# Patient Record
Sex: MALE | Race: WHITE | NOT HISPANIC OR LATINO | Employment: FULL TIME | URBAN - METROPOLITAN AREA
[De-identification: names, ages, dates, MRNs, and addresses within clinical notes are randomized per-mention and may not be internally consistent; named-entity substitution may affect disease eponyms.]

---

## 2020-11-12 RX ORDER — SODIUM, POTASSIUM,MAG SULFATES 17.5-3.13G
1 SOLUTION, RECONSTITUTED, ORAL ORAL DAILY
Qty: 1 KIT | Refills: 0 | Status: SHIPPED | OUTPATIENT
Start: 2020-11-12 | End: 2020-11-14

## 2020-12-28 ENCOUNTER — TELEPHONE (OUTPATIENT)
Dept: ENDOSCOPY | Facility: HOSPITAL | Age: 55
End: 2020-12-28

## 2020-12-28 ENCOUNTER — ANESTHESIA EVENT (OUTPATIENT)
Dept: ENDOSCOPY | Facility: HOSPITAL | Age: 55
End: 2020-12-28
Payer: COMMERCIAL

## 2020-12-28 NOTE — TELEPHONE ENCOUNTER
----- Message from Rossy Barba sent at 12/28/2020 11:34 AM CST -----  Regarding: Regarding procedure and Covid test  Contact: Narendra Ng:  Needs Medical Advice    Who Called: Pt  Would the patient rather a call back or a response via [facility]? Call back  Best Call Back Number: [phone]  Additional Information: Says he was told that he would get a rapid test the day of his procedure and need to speak with someone in office before he start the prep for his Endo visit on tomorrow morning

## 2020-12-29 ENCOUNTER — HOSPITAL ENCOUNTER (OUTPATIENT)
Facility: HOSPITAL | Age: 55
Discharge: HOME | End: 2020-12-29
Attending: STUDENT IN AN ORGANIZED HEALTH CARE EDUCATION/TRAINING PROGRAM | Admitting: STUDENT IN AN ORGANIZED HEALTH CARE EDUCATION/TRAINING PROGRAM
Payer: COMMERCIAL

## 2020-12-29 ENCOUNTER — ANESTHESIA (OUTPATIENT)
Dept: ENDOSCOPY | Facility: HOSPITAL | Age: 55
End: 2020-12-29
Payer: COMMERCIAL

## 2020-12-29 VITALS
OXYGEN SATURATION: 97 % | SYSTOLIC BLOOD PRESSURE: 138 MMHG | HEIGHT: 73 IN | WEIGHT: 295 LBS | TEMPERATURE: 98 F | RESPIRATION RATE: 20 BRPM | BODY MASS INDEX: 39.1 KG/M2 | DIASTOLIC BLOOD PRESSURE: 62 MMHG | HEART RATE: 77 BPM

## 2020-12-29 DIAGNOSIS — Z12.11 COLON CANCER SCREENING: ICD-10-CM

## 2020-12-29 LAB
POCT GLUCOSE: 123 MG/DL (ref 70–110)
SARS-COV-2 RDRP RESP QL NAA+PROBE: NEGATIVE

## 2020-12-29 PROCEDURE — 45385 COLONOSCOPY W/LESION REMOVAL: CPT | Mod: 33,,, | Performed by: STUDENT IN AN ORGANIZED HEALTH CARE EDUCATION/TRAINING PROGRAM

## 2020-12-29 PROCEDURE — 45385 COLONOSCOPY W/LESION REMOVAL: CPT | Performed by: STUDENT IN AN ORGANIZED HEALTH CARE EDUCATION/TRAINING PROGRAM

## 2020-12-29 PROCEDURE — 27201012 HC FORCEPS, HOT/COLD, DISP: Performed by: STUDENT IN AN ORGANIZED HEALTH CARE EDUCATION/TRAINING PROGRAM

## 2020-12-29 PROCEDURE — 27201089 HC SNARE, DISP (ANY): Performed by: STUDENT IN AN ORGANIZED HEALTH CARE EDUCATION/TRAINING PROGRAM

## 2020-12-29 PROCEDURE — 63600175 PHARM REV CODE 636 W HCPCS: Performed by: NURSE ANESTHETIST, CERTIFIED REGISTERED

## 2020-12-29 PROCEDURE — 88305 TISSUE EXAM BY PATHOLOGIST: ICD-10-PCS | Mod: 26,,, | Performed by: PATHOLOGY

## 2020-12-29 PROCEDURE — 27201028 HC NEEDLE, SCLERO: Performed by: STUDENT IN AN ORGANIZED HEALTH CARE EDUCATION/TRAINING PROGRAM

## 2020-12-29 PROCEDURE — 37000008 HC ANESTHESIA 1ST 15 MINUTES: Performed by: STUDENT IN AN ORGANIZED HEALTH CARE EDUCATION/TRAINING PROGRAM

## 2020-12-29 PROCEDURE — U0002 COVID-19 LAB TEST NON-CDC: HCPCS

## 2020-12-29 PROCEDURE — 45380 COLONOSCOPY AND BIOPSY: CPT | Mod: 59,,, | Performed by: STUDENT IN AN ORGANIZED HEALTH CARE EDUCATION/TRAINING PROGRAM

## 2020-12-29 PROCEDURE — D9220A PRA ANESTHESIA: ICD-10-PCS | Mod: 33,CRNA,, | Performed by: NURSE ANESTHETIST, CERTIFIED REGISTERED

## 2020-12-29 PROCEDURE — 45380 COLONOSCOPY AND BIOPSY: CPT | Performed by: STUDENT IN AN ORGANIZED HEALTH CARE EDUCATION/TRAINING PROGRAM

## 2020-12-29 PROCEDURE — 45380 PR COLONOSCOPY,BIOPSY: ICD-10-PCS | Mod: 59,,, | Performed by: STUDENT IN AN ORGANIZED HEALTH CARE EDUCATION/TRAINING PROGRAM

## 2020-12-29 PROCEDURE — 45381 PR COLONOSCPY,FLEX,W/DIR SUBMUC INJECT: ICD-10-PCS | Mod: 51,,, | Performed by: STUDENT IN AN ORGANIZED HEALTH CARE EDUCATION/TRAINING PROGRAM

## 2020-12-29 PROCEDURE — 37000009 HC ANESTHESIA EA ADD 15 MINS: Performed by: STUDENT IN AN ORGANIZED HEALTH CARE EDUCATION/TRAINING PROGRAM

## 2020-12-29 PROCEDURE — D9220A PRA ANESTHESIA: Mod: 33,ANES,, | Performed by: ANESTHESIOLOGY

## 2020-12-29 PROCEDURE — 45381 COLONOSCOPY SUBMUCOUS NJX: CPT | Performed by: STUDENT IN AN ORGANIZED HEALTH CARE EDUCATION/TRAINING PROGRAM

## 2020-12-29 PROCEDURE — 25000003 PHARM REV CODE 250: Performed by: ANESTHESIOLOGY

## 2020-12-29 PROCEDURE — 25000003 PHARM REV CODE 250: Performed by: NURSE ANESTHETIST, CERTIFIED REGISTERED

## 2020-12-29 PROCEDURE — 45381 COLONOSCOPY SUBMUCOUS NJX: CPT | Mod: 51,,, | Performed by: STUDENT IN AN ORGANIZED HEALTH CARE EDUCATION/TRAINING PROGRAM

## 2020-12-29 PROCEDURE — 88305 TISSUE EXAM BY PATHOLOGIST: CPT | Mod: 26,,, | Performed by: PATHOLOGY

## 2020-12-29 PROCEDURE — D9220A PRA ANESTHESIA: Mod: 33,CRNA,, | Performed by: NURSE ANESTHETIST, CERTIFIED REGISTERED

## 2020-12-29 PROCEDURE — 88305 TISSUE EXAM BY PATHOLOGIST: CPT | Mod: 59 | Performed by: PATHOLOGY

## 2020-12-29 PROCEDURE — D9220A PRA ANESTHESIA: ICD-10-PCS | Mod: 33,ANES,, | Performed by: ANESTHESIOLOGY

## 2020-12-29 PROCEDURE — 45385 PR COLONOSCOPY,REMV LESN,SNARE: ICD-10-PCS | Mod: 33,,, | Performed by: STUDENT IN AN ORGANIZED HEALTH CARE EDUCATION/TRAINING PROGRAM

## 2020-12-29 RX ORDER — LIDOCAINE HCL/PF 100 MG/5ML
SYRINGE (ML) INTRAVENOUS
Status: DISCONTINUED | OUTPATIENT
Start: 2020-12-29 | End: 2020-12-29

## 2020-12-29 RX ORDER — SODIUM CHLORIDE 9 MG/ML
INJECTION, SOLUTION INTRAVENOUS CONTINUOUS
Status: DISCONTINUED | OUTPATIENT
Start: 2020-12-30 | End: 2020-12-29 | Stop reason: HOSPADM

## 2020-12-29 RX ORDER — PROPOFOL 10 MG/ML
VIAL (ML) INTRAVENOUS
Status: DISCONTINUED | OUTPATIENT
Start: 2020-12-29 | End: 2020-12-29

## 2020-12-29 RX ORDER — LIDOCAINE HYDROCHLORIDE 10 MG/ML
1 INJECTION, SOLUTION EPIDURAL; INFILTRATION; INTRACAUDAL; PERINEURAL ONCE
Status: DISCONTINUED | OUTPATIENT
Start: 2020-12-30 | End: 2020-12-29 | Stop reason: HOSPADM

## 2020-12-29 RX ADMIN — PROPOFOL 120 MG: 10 INJECTION, EMULSION INTRAVENOUS at 10:12

## 2020-12-29 RX ADMIN — PROPOFOL 30 MG: 10 INJECTION, EMULSION INTRAVENOUS at 10:12

## 2020-12-29 RX ADMIN — PROPOFOL 40 MG: 10 INJECTION, EMULSION INTRAVENOUS at 10:12

## 2020-12-29 RX ADMIN — LIDOCAINE HYDROCHLORIDE 100 MG: 20 INJECTION, SOLUTION INTRAVENOUS at 10:12

## 2020-12-29 RX ADMIN — SODIUM CHLORIDE 10 ML/HR: 0.9 INJECTION, SOLUTION INTRAVENOUS at 09:12

## 2020-12-29 NOTE — TRANSFER OF CARE
"Anesthesia Transfer of Care Note    Patient: Narendra English    Procedure(s) Performed: Procedure(s) (LRB):  COLONOSCOPY (N/A)    Patient location: GI    Anesthesia Type: general    Transport from OR: Transported from OR on room air with adequate spontaneous ventilation    Post pain: adequate analgesia    Post assessment: no apparent anesthetic complications    Post vital signs: stable    Level of consciousness: sedated and responds to stimulation    Nausea/Vomiting: no nausea/vomiting    Complications: none    Transfer of care protocol was followed      Last vitals:   Visit Vitals  BP (!) 147/66 (BP Location: Right arm, Patient Position: Lying)   Pulse 75   Temp 36.9 °C (98.4 °F) (Oral)   Resp 20   Ht 6' 1" (1.854 m)   Wt 133.8 kg (295 lb)   SpO2 99%   BMI 38.92 kg/m²     "

## 2020-12-29 NOTE — ANESTHESIA PREPROCEDURE EVALUATION
12/29/2020  Narendra English is a 55 y.o., male.    Anesthesia Evaluation     I have reviewed the Nursing Notes.       Review of Systems  Anesthesia Hx:  No problems with previous Anesthesia   Social:  Non-Smoker    Cardiovascular:   Exercise tolerance: good Denies Pacemaker. Hypertension  Denies Valvular problems/Murmurs.  Denies MI.  Denies CAD.    Denies CABG/stent.  Denies Dysrhythmias.   Denies Angina.                Pulmonary:   Denies Pneumonia Denies COPD.  Denies Asthma.  Denies Shortness of breath.  Denies Recent URI. Sleep Apnea    Renal/:  Renal/ Normal     Hepatic/GI:   Bowel Prep. Denies PUD. Denies Hiatal Hernia.  Denies GERD. Denies Liver Disease.  Denies Hepatitis.    Neurological:  Neurology Normal    Endocrine:   Diabetes, type 2        Physical Exam  General:  Obesity    Airway/Jaw/Neck:  AIRWAY FINDINGS: Normal      Chest/Lungs:  Chest/Lungs Clear    Heart/Vascular:  Heart Findings: Normal       Mental Status:  Mental Status Findings: Normal        Anesthesia Plan  Type of Anesthesia, risks & benefits discussed:  Anesthesia Type:  general  Patient's Preference:   Intra-op Monitoring Plan: standard ASA monitors  Intra-op Monitoring Plan Comments:   Post Op Pain Control Plan:   Post Op Pain Control Plan Comments:   Induction:   IV  Beta Blocker:  Patient is not currently on a Beta-Blocker (No further documentation required).       Informed Consent: Patient understands risks and agrees with Anesthesia plan.  Questions answered. Anesthesia consent signed with patient.  ASA Score: 2     Day of Surgery Review of History & Physical:  There are no significant changes.  H&P update referred to the provider.         Ready For Surgery From Anesthesia Perspective.

## 2020-12-30 NOTE — ANESTHESIA POSTPROCEDURE EVALUATION
Anesthesia Post Evaluation    Patient: Narendra English    Procedure(s) Performed: Procedure(s) (LRB):  COLONOSCOPY (N/A)    Final Anesthesia Type: general      Patient location during evaluation: GI PACU  Patient participation: Yes- Able to Participate  Level of consciousness: awake and alert  Post-procedure vital signs: reviewed and stable  Pain management: adequate  Airway patency: patent    PONV status at discharge: No PONV  Anesthetic complications: no      Cardiovascular status: blood pressure returned to baseline and hemodynamically stable  Respiratory status: unassisted and spontaneous ventilation  Hydration status: euvolemic  Follow-up not needed.          Vitals Value Taken Time   /62 12/29/20 1109   Temp 36.6 °C (97.8 °F) 12/29/20 1039   Pulse 77 12/29/20 1109   Resp 20 12/29/20 1109   SpO2 97 % 12/29/20 1109         Event Time   Out of Recovery 11:31:41         Pain/Shayan Score: Shayan Score: 10 (12/29/2020 11:09 AM)

## 2021-01-04 ENCOUNTER — TELEPHONE (OUTPATIENT)
Dept: SURGERY | Facility: CLINIC | Age: 56
End: 2021-01-04

## 2021-01-04 DIAGNOSIS — C18.3 MALIGNANT NEOPLASM OF HEPATIC FLEXURE: Primary | ICD-10-CM

## 2021-01-04 LAB
FINAL PATHOLOGIC DIAGNOSIS: NORMAL
GROSS: NORMAL
Lab: NORMAL

## 2021-01-06 ENCOUNTER — HOSPITAL ENCOUNTER (OUTPATIENT)
Dept: RADIOLOGY | Facility: HOSPITAL | Age: 56
Discharge: HOME | End: 2021-01-06
Attending: COLON & RECTAL SURGERY
Payer: COMMERCIAL

## 2021-01-06 DIAGNOSIS — C18.3 MALIGNANT NEOPLASM OF HEPATIC FLEXURE: ICD-10-CM

## 2021-01-06 PROCEDURE — 71260 CT THORAX DX C+: CPT | Mod: TC

## 2021-01-06 PROCEDURE — 74177 CT ABD & PELVIS W/CONTRAST: CPT | Mod: TC

## 2021-01-06 PROCEDURE — 25500020 PHARM REV CODE 255: Performed by: COLON & RECTAL SURGERY

## 2021-01-06 RX ADMIN — IOHEXOL 100 ML: 350 INJECTION, SOLUTION INTRAVENOUS at 02:01

## 2021-01-08 ENCOUNTER — OFFICE VISIT (OUTPATIENT)
Dept: SURGERY | Facility: CLINIC | Age: 56
End: 2021-01-08
Attending: COLON & RECTAL SURGERY
Payer: COMMERCIAL

## 2021-01-08 ENCOUNTER — HOSPITAL ENCOUNTER (OUTPATIENT)
Dept: CARDIOLOGY | Facility: CLINIC | Age: 56
Discharge: HOME | End: 2021-01-08
Attending: COLON & RECTAL SURGERY
Payer: COMMERCIAL

## 2021-01-08 VITALS
BODY MASS INDEX: 38.54 KG/M2 | HEIGHT: 73 IN | SYSTOLIC BLOOD PRESSURE: 128 MMHG | DIASTOLIC BLOOD PRESSURE: 88 MMHG | WEIGHT: 290.81 LBS | HEART RATE: 82 BPM

## 2021-01-08 DIAGNOSIS — Z01.818 PRE-OP EVALUATION: ICD-10-CM

## 2021-01-08 DIAGNOSIS — Z01.818 PRE-OP EVALUATION: Primary | ICD-10-CM

## 2021-01-08 DIAGNOSIS — C18.3 MALIGNANT NEOPLASM OF HEPATIC FLEXURE: Primary | ICD-10-CM

## 2021-01-08 PROCEDURE — 1126F PR PAIN SEVERITY QUANTIFIED, NO PAIN PRESENT: ICD-10-PCS | Mod: S$GLB,,, | Performed by: COLON & RECTAL SURGERY

## 2021-01-08 PROCEDURE — 99999 PR PBB SHADOW E&M-EST. PATIENT-LVL IV: ICD-10-PCS | Mod: PBBFAC,,, | Performed by: COLON & RECTAL SURGERY

## 2021-01-08 PROCEDURE — 93005 ELECTROCARDIOGRAM TRACING: CPT | Mod: S$GLB,,, | Performed by: COLON & RECTAL SURGERY

## 2021-01-08 PROCEDURE — 3008F BODY MASS INDEX DOCD: CPT | Mod: CPTII,S$GLB,, | Performed by: COLON & RECTAL SURGERY

## 2021-01-08 PROCEDURE — 93005 EKG 12-LEAD: ICD-10-PCS | Mod: S$GLB,,, | Performed by: COLON & RECTAL SURGERY

## 2021-01-08 PROCEDURE — 99204 OFFICE O/P NEW MOD 45 MIN: CPT | Mod: S$GLB,,, | Performed by: COLON & RECTAL SURGERY

## 2021-01-08 PROCEDURE — 99204 PR OFFICE/OUTPT VISIT, NEW, LEVL IV, 45-59 MIN: ICD-10-PCS | Mod: S$GLB,,, | Performed by: COLON & RECTAL SURGERY

## 2021-01-08 PROCEDURE — 99999 PR PBB SHADOW E&M-EST. PATIENT-LVL IV: CPT | Mod: PBBFAC,,, | Performed by: COLON & RECTAL SURGERY

## 2021-01-08 PROCEDURE — 3008F PR BODY MASS INDEX (BMI) DOCUMENTED: ICD-10-PCS | Mod: CPTII,S$GLB,, | Performed by: COLON & RECTAL SURGERY

## 2021-01-08 PROCEDURE — 93010 ELECTROCARDIOGRAM REPORT: CPT | Mod: S$GLB,,, | Performed by: INTERNAL MEDICINE

## 2021-01-08 PROCEDURE — 1126F AMNT PAIN NOTED NONE PRSNT: CPT | Mod: S$GLB,,, | Performed by: COLON & RECTAL SURGERY

## 2021-01-08 PROCEDURE — 93010 EKG 12-LEAD: ICD-10-PCS | Mod: S$GLB,,, | Performed by: INTERNAL MEDICINE

## 2021-01-08 RX ORDER — BENAZEPRIL HYDROCHLORIDE 40 MG/1
40 TABLET ORAL DAILY
COMMUNITY
End: 2021-07-14

## 2021-01-08 RX ORDER — ZOLPIDEM TARTRATE 12.5 MG/1
12.5 TABLET, FILM COATED, EXTENDED RELEASE ORAL NIGHTLY PRN
COMMUNITY
End: 2021-07-15

## 2021-01-08 RX ORDER — METFORMIN HYDROCHLORIDE 1000 MG/1
1000 TABLET ORAL 2 TIMES DAILY WITH MEALS
COMMUNITY
End: 2021-07-15 | Stop reason: SDUPTHER

## 2021-01-08 RX ORDER — ATORVASTATIN CALCIUM 40 MG/1
40 TABLET, FILM COATED ORAL DAILY
COMMUNITY
End: 2021-07-15 | Stop reason: SDUPTHER

## 2021-01-08 RX ORDER — METRONIDAZOLE 500 MG/1
TABLET ORAL
Qty: 3 TABLET | Refills: 0 | Status: SHIPPED | OUTPATIENT
Start: 2021-01-08 | End: 2021-01-13 | Stop reason: SDUPTHER

## 2021-01-08 RX ORDER — NEOMYCIN SULFATE 500 MG/1
TABLET ORAL
Qty: 6 TABLET | Refills: 0 | Status: SHIPPED | OUTPATIENT
Start: 2021-01-08 | End: 2021-01-13 | Stop reason: SDUPTHER

## 2021-01-08 RX ORDER — POLYETHYLENE GLYCOL 3350 17 G/17G
POWDER, FOR SOLUTION ORAL
Qty: 238 G | Refills: 0 | Status: SHIPPED | OUTPATIENT
Start: 2021-01-08 | End: 2021-01-13 | Stop reason: SDUPTHER

## 2021-01-08 RX ORDER — COLCHICINE 0.6 MG/1
0.6 TABLET ORAL 3 TIMES DAILY PRN
COMMUNITY
End: 2021-02-25 | Stop reason: SDUPTHER

## 2021-01-08 RX ORDER — LEVOCETIRIZINE DIHYDROCHLORIDE 5 MG/1
5 TABLET, FILM COATED ORAL NIGHTLY
COMMUNITY
End: 2022-11-01

## 2021-01-08 RX ORDER — LEVOTHYROXINE SODIUM 112 UG/1
112 TABLET ORAL
COMMUNITY
End: 2021-07-15 | Stop reason: SDUPTHER

## 2021-01-08 RX ORDER — TESTOSTERONE GEL, 1% 10 MG/G
1.62 GEL TRANSDERMAL DAILY
COMMUNITY
End: 2022-04-20 | Stop reason: SDUPTHER

## 2021-01-13 ENCOUNTER — PATIENT MESSAGE (OUTPATIENT)
Dept: SURGERY | Facility: HOSPITAL | Age: 56
End: 2021-01-13

## 2021-01-13 RX ORDER — NEOMYCIN SULFATE 500 MG/1
TABLET ORAL
Qty: 6 TABLET | Refills: 0 | Status: SHIPPED | OUTPATIENT
Start: 2021-01-13 | End: 2021-02-25 | Stop reason: SDUPTHER

## 2021-01-13 RX ORDER — METRONIDAZOLE 500 MG/1
TABLET ORAL
Qty: 3 TABLET | Refills: 0 | Status: SHIPPED | OUTPATIENT
Start: 2021-01-13 | End: 2021-02-25 | Stop reason: SDUPTHER

## 2021-01-13 RX ORDER — POLYETHYLENE GLYCOL 3350 17 G/17G
POWDER, FOR SOLUTION ORAL
Qty: 238 G | Refills: 0 | Status: SHIPPED | OUTPATIENT
Start: 2021-01-13 | End: 2021-02-25 | Stop reason: SDUPTHER

## 2021-01-22 ENCOUNTER — LAB VISIT (OUTPATIENT)
Dept: LAB | Facility: HOSPITAL | Age: 56
End: 2021-01-22
Attending: INTERNAL MEDICINE
Payer: COMMERCIAL

## 2021-01-22 DIAGNOSIS — C18.3 MALIGNANT NEOPLASM OF HEPATIC FLEXURE: ICD-10-CM

## 2021-01-22 LAB
ANION GAP SERPL CALC-SCNC: 10 MMOL/L (ref 8–16)
BASOPHILS # BLD AUTO: 0.07 K/UL (ref 0–0.2)
BASOPHILS NFR BLD: 0.8 % (ref 0–1.9)
BUN SERPL-MCNC: 13 MG/DL (ref 6–20)
CALCIUM SERPL-MCNC: 8.9 MG/DL (ref 8.7–10.5)
CHLORIDE SERPL-SCNC: 109 MMOL/L (ref 95–110)
CO2 SERPL-SCNC: 23 MMOL/L (ref 23–29)
CREAT SERPL-MCNC: 1.3 MG/DL (ref 0.5–1.4)
DIFFERENTIAL METHOD: ABNORMAL
EOSINOPHIL # BLD AUTO: 0.2 K/UL (ref 0–0.5)
EOSINOPHIL NFR BLD: 1.7 % (ref 0–8)
ERYTHROCYTE [DISTWIDTH] IN BLOOD BY AUTOMATED COUNT: 13.5 % (ref 11.5–14.5)
EST. GFR  (AFRICAN AMERICAN): >60 ML/MIN/1.73 M^2
EST. GFR  (NON AFRICAN AMERICAN): >60 ML/MIN/1.73 M^2
GLUCOSE SERPL-MCNC: 153 MG/DL (ref 70–110)
HCT VFR BLD AUTO: 37.1 % (ref 40–54)
HGB BLD-MCNC: 11.6 G/DL (ref 14–18)
IMM GRANULOCYTES # BLD AUTO: 0.07 K/UL (ref 0–0.04)
IMM GRANULOCYTES NFR BLD AUTO: 0.8 % (ref 0–0.5)
LYMPHOCYTES # BLD AUTO: 2 K/UL (ref 1–4.8)
LYMPHOCYTES NFR BLD: 21.3 % (ref 18–48)
MCH RBC QN AUTO: 29.8 PG (ref 27–31)
MCHC RBC AUTO-ENTMCNC: 31.3 G/DL (ref 32–36)
MCV RBC AUTO: 95 FL (ref 82–98)
MONOCYTES # BLD AUTO: 0.9 K/UL (ref 0.3–1)
MONOCYTES NFR BLD: 9.9 % (ref 4–15)
NEUTROPHILS # BLD AUTO: 6.1 K/UL (ref 1.8–7.7)
NEUTROPHILS NFR BLD: 65.5 % (ref 38–73)
NRBC BLD-RTO: 0 /100 WBC
PLATELET # BLD AUTO: 378 K/UL (ref 150–350)
PMV BLD AUTO: 10.2 FL (ref 9.2–12.9)
POTASSIUM SERPL-SCNC: 4.4 MMOL/L (ref 3.5–5.1)
RBC # BLD AUTO: 3.89 M/UL (ref 4.6–6.2)
SODIUM SERPL-SCNC: 142 MMOL/L (ref 136–145)
WBC # BLD AUTO: 9.27 K/UL (ref 3.9–12.7)

## 2021-01-22 PROCEDURE — 85025 COMPLETE CBC W/AUTO DIFF WBC: CPT

## 2021-01-22 PROCEDURE — 80048 BASIC METABOLIC PNL TOTAL CA: CPT

## 2021-01-22 PROCEDURE — 36415 COLL VENOUS BLD VENIPUNCTURE: CPT

## 2021-02-01 ENCOUNTER — TELEPHONE (OUTPATIENT)
Dept: SURGERY | Facility: CLINIC | Age: 56
End: 2021-02-01

## 2021-02-01 ENCOUNTER — ANESTHESIA EVENT (OUTPATIENT)
Dept: SURGERY | Facility: HOSPITAL | Age: 56
DRG: 331 | End: 2021-02-01
Payer: COMMERCIAL

## 2021-02-01 ENCOUNTER — PATIENT MESSAGE (OUTPATIENT)
Dept: SURGERY | Facility: HOSPITAL | Age: 56
End: 2021-02-01

## 2021-02-01 ENCOUNTER — LAB VISIT (OUTPATIENT)
Dept: LAB | Facility: HOSPITAL | Age: 56
End: 2021-02-01
Attending: COLON & RECTAL SURGERY
Payer: COMMERCIAL

## 2021-02-01 DIAGNOSIS — C18.3 MALIGNANT NEOPLASM OF HEPATIC FLEXURE: Primary | ICD-10-CM

## 2021-02-01 LAB
ABO + RH BLD: NORMAL
BLD GP AB SCN CELLS X3 SERPL QL: NORMAL

## 2021-02-01 PROCEDURE — 86900 BLOOD TYPING SEROLOGIC ABO: CPT

## 2021-02-01 PROCEDURE — 36415 COLL VENOUS BLD VENIPUNCTURE: CPT

## 2021-02-02 ENCOUNTER — ANESTHESIA (OUTPATIENT)
Dept: SURGERY | Facility: HOSPITAL | Age: 56
DRG: 331 | End: 2021-02-02
Payer: COMMERCIAL

## 2021-02-02 ENCOUNTER — HOSPITAL ENCOUNTER (INPATIENT)
Facility: HOSPITAL | Age: 56
LOS: 2 days | Discharge: HOME | DRG: 331 | End: 2021-02-04
Attending: COLON & RECTAL SURGERY | Admitting: COLON & RECTAL SURGERY
Payer: COMMERCIAL

## 2021-02-02 DIAGNOSIS — C18.9 MALIGNANT NEOPLASM OF COLON, UNSPECIFIED PART OF COLON: Primary | ICD-10-CM

## 2021-02-02 DIAGNOSIS — C18.9 COLON CANCER: ICD-10-CM

## 2021-02-02 PROBLEM — E03.9 HYPOTHYROID: Status: ACTIVE | Noted: 2021-02-02

## 2021-02-02 PROBLEM — Z79.4 TYPE 2 DIABETES MELLITUS WITH CHRONIC KIDNEY DISEASE, WITH LONG-TERM CURRENT USE OF INSULIN: Status: ACTIVE | Noted: 2021-02-02

## 2021-02-02 PROBLEM — E11.22 TYPE 2 DIABETES MELLITUS WITH CHRONIC KIDNEY DISEASE, WITH LONG-TERM CURRENT USE OF INSULIN: Status: ACTIVE | Noted: 2021-02-02

## 2021-02-02 LAB
ABO + RH BLD: NORMAL
BLD GP AB SCN CELLS X3 SERPL QL: NORMAL
POCT GLUCOSE: 108 MG/DL (ref 70–110)
POCT GLUCOSE: 157 MG/DL (ref 70–110)
POCT GLUCOSE: 163 MG/DL (ref 70–110)
POCT GLUCOSE: 163 MG/DL (ref 70–110)
SARS-COV-2 RDRP RESP QL NAA+PROBE: NEGATIVE

## 2021-02-02 PROCEDURE — 94761 N-INVAS EAR/PLS OXIMETRY MLT: CPT

## 2021-02-02 PROCEDURE — 25000003 PHARM REV CODE 250: Performed by: NURSE PRACTITIONER

## 2021-02-02 PROCEDURE — 88341 PR IHC OR ICC EACH ADD'L SINGLE ANTIBODY  STAINPR: ICD-10-PCS | Mod: 26,,, | Performed by: PATHOLOGY

## 2021-02-02 PROCEDURE — 25000003 PHARM REV CODE 250: Performed by: NURSE ANESTHETIST, CERTIFIED REGISTERED

## 2021-02-02 PROCEDURE — 25000003 PHARM REV CODE 250: Performed by: GENERAL ACUTE CARE HOSPITAL

## 2021-02-02 PROCEDURE — 88309 TISSUE EXAM BY PATHOLOGIST: CPT | Performed by: PATHOLOGY

## 2021-02-02 PROCEDURE — 37000009 HC ANESTHESIA EA ADD 15 MINS: Performed by: COLON & RECTAL SURGERY

## 2021-02-02 PROCEDURE — 88309 TISSUE EXAM BY PATHOLOGIST: CPT | Mod: 26,,, | Performed by: PATHOLOGY

## 2021-02-02 PROCEDURE — D9220A PRA ANESTHESIA: Mod: ANES,,, | Performed by: STUDENT IN AN ORGANIZED HEALTH CARE EDUCATION/TRAINING PROGRAM

## 2021-02-02 PROCEDURE — 82962 GLUCOSE BLOOD TEST: CPT | Performed by: COLON & RECTAL SURGERY

## 2021-02-02 PROCEDURE — D9220A PRA ANESTHESIA: ICD-10-PCS | Mod: ANES,,, | Performed by: STUDENT IN AN ORGANIZED HEALTH CARE EDUCATION/TRAINING PROGRAM

## 2021-02-02 PROCEDURE — 88309 PR  SURG PATH,LEVEL VI: ICD-10-PCS | Mod: 26,,, | Performed by: PATHOLOGY

## 2021-02-02 PROCEDURE — 71000039 HC RECOVERY, EACH ADD'L HOUR: Performed by: COLON & RECTAL SURGERY

## 2021-02-02 PROCEDURE — 63600175 PHARM REV CODE 636 W HCPCS: Performed by: NURSE PRACTITIONER

## 2021-02-02 PROCEDURE — 25000003 PHARM REV CODE 250: Performed by: COLON & RECTAL SURGERY

## 2021-02-02 PROCEDURE — 94799 UNLISTED PULMONARY SVC/PX: CPT

## 2021-02-02 PROCEDURE — 88341 IMHCHEM/IMCYTCHM EA ADD ANTB: CPT | Mod: 26,,, | Performed by: PATHOLOGY

## 2021-02-02 PROCEDURE — 25000003 PHARM REV CODE 250: Performed by: STUDENT IN AN ORGANIZED HEALTH CARE EDUCATION/TRAINING PROGRAM

## 2021-02-02 PROCEDURE — 63600175 PHARM REV CODE 636 W HCPCS: Performed by: NURSE ANESTHETIST, CERTIFIED REGISTERED

## 2021-02-02 PROCEDURE — 99223 1ST HOSP IP/OBS HIGH 75: CPT | Mod: ,,, | Performed by: INTERNAL MEDICINE

## 2021-02-02 PROCEDURE — 20600001 HC STEP DOWN PRIVATE ROOM

## 2021-02-02 PROCEDURE — 71000015 HC POSTOP RECOV 1ST HR: Performed by: COLON & RECTAL SURGERY

## 2021-02-02 PROCEDURE — U0002 COVID-19 LAB TEST NON-CDC: HCPCS

## 2021-02-02 PROCEDURE — 71000016 HC POSTOP RECOV ADDL HR: Performed by: COLON & RECTAL SURGERY

## 2021-02-02 PROCEDURE — 27201423 OPTIME MED/SURG SUP & DEVICES STERILE SUPPLY: Performed by: COLON & RECTAL SURGERY

## 2021-02-02 PROCEDURE — D9220A PRA ANESTHESIA: Mod: CRNA,,, | Performed by: NURSE ANESTHETIST, CERTIFIED REGISTERED

## 2021-02-02 PROCEDURE — 88342 IMHCHEM/IMCYTCHM 1ST ANTB: CPT | Performed by: PATHOLOGY

## 2021-02-02 PROCEDURE — 71000033 HC RECOVERY, INTIAL HOUR: Performed by: COLON & RECTAL SURGERY

## 2021-02-02 PROCEDURE — 63600175 PHARM REV CODE 636 W HCPCS: Performed by: STUDENT IN AN ORGANIZED HEALTH CARE EDUCATION/TRAINING PROGRAM

## 2021-02-02 PROCEDURE — 99900035 HC TECH TIME PER 15 MIN (STAT)

## 2021-02-02 PROCEDURE — 37000008 HC ANESTHESIA 1ST 15 MINUTES: Performed by: COLON & RECTAL SURGERY

## 2021-02-02 PROCEDURE — 44205 PR LAP,SURG,COLECTOMY,W/REMVL TERM ILEUM: ICD-10-PCS | Mod: ,,, | Performed by: COLON & RECTAL SURGERY

## 2021-02-02 PROCEDURE — 88341 IMHCHEM/IMCYTCHM EA ADD ANTB: CPT | Performed by: PATHOLOGY

## 2021-02-02 PROCEDURE — 36000710: Performed by: COLON & RECTAL SURGERY

## 2021-02-02 PROCEDURE — D9220A PRA ANESTHESIA: ICD-10-PCS | Mod: CRNA,,, | Performed by: NURSE ANESTHETIST, CERTIFIED REGISTERED

## 2021-02-02 PROCEDURE — 99223 PR INITIAL HOSPITAL CARE,LEVL III: ICD-10-PCS | Mod: ,,, | Performed by: INTERNAL MEDICINE

## 2021-02-02 PROCEDURE — 86900 BLOOD TYPING SEROLOGIC ABO: CPT

## 2021-02-02 PROCEDURE — S0030 INJECTION, METRONIDAZOLE: HCPCS | Performed by: STUDENT IN AN ORGANIZED HEALTH CARE EDUCATION/TRAINING PROGRAM

## 2021-02-02 PROCEDURE — C9399 UNCLASSIFIED DRUGS OR BIOLOG: HCPCS | Performed by: GENERAL ACUTE CARE HOSPITAL

## 2021-02-02 PROCEDURE — 88342 IMHCHEM/IMCYTCHM 1ST ANTB: CPT | Mod: 26,,, | Performed by: PATHOLOGY

## 2021-02-02 PROCEDURE — 44205 LAP COLECTOMY PART W/ILEUM: CPT | Mod: ,,, | Performed by: COLON & RECTAL SURGERY

## 2021-02-02 PROCEDURE — S0030 INJECTION, METRONIDAZOLE: HCPCS | Performed by: NURSE PRACTITIONER

## 2021-02-02 PROCEDURE — 36000711: Performed by: COLON & RECTAL SURGERY

## 2021-02-02 PROCEDURE — 88342 CHG IMMUNOCYTOCHEMISTRY: ICD-10-PCS | Mod: 26,,, | Performed by: PATHOLOGY

## 2021-02-02 RX ORDER — FENTANYL CITRATE 50 UG/ML
INJECTION, SOLUTION INTRAMUSCULAR; INTRAVENOUS
Status: DISCONTINUED | OUTPATIENT
Start: 2021-02-02 | End: 2021-02-02

## 2021-02-02 RX ORDER — ACETAMINOPHEN 650 MG/20.3ML
975 LIQUID ORAL
Status: COMPLETED | OUTPATIENT
Start: 2021-02-02 | End: 2021-02-02

## 2021-02-02 RX ORDER — ACETAMINOPHEN 500 MG
1000 TABLET ORAL EVERY 8 HOURS
Status: DISCONTINUED | OUTPATIENT
Start: 2021-02-03 | End: 2021-02-04 | Stop reason: HOSPADM

## 2021-02-02 RX ORDER — METRONIDAZOLE 500 MG/100ML
500 INJECTION, SOLUTION INTRAVENOUS
Status: COMPLETED | OUTPATIENT
Start: 2021-02-02 | End: 2021-02-02

## 2021-02-02 RX ORDER — MIDAZOLAM HYDROCHLORIDE 1 MG/ML
INJECTION, SOLUTION INTRAMUSCULAR; INTRAVENOUS
Status: DISCONTINUED | OUTPATIENT
Start: 2021-02-02 | End: 2021-02-02

## 2021-02-02 RX ORDER — IBUPROFEN 200 MG
24 TABLET ORAL
Status: DISCONTINUED | OUTPATIENT
Start: 2021-02-02 | End: 2021-02-02

## 2021-02-02 RX ORDER — SODIUM CHLORIDE 0.9 % (FLUSH) 0.9 %
10 SYRINGE (ML) INJECTION
Status: DISCONTINUED | OUTPATIENT
Start: 2021-02-02 | End: 2021-02-02 | Stop reason: HOSPADM

## 2021-02-02 RX ORDER — METRONIDAZOLE 500 MG/100ML
500 INJECTION, SOLUTION INTRAVENOUS
Status: COMPLETED | OUTPATIENT
Start: 2021-02-02 | End: 2021-02-03

## 2021-02-02 RX ORDER — FENTANYL CITRATE 50 UG/ML
25 INJECTION, SOLUTION INTRAMUSCULAR; INTRAVENOUS EVERY 5 MIN PRN
Status: COMPLETED | OUTPATIENT
Start: 2021-02-02 | End: 2021-02-02

## 2021-02-02 RX ORDER — ALVIMOPAN 12 MG/1
12 CAPSULE ORAL 2 TIMES DAILY
Status: DISCONTINUED | OUTPATIENT
Start: 2021-02-03 | End: 2021-02-04 | Stop reason: HOSPADM

## 2021-02-02 RX ORDER — ENOXAPARIN SODIUM 100 MG/ML
40 INJECTION SUBCUTANEOUS EVERY 24 HOURS
Status: DISCONTINUED | OUTPATIENT
Start: 2021-02-02 | End: 2021-02-04 | Stop reason: HOSPADM

## 2021-02-02 RX ORDER — GLUCAGON 1 MG
1 KIT INJECTION
Status: DISCONTINUED | OUTPATIENT
Start: 2021-02-02 | End: 2021-02-02

## 2021-02-02 RX ORDER — MUPIROCIN 20 MG/G
1 OINTMENT TOPICAL
Status: COMPLETED | OUTPATIENT
Start: 2021-02-02 | End: 2021-02-02

## 2021-02-02 RX ORDER — SODIUM CHLORIDE 9 MG/ML
INJECTION, SOLUTION INTRAVENOUS
Status: COMPLETED | OUTPATIENT
Start: 2021-02-02 | End: 2021-02-02

## 2021-02-02 RX ORDER — IBUPROFEN 400 MG/1
800 TABLET ORAL EVERY 8 HOURS
Status: DISCONTINUED | OUTPATIENT
Start: 2021-02-03 | End: 2021-02-04 | Stop reason: HOSPADM

## 2021-02-02 RX ORDER — LEVOTHYROXINE SODIUM 112 UG/1
112 TABLET ORAL
Status: DISCONTINUED | OUTPATIENT
Start: 2021-02-03 | End: 2021-02-04 | Stop reason: HOSPADM

## 2021-02-02 RX ORDER — OXYCODONE HYDROCHLORIDE 5 MG/1
5 TABLET ORAL EVERY 6 HOURS PRN
Status: DISCONTINUED | OUTPATIENT
Start: 2021-02-02 | End: 2021-02-04 | Stop reason: HOSPADM

## 2021-02-02 RX ORDER — LIDOCAINE HYDROCHLORIDE ANHYDROUS AND DEXTROSE MONOHYDRATE .8; 5 G/100ML; G/100ML
INJECTION, SOLUTION INTRAVENOUS CONTINUOUS PRN
Status: DISCONTINUED | OUTPATIENT
Start: 2021-02-02 | End: 2021-02-02

## 2021-02-02 RX ORDER — PHENYLEPHRINE HYDROCHLORIDE 10 MG/ML
INJECTION INTRAVENOUS
Status: DISCONTINUED | OUTPATIENT
Start: 2021-02-02 | End: 2021-02-02

## 2021-02-02 RX ORDER — PROPOFOL 10 MG/ML
VIAL (ML) INTRAVENOUS
Status: DISCONTINUED | OUTPATIENT
Start: 2021-02-02 | End: 2021-02-02

## 2021-02-02 RX ORDER — ROCURONIUM BROMIDE 10 MG/ML
INJECTION, SOLUTION INTRAVENOUS
Status: DISCONTINUED | OUTPATIENT
Start: 2021-02-02 | End: 2021-02-02

## 2021-02-02 RX ORDER — SODIUM CHLORIDE 9 MG/ML
INJECTION, SOLUTION INTRAVENOUS CONTINUOUS
Status: DISCONTINUED | OUTPATIENT
Start: 2021-02-02 | End: 2021-02-03

## 2021-02-02 RX ORDER — DIPHENHYDRAMINE HCL 25 MG
25 CAPSULE ORAL EVERY 6 HOURS PRN
Status: DISCONTINUED | OUTPATIENT
Start: 2021-02-02 | End: 2021-02-04 | Stop reason: HOSPADM

## 2021-02-02 RX ORDER — OXYCODONE HYDROCHLORIDE 10 MG/1
10 TABLET ORAL EVERY 4 HOURS PRN
Status: DISCONTINUED | OUTPATIENT
Start: 2021-02-02 | End: 2021-02-04 | Stop reason: HOSPADM

## 2021-02-02 RX ORDER — HALOPERIDOL 5 MG/ML
0.5 INJECTION INTRAMUSCULAR EVERY 10 MIN PRN
Status: DISCONTINUED | OUTPATIENT
Start: 2021-02-02 | End: 2021-02-02 | Stop reason: HOSPADM

## 2021-02-02 RX ORDER — GLUCAGON 1 MG
1 KIT INJECTION
Status: DISCONTINUED | OUTPATIENT
Start: 2021-02-02 | End: 2021-02-04 | Stop reason: HOSPADM

## 2021-02-02 RX ORDER — SUCCINYLCHOLINE CHLORIDE 20 MG/ML
INJECTION INTRAMUSCULAR; INTRAVENOUS
Status: DISCONTINUED | OUTPATIENT
Start: 2021-02-02 | End: 2021-02-02

## 2021-02-02 RX ORDER — DEXAMETHASONE SODIUM PHOSPHATE 4 MG/ML
INJECTION, SOLUTION INTRA-ARTICULAR; INTRALESIONAL; INTRAMUSCULAR; INTRAVENOUS; SOFT TISSUE
Status: DISCONTINUED | OUTPATIENT
Start: 2021-02-02 | End: 2021-02-02

## 2021-02-02 RX ORDER — ESMOLOL HYDROCHLORIDE 10 MG/ML
INJECTION INTRAVENOUS
Status: DISCONTINUED | OUTPATIENT
Start: 2021-02-02 | End: 2021-02-02

## 2021-02-02 RX ORDER — ACETAMINOPHEN 10 MG/ML
1000 INJECTION, SOLUTION INTRAVENOUS EVERY 8 HOURS
Status: COMPLETED | OUTPATIENT
Start: 2021-02-02 | End: 2021-02-03

## 2021-02-02 RX ORDER — INSULIN ASPART 100 [IU]/ML
0-5 INJECTION, SOLUTION INTRAVENOUS; SUBCUTANEOUS EVERY 6 HOURS PRN
Status: DISCONTINUED | OUTPATIENT
Start: 2021-02-02 | End: 2021-02-03

## 2021-02-02 RX ORDER — IBUPROFEN 200 MG
16 TABLET ORAL
Status: DISCONTINUED | OUTPATIENT
Start: 2021-02-02 | End: 2021-02-02

## 2021-02-02 RX ORDER — LIDOCAINE HYDROCHLORIDE 10 MG/ML
1 INJECTION, SOLUTION EPIDURAL; INFILTRATION; INTRACAUDAL; PERINEURAL
Status: COMPLETED | OUTPATIENT
Start: 2021-02-02 | End: 2021-02-02

## 2021-02-02 RX ORDER — GABAPENTIN 300 MG/1
300 CAPSULE ORAL 3 TIMES DAILY
Status: DISCONTINUED | OUTPATIENT
Start: 2021-02-02 | End: 2021-02-02

## 2021-02-02 RX ORDER — SODIUM CHLORIDE 0.9 % (FLUSH) 0.9 %
10 SYRINGE (ML) INJECTION EVERY 6 HOURS PRN
Status: DISCONTINUED | OUTPATIENT
Start: 2021-02-02 | End: 2021-02-04 | Stop reason: HOSPADM

## 2021-02-02 RX ORDER — TRIPROLIDINE/PSEUDOEPHEDRINE 2.5MG-60MG
600 TABLET ORAL
Status: COMPLETED | OUTPATIENT
Start: 2021-02-02 | End: 2021-02-02

## 2021-02-02 RX ORDER — OXYCODONE HYDROCHLORIDE 5 MG/1
5 TABLET ORAL
Status: DISCONTINUED | OUTPATIENT
Start: 2021-02-02 | End: 2021-02-02 | Stop reason: HOSPADM

## 2021-02-02 RX ORDER — BUPIVACAINE HYDROCHLORIDE 2.5 MG/ML
INJECTION, SOLUTION EPIDURAL; INFILTRATION; INTRACAUDAL
Status: DISCONTINUED | OUTPATIENT
Start: 2021-02-02 | End: 2021-02-02

## 2021-02-02 RX ORDER — ONDANSETRON 8 MG/1
8 TABLET, ORALLY DISINTEGRATING ORAL EVERY 8 HOURS PRN
Status: DISCONTINUED | OUTPATIENT
Start: 2021-02-02 | End: 2021-02-04 | Stop reason: HOSPADM

## 2021-02-02 RX ORDER — KETAMINE HCL IN 0.9 % NACL 50 MG/5 ML
SYRINGE (ML) INTRAVENOUS
Status: DISCONTINUED | OUTPATIENT
Start: 2021-02-02 | End: 2021-02-02

## 2021-02-02 RX ORDER — OXYCODONE HYDROCHLORIDE 5 MG/1
5 TABLET ORAL EVERY 6 HOURS PRN
Status: DISCONTINUED | OUTPATIENT
Start: 2021-02-02 | End: 2021-02-02

## 2021-02-02 RX ORDER — GABAPENTIN 300 MG/1
300 CAPSULE ORAL 3 TIMES DAILY
Status: DISCONTINUED | OUTPATIENT
Start: 2021-02-02 | End: 2023-03-08

## 2021-02-02 RX ORDER — INSULIN ASPART 100 [IU]/ML
1-10 INJECTION, SOLUTION INTRAVENOUS; SUBCUTANEOUS EVERY 6 HOURS PRN
Status: DISCONTINUED | OUTPATIENT
Start: 2021-02-02 | End: 2021-02-02

## 2021-02-02 RX ORDER — HEPARIN SODIUM 5000 [USP'U]/ML
5000 INJECTION, SOLUTION INTRAVENOUS; SUBCUTANEOUS EVERY 8 HOURS
Status: COMPLETED | OUTPATIENT
Start: 2021-02-02 | End: 2021-02-02

## 2021-02-02 RX ORDER — ALVIMOPAN 12 MG/1
12 CAPSULE ORAL ONCE
Status: COMPLETED | OUTPATIENT
Start: 2021-02-02 | End: 2021-02-02

## 2021-02-02 RX ORDER — GABAPENTIN 300 MG/1
300 CAPSULE ORAL
Status: COMPLETED | OUTPATIENT
Start: 2021-02-02 | End: 2021-02-02

## 2021-02-02 RX ORDER — INSULIN ASPART 100 [IU]/ML
1-10 INJECTION, SOLUTION INTRAVENOUS; SUBCUTANEOUS
Status: DISCONTINUED | OUTPATIENT
Start: 2021-02-02 | End: 2021-02-02

## 2021-02-02 RX ORDER — HYDROMORPHONE HYDROCHLORIDE 2 MG/ML
INJECTION, SOLUTION INTRAMUSCULAR; INTRAVENOUS; SUBCUTANEOUS
Status: DISCONTINUED | OUTPATIENT
Start: 2021-02-02 | End: 2021-02-02

## 2021-02-02 RX ORDER — MUPIROCIN 20 MG/G
OINTMENT TOPICAL 2 TIMES DAILY
Status: DISCONTINUED | OUTPATIENT
Start: 2021-02-02 | End: 2021-02-04 | Stop reason: HOSPADM

## 2021-02-02 RX ORDER — TRAMADOL HYDROCHLORIDE 50 MG/1
50 TABLET ORAL EVERY 6 HOURS PRN
Status: DISCONTINUED | OUTPATIENT
Start: 2021-02-02 | End: 2021-02-04 | Stop reason: HOSPADM

## 2021-02-02 RX ORDER — CIPROFLOXACIN 2 MG/ML
400 INJECTION, SOLUTION INTRAVENOUS
Status: COMPLETED | OUTPATIENT
Start: 2021-02-02 | End: 2021-02-03

## 2021-02-02 RX ORDER — ONDANSETRON 2 MG/ML
INJECTION INTRAMUSCULAR; INTRAVENOUS
Status: DISCONTINUED | OUTPATIENT
Start: 2021-02-02 | End: 2021-02-02

## 2021-02-02 RX ADMIN — CIPROFLOXACIN 400 MG: 2 INJECTION, SOLUTION INTRAVENOUS at 12:02

## 2021-02-02 RX ADMIN — OXYCODONE HYDROCHLORIDE 10 MG: 10 TABLET ORAL at 12:02

## 2021-02-02 RX ADMIN — GLYCOPYRROLATE 0.2 MG: 0.2 INJECTION, SOLUTION INTRAMUSCULAR; INTRAVITREAL at 08:02

## 2021-02-02 RX ADMIN — METRONIDAZOLE 500 MG: 500 INJECTION, SOLUTION INTRAVENOUS at 11:02

## 2021-02-02 RX ADMIN — HYDROMORPHONE HYDROCHLORIDE 0.4 MG: 2 INJECTION, SOLUTION INTRAMUSCULAR; INTRAVENOUS; SUBCUTANEOUS at 09:02

## 2021-02-02 RX ADMIN — OXYCODONE HYDROCHLORIDE 10 MG: 10 TABLET ORAL at 04:02

## 2021-02-02 RX ADMIN — IBUPROFEN 800 MG: 800 INJECTION INTRAVENOUS at 09:02

## 2021-02-02 RX ADMIN — PHENYLEPHRINE HYDROCHLORIDE 100 MCG: 10 INJECTION INTRAVENOUS at 10:02

## 2021-02-02 RX ADMIN — HYDROMORPHONE HYDROCHLORIDE 0.4 MG: 2 INJECTION, SOLUTION INTRAMUSCULAR; INTRAVENOUS; SUBCUTANEOUS at 10:02

## 2021-02-02 RX ADMIN — ROCURONIUM BROMIDE 40 MG: 10 INJECTION, SOLUTION INTRAVENOUS at 07:02

## 2021-02-02 RX ADMIN — ACETAMINOPHEN 1000 MG: 10 INJECTION, SOLUTION INTRAVENOUS at 03:02

## 2021-02-02 RX ADMIN — OXYCODONE HYDROCHLORIDE 10 MG: 10 TABLET ORAL at 11:02

## 2021-02-02 RX ADMIN — FENTANYL CITRATE 25 MCG: 50 INJECTION, SOLUTION INTRAMUSCULAR; INTRAVENOUS at 12:02

## 2021-02-02 RX ADMIN — ROCURONIUM BROMIDE 10 MG: 10 INJECTION, SOLUTION INTRAVENOUS at 08:02

## 2021-02-02 RX ADMIN — MUPIROCIN 1 G: 20 OINTMENT TOPICAL at 06:02

## 2021-02-02 RX ADMIN — MIDAZOLAM HYDROCHLORIDE 2 MG: 1 INJECTION, SOLUTION INTRAMUSCULAR; INTRAVENOUS at 06:02

## 2021-02-02 RX ADMIN — Medication 25 MG: at 07:02

## 2021-02-02 RX ADMIN — MUPIROCIN: 20 OINTMENT TOPICAL at 09:02

## 2021-02-02 RX ADMIN — ROCURONIUM BROMIDE 10 MG: 10 INJECTION, SOLUTION INTRAVENOUS at 10:02

## 2021-02-02 RX ADMIN — PROPOFOL 200 MG: 10 INJECTION, EMULSION INTRAVENOUS at 07:02

## 2021-02-02 RX ADMIN — Medication 10 MG: at 09:02

## 2021-02-02 RX ADMIN — GABAPENTIN 300 MG: 300 CAPSULE ORAL at 09:02

## 2021-02-02 RX ADMIN — GABAPENTIN 300 MG: 300 CAPSULE ORAL at 03:02

## 2021-02-02 RX ADMIN — LIDOCAINE HYDROCHLORIDE 0.02 MG/KG/MIN: 8 INJECTION, SOLUTION INTRAVENOUS at 07:02

## 2021-02-02 RX ADMIN — METRONIDAZOLE 500 MG: 500 SOLUTION INTRAVENOUS at 07:02

## 2021-02-02 RX ADMIN — CEFTRIAXONE 2 G: 2 INJECTION, SOLUTION INTRAVENOUS at 07:02

## 2021-02-02 RX ADMIN — DEXAMETHASONE SODIUM PHOSPHATE 8 MG: 4 INJECTION, SOLUTION INTRAMUSCULAR; INTRAVENOUS at 07:02

## 2021-02-02 RX ADMIN — ALVIMOPAN 12 MG: 12 CAPSULE ORAL at 06:02

## 2021-02-02 RX ADMIN — HEPARIN SODIUM 5000 UNITS: 5000 INJECTION INTRAVENOUS; SUBCUTANEOUS at 06:02

## 2021-02-02 RX ADMIN — PHENYLEPHRINE HYDROCHLORIDE 100 MCG: 10 INJECTION INTRAVENOUS at 09:02

## 2021-02-02 RX ADMIN — Medication 15 MG: at 08:02

## 2021-02-02 RX ADMIN — CIPROFLOXACIN 400 MG: 2 INJECTION, SOLUTION INTRAVENOUS at 11:02

## 2021-02-02 RX ADMIN — SUCCINYLCHOLINE CHLORIDE 200 MG: 20 INJECTION, SOLUTION INTRAMUSCULAR; INTRAVENOUS at 07:02

## 2021-02-02 RX ADMIN — METRONIDAZOLE 500 MG: 500 INJECTION, SOLUTION INTRAVENOUS at 04:02

## 2021-02-02 RX ADMIN — ROCURONIUM BROMIDE 10 MG: 10 INJECTION, SOLUTION INTRAVENOUS at 07:02

## 2021-02-02 RX ADMIN — PHENYLEPHRINE HYDROCHLORIDE 50 MCG: 10 INJECTION INTRAVENOUS at 08:02

## 2021-02-02 RX ADMIN — SUGAMMADEX 400 MG: 100 INJECTION, SOLUTION INTRAVENOUS at 10:02

## 2021-02-02 RX ADMIN — PROPOFOL 100 MG: 10 INJECTION, EMULSION INTRAVENOUS at 07:02

## 2021-02-02 RX ADMIN — ESMOLOL HYDROCHLORIDE 20 MCG: 10 INJECTION INTRAVENOUS at 07:02

## 2021-02-02 RX ADMIN — SODIUM CHLORIDE: 0.9 INJECTION, SOLUTION INTRAVENOUS at 06:02

## 2021-02-02 RX ADMIN — ONDANSETRON 4 MG: 2 INJECTION, SOLUTION INTRAMUSCULAR; INTRAVENOUS at 10:02

## 2021-02-02 RX ADMIN — FENTANYL CITRATE 50 MCG: 50 INJECTION, SOLUTION INTRAMUSCULAR; INTRAVENOUS at 07:02

## 2021-02-02 RX ADMIN — FENTANYL CITRATE 50 MCG: 50 INJECTION, SOLUTION INTRAMUSCULAR; INTRAVENOUS at 08:02

## 2021-02-02 RX ADMIN — ACETAMINOPHEN 976.6 MG: 160 SOLUTION ORAL at 06:02

## 2021-02-02 RX ADMIN — INSULIN DETEMIR 12 UNITS: 100 INJECTION, SOLUTION SUBCUTANEOUS at 09:02

## 2021-02-02 RX ADMIN — ESMOLOL HYDROCHLORIDE 30 MCG: 10 INJECTION INTRAVENOUS at 07:02

## 2021-02-02 RX ADMIN — LIDOCAINE HYDROCHLORIDE 10 MG: 10 INJECTION, SOLUTION EPIDURAL; INFILTRATION; INTRACAUDAL at 06:02

## 2021-02-02 RX ADMIN — IBUPROFEN 600 MG: 100 SUSPENSION ORAL at 06:02

## 2021-02-02 RX ADMIN — ACETAMINOPHEN 1000 MG: 10 INJECTION, SOLUTION INTRAVENOUS at 09:02

## 2021-02-02 RX ADMIN — GABAPENTIN 300 MG: 300 CAPSULE ORAL at 06:02

## 2021-02-02 RX ADMIN — ROCURONIUM BROMIDE 10 MG: 10 INJECTION, SOLUTION INTRAVENOUS at 09:02

## 2021-02-02 RX ADMIN — ENOXAPARIN SODIUM 40 MG: 40 INJECTION SUBCUTANEOUS at 04:02

## 2021-02-02 RX ADMIN — SODIUM CHLORIDE: 0.9 INJECTION, SOLUTION INTRAVENOUS at 11:02

## 2021-02-02 RX ADMIN — PHENYLEPHRINE HYDROCHLORIDE 50 MCG: 10 INJECTION INTRAVENOUS at 09:02

## 2021-02-03 LAB
ANION GAP SERPL CALC-SCNC: 11 MMOL/L (ref 8–16)
BASOPHILS # BLD AUTO: 0.04 K/UL (ref 0–0.2)
BASOPHILS NFR BLD: 0.3 % (ref 0–1.9)
BUN SERPL-MCNC: 14 MG/DL (ref 6–20)
CALCIUM SERPL-MCNC: 8.1 MG/DL (ref 8.7–10.5)
CHLORIDE SERPL-SCNC: 107 MMOL/L (ref 95–110)
CO2 SERPL-SCNC: 20 MMOL/L (ref 23–29)
CREAT SERPL-MCNC: 1.2 MG/DL (ref 0.5–1.4)
DIFFERENTIAL METHOD: ABNORMAL
EOSINOPHIL # BLD AUTO: 0 K/UL (ref 0–0.5)
EOSINOPHIL NFR BLD: 0.1 % (ref 0–8)
ERYTHROCYTE [DISTWIDTH] IN BLOOD BY AUTOMATED COUNT: 13.5 % (ref 11.5–14.5)
EST. GFR  (AFRICAN AMERICAN): >60 ML/MIN/1.73 M^2
EST. GFR  (NON AFRICAN AMERICAN): >60 ML/MIN/1.73 M^2
GLUCOSE SERPL-MCNC: 154 MG/DL (ref 70–110)
HCT VFR BLD AUTO: 33.3 % (ref 40–54)
HGB BLD-MCNC: 10.6 G/DL (ref 14–18)
IMM GRANULOCYTES # BLD AUTO: 0.06 K/UL (ref 0–0.04)
IMM GRANULOCYTES NFR BLD AUTO: 0.5 % (ref 0–0.5)
LYMPHOCYTES # BLD AUTO: 1.8 K/UL (ref 1–4.8)
LYMPHOCYTES NFR BLD: 15.3 % (ref 18–48)
MAGNESIUM SERPL-MCNC: 1.7 MG/DL (ref 1.6–2.6)
MCH RBC QN AUTO: 29.4 PG (ref 27–31)
MCHC RBC AUTO-ENTMCNC: 31.8 G/DL (ref 32–36)
MCV RBC AUTO: 92 FL (ref 82–98)
MONOCYTES # BLD AUTO: 1.3 K/UL (ref 0.3–1)
MONOCYTES NFR BLD: 11 % (ref 4–15)
NEUTROPHILS # BLD AUTO: 8.7 K/UL (ref 1.8–7.7)
NEUTROPHILS NFR BLD: 72.8 % (ref 38–73)
NRBC BLD-RTO: 0 /100 WBC
PHOSPHATE SERPL-MCNC: 3.7 MG/DL (ref 2.7–4.5)
PLATELET # BLD AUTO: 371 K/UL (ref 150–350)
PMV BLD AUTO: 10.1 FL (ref 9.2–12.9)
POCT GLUCOSE: 117 MG/DL (ref 70–110)
POCT GLUCOSE: 118 MG/DL (ref 70–110)
POCT GLUCOSE: 120 MG/DL (ref 70–110)
POCT GLUCOSE: 145 MG/DL (ref 70–110)
POCT GLUCOSE: 159 MG/DL (ref 70–110)
POCT GLUCOSE: 173 MG/DL (ref 70–110)
POTASSIUM SERPL-SCNC: 3.8 MMOL/L (ref 3.5–5.1)
RBC # BLD AUTO: 3.61 M/UL (ref 4.6–6.2)
SODIUM SERPL-SCNC: 138 MMOL/L (ref 136–145)
WBC # BLD AUTO: 11.92 K/UL (ref 3.9–12.7)

## 2021-02-03 PROCEDURE — 25000003 PHARM REV CODE 250: Performed by: STUDENT IN AN ORGANIZED HEALTH CARE EDUCATION/TRAINING PROGRAM

## 2021-02-03 PROCEDURE — 84100 ASSAY OF PHOSPHORUS: CPT

## 2021-02-03 PROCEDURE — 97165 OT EVAL LOW COMPLEX 30 MIN: CPT

## 2021-02-03 PROCEDURE — 36415 COLL VENOUS BLD VENIPUNCTURE: CPT

## 2021-02-03 PROCEDURE — 97161 PT EVAL LOW COMPLEX 20 MIN: CPT

## 2021-02-03 PROCEDURE — 97535 SELF CARE MNGMENT TRAINING: CPT

## 2021-02-03 PROCEDURE — 83735 ASSAY OF MAGNESIUM: CPT

## 2021-02-03 PROCEDURE — 85025 COMPLETE CBC W/AUTO DIFF WBC: CPT

## 2021-02-03 PROCEDURE — 80048 BASIC METABOLIC PNL TOTAL CA: CPT

## 2021-02-03 PROCEDURE — 25000003 PHARM REV CODE 250: Performed by: NURSE PRACTITIONER

## 2021-02-03 PROCEDURE — 63600175 PHARM REV CODE 636 W HCPCS: Performed by: STUDENT IN AN ORGANIZED HEALTH CARE EDUCATION/TRAINING PROGRAM

## 2021-02-03 PROCEDURE — 99232 PR SUBSEQUENT HOSPITAL CARE,LEVL II: ICD-10-PCS | Mod: ,,, | Performed by: INTERNAL MEDICINE

## 2021-02-03 PROCEDURE — S0030 INJECTION, METRONIDAZOLE: HCPCS | Performed by: STUDENT IN AN ORGANIZED HEALTH CARE EDUCATION/TRAINING PROGRAM

## 2021-02-03 PROCEDURE — 25000003 PHARM REV CODE 250: Performed by: COLON & RECTAL SURGERY

## 2021-02-03 PROCEDURE — 99232 SBSQ HOSP IP/OBS MODERATE 35: CPT | Mod: ,,, | Performed by: INTERNAL MEDICINE

## 2021-02-03 PROCEDURE — 20600001 HC STEP DOWN PRIVATE ROOM

## 2021-02-03 PROCEDURE — 63600175 PHARM REV CODE 636 W HCPCS: Performed by: GENERAL ACUTE CARE HOSPITAL

## 2021-02-03 RX ORDER — ATORVASTATIN CALCIUM 20 MG/1
40 TABLET, FILM COATED ORAL DAILY
Status: DISCONTINUED | OUTPATIENT
Start: 2021-02-03 | End: 2021-02-04 | Stop reason: HOSPADM

## 2021-02-03 RX ORDER — INSULIN ASPART 100 [IU]/ML
0-5 INJECTION, SOLUTION INTRAVENOUS; SUBCUTANEOUS
Status: DISCONTINUED | OUTPATIENT
Start: 2021-02-03 | End: 2021-02-04 | Stop reason: HOSPADM

## 2021-02-03 RX ORDER — OXYMETAZOLINE HCL 0.05 %
1 SPRAY, NON-AEROSOL (ML) NASAL NIGHTLY
Status: DISCONTINUED | OUTPATIENT
Start: 2021-02-03 | End: 2021-02-04 | Stop reason: HOSPADM

## 2021-02-03 RX ORDER — INSULIN ASPART 100 [IU]/ML
4 INJECTION, SOLUTION INTRAVENOUS; SUBCUTANEOUS
Status: DISCONTINUED | OUTPATIENT
Start: 2021-02-03 | End: 2021-02-04 | Stop reason: HOSPADM

## 2021-02-03 RX ADMIN — Medication 1 SPRAY: at 05:02

## 2021-02-03 RX ADMIN — ENOXAPARIN SODIUM 40 MG: 40 INJECTION SUBCUTANEOUS at 05:02

## 2021-02-03 RX ADMIN — INSULIN ASPART 4 UNITS: 100 INJECTION, SOLUTION INTRAVENOUS; SUBCUTANEOUS at 05:02

## 2021-02-03 RX ADMIN — ATORVASTATIN CALCIUM 40 MG: 20 TABLET, FILM COATED ORAL at 08:02

## 2021-02-03 RX ADMIN — ACETAMINOPHEN 1000 MG: 500 TABLET ORAL at 02:02

## 2021-02-03 RX ADMIN — IBUPROFEN 800 MG: 800 INJECTION INTRAVENOUS at 05:02

## 2021-02-03 RX ADMIN — ACETAMINOPHEN 1000 MG: 500 TABLET ORAL at 09:02

## 2021-02-03 RX ADMIN — OXYCODONE HYDROCHLORIDE 10 MG: 10 TABLET ORAL at 09:02

## 2021-02-03 RX ADMIN — IBUPROFEN 800 MG: 400 TABLET, FILM COATED ORAL at 02:02

## 2021-02-03 RX ADMIN — MUPIROCIN: 20 OINTMENT TOPICAL at 08:02

## 2021-02-03 RX ADMIN — GABAPENTIN 300 MG: 300 CAPSULE ORAL at 02:02

## 2021-02-03 RX ADMIN — OXYCODONE HYDROCHLORIDE 10 MG: 10 TABLET ORAL at 05:02

## 2021-02-03 RX ADMIN — INSULIN DETEMIR 12 UNITS: 100 INJECTION, SOLUTION SUBCUTANEOUS at 09:02

## 2021-02-03 RX ADMIN — ALVIMOPAN 12 MG: 12 CAPSULE ORAL at 08:02

## 2021-02-03 RX ADMIN — ALVIMOPAN 12 MG: 12 CAPSULE ORAL at 09:02

## 2021-02-03 RX ADMIN — GABAPENTIN 300 MG: 300 CAPSULE ORAL at 08:02

## 2021-02-03 RX ADMIN — INSULIN ASPART 4 UNITS: 100 INJECTION, SOLUTION INTRAVENOUS; SUBCUTANEOUS at 08:02

## 2021-02-03 RX ADMIN — Medication 1 SPRAY: at 09:02

## 2021-02-03 RX ADMIN — ACETAMINOPHEN 1000 MG: 10 INJECTION, SOLUTION INTRAVENOUS at 06:02

## 2021-02-03 RX ADMIN — INSULIN ASPART 4 UNITS: 100 INJECTION, SOLUTION INTRAVENOUS; SUBCUTANEOUS at 11:02

## 2021-02-03 RX ADMIN — MUPIROCIN: 20 OINTMENT TOPICAL at 09:02

## 2021-02-03 RX ADMIN — IBUPROFEN 800 MG: 400 TABLET, FILM COATED ORAL at 09:02

## 2021-02-03 RX ADMIN — OXYCODONE HYDROCHLORIDE 5 MG: 5 TABLET ORAL at 12:02

## 2021-02-03 RX ADMIN — LEVOTHYROXINE SODIUM 112 MCG: 112 TABLET ORAL at 05:02

## 2021-02-03 RX ADMIN — GABAPENTIN 300 MG: 300 CAPSULE ORAL at 09:02

## 2021-02-03 RX ADMIN — METRONIDAZOLE 500 MG: 500 INJECTION, SOLUTION INTRAVENOUS at 08:02

## 2021-02-04 ENCOUNTER — TELEPHONE (OUTPATIENT)
Dept: SURGERY | Facility: CLINIC | Age: 56
End: 2021-02-04

## 2021-02-04 VITALS
HEIGHT: 73 IN | DIASTOLIC BLOOD PRESSURE: 78 MMHG | RESPIRATION RATE: 20 BRPM | WEIGHT: 290.13 LBS | BODY MASS INDEX: 38.45 KG/M2 | HEART RATE: 71 BPM | SYSTOLIC BLOOD PRESSURE: 147 MMHG | OXYGEN SATURATION: 95 % | TEMPERATURE: 97 F

## 2021-02-04 LAB — POCT GLUCOSE: 120 MG/DL (ref 70–110)

## 2021-02-04 PROCEDURE — 25000003 PHARM REV CODE 250: Performed by: STUDENT IN AN ORGANIZED HEALTH CARE EDUCATION/TRAINING PROGRAM

## 2021-02-04 PROCEDURE — 25000003 PHARM REV CODE 250: Performed by: NURSE PRACTITIONER

## 2021-02-04 RX ORDER — OXYCODONE AND ACETAMINOPHEN 5; 325 MG/1; MG/1
1 TABLET ORAL EVERY 4 HOURS PRN
Qty: 25 TABLET | Refills: 0 | Status: SHIPPED | OUTPATIENT
Start: 2021-02-04 | End: 2021-02-25 | Stop reason: SDUPTHER

## 2021-02-04 RX ADMIN — IBUPROFEN 800 MG: 400 TABLET, FILM COATED ORAL at 06:02

## 2021-02-04 RX ADMIN — ACETAMINOPHEN 1000 MG: 500 TABLET ORAL at 06:02

## 2021-02-04 RX ADMIN — MUPIROCIN: 20 OINTMENT TOPICAL at 08:02

## 2021-02-04 RX ADMIN — ATORVASTATIN CALCIUM 40 MG: 20 TABLET, FILM COATED ORAL at 08:02

## 2021-02-04 RX ADMIN — GABAPENTIN 300 MG: 300 CAPSULE ORAL at 08:02

## 2021-02-04 RX ADMIN — ALVIMOPAN 12 MG: 12 CAPSULE ORAL at 08:02

## 2021-02-04 RX ADMIN — INSULIN ASPART 4 UNITS: 100 INJECTION, SOLUTION INTRAVENOUS; SUBCUTANEOUS at 08:02

## 2021-02-04 RX ADMIN — LEVOTHYROXINE SODIUM 112 MCG: 112 TABLET ORAL at 05:02

## 2021-02-10 LAB
FINAL PATHOLOGIC DIAGNOSIS: NORMAL
GROSS: NORMAL
Lab: NORMAL

## 2021-02-13 ENCOUNTER — PATIENT MESSAGE (OUTPATIENT)
Dept: SURGERY | Facility: CLINIC | Age: 56
End: 2021-02-13

## 2021-02-15 ENCOUNTER — TELEPHONE (OUTPATIENT)
Dept: SURGERY | Facility: CLINIC | Age: 56
End: 2021-02-15

## 2021-02-15 ENCOUNTER — PATIENT MESSAGE (OUTPATIENT)
Dept: SURGERY | Facility: CLINIC | Age: 56
End: 2021-02-15

## 2021-02-17 ENCOUNTER — OFFICE VISIT (OUTPATIENT)
Dept: SURGERY | Facility: CLINIC | Age: 56
End: 2021-02-17
Attending: COLON & RECTAL SURGERY
Payer: COMMERCIAL

## 2021-02-17 VITALS
BODY MASS INDEX: 38.16 KG/M2 | SYSTOLIC BLOOD PRESSURE: 195 MMHG | WEIGHT: 287.94 LBS | HEART RATE: 88 BPM | HEIGHT: 73 IN | DIASTOLIC BLOOD PRESSURE: 93 MMHG

## 2021-02-17 DIAGNOSIS — L29.8 ITCHING DUE TO DRUG: Primary | ICD-10-CM

## 2021-02-17 DIAGNOSIS — R91.1 SOLITARY PULMONARY NODULE: ICD-10-CM

## 2021-02-17 DIAGNOSIS — T50.905A ITCHING DUE TO DRUG: Primary | ICD-10-CM

## 2021-02-17 DIAGNOSIS — Z98.890 POST-OPERATIVE STATE: ICD-10-CM

## 2021-02-17 PROCEDURE — 1125F PR PAIN SEVERITY QUANTIFIED, PAIN PRESENT: ICD-10-PCS | Mod: S$GLB,,, | Performed by: COLON & RECTAL SURGERY

## 2021-02-17 PROCEDURE — 3008F BODY MASS INDEX DOCD: CPT | Mod: CPTII,S$GLB,, | Performed by: COLON & RECTAL SURGERY

## 2021-02-17 PROCEDURE — 99024 POSTOP FOLLOW-UP VISIT: CPT | Mod: S$GLB,,, | Performed by: COLON & RECTAL SURGERY

## 2021-02-17 PROCEDURE — 99999 PR PBB SHADOW E&M-EST. PATIENT-LVL IV: ICD-10-PCS | Mod: PBBFAC,,, | Performed by: COLON & RECTAL SURGERY

## 2021-02-17 PROCEDURE — 3008F PR BODY MASS INDEX (BMI) DOCUMENTED: ICD-10-PCS | Mod: CPTII,S$GLB,, | Performed by: COLON & RECTAL SURGERY

## 2021-02-17 PROCEDURE — 1125F AMNT PAIN NOTED PAIN PRSNT: CPT | Mod: S$GLB,,, | Performed by: COLON & RECTAL SURGERY

## 2021-02-17 PROCEDURE — 99999 PR PBB SHADOW E&M-EST. PATIENT-LVL IV: CPT | Mod: PBBFAC,,, | Performed by: COLON & RECTAL SURGERY

## 2021-02-17 PROCEDURE — 99024 PR POST-OP FOLLOW-UP VISIT: ICD-10-PCS | Mod: S$GLB,,, | Performed by: COLON & RECTAL SURGERY

## 2021-02-17 RX ORDER — METHYLPREDNISOLONE 4 MG/1
TABLET ORAL
Qty: 1 PACKAGE | Refills: 0 | Status: SHIPPED | OUTPATIENT
Start: 2021-02-17 | End: 2021-02-25 | Stop reason: SDUPTHER

## 2021-02-23 ENCOUNTER — PATIENT MESSAGE (OUTPATIENT)
Dept: SURGERY | Facility: CLINIC | Age: 56
End: 2021-02-23

## 2021-02-25 ENCOUNTER — OFFICE VISIT (OUTPATIENT)
Dept: ALLERGY | Facility: CLINIC | Age: 56
End: 2021-02-25
Payer: COMMERCIAL

## 2021-02-25 VITALS
HEIGHT: 73 IN | HEART RATE: 86 BPM | WEIGHT: 293 LBS | TEMPERATURE: 98 F | SYSTOLIC BLOOD PRESSURE: 127 MMHG | DIASTOLIC BLOOD PRESSURE: 78 MMHG | BODY MASS INDEX: 38.83 KG/M2

## 2021-02-25 DIAGNOSIS — T50.905A ITCHING DUE TO DRUG: ICD-10-CM

## 2021-02-25 DIAGNOSIS — L29.8 ITCHING DUE TO DRUG: ICD-10-CM

## 2021-02-25 PROCEDURE — 99204 OFFICE O/P NEW MOD 45 MIN: CPT | Mod: S$GLB,,, | Performed by: ALLERGY & IMMUNOLOGY

## 2021-02-25 PROCEDURE — 99999 PR PBB SHADOW E&M-EST. PATIENT-LVL V: CPT | Mod: PBBFAC,,, | Performed by: ALLERGY & IMMUNOLOGY

## 2021-02-25 PROCEDURE — 3008F BODY MASS INDEX DOCD: CPT | Mod: CPTII,S$GLB,, | Performed by: ALLERGY & IMMUNOLOGY

## 2021-02-25 PROCEDURE — 3008F PR BODY MASS INDEX (BMI) DOCUMENTED: ICD-10-PCS | Mod: CPTII,S$GLB,, | Performed by: ALLERGY & IMMUNOLOGY

## 2021-02-25 PROCEDURE — 99204 PR OFFICE/OUTPT VISIT, NEW, LEVL IV, 45-59 MIN: ICD-10-PCS | Mod: S$GLB,,, | Performed by: ALLERGY & IMMUNOLOGY

## 2021-02-25 PROCEDURE — 99999 PR PBB SHADOW E&M-EST. PATIENT-LVL V: ICD-10-PCS | Mod: PBBFAC,,, | Performed by: ALLERGY & IMMUNOLOGY

## 2021-02-25 RX ORDER — FLASH GLUCOSE SENSOR
KIT MISCELLANEOUS
COMMUNITY
Start: 2021-02-19 | End: 2021-09-20 | Stop reason: SDUPTHER

## 2021-02-25 RX ORDER — ICOSAPENT ETHYL 1000 MG/1
2 CAPSULE ORAL 2 TIMES DAILY
COMMUNITY
Start: 2021-02-19 | End: 2021-07-15 | Stop reason: SDUPTHER

## 2021-02-25 RX ORDER — INSULIN GLARGINE 300 U/ML
INJECTION, SOLUTION SUBCUTANEOUS
COMMUNITY
Start: 2021-01-05 | End: 2021-07-15 | Stop reason: SDUPTHER

## 2021-02-25 RX ORDER — FLUTICASONE PROPIONATE 50 MCG
1 SPRAY, SUSPENSION (ML) NASAL
COMMUNITY
Start: 2020-02-27 | End: 2021-02-26

## 2021-02-25 RX ORDER — ALBUTEROL SULFATE 90 UG/1
AEROSOL, METERED RESPIRATORY (INHALATION)
COMMUNITY
Start: 2020-03-20 | End: 2021-11-15

## 2021-02-25 RX ORDER — SEMAGLUTIDE 1.34 MG/ML
INJECTION, SOLUTION SUBCUTANEOUS
COMMUNITY
Start: 2020-12-14 | End: 2021-07-15

## 2021-02-25 RX ORDER — FENOFIBRATE 120 MG/1
TABLET ORAL
COMMUNITY
End: 2021-07-14

## 2021-02-25 RX ORDER — INDOMETHACIN 25 MG/5ML
SUSPENSION ORAL
COMMUNITY
End: 2021-07-14

## 2021-03-11 ENCOUNTER — TELEPHONE (OUTPATIENT)
Dept: ORTHOPEDICS | Facility: CLINIC | Age: 56
End: 2021-03-11

## 2021-03-11 DIAGNOSIS — M25.579 ANKLE PAIN, UNSPECIFIED CHRONICITY, UNSPECIFIED LATERALITY: Primary | ICD-10-CM

## 2021-04-29 ENCOUNTER — PATIENT MESSAGE (OUTPATIENT)
Dept: RESEARCH | Facility: HOSPITAL | Age: 56
End: 2021-04-29

## 2021-05-21 ENCOUNTER — OFFICE VISIT (OUTPATIENT)
Dept: SURGERY | Facility: CLINIC | Age: 56
End: 2021-05-21
Attending: COLON & RECTAL SURGERY
Payer: COMMERCIAL

## 2021-05-21 ENCOUNTER — HOSPITAL ENCOUNTER (OUTPATIENT)
Dept: RADIOLOGY | Facility: HOSPITAL | Age: 56
Discharge: HOME | End: 2021-05-21
Attending: COLON & RECTAL SURGERY
Payer: COMMERCIAL

## 2021-05-21 VITALS
WEIGHT: 273.38 LBS | HEIGHT: 73 IN | SYSTOLIC BLOOD PRESSURE: 195 MMHG | BODY MASS INDEX: 36.23 KG/M2 | HEART RATE: 85 BPM | DIASTOLIC BLOOD PRESSURE: 98 MMHG

## 2021-05-21 DIAGNOSIS — C18.3 MALIGNANT NEOPLASM OF HEPATIC FLEXURE: Primary | ICD-10-CM

## 2021-05-21 DIAGNOSIS — R91.1 SOLITARY PULMONARY NODULE: ICD-10-CM

## 2021-05-21 PROCEDURE — 1126F AMNT PAIN NOTED NONE PRSNT: CPT | Mod: S$GLB,,, | Performed by: COLON & RECTAL SURGERY

## 2021-05-21 PROCEDURE — 71250 CT CHEST WITHOUT CONTRAST: ICD-10-PCS | Mod: 26,,, | Performed by: RADIOLOGY

## 2021-05-21 PROCEDURE — 71250 CT THORAX DX C-: CPT | Mod: TC

## 2021-05-21 PROCEDURE — 99999 PR PBB SHADOW E&M-EST. PATIENT-LVL IV: ICD-10-PCS | Mod: PBBFAC,,, | Performed by: COLON & RECTAL SURGERY

## 2021-05-21 PROCEDURE — 99213 OFFICE O/P EST LOW 20 MIN: CPT | Mod: S$GLB,,, | Performed by: COLON & RECTAL SURGERY

## 2021-05-21 PROCEDURE — 71250 CT THORAX DX C-: CPT | Mod: 26,,, | Performed by: RADIOLOGY

## 2021-05-21 PROCEDURE — 99999 PR PBB SHADOW E&M-EST. PATIENT-LVL IV: CPT | Mod: PBBFAC,,, | Performed by: COLON & RECTAL SURGERY

## 2021-05-21 PROCEDURE — 99213 PR OFFICE/OUTPT VISIT, EST, LEVL III, 20-29 MIN: ICD-10-PCS | Mod: S$GLB,,, | Performed by: COLON & RECTAL SURGERY

## 2021-05-21 PROCEDURE — 3008F BODY MASS INDEX DOCD: CPT | Mod: CPTII,S$GLB,, | Performed by: COLON & RECTAL SURGERY

## 2021-05-21 PROCEDURE — 1126F PR PAIN SEVERITY QUANTIFIED, NO PAIN PRESENT: ICD-10-PCS | Mod: S$GLB,,, | Performed by: COLON & RECTAL SURGERY

## 2021-05-21 PROCEDURE — 3008F PR BODY MASS INDEX (BMI) DOCUMENTED: ICD-10-PCS | Mod: CPTII,S$GLB,, | Performed by: COLON & RECTAL SURGERY

## 2021-05-21 RX ORDER — CLONAZEPAM 2 MG/1
2 TABLET, ORALLY DISINTEGRATING ORAL 2 TIMES DAILY PRN
COMMUNITY
End: 2021-07-14

## 2021-07-14 PROBLEM — E11.9 TYPE 2 DIABETES MELLITUS WITHOUT COMPLICATION, WITHOUT LONG-TERM CURRENT USE OF INSULIN: Status: ACTIVE | Noted: 2019-04-30

## 2021-07-14 PROBLEM — E03.9 HYPOTHYROIDISM: Status: ACTIVE | Noted: 2019-04-30

## 2021-07-14 PROBLEM — R79.89 LOW TESTOSTERONE: Status: ACTIVE | Noted: 2019-04-30

## 2021-07-14 PROBLEM — M10.9 GOUT: Status: ACTIVE | Noted: 2019-08-06

## 2021-07-14 PROBLEM — E78.5 HYPERLIPIDEMIA: Status: ACTIVE | Noted: 2019-08-06

## 2021-07-14 PROBLEM — I10 BENIGN ESSENTIAL HTN: Status: ACTIVE | Noted: 2019-04-30

## 2021-07-14 PROBLEM — C18.7 MALIGNANT NEOPLASM OF SIGMOID COLON: Status: ACTIVE | Noted: 2021-01-05

## 2021-07-14 PROBLEM — E78.1 HYPERTRIGLYCERIDEMIA: Status: ACTIVE | Noted: 2019-11-25

## 2021-07-14 PROBLEM — F39 MOOD DISORDER: Status: ACTIVE | Noted: 2019-04-30

## 2021-07-14 RX ORDER — CLONAZEPAM 1 MG/1
1 TABLET ORAL NIGHTLY PRN
COMMUNITY
Start: 2021-04-13 | End: 2021-07-15

## 2021-07-14 RX ORDER — COLCHICINE 0.6 MG/1
0.6 TABLET ORAL 3 TIMES DAILY
COMMUNITY
Start: 2021-03-13 | End: 2021-07-15 | Stop reason: SDUPTHER

## 2021-07-14 RX ORDER — INDOMETHACIN 75 MG/1
75 CAPSULE, EXTENDED RELEASE ORAL 2 TIMES DAILY
COMMUNITY
Start: 2021-05-04 | End: 2021-07-15 | Stop reason: SDUPTHER

## 2021-07-14 RX ORDER — METOPROLOL TARTRATE 50 MG/1
50 TABLET ORAL
COMMUNITY
End: 2021-07-15

## 2021-07-15 ENCOUNTER — OFFICE VISIT (OUTPATIENT)
Dept: INTERNAL MEDICINE | Facility: CLINIC | Age: 56
End: 2021-07-15
Payer: COMMERCIAL

## 2021-07-15 VITALS
BODY MASS INDEX: 33.28 KG/M2 | HEIGHT: 73 IN | OXYGEN SATURATION: 98 % | SYSTOLIC BLOOD PRESSURE: 139 MMHG | TEMPERATURE: 98 F | WEIGHT: 251.13 LBS | HEART RATE: 87 BPM | DIASTOLIC BLOOD PRESSURE: 87 MMHG

## 2021-07-15 DIAGNOSIS — I10 BENIGN ESSENTIAL HTN: ICD-10-CM

## 2021-07-15 DIAGNOSIS — M10.9 GOUT, UNSPECIFIED CAUSE, UNSPECIFIED CHRONICITY, UNSPECIFIED SITE: ICD-10-CM

## 2021-07-15 DIAGNOSIS — E78.5 HYPERLIPIDEMIA, UNSPECIFIED HYPERLIPIDEMIA TYPE: ICD-10-CM

## 2021-07-15 DIAGNOSIS — Z00.00 ROUTINE CHECK-UP: Primary | ICD-10-CM

## 2021-07-15 DIAGNOSIS — E03.9 HYPOTHYROIDISM, UNSPECIFIED TYPE: ICD-10-CM

## 2021-07-15 DIAGNOSIS — E11.9 TYPE 2 DIABETES MELLITUS WITHOUT COMPLICATION, WITHOUT LONG-TERM CURRENT USE OF INSULIN: ICD-10-CM

## 2021-07-15 PROCEDURE — 3008F BODY MASS INDEX DOCD: CPT | Mod: CPTII,S$GLB,, | Performed by: FAMILY MEDICINE

## 2021-07-15 PROCEDURE — 3008F PR BODY MASS INDEX (BMI) DOCUMENTED: ICD-10-PCS | Mod: CPTII,S$GLB,, | Performed by: FAMILY MEDICINE

## 2021-07-15 PROCEDURE — 99999 PR PBB SHADOW E&M-EST. PATIENT-LVL IV: ICD-10-PCS | Mod: PBBFAC,,, | Performed by: FAMILY MEDICINE

## 2021-07-15 PROCEDURE — 99204 PR OFFICE/OUTPT VISIT, NEW, LEVL IV, 45-59 MIN: ICD-10-PCS | Mod: S$GLB,,, | Performed by: FAMILY MEDICINE

## 2021-07-15 PROCEDURE — 99204 OFFICE O/P NEW MOD 45 MIN: CPT | Mod: S$GLB,,, | Performed by: FAMILY MEDICINE

## 2021-07-15 PROCEDURE — 3075F PR MOST RECENT SYSTOLIC BLOOD PRESS GE 130-139MM HG: ICD-10-PCS | Mod: CPTII,S$GLB,, | Performed by: FAMILY MEDICINE

## 2021-07-15 PROCEDURE — 3079F PR MOST RECENT DIASTOLIC BLOOD PRESSURE 80-89 MM HG: ICD-10-PCS | Mod: CPTII,S$GLB,, | Performed by: FAMILY MEDICINE

## 2021-07-15 PROCEDURE — 1125F PR PAIN SEVERITY QUANTIFIED, PAIN PRESENT: ICD-10-PCS | Mod: S$GLB,,, | Performed by: FAMILY MEDICINE

## 2021-07-15 PROCEDURE — 3079F DIAST BP 80-89 MM HG: CPT | Mod: CPTII,S$GLB,, | Performed by: FAMILY MEDICINE

## 2021-07-15 PROCEDURE — 99999 PR PBB SHADOW E&M-EST. PATIENT-LVL IV: CPT | Mod: PBBFAC,,, | Performed by: FAMILY MEDICINE

## 2021-07-15 PROCEDURE — 1125F AMNT PAIN NOTED PAIN PRSNT: CPT | Mod: S$GLB,,, | Performed by: FAMILY MEDICINE

## 2021-07-15 PROCEDURE — 3075F SYST BP GE 130 - 139MM HG: CPT | Mod: CPTII,S$GLB,, | Performed by: FAMILY MEDICINE

## 2021-07-15 RX ORDER — TAPENTADOL HYDROCHLORIDE 75 MG/1
TABLET, FILM COATED ORAL
COMMUNITY
Start: 2021-05-24 | End: 2021-11-15

## 2021-07-15 RX ORDER — METFORMIN HYDROCHLORIDE 1000 MG/1
1000 TABLET ORAL 2 TIMES DAILY WITH MEALS
Qty: 180 TABLET | Refills: 1 | Status: SHIPPED | OUTPATIENT
Start: 2021-07-15 | End: 2021-09-15 | Stop reason: SDUPTHER

## 2021-07-15 RX ORDER — INDOMETHACIN 75 MG/1
75 CAPSULE, EXTENDED RELEASE ORAL 2 TIMES DAILY WITH MEALS
Qty: 180 CAPSULE | Refills: 1 | Status: SHIPPED | OUTPATIENT
Start: 2021-07-15 | End: 2021-09-15 | Stop reason: SDUPTHER

## 2021-07-15 RX ORDER — INSULIN GLARGINE 300 U/ML
85 INJECTION, SOLUTION SUBCUTANEOUS DAILY
Qty: 4 PEN | Refills: 3 | Status: SHIPPED | OUTPATIENT
Start: 2021-07-15 | End: 2021-09-15 | Stop reason: SDUPTHER

## 2021-07-15 RX ORDER — CLONAZEPAM 2 MG/1
2 TABLET ORAL NIGHTLY PRN
COMMUNITY
Start: 2021-06-22 | End: 2021-07-15 | Stop reason: SDUPTHER

## 2021-07-15 RX ORDER — METOPROLOL SUCCINATE 50 MG/1
50 TABLET, EXTENDED RELEASE ORAL DAILY
COMMUNITY
Start: 2021-05-28 | End: 2021-07-15 | Stop reason: SDUPTHER

## 2021-07-15 RX ORDER — SEMAGLUTIDE 1.34 MG/ML
INJECTION, SOLUTION SUBCUTANEOUS
COMMUNITY
Start: 2021-06-27 | End: 2021-07-15 | Stop reason: SDUPTHER

## 2021-07-15 RX ORDER — LEVOTHYROXINE SODIUM 112 UG/1
112 TABLET ORAL
Qty: 90 TABLET | Refills: 1 | Status: SHIPPED | OUTPATIENT
Start: 2021-07-15 | End: 2021-09-15 | Stop reason: SDUPTHER

## 2021-07-15 RX ORDER — METOPROLOL SUCCINATE 50 MG/1
50 TABLET, EXTENDED RELEASE ORAL DAILY
Qty: 90 TABLET | Refills: 1 | Status: SHIPPED | OUTPATIENT
Start: 2021-07-15 | End: 2021-09-15 | Stop reason: SDUPTHER

## 2021-07-15 RX ORDER — BENAZEPRIL HYDROCHLORIDE 40 MG/1
40 TABLET ORAL DAILY
Qty: 90 TABLET | Refills: 3 | Status: SHIPPED | OUTPATIENT
Start: 2021-07-15 | End: 2021-09-15 | Stop reason: SDUPTHER

## 2021-07-15 RX ORDER — CLONAZEPAM 2 MG/1
2 TABLET ORAL NIGHTLY
Qty: 30 TABLET | Refills: 1 | Status: SHIPPED | OUTPATIENT
Start: 2021-07-15 | End: 2021-09-15 | Stop reason: SDUPTHER

## 2021-07-15 RX ORDER — ATORVASTATIN CALCIUM 40 MG/1
40 TABLET, FILM COATED ORAL DAILY
Qty: 90 TABLET | Refills: 1 | Status: SHIPPED | OUTPATIENT
Start: 2021-07-15 | End: 2021-09-15 | Stop reason: SDUPTHER

## 2021-07-15 RX ORDER — SEMAGLUTIDE 1.34 MG/ML
1 INJECTION, SOLUTION SUBCUTANEOUS
Qty: 12 PEN | Refills: 1 | Status: SHIPPED | OUTPATIENT
Start: 2021-07-15 | End: 2021-09-15 | Stop reason: SDUPTHER

## 2021-07-15 RX ORDER — ICOSAPENT ETHYL 1000 MG/1
2 CAPSULE ORAL 2 TIMES DAILY
Qty: 90 CAPSULE | Refills: 1 | Status: SHIPPED | OUTPATIENT
Start: 2021-07-15 | End: 2021-09-15 | Stop reason: SDUPTHER

## 2021-07-15 RX ORDER — BENAZEPRIL HYDROCHLORIDE 40 MG/1
1 TABLET ORAL DAILY
COMMUNITY
Start: 2021-01-05 | End: 2021-07-15 | Stop reason: SDUPTHER

## 2021-07-15 RX ORDER — COLCHICINE 0.6 MG/1
0.6 TABLET ORAL 2 TIMES DAILY
Qty: 180 TABLET | Refills: 1 | Status: SHIPPED | OUTPATIENT
Start: 2021-07-15 | End: 2021-09-15 | Stop reason: SDUPTHER

## 2021-08-03 ENCOUNTER — PATIENT MESSAGE (OUTPATIENT)
Dept: INTERNAL MEDICINE | Facility: CLINIC | Age: 56
End: 2021-08-03

## 2021-08-03 RX ORDER — AMLODIPINE BESYLATE 5 MG/1
5 TABLET ORAL DAILY
Qty: 30 TABLET | Refills: 3 | Status: SHIPPED | OUTPATIENT
Start: 2021-08-03 | End: 2021-08-12 | Stop reason: SDUPTHER

## 2021-08-04 ENCOUNTER — PATIENT MESSAGE (OUTPATIENT)
Dept: INTERNAL MEDICINE | Facility: CLINIC | Age: 56
End: 2021-08-04

## 2021-08-05 ENCOUNTER — PATIENT MESSAGE (OUTPATIENT)
Dept: INTERNAL MEDICINE | Facility: CLINIC | Age: 56
End: 2021-08-05

## 2021-08-06 ENCOUNTER — PATIENT MESSAGE (OUTPATIENT)
Dept: INTERNAL MEDICINE | Facility: CLINIC | Age: 56
End: 2021-08-06

## 2021-08-09 ENCOUNTER — PATIENT MESSAGE (OUTPATIENT)
Dept: INTERNAL MEDICINE | Facility: CLINIC | Age: 56
End: 2021-08-09

## 2021-08-10 ENCOUNTER — PATIENT MESSAGE (OUTPATIENT)
Dept: INTERNAL MEDICINE | Facility: CLINIC | Age: 56
End: 2021-08-10

## 2021-08-11 ENCOUNTER — OFFICE VISIT (OUTPATIENT)
Dept: OPHTHALMOLOGY | Facility: CLINIC | Age: 56
End: 2021-08-11
Payer: COMMERCIAL

## 2021-08-11 DIAGNOSIS — E11.9 DIABETES MELLITUS TYPE 2 WITHOUT RETINOPATHY: Primary | ICD-10-CM

## 2021-08-11 DIAGNOSIS — H52.7 REFRACTIVE ERRORS: ICD-10-CM

## 2021-08-11 DIAGNOSIS — E11.9 TYPE 2 DIABETES MELLITUS WITHOUT COMPLICATION, WITHOUT LONG-TERM CURRENT USE OF INSULIN: ICD-10-CM

## 2021-08-11 PROCEDURE — 99999 PR PBB SHADOW E&M-EST. PATIENT-LVL III: ICD-10-PCS | Mod: PBBFAC,,, | Performed by: OPTOMETRIST

## 2021-08-11 PROCEDURE — 1159F PR MEDICATION LIST DOCUMENTED IN MEDICAL RECORD: ICD-10-PCS | Mod: CPTII,S$GLB,, | Performed by: OPTOMETRIST

## 2021-08-11 PROCEDURE — 99999 PR PBB SHADOW E&M-EST. PATIENT-LVL III: CPT | Mod: PBBFAC,,, | Performed by: OPTOMETRIST

## 2021-08-11 PROCEDURE — 2023F DILAT RTA XM W/O RTNOPTHY: CPT | Mod: CPTII,S$GLB,, | Performed by: OPTOMETRIST

## 2021-08-11 PROCEDURE — 1159F MED LIST DOCD IN RCRD: CPT | Mod: CPTII,S$GLB,, | Performed by: OPTOMETRIST

## 2021-08-11 PROCEDURE — 92015 PR REFRACTION: ICD-10-PCS | Mod: S$GLB,,, | Performed by: OPTOMETRIST

## 2021-08-11 PROCEDURE — 92015 DETERMINE REFRACTIVE STATE: CPT | Mod: S$GLB,,, | Performed by: OPTOMETRIST

## 2021-08-11 PROCEDURE — 2023F PR DILATED RETINAL EXAM W/O EVID OF RETINOPATHY: ICD-10-PCS | Mod: CPTII,S$GLB,, | Performed by: OPTOMETRIST

## 2021-08-11 PROCEDURE — 92004 COMPRE OPH EXAM NEW PT 1/>: CPT | Mod: S$GLB,,, | Performed by: OPTOMETRIST

## 2021-08-11 PROCEDURE — 92004 PR EYE EXAM, NEW PATIENT,COMPREHESV: ICD-10-PCS | Mod: S$GLB,,, | Performed by: OPTOMETRIST

## 2021-08-12 ENCOUNTER — OFFICE VISIT (OUTPATIENT)
Dept: INTERNAL MEDICINE | Facility: CLINIC | Age: 56
End: 2021-08-12
Payer: COMMERCIAL

## 2021-08-12 VITALS
DIASTOLIC BLOOD PRESSURE: 72 MMHG | BODY MASS INDEX: 33.23 KG/M2 | HEIGHT: 73 IN | WEIGHT: 250.75 LBS | TEMPERATURE: 98 F | SYSTOLIC BLOOD PRESSURE: 122 MMHG | HEART RATE: 83 BPM | OXYGEN SATURATION: 98 %

## 2021-08-12 DIAGNOSIS — E11.9 TYPE 2 DIABETES MELLITUS WITHOUT COMPLICATION, WITHOUT LONG-TERM CURRENT USE OF INSULIN: ICD-10-CM

## 2021-08-12 DIAGNOSIS — I10 HYPERTENSION, UNSPECIFIED TYPE: Primary | ICD-10-CM

## 2021-08-12 DIAGNOSIS — E03.9 HYPOTHYROIDISM, UNSPECIFIED TYPE: ICD-10-CM

## 2021-08-12 DIAGNOSIS — R79.89 LOW TESTOSTERONE: ICD-10-CM

## 2021-08-12 PROCEDURE — 3074F SYST BP LT 130 MM HG: CPT | Mod: CPTII,S$GLB,, | Performed by: FAMILY MEDICINE

## 2021-08-12 PROCEDURE — 3008F BODY MASS INDEX DOCD: CPT | Mod: CPTII,S$GLB,, | Performed by: FAMILY MEDICINE

## 2021-08-12 PROCEDURE — 99999 PR PBB SHADOW E&M-EST. PATIENT-LVL IV: ICD-10-PCS | Mod: PBBFAC,,, | Performed by: FAMILY MEDICINE

## 2021-08-12 PROCEDURE — 99213 OFFICE O/P EST LOW 20 MIN: CPT | Mod: S$GLB,,, | Performed by: FAMILY MEDICINE

## 2021-08-12 PROCEDURE — 3074F PR MOST RECENT SYSTOLIC BLOOD PRESSURE < 130 MM HG: ICD-10-PCS | Mod: CPTII,S$GLB,, | Performed by: FAMILY MEDICINE

## 2021-08-12 PROCEDURE — 1159F MED LIST DOCD IN RCRD: CPT | Mod: CPTII,S$GLB,, | Performed by: FAMILY MEDICINE

## 2021-08-12 PROCEDURE — 3078F DIAST BP <80 MM HG: CPT | Mod: CPTII,S$GLB,, | Performed by: FAMILY MEDICINE

## 2021-08-12 PROCEDURE — 3008F PR BODY MASS INDEX (BMI) DOCUMENTED: ICD-10-PCS | Mod: CPTII,S$GLB,, | Performed by: FAMILY MEDICINE

## 2021-08-12 PROCEDURE — 99213 PR OFFICE/OUTPT VISIT, EST, LEVL III, 20-29 MIN: ICD-10-PCS | Mod: S$GLB,,, | Performed by: FAMILY MEDICINE

## 2021-08-12 PROCEDURE — 3078F PR MOST RECENT DIASTOLIC BLOOD PRESSURE < 80 MM HG: ICD-10-PCS | Mod: CPTII,S$GLB,, | Performed by: FAMILY MEDICINE

## 2021-08-12 PROCEDURE — 1159F PR MEDICATION LIST DOCUMENTED IN MEDICAL RECORD: ICD-10-PCS | Mod: CPTII,S$GLB,, | Performed by: FAMILY MEDICINE

## 2021-08-12 PROCEDURE — 99999 PR PBB SHADOW E&M-EST. PATIENT-LVL IV: CPT | Mod: PBBFAC,,, | Performed by: FAMILY MEDICINE

## 2021-08-12 RX ORDER — AMLODIPINE BESYLATE 10 MG/1
10 TABLET ORAL DAILY
Qty: 30 TABLET | Refills: 3 | Status: SHIPPED | OUTPATIENT
Start: 2021-08-12 | End: 2021-09-15 | Stop reason: SDUPTHER

## 2021-09-08 ENCOUNTER — PATIENT MESSAGE (OUTPATIENT)
Dept: INTERNAL MEDICINE | Facility: CLINIC | Age: 56
End: 2021-09-08

## 2021-09-09 ENCOUNTER — OFFICE VISIT (OUTPATIENT)
Dept: INTERNAL MEDICINE | Facility: CLINIC | Age: 56
End: 2021-09-09
Payer: COMMERCIAL

## 2021-09-09 VITALS — SYSTOLIC BLOOD PRESSURE: 137 MMHG | DIASTOLIC BLOOD PRESSURE: 78 MMHG

## 2021-09-09 DIAGNOSIS — E11.9 TYPE 2 DIABETES MELLITUS WITHOUT COMPLICATION, WITHOUT LONG-TERM CURRENT USE OF INSULIN: Primary | ICD-10-CM

## 2021-09-09 DIAGNOSIS — I10 ESSENTIAL HYPERTENSION: ICD-10-CM

## 2021-09-09 PROCEDURE — 99214 PR OFFICE/OUTPT VISIT, EST, LEVL IV, 30-39 MIN: ICD-10-PCS | Mod: 95,,, | Performed by: FAMILY MEDICINE

## 2021-09-09 PROCEDURE — 3075F PR MOST RECENT SYSTOLIC BLOOD PRESS GE 130-139MM HG: ICD-10-PCS | Mod: CPTII,,, | Performed by: FAMILY MEDICINE

## 2021-09-09 PROCEDURE — 4010F PR ACE/ARB THEARPY RXD/TAKEN: ICD-10-PCS | Mod: CPTII,,, | Performed by: FAMILY MEDICINE

## 2021-09-09 PROCEDURE — 1160F RVW MEDS BY RX/DR IN RCRD: CPT | Mod: CPTII,,, | Performed by: FAMILY MEDICINE

## 2021-09-09 PROCEDURE — 4010F ACE/ARB THERAPY RXD/TAKEN: CPT | Mod: CPTII,,, | Performed by: FAMILY MEDICINE

## 2021-09-09 PROCEDURE — 3075F SYST BP GE 130 - 139MM HG: CPT | Mod: CPTII,,, | Performed by: FAMILY MEDICINE

## 2021-09-09 PROCEDURE — 3078F PR MOST RECENT DIASTOLIC BLOOD PRESSURE < 80 MM HG: ICD-10-PCS | Mod: CPTII,,, | Performed by: FAMILY MEDICINE

## 2021-09-09 PROCEDURE — 1159F MED LIST DOCD IN RCRD: CPT | Mod: CPTII,,, | Performed by: FAMILY MEDICINE

## 2021-09-09 PROCEDURE — 99214 OFFICE O/P EST MOD 30 MIN: CPT | Mod: 95,,, | Performed by: FAMILY MEDICINE

## 2021-09-09 PROCEDURE — 1159F PR MEDICATION LIST DOCUMENTED IN MEDICAL RECORD: ICD-10-PCS | Mod: CPTII,,, | Performed by: FAMILY MEDICINE

## 2021-09-09 PROCEDURE — 3078F DIAST BP <80 MM HG: CPT | Mod: CPTII,,, | Performed by: FAMILY MEDICINE

## 2021-09-09 PROCEDURE — 1160F PR REVIEW ALL MEDS BY PRESCRIBER/CLIN PHARMACIST DOCUMENTED: ICD-10-PCS | Mod: CPTII,,, | Performed by: FAMILY MEDICINE

## 2021-09-10 ENCOUNTER — PATIENT MESSAGE (OUTPATIENT)
Dept: INTERNAL MEDICINE | Facility: CLINIC | Age: 56
End: 2021-09-10

## 2021-09-15 ENCOUNTER — PATIENT MESSAGE (OUTPATIENT)
Dept: INTERNAL MEDICINE | Facility: CLINIC | Age: 56
End: 2021-09-15

## 2021-09-16 RX ORDER — CLONAZEPAM 2 MG/1
2 TABLET ORAL NIGHTLY
Qty: 30 TABLET | Refills: 1 | Status: SHIPPED | OUTPATIENT
Start: 2021-09-16 | End: 2021-11-24

## 2021-09-16 RX ORDER — METOPROLOL SUCCINATE 50 MG/1
50 TABLET, EXTENDED RELEASE ORAL DAILY
Qty: 90 TABLET | Refills: 1 | Status: SHIPPED | OUTPATIENT
Start: 2021-09-16 | End: 2022-09-08 | Stop reason: SDUPTHER

## 2021-09-16 RX ORDER — SEMAGLUTIDE 1.34 MG/ML
1 INJECTION, SOLUTION SUBCUTANEOUS
Qty: 12 PEN | Refills: 1 | Status: SHIPPED | OUTPATIENT
Start: 2021-09-16 | End: 2022-02-09

## 2021-09-16 RX ORDER — AMLODIPINE BESYLATE 10 MG/1
10 TABLET ORAL DAILY
Qty: 30 TABLET | Refills: 3 | Status: SHIPPED | OUTPATIENT
Start: 2021-09-16 | End: 2021-11-15

## 2021-09-16 RX ORDER — METFORMIN HYDROCHLORIDE 1000 MG/1
1000 TABLET ORAL 2 TIMES DAILY WITH MEALS
Qty: 180 TABLET | Refills: 1 | Status: SHIPPED | OUTPATIENT
Start: 2021-09-16 | End: 2022-03-22

## 2021-09-16 RX ORDER — ICOSAPENT ETHYL 1000 MG/1
2 CAPSULE ORAL 2 TIMES DAILY
Qty: 90 CAPSULE | Refills: 1 | Status: SHIPPED | OUTPATIENT
Start: 2021-09-16 | End: 2022-07-05 | Stop reason: SDUPTHER

## 2021-09-16 RX ORDER — INSULIN GLARGINE 300 U/ML
85 INJECTION, SOLUTION SUBCUTANEOUS DAILY
Qty: 4 PEN | Refills: 3 | Status: SHIPPED | OUTPATIENT
Start: 2021-09-16 | End: 2022-03-14

## 2021-09-16 RX ORDER — INDOMETHACIN 75 MG/1
75 CAPSULE, EXTENDED RELEASE ORAL 2 TIMES DAILY WITH MEALS
Qty: 180 CAPSULE | Refills: 1 | Status: SHIPPED | OUTPATIENT
Start: 2021-09-16 | End: 2021-11-15

## 2021-09-16 RX ORDER — COLCHICINE 0.6 MG/1
0.6 TABLET ORAL 2 TIMES DAILY
Qty: 180 TABLET | Refills: 1 | Status: SHIPPED | OUTPATIENT
Start: 2021-09-16 | End: 2022-03-29

## 2021-09-16 RX ORDER — ATORVASTATIN CALCIUM 40 MG/1
40 TABLET, FILM COATED ORAL DAILY
Qty: 90 TABLET | Refills: 1 | Status: SHIPPED | OUTPATIENT
Start: 2021-09-16 | End: 2022-05-16

## 2021-09-16 RX ORDER — BENAZEPRIL HYDROCHLORIDE 40 MG/1
40 TABLET ORAL DAILY
Qty: 90 TABLET | Refills: 3 | Status: SHIPPED | OUTPATIENT
Start: 2021-09-16 | End: 2022-09-08 | Stop reason: SDUPTHER

## 2021-09-16 RX ORDER — LEVOTHYROXINE SODIUM 112 UG/1
112 TABLET ORAL
Qty: 90 TABLET | Refills: 1 | Status: SHIPPED | OUTPATIENT
Start: 2021-09-16 | End: 2022-03-22

## 2021-09-17 ENCOUNTER — PATIENT MESSAGE (OUTPATIENT)
Dept: INTERNAL MEDICINE | Facility: CLINIC | Age: 56
End: 2021-09-17

## 2021-09-20 ENCOUNTER — PATIENT MESSAGE (OUTPATIENT)
Dept: INTERNAL MEDICINE | Facility: CLINIC | Age: 56
End: 2021-09-20

## 2021-09-20 RX ORDER — FLASH GLUCOSE SENSOR
KIT MISCELLANEOUS
Qty: 1 KIT | Refills: 4 | Status: SHIPPED | OUTPATIENT
Start: 2021-09-20 | End: 2022-08-12

## 2021-10-15 ENCOUNTER — LAB VISIT (OUTPATIENT)
Dept: LAB | Facility: HOSPITAL | Age: 56
End: 2021-10-15
Attending: FAMILY MEDICINE
Payer: COMMERCIAL

## 2021-10-15 DIAGNOSIS — M10.9 GOUT, UNSPECIFIED CAUSE, UNSPECIFIED CHRONICITY, UNSPECIFIED SITE: ICD-10-CM

## 2021-10-15 DIAGNOSIS — E11.9 TYPE 2 DIABETES MELLITUS WITHOUT COMPLICATION, WITHOUT LONG-TERM CURRENT USE OF INSULIN: ICD-10-CM

## 2021-10-15 DIAGNOSIS — E78.5 HYPERLIPIDEMIA, UNSPECIFIED HYPERLIPIDEMIA TYPE: ICD-10-CM

## 2021-10-15 DIAGNOSIS — I10 BENIGN ESSENTIAL HTN: ICD-10-CM

## 2021-10-15 DIAGNOSIS — Z00.00 ROUTINE CHECK-UP: ICD-10-CM

## 2021-10-15 DIAGNOSIS — E03.9 HYPOTHYROIDISM, UNSPECIFIED TYPE: ICD-10-CM

## 2021-10-15 LAB
ALBUMIN SERPL BCP-MCNC: 4 G/DL (ref 3.5–5.2)
ALP SERPL-CCNC: 46 U/L (ref 55–135)
ALT SERPL W/O P-5'-P-CCNC: 104 U/L (ref 10–44)
ANION GAP SERPL CALC-SCNC: 11 MMOL/L (ref 8–16)
AST SERPL-CCNC: 49 U/L (ref 10–40)
BILIRUB SERPL-MCNC: 0.6 MG/DL (ref 0.1–1)
BUN SERPL-MCNC: 16 MG/DL (ref 6–20)
CALCIUM SERPL-MCNC: 9.6 MG/DL (ref 8.7–10.5)
CHLORIDE SERPL-SCNC: 109 MMOL/L (ref 95–110)
CO2 SERPL-SCNC: 23 MMOL/L (ref 23–29)
CREAT SERPL-MCNC: 1.2 MG/DL (ref 0.5–1.4)
EST. GFR  (AFRICAN AMERICAN): >60 ML/MIN/1.73 M^2
EST. GFR  (NON AFRICAN AMERICAN): >60 ML/MIN/1.73 M^2
ESTIMATED AVG GLUCOSE: 117 MG/DL (ref 68–131)
GLUCOSE SERPL-MCNC: 94 MG/DL (ref 70–110)
HBA1C MFR BLD: 5.7 % (ref 4–5.6)
HCV AB SERPL QL IA: NEGATIVE
POTASSIUM SERPL-SCNC: 4.1 MMOL/L (ref 3.5–5.1)
PROT SERPL-MCNC: 7 G/DL (ref 6–8.4)
SODIUM SERPL-SCNC: 143 MMOL/L (ref 136–145)
T4 FREE SERPL-MCNC: 0.97 NG/DL (ref 0.71–1.51)
TSH SERPL DL<=0.005 MIU/L-ACNC: 4.39 UIU/ML (ref 0.4–4)

## 2021-10-15 PROCEDURE — 83036 HEMOGLOBIN GLYCOSYLATED A1C: CPT | Performed by: FAMILY MEDICINE

## 2021-10-15 PROCEDURE — 86803 HEPATITIS C AB TEST: CPT | Performed by: FAMILY MEDICINE

## 2021-10-15 PROCEDURE — 36415 COLL VENOUS BLD VENIPUNCTURE: CPT | Mod: PO | Performed by: FAMILY MEDICINE

## 2021-10-15 PROCEDURE — 84443 ASSAY THYROID STIM HORMONE: CPT | Performed by: FAMILY MEDICINE

## 2021-10-15 PROCEDURE — 84439 ASSAY OF FREE THYROXINE: CPT | Performed by: FAMILY MEDICINE

## 2021-10-15 PROCEDURE — 80053 COMPREHEN METABOLIC PANEL: CPT | Performed by: FAMILY MEDICINE

## 2021-10-17 ENCOUNTER — PATIENT MESSAGE (OUTPATIENT)
Dept: INTERNAL MEDICINE | Facility: CLINIC | Age: 56
End: 2021-10-17

## 2021-10-17 DIAGNOSIS — E03.9 HYPOTHYROIDISM, UNSPECIFIED TYPE: Primary | ICD-10-CM

## 2021-10-17 DIAGNOSIS — R79.89 ELEVATED LIVER FUNCTION TESTS: ICD-10-CM

## 2021-10-18 ENCOUNTER — PATIENT MESSAGE (OUTPATIENT)
Dept: INTERNAL MEDICINE | Facility: CLINIC | Age: 56
End: 2021-10-18
Payer: COMMERCIAL

## 2021-10-19 ENCOUNTER — HOSPITAL ENCOUNTER (OUTPATIENT)
Dept: RADIOLOGY | Facility: HOSPITAL | Age: 56
Discharge: HOME | End: 2021-10-19
Attending: FAMILY MEDICINE
Payer: COMMERCIAL

## 2021-10-19 DIAGNOSIS — E03.9 HYPOTHYROIDISM, UNSPECIFIED TYPE: ICD-10-CM

## 2021-10-19 DIAGNOSIS — R79.89 ELEVATED LIVER FUNCTION TESTS: ICD-10-CM

## 2021-10-19 PROCEDURE — 76705 ECHO EXAM OF ABDOMEN: CPT | Mod: TC

## 2021-10-19 PROCEDURE — 76705 US ABDOMEN LIMITED_LIVER: ICD-10-PCS | Mod: 26,,, | Performed by: RADIOLOGY

## 2021-10-19 PROCEDURE — 76705 ECHO EXAM OF ABDOMEN: CPT | Mod: 26,,, | Performed by: RADIOLOGY

## 2021-10-20 ENCOUNTER — PATIENT MESSAGE (OUTPATIENT)
Dept: INTERNAL MEDICINE | Facility: CLINIC | Age: 56
End: 2021-10-20
Payer: COMMERCIAL

## 2021-10-27 ENCOUNTER — PATIENT MESSAGE (OUTPATIENT)
Dept: SURGERY | Facility: CLINIC | Age: 56
End: 2021-10-27
Payer: COMMERCIAL

## 2021-10-27 RX ORDER — AMLODIPINE BESYLATE 10 MG/1
10 TABLET ORAL DAILY
Qty: 90 TABLET | Refills: 3 | Status: SHIPPED | OUTPATIENT
Start: 2021-10-27 | End: 2022-04-12

## 2021-11-15 ENCOUNTER — OFFICE VISIT (OUTPATIENT)
Dept: INTERNAL MEDICINE | Facility: CLINIC | Age: 56
End: 2021-11-15
Payer: COMMERCIAL

## 2021-11-15 ENCOUNTER — HOSPITAL ENCOUNTER (EMERGENCY)
Facility: HOSPITAL | Age: 56
Discharge: HOME | End: 2021-11-15
Attending: EMERGENCY MEDICINE
Payer: COMMERCIAL

## 2021-11-15 ENCOUNTER — HOSPITAL ENCOUNTER (OUTPATIENT)
Dept: CARDIOLOGY | Facility: HOSPITAL | Age: 56
Discharge: HOME | End: 2021-11-15
Payer: COMMERCIAL

## 2021-11-15 VITALS
OXYGEN SATURATION: 98 % | RESPIRATION RATE: 17 BRPM | BODY MASS INDEX: 33.45 KG/M2 | HEART RATE: 64 BPM | SYSTOLIC BLOOD PRESSURE: 151 MMHG | WEIGHT: 253.5 LBS | DIASTOLIC BLOOD PRESSURE: 88 MMHG

## 2021-11-15 VITALS
HEART RATE: 71 BPM | DIASTOLIC BLOOD PRESSURE: 92 MMHG | TEMPERATURE: 97 F | OXYGEN SATURATION: 98 % | WEIGHT: 254.75 LBS | BODY MASS INDEX: 33.76 KG/M2 | HEIGHT: 73 IN | SYSTOLIC BLOOD PRESSURE: 156 MMHG

## 2021-11-15 DIAGNOSIS — E03.9 HYPOTHYROIDISM, UNSPECIFIED TYPE: ICD-10-CM

## 2021-11-15 DIAGNOSIS — C18.7 MALIGNANT NEOPLASM OF SIGMOID COLON: Primary | ICD-10-CM

## 2021-11-15 DIAGNOSIS — I10 ESSENTIAL HYPERTENSION: ICD-10-CM

## 2021-11-15 DIAGNOSIS — R07.9 CHEST PAIN, UNSPECIFIED TYPE: ICD-10-CM

## 2021-11-15 DIAGNOSIS — E11.9 TYPE 2 DIABETES MELLITUS WITHOUT COMPLICATION, WITHOUT LONG-TERM CURRENT USE OF INSULIN: ICD-10-CM

## 2021-11-15 DIAGNOSIS — I10 BENIGN ESSENTIAL HTN: ICD-10-CM

## 2021-11-15 DIAGNOSIS — R07.9 CHEST PAIN: Primary | ICD-10-CM

## 2021-11-15 LAB
ALBUMIN SERPL BCP-MCNC: 3.7 G/DL (ref 3.5–5.2)
ALP SERPL-CCNC: 45 U/L (ref 55–135)
ALT SERPL W/O P-5'-P-CCNC: 91 U/L (ref 10–44)
ANION GAP SERPL CALC-SCNC: 9 MMOL/L (ref 8–16)
AST SERPL-CCNC: 37 U/L (ref 10–40)
BASOPHILS # BLD AUTO: 0.05 K/UL (ref 0–0.2)
BASOPHILS NFR BLD: 0.9 % (ref 0–1.9)
BILIRUB SERPL-MCNC: 0.7 MG/DL (ref 0.1–1)
BILIRUB UR QL STRIP: NEGATIVE
BNP SERPL-MCNC: <10 PG/ML (ref 0–99)
BUN SERPL-MCNC: 16 MG/DL (ref 6–20)
CALCIUM SERPL-MCNC: 9.1 MG/DL (ref 8.7–10.5)
CHLORIDE SERPL-SCNC: 107 MMOL/L (ref 95–110)
CK SERPL-CCNC: 167 U/L (ref 20–200)
CLARITY UR: CLEAR
CO2 SERPL-SCNC: 24 MMOL/L (ref 23–29)
COLOR UR: YELLOW
CREAT SERPL-MCNC: 1.1 MG/DL (ref 0.5–1.4)
DIFFERENTIAL METHOD: ABNORMAL
EOSINOPHIL # BLD AUTO: 0.2 K/UL (ref 0–0.5)
EOSINOPHIL NFR BLD: 4.2 % (ref 0–8)
ERYTHROCYTE [DISTWIDTH] IN BLOOD BY AUTOMATED COUNT: 13.2 % (ref 11.5–14.5)
EST. GFR  (AFRICAN AMERICAN): >60 ML/MIN/1.73 M^2
EST. GFR  (NON AFRICAN AMERICAN): >60 ML/MIN/1.73 M^2
GLUCOSE SERPL-MCNC: 110 MG/DL (ref 70–110)
GLUCOSE UR QL STRIP: NEGATIVE
HCT VFR BLD AUTO: 38.8 % (ref 40–54)
HGB BLD-MCNC: 13 G/DL (ref 14–18)
HGB UR QL STRIP: NEGATIVE
IMM GRANULOCYTES # BLD AUTO: 0.02 K/UL (ref 0–0.04)
IMM GRANULOCYTES NFR BLD AUTO: 0.3 % (ref 0–0.5)
KETONES UR QL STRIP: NEGATIVE
LEUKOCYTE ESTERASE UR QL STRIP: NEGATIVE
LYMPHOCYTES # BLD AUTO: 2 K/UL (ref 1–4.8)
LYMPHOCYTES NFR BLD: 34.1 % (ref 18–48)
MCH RBC QN AUTO: 30 PG (ref 27–31)
MCHC RBC AUTO-ENTMCNC: 33.5 G/DL (ref 32–36)
MCV RBC AUTO: 89 FL (ref 82–98)
MONOCYTES # BLD AUTO: 0.7 K/UL (ref 0.3–1)
MONOCYTES NFR BLD: 11.8 % (ref 4–15)
NEUTROPHILS # BLD AUTO: 2.8 K/UL (ref 1.8–7.7)
NEUTROPHILS NFR BLD: 48.7 % (ref 38–73)
NITRITE UR QL STRIP: NEGATIVE
NRBC BLD-RTO: 0 /100 WBC
PH UR STRIP: 7 [PH] (ref 5–8)
PLATELET # BLD AUTO: 297 K/UL (ref 150–450)
PMV BLD AUTO: 10.6 FL (ref 9.2–12.9)
POTASSIUM SERPL-SCNC: 4.1 MMOL/L (ref 3.5–5.1)
PROT SERPL-MCNC: 6.6 G/DL (ref 6–8.4)
PROT UR QL STRIP: NEGATIVE
RBC # BLD AUTO: 4.34 M/UL (ref 4.6–6.2)
SODIUM SERPL-SCNC: 140 MMOL/L (ref 136–145)
SP GR UR STRIP: 1.01 (ref 1–1.03)
TROPONIN I SERPL DL<=0.01 NG/ML-MCNC: <0.006 NG/ML (ref 0–0.03)
URN SPEC COLLECT METH UR: NORMAL
UROBILINOGEN UR STRIP-ACNC: NEGATIVE EU/DL
WBC # BLD AUTO: 5.78 K/UL (ref 3.9–12.7)

## 2021-11-15 PROCEDURE — 3077F PR MOST RECENT SYSTOLIC BLOOD PRESSURE >= 140 MM HG: ICD-10-PCS | Mod: CPTII,S$GLB,, | Performed by: FAMILY MEDICINE

## 2021-11-15 PROCEDURE — 3044F PR MOST RECENT HEMOGLOBIN A1C LEVEL <7.0%: ICD-10-PCS | Mod: CPTII,S$GLB,, | Performed by: FAMILY MEDICINE

## 2021-11-15 PROCEDURE — 3066F PR DOCUMENTATION OF TREATMENT FOR NEPHROPATHY: ICD-10-PCS | Mod: CPTII,S$GLB,, | Performed by: FAMILY MEDICINE

## 2021-11-15 PROCEDURE — 99999 PR PBB SHADOW E&M-EST. PATIENT-LVL III: CPT | Mod: PBBFAC,,, | Performed by: FAMILY MEDICINE

## 2021-11-15 PROCEDURE — 3080F PR MOST RECENT DIASTOLIC BLOOD PRESSURE >= 90 MM HG: ICD-10-PCS | Mod: CPTII,S$GLB,, | Performed by: FAMILY MEDICINE

## 2021-11-15 PROCEDURE — 99214 OFFICE O/P EST MOD 30 MIN: CPT | Mod: S$GLB,,, | Performed by: FAMILY MEDICINE

## 2021-11-15 PROCEDURE — 3077F SYST BP >= 140 MM HG: CPT | Mod: CPTII,S$GLB,, | Performed by: FAMILY MEDICINE

## 2021-11-15 PROCEDURE — 3061F PR NEG MICROALBUMINURIA RESULT DOCUMENTED/REVIEW: ICD-10-PCS | Mod: CPTII,S$GLB,, | Performed by: FAMILY MEDICINE

## 2021-11-15 PROCEDURE — 93005 ELECTROCARDIOGRAM TRACING: CPT

## 2021-11-15 PROCEDURE — 81003 URINALYSIS AUTO W/O SCOPE: CPT | Performed by: EMERGENCY MEDICINE

## 2021-11-15 PROCEDURE — 93010 ELECTROCARDIOGRAM REPORT: CPT | Mod: ,,, | Performed by: INTERNAL MEDICINE

## 2021-11-15 PROCEDURE — 99285 EMERGENCY DEPT VISIT HI MDM: CPT | Mod: 25

## 2021-11-15 PROCEDURE — 85025 COMPLETE CBC W/AUTO DIFF WBC: CPT | Performed by: EMERGENCY MEDICINE

## 2021-11-15 PROCEDURE — 99999 PR PBB SHADOW E&M-EST. PATIENT-LVL III: ICD-10-PCS | Mod: PBBFAC,,, | Performed by: FAMILY MEDICINE

## 2021-11-15 PROCEDURE — 4010F ACE/ARB THERAPY RXD/TAKEN: CPT | Mod: CPTII,S$GLB,, | Performed by: FAMILY MEDICINE

## 2021-11-15 PROCEDURE — 3008F PR BODY MASS INDEX (BMI) DOCUMENTED: ICD-10-PCS | Mod: CPTII,S$GLB,, | Performed by: FAMILY MEDICINE

## 2021-11-15 PROCEDURE — 83880 ASSAY OF NATRIURETIC PEPTIDE: CPT | Performed by: EMERGENCY MEDICINE

## 2021-11-15 PROCEDURE — 3008F BODY MASS INDEX DOCD: CPT | Mod: CPTII,S$GLB,, | Performed by: FAMILY MEDICINE

## 2021-11-15 PROCEDURE — 80053 COMPREHEN METABOLIC PANEL: CPT | Performed by: EMERGENCY MEDICINE

## 2021-11-15 PROCEDURE — 4010F PR ACE/ARB THEARPY RXD/TAKEN: ICD-10-PCS | Mod: CPTII,S$GLB,, | Performed by: FAMILY MEDICINE

## 2021-11-15 PROCEDURE — 84484 ASSAY OF TROPONIN QUANT: CPT | Performed by: EMERGENCY MEDICINE

## 2021-11-15 PROCEDURE — 82550 ASSAY OF CK (CPK): CPT | Performed by: EMERGENCY MEDICINE

## 2021-11-15 PROCEDURE — 3066F NEPHROPATHY DOC TX: CPT | Mod: CPTII,S$GLB,, | Performed by: FAMILY MEDICINE

## 2021-11-15 PROCEDURE — 3061F NEG MICROALBUMINURIA REV: CPT | Mod: CPTII,S$GLB,, | Performed by: FAMILY MEDICINE

## 2021-11-15 PROCEDURE — 93010 EKG 12-LEAD: ICD-10-PCS | Mod: ,,, | Performed by: INTERNAL MEDICINE

## 2021-11-15 PROCEDURE — 3044F HG A1C LEVEL LT 7.0%: CPT | Mod: CPTII,S$GLB,, | Performed by: FAMILY MEDICINE

## 2021-11-15 PROCEDURE — 99214 PR OFFICE/OUTPT VISIT, EST, LEVL IV, 30-39 MIN: ICD-10-PCS | Mod: S$GLB,,, | Performed by: FAMILY MEDICINE

## 2021-11-15 PROCEDURE — 3080F DIAST BP >= 90 MM HG: CPT | Mod: CPTII,S$GLB,, | Performed by: FAMILY MEDICINE

## 2021-11-15 RX ORDER — INDOMETHACIN 75 MG/1
75 CAPSULE, EXTENDED RELEASE ORAL 2 TIMES DAILY PRN
COMMUNITY
End: 2023-10-12 | Stop reason: SDUPTHER

## 2021-11-15 RX ORDER — ESOMEPRAZOLE MAGNESIUM 40 MG/1
40 CAPSULE, DELAYED RELEASE ORAL
Qty: 30 CAPSULE | Refills: 11 | Status: SHIPPED | OUTPATIENT
Start: 2021-11-15 | End: 2022-03-15

## 2021-11-15 RX ORDER — OMEPRAZOLE 20 MG/1
20 CAPSULE, DELAYED RELEASE ORAL DAILY
Qty: 30 CAPSULE | Refills: 1 | Status: SHIPPED | OUTPATIENT
Start: 2021-11-15 | End: 2022-08-12

## 2021-11-16 ENCOUNTER — PATIENT MESSAGE (OUTPATIENT)
Dept: INTERNAL MEDICINE | Facility: CLINIC | Age: 56
End: 2021-11-16
Payer: COMMERCIAL

## 2021-11-19 ENCOUNTER — TELEPHONE (OUTPATIENT)
Dept: INTERNAL MEDICINE | Facility: CLINIC | Age: 56
End: 2021-11-19
Payer: COMMERCIAL

## 2021-11-19 ENCOUNTER — PATIENT OUTREACH (OUTPATIENT)
Dept: ADMINISTRATIVE | Facility: OTHER | Age: 56
End: 2021-11-19
Payer: COMMERCIAL

## 2021-11-22 ENCOUNTER — PATIENT MESSAGE (OUTPATIENT)
Dept: PHARMACY | Facility: CLINIC | Age: 56
End: 2021-11-22
Payer: COMMERCIAL

## 2021-11-22 ENCOUNTER — TELEPHONE (OUTPATIENT)
Dept: INTERNAL MEDICINE | Facility: CLINIC | Age: 56
End: 2021-11-22
Payer: COMMERCIAL

## 2021-11-22 ENCOUNTER — OFFICE VISIT (OUTPATIENT)
Dept: GASTROENTEROLOGY | Facility: CLINIC | Age: 56
End: 2021-11-22
Payer: COMMERCIAL

## 2021-11-22 VITALS
BODY MASS INDEX: 34.43 KG/M2 | DIASTOLIC BLOOD PRESSURE: 80 MMHG | SYSTOLIC BLOOD PRESSURE: 152 MMHG | WEIGHT: 259.81 LBS | HEART RATE: 76 BPM | HEIGHT: 73 IN | OXYGEN SATURATION: 97 %

## 2021-11-22 DIAGNOSIS — Z85.038 HX OF COLON CANCER, STAGE II: Primary | ICD-10-CM

## 2021-11-22 DIAGNOSIS — R63.4 WEIGHT LOSS: ICD-10-CM

## 2021-11-22 DIAGNOSIS — K52.9 CHRONIC DIARRHEA: ICD-10-CM

## 2021-11-22 DIAGNOSIS — R07.89 ATYPICAL CHEST PAIN: ICD-10-CM

## 2021-11-22 DIAGNOSIS — R13.19 ESOPHAGEAL DYSPHAGIA: ICD-10-CM

## 2021-11-22 DIAGNOSIS — R07.9 CHEST PAIN, UNSPECIFIED TYPE: ICD-10-CM

## 2021-11-22 PROBLEM — Z79.4 TYPE 2 DIABETES MELLITUS WITHOUT COMPLICATION, WITH LONG-TERM CURRENT USE OF INSULIN: Status: ACTIVE | Noted: 2019-04-30

## 2021-11-22 PROCEDURE — 4010F PR ACE/ARB THEARPY RXD/TAKEN: ICD-10-PCS | Mod: CPTII,S$GLB,, | Performed by: INTERNAL MEDICINE

## 2021-11-22 PROCEDURE — 3066F NEPHROPATHY DOC TX: CPT | Mod: CPTII,S$GLB,, | Performed by: INTERNAL MEDICINE

## 2021-11-22 PROCEDURE — 99999 PR PBB SHADOW E&M-EST. PATIENT-LVL V: CPT | Mod: PBBFAC,,, | Performed by: INTERNAL MEDICINE

## 2021-11-22 PROCEDURE — 99204 OFFICE O/P NEW MOD 45 MIN: CPT | Mod: S$GLB,,, | Performed by: INTERNAL MEDICINE

## 2021-11-22 PROCEDURE — 99204 PR OFFICE/OUTPT VISIT, NEW, LEVL IV, 45-59 MIN: ICD-10-PCS | Mod: S$GLB,,, | Performed by: INTERNAL MEDICINE

## 2021-11-22 PROCEDURE — 3061F NEG MICROALBUMINURIA REV: CPT | Mod: CPTII,S$GLB,, | Performed by: INTERNAL MEDICINE

## 2021-11-22 PROCEDURE — 99999 PR PBB SHADOW E&M-EST. PATIENT-LVL V: ICD-10-PCS | Mod: PBBFAC,,, | Performed by: INTERNAL MEDICINE

## 2021-11-22 PROCEDURE — 3061F PR NEG MICROALBUMINURIA RESULT DOCUMENTED/REVIEW: ICD-10-PCS | Mod: CPTII,S$GLB,, | Performed by: INTERNAL MEDICINE

## 2021-11-22 PROCEDURE — 4010F ACE/ARB THERAPY RXD/TAKEN: CPT | Mod: CPTII,S$GLB,, | Performed by: INTERNAL MEDICINE

## 2021-11-22 PROCEDURE — 3066F PR DOCUMENTATION OF TREATMENT FOR NEPHROPATHY: ICD-10-PCS | Mod: CPTII,S$GLB,, | Performed by: INTERNAL MEDICINE

## 2021-11-24 ENCOUNTER — PATIENT MESSAGE (OUTPATIENT)
Dept: INTERNAL MEDICINE | Facility: CLINIC | Age: 56
End: 2021-11-24
Payer: COMMERCIAL

## 2021-12-01 ENCOUNTER — TELEPHONE (OUTPATIENT)
Dept: INTERNAL MEDICINE | Facility: CLINIC | Age: 56
End: 2021-12-01
Payer: COMMERCIAL

## 2021-12-06 ENCOUNTER — LAB VISIT (OUTPATIENT)
Dept: LAB | Facility: HOSPITAL | Age: 56
End: 2021-12-06
Attending: INTERNAL MEDICINE
Payer: COMMERCIAL

## 2021-12-06 ENCOUNTER — TELEPHONE (OUTPATIENT)
Dept: GASTROENTEROLOGY | Facility: CLINIC | Age: 56
End: 2021-12-06
Payer: COMMERCIAL

## 2021-12-06 DIAGNOSIS — K52.9 CHRONIC DIARRHEA: ICD-10-CM

## 2021-12-06 DIAGNOSIS — R07.89 ATYPICAL CHEST PAIN: ICD-10-CM

## 2021-12-06 PROCEDURE — 82705 FATS/LIPIDS FECES QUAL: CPT | Performed by: INTERNAL MEDICINE

## 2021-12-06 PROCEDURE — 87427 SHIGA-LIKE TOXIN AG IA: CPT | Performed by: INTERNAL MEDICINE

## 2021-12-06 PROCEDURE — 87045 FECES CULTURE AEROBIC BACT: CPT | Performed by: INTERNAL MEDICINE

## 2021-12-06 PROCEDURE — 87338 HPYLORI STOOL AG IA: CPT | Performed by: INTERNAL MEDICINE

## 2021-12-06 PROCEDURE — 87324 CLOSTRIDIUM AG IA: CPT | Performed by: INTERNAL MEDICINE

## 2021-12-06 PROCEDURE — 87046 STOOL CULTR AEROBIC BACT EA: CPT | Performed by: INTERNAL MEDICINE

## 2021-12-06 PROCEDURE — 87449 NOS EACH ORGANISM AG IA: CPT | Performed by: INTERNAL MEDICINE

## 2021-12-06 PROCEDURE — 82272 OCCULT BLD FECES 1-3 TESTS: CPT | Performed by: INTERNAL MEDICINE

## 2021-12-06 PROCEDURE — 89055 LEUKOCYTE ASSESSMENT FECAL: CPT | Performed by: INTERNAL MEDICINE

## 2021-12-07 LAB
OB PNL STL: NEGATIVE
WBC #/AREA STL HPF: NORMAL /[HPF]

## 2021-12-08 ENCOUNTER — TELEPHONE (OUTPATIENT)
Dept: INTERNAL MEDICINE | Facility: CLINIC | Age: 56
End: 2021-12-08
Payer: COMMERCIAL

## 2021-12-08 LAB
E COLI SXT1 STL QL IA: NEGATIVE
E COLI SXT2 STL QL IA: NEGATIVE
FAT STL QL: NORMAL
NEUTRAL FAT STL QL: NORMAL

## 2021-12-09 ENCOUNTER — PATIENT MESSAGE (OUTPATIENT)
Dept: GASTROENTEROLOGY | Facility: CLINIC | Age: 56
End: 2021-12-09
Payer: COMMERCIAL

## 2021-12-09 DIAGNOSIS — R19.7 DIARRHEA IN ADULT PATIENT: Primary | ICD-10-CM

## 2021-12-10 LAB — BACTERIA STL CULT: NORMAL

## 2021-12-13 LAB
H PYLORI AG STL QL IA: NORMAL
SPECIMEN SOURCE: 0

## 2021-12-14 ENCOUNTER — HOSPITAL ENCOUNTER (OUTPATIENT)
Dept: RADIOLOGY | Facility: HOSPITAL | Age: 56
Discharge: HOME | End: 2021-12-14
Attending: INTERNAL MEDICINE
Payer: COMMERCIAL

## 2021-12-14 ENCOUNTER — OFFICE VISIT (OUTPATIENT)
Dept: CARDIOLOGY | Facility: CLINIC | Age: 56
End: 2021-12-14
Payer: COMMERCIAL

## 2021-12-14 VITALS
HEIGHT: 73 IN | OXYGEN SATURATION: 97 % | DIASTOLIC BLOOD PRESSURE: 76 MMHG | WEIGHT: 266.56 LBS | BODY MASS INDEX: 35.33 KG/M2 | HEART RATE: 74 BPM | SYSTOLIC BLOOD PRESSURE: 160 MMHG

## 2021-12-14 DIAGNOSIS — E11.9 TYPE 2 DIABETES MELLITUS WITHOUT COMPLICATION, WITHOUT LONG-TERM CURRENT USE OF INSULIN: ICD-10-CM

## 2021-12-14 DIAGNOSIS — R13.19 ESOPHAGEAL DYSPHAGIA: ICD-10-CM

## 2021-12-14 DIAGNOSIS — E78.5 HYPERLIPIDEMIA, UNSPECIFIED HYPERLIPIDEMIA TYPE: ICD-10-CM

## 2021-12-14 DIAGNOSIS — I10 HYPERTENSION, UNSPECIFIED TYPE: ICD-10-CM

## 2021-12-14 DIAGNOSIS — E78.1 HYPERTRIGLYCERIDEMIA: ICD-10-CM

## 2021-12-14 DIAGNOSIS — R94.31 NONSPECIFIC ABNORMAL ELECTROCARDIOGRAM (ECG) (EKG): ICD-10-CM

## 2021-12-14 DIAGNOSIS — R07.9 CHEST PAIN, UNSPECIFIED TYPE: Primary | ICD-10-CM

## 2021-12-14 DIAGNOSIS — R07.89 ATYPICAL CHEST PAIN: ICD-10-CM

## 2021-12-14 PROCEDURE — 99999 PR PBB SHADOW E&M-EST. PATIENT-LVL V: CPT | Mod: PBBFAC,,, | Performed by: INTERNAL MEDICINE

## 2021-12-14 PROCEDURE — 25500020 PHARM REV CODE 255: Performed by: INTERNAL MEDICINE

## 2021-12-14 PROCEDURE — 4010F PR ACE/ARB THEARPY RXD/TAKEN: ICD-10-PCS | Mod: CPTII,S$GLB,, | Performed by: INTERNAL MEDICINE

## 2021-12-14 PROCEDURE — 4010F ACE/ARB THERAPY RXD/TAKEN: CPT | Mod: CPTII,S$GLB,, | Performed by: INTERNAL MEDICINE

## 2021-12-14 PROCEDURE — 74220 X-RAY XM ESOPHAGUS 1CNTRST: CPT | Mod: TC

## 2021-12-14 PROCEDURE — A9698 NON-RAD CONTRAST MATERIALNOC: HCPCS | Performed by: INTERNAL MEDICINE

## 2021-12-14 PROCEDURE — 3061F NEG MICROALBUMINURIA REV: CPT | Mod: CPTII,S$GLB,, | Performed by: INTERNAL MEDICINE

## 2021-12-14 PROCEDURE — 3066F NEPHROPATHY DOC TX: CPT | Mod: CPTII,S$GLB,, | Performed by: INTERNAL MEDICINE

## 2021-12-14 PROCEDURE — 99204 OFFICE O/P NEW MOD 45 MIN: CPT | Mod: S$GLB,,, | Performed by: INTERNAL MEDICINE

## 2021-12-14 PROCEDURE — 99204 PR OFFICE/OUTPT VISIT, NEW, LEVL IV, 45-59 MIN: ICD-10-PCS | Mod: S$GLB,,, | Performed by: INTERNAL MEDICINE

## 2021-12-14 PROCEDURE — 3066F PR DOCUMENTATION OF TREATMENT FOR NEPHROPATHY: ICD-10-PCS | Mod: CPTII,S$GLB,, | Performed by: INTERNAL MEDICINE

## 2021-12-14 PROCEDURE — 99999 PR PBB SHADOW E&M-EST. PATIENT-LVL V: ICD-10-PCS | Mod: PBBFAC,,, | Performed by: INTERNAL MEDICINE

## 2021-12-14 PROCEDURE — 3061F PR NEG MICROALBUMINURIA RESULT DOCUMENTED/REVIEW: ICD-10-PCS | Mod: CPTII,S$GLB,, | Performed by: INTERNAL MEDICINE

## 2021-12-14 RX ADMIN — BARIUM SULFATE 40 G: 960 POWDER, FOR SUSPENSION ORAL at 12:12

## 2021-12-14 RX ADMIN — BARIUM SULFATE 135 ML: 980 POWDER, FOR SUSPENSION ORAL at 12:12

## 2021-12-15 ENCOUNTER — OFFICE VISIT (OUTPATIENT)
Dept: INTERNAL MEDICINE | Facility: CLINIC | Age: 56
End: 2021-12-15
Payer: COMMERCIAL

## 2021-12-15 VITALS
HEART RATE: 69 BPM | HEIGHT: 73 IN | BODY MASS INDEX: 34.3 KG/M2 | OXYGEN SATURATION: 97 % | SYSTOLIC BLOOD PRESSURE: 138 MMHG | WEIGHT: 258.81 LBS | DIASTOLIC BLOOD PRESSURE: 82 MMHG | TEMPERATURE: 99 F

## 2021-12-15 DIAGNOSIS — E11.9 TYPE 2 DIABETES MELLITUS WITHOUT COMPLICATION, WITH LONG-TERM CURRENT USE OF INSULIN: ICD-10-CM

## 2021-12-15 DIAGNOSIS — E11.9 ENCOUNTER FOR DIABETIC FOOT EXAM: ICD-10-CM

## 2021-12-15 DIAGNOSIS — J06.9 UPPER RESPIRATORY TRACT INFECTION, UNSPECIFIED TYPE: ICD-10-CM

## 2021-12-15 DIAGNOSIS — Z79.4 TYPE 2 DIABETES MELLITUS WITHOUT COMPLICATION, WITH LONG-TERM CURRENT USE OF INSULIN: ICD-10-CM

## 2021-12-15 DIAGNOSIS — I10 HYPERTENSION, UNSPECIFIED TYPE: Primary | ICD-10-CM

## 2021-12-15 DIAGNOSIS — R07.9 CHEST PAIN, UNSPECIFIED TYPE: ICD-10-CM

## 2021-12-15 PROCEDURE — 3066F NEPHROPATHY DOC TX: CPT | Mod: CPTII,S$GLB,,

## 2021-12-15 PROCEDURE — 3066F PR DOCUMENTATION OF TREATMENT FOR NEPHROPATHY: ICD-10-PCS | Mod: CPTII,S$GLB,,

## 2021-12-15 PROCEDURE — 99999 PR PBB SHADOW E&M-EST. PATIENT-LVL IV: CPT | Mod: PBBFAC,,,

## 2021-12-15 PROCEDURE — 4010F PR ACE/ARB THEARPY RXD/TAKEN: ICD-10-PCS | Mod: CPTII,S$GLB,,

## 2021-12-15 PROCEDURE — 99214 OFFICE O/P EST MOD 30 MIN: CPT | Mod: S$GLB,,,

## 2021-12-15 PROCEDURE — 99999 PR PBB SHADOW E&M-EST. PATIENT-LVL IV: ICD-10-PCS | Mod: PBBFAC,,,

## 2021-12-15 PROCEDURE — 3061F NEG MICROALBUMINURIA REV: CPT | Mod: CPTII,S$GLB,,

## 2021-12-15 PROCEDURE — 99214 PR OFFICE/OUTPT VISIT, EST, LEVL IV, 30-39 MIN: ICD-10-PCS | Mod: S$GLB,,,

## 2021-12-15 PROCEDURE — 3061F PR NEG MICROALBUMINURIA RESULT DOCUMENTED/REVIEW: ICD-10-PCS | Mod: CPTII,S$GLB,,

## 2021-12-15 PROCEDURE — 4010F ACE/ARB THERAPY RXD/TAKEN: CPT | Mod: CPTII,S$GLB,,

## 2021-12-15 RX ORDER — PROMETHAZINE HYDROCHLORIDE AND PHENYLEPHRINE HYDROCHLORIDE 6.25; 5 MG/5ML; MG/5ML
5 SYRUP ORAL EVERY 6 HOURS PRN
Qty: 118 ML | Refills: 0 | Status: SHIPPED | OUTPATIENT
Start: 2021-12-15 | End: 2021-12-25

## 2022-01-03 ENCOUNTER — PATIENT MESSAGE (OUTPATIENT)
Dept: INTERNAL MEDICINE | Facility: CLINIC | Age: 57
End: 2022-01-03
Payer: COMMERCIAL

## 2022-01-18 ENCOUNTER — PATIENT MESSAGE (OUTPATIENT)
Dept: INTERNAL MEDICINE | Facility: CLINIC | Age: 57
End: 2022-01-18
Payer: COMMERCIAL

## 2022-01-18 ENCOUNTER — LAB VISIT (OUTPATIENT)
Dept: LAB | Facility: HOSPITAL | Age: 57
End: 2022-01-18
Attending: FAMILY MEDICINE
Payer: COMMERCIAL

## 2022-01-18 DIAGNOSIS — E03.9 HYPOTHYROIDISM, UNSPECIFIED TYPE: ICD-10-CM

## 2022-01-18 LAB — TSH SERPL DL<=0.005 MIU/L-ACNC: 3.75 UIU/ML (ref 0.4–4)

## 2022-01-18 PROCEDURE — 84443 ASSAY THYROID STIM HORMONE: CPT | Performed by: FAMILY MEDICINE

## 2022-01-18 PROCEDURE — 36415 COLL VENOUS BLD VENIPUNCTURE: CPT | Mod: PO | Performed by: FAMILY MEDICINE

## 2022-01-19 ENCOUNTER — PATIENT MESSAGE (OUTPATIENT)
Dept: INTERNAL MEDICINE | Facility: CLINIC | Age: 57
End: 2022-01-19
Payer: COMMERCIAL

## 2022-01-19 DIAGNOSIS — Z85.038 HX OF COLON CANCER, STAGE II: Primary | ICD-10-CM

## 2022-01-19 RX ORDER — SODIUM, POTASSIUM,MAG SULFATES 17.5-3.13G
1 SOLUTION, RECONSTITUTED, ORAL ORAL DAILY
Qty: 1 KIT | Refills: 0 | Status: SHIPPED | OUTPATIENT
Start: 2022-01-19 | End: 2022-01-21

## 2022-01-21 ENCOUNTER — PATIENT MESSAGE (OUTPATIENT)
Dept: ENDOSCOPY | Facility: HOSPITAL | Age: 57
End: 2022-01-21
Payer: COMMERCIAL

## 2022-01-24 ENCOUNTER — PATIENT MESSAGE (OUTPATIENT)
Dept: INTERNAL MEDICINE | Facility: CLINIC | Age: 57
End: 2022-01-24
Payer: COMMERCIAL

## 2022-01-25 ENCOUNTER — PATIENT MESSAGE (OUTPATIENT)
Dept: INTERNAL MEDICINE | Facility: CLINIC | Age: 57
End: 2022-01-25
Payer: COMMERCIAL

## 2022-01-25 ENCOUNTER — PATIENT MESSAGE (OUTPATIENT)
Dept: CARDIOLOGY | Facility: CLINIC | Age: 57
End: 2022-01-25
Payer: COMMERCIAL

## 2022-02-01 ENCOUNTER — HOSPITAL ENCOUNTER (OUTPATIENT)
Dept: CARDIOLOGY | Facility: HOSPITAL | Age: 57
Discharge: HOME | End: 2022-02-01
Attending: INTERNAL MEDICINE
Payer: COMMERCIAL

## 2022-02-01 VITALS
DIASTOLIC BLOOD PRESSURE: 85 MMHG | HEIGHT: 73 IN | SYSTOLIC BLOOD PRESSURE: 149 MMHG | WEIGHT: 258 LBS | BODY MASS INDEX: 34.19 KG/M2

## 2022-02-01 DIAGNOSIS — E11.9 TYPE 2 DIABETES MELLITUS WITHOUT COMPLICATION, WITHOUT LONG-TERM CURRENT USE OF INSULIN: ICD-10-CM

## 2022-02-01 DIAGNOSIS — E78.1 HYPERTRIGLYCERIDEMIA: ICD-10-CM

## 2022-02-01 DIAGNOSIS — R94.31 NONSPECIFIC ABNORMAL ELECTROCARDIOGRAM (ECG) (EKG): ICD-10-CM

## 2022-02-01 DIAGNOSIS — R07.9 CHEST PAIN, UNSPECIFIED TYPE: ICD-10-CM

## 2022-02-01 DIAGNOSIS — I10 HYPERTENSION, UNSPECIFIED TYPE: ICD-10-CM

## 2022-02-01 DIAGNOSIS — E78.5 HYPERLIPIDEMIA, UNSPECIFIED HYPERLIPIDEMIA TYPE: ICD-10-CM

## 2022-02-01 PROCEDURE — 93351 STRESS TTE COMPLETE: CPT | Mod: 26,,, | Performed by: INTERNAL MEDICINE

## 2022-02-01 PROCEDURE — 93351 STRESS TTE COMPLETE: CPT | Mod: PO

## 2022-02-01 PROCEDURE — 93351 STRESS ECHO (CUPID ONLY): ICD-10-PCS | Mod: 26,,, | Performed by: INTERNAL MEDICINE

## 2022-02-02 LAB
AORTIC ROOT ANNULUS: 3.43 CM
AV INDEX (PROSTH): 0.84
AV MEAN GRADIENT: 4 MMHG
AV PEAK GRADIENT: 6 MMHG
AV VALVE AREA: 2.57 CM2
AV VELOCITY RATIO: 0.86
BSA FOR ECHO PROCEDURE: 2.45 M2
CV ECHO LV RWT: 0.5 CM
CV STRESS BASE HR: 72 BPM
DIASTOLIC BLOOD PRESSURE: 85 MMHG
DOP CALC AO PEAK VEL: 1.26 M/S
DOP CALC AO VTI: 28.5 CM
DOP CALC LVOT AREA: 3.1 CM2
DOP CALC LVOT DIAMETER: 1.98 CM
DOP CALC LVOT PEAK VEL: 1.08 M/S
DOP CALC LVOT STROKE VOLUME: 73.24 CM3
DOP CALC RVOT PEAK VEL: 0.77 M/S
DOP CALC RVOT VTI: 16.7 CM
DOP CALCLVOT PEAK VEL VTI: 23.8 CM
E WAVE DECELERATION TIME: 396.8 MSEC
E/A RATIO: 0.89
E/E' RATIO: 7.9 M/S
ECHO EF ESTIMATED: 69 %
ECHO LV POSTERIOR WALL: 1.28 CM (ref 0.6–1.1)
EJECTION FRACTION: 60 %
FRACTIONAL SHORTENING: 38 % (ref 28–44)
INTERVENTRICULAR SEPTUM: 1.05 CM (ref 0.6–1.1)
IVRT: 97.05 MSEC
LA MAJOR: 5.06 CM
LA MINOR: 4.65 CM
LA WIDTH: 3.31 CM
LEFT ATRIUM SIZE: 3.45 CM
LEFT ATRIUM VOLUME INDEX MOD: 22.2 ML/M2
LEFT ATRIUM VOLUME INDEX: 19.6 ML/M2
LEFT ATRIUM VOLUME MOD: 53.32 CM3
LEFT ATRIUM VOLUME: 47.04 CM3
LEFT INTERNAL DIMENSION IN SYSTOLE: 3.18 CM (ref 2.1–4)
LEFT VENTRICLE DIASTOLIC VOLUME INDEX: 53.23 ML/M2
LEFT VENTRICLE DIASTOLIC VOLUME: 127.74 ML
LEFT VENTRICLE MASS INDEX: 99 G/M2
LEFT VENTRICLE SYSTOLIC VOLUME INDEX: 16.8 ML/M2
LEFT VENTRICLE SYSTOLIC VOLUME: 40.24 ML
LEFT VENTRICULAR INTERNAL DIMENSION IN DIASTOLE: 5.17 CM (ref 3.5–6)
LEFT VENTRICULAR MASS: 236.63 G
LV LATERAL E/E' RATIO: 7.18 M/S
LV SEPTAL E/E' RATIO: 8.78 M/S
LVOT MG: 2.58 MMHG
LVOT MV: 0.77 CM/S
MV PEAK A VEL: 0.89 M/S
MV PEAK E VEL: 0.79 M/S
OHS CV CPX 1 MINUTE RECOVERY HEART RATE: 122 BPM
OHS CV CPX 85 PERCENT MAX PREDICTED HEART RATE MALE: 139
OHS CV CPX ESTIMATED METS: 7
OHS CV CPX MAX PREDICTED HEART RATE: 164
OHS CV CPX PATIENT IS FEMALE: 0
OHS CV CPX PATIENT IS MALE: 1
OHS CV CPX PEAK DIASTOLIC BLOOD PRESSURE: 63 MMHG
OHS CV CPX PEAK HEAR RATE: 142 BPM
OHS CV CPX PEAK RATE PRESSURE PRODUCT: ABNORMAL
OHS CV CPX PEAK SYSTOLIC BLOOD PRESSURE: 225 MMHG
OHS CV CPX PERCENT MAX PREDICTED HEART RATE ACHIEVED: 87
OHS CV CPX RATE PRESSURE PRODUCT PRESENTING: ABNORMAL
PULM VEIN S/D RATIO: 1.35
PV MEAN GRADIENT: 1.35 MMHG
PV MV: 0.82 M/S
PV PEAK D VEL: 0.5 M/S
PV PEAK S VEL: 0.67 M/S
PV PEAK VELOCITY: 1.1 CM/S
RA MAJOR: 4.17 CM
RA WIDTH: 3.06 CM
RIGHT VENTRICULAR END-DIASTOLIC DIMENSION: 2.51 CM
SINUS: 3.24 CM
STJ: 3.02 CM
STRESS ECHO POST EXERCISE DUR MIN: 5 MINUTES
STRESS ECHO POST EXERCISE DUR SEC: 37 SECONDS
SYSTOLIC BLOOD PRESSURE: 149 MMHG
TDI LATERAL: 0.11 M/S
TDI SEPTAL: 0.09 M/S
TDI: 0.1 M/S

## 2022-02-03 ENCOUNTER — TELEPHONE (OUTPATIENT)
Dept: TRANSPLANT | Facility: CLINIC | Age: 57
End: 2022-02-03
Payer: COMMERCIAL

## 2022-02-03 NOTE — TELEPHONE ENCOUNTER
Spoke to pt to let him know that stress test came back negative and dr gonzalez will follow up next visit. Pt verbalized understanding  ----- Message from Narendra Gonzalez MD sent at 2/3/2022  8:08 AM CST -----  The negative stress test for cardiac ischemia normal heart function will discuss further at next visit

## 2022-02-07 ENCOUNTER — PATIENT OUTREACH (OUTPATIENT)
Dept: ADMINISTRATIVE | Facility: OTHER | Age: 57
End: 2022-02-07
Payer: COMMERCIAL

## 2022-02-08 ENCOUNTER — OFFICE VISIT (OUTPATIENT)
Dept: CARDIOLOGY | Facility: CLINIC | Age: 57
End: 2022-02-08
Payer: COMMERCIAL

## 2022-02-08 VITALS
OXYGEN SATURATION: 96 % | DIASTOLIC BLOOD PRESSURE: 92 MMHG | SYSTOLIC BLOOD PRESSURE: 146 MMHG | WEIGHT: 260.38 LBS | BODY MASS INDEX: 34.51 KG/M2 | HEIGHT: 73 IN | HEART RATE: 64 BPM

## 2022-02-08 DIAGNOSIS — Z01.818 PRE-OP EVALUATION: ICD-10-CM

## 2022-02-08 DIAGNOSIS — E78.5 HYPERLIPIDEMIA, UNSPECIFIED HYPERLIPIDEMIA TYPE: ICD-10-CM

## 2022-02-08 DIAGNOSIS — R07.9 CHEST PAIN, UNSPECIFIED TYPE: ICD-10-CM

## 2022-02-08 DIAGNOSIS — I10 HYPERTENSION, UNSPECIFIED TYPE: Primary | ICD-10-CM

## 2022-02-08 DIAGNOSIS — E11.9 TYPE 2 DIABETES MELLITUS WITHOUT COMPLICATION, WITHOUT LONG-TERM CURRENT USE OF INSULIN: ICD-10-CM

## 2022-02-08 DIAGNOSIS — R94.31 NONSPECIFIC ABNORMAL ELECTROCARDIOGRAM (ECG) (EKG): ICD-10-CM

## 2022-02-08 PROCEDURE — 99999 PR PBB SHADOW E&M-EST. PATIENT-LVL V: ICD-10-PCS | Mod: PBBFAC,,, | Performed by: INTERNAL MEDICINE

## 2022-02-08 PROCEDURE — 3080F DIAST BP >= 90 MM HG: CPT | Mod: CPTII,S$GLB,, | Performed by: INTERNAL MEDICINE

## 2022-02-08 PROCEDURE — 1159F PR MEDICATION LIST DOCUMENTED IN MEDICAL RECORD: ICD-10-PCS | Mod: CPTII,S$GLB,, | Performed by: INTERNAL MEDICINE

## 2022-02-08 PROCEDURE — 3080F PR MOST RECENT DIASTOLIC BLOOD PRESSURE >= 90 MM HG: ICD-10-PCS | Mod: CPTII,S$GLB,, | Performed by: INTERNAL MEDICINE

## 2022-02-08 PROCEDURE — 3077F PR MOST RECENT SYSTOLIC BLOOD PRESSURE >= 140 MM HG: ICD-10-PCS | Mod: CPTII,S$GLB,, | Performed by: INTERNAL MEDICINE

## 2022-02-08 PROCEDURE — 1159F MED LIST DOCD IN RCRD: CPT | Mod: CPTII,S$GLB,, | Performed by: INTERNAL MEDICINE

## 2022-02-08 PROCEDURE — 99214 OFFICE O/P EST MOD 30 MIN: CPT | Mod: S$GLB,,, | Performed by: INTERNAL MEDICINE

## 2022-02-08 PROCEDURE — 3008F BODY MASS INDEX DOCD: CPT | Mod: CPTII,S$GLB,, | Performed by: INTERNAL MEDICINE

## 2022-02-08 PROCEDURE — 3072F LOW RISK FOR RETINOPATHY: CPT | Mod: CPTII,S$GLB,, | Performed by: INTERNAL MEDICINE

## 2022-02-08 PROCEDURE — 99999 PR PBB SHADOW E&M-EST. PATIENT-LVL V: CPT | Mod: PBBFAC,,, | Performed by: INTERNAL MEDICINE

## 2022-02-08 PROCEDURE — 3072F PR LOW RISK FOR RETINOPATHY: ICD-10-PCS | Mod: CPTII,S$GLB,, | Performed by: INTERNAL MEDICINE

## 2022-02-08 PROCEDURE — 3077F SYST BP >= 140 MM HG: CPT | Mod: CPTII,S$GLB,, | Performed by: INTERNAL MEDICINE

## 2022-02-08 PROCEDURE — 3008F PR BODY MASS INDEX (BMI) DOCUMENTED: ICD-10-PCS | Mod: CPTII,S$GLB,, | Performed by: INTERNAL MEDICINE

## 2022-02-08 PROCEDURE — 99214 PR OFFICE/OUTPT VISIT, EST, LEVL IV, 30-39 MIN: ICD-10-PCS | Mod: S$GLB,,, | Performed by: INTERNAL MEDICINE

## 2022-02-08 RX ORDER — HYDRALAZINE HYDROCHLORIDE 10 MG/1
10 TABLET, FILM COATED ORAL EVERY 12 HOURS
Qty: 60 TABLET | Refills: 11 | Status: SHIPPED | OUTPATIENT
Start: 2022-02-08 | End: 2022-08-15

## 2022-02-08 RX ORDER — IBUPROFEN 800 MG/1
800 TABLET ORAL 3 TIMES DAILY
COMMUNITY
Start: 2022-02-04 | End: 2022-06-27

## 2022-02-08 RX ORDER — PENICILLIN V POTASSIUM 500 MG/1
500 TABLET, FILM COATED ORAL EVERY 6 HOURS
COMMUNITY
Start: 2022-02-04 | End: 2022-03-14

## 2022-02-08 NOTE — PROGRESS NOTES
Subjective:   Patient ID:  Narendra English Sr. is a 56 y.o. male who presents for follow-up of No chief complaint on file.    This pleasant patient 56 years of age who has history hypertension hyperlipidemia and evidence of type 2 diabetes without complications.  The patient has had exertional chest pain present in the emergency room with nonspecific EKG changes negative troponin levels.  Follow-up in the office today.  EKG shows evidence of right bundle branch block no acute changes.  Otherwise patient is stable.  He has had no exertional symptoms.  There is no significant family history of coronary disease.  Patient nonsmoker no history of drug or alcohol abuse otherwise stable.  States that the pain is very intermittent and is not typical.  This visit finds patient feeling well.  No exertional symptoms chest pain shortness of breath.  Stress test showed showed no evidence cardiac ischemia normal LV function blood pressure still elevated I will add hydralazine 10 mg b.i.d. to his current regimen of metoprolol 50 mg daily, amlodipine 10 mg daily and benazepril 40 mg daily.  Of note hydrochlorothiazide in the past has given the patient gout and is not to be used.  Overall stable and he is to have endoscopy this week for follow-up.  He should do well with procedure and has no issues with cardiac events for this and should be at low cardiac risk for procedure.      Review of Systems   Constitutional: Negative for chills, diaphoresis, night sweats, weight gain and weight loss.   HENT: Negative for congestion, hoarse voice, sore throat and stridor.    Eyes: Negative for double vision and pain.   Cardiovascular: Negative for chest pain, claudication, cyanosis, dyspnea on exertion, irregular heartbeat, leg swelling, near-syncope, orthopnea, palpitations, paroxysmal nocturnal dyspnea and syncope.   Respiratory: Negative for cough, hemoptysis, shortness of breath, sleep disturbances due to breathing, snoring, sputum  production and wheezing.    Endocrine: Negative for cold intolerance, heat intolerance and polydipsia.   Hematologic/Lymphatic: Negative for bleeding problem. Does not bruise/bleed easily.   Skin: Negative for color change, dry skin and rash.   Musculoskeletal: Negative for joint swelling and muscle cramps.   Gastrointestinal: Negative for bloating, abdominal pain, constipation, diarrhea, dysphagia, melena, nausea and vomiting.   Genitourinary: Negative for flank pain and urgency.   Neurological: Negative for dizziness, focal weakness, headaches, light-headedness, loss of balance, seizures and weakness.   Psychiatric/Behavioral: Negative for altered mental status and memory loss. The patient is not nervous/anxious.      Family History   Problem Relation Age of Onset    Hypertension Mother     Diabetes Mother     Breast cancer Mother     Diabetes Maternal Grandmother     Hypertension Brother     Stroke Father      Past Medical History:   Diagnosis Date    Diabetes mellitus 8 years    BS 84 in the room 08/11/2021    Hypertension     Sleep apnea     Thyroid disease      Social History     Socioeconomic History    Marital status:    Tobacco Use    Smoking status: Never Smoker    Smokeless tobacco: Never Used   Substance and Sexual Activity    Alcohol use: Not Currently    Drug use: Never    Sexual activity: Yes     Partners: Female     Current Outpatient Medications on File Prior to Visit   Medication Sig Dispense Refill    amLODIPine (NORVASC) 10 MG tablet Take 1 tablet (10 mg total) by mouth once daily. 90 tablet 3    atorvastatin (LIPITOR) 40 MG tablet Take 1 tablet (40 mg total) by mouth once daily. 90 tablet 1    benazepriL (LOTENSIN) 40 MG tablet Take 1 tablet (40 mg total) by mouth once daily. 90 tablet 3    clonazePAM (KLONOPIN) 2 MG Tab TAKE 1 TABLET BY MOUTH EVERY EVENING 30 tablet 0    colchicine (COLCRYS) 0.6 mg tablet Take 1 tablet (0.6 mg total) by mouth 2 (two) times daily. 180  "tablet 1    esomeprazole (NEXIUM) 40 MG capsule Take 1 capsule (40 mg total) by mouth before breakfast. 30 capsule 11    flash glucose scanning reader Misc 1 application.      flash glucose sensor Kit 1 application.      FREESTYLE WILMAN 14 DAY SENSOR Kit SMARTSI Patch(s) Topical Every 2 Weeks 1 kit 4    indomethacin (INDOCIN SR) 75 mg CpSR CR capsule Take 75 mg by mouth 2 (two) times daily with meals.      levocetirizine (XYZAL) 5 MG tablet Take 5 mg by mouth every evening.      levothyroxine (SYNTHROID) 112 MCG tablet Take 1 tablet (112 mcg total) by mouth before breakfast. 90 tablet 1    metFORMIN (GLUCOPHAGE) 1000 MG tablet Take 1 tablet (1,000 mg total) by mouth 2 (two) times daily with meals. 180 tablet 1    metoprolol succinate (TOPROL-XL) 50 MG 24 hr tablet Take 1 tablet (50 mg total) by mouth once daily. 90 tablet 1    omeprazole (PRILOSEC) 20 MG capsule Take 1 capsule (20 mg total) by mouth once daily. 30 capsule 1    OZEMPIC 1 mg/dose (4 mg/3 mL) Inject 1 mg into the skin every 7 days. 12 pen 1    testosterone (ANDROGEL) 1 % (50 mg/5 gram) GlPk Apply 1.62 g topically once daily.      TOUJEO SOLOSTAR U-300 INSULIN 300 unit/mL (1.5 mL) InPn pen Inject 85 Units into the skin once daily. 4 pen 3    VASCEPA 1 gram Cap Take 2 capsules (2,000 mg total) by mouth 2 (two) times daily. 90 capsule 1     Current Facility-Administered Medications on File Prior to Visit   Medication Dose Route Frequency Provider Last Rate Last Admin    gabapentin capsule 300 mg  300 mg Oral TID Dali Lim NP   300 mg at 21 0810     Review of patient's allergies indicates:   Allergen Reactions    Demerol (pf) [meperidine (pf)] Other (See Comments)     Pt reports,"They lost my blood pressure."    Percocet [oxycodone-acetaminophen] Itching     itching       Objective:     Physical Exam  Eyes:      Pupils: Pupils are equal, round, and reactive to light.   Neck:      Trachea: No tracheal deviation. "   Cardiovascular:      Rate and Rhythm: Normal rate and regular rhythm.      Pulses: Intact distal pulses.           Carotid pulses are 2+ on the right side and 2+ on the left side.       Radial pulses are 2+ on the right side and 2+ on the left side.        Femoral pulses are 2+ on the right side and 2+ on the left side.       Popliteal pulses are 2+ on the right side and 2+ on the left side.        Dorsalis pedis pulses are 2+ on the right side and 2+ on the left side.        Posterior tibial pulses are 2+ on the right side and 2+ on the left side.      Heart sounds: Normal heart sounds. No murmur heard.  No friction rub. No gallop.    Pulmonary:      Effort: Pulmonary effort is normal. No respiratory distress.      Breath sounds: Normal breath sounds. No stridor. No wheezing or rales.   Chest:      Chest wall: No tenderness.   Abdominal:      General: There is no distension.      Tenderness: There is no abdominal tenderness. There is no rebound.   Musculoskeletal:         General: No tenderness or edema.      Cervical back: Normal range of motion.   Skin:     General: Skin is warm and dry.   Neurological:      Mental Status: He is alert and oriented to person, place, and time.     Stress test 02/01/2022  · Concentric remodeling and normal systolic function.  · There were no arrhythmias during stress.  · The estimated ejection fraction is 60%.  · Normal left ventricular diastolic function.  · With normal right ventricular systolic function.  · The stress echo portion of this study is negative for myocardial ischemia.  · The ECG portion of this study is negative for myocardial ischemia.       Assessment:     1. Chest pain, unspecified type    2. Nonspecific abnormal electrocardiogram (ECG) (EKG)    3. Hypertension, unspecified type    4. Pre-op evaluation    5. Hyperlipidemia, unspecified hyperlipidemia type    6. Type 2 diabetes mellitus without complication, without long-term current use of insulin        Plan:      Chest pain, unspecified type    Nonspecific abnormal electrocardiogram (ECG) (EKG)    Hypertension, unspecified type    Pre-op evaluation    Hyperlipidemia, unspecified hyperlipidemia type    Type 2 diabetes mellitus without complication, without long-term current use of insulin      Impression 1 hypertension still elevated blood pressure improved I will add hydralazine 10 mg b.i.d. current regimen.  Repeat blood pressure checks this week and next week and follow-up in 1 month.  2. Hyperlipidemia stable continue atorvastatin  3. Diabetes improving  4. Endoscopy planned this week preop the patient is cleared for procedure and should be at low cardiac risk.  Hopefully blood pressure is slightly better for procedure.  Patient has no symptoms.

## 2022-02-10 ENCOUNTER — HOSPITAL ENCOUNTER (OUTPATIENT)
Facility: HOSPITAL | Age: 57
Discharge: HOME | End: 2022-02-10
Attending: INTERNAL MEDICINE | Admitting: INTERNAL MEDICINE
Payer: COMMERCIAL

## 2022-02-10 ENCOUNTER — ANESTHESIA (OUTPATIENT)
Dept: ENDOSCOPY | Facility: HOSPITAL | Age: 57
End: 2022-02-10
Payer: COMMERCIAL

## 2022-02-10 ENCOUNTER — ANESTHESIA EVENT (OUTPATIENT)
Dept: ENDOSCOPY | Facility: HOSPITAL | Age: 57
End: 2022-02-10
Payer: COMMERCIAL

## 2022-02-10 ENCOUNTER — PATIENT MESSAGE (OUTPATIENT)
Dept: SURGERY | Facility: CLINIC | Age: 57
End: 2022-02-10
Payer: COMMERCIAL

## 2022-02-10 ENCOUNTER — PATIENT MESSAGE (OUTPATIENT)
Dept: INTERNAL MEDICINE | Facility: CLINIC | Age: 57
End: 2022-02-10
Payer: COMMERCIAL

## 2022-02-10 DIAGNOSIS — C18.7 MALIGNANT NEOPLASM OF SIGMOID COLON: Primary | ICD-10-CM

## 2022-02-10 DIAGNOSIS — Z85.038 HISTORY OF COLON CANCER: ICD-10-CM

## 2022-02-10 LAB
CTP QC/QA: NORMAL
POCT GLUCOSE: 77 MG/DL (ref 70–110)
SARS-COV-2 RDRP RESP QL NAA+PROBE: NEGATIVE

## 2022-02-10 PROCEDURE — 88305 TISSUE EXAM BY PATHOLOGIST: ICD-10-PCS | Mod: 26,,, | Performed by: PATHOLOGY

## 2022-02-10 PROCEDURE — 37000009 HC ANESTHESIA EA ADD 15 MINS: Performed by: INTERNAL MEDICINE

## 2022-02-10 PROCEDURE — 45380 COLONOSCOPY AND BIOPSY: CPT | Mod: 33,,, | Performed by: INTERNAL MEDICINE

## 2022-02-10 PROCEDURE — 88305 TISSUE EXAM BY PATHOLOGIST: CPT | Mod: 59 | Performed by: PATHOLOGY

## 2022-02-10 PROCEDURE — 25000003 PHARM REV CODE 250: Performed by: NURSE ANESTHETIST, CERTIFIED REGISTERED

## 2022-02-10 PROCEDURE — 45380 COLONOSCOPY AND BIOPSY: CPT | Performed by: INTERNAL MEDICINE

## 2022-02-10 PROCEDURE — 37000008 HC ANESTHESIA 1ST 15 MINUTES: Performed by: INTERNAL MEDICINE

## 2022-02-10 PROCEDURE — 88305 TISSUE EXAM BY PATHOLOGIST: CPT | Mod: 26,,, | Performed by: PATHOLOGY

## 2022-02-10 PROCEDURE — 45380 PR COLONOSCOPY,BIOPSY: ICD-10-PCS | Mod: 33,,, | Performed by: INTERNAL MEDICINE

## 2022-02-10 PROCEDURE — 27201012 HC FORCEPS, HOT/COLD, DISP: Performed by: INTERNAL MEDICINE

## 2022-02-10 PROCEDURE — U0002 COVID-19 LAB TEST NON-CDC: HCPCS | Performed by: INTERNAL MEDICINE

## 2022-02-10 PROCEDURE — 63600175 PHARM REV CODE 636 W HCPCS: Performed by: NURSE ANESTHETIST, CERTIFIED REGISTERED

## 2022-02-10 RX ORDER — LIDOCAINE HYDROCHLORIDE 10 MG/ML
INJECTION, SOLUTION EPIDURAL; INFILTRATION; INTRACAUDAL; PERINEURAL
Status: DISCONTINUED | OUTPATIENT
Start: 2022-02-10 | End: 2022-02-10

## 2022-02-10 RX ORDER — PROPOFOL 10 MG/ML
VIAL (ML) INTRAVENOUS
Status: DISCONTINUED | OUTPATIENT
Start: 2022-02-10 | End: 2022-02-10

## 2022-02-10 RX ORDER — SODIUM CHLORIDE, SODIUM LACTATE, POTASSIUM CHLORIDE, CALCIUM CHLORIDE 600; 310; 30; 20 MG/100ML; MG/100ML; MG/100ML; MG/100ML
INJECTION, SOLUTION INTRAVENOUS CONTINUOUS PRN
Status: DISCONTINUED | OUTPATIENT
Start: 2022-02-10 | End: 2022-02-10

## 2022-02-10 RX ADMIN — LIDOCAINE HYDROCHLORIDE 50 MG: 10 INJECTION, SOLUTION EPIDURAL; INFILTRATION; INTRACAUDAL; PERINEURAL at 01:02

## 2022-02-10 RX ADMIN — PROPOFOL 20 MG: 10 INJECTION, EMULSION INTRAVENOUS at 01:02

## 2022-02-10 RX ADMIN — SODIUM CHLORIDE, SODIUM LACTATE, POTASSIUM CHLORIDE, AND CALCIUM CHLORIDE: .6; .31; .03; .02 INJECTION, SOLUTION INTRAVENOUS at 01:02

## 2022-02-10 RX ADMIN — PROPOFOL 100 MG: 10 INJECTION, EMULSION INTRAVENOUS at 01:02

## 2022-02-10 NOTE — ANESTHESIA POSTPROCEDURE EVALUATION
Anesthesia Post Evaluation    Patient: Narendra Hopkins Amador Oliva    Procedure(s) Performed: Procedure(s) (LRB):  COLONOSCOPY (N/A)    Final Anesthesia Type: MAC      Patient location during evaluation: PACU  Patient participation: No - Unable to Participate, Sedation  Level of consciousness: sedated  Post-procedure vital signs: reviewed and stable  Pain management: adequate  Airway patency: patent    PONV status at discharge: No PONV  Anesthetic complications: no      Cardiovascular status: blood pressure returned to baseline and hemodynamically stable  Respiratory status: unassisted and spontaneous ventilation  Hydration status: euvolemic  Follow-up not needed.              No case tracking events are documented in the log.      Pain/Shayan Score: No data recorded

## 2022-02-10 NOTE — ANESTHESIA PREPROCEDURE EVALUATION
02/10/2022  Narendra English . is a 56 y.o., male.    Anesthesia Evaluation    I have reviewed the Patient Summary Reports.    I have reviewed the Nursing Notes. I have reviewed the NPO Status.   I have reviewed the Medications.     Review of Systems  Anesthesia Hx:  No problems with previous Anesthesia  Denies Family Hx of Anesthesia complications.   Denies Personal Hx of Anesthesia complications.   Social:  Non-Smoker, No Alcohol Use    Hematology/Oncology:         -- Anemia: Oncology Comments: Malignant neoplasm of sigmoid colon    Cardiovascular:   Hypertension Denies MI.   Denies CABG/stent.      hyperlipidemia ECG has been reviewed. ekg 11/2021:  Sinus rhythm with 1st degree A-V block 67  Right bundle branch block   Abnormal ECG   When compared with ECG of 08-JAN-2021 11:40,   KS interval has increased    Echo 2/2022:  · Concentric remodeling and normal systolic function.  · There were no arrhythmias during stress.  · The estimated ejection fraction is 60%.  · Normal left ventricular diastolic function.  · With normal right ventricular systolic function.  · The stress echo portion of this study is negative for myocardial   ischemia.  · The ECG portion of this study is negative for myocardial ischemia   Pulmonary:   Denies COPD.  Denies Asthma. Sleep Apnea    Renal/:  Renal/ Normal     Hepatic/GI:   Bowel Prep. Denies GERD. Denies Liver Disease.  Denies Hepatitis.    Musculoskeletal:   gout   Neurological:   Denies CVA. Denies Seizures.    Endocrine:   Diabetes Hypothyroidism        Physical Exam  General:  Obesity    Airway/Jaw/Neck:  Airway Findings: Mouth Opening: Normal Tongue: Normal  General Airway Assessment: Adult  Mallampati: II      Dental:  Dental Findings: In tact   Chest/Lungs:  Chest/Lungs Findings: Normal Respiratory Rate, Clear to auscultation     Heart/Vascular:  Heart Findings:  Rate: Normal  Rhythm: Regular Rhythm             Anesthesia Plan  Type of Anesthesia, risks & benefits discussed:  Anesthesia Type:  MAC    Patient's Preference:   Plan Factors:          Intra-op Monitoring Plan: standard ASA monitors  Intra-op Monitoring Plan Comments:   Post Op Pain Control Plan:   Post Op Pain Control Plan Comments:     Induction:   IV  Beta Blocker:  Patient is on a Beta-Blocker and has received one dose within the past 24 hours (No further documentation required).       Informed Consent: Patient understands risks and agrees with Anesthesia plan.  Questions answered. Anesthesia consent signed with patient.  ASA Score: 2     Day of Surgery Review of History & Physical: I have interviewed and examined the patient. I have reviewed the patient's H&P dated:  There are no significant changes.  H&P update referred to the surgeon.         Ready For Surgery From Anesthesia Perspective.

## 2022-02-10 NOTE — DISCHARGE INSTRUCTIONS
"Patient Education       Colon Polyps   The Basics   Written by the doctors and editors at [facility]   What are colon polyps? -- Colon polyps are tiny growths that form on the inside of the large intestine (also known as the colon) (figure 1). Polyps are very common. About one-third to one-half of all adults have them by the time they are 50 years old. They do not usually cause symptoms. But some polyps can be or become cancer, so doctors sometimes remove them.  What are the symptoms of colon polyps? -- Colon polyps do not usually cause symptoms.  How do doctors find colon polyps? -- Doctors usually find colon polyps when they are doing screening tests to check for colon or rectal cancer. Cancer screening tests are tests that are done to try and find cancer early, before a person has symptoms. The screening tests for colon and rectal cancer include:  · Colonoscopy - Before having a colonoscopy, you will get medicine to help you relax. Then a doctor will put a thin tube into your anus and advance it into your colon (figure 2). The tube has a camera attached to it, so the doctor can look inside your colon. The tube also has tools on the end, so the doctor can remove pieces of tissue, including polyps. After polyps are removed, they usually go to a lab to be tested for cancer and other problems.  · Sigmoidoscopy - A sigmoidoscopy is very similar to a colonoscopy. The only difference is that this test looks only at the first part of the colon, and a colonoscopy looks at the whole colon.  · CT colonography (also known as virtual colonoscopy) - For a virtual colonoscopy, you have a special kind of X-ray taken, called a "CT scan." This test creates pictures of the colon.  · Stool test - "Stool" is another word for "bowel movements." Stool tests check for blood or abnormal genes in samples of stool. If a stool test indicates that something might be wrong with the colon, doctors usually follow up with a colonoscopy. Then " "doctors find polyps, if they are there.  · Capsule colonoscopy - Rarely, your doctor might do something called a "capsule" colonoscopy. For this test, you swallow a special capsule that contains tiny wireless video cameras.   How are colon polyps treated? -- Doctors remove polyps using the same tools they use for a colonoscopy. They can remove polyps either by snipping them off with a special cutting tool, or by catching the polyps in a noose (figure 3). Most polyps can be removed during a colonoscopy. But sometimes, large polyps need to be removed at a later time, either with another colonoscopy or with surgery.  What happens after I have polyps removed? -- You might need to have a colonoscopy every few years to check for more polyps. In some people polyps come back. And if you had the kind of polyps that could become cancer, your doctor will want to remove them as they appear. Also, if the polyps you had removed were the kind that could become cancer, people in your family might need to be checked for polyps and colon cancer earlier than if you did not have polyps.  Depending on your situation, your doctor might suggest genetic testing. This can show if your polyps are related to a specific gene that runs in families. If this turns out to be the case, they might recommend other tests that can be done to prevent cancer or find it early.  Can colon polyps be prevented? -- To reduce your chances of getting polyps or colon cancer:  · Eat a diet that is low in fat and high in fruits, vegetables, and fiber  · Lose weight, if you are overweight  · Do not smoke  · Limit the amount of alcohol you drink  All topics are updated as new evidence becomes available and our peer review process is complete.  This topic retrieved from [employer] on: Sep 21, 2021.  Topic 18891 Version 8.0  Release: 29.4.2 - C29.263  © 2021 UpToDate, Inc. and/or its affiliates. All rights reserved.  figure 1: Digestive system     This drawing shows the " "organs in the body that process food. Together these organs are called "the digestive system," or "digestive tract." As food travels through this system, the body absorbs nutrients and water.  Graphic 70729 Version 4.0    figure 2: Colonoscopy     During a colonoscopy, you lie on your side and the doctor puts a thin tube with a camera into your anus (from behind). Then the doctor advances the tube into the rectum and colon. The camera sends pictures from inside your colon to a television screen.  Graphic 43700 Version 6.0    figure 3: Removing a colon polyp     One way doctors remove colon polyps is to use a noose as a tool. They loop a wire around the polyp and squeeze the loop tight. When the polyp comes off, the doctor sucks it up into the endoscope, so that it can go to the lab for tests.  Graphic 25379 Version 5.0    Consumer Information Use and Disclaimer   This information is not specific medical advice and does not replace information you receive from your health care provider. This is only a brief summary of general information. It does NOT include all information about conditions, illnesses, injuries, tests, procedures, treatments, therapies, discharge instructions or life-style choices that may apply to you. You must talk with your health care provider for complete information about your health and treatment options. This information should not be used to decide whether or not to accept your health care provider's advice, instructions or recommendations. Only your health care provider has the knowledge and training to provide advice that is right for you. The use of this information is governed by the [employer] End User License Agreement, available at [email or web address]/eric.The use of [employer] content is governed by the [employer] Terms of Use. ©2021 UpToDate, Inc. All rights reserved.  Copyright   © 2021 UpToDate, Inc. and/or its affiliates. All rights reserved.    "

## 2022-02-10 NOTE — PROVATION PATIENT INSTRUCTIONS
Discharge Summary/Instructions after an Endoscopic Procedure  Patient Name: Narendra English  Patient MRN: [id number]  Patient YOB: 1965  Thursday, February 10, 2022 Wili Espinosa MD  Dear patient,  As a result of recent federal legislation (The Federal Cures Act), you may   receive lab or pathology results from your procedure in your [facility]   account before your physician is able to contact you. Your physician or   their representative will relay the results to you with their   recommendations at their soonest availability.  Thank you,  RESTRICTIONS:  During your procedure today, you received medications for sedation.  These   medications may affect your judgment, balance and coordination.  Therefore,   for 24 hours, you have the following restrictions:   - DO NOT drive a car, operate machinery, make legal/financial decisions,   sign important papers or drink alcohol.    ACTIVITY:  Today: no heavy lifting, straining or running due to procedural   sedation/anesthesia.  The following day: return to full activity including work.  DIET:  Eat and drink normally unless instructed otherwise.     TREATMENT FOR COMMON SIDE EFFECTS:  - Mild abdominal pain, nausea, belching, bloating or excessive gas:  rest,   eat lightly and use a heating pad.  - Sore Throat: treat with throat lozenges and/or gargle with warm salt   water.  - Because air was used during the procedure, expelling large amounts of air   from your rectum or belching is normal.  - If a bowel prep was taken, you may not have a bowel movement for 1-3 days.    This is normal.  SYMPTOMS TO WATCH FOR AND REPORT TO YOUR PHYSICIAN:  1. Abdominal pain or bloating, other than gas cramps.  2. Chest pain.  3. Back pain.  4. Signs of infection such as: chills or fever occurring within 24 hours   after the procedure.  5. Rectal bleeding, which would show as bright red, maroon, or black stools.   (A tablespoon of blood from the rectum is not serious,  especially if   hemorrhoids are present.)  6. Vomiting.  7. Weakness or dizziness.  GO DIRECTLY TO THE NEAREST EMERGENCY ROOM IF YOU HAVE ANY OF THE FOLLOWING:      Difficulty breathing              Chills and/or fever over 101 F   Persistent vomiting and/or vomiting blood   Severe abdominal pain   Severe chest pain   Black, tarry stools   Bleeding- more than one tablespoon   Any other symptom or condition that you feel may need urgent attention  Your doctor recommends these additional instructions:  If any biopsies were taken, your doctors clinic will contact you in 1 to 2   weeks with any results.  - Discharge patient to home.   - Resume previous diet.   - Continue present medications.   - Await pathology results.   - Repeat colonoscopy in 3 years for surveillance.   - Return to referring physician.  For questions, problems or results please call your physician Wili Espinosa MD at Work:  [phone]  If you have any questions about the above instructions, call the GI   department at [phone] or call the endoscopy unit at [phone]   from 7am until 3 pm.  [facility] [place], EMERGENCY ROOM PHONE NUMBER:   [phone]  IF A COMPLICATION OR EMERGENCY SITUATION ARISES AND YOU ARE UNABLE TO REACH   YOUR PHYSICIAN - GO DIRECTLY TO THE EMERGENCY ROOM.  I have read or have had read to me these discharge instructions for my   procedure and have received a written copy.  I understand these   instructions and will follow-up with my physician if I have any questions.     __________________________________       _____________________________________  Nurse Signature                                          Patient/Designated   Responsible Party Signature  Wili Espinosa MD  2/10/2022 1:55:40 PM  This report has been verified and signed electronically.  Dear patient,  As a result of recent federal legislation (The Federal Cures Act), you may   receive lab or pathology results from  your procedure in your [facility]   account before your physician is able to contact you. Your physician or   their representative will relay the results to you with their   recommendations at their soonest availability.  Thank you,  PROVATION

## 2022-02-10 NOTE — TRANSFER OF CARE
"Anesthesia Transfer of Care Note    Patient: Narendra English .    Procedure(s) Performed: Procedure(s) (LRB):  COLONOSCOPY (N/A)    Patient location: PACU    Anesthesia Type: MAC    Transport from OR: Transported from OR on room air with adequate spontaneous ventilation    Post pain: adequate analgesia    Post assessment: no apparent anesthetic complications and tolerated procedure well    Post vital signs: stable    Level of consciousness: sedated    Nausea/Vomiting: no nausea/vomiting    Complications: none    Transfer of care protocol was followed      Last vitals:   Visit Vitals  BP (!) 147/80 (BP Location: Left arm, Patient Position: Lying)   Pulse 67   Temp 36.6 °C (97.9 °F) (Temporal)   Resp 18   Ht 6' 1" (1.854 m)   Wt 115.7 kg (255 lb)   SpO2 98%   BMI 33.64 kg/m²     "

## 2022-02-10 NOTE — H&P
PRE PROCEDURE H&P    Patient Name: Narendra English Sr.  MRN: [id number]  : 1965  Date of Procedure:  2/10/2022  Referring Physician: Cricket Silva III, *  Primary Physician: Skyla Ardon MD  Procedure Physician: Wili Espinosa MD       Planned Procedure: Colonoscopy  Diagnosis: previous colon cancer  Chief Complaint: Same as above    HPI: Patient is an 56 y.o. male is here for the above.     Last colonoscopy:   Family history: None  Anticoagulation: None    Past Medical History:   Past Medical History:   Diagnosis Date    Diabetes mellitus 8 years    BS 84 in the room 2021    Hypertension     Sleep apnea     Thyroid disease         Past Surgical History:  Past Surgical History:   Procedure Laterality Date    BACK SURGERY      COLONOSCOPY N/A 2020    Procedure: COLONOSCOPY;  Surgeon: William Cotter MD;  Location: [facility];  Service: Endoscopy;  Laterality: N/A;  Will need Rapid doesn't live here    LAPAROSCOPIC RIGHT COLON RESECTION N/A 2021    Procedure: COLECTOMY, RIGHT, LAPAROSCOPIC, ERAS low;  Surgeon: Melonie Santoyo MD;  Location: [facility] OR [address] [place] [address];  Service: Colon and Rectal;  Laterality: N/A;  needs rapid covid test        Home Medications:  Prior to Admission medications    Medication Sig Start Date End Date Taking? Authorizing Provider   amLODIPine (NORVASC) 10 MG tablet Take 1 tablet (10 mg total) by mouth once daily. 10/27/21  Yes Skyla Ardon MD   atorvastatin (LIPITOR) 40 MG tablet Take 1 tablet (40 mg total) by mouth once daily. 21  Yes Skyla Ardon MD   benazepriL (LOTENSIN) 40 MG tablet Take 1 tablet (40 mg total) by mouth once daily. 21 Yes Skyla Ardon MD   clonazePAM (KLONOPIN) 2 MG Tab TAKE 1 TABLET BY MOUTH EVERY EVENING 22  Yes Skyla Ardon MD   colchicine (COLCRYS) 0.6 mg tablet Take 1 tablet (0.6 mg total) by mouth 2 (two) times daily. 21  Yes Skyla Ardon MD   esomeprazole (NEXIUM) 40  MG capsule Take 1 capsule (40 mg total) by mouth before breakfast. 11/15/21 11/15/22 Yes Skyla Ardon MD   flash glucose scanning reader Misc 1 application. 10/27/20  Yes Historical Provider   flash glucose sensor Kit 1 application. 10/27/20  Yes Historical Provider   FREESTYLE WILMAN 14 DAY SENSOR Kit SMARTSI Patch(s) Topical Every 2 Weeks 21  Yes Skyla Ardon MD   hydrALAZINE (APRESOLINE) 10 MG tablet Take 1 tablet (10 mg total) by mouth every 12 (twelve) hours. 22 Yes Narendra Gonzalez MD   ibuprofen (ADVIL,MOTRIN) 800 MG tablet Take 800 mg by mouth 3 (three) times daily. 22  Yes Historical Provider   indomethacin (INDOCIN SR) 75 mg CpSR CR capsule Take 75 mg by mouth 2 (two) times daily with meals.   Yes Historical Provider   levocetirizine (XYZAL) 5 MG tablet Take 5 mg by mouth every evening.   Yes Historical Provider   levothyroxine (SYNTHROID) 112 MCG tablet Take 1 tablet (112 mcg total) by mouth before breakfast. 21  Yes Skyla Ardon MD   metFORMIN (GLUCOPHAGE) 1000 MG tablet Take 1 tablet (1,000 mg total) by mouth 2 (two) times daily with meals. 21  Yes Skyla Ardon MD   metoprolol succinate (TOPROL-XL) 50 MG 24 hr tablet Take 1 tablet (50 mg total) by mouth once daily. 21  Yes Skyla Ardon MD   omeprazole (PRILOSEC) 20 MG capsule Take 1 capsule (20 mg total) by mouth once daily. 11/15/21 11/15/22 Yes Nakul Preston MD   OZEMPIC 1 mg/dose (4 mg/3 mL) INJECT 1 MG INTO THE SKIN EVERY 7 DAYS 22  Yes Skyla Ardon MD   penicillin v potassium (VEETID) 500 MG tablet Take 500 mg by mouth every 6 (six) hours. 22  Yes Historical Provider   testosterone (ANDROGEL) 1 % (50 mg/5 gram) GlPk Apply 1.62 g topically once daily.   Yes Historical Provider   TOUJEO SOLOSTAR U-300 INSULIN 300 unit/mL (1.5 mL) InPn pen Inject 85 Units into the skin once daily. 21  Yes Skyla Ardon MD   VASCEPA 1 gram Cap Take 2 capsules (2,000 mg total) by  "mouth 2 (two) times daily. 9/16/21  Yes Skyla Ardon MD        Allergies:  Review of patient's allergies indicates:   Allergen Reactions    Demerol (pf) [meperidine (pf)] Other (See Comments)     Pt reports,"They lost my blood pressure."    Percocet [oxycodone-acetaminophen] Itching     itching        Social History:   Social History     Socioeconomic History    Marital status:    Tobacco Use    Smoking status: Never Smoker    Smokeless tobacco: Never Used   Substance and Sexual Activity    Alcohol use: Not Currently    Drug use: Never    Sexual activity: Yes     Partners: Female       Family History:  Family History   Problem Relation Age of Onset    Hypertension Mother     Diabetes Mother     Breast cancer Mother     Diabetes Maternal Grandmother     Hypertension Brother     Stroke Father        ROS: No acute cardiac events, no acute respiratory complaints.     Physical Exam (all patients):    BP (!) 147/80 (BP Location: Left arm, Patient Position: Lying)   Pulse 67   Temp 97.9 °F (36.6 °C) (Temporal)   Resp 18   Ht 6' 1" (1.854 m)   Wt 115.7 kg (255 lb)   SpO2 98%   BMI 33.64 kg/m²   Lungs: Clear to auscultation bilaterally, respirations unlabored  Heart: Regular rate and rhythm, S1 and S2 normal, no obvious murmurs  Abdomen:         Soft, non-tender, bowel sounds normal, no masses, no organomegaly    Lab Results   Component Value Date    WBC 5.78 11/15/2021    MCV 89 11/15/2021    RDW 13.2 11/15/2021     11/15/2021     11/15/2021    HGBA1C 5.7 (H) 10/15/2021    BUN 16 11/15/2021     11/15/2021    K 4.1 11/15/2021     11/15/2021        SEDATION PLAN: per anesthesia      History reviewed, vital signs satisfactory, cardiopulmonary status satisfactory, sedation options, risks and plans have been discussed with the patient  All their questions were answered and the patient agrees to the sedation procedures as planned and the patient is deemed an appropriate " candidate for the sedation as planned.    Procedure explained to patient, informed consent obtained and placed in chart.    Wili Espinosa  2/10/2022  1:18 PM

## 2022-02-11 ENCOUNTER — TELEPHONE (OUTPATIENT)
Dept: SURGERY | Facility: CLINIC | Age: 57
End: 2022-02-11
Payer: COMMERCIAL

## 2022-02-14 VITALS
TEMPERATURE: 98 F | WEIGHT: 255 LBS | HEIGHT: 73 IN | RESPIRATION RATE: 20 BRPM | BODY MASS INDEX: 33.8 KG/M2 | OXYGEN SATURATION: 96 % | HEART RATE: 70 BPM | DIASTOLIC BLOOD PRESSURE: 85 MMHG | SYSTOLIC BLOOD PRESSURE: 142 MMHG

## 2022-02-17 ENCOUNTER — PATIENT MESSAGE (OUTPATIENT)
Dept: GASTROENTEROLOGY | Facility: CLINIC | Age: 57
End: 2022-02-17
Payer: COMMERCIAL

## 2022-02-17 LAB
FINAL PATHOLOGIC DIAGNOSIS: NORMAL
GROSS: NORMAL
Lab: NORMAL

## 2022-02-28 ENCOUNTER — PATIENT MESSAGE (OUTPATIENT)
Dept: GASTROENTEROLOGY | Facility: CLINIC | Age: 57
End: 2022-02-28
Payer: COMMERCIAL

## 2022-03-02 RX ORDER — CLONAZEPAM 2 MG/1
TABLET ORAL
Qty: 30 TABLET | Refills: 0 | Status: SHIPPED | OUTPATIENT
Start: 2022-03-02 | End: 2022-04-11

## 2022-03-02 NOTE — TELEPHONE ENCOUNTER
No new care gaps identified.  Powered by [employer]. Reference number: [id number].   3/02/2022 3:24:15 PM CST

## 2022-03-14 ENCOUNTER — LAB VISIT (OUTPATIENT)
Dept: LAB | Facility: HOSPITAL | Age: 57
End: 2022-03-14
Attending: FAMILY MEDICINE
Payer: COMMERCIAL

## 2022-03-14 ENCOUNTER — OFFICE VISIT (OUTPATIENT)
Dept: OTOLARYNGOLOGY | Facility: CLINIC | Age: 57
End: 2022-03-14
Payer: COMMERCIAL

## 2022-03-14 ENCOUNTER — OFFICE VISIT (OUTPATIENT)
Dept: INTERNAL MEDICINE | Facility: CLINIC | Age: 57
End: 2022-03-14
Payer: COMMERCIAL

## 2022-03-14 ENCOUNTER — PATIENT OUTREACH (OUTPATIENT)
Dept: ADMINISTRATIVE | Facility: OTHER | Age: 57
End: 2022-03-14
Payer: COMMERCIAL

## 2022-03-14 VITALS
TEMPERATURE: 98 F | WEIGHT: 263.88 LBS | SYSTOLIC BLOOD PRESSURE: 144 MMHG | DIASTOLIC BLOOD PRESSURE: 96 MMHG | HEART RATE: 77 BPM | BODY MASS INDEX: 34.82 KG/M2

## 2022-03-14 VITALS
DIASTOLIC BLOOD PRESSURE: 82 MMHG | HEART RATE: 77 BPM | TEMPERATURE: 96 F | OXYGEN SATURATION: 97 % | SYSTOLIC BLOOD PRESSURE: 130 MMHG | BODY MASS INDEX: 34.42 KG/M2 | HEIGHT: 73 IN | WEIGHT: 259.69 LBS

## 2022-03-14 DIAGNOSIS — I10 BENIGN ESSENTIAL HTN: ICD-10-CM

## 2022-03-14 DIAGNOSIS — Z00.00 ANNUAL PHYSICAL EXAM: Primary | ICD-10-CM

## 2022-03-14 DIAGNOSIS — R09.81 SINUS CONGESTION: ICD-10-CM

## 2022-03-14 DIAGNOSIS — J32.9 CHRONIC SINUSITIS, UNSPECIFIED LOCATION: Primary | ICD-10-CM

## 2022-03-14 DIAGNOSIS — I10 HYPERTENSION, UNSPECIFIED TYPE: ICD-10-CM

## 2022-03-14 DIAGNOSIS — Z79.4 TYPE 2 DIABETES MELLITUS WITHOUT COMPLICATION, WITH LONG-TERM CURRENT USE OF INSULIN: ICD-10-CM

## 2022-03-14 DIAGNOSIS — R79.89 LOW TESTOSTERONE: ICD-10-CM

## 2022-03-14 DIAGNOSIS — E03.9 HYPOTHYROIDISM, UNSPECIFIED TYPE: ICD-10-CM

## 2022-03-14 DIAGNOSIS — E11.9 TYPE 2 DIABETES MELLITUS WITHOUT COMPLICATION, WITH LONG-TERM CURRENT USE OF INSULIN: ICD-10-CM

## 2022-03-14 LAB
ALBUMIN SERPL BCP-MCNC: 3.9 G/DL (ref 3.5–5.2)
ALP SERPL-CCNC: 54 U/L (ref 55–135)
ALT SERPL W/O P-5'-P-CCNC: 98 U/L (ref 10–44)
ANION GAP SERPL CALC-SCNC: 13 MMOL/L (ref 8–16)
AST SERPL-CCNC: 36 U/L (ref 10–40)
BILIRUB SERPL-MCNC: 0.6 MG/DL (ref 0.1–1)
BUN SERPL-MCNC: 12 MG/DL (ref 6–20)
CALCIUM SERPL-MCNC: 9.9 MG/DL (ref 8.7–10.5)
CHLORIDE SERPL-SCNC: 108 MMOL/L (ref 95–110)
CHOLEST SERPL-MCNC: 89 MG/DL (ref 120–199)
CHOLEST/HDLC SERPL: 2.8 {RATIO} (ref 2–5)
CO2 SERPL-SCNC: 23 MMOL/L (ref 23–29)
CREAT SERPL-MCNC: 1.3 MG/DL (ref 0.5–1.4)
EST. GFR  (AFRICAN AMERICAN): >60 ML/MIN/1.73 M^2
EST. GFR  (NON AFRICAN AMERICAN): >60 ML/MIN/1.73 M^2
GLUCOSE SERPL-MCNC: 84 MG/DL (ref 70–110)
HDLC SERPL-MCNC: 32 MG/DL (ref 40–75)
HDLC SERPL: 36 % (ref 20–50)
LDLC SERPL CALC-MCNC: 31 MG/DL (ref 63–159)
NONHDLC SERPL-MCNC: 57 MG/DL
POTASSIUM SERPL-SCNC: 4.8 MMOL/L (ref 3.5–5.1)
PROT SERPL-MCNC: 6.8 G/DL (ref 6–8.4)
SODIUM SERPL-SCNC: 144 MMOL/L (ref 136–145)
TRIGL SERPL-MCNC: 130 MG/DL (ref 30–150)
TSH SERPL DL<=0.005 MIU/L-ACNC: 1.82 UIU/ML (ref 0.4–4)

## 2022-03-14 PROCEDURE — 99214 PR OFFICE/OUTPT VISIT, EST, LEVL IV, 30-39 MIN: ICD-10-PCS | Mod: S$GLB,,, | Performed by: FAMILY MEDICINE

## 2022-03-14 PROCEDURE — 99999 PR PBB SHADOW E&M-EST. PATIENT-LVL V: CPT | Mod: PBBFAC,,, | Performed by: FAMILY MEDICINE

## 2022-03-14 PROCEDURE — 3080F DIAST BP >= 90 MM HG: CPT | Mod: CPTII,S$GLB,, | Performed by: STUDENT IN AN ORGANIZED HEALTH CARE EDUCATION/TRAINING PROGRAM

## 2022-03-14 PROCEDURE — 3008F BODY MASS INDEX DOCD: CPT | Mod: CPTII,S$GLB,, | Performed by: STUDENT IN AN ORGANIZED HEALTH CARE EDUCATION/TRAINING PROGRAM

## 2022-03-14 PROCEDURE — 3072F PR LOW RISK FOR RETINOPATHY: ICD-10-PCS | Mod: CPTII,S$GLB,, | Performed by: STUDENT IN AN ORGANIZED HEALTH CARE EDUCATION/TRAINING PROGRAM

## 2022-03-14 PROCEDURE — 1159F MED LIST DOCD IN RCRD: CPT | Mod: CPTII,S$GLB,, | Performed by: FAMILY MEDICINE

## 2022-03-14 PROCEDURE — 99203 OFFICE O/P NEW LOW 30 MIN: CPT | Mod: S$GLB,,, | Performed by: STUDENT IN AN ORGANIZED HEALTH CARE EDUCATION/TRAINING PROGRAM

## 2022-03-14 PROCEDURE — 80053 COMPREHEN METABOLIC PANEL: CPT | Performed by: FAMILY MEDICINE

## 2022-03-14 PROCEDURE — 3079F PR MOST RECENT DIASTOLIC BLOOD PRESSURE 80-89 MM HG: ICD-10-PCS | Mod: CPTII,S$GLB,, | Performed by: FAMILY MEDICINE

## 2022-03-14 PROCEDURE — 4010F PR ACE/ARB THEARPY RXD/TAKEN: ICD-10-PCS | Mod: CPTII,S$GLB,, | Performed by: FAMILY MEDICINE

## 2022-03-14 PROCEDURE — 83036 HEMOGLOBIN GLYCOSYLATED A1C: CPT | Performed by: FAMILY MEDICINE

## 2022-03-14 PROCEDURE — 99999 PR PBB SHADOW E&M-EST. PATIENT-LVL V: ICD-10-PCS | Mod: PBBFAC,,, | Performed by: STUDENT IN AN ORGANIZED HEALTH CARE EDUCATION/TRAINING PROGRAM

## 2022-03-14 PROCEDURE — 99999 PR PBB SHADOW E&M-EST. PATIENT-LVL V: CPT | Mod: PBBFAC,,, | Performed by: STUDENT IN AN ORGANIZED HEALTH CARE EDUCATION/TRAINING PROGRAM

## 2022-03-14 PROCEDURE — 3077F SYST BP >= 140 MM HG: CPT | Mod: CPTII,S$GLB,, | Performed by: STUDENT IN AN ORGANIZED HEALTH CARE EDUCATION/TRAINING PROGRAM

## 2022-03-14 PROCEDURE — 3072F PR LOW RISK FOR RETINOPATHY: ICD-10-PCS | Mod: CPTII,S$GLB,, | Performed by: FAMILY MEDICINE

## 2022-03-14 PROCEDURE — 36415 COLL VENOUS BLD VENIPUNCTURE: CPT | Performed by: FAMILY MEDICINE

## 2022-03-14 PROCEDURE — 3075F PR MOST RECENT SYSTOLIC BLOOD PRESS GE 130-139MM HG: ICD-10-PCS | Mod: CPTII,S$GLB,, | Performed by: FAMILY MEDICINE

## 2022-03-14 PROCEDURE — 80061 LIPID PANEL: CPT | Performed by: FAMILY MEDICINE

## 2022-03-14 PROCEDURE — 3079F DIAST BP 80-89 MM HG: CPT | Mod: CPTII,S$GLB,, | Performed by: FAMILY MEDICINE

## 2022-03-14 PROCEDURE — 99214 OFFICE O/P EST MOD 30 MIN: CPT | Mod: S$GLB,,, | Performed by: FAMILY MEDICINE

## 2022-03-14 PROCEDURE — 3008F BODY MASS INDEX DOCD: CPT | Mod: CPTII,S$GLB,, | Performed by: FAMILY MEDICINE

## 2022-03-14 PROCEDURE — 3077F PR MOST RECENT SYSTOLIC BLOOD PRESSURE >= 140 MM HG: ICD-10-PCS | Mod: CPTII,S$GLB,, | Performed by: STUDENT IN AN ORGANIZED HEALTH CARE EDUCATION/TRAINING PROGRAM

## 2022-03-14 PROCEDURE — 3072F LOW RISK FOR RETINOPATHY: CPT | Mod: CPTII,S$GLB,, | Performed by: STUDENT IN AN ORGANIZED HEALTH CARE EDUCATION/TRAINING PROGRAM

## 2022-03-14 PROCEDURE — 3080F PR MOST RECENT DIASTOLIC BLOOD PRESSURE >= 90 MM HG: ICD-10-PCS | Mod: CPTII,S$GLB,, | Performed by: STUDENT IN AN ORGANIZED HEALTH CARE EDUCATION/TRAINING PROGRAM

## 2022-03-14 PROCEDURE — 4010F ACE/ARB THERAPY RXD/TAKEN: CPT | Mod: CPTII,S$GLB,, | Performed by: STUDENT IN AN ORGANIZED HEALTH CARE EDUCATION/TRAINING PROGRAM

## 2022-03-14 PROCEDURE — 3008F PR BODY MASS INDEX (BMI) DOCUMENTED: ICD-10-PCS | Mod: CPTII,S$GLB,, | Performed by: FAMILY MEDICINE

## 2022-03-14 PROCEDURE — 1159F PR MEDICATION LIST DOCUMENTED IN MEDICAL RECORD: ICD-10-PCS | Mod: CPTII,S$GLB,, | Performed by: STUDENT IN AN ORGANIZED HEALTH CARE EDUCATION/TRAINING PROGRAM

## 2022-03-14 PROCEDURE — 4010F PR ACE/ARB THEARPY RXD/TAKEN: ICD-10-PCS | Mod: CPTII,S$GLB,, | Performed by: STUDENT IN AN ORGANIZED HEALTH CARE EDUCATION/TRAINING PROGRAM

## 2022-03-14 PROCEDURE — 84443 ASSAY THYROID STIM HORMONE: CPT | Performed by: FAMILY MEDICINE

## 2022-03-14 PROCEDURE — 4010F ACE/ARB THERAPY RXD/TAKEN: CPT | Mod: CPTII,S$GLB,, | Performed by: FAMILY MEDICINE

## 2022-03-14 PROCEDURE — 3072F LOW RISK FOR RETINOPATHY: CPT | Mod: CPTII,S$GLB,, | Performed by: FAMILY MEDICINE

## 2022-03-14 PROCEDURE — 1159F PR MEDICATION LIST DOCUMENTED IN MEDICAL RECORD: ICD-10-PCS | Mod: CPTII,S$GLB,, | Performed by: FAMILY MEDICINE

## 2022-03-14 PROCEDURE — 1159F MED LIST DOCD IN RCRD: CPT | Mod: CPTII,S$GLB,, | Performed by: STUDENT IN AN ORGANIZED HEALTH CARE EDUCATION/TRAINING PROGRAM

## 2022-03-14 PROCEDURE — 3075F SYST BP GE 130 - 139MM HG: CPT | Mod: CPTII,S$GLB,, | Performed by: FAMILY MEDICINE

## 2022-03-14 PROCEDURE — 3008F PR BODY MASS INDEX (BMI) DOCUMENTED: ICD-10-PCS | Mod: CPTII,S$GLB,, | Performed by: STUDENT IN AN ORGANIZED HEALTH CARE EDUCATION/TRAINING PROGRAM

## 2022-03-14 PROCEDURE — 85025 COMPLETE CBC W/AUTO DIFF WBC: CPT | Performed by: FAMILY MEDICINE

## 2022-03-14 PROCEDURE — 99203 PR OFFICE/OUTPT VISIT, NEW, LEVL III, 30-44 MIN: ICD-10-PCS | Mod: S$GLB,,, | Performed by: STUDENT IN AN ORGANIZED HEALTH CARE EDUCATION/TRAINING PROGRAM

## 2022-03-14 PROCEDURE — 99999 PR PBB SHADOW E&M-EST. PATIENT-LVL V: ICD-10-PCS | Mod: PBBFAC,,, | Performed by: FAMILY MEDICINE

## 2022-03-14 RX ORDER — LEVOFLOXACIN 500 MG/1
500 TABLET, FILM COATED ORAL DAILY
Qty: 10 TABLET | Refills: 0 | Status: SHIPPED | OUTPATIENT
Start: 2022-03-14 | End: 2022-03-24

## 2022-03-14 RX ORDER — PREDNISONE 10 MG/1
TABLET ORAL
Qty: 24 TABLET | Refills: 0 | Status: SHIPPED | OUTPATIENT
Start: 2022-03-14 | End: 2022-08-12

## 2022-03-14 RX ORDER — FLUTICASONE PROPIONATE 50 MCG
1 SPRAY, SUSPENSION (ML) NASAL DAILY
Qty: 16 G | Refills: 0 | Status: SHIPPED | OUTPATIENT
Start: 2022-03-14 | End: 2022-08-12

## 2022-03-14 NOTE — PROGRESS NOTES
Narendra English .  03/14/2022  [id number]    Skyla Ardon MD  Patient Care Team:  Skyla Ardon MD as PCP - General (Family Medicine)  Has the patient seen any provider outside of the [facility] network since the last visit? (no). If yes, HIPPA forms completed and records requested.        Visit Type:a scheduled routine follow-up visit    Chief Complaint:  Chief Complaint   Patient presents with    Annual Exam       History of Present Illness:  56 year old  Annual exam    Type 2 Dm  Lab Results   Component Value Date    HGBA1C 5.7 (H) 10/15/2021   Glucophage, Ozempic an Toujeo  Has Katy  History of low T  On Angrogel    HTn, stable on Toprol, Hydralazine, Norvasc, Benazapril.    Hypothyroid- on replacement  Lab Results   Component Value Date    TSH 3.750 01/18/2022     Hitstory of mood disorder    History of colon cancer, sigmoid. Had surgery.   Had repeat colon done in Feb. Benign polyp  Following with GI    Had covid in [place]  Jess to have Sinus issues  Had tried OTC meds.  PND down his throat.   Gag.  He has use Zytrec or Xyzal, mucinex.              History:  Past Medical History:   Diagnosis Date    Diabetes mellitus 8 years    BS 84 in the room 08/11/2021    Hypertension     Sleep apnea     Thyroid disease      Past Surgical History:   Procedure Laterality Date    BACK SURGERY      COLONOSCOPY N/A 12/29/2020    Procedure: COLONOSCOPY;  Surgeon: William Cotter MD;  Location: [facility];  Service: Endoscopy;  Laterality: N/A;  Will need Rapid doesn't live here    COLONOSCOPY N/A 2/10/2022    Procedure: COLONOSCOPY;  Surgeon: Wili Espinosa MD;  Location: [facility];  Service: Endoscopy;  Laterality: N/A;    LAPAROSCOPIC RIGHT COLON RESECTION N/A 2/2/2021    Procedure: COLECTOMY, RIGHT, LAPAROSCOPIC, ERAS low;  Surgeon: Melonie Santoyo MD;  Location: [address];  Service: Colon and Rectal;  Laterality: N/A;  needs rapid covid test     Family History   Problem Relation Age of  "Onset    Hypertension Mother     Diabetes Mother     Breast cancer Mother     Diabetes Maternal Grandmother     Hypertension Brother     Stroke Father      Social History     Socioeconomic History    Marital status:    Tobacco Use    Smoking status: Never Smoker    Smokeless tobacco: Never Used   Substance and Sexual Activity    Alcohol use: Not Currently    Drug use: Never    Sexual activity: Yes     Partners: Female     Patient Active Problem List   Diagnosis    Malignant neoplasm of sigmoid colon    Type 2 diabetes mellitus without complication, with long-term current use of insulin    Hypothyroidism    Adult BMI 38.0-38.9 kg/sq m    Benign essential HTN    Gout    Hyperlipidemia    Hypertriglyceridemia    Low testosterone    Mood disorder    Hypertension     Review of patient's allergies indicates:   Allergen Reactions    Demerol (pf) [meperidine (pf)] Other (See Comments)     Pt reports,"They lost my blood pressure."    Percocet [oxycodone-acetaminophen] Itching     itching       The following were reviewed at this visit: active problem list, medication list, allergies, family history, social history, and health maintenance.    Medications:  Current Outpatient Medications on File Prior to Visit   Medication Sig Dispense Refill    amLODIPine (NORVASC) 10 MG tablet Take 1 tablet (10 mg total) by mouth once daily. 90 tablet 3    atorvastatin (LIPITOR) 40 MG tablet Take 1 tablet (40 mg total) by mouth once daily. 90 tablet 1    benazepriL (LOTENSIN) 40 MG tablet Take 1 tablet (40 mg total) by mouth once daily. 90 tablet 3    clonazePAM (KLONOPIN) 2 MG Tab TAKE 1 TABLET BY MOUTH EVERY EVENING 30 tablet 0    colchicine (COLCRYS) 0.6 mg tablet Take 1 tablet (0.6 mg total) by mouth 2 (two) times daily. 180 tablet 1    hydrALAZINE (APRESOLINE) 10 MG tablet Take 1 tablet (10 mg total) by mouth every 12 (twelve) hours. 60 tablet 11    ibuprofen (ADVIL,MOTRIN) 800 MG tablet Take 800 " mg by mouth 3 (three) times daily.      indomethacin (INDOCIN SR) 75 mg CpSR CR capsule Take 75 mg by mouth 2 (two) times daily with meals.      levocetirizine (XYZAL) 5 MG tablet Take 5 mg by mouth every evening.      levothyroxine (SYNTHROID) 112 MCG tablet Take 1 tablet (112 mcg total) by mouth before breakfast. 90 tablet 1    metFORMIN (GLUCOPHAGE) 1000 MG tablet Take 1 tablet (1,000 mg total) by mouth 2 (two) times daily with meals. 180 tablet 1    metoprolol succinate (TOPROL-XL) 50 MG 24 hr tablet Take 1 tablet (50 mg total) by mouth once daily. 90 tablet 1    OZEMPIC 1 mg/dose (4 mg/3 mL) INJECT 1 MG INTO THE SKIN EVERY 7 DAYS 1 pen 1    testosterone (ANDROGEL) 1 % (50 mg/5 gram) GlPk Apply 1.62 g topically once daily.      TOUJEO SOLOSTAR U-300 INSULIN 300 unit/mL (1.5 mL) InPn pen Inject 85 Units into the skin once daily. 4 pen 3    VASCEPA 1 gram Cap Take 2 capsules (2,000 mg total) by mouth 2 (two) times daily. 90 capsule 1    esomeprazole (NEXIUM) 40 MG capsule Take 1 capsule (40 mg total) by mouth before breakfast. (Patient not taking: Reported on 3/14/2022) 30 capsule 11    flash glucose scanning reader Misc 1 application.      flash glucose sensor Kit 1 application.      FREESTYLE WILMAN 14 DAY SENSOR Kit SMARTSI Patch(s) Topical Every 2 Weeks (Patient not taking: Reported on 3/14/2022) 1 kit 4    omeprazole (PRILOSEC) 20 MG capsule Take 1 capsule (20 mg total) by mouth once daily. (Patient not taking: Reported on 3/14/2022) 30 capsule 1    [DISCONTINUED] penicillin v potassium (VEETID) 500 MG tablet Take 500 mg by mouth every 6 (six) hours.       Current Facility-Administered Medications on File Prior to Visit   Medication Dose Route Frequency Provider Last Rate Last Admin    gabapentin capsule 300 mg  300 mg Oral TID Dali Lim NP   300 mg at 21 0810       Medications have been reviewed and reconciled with patient at this visit.  Barriers to medications reviewed with  patient.    Adverse reactions to current medications reviewed with patient..    Over the counter medications reviewed and reconciled with patient.    Exam:  Wt Readings from Last 3 Encounters:   03/14/22 117.8 kg (259 lb 11.2 oz)   02/10/22 115.7 kg (255 lb)   02/08/22 118.1 kg (260 lb 5.8 oz)     Temp Readings from Last 3 Encounters:   03/14/22 96 °F (35.6 °C) (Tympanic)   02/10/22 98.1 °F (36.7 °C) (Temporal)   12/15/21 98.7 °F (37.1 °C) (Tympanic)     BP Readings from Last 3 Encounters:   03/14/22 130/82   02/10/22 (!) 142/85   02/08/22 (!) 146/92     Pulse Readings from Last 3 Encounters:   03/14/22 77   02/10/22 70   02/08/22 64     Body mass index is 34.26 kg/m².      Review of Systems   Constitutional: Negative.  Negative for chills and fever.   HENT: Positive for sinus pain. Negative for congestion and sore throat.    Eyes: Negative for blurred vision and double vision.   Respiratory: Negative for cough, sputum production, shortness of breath and wheezing.    Cardiovascular: Negative for chest pain, palpitations and leg swelling.   Gastrointestinal: Negative for abdominal pain, constipation, diarrhea, heartburn, nausea and vomiting.   Genitourinary: Negative.    Musculoskeletal: Negative.    Skin: Negative.  Negative for rash.   Neurological: Negative.    Endo/Heme/Allergies: Negative.  Negative for polydipsia. Does not bruise/bleed easily.   Psychiatric/Behavioral: Negative for depression and substance abuse.     Physical Exam  Nursing note reviewed.   Cardiovascular:      Rate and Rhythm: Normal rate and regular rhythm.   Pulmonary:      Effort: Pulmonary effort is normal. No respiratory distress.   Neurological:      Mental Status: He is alert and oriented to person, place, and time.   Psychiatric:         Mood and Affect: Mood normal.         Behavior: Behavior normal.         Thought Content: Thought content normal.         Judgment: Judgment normal.         Laboratory Reviewed ({Yes)  Lab Results    Component Value Date    WBC 5.78 11/15/2021    HGB 13.0 (L) 11/15/2021    HCT 38.8 (L) 11/15/2021     11/15/2021    ALT 91 (H) 11/15/2021    AST 37 11/15/2021     11/15/2021    K 4.1 11/15/2021     11/15/2021    CREATININE 1.1 11/15/2021    BUN 16 11/15/2021    CO2 24 11/15/2021    TSH 3.750 01/18/2022    HGBA1C 5.7 (H) 10/15/2021       Narendra was seen today for annual exam.    Diagnoses and all orders for this visit:    Annual physical exam    Hypertension, unspecified type  -     Comprehensive Metabolic Panel; Future  -     Lipid Panel; Future  -     CBC Auto Differential; Future    Type 2 diabetes mellitus without complication, with long-term current use of insulin  -     Lipid Panel; Future  -     Hemoglobin A1C; Future    Hypothyroidism, unspecified type  -     TSH; Future    Benign essential HTN  -     Comprehensive Metabolic Panel; Future    Low testosterone  -     Comprehensive Metabolic Panel; Future  -     CBC Auto Differential; Future    Sinus congestion  -     Ambulatory referral/consult to ENT; Future          Check Labs today   reviewed  Up to date    Takes [facility]    ENT for post COVID Sinus        Care Plan/Goals: Reviewed    Goals    None         Follow up: No follow-ups on file.    After visit summary was printed and given to patient upon discharge today.  Patient goals and care plan are included in After Visit Summary.

## 2022-03-14 NOTE — PROGRESS NOTES
Subjective:   Patient ID:  Narendra English Sr. is a 56 y.o. male who presents for follow-up of No chief complaint on file.    This pleasant patient 56 years of age who has history hypertension hyperlipidemia and evidence of type 2 diabetes without complications.  The patient has had exertional chest pain present in the emergency room with nonspecific EKG changes negative troponin levels.  Follow-up in the office today.  EKG shows evidence of right bundle branch block no acute changes.  Otherwise patient is stable.  He has had no exertional symptoms.  There is no significant family history of coronary disease.  Patient nonsmoker no history of drug or alcohol abuse otherwise stable.  States that the pain is very intermittent and is not typical.  This visit finds patient feeling well.  No exertional symptoms chest pain shortness of breath.  Stress test showed showed no evidence cardiac ischemia normal LV function blood pressure still elevated I will add hydralazine 10 mg b.i.d. to his current regimen of metoprolol 50 mg daily, amlodipine 10 mg daily and benazepril 40 mg daily.  Of note hydrochlorothiazide in the past has given the patient gout and is not to be used.  Overall stable and he is to have endoscopy this week for follow-up.  He should do well with procedure and has no issues with cardiac events for this and should be at low cardiac risk for procedure.  Is is advised patient doing well.  Stable heart and blood pressure on medication renewed cluster profiles are excellent he continues on statin medications.      Review of Systems   Constitutional: Negative for chills, diaphoresis, night sweats, weight gain and weight loss.   HENT: Negative for congestion, hoarse voice, sore throat and stridor.    Eyes: Negative for double vision and pain.   Cardiovascular: Negative for chest pain, claudication, cyanosis, dyspnea on exertion, irregular heartbeat, leg swelling, near-syncope, orthopnea, palpitations, paroxysmal  nocturnal dyspnea and syncope.   Respiratory: Negative for cough, hemoptysis, shortness of breath, sleep disturbances due to breathing, snoring, sputum production and wheezing.    Endocrine: Negative for cold intolerance, heat intolerance and polydipsia.   Hematologic/Lymphatic: Negative for bleeding problem. Does not bruise/bleed easily.   Skin: Negative for color change, dry skin and rash.   Musculoskeletal: Negative for joint swelling and muscle cramps.   Gastrointestinal: Negative for bloating, abdominal pain, constipation, diarrhea, dysphagia, melena, nausea and vomiting.   Genitourinary: Negative for flank pain and urgency.   Neurological: Negative for dizziness, focal weakness, headaches, light-headedness, loss of balance, seizures and weakness.   Psychiatric/Behavioral: Negative for altered mental status and memory loss. The patient is not nervous/anxious.      Family History   Problem Relation Age of Onset    Hypertension Mother     Diabetes Mother     Breast cancer Mother     Diabetes Maternal Grandmother     Hypertension Brother     Stroke Father      Past Medical History:   Diagnosis Date    Diabetes mellitus 8 years    BS 84 in the room 08/11/2021    Hypertension     Sleep apnea     Thyroid disease      Social History     Socioeconomic History    Marital status:    Tobacco Use    Smoking status: Never Smoker    Smokeless tobacco: Never Used   Substance and Sexual Activity    Alcohol use: Not Currently    Drug use: Never    Sexual activity: Yes     Partners: Female     Current Outpatient Medications on File Prior to Visit   Medication Sig Dispense Refill    amLODIPine (NORVASC) 10 MG tablet Take 1 tablet (10 mg total) by mouth once daily. 90 tablet 3    atorvastatin (LIPITOR) 40 MG tablet Take 1 tablet (40 mg total) by mouth once daily. 90 tablet 1    benazepriL (LOTENSIN) 40 MG tablet Take 1 tablet (40 mg total) by mouth once daily. 90 tablet 3    clonazePAM (KLONOPIN) 2 MG  Tab TAKE 1 TABLET BY MOUTH EVERY EVENING 30 tablet 0    colchicine (COLCRYS) 0.6 mg tablet Take 1 tablet (0.6 mg total) by mouth 2 (two) times daily. 180 tablet 1    esomeprazole (NEXIUM) 40 MG capsule Take 1 capsule (40 mg total) by mouth before breakfast. (Patient not taking: No sig reported) 30 capsule 11    flash glucose scanning reader Misc 1 application.      flash glucose sensor Kit 1 application.      fluticasone propionate (FLONASE) 50 mcg/actuation nasal spray 1 spray (50 mcg total) by Each Nostril route once daily. 16 g 0    FREESTYLE WILMAN 14 DAY SENSOR Kit SMARTSI Patch(s) Topical Every 2 Weeks 1 kit 4    hydrALAZINE (APRESOLINE) 10 MG tablet Take 1 tablet (10 mg total) by mouth every 12 (twelve) hours. 60 tablet 11    ibuprofen (ADVIL,MOTRIN) 800 MG tablet Take 800 mg by mouth 3 (three) times daily.      indomethacin (INDOCIN SR) 75 mg CpSR CR capsule Take 75 mg by mouth 2 (two) times daily with meals.      levocetirizine (XYZAL) 5 MG tablet Take 5 mg by mouth every evening.      levoFLOXacin (LEVAQUIN) 500 MG tablet Take 1 tablet (500 mg total) by mouth once daily. for 10 days 10 tablet 0    levothyroxine (SYNTHROID) 112 MCG tablet Take 1 tablet (112 mcg total) by mouth before breakfast. 90 tablet 1    metFORMIN (GLUCOPHAGE) 1000 MG tablet Take 1 tablet (1,000 mg total) by mouth 2 (two) times daily with meals. 180 tablet 1    metoprolol succinate (TOPROL-XL) 50 MG 24 hr tablet Take 1 tablet (50 mg total) by mouth once daily. 90 tablet 1    omeprazole (PRILOSEC) 20 MG capsule Take 1 capsule (20 mg total) by mouth once daily. 30 capsule 1    OZEMPIC 1 mg/dose (4 mg/3 mL) INJECT 1 MG INTO THE SKIN EVERY 7 DAYS 1 pen 1    predniSONE (DELTASONE) 10 MG tablet Take 3 tablets a day for 4 days (1 before breakfast, 1 after lunch, 1 before bed). Then take 2 tablets a day for 4 days (1 before breakfast, 1 before bed). Then take 1 tablet a day for 4 days (1 before breakfast). 24 tablet 0     "testosterone (ANDROGEL) 1 % (50 mg/5 gram) GlPk Apply 1.62 g topically once daily.      TOUJEO SOLOSTAR U-300 INSULIN 300 unit/mL (1.5 mL) InPn pen Inject 85 Units into the skin once daily. 4 pen 3    VASCEPA 1 gram Cap Take 2 capsules (2,000 mg total) by mouth 2 (two) times daily. 90 capsule 1    [DISCONTINUED] penicillin v potassium (VEETID) 500 MG tablet Take 500 mg by mouth every 6 (six) hours.       Current Facility-Administered Medications on File Prior to Visit   Medication Dose Route Frequency Provider Last Rate Last Admin    gabapentin capsule 300 mg  300 mg Oral TID Dali Lim, NP   300 mg at 02/04/21 0810     Review of patient's allergies indicates:   Allergen Reactions    Demerol (pf) [meperidine (pf)] Other (See Comments)     Pt reports,"They lost my blood pressure."    Percocet [oxycodone-acetaminophen] Itching     itching    Demerol [meperidine]        Objective:     Physical Exam  Eyes:      Pupils: Pupils are equal, round, and reactive to light.   Neck:      Trachea: No tracheal deviation.   Cardiovascular:      Rate and Rhythm: Normal rate and regular rhythm.      Pulses: Intact distal pulses.           Carotid pulses are 2+ on the right side and 2+ on the left side.       Radial pulses are 2+ on the right side and 2+ on the left side.        Femoral pulses are 2+ on the right side and 2+ on the left side.       Popliteal pulses are 2+ on the right side and 2+ on the left side.        Dorsalis pedis pulses are 2+ on the right side and 2+ on the left side.        Posterior tibial pulses are 2+ on the right side and 2+ on the left side.      Heart sounds: Normal heart sounds. No murmur heard.    No friction rub. No gallop.   Pulmonary:      Effort: Pulmonary effort is normal. No respiratory distress.      Breath sounds: Normal breath sounds. No stridor. No wheezing or rales.   Chest:      Chest wall: No tenderness.   Abdominal:      General: There is no distension.      Tenderness: There " is no abdominal tenderness. There is no rebound.   Musculoskeletal:         General: No tenderness.      Cervical back: Normal range of motion.   Skin:     General: Skin is warm and dry.   Neurological:      Mental Status: He is alert and oriented to person, place, and time.     Stress echo from 02/01/2022:  · Concentric remodeling and normal systolic function.  · There were no arrhythmias during stress.  · The estimated ejection fraction is 60%.  · Normal left ventricular diastolic function.  · With normal right ventricular systolic function.  · The stress echo portion of this study is negative for myocardial ischemia.  · The ECG portion of this study is negative for myocardial ischemia.     & Units 1 d ago     Cholesterol 120 - 199 mg/dL 89 Low     Comment: The National Cholesterol Education Program (NCEP) has set the   following guidelines (reference ranges) for Cholesterol:   Optimal.....................<200 mg/dL   Borderline High.............200-239 mg/dL   High........................> or = 240 mg/dL    Triglycerides 30 - 150 mg/dL 130    Comment: The National Cholesterol Education Program (NCEP) has set the   following guidelines (reference values) for triglycerides:   Normal......................<150 mg/dL   Borderline High.............150-199 mg/dL   High........................200-499 mg/dL    HDL 40 - 75 mg/dL 32 Low     Comment: The National Cholesterol Education Program (NCEP) has set the   following guidelines (reference values) for HDL Cholesterol:   Low...............<40 mg/dL   Optimal...........>60 mg/dL    LDL Cholesterol 63.0 - 159.0 mg/dL 31.0 Low     Comment: The National Cholesterol Education Program (       Assessment:     1. Chest pain, unspecified type    2. Hyperlipidemia, unspecified hyperlipidemia type    3. Nonspecific abnormal electrocardiogram (ECG) (EKG)    4. Type 2 diabetes mellitus without complication, without long-term current use of insulin    5. Hypertension, unspecified type     6. Hypertriglyceridemia    7. Pre-op evaluation        Plan:     Chest pain, unspecified type    Hyperlipidemia, unspecified hyperlipidemia type    Nonspecific abnormal electrocardiogram (ECG) (EKG)    Type 2 diabetes mellitus without complication, without long-term current use of insulin    Hypertension, unspecified type    Hypertriglyceridemia    Pre-op evaluation      Impression 1. Hypertension good control current medication   2 hyperlipidemia stable on current dose of statin and atorvastatin 40 mg daily  3. Chest discomfort stable and resolved stress test showed no evidence of cardiac ischemia  All questions answered patient doing well on current medications of ablation 3 months.

## 2022-03-14 NOTE — TELEPHONE ENCOUNTER
No new care gaps identified.  Powered by [employer]. Reference number: [id number].   3/14/2022 4:23:05 PM CDT

## 2022-03-14 NOTE — PROGRESS NOTES
Chief complaint:    Chief Complaint   Patient presents with    drainage in throat     Pt states he had Covid in Jan. C/o headaches and drainage in back of throat getting worse. C/o congestion as well.           Referring Provider:  Skyla Ardon Md  [address] [place] [id number]      History of present illness:     Mr. English is a 56 y.o. presenting for evaluation of sinus congestion.     He  has been referred by Dr. Ardon.      The patient reports the following allergy/sinus symptoms:     Headaches - 3-4 times/day, forehead    Nasal congestion, purulent anterior drainage, and PND - present all the time  Coughing - at night    No fever, no hyposmia.     Started after COVID in January. Symptoms have been present for almost continuously since then.    Treatment has included: Zytrec or Xyzal, mucinex, abx given 1 month ago for tooth issue. Had no effect on nasal issues.     Prior to the last few months patient has had about 0 sinus infections in the past 12 months.   Prior sinus surgery: no.    Current medications: ibuprofen 800, mucinex, and xyzal. No help with any of it.    Allergy history: yes, when he was young, not bad as an adult       History      Past Medical History:   Past Medical History:   Diagnosis Date    Diabetes mellitus 8 years    BS 84 in the room 08/11/2021    Hypertension     Sleep apnea     Thyroid disease          Past Surgical History:  Past Surgical History:   Procedure Laterality Date    BACK SURGERY      COLONOSCOPY N/A 12/29/2020    Procedure: COLONOSCOPY;  Surgeon: William Cotter MD;  [facility];  Service: Endoscopy;  Laterality: N/A;  Will need Rapid doesn't live here    COLONOSCOPY N/A 2/10/2022    Procedure: COLONOSCOPY;  Surgeon: Wili Espinosa MD;  Location: [facility];  Service: Endoscopy;  Laterality: N/A;    LAPAROSCOPIC RIGHT COLON RESECTION N/A 2/2/2021    Procedure: COLECTOMY, RIGHT, LAPAROSCOPIC, ERAS low;  Surgeon: Melonie  "LARA Santoyo MD;  Location: [facility] OR [address] [place] [address];  Service: Colon and Rectal;  Laterality: N/A;  needs rapid covid test         Medications: Medication list reviewed. He  has a current medication list which includes the following prescription(s): amlodipine, atorvastatin, benazepril, clonazepam, colchicine, flash glucose scanning reader, flash glucose sensor, freestyle lakhwinder 14 day sensor, hydralazine, indomethacin, levothyroxine, metformin, metoprolol succinate, omeprazole, ozempic, testosterone, toujeo solostar u-300 insulin, vascepa, esomeprazole, ibuprofen, and levocetirizine, and the following Facility-Administered Medications: gabapentin.     Allergies:   Review of patient's allergies indicates:   Allergen Reactions    Demerol (pf) [meperidine (pf)] Other (See Comments)     Pt reports,"They lost my blood pressure."    Percocet [oxycodone-acetaminophen] Itching     itching    Demerol [meperidine]          Family history: family history includes Breast cancer in his mother; Diabetes in his maternal grandmother and mother; Hypertension in his brother and mother; Stroke in his father.         Social History          Alcohol use:  reports previous alcohol use.            Tobacco:  reports that he has never smoked. He has never used smokeless tobacco.         Physical Examination      Vitals: Blood pressure (!) 144/96, pulse 77, temperature 98.1 °F (36.7 °C), temperature source Temporal, weight 119.7 kg (263 lb 14.3 oz).      General: Well developed, well nourished, well hydrated.     Voice: no dysphonia, no dysarthria      Head/Face: Normocephalic, atraumatic. No scars or lesions. Facial musculature equal.     Eyes: No scleral icterus or conjunctival hemorrhage. EOMI. PERRLA.     Ears:     · Right ear: No gross deformity. EAC is clear of debris and erythema. TM are intact with a pneumatized middle ear. No signs of retraction, fluid or infection.      · Left ear: No gross deformity. EAC is clear of debris and " erythema. TM are intact with a pneumatized middle ear. No signs of retraction, fluid or infection.      Nose: No gross deformity or lesions. No purulent discharge. Anterior rhinoscopy reveals: No significant NSD. Inferior turbinate hypertrophy. L MT edema visualized. Some thick purulent drainage     Mouth/Oropharynx: Lips without any lesions. No mucosal lesions within the oropharynx. No tonsillar exudate or lesions. Pharyngeal walls symmetrical. Uvula midline. Tongue midline without lesions.     Neurologic: Moving all extremities without gross abnormality.CN II-XII grossly intact. House-Brackmann 1/6. No signs of nystagmus.        Data reviewed      Review of records:      I reviewed records from the referring provider's office visits describing the history, workup, and/or treatment of this problem thus far.      Assessment/Plan:    Chronic Rhinosinusitis following URI    Narendra  presents today for initial evaluation.  The patient presents with significant evidence of chronic sinusitis.My recommendation is treatment of chronic rhinosinusitis with a course of oral steroids and prolonged course of antibiotics. The patient will return in 3-4 weeks after completion of treatment. If the patient has persistent symptoms of sinusitis with nasal endoscopy findings consistent with sinusitis we will proceed with a CT scan. We discussed the possible need for sinus surgery in the event of persistent sinusitis resistant to medical management. We discussed at length the risk of sinus surgery including, but not limited to, recurrence of disease, bleeding, infection, septal perforation, tooth or cheek numbness, vision changes, orbital injury, CSF leak and changes in smell.  The patient had opportunity to ask questions and I answered all of them to their satisfaction.           Viktor Ferrell MD  [facility]   [facility]  [address] [place] [address]  P: [phone]  F: [phone]

## 2022-03-15 ENCOUNTER — OFFICE VISIT (OUTPATIENT)
Dept: CARDIOLOGY | Facility: CLINIC | Age: 57
End: 2022-03-15
Payer: COMMERCIAL

## 2022-03-15 ENCOUNTER — PATIENT MESSAGE (OUTPATIENT)
Dept: SURGERY | Facility: CLINIC | Age: 57
End: 2022-03-15
Payer: COMMERCIAL

## 2022-03-15 ENCOUNTER — PATIENT MESSAGE (OUTPATIENT)
Dept: INTERNAL MEDICINE | Facility: CLINIC | Age: 57
End: 2022-03-15
Payer: COMMERCIAL

## 2022-03-15 VITALS
DIASTOLIC BLOOD PRESSURE: 84 MMHG | BODY MASS INDEX: 34.76 KG/M2 | SYSTOLIC BLOOD PRESSURE: 134 MMHG | WEIGHT: 263.44 LBS | HEART RATE: 68 BPM | OXYGEN SATURATION: 98 %

## 2022-03-15 DIAGNOSIS — E78.1 HYPERTRIGLYCERIDEMIA: ICD-10-CM

## 2022-03-15 DIAGNOSIS — Z01.818 PRE-OP EVALUATION: ICD-10-CM

## 2022-03-15 DIAGNOSIS — E78.5 HYPERLIPIDEMIA, UNSPECIFIED HYPERLIPIDEMIA TYPE: ICD-10-CM

## 2022-03-15 DIAGNOSIS — E11.9 TYPE 2 DIABETES MELLITUS WITHOUT COMPLICATION, WITHOUT LONG-TERM CURRENT USE OF INSULIN: ICD-10-CM

## 2022-03-15 DIAGNOSIS — I10 HYPERTENSION, UNSPECIFIED TYPE: ICD-10-CM

## 2022-03-15 DIAGNOSIS — R94.31 NONSPECIFIC ABNORMAL ELECTROCARDIOGRAM (ECG) (EKG): ICD-10-CM

## 2022-03-15 DIAGNOSIS — R07.9 CHEST PAIN, UNSPECIFIED TYPE: Primary | ICD-10-CM

## 2022-03-15 LAB
BASOPHILS # BLD AUTO: 0.07 K/UL (ref 0–0.2)
BASOPHILS NFR BLD: 1 % (ref 0–1.9)
DIFFERENTIAL METHOD: ABNORMAL
EOSINOPHIL # BLD AUTO: 0.3 K/UL (ref 0–0.5)
EOSINOPHIL NFR BLD: 4.1 % (ref 0–8)
ERYTHROCYTE [DISTWIDTH] IN BLOOD BY AUTOMATED COUNT: 15 % (ref 11.5–14.5)
ESTIMATED AVG GLUCOSE: 131 MG/DL (ref 68–131)
HBA1C MFR BLD: 6.2 % (ref 4–5.6)
HCT VFR BLD AUTO: 45.6 % (ref 40–54)
HGB BLD-MCNC: 14.3 G/DL (ref 14–18)
IMM GRANULOCYTES # BLD AUTO: 0.02 K/UL (ref 0–0.04)
IMM GRANULOCYTES NFR BLD AUTO: 0.3 % (ref 0–0.5)
LYMPHOCYTES # BLD AUTO: 2.5 K/UL (ref 1–4.8)
LYMPHOCYTES NFR BLD: 36.5 % (ref 18–48)
MCH RBC QN AUTO: 30.6 PG (ref 27–31)
MCHC RBC AUTO-ENTMCNC: 31.4 G/DL (ref 32–36)
MCV RBC AUTO: 97 FL (ref 82–98)
MONOCYTES # BLD AUTO: 0.8 K/UL (ref 0.3–1)
MONOCYTES NFR BLD: 11.3 % (ref 4–15)
NEUTROPHILS # BLD AUTO: 3.2 K/UL (ref 1.8–7.7)
NEUTROPHILS NFR BLD: 46.8 % (ref 38–73)
NRBC BLD-RTO: 0 /100 WBC
PLATELET # BLD AUTO: 285 K/UL (ref 150–450)
PMV BLD AUTO: 12.1 FL (ref 9.2–12.9)
RBC # BLD AUTO: 4.68 M/UL (ref 4.6–6.2)
WBC # BLD AUTO: 6.79 K/UL (ref 3.9–12.7)

## 2022-03-15 PROCEDURE — 3079F PR MOST RECENT DIASTOLIC BLOOD PRESSURE 80-89 MM HG: ICD-10-PCS | Mod: CPTII,S$GLB,, | Performed by: INTERNAL MEDICINE

## 2022-03-15 PROCEDURE — 99214 OFFICE O/P EST MOD 30 MIN: CPT | Mod: S$GLB,,, | Performed by: INTERNAL MEDICINE

## 2022-03-15 PROCEDURE — 3079F DIAST BP 80-89 MM HG: CPT | Mod: CPTII,S$GLB,, | Performed by: INTERNAL MEDICINE

## 2022-03-15 PROCEDURE — 4010F ACE/ARB THERAPY RXD/TAKEN: CPT | Mod: CPTII,S$GLB,, | Performed by: INTERNAL MEDICINE

## 2022-03-15 PROCEDURE — 1160F PR REVIEW ALL MEDS BY PRESCRIBER/CLIN PHARMACIST DOCUMENTED: ICD-10-PCS | Mod: CPTII,S$GLB,, | Performed by: INTERNAL MEDICINE

## 2022-03-15 PROCEDURE — 99999 PR PBB SHADOW E&M-EST. PATIENT-LVL V: CPT | Mod: PBBFAC,,, | Performed by: INTERNAL MEDICINE

## 2022-03-15 PROCEDURE — 3075F PR MOST RECENT SYSTOLIC BLOOD PRESS GE 130-139MM HG: ICD-10-PCS | Mod: CPTII,S$GLB,, | Performed by: INTERNAL MEDICINE

## 2022-03-15 PROCEDURE — 3075F SYST BP GE 130 - 139MM HG: CPT | Mod: CPTII,S$GLB,, | Performed by: INTERNAL MEDICINE

## 2022-03-15 PROCEDURE — 1159F MED LIST DOCD IN RCRD: CPT | Mod: CPTII,S$GLB,, | Performed by: INTERNAL MEDICINE

## 2022-03-15 PROCEDURE — 4010F PR ACE/ARB THEARPY RXD/TAKEN: ICD-10-PCS | Mod: CPTII,S$GLB,, | Performed by: INTERNAL MEDICINE

## 2022-03-15 PROCEDURE — 3044F PR MOST RECENT HEMOGLOBIN A1C LEVEL <7.0%: ICD-10-PCS | Mod: CPTII,S$GLB,, | Performed by: INTERNAL MEDICINE

## 2022-03-15 PROCEDURE — 1159F PR MEDICATION LIST DOCUMENTED IN MEDICAL RECORD: ICD-10-PCS | Mod: CPTII,S$GLB,, | Performed by: INTERNAL MEDICINE

## 2022-03-15 PROCEDURE — 3008F PR BODY MASS INDEX (BMI) DOCUMENTED: ICD-10-PCS | Mod: CPTII,S$GLB,, | Performed by: INTERNAL MEDICINE

## 2022-03-15 PROCEDURE — 99214 PR OFFICE/OUTPT VISIT, EST, LEVL IV, 30-39 MIN: ICD-10-PCS | Mod: S$GLB,,, | Performed by: INTERNAL MEDICINE

## 2022-03-15 PROCEDURE — 3072F LOW RISK FOR RETINOPATHY: CPT | Mod: CPTII,S$GLB,, | Performed by: INTERNAL MEDICINE

## 2022-03-15 PROCEDURE — 3008F BODY MASS INDEX DOCD: CPT | Mod: CPTII,S$GLB,, | Performed by: INTERNAL MEDICINE

## 2022-03-15 PROCEDURE — 1160F RVW MEDS BY RX/DR IN RCRD: CPT | Mod: CPTII,S$GLB,, | Performed by: INTERNAL MEDICINE

## 2022-03-15 PROCEDURE — 99999 PR PBB SHADOW E&M-EST. PATIENT-LVL V: ICD-10-PCS | Mod: PBBFAC,,, | Performed by: INTERNAL MEDICINE

## 2022-03-15 PROCEDURE — 3044F HG A1C LEVEL LT 7.0%: CPT | Mod: CPTII,S$GLB,, | Performed by: INTERNAL MEDICINE

## 2022-03-15 PROCEDURE — 3072F PR LOW RISK FOR RETINOPATHY: ICD-10-PCS | Mod: CPTII,S$GLB,, | Performed by: INTERNAL MEDICINE

## 2022-03-22 RX ORDER — METFORMIN HYDROCHLORIDE 1000 MG/1
TABLET ORAL
Qty: 180 TABLET | Refills: 1 | Status: SHIPPED | OUTPATIENT
Start: 2022-03-22 | End: 2022-09-08 | Stop reason: SDUPTHER

## 2022-03-22 RX ORDER — LEVOTHYROXINE SODIUM 112 UG/1
TABLET ORAL
Qty: 90 TABLET | Refills: 3 | Status: SHIPPED | OUTPATIENT
Start: 2022-03-22 | End: 2023-03-16 | Stop reason: SDUPTHER

## 2022-03-22 NOTE — TELEPHONE ENCOUNTER
Refill Authorization Note   Narendra English  is requesting a refill authorization.  Brief Assessment and Rationale for Refill:  Approve     Medication Therapy Plan:       Medication Reconciliation Completed: No   Comments:   --->Care Gap information included below if applicable.   Orders Placed This Encounter    levothyroxine (SYNTHROID) 112 MCG tablet    metFORMIN (GLUCOPHAGE) 1000 MG tablet      Requested Prescriptions   Signed Prescriptions Disp Refills    levothyroxine (SYNTHROID) 112 MCG tablet 90 tablet 3     Sig: TAKE 1 TABLET(112 MCG) BY MOUTH BEFORE BREAKFAST       Endocrinology:  Hypothyroid Agents Passed - 3/14/2022  4:22 PM        Passed - Patient is at least 18 years old        Passed - Valid encounter within last 15 months     Recent Visits  Date Type Provider Dept   03/14/22 Office Visit Skyla Ardon MD [facility]   11/15/21 Office Visit Skyla Ardon MD [facility]   09/09/21 Office Visit Skyla Ardon MD [facility]   08/12/21 Office Visit Skyla Ardon MD [facility]   07/15/21 Office Visit Skyla Ardon MD [facility]   Showing recent visits within past 720 days and meeting all other requirements  Future Appointments  No visits were found meeting these conditions.  Showing future appointments within next 150 days and meeting all other requirements      Future Appointments              In 1 week [facility] CT1 LIMIT 500 LBS [place] - Ct Scan 1st Fl, [place]    In 2 weeks MD [facility] - Ear Nose Throat,  Medical C    In 2 months MD [facility] - Heart Failure & Transplant ([facility]                Passed - Manual Review: FT4 is not required if last TSH is WNL.        Passed - TSH in normal range and within 360 days     TSH   Date Value Ref Range Status   03/14/2022 1.817 0.400 - 4.000 uIU/mL Final   01/18/2022 3.750 0.400 - 4.000 uIU/mL Final   10/15/2021 4.390 (H) 0.400 -  4.000 uIU/mL Final              Passed - T4 free within 1080 days     Free T4   Date Value Ref Range Status   10/15/2021 0.97 0.71 - 1.51 ng/dL Final                metFORMIN (GLUCOPHAGE) 1000 MG tablet 180 tablet 1     Sig: TAKE 1 TABLET(1000 MG) BY MOUTH TWICE DAILY WITH MEALS       Endocrinology:  Diabetes - Biguanides Passed - 3/14/2022  4:22 PM        Passed - Patient is at least 18 years old        Passed - Valid encounter within last 15 months     Recent Visits  Date Type Provider Dept   03/14/22 Office Visit Skyla Ardon MD [facility] Internal Medicine   11/15/21 Office Visit Skyla Ardon MD [facility]   09/09/21 Office Visit Skyla Ardon MD [facility] Medicine   08/12/21 Office Visit Skyla Ardon MD [facility]   07/15/21 Office Visit Skyla Ardon MD [facility] Medicine   Showing recent visits within past 720 days and meeting all other requirements  Future Appointments  No visits were found meeting these conditions.  Showing future appointments within next 150 days and meeting all other requirements      Future Appointments              In 1 week [facility] CT1 LIMIT 500 LBS [place] - Ct Scan 1st Fl, [place]    In 2 weeks MD [facility] - Ear Nose Throat,  Medical C    In 2 months MD [facility] - Heart Failure & Transplant ([facility]), [facility]                Passed - Cr is 1.39 or below and within 360 days     Lab Results   Component Value Date    CREATININE 1.3 03/14/2022    CREATININE 1.1 11/15/2021    CREATININE 1.2 10/15/2021              Passed - HBA1C within 180 days     Lab Results   Component Value Date    HGBA1C 6.2 (H) 03/14/2022    HGBA1C 5.7 (H) 10/15/2021    HGBA1C 6.3 (H) 03/26/2021              Passed - eGFR is 45 or above and within 360 days     Lab Results   Component Value Date    EGFRNONAA >60.0 03/14/2022    EGFRNONAA >60 11/15/2021    EGFRNONAA >60.0 10/15/2021                    Appointments  past 12m or  future 3m with PCP    Date Provider   Last Visit   3/14/2022 Skyla Ardon MD   Next Visit   Visit date not found Skyla Ardon MD   ED visits in past 90 days: 0     Note composed:4:01 PM 03/22/2022

## 2022-03-27 NOTE — TELEPHONE ENCOUNTER
No new care gaps identified.  Powered by [employer] by [employer]. Reference number: [id number].   3/27/2022 6:33:36 AM CDT

## 2022-03-28 NOTE — TELEPHONE ENCOUNTER
This Rx Request does not qualify for assessment with the OR   Please review protocol details and the Care Due Message for extra clinical information    Reasons Rx Request may be deferred:  Labs/Vitals overdue    Note composed:5:57 PM 03/28/2022

## 2022-03-29 RX ORDER — COLCHICINE 0.6 MG/1
TABLET ORAL
Qty: 180 TABLET | Refills: 1 | Status: SHIPPED | OUTPATIENT
Start: 2022-03-29 | End: 2022-09-08 | Stop reason: SDUPTHER

## 2022-03-30 ENCOUNTER — HOSPITAL ENCOUNTER (OUTPATIENT)
Dept: RADIOLOGY | Facility: HOSPITAL | Age: 57
Discharge: HOME | End: 2022-03-30
Attending: COLON & RECTAL SURGERY
Payer: COMMERCIAL

## 2022-03-30 DIAGNOSIS — R91.1 SOLITARY PULMONARY NODULE: ICD-10-CM

## 2022-03-30 PROCEDURE — 71250 CT CHEST WITHOUT CONTRAST: ICD-10-PCS | Mod: 26,,, | Performed by: RADIOLOGY

## 2022-03-30 PROCEDURE — 71250 CT THORAX DX C-: CPT | Mod: TC

## 2022-03-30 PROCEDURE — 71250 CT THORAX DX C-: CPT | Mod: 26,,, | Performed by: RADIOLOGY

## 2022-03-31 DIAGNOSIS — R91.1 SOLITARY PULMONARY NODULE: Primary | ICD-10-CM

## 2022-04-01 ENCOUNTER — PATIENT MESSAGE (OUTPATIENT)
Dept: SURGERY | Facility: CLINIC | Age: 57
End: 2022-04-01
Payer: COMMERCIAL

## 2022-04-11 ENCOUNTER — OFFICE VISIT (OUTPATIENT)
Dept: OTOLARYNGOLOGY | Facility: CLINIC | Age: 57
End: 2022-04-11
Payer: COMMERCIAL

## 2022-04-11 VITALS
SYSTOLIC BLOOD PRESSURE: 154 MMHG | TEMPERATURE: 98 F | HEART RATE: 73 BPM | BODY MASS INDEX: 34.9 KG/M2 | WEIGHT: 264.56 LBS | DIASTOLIC BLOOD PRESSURE: 85 MMHG

## 2022-04-11 DIAGNOSIS — J30.9 ALLERGIC RHINITIS, UNSPECIFIED SEASONALITY, UNSPECIFIED TRIGGER: Primary | ICD-10-CM

## 2022-04-11 DIAGNOSIS — J30.0 VASOMOTOR RHINITIS: ICD-10-CM

## 2022-04-11 PROCEDURE — 3072F LOW RISK FOR RETINOPATHY: CPT | Mod: CPTII,S$GLB,, | Performed by: STUDENT IN AN ORGANIZED HEALTH CARE EDUCATION/TRAINING PROGRAM

## 2022-04-11 PROCEDURE — 3077F PR MOST RECENT SYSTOLIC BLOOD PRESSURE >= 140 MM HG: ICD-10-PCS | Mod: CPTII,S$GLB,, | Performed by: STUDENT IN AN ORGANIZED HEALTH CARE EDUCATION/TRAINING PROGRAM

## 2022-04-11 PROCEDURE — 3044F PR MOST RECENT HEMOGLOBIN A1C LEVEL <7.0%: ICD-10-PCS | Mod: CPTII,S$GLB,, | Performed by: STUDENT IN AN ORGANIZED HEALTH CARE EDUCATION/TRAINING PROGRAM

## 2022-04-11 PROCEDURE — 99999 PR PBB SHADOW E&M-EST. PATIENT-LVL IV: CPT | Mod: PBBFAC,,, | Performed by: STUDENT IN AN ORGANIZED HEALTH CARE EDUCATION/TRAINING PROGRAM

## 2022-04-11 PROCEDURE — 1159F PR MEDICATION LIST DOCUMENTED IN MEDICAL RECORD: ICD-10-PCS | Mod: CPTII,S$GLB,, | Performed by: STUDENT IN AN ORGANIZED HEALTH CARE EDUCATION/TRAINING PROGRAM

## 2022-04-11 PROCEDURE — 3072F PR LOW RISK FOR RETINOPATHY: ICD-10-PCS | Mod: CPTII,S$GLB,, | Performed by: STUDENT IN AN ORGANIZED HEALTH CARE EDUCATION/TRAINING PROGRAM

## 2022-04-11 PROCEDURE — 3079F DIAST BP 80-89 MM HG: CPT | Mod: CPTII,S$GLB,, | Performed by: STUDENT IN AN ORGANIZED HEALTH CARE EDUCATION/TRAINING PROGRAM

## 2022-04-11 PROCEDURE — 4010F PR ACE/ARB THEARPY RXD/TAKEN: ICD-10-PCS | Mod: CPTII,S$GLB,, | Performed by: STUDENT IN AN ORGANIZED HEALTH CARE EDUCATION/TRAINING PROGRAM

## 2022-04-11 PROCEDURE — 99213 OFFICE O/P EST LOW 20 MIN: CPT | Mod: 25,S$GLB,, | Performed by: STUDENT IN AN ORGANIZED HEALTH CARE EDUCATION/TRAINING PROGRAM

## 2022-04-11 PROCEDURE — 31231 PR NASAL ENDOSCOPY, DX: ICD-10-PCS | Mod: S$GLB,,, | Performed by: STUDENT IN AN ORGANIZED HEALTH CARE EDUCATION/TRAINING PROGRAM

## 2022-04-11 PROCEDURE — 99999 PR PBB SHADOW E&M-EST. PATIENT-LVL IV: ICD-10-PCS | Mod: PBBFAC,,, | Performed by: STUDENT IN AN ORGANIZED HEALTH CARE EDUCATION/TRAINING PROGRAM

## 2022-04-11 PROCEDURE — 3077F SYST BP >= 140 MM HG: CPT | Mod: CPTII,S$GLB,, | Performed by: STUDENT IN AN ORGANIZED HEALTH CARE EDUCATION/TRAINING PROGRAM

## 2022-04-11 PROCEDURE — 3044F HG A1C LEVEL LT 7.0%: CPT | Mod: CPTII,S$GLB,, | Performed by: STUDENT IN AN ORGANIZED HEALTH CARE EDUCATION/TRAINING PROGRAM

## 2022-04-11 PROCEDURE — 3008F BODY MASS INDEX DOCD: CPT | Mod: CPTII,S$GLB,, | Performed by: STUDENT IN AN ORGANIZED HEALTH CARE EDUCATION/TRAINING PROGRAM

## 2022-04-11 PROCEDURE — 3079F PR MOST RECENT DIASTOLIC BLOOD PRESSURE 80-89 MM HG: ICD-10-PCS | Mod: CPTII,S$GLB,, | Performed by: STUDENT IN AN ORGANIZED HEALTH CARE EDUCATION/TRAINING PROGRAM

## 2022-04-11 PROCEDURE — 1159F MED LIST DOCD IN RCRD: CPT | Mod: CPTII,S$GLB,, | Performed by: STUDENT IN AN ORGANIZED HEALTH CARE EDUCATION/TRAINING PROGRAM

## 2022-04-11 PROCEDURE — 99213 PR OFFICE/OUTPT VISIT, EST, LEVL III, 20-29 MIN: ICD-10-PCS | Mod: 25,S$GLB,, | Performed by: STUDENT IN AN ORGANIZED HEALTH CARE EDUCATION/TRAINING PROGRAM

## 2022-04-11 PROCEDURE — 31231 NASAL ENDOSCOPY DX: CPT | Mod: S$GLB,,, | Performed by: STUDENT IN AN ORGANIZED HEALTH CARE EDUCATION/TRAINING PROGRAM

## 2022-04-11 PROCEDURE — 3008F PR BODY MASS INDEX (BMI) DOCUMENTED: ICD-10-PCS | Mod: CPTII,S$GLB,, | Performed by: STUDENT IN AN ORGANIZED HEALTH CARE EDUCATION/TRAINING PROGRAM

## 2022-04-11 PROCEDURE — 4010F ACE/ARB THERAPY RXD/TAKEN: CPT | Mod: CPTII,S$GLB,, | Performed by: STUDENT IN AN ORGANIZED HEALTH CARE EDUCATION/TRAINING PROGRAM

## 2022-04-11 RX ORDER — FLUTICASONE PROPIONATE 50 MCG
1 SPRAY, SUSPENSION (ML) NASAL DAILY
Qty: 16 G | Refills: 0 | Status: SHIPPED | OUTPATIENT
Start: 2022-04-11

## 2022-04-11 RX ORDER — IPRATROPIUM BROMIDE 21 UG/1
2 SPRAY, METERED NASAL 2 TIMES DAILY
Qty: 30 ML | Refills: 0 | Status: SHIPPED | OUTPATIENT
Start: 2022-04-11 | End: 2022-05-23

## 2022-04-11 RX ORDER — SEMAGLUTIDE 1.34 MG/ML
INJECTION, SOLUTION SUBCUTANEOUS
Qty: 3 PEN | Refills: 1 | Status: SHIPPED | OUTPATIENT
Start: 2022-04-11 | End: 2022-06-07

## 2022-04-11 NOTE — PROGRESS NOTES
Chief complaint:    Chief Complaint   Patient presents with    Follow-up    Sinus Problem     States no improvement in symptoms           Referring Provider:  No referring provider defined for this encounter.      History of present illness:     Mr. English is a 56 y.o. presenting for evaluation of sinus congestion.     The patient reports the following allergy/sinus symptoms:     Headaches - 3-4 times/day, forehead    Nasal congestion, purulent anterior drainage, and PND - present all the time  Coughing - at night    No fever, no hyposmia.     Started after COVID in January. Symptoms have been present for almost continuously since then.    Treatment has included: Zytrec or Xyzal, mucinex, abx given 1 month ago for tooth issue. Had no effect on nasal issues.     Prior to the last few months patient has had about 0 sinus infections in the past 12 months.   Prior sinus surgery: no.    Current medications: ibuprofen 800, mucinex, and xyzal. No help with any of it.    Allergy history: yes, when he was young, not bad as an adult       Return clinic visit, 4/11/22  Completed abx and steroids. Since then, symptoms have minimally improved. Major complaints are nasal drip (clear) and nasal congestion (both sides), and some forehead pressure.     History      Past Medical History:   Past Medical History:   Diagnosis Date    Diabetes mellitus 8 years    BS 84 in the room 08/11/2021    Hypertension     Sleep apnea     Thyroid disease          Past Surgical History:  Past Surgical History:   Procedure Laterality Date    BACK SURGERY      COLONOSCOPY N/A 12/29/2020    Procedure: COLONOSCOPY;  Surgeon: William Cotter MD;  Location: [facility];  Service: Endoscopy;  Laterality: N/A;  Will need Rapid doesn't live here    COLONOSCOPY N/A 2/10/2022    Procedure: COLONOSCOPY;  Surgeon: Wili Espinosa MD;  Location: [facility];  Service: Endoscopy;  Laterality: N/A;    LAPAROSCOPIC RIGHT COLON RESECTION N/A 2/2/2021  "   Procedure: COLECTOMY, RIGHT, LAPAROSCOPIC, ERAS low;  Surgeon: Melonie Santoyo MD;  Location: [facility] OR [address] [place] [address];  Service: Colon and Rectal;  Laterality: N/A;  needs rapid covid test         Medications: Medication list reviewed. He  has a current medication list which includes the following prescription(s): amlodipine, atorvastatin, benazepril, clonazepam, colchicine, flash glucose scanning reader, flash glucose sensor, fluticasone propionate, freestyle lakhwinder 14 day sensor, hydralazine, ibuprofen, indomethacin, levocetirizine, levothyroxine, metformin, metoprolol succinate, omeprazole, ozempic, prednisone, testosterone, toujeo solostar u-300 insulin, vascepa, fluticasone propionate, and ipratropium, and the following Facility-Administered Medications: gabapentin.     Allergies:   Review of patient's allergies indicates:   Allergen Reactions    Demerol (pf) [meperidine (pf)] Other (See Comments)     Pt reports,"They lost my blood pressure."    Percocet [oxycodone-acetaminophen] Itching     itching    Demerol [meperidine]          Family history: family history includes Breast cancer in his mother; Diabetes in his maternal grandmother and mother; Hypertension in his brother and mother; Stroke in his father.         Social History          Alcohol use:  reports previous alcohol use.            Tobacco:  reports that he has never smoked. He has never used smokeless tobacco.         Physical Examination      Vitals: Blood pressure (!) 154/85, pulse 73, temperature 97.9 °F (36.6 °C), temperature source Temporal, weight 120 kg (264 lb 8.8 oz).      General: Well developed, well nourished, well hydrated.     Voice: no dysphonia, no dysarthria      Head/Face: Normocephalic, atraumatic. No scars or lesions. Facial musculature equal.     Eyes: No scleral icterus or conjunctival hemorrhage. EOMI. PERRLA.     Ears:     · Right ear: No gross deformity. EAC is clear of debris and erythema. TM are intact with a " pneumatized middle ear. No signs of retraction, fluid or infection.      · Left ear: No gross deformity. EAC is clear of debris and erythema. TM are intact with a pneumatized middle ear. No signs of retraction, fluid or infection.      Nose: No gross deformity or lesions. No purulent discharge.     Mouth/Oropharynx: Lips without any lesions. No mucosal lesions within the oropharynx. No tonsillar exudate or lesions. Pharyngeal walls symmetrical. Uvula midline. Tongue midline without lesions.     Neurologic: Moving all extremities without gross abnormality.CN II-XII grossly intact. House-Brackmann 1/6. No signs of nystagmus.        Data reviewed      Review of records:      I reviewed records from the referring provider's office visits describing the history, workup, and/or treatment of this problem thus far.    Procedure Note - Rigid Nasal Endoscopy     Surgeon: Viktor Ferrell MD  Anesthesia: topical oxymetazoline and 4% lidocaine.    Technique: The nose was sprayed with oxymetazoline and 4% lidocaine. With the patient in the upright position, a 2.7mm 30-degree endscope was inserted into the patient's right and left nare.  Where visible, nasal secretions and mucosal crusting were removed with a suction. The overall appearance of the nasal cavity and paranasal sinuses were noted and the findings are described below.     Findings: The nasal septum was intact and was deviated to the left with a mid-septal spur.  The inferior turbinates were moderately enlarged. There was not any evidence of nasal polyps, masses, or lesions within the nasal cavity.  Mucopurulent drainage was not noted at the middle meatus, frontal recess, or sphenoethmoidal recess.  There was significant clear mucus drainage in the bilateral nasal cavities, significant middle turbinate and inferior turbinate bogginess        Assessment/Plan:    Chronic Rhinosinusitis following URI    Narendra  presents today for initial evaluation.  The patient presents  with significant evidence of chronic sinusitis.My recommendation is treatment of chronic rhinosinusitis with a course of oral steroids and prolonged course of antibiotics. The patient will return in 3-4 weeks after completion of treatment. If the patient has persistent symptoms of sinusitis with nasal endoscopy findings consistent with sinusitis we will proceed with a CT scan. We discussed the possible need for sinus surgery in the event of persistent sinusitis resistant to medical management. We discussed at length the risk of sinus surgery including, but not limited to, recurrence of disease, bleeding, infection, septal perforation, tooth or cheek numbness, vision changes, orbital injury, CSF leak and changes in smell.  The patient had opportunity to ask questions and I answered all of them to their satisfaction.     Update, 4/11/22  Minimal response to abx/steroids. Endoscopy consistent with rhinitis, no sinusitis. Recommend Atrovent BID and Flonase daily. Continue OTC oral antihistamine. Will reassess symptoms in 1-2 months.        Viktor Ferrell MD  [facility]   [facility] [address]  [place] [address]  P: [phone]  F: [phone]

## 2022-04-11 NOTE — TELEPHONE ENCOUNTER
Refill Authorization Note   Narendra English  is requesting a refill authorization.  Brief Assessment and Rationale for Refill:  Approve    -Medication-Related Problems Identified: Requires labs  Medication Therapy Plan:       Medication Reconciliation Completed: No   Comments:     Provider Staff:     Action is required for this patient.   Please see care gap opportunities below in Care Due Message.     Thanks!  [facility]     Appointments      Date Provider   Last Visit   3/14/2022 Skyla Ardon MD   Next Visit   Visit date not found Skyla Ardon MD     Note composed:4:12 PM 04/11/2022           Note composed:4:12 PM 04/11/2022

## 2022-04-14 ENCOUNTER — PATIENT MESSAGE (OUTPATIENT)
Dept: INTERNAL MEDICINE | Facility: CLINIC | Age: 57
End: 2022-04-14
Payer: COMMERCIAL

## 2022-04-20 ENCOUNTER — PATIENT MESSAGE (OUTPATIENT)
Dept: INTERNAL MEDICINE | Facility: CLINIC | Age: 57
End: 2022-04-20
Payer: COMMERCIAL

## 2022-04-20 RX ORDER — TESTOSTERONE GEL, 1% 10 MG/G
1.62 GEL TRANSDERMAL DAILY
Qty: 5 G | Refills: 0 | Status: SHIPPED | OUTPATIENT
Start: 2022-04-20 | End: 2022-04-21 | Stop reason: SDUPTHER

## 2022-04-21 ENCOUNTER — PATIENT MESSAGE (OUTPATIENT)
Dept: INTERNAL MEDICINE | Facility: CLINIC | Age: 57
End: 2022-04-21
Payer: COMMERCIAL

## 2022-04-21 NOTE — TELEPHONE ENCOUNTER
No new care gaps identified.  Powered by [employer]. Reference number: [id number].   4/21/2022 4:27:15 PM CDT

## 2022-04-22 ENCOUNTER — PATIENT MESSAGE (OUTPATIENT)
Dept: INTERNAL MEDICINE | Facility: CLINIC | Age: 57
End: 2022-04-22
Payer: COMMERCIAL

## 2022-04-22 RX ORDER — TESTOSTERONE GEL, 1% 10 MG/G
GEL TRANSDERMAL
Qty: 5 G | Refills: 2 | Status: SHIPPED | OUTPATIENT
Start: 2022-04-22 | End: 2022-04-25

## 2022-04-22 NOTE — TELEPHONE ENCOUNTER
Call and asked him how much he was using a day, was it one pump or two?  Since I was not last to Rx I need to know before I send

## 2022-04-24 ENCOUNTER — PATIENT MESSAGE (OUTPATIENT)
Dept: INTERNAL MEDICINE | Facility: CLINIC | Age: 57
End: 2022-04-24
Payer: COMMERCIAL

## 2022-04-25 RX ORDER — TESTOSTERONE 20.25 MG/1.25G
40.5 GEL TOPICAL DAILY
Qty: 75 G | Refills: 1 | Status: SHIPPED | OUTPATIENT
Start: 2022-04-25 | End: 2022-07-20

## 2022-05-14 NOTE — TELEPHONE ENCOUNTER
No new care gaps identified.  [facility] Embedded Care Gaps. Reference number: [id number]. 5/14/2022   9:09:29 AM TINOT

## 2022-05-16 RX ORDER — ATORVASTATIN CALCIUM 40 MG/1
TABLET, FILM COATED ORAL
Qty: 90 TABLET | Refills: 3 | Status: SHIPPED | OUTPATIENT
Start: 2022-05-16 | End: 2023-02-22 | Stop reason: SDUPTHER

## 2022-05-16 NOTE — TELEPHONE ENCOUNTER
Refill Authorization Note   Narendra English  is requesting a refill authorization.  Brief Assessment and Rationale for Refill:  Approve     Medication Therapy Plan:       Medication Reconciliation Completed: No   Comments:     No Care Gaps recommended.     Note composed:8:23 AM 05/16/2022

## 2022-05-31 ENCOUNTER — OFFICE VISIT (OUTPATIENT)
Dept: OTOLARYNGOLOGY | Facility: CLINIC | Age: 57
End: 2022-05-31
Payer: COMMERCIAL

## 2022-05-31 VITALS
BODY MASS INDEX: 35.49 KG/M2 | SYSTOLIC BLOOD PRESSURE: 155 MMHG | HEART RATE: 82 BPM | WEIGHT: 268.94 LBS | DIASTOLIC BLOOD PRESSURE: 89 MMHG | TEMPERATURE: 98 F

## 2022-05-31 DIAGNOSIS — J34.89 NASAL OBSTRUCTION: ICD-10-CM

## 2022-05-31 DIAGNOSIS — J32.9 CHRONIC SINUSITIS, UNSPECIFIED LOCATION: ICD-10-CM

## 2022-05-31 DIAGNOSIS — J34.3 HYPERTROPHY OF BOTH INFERIOR NASAL TURBINATES: ICD-10-CM

## 2022-05-31 DIAGNOSIS — J30.9 ALLERGIC RHINITIS, UNSPECIFIED SEASONALITY, UNSPECIFIED TRIGGER: Primary | ICD-10-CM

## 2022-05-31 DIAGNOSIS — J34.2 NASAL SEPTAL DEVIATION: ICD-10-CM

## 2022-05-31 DIAGNOSIS — J30.0 VASOMOTOR RHINITIS: ICD-10-CM

## 2022-05-31 PROCEDURE — 31231 NASAL ENDOSCOPY DX: CPT | Mod: S$GLB,,, | Performed by: STUDENT IN AN ORGANIZED HEALTH CARE EDUCATION/TRAINING PROGRAM

## 2022-05-31 PROCEDURE — 3077F SYST BP >= 140 MM HG: CPT | Mod: CPTII,S$GLB,, | Performed by: STUDENT IN AN ORGANIZED HEALTH CARE EDUCATION/TRAINING PROGRAM

## 2022-05-31 PROCEDURE — 3079F PR MOST RECENT DIASTOLIC BLOOD PRESSURE 80-89 MM HG: ICD-10-PCS | Mod: CPTII,S$GLB,, | Performed by: STUDENT IN AN ORGANIZED HEALTH CARE EDUCATION/TRAINING PROGRAM

## 2022-05-31 PROCEDURE — 3072F LOW RISK FOR RETINOPATHY: CPT | Mod: CPTII,S$GLB,, | Performed by: STUDENT IN AN ORGANIZED HEALTH CARE EDUCATION/TRAINING PROGRAM

## 2022-05-31 PROCEDURE — 3079F DIAST BP 80-89 MM HG: CPT | Mod: CPTII,S$GLB,, | Performed by: STUDENT IN AN ORGANIZED HEALTH CARE EDUCATION/TRAINING PROGRAM

## 2022-05-31 PROCEDURE — 3044F PR MOST RECENT HEMOGLOBIN A1C LEVEL <7.0%: ICD-10-PCS | Mod: CPTII,S$GLB,, | Performed by: STUDENT IN AN ORGANIZED HEALTH CARE EDUCATION/TRAINING PROGRAM

## 2022-05-31 PROCEDURE — 99999 PR PBB SHADOW E&M-EST. PATIENT-LVL III: ICD-10-PCS | Mod: PBBFAC,,, | Performed by: STUDENT IN AN ORGANIZED HEALTH CARE EDUCATION/TRAINING PROGRAM

## 2022-05-31 PROCEDURE — 3072F PR LOW RISK FOR RETINOPATHY: ICD-10-PCS | Mod: CPTII,S$GLB,, | Performed by: STUDENT IN AN ORGANIZED HEALTH CARE EDUCATION/TRAINING PROGRAM

## 2022-05-31 PROCEDURE — 99214 OFFICE O/P EST MOD 30 MIN: CPT | Mod: 25,S$GLB,, | Performed by: STUDENT IN AN ORGANIZED HEALTH CARE EDUCATION/TRAINING PROGRAM

## 2022-05-31 PROCEDURE — 99999 PR PBB SHADOW E&M-EST. PATIENT-LVL III: CPT | Mod: PBBFAC,,, | Performed by: STUDENT IN AN ORGANIZED HEALTH CARE EDUCATION/TRAINING PROGRAM

## 2022-05-31 PROCEDURE — 3077F PR MOST RECENT SYSTOLIC BLOOD PRESSURE >= 140 MM HG: ICD-10-PCS | Mod: CPTII,S$GLB,, | Performed by: STUDENT IN AN ORGANIZED HEALTH CARE EDUCATION/TRAINING PROGRAM

## 2022-05-31 PROCEDURE — 31231 PR NASAL ENDOSCOPY, DX: ICD-10-PCS | Mod: S$GLB,,, | Performed by: STUDENT IN AN ORGANIZED HEALTH CARE EDUCATION/TRAINING PROGRAM

## 2022-05-31 PROCEDURE — 3008F PR BODY MASS INDEX (BMI) DOCUMENTED: ICD-10-PCS | Mod: CPTII,S$GLB,, | Performed by: STUDENT IN AN ORGANIZED HEALTH CARE EDUCATION/TRAINING PROGRAM

## 2022-05-31 PROCEDURE — 99214 PR OFFICE/OUTPT VISIT, EST, LEVL IV, 30-39 MIN: ICD-10-PCS | Mod: 25,S$GLB,, | Performed by: STUDENT IN AN ORGANIZED HEALTH CARE EDUCATION/TRAINING PROGRAM

## 2022-05-31 PROCEDURE — 4010F ACE/ARB THERAPY RXD/TAKEN: CPT | Mod: CPTII,S$GLB,, | Performed by: STUDENT IN AN ORGANIZED HEALTH CARE EDUCATION/TRAINING PROGRAM

## 2022-05-31 PROCEDURE — 3044F HG A1C LEVEL LT 7.0%: CPT | Mod: CPTII,S$GLB,, | Performed by: STUDENT IN AN ORGANIZED HEALTH CARE EDUCATION/TRAINING PROGRAM

## 2022-05-31 PROCEDURE — 3008F BODY MASS INDEX DOCD: CPT | Mod: CPTII,S$GLB,, | Performed by: STUDENT IN AN ORGANIZED HEALTH CARE EDUCATION/TRAINING PROGRAM

## 2022-05-31 PROCEDURE — 4010F PR ACE/ARB THEARPY RXD/TAKEN: ICD-10-PCS | Mod: CPTII,S$GLB,, | Performed by: STUDENT IN AN ORGANIZED HEALTH CARE EDUCATION/TRAINING PROGRAM

## 2022-05-31 RX ORDER — AZELASTINE 1 MG/ML
1 SPRAY, METERED NASAL 2 TIMES DAILY
Qty: 30 ML | Refills: 3 | Status: SHIPPED | OUTPATIENT
Start: 2022-05-31 | End: 2022-09-29 | Stop reason: SDUPTHER

## 2022-05-31 NOTE — PROGRESS NOTES
Chief complaint:    No chief complaint on file.        Referring Provider:  No referring provider defined for this encounter.      History of present illness:     Mr. English is a 56 y.o. presenting for evaluation of sinus congestion.     The patient reports the following allergy/sinus symptoms:     Headaches - 3-4 times/day, forehead    Nasal congestion, purulent anterior drainage, and PND - present all the time  Coughing - at night    No fever, no hyposmia.     Started after COVID in January. Symptoms have been present for almost continuously since then.    Treatment has included: Zytrec or Xyzal, mucinex, abx given 1 month ago for tooth issue. Had no effect on nasal issues.     Prior to the last few months patient has had about 0 sinus infections in the past 12 months.   Prior sinus surgery: no.    Current medications: ibuprofen 800, mucinex, and xyzal. No help with any of it.    Allergy history: yes, when he was young, not bad as an adult       Return clinic visit, 4/11/22  Completed abx and steroids. Since then, symptoms have minimally improved. Major complaints are nasal drip (clear) and nasal congestion (both sides), and some forehead pressure.     Return clinic visit, 5/31/22  Using saline, flonase, and Atrovent.   Post nasal drip has improved, but nasal congestion has not. Bilateral, sometimes worse on either side. Worse at night, but present all the time.   Intermittent facial pressure/pain, anterior rhinorrhea.     History      Past Medical History:   Past Medical History:   Diagnosis Date    Diabetes mellitus 8 years    BS 84 in the room 08/11/2021    Hypertension     Sleep apnea     Thyroid disease          Past Surgical History:  Past Surgical History:   Procedure Laterality Date    BACK SURGERY      COLONOSCOPY N/A 12/29/2020    Procedure: COLONOSCOPY;  Surgeon: William Cotter MD;  Location: [facility];  Service: Endoscopy;  Laterality: N/A;  Will need Rapid doesn't live here     "COLONOSCOPY N/A 2/10/2022    Procedure: COLONOSCOPY;  Surgeon: Wili Espinosa MD;  Location: [facility];  Service: Endoscopy;  Laterality: N/A;    LAPAROSCOPIC RIGHT COLON RESECTION N/A 2/2/2021    Procedure: COLECTOMY, RIGHT, LAPAROSCOPIC, ERAS low;  Surgeon: Melonie Santoyo MD;  Location: [facility] OR [address] [place] [address];  Service: Colon and Rectal;  Laterality: N/A;  needs rapid covid test         Medications: Medication list reviewed. He  has a current medication list which includes the following prescription(s): amlodipine, atorvastatin, benazepril, clonazepam, colchicine, flash glucose scanning reader, flash glucose sensor, fluticasone propionate, fluticasone propionate, freestyle lakhwinder 14 day sensor, hydralazine, ibuprofen, indomethacin, ipratropium, levocetirizine, levothyroxine, metformin, metoprolol succinate, omeprazole, ozempic, prednisone, testosterone, toujeo solostar u-300 insulin, and vascepa, and the following Facility-Administered Medications: gabapentin.     Allergies:   Review of patient's allergies indicates:   Allergen Reactions    Demerol (pf) [meperidine (pf)] Other (See Comments)     Pt reports,"They lost my blood pressure."    Percocet [oxycodone-acetaminophen] Itching     itching    Demerol [meperidine]          Family history: family history includes Breast cancer in his mother; Diabetes in his maternal grandmother and mother; Hypertension in his brother and mother; Stroke in his father.         Social History          Alcohol use:  reports previous alcohol use.            Tobacco:  reports that he has never smoked. He has never used smokeless tobacco.         Physical Examination      Vitals: There were no vitals taken for this visit.      General: Well developed, well nourished, well hydrated.     Voice: no dysphonia, no dysarthria      Head/Face: Normocephalic, atraumatic. No scars or lesions. Facial musculature equal.     Eyes: No scleral icterus or conjunctival hemorrhage. EOMI. " PERRLA.     Ears:     · Right ear: No gross deformity. EAC is clear of debris and erythema. TM are intact with a pneumatized middle ear. No signs of retraction, fluid or infection.      · Left ear: No gross deformity. EAC is clear of debris and erythema. TM are intact with a pneumatized middle ear. No signs of retraction, fluid or infection.      Nose: No gross deformity or lesions. No purulent discharge.     Mouth/Oropharynx: Lips without any lesions. No mucosal lesions within the oropharynx. No tonsillar exudate or lesions. Pharyngeal walls symmetrical. Uvula midline. Tongue midline without lesions.     Neurologic: Moving all extremities without gross abnormality.CN II-XII grossly intact. House-Brackmann 1/6. No signs of nystagmus.        Data reviewed      Review of records:      I reviewed records from the referring provider's office visits describing the history, workup, and/or treatment of this problem thus far.    Procedure Note - Rigid Nasal Endoscopy     Surgeon: Viktor Ferrell MD  Anesthesia: topical oxymetazoline and 4% lidocaine.    Technique: The nose was sprayed with oxymetazoline and 4% lidocaine. With the patient in the upright position, a 2.7mm 30-degree endscope was inserted into the patient's right and left nare.  Where visible, nasal secretions and mucosal crusting were removed with a suction. The overall appearance of the nasal cavity and paranasal sinuses were noted and the findings are described below.     Findings: The nasal septum was intact and was deviated to the left with a mid-septal spur.  The inferior turbinates were moderately enlarged. There was not any evidence of nasal polyps, masses, or lesions within the nasal cavity.  Mucopurulent drainage was not noted at the middle meatus, frontal recess, or sphenoethmoidal recess.  There was some thick mucuoid drainage at bilateral middle meatus, significant middle turbinate and inferior turbinate bogginess      Assessment/Plan:    Chronic  Rhinosinusitis following URI  Vasomotor rhinitis   Septal deviation  Inferior turbinate hypertrophy    Narendra  presents today for initial evaluation.  The patient presents with significant evidence of chronic sinusitis.My recommendation is treatment of chronic rhinosinusitis with a course of oral steroids and prolonged course of antibiotics. The patient will return in 3-4 weeks after completion of treatment. If the patient has persistent symptoms of sinusitis with nasal endoscopy findings consistent with sinusitis we will proceed with a CT scan. We discussed the possible need for sinus surgery in the event of persistent sinusitis resistant to medical management. We discussed at length the risk of sinus surgery including, but not limited to, recurrence of disease, bleeding, infection, septal perforation, tooth or cheek numbness, vision changes, orbital injury, CSF leak and changes in smell.  The patient had opportunity to ask questions and I answered all of them to their satisfaction.     Update, 4/11/22  Minimal response to abx/steroids. Endoscopy consistent with rhinitis, no sinusitis. Recommend Atrovent BID and Flonase daily. Continue OTC oral antihistamine. Will reassess symptoms in 1-2 months.    Update, 5/31/22  Continue atrovent  Addition of astelin  CT sinus for persistent sinonasal symptoms minimal responsive to prior treatment  Discussed septoplasty and inferior turbinate reduction if no evidence of sinus disease and insignificant improvement with addition of astelin      Viktor Ferrell MD  [facility]   [facility]  [id number] [place] [address].  [place] [address]  P: [phone]  F: [phone]

## 2022-06-06 NOTE — TELEPHONE ENCOUNTER
No new care gaps identified.  [facility] Embedded Care Gaps. Reference number: [id number]. 6/06/2022   4:09:32 PM CDT

## 2022-06-07 RX ORDER — SEMAGLUTIDE 1.34 MG/ML
INJECTION, SOLUTION SUBCUTANEOUS
Qty: 3 PEN | Refills: 1 | Status: SHIPPED | OUTPATIENT
Start: 2022-06-07 | End: 2022-07-12

## 2022-06-07 NOTE — TELEPHONE ENCOUNTER
Refill Authorization Note   Narendra English  is requesting a refill authorization.  Brief Assessment and Rationale for Refill:  Approve     Medication Therapy Plan:       Medication Reconciliation Completed: No   Comments:     No Care Gaps recommended.     Note composed:12:52 PM 06/07/2022

## 2022-06-16 ENCOUNTER — LAB VISIT (OUTPATIENT)
Dept: LAB | Facility: HOSPITAL | Age: 57
End: 2022-06-16
Attending: UROLOGY
Payer: COMMERCIAL

## 2022-06-16 ENCOUNTER — OFFICE VISIT (OUTPATIENT)
Dept: UROLOGY | Facility: CLINIC | Age: 57
End: 2022-06-16
Payer: COMMERCIAL

## 2022-06-16 VITALS
SYSTOLIC BLOOD PRESSURE: 158 MMHG | DIASTOLIC BLOOD PRESSURE: 82 MMHG | WEIGHT: 268.94 LBS | HEIGHT: 73 IN | BODY MASS INDEX: 35.64 KG/M2

## 2022-06-16 DIAGNOSIS — Z12.5 PROSTATE CANCER SCREENING: Primary | ICD-10-CM

## 2022-06-16 DIAGNOSIS — Z12.5 PROSTATE CANCER SCREENING: ICD-10-CM

## 2022-06-16 DIAGNOSIS — N52.9 ERECTILE DYSFUNCTION, UNSPECIFIED ERECTILE DYSFUNCTION TYPE: ICD-10-CM

## 2022-06-16 LAB — COMPLEXED PSA SERPL-MCNC: 0.77 NG/ML (ref 0–4)

## 2022-06-16 PROCEDURE — 3079F PR MOST RECENT DIASTOLIC BLOOD PRESSURE 80-89 MM HG: ICD-10-PCS | Mod: CPTII,S$GLB,, | Performed by: UROLOGY

## 2022-06-16 PROCEDURE — 3044F PR MOST RECENT HEMOGLOBIN A1C LEVEL <7.0%: ICD-10-PCS | Mod: CPTII,S$GLB,, | Performed by: UROLOGY

## 2022-06-16 PROCEDURE — 99204 PR OFFICE/OUTPT VISIT, NEW, LEVL IV, 45-59 MIN: ICD-10-PCS | Mod: S$GLB,,, | Performed by: UROLOGY

## 2022-06-16 PROCEDURE — 3072F PR LOW RISK FOR RETINOPATHY: ICD-10-PCS | Mod: CPTII,S$GLB,, | Performed by: UROLOGY

## 2022-06-16 PROCEDURE — 3079F DIAST BP 80-89 MM HG: CPT | Mod: CPTII,S$GLB,, | Performed by: UROLOGY

## 2022-06-16 PROCEDURE — 36415 COLL VENOUS BLD VENIPUNCTURE: CPT | Performed by: UROLOGY

## 2022-06-16 PROCEDURE — 1160F PR REVIEW ALL MEDS BY PRESCRIBER/CLIN PHARMACIST DOCUMENTED: ICD-10-PCS | Mod: CPTII,S$GLB,, | Performed by: UROLOGY

## 2022-06-16 PROCEDURE — 1159F PR MEDICATION LIST DOCUMENTED IN MEDICAL RECORD: ICD-10-PCS | Mod: CPTII,S$GLB,, | Performed by: UROLOGY

## 2022-06-16 PROCEDURE — 3072F LOW RISK FOR RETINOPATHY: CPT | Mod: CPTII,S$GLB,, | Performed by: UROLOGY

## 2022-06-16 PROCEDURE — 3044F HG A1C LEVEL LT 7.0%: CPT | Mod: CPTII,S$GLB,, | Performed by: UROLOGY

## 2022-06-16 PROCEDURE — 3077F SYST BP >= 140 MM HG: CPT | Mod: CPTII,S$GLB,, | Performed by: UROLOGY

## 2022-06-16 PROCEDURE — 1160F RVW MEDS BY RX/DR IN RCRD: CPT | Mod: CPTII,S$GLB,, | Performed by: UROLOGY

## 2022-06-16 PROCEDURE — 84153 ASSAY OF PSA TOTAL: CPT | Performed by: UROLOGY

## 2022-06-16 PROCEDURE — 4010F PR ACE/ARB THEARPY RXD/TAKEN: ICD-10-PCS | Mod: CPTII,S$GLB,, | Performed by: UROLOGY

## 2022-06-16 PROCEDURE — 99999 PR PBB SHADOW E&M-EST. PATIENT-LVL V: ICD-10-PCS | Mod: PBBFAC,,, | Performed by: UROLOGY

## 2022-06-16 PROCEDURE — 99204 OFFICE O/P NEW MOD 45 MIN: CPT | Mod: S$GLB,,, | Performed by: UROLOGY

## 2022-06-16 PROCEDURE — 3077F PR MOST RECENT SYSTOLIC BLOOD PRESSURE >= 140 MM HG: ICD-10-PCS | Mod: CPTII,S$GLB,, | Performed by: UROLOGY

## 2022-06-16 PROCEDURE — 99999 PR PBB SHADOW E&M-EST. PATIENT-LVL V: CPT | Mod: PBBFAC,,, | Performed by: UROLOGY

## 2022-06-16 PROCEDURE — 3008F BODY MASS INDEX DOCD: CPT | Mod: CPTII,S$GLB,, | Performed by: UROLOGY

## 2022-06-16 PROCEDURE — 1159F MED LIST DOCD IN RCRD: CPT | Mod: CPTII,S$GLB,, | Performed by: UROLOGY

## 2022-06-16 PROCEDURE — 4010F ACE/ARB THERAPY RXD/TAKEN: CPT | Mod: CPTII,S$GLB,, | Performed by: UROLOGY

## 2022-06-16 PROCEDURE — 3008F PR BODY MASS INDEX (BMI) DOCUMENTED: ICD-10-PCS | Mod: CPTII,S$GLB,, | Performed by: UROLOGY

## 2022-06-16 NOTE — PROGRESS NOTES
Chief Complaint:  ED    HPI:   Narendra English Sr. is a 56 y.o. male that presents today for evaluation of ED.  Patient notes issues for the last 3-4 years but notes that over the last six months it has gotten worse.  He notes difficulty both achieving and maintaining an erection.  He has previously tried both Cialis and Viagra and both of these cause in to have a very bad headache that make it on tolerable to take these medications, though they do work.  Otherwise he voids with a good stream and feels like he empties well subjectively.  He denies any gross hematuria or family history of  cancers.  Denies prior stones or urologic procedures.      PMH:  Past Medical History:   Diagnosis Date    Diabetes mellitus 8 years    BS 84 in the room 08/11/2021    Hypertension     Sleep apnea     Thyroid disease        PSH:  Past Surgical History:   Procedure Laterality Date    BACK SURGERY      COLONOSCOPY N/A 12/29/2020    Procedure: COLONOSCOPY;  Surgeon: William Cotter MD;  Location: [facility];  Service: Endoscopy;  Laterality: N/A;  Will need Rapid doesn't live here    COLONOSCOPY N/A 2/10/2022    Procedure: COLONOSCOPY;  Surgeon: Wili Espinosa MD;  Location: [facility];  Service: Endoscopy;  Laterality: N/A;    LAPAROSCOPIC RIGHT COLON RESECTION N/A 2/2/2021    Procedure: COLECTOMY, RIGHT, LAPAROSCOPIC, ERAS low;  Surgeon: Melonie Santoyo MD;  Location: [address];  Service: Colon and Rectal;  Laterality: N/A;  needs rapid covid test       Family History:  Family History   Problem Relation Age of Onset    Hypertension Mother     Diabetes Mother     Breast cancer Mother     Diabetes Maternal Grandmother     Hypertension Brother     Stroke Father        Social History:  Social History     Tobacco Use    Smoking status: Never Smoker    Smokeless tobacco: Never Used   Substance Use Topics    Alcohol use: Not Currently    Drug use: Never        Review of Systems:  General: No fever,  chills  Skin: No rashes  Chest:  Denies cough and sputum production  Heart: Denies chest pain  Resp: Denies dyspnea  Abdomen: Denies diarrhea, abdominal pain, hematemesis, or blood in stool.  Musculoskeletal: No joint stiffness or swelling. Denies back pain.  : see HPI  Neuro: no dizziness or weakness    Allergies:  Demerol (pf) [meperidine (pf)], Percocet [oxycodone-acetaminophen], and Demerol [meperidine]    Medications:    Current Outpatient Medications:     amLODIPine (NORVASC) 10 MG tablet, TAKE 1 TABLET(10 MG) BY MOUTH EVERY DAY, Disp: 90 tablet, Rfl: 1    atorvastatin (LIPITOR) 40 MG tablet, TAKE 1 TABLET(40 MG) BY MOUTH EVERY DAY, Disp: 90 tablet, Rfl: 3    azelastine (ASTELIN) 137 mcg (0.1 %) nasal spray, 1 spray (137 mcg total) by Nasal route 2 (two) times daily., Disp: 30 mL, Rfl: 3    benazepriL (LOTENSIN) 40 MG tablet, Take 1 tablet (40 mg total) by mouth once daily., Disp: 90 tablet, Rfl: 3    clonazePAM (KLONOPIN) 2 MG Tab, TAKE 1 TABLET BY MOUTH EVERY EVENING, Disp: 30 tablet, Rfl: 0    colchicine (COLCRYS) 0.6 mg tablet, TAKE 1 TABLET(0.6 MG) BY MOUTH TWICE DAILY, Disp: 180 tablet, Rfl: 1    flash glucose scanning reader Misc, 1 application., Disp: , Rfl:     flash glucose sensor Kit, 1 application., Disp: , Rfl:     fluticasone propionate (FLONASE) 50 mcg/actuation nasal spray, 1 spray (50 mcg total) by Each Nostril route once daily., Disp: 16 g, Rfl: 0    fluticasone propionate (FLONASE) 50 mcg/actuation nasal spray, 1 spray (50 mcg total) by Each Nostril route once daily., Disp: 16 g, Rfl: 0    FREESTYLE WILMAN 14 DAY SENSOR Kit, SMARTSI Patch(s) Topical Every 2 Weeks, Disp: 1 kit, Rfl: 4    hydrALAZINE (APRESOLINE) 10 MG tablet, Take 1 tablet (10 mg total) by mouth every 12 (twelve) hours., Disp: 60 tablet, Rfl: 11    ibuprofen (ADVIL,MOTRIN) 800 MG tablet, Take 800 mg by mouth 3 (three) times daily., Disp: , Rfl:     indomethacin (INDOCIN SR) 75 mg CpSR CR capsule, Take 75 mg  by mouth 2 (two) times daily with meals., Disp: , Rfl:     ipratropium (ATROVENT) 21 mcg (0.03 %) nasal spray, USE 2 SPRAYS IN EACH NOSTRIL TWICE DAILY, Disp: 30 mL, Rfl: 0    levocetirizine (XYZAL) 5 MG tablet, Take 5 mg by mouth every evening., Disp: , Rfl:     levothyroxine (SYNTHROID) 112 MCG tablet, TAKE 1 TABLET(112 MCG) BY MOUTH BEFORE BREAKFAST, Disp: 90 tablet, Rfl: 3    metFORMIN (GLUCOPHAGE) 1000 MG tablet, TAKE 1 TABLET(1000 MG) BY MOUTH TWICE DAILY WITH MEALS, Disp: 180 tablet, Rfl: 1    metoprolol succinate (TOPROL-XL) 50 MG 24 hr tablet, Take 1 tablet (50 mg total) by mouth once daily., Disp: 90 tablet, Rfl: 1    omeprazole (PRILOSEC) 20 MG capsule, Take 1 capsule (20 mg total) by mouth once daily., Disp: 30 capsule, Rfl: 1    OZEMPIC 1 mg/dose (4 mg/3 mL), INJECT 1MG INTO THE SKIN ONCE EVERY 7 DAYS, Disp: 3 pen, Rfl: 1    predniSONE (DELTASONE) 10 MG tablet, Take 3 tablets a day for 4 days (1 before breakfast, 1 after lunch, 1 before bed). Then take 2 tablets a day for 4 days (1 before breakfast, 1 before bed). Then take 1 tablet a day for 4 days (1 before breakfast)., Disp: 24 tablet, Rfl: 0    testosterone (ANDROGEL) 20.25 mg/1.25 gram (1.62 %) GlPm, Place 40.5 mg onto the skin once daily at 6am., Disp: 75 g, Rfl: 1    TOUJEO SOLOSTAR U-300 INSULIN 300 unit/mL (1.5 mL) InPn pen, ADMINISTER 85 UNITS UNDER THE SKIN EVERY DAY, Disp: 3 pen, Rfl: 3    VASCEPA 1 gram Cap, Take 2 capsules (2,000 mg total) by mouth 2 (two) times daily., Disp: 90 capsule, Rfl: 1    pep injection, Inject 0.15 ml as directed   For compounding pharmacy use:  Add PAPAVERINE 30 mg Add PHENTOLAMINE 1 mg Add ALPROSTADIL 20 mcg, Disp: 1 vial, Rfl: 5  No current facility-administered medications for this visit.    Facility-Administered Medications Ordered in Other Visits:     gabapentin capsule 300 mg, 300 mg, Oral, TID, Dali Lim NP, 300 mg at 02/04/21 0810    Physical Exam:  Vitals:    06/16/22 1419   BP: (!)  158/82     Body mass index is 35.49 kg/m².  General: awake, alert, cooperative  Head: NC/AT  Ears: external ears normal  Eyes: sclera normal  Lungs: normal inspiration, NAD  Heart: well-perfused  Abdomen: Soft, NT, ND  : Normal circ'd phallus, meatus normal in size and position, BL testicles palpable, no masses, nontender, no abnormalities of epididymi  JAIMIE: Normal rectal tone, no hemorrhoids. Prostate smooth and normal, no nodules 30 gm SV not palpable. Perineum and anus normal.  Lymphatic: groin nodes negative  Skin: The skin is warm and dry  Ext: No c/c/e.  Neuro: grossly intact, AOx3    RADIOLOGY:  No recent relevant imaging available for review.    LABS:  I personally reviewed the following lab values:  Lab Results   Component Value Date    WBC 6.79 03/14/2022    HGB 14.3 03/14/2022    HCT 45.6 03/14/2022     03/14/2022     03/14/2022    K 4.8 03/14/2022     03/14/2022    CREATININE 1.3 03/14/2022    BUN 12 03/14/2022    CO2 23 03/14/2022    TSH 1.817 03/14/2022    HGBA1C 6.2 (H) 03/14/2022    CHOL 89 (L) 03/14/2022    TRIG 130 03/14/2022    HDL 32 (L) 03/14/2022    ALT 98 (H) 03/14/2022    AST 36 03/14/2022       Assessment/Plan:   Narendra English  is a 56 y.o. male with:    ED - I reviewed the etiology and management of ED.  Management options include oral medications, vacuum erectile devices, MUSE urethral suppositories, intracavernosal injections, and surgical placement of a penile prosthesis.  I reviewed the risks and benefits of each.  For medications, I noted that they are safe and effective, but noted that side effects include flushing of the face, headaches, color vision change, blurry vision, and congestion/runny nose. They should not be used by anyone currently taking nitrates for chest pain, and I reviewed the patient's current medications in the chart and verbally with the patient and he is not. For vacuum erectile devices, I noted that they also are safe but that they  require the use of a restrictive ring at the base of the penis in order to prevent a detumescence, which can be uncomfortable for some patients. They also have the added benefits of being able to be combined with medical therapy for added effect.  For MUSE, I noted that these are reasonably effective, but that patients usually experience urethral burning, which can also be experienced by the partner, often requiring the use of condoms.  For intracavernosal injections I explained that this is usually the most efficacious medication to achieve a natural erection, but requires injecting the penis, which is not a suitable option for everybody.  For surgical options I reviewed a that there is the surgical risks which include pain, bleeding, and infection.  I noted that an infection of the device would require it to be removed, which can make replacement at a later date very difficult.  I explained that once a prosthesis is implanted the patient will never achieve a natural erection ever again.  Rx for trimix provided, and he was scheduled for teaching next week.    Prostate Cancer Screening - JAIMIE normal, needs PSA prior to next visit    Thank you for allowing me the opportunity to participate in this patient's care.     William Clarke MD  Urology

## 2022-06-24 ENCOUNTER — OFFICE VISIT (OUTPATIENT)
Dept: UROLOGY | Facility: CLINIC | Age: 57
End: 2022-06-24
Payer: COMMERCIAL

## 2022-06-24 DIAGNOSIS — N52.9 ERECTILE DYSFUNCTION, UNSPECIFIED ERECTILE DYSFUNCTION TYPE: Primary | ICD-10-CM

## 2022-06-24 PROCEDURE — 3072F PR LOW RISK FOR RETINOPATHY: ICD-10-PCS | Mod: CPTII,S$GLB,, | Performed by: UROLOGY

## 2022-06-24 PROCEDURE — 1159F PR MEDICATION LIST DOCUMENTED IN MEDICAL RECORD: ICD-10-PCS | Mod: CPTII,S$GLB,, | Performed by: UROLOGY

## 2022-06-24 PROCEDURE — 99999 PR PBB SHADOW E&M-EST. PATIENT-LVL II: CPT | Mod: PBBFAC,,, | Performed by: UROLOGY

## 2022-06-24 PROCEDURE — 4010F ACE/ARB THERAPY RXD/TAKEN: CPT | Mod: CPTII,S$GLB,, | Performed by: UROLOGY

## 2022-06-24 PROCEDURE — 99213 OFFICE O/P EST LOW 20 MIN: CPT | Mod: S$GLB,,, | Performed by: UROLOGY

## 2022-06-24 PROCEDURE — 3072F LOW RISK FOR RETINOPATHY: CPT | Mod: CPTII,S$GLB,, | Performed by: UROLOGY

## 2022-06-24 PROCEDURE — 3044F PR MOST RECENT HEMOGLOBIN A1C LEVEL <7.0%: ICD-10-PCS | Mod: CPTII,S$GLB,, | Performed by: UROLOGY

## 2022-06-24 PROCEDURE — 4010F PR ACE/ARB THEARPY RXD/TAKEN: ICD-10-PCS | Mod: CPTII,S$GLB,, | Performed by: UROLOGY

## 2022-06-24 PROCEDURE — 1160F RVW MEDS BY RX/DR IN RCRD: CPT | Mod: CPTII,S$GLB,, | Performed by: UROLOGY

## 2022-06-24 PROCEDURE — 99999 PR PBB SHADOW E&M-EST. PATIENT-LVL II: ICD-10-PCS | Mod: PBBFAC,,, | Performed by: UROLOGY

## 2022-06-24 PROCEDURE — 99213 PR OFFICE/OUTPT VISIT, EST, LEVL III, 20-29 MIN: ICD-10-PCS | Mod: S$GLB,,, | Performed by: UROLOGY

## 2022-06-24 PROCEDURE — 3044F HG A1C LEVEL LT 7.0%: CPT | Mod: CPTII,S$GLB,, | Performed by: UROLOGY

## 2022-06-24 PROCEDURE — 1159F MED LIST DOCD IN RCRD: CPT | Mod: CPTII,S$GLB,, | Performed by: UROLOGY

## 2022-06-24 PROCEDURE — 1160F PR REVIEW ALL MEDS BY PRESCRIBER/CLIN PHARMACIST DOCUMENTED: ICD-10-PCS | Mod: CPTII,S$GLB,, | Performed by: UROLOGY

## 2022-06-24 NOTE — PROGRESS NOTES
Chief Complaint:  ED    HPI:   06/24/2022 - presents for trimix teaching, wife is a nurse and he notes that she will be able to help    06/16/2022 - 57 yo male that presents today for evaluation of ED.  Patient notes issues for the last 3-4 years but notes that over the last six months it has gotten worse.  He notes difficulty both achieving and maintaining an erection.  He has previously tried both Cialis and Viagra and both of these cause in to have a very bad headache that make it on tolerable to take these medications, though they do work.  Otherwise he voids with a good stream and feels like he empties well subjectively.  He denies any gross hematuria or family history of  cancers.  Denies prior stones or urologic procedures.      PMH:  Past Medical History:   Diagnosis Date    Diabetes mellitus 8 years    BS 84 in the room 08/11/2021    Hypertension     Sleep apnea     Thyroid disease        PSH:  Past Surgical History:   Procedure Laterality Date    BACK SURGERY      COLONOSCOPY N/A 12/29/2020    Procedure: COLONOSCOPY;  Surgeon: William Cotter MD;  Location: [facility];  Service: Endoscopy;  Laterality: N/A;  Will need Rapid doesn't live here    COLONOSCOPY N/A 2/10/2022    Procedure: COLONOSCOPY;  Surgeon: Wili Espinosa MD;  Location: [facility];  Service: Endoscopy;  Laterality: N/A;    LAPAROSCOPIC RIGHT COLON RESECTION N/A 2/2/2021    Procedure: COLECTOMY, RIGHT, LAPAROSCOPIC, ERAS low;  Surgeon: Melonie Santoyo MD;  Location: [address];  Service: Colon and Rectal;  Laterality: N/A;  needs rapid covid test       Family History:  Family History   Problem Relation Age of Onset    Hypertension Mother     Diabetes Mother     Breast cancer Mother     Diabetes Maternal Grandmother     Hypertension Brother     Stroke Father        Social History:  Social History     Tobacco Use    Smoking status: Never Smoker    Smokeless tobacco: Never Used   Substance Use Topics     Alcohol use: Not Currently    Drug use: Never        Review of Systems:  General: No fever, chills  Skin: No rashes  Chest:  Denies cough and sputum production  Heart: Denies chest pain  Resp: Denies dyspnea  Abdomen: Denies diarrhea, abdominal pain, hematemesis, or blood in stool.  Musculoskeletal: No joint stiffness or swelling. Denies back pain.  : see HPI  Neuro: no dizziness or weakness    Allergies:  Demerol (pf) [meperidine (pf)], Percocet [oxycodone-acetaminophen], and Demerol [meperidine]    Medications:    Current Outpatient Medications:     amLODIPine (NORVASC) 10 MG tablet, TAKE 1 TABLET(10 MG) BY MOUTH EVERY DAY, Disp: 90 tablet, Rfl: 1    atorvastatin (LIPITOR) 40 MG tablet, TAKE 1 TABLET(40 MG) BY MOUTH EVERY DAY, Disp: 90 tablet, Rfl: 3    azelastine (ASTELIN) 137 mcg (0.1 %) nasal spray, 1 spray (137 mcg total) by Nasal route 2 (two) times daily., Disp: 30 mL, Rfl: 3    benazepriL (LOTENSIN) 40 MG tablet, Take 1 tablet (40 mg total) by mouth once daily., Disp: 90 tablet, Rfl: 3    clonazePAM (KLONOPIN) 2 MG Tab, TAKE 1 TABLET BY MOUTH EVERY EVENING, Disp: 30 tablet, Rfl: 0    colchicine (COLCRYS) 0.6 mg tablet, TAKE 1 TABLET(0.6 MG) BY MOUTH TWICE DAILY, Disp: 180 tablet, Rfl: 1    flash glucose scanning reader Misc, 1 application., Disp: , Rfl:     flash glucose sensor Kit, 1 application., Disp: , Rfl:     fluticasone propionate (FLONASE) 50 mcg/actuation nasal spray, 1 spray (50 mcg total) by Each Nostril route once daily., Disp: 16 g, Rfl: 0    fluticasone propionate (FLONASE) 50 mcg/actuation nasal spray, 1 spray (50 mcg total) by Each Nostril route once daily., Disp: 16 g, Rfl: 0    FREESTYLE WILMAN 14 DAY SENSOR Kit, SMARTSI Patch(s) Topical Every 2 Weeks, Disp: 1 kit, Rfl: 4    hydrALAZINE (APRESOLINE) 10 MG tablet, Take 1 tablet (10 mg total) by mouth every 12 (twelve) hours., Disp: 60 tablet, Rfl: 11    ibuprofen (ADVIL,MOTRIN) 800 MG tablet, Take 800 mg by mouth 3 (three)  times daily., Disp: , Rfl:     indomethacin (INDOCIN SR) 75 mg CpSR CR capsule, Take 75 mg by mouth 2 (two) times daily with meals., Disp: , Rfl:     ipratropium (ATROVENT) 21 mcg (0.03 %) nasal spray, USE 2 SPRAYS IN EACH NOSTRIL TWICE DAILY, Disp: 30 mL, Rfl: 0    levocetirizine (XYZAL) 5 MG tablet, Take 5 mg by mouth every evening., Disp: , Rfl:     levothyroxine (SYNTHROID) 112 MCG tablet, TAKE 1 TABLET(112 MCG) BY MOUTH BEFORE BREAKFAST, Disp: 90 tablet, Rfl: 3    metFORMIN (GLUCOPHAGE) 1000 MG tablet, TAKE 1 TABLET(1000 MG) BY MOUTH TWICE DAILY WITH MEALS, Disp: 180 tablet, Rfl: 1    metoprolol succinate (TOPROL-XL) 50 MG 24 hr tablet, Take 1 tablet (50 mg total) by mouth once daily., Disp: 90 tablet, Rfl: 1    omeprazole (PRILOSEC) 20 MG capsule, Take 1 capsule (20 mg total) by mouth once daily., Disp: 30 capsule, Rfl: 1    OZEMPIC 1 mg/dose (4 mg/3 mL), INJECT 1MG INTO THE SKIN ONCE EVERY 7 DAYS, Disp: 3 pen, Rfl: 1    pep injection, Inject 0.15 ml as directed   For compounding pharmacy use:  Add PAPAVERINE 30 mg Add PHENTOLAMINE 1 mg Add ALPROSTADIL 20 mcg, Disp: 1 vial, Rfl: 5    predniSONE (DELTASONE) 10 MG tablet, Take 3 tablets a day for 4 days (1 before breakfast, 1 after lunch, 1 before bed). Then take 2 tablets a day for 4 days (1 before breakfast, 1 before bed). Then take 1 tablet a day for 4 days (1 before breakfast)., Disp: 24 tablet, Rfl: 0    testosterone (ANDROGEL) 20.25 mg/1.25 gram (1.62 %) GlPm, Place 40.5 mg onto the skin once daily at 6am., Disp: 75 g, Rfl: 1    TOUJEO SOLOSTAR U-300 INSULIN 300 unit/mL (1.5 mL) InPn pen, ADMINISTER 85 UNITS UNDER THE SKIN EVERY DAY, Disp: 3 pen, Rfl: 3    VASCEPA 1 gram Cap, Take 2 capsules (2,000 mg total) by mouth 2 (two) times daily., Disp: 90 capsule, Rfl: 1  No current facility-administered medications for this visit.    Facility-Administered Medications Ordered in Other Visits:     gabapentin capsule 300 mg, 300 mg, Oral, TID, Dali YEAGER  Carina, NP, 300 mg at 02/04/21 0810    Physical Exam:  General: awake, alert, cooperative  Head: NC/AT  Ears: external ears normal  Eyes: sclera normal  Lungs: normal inspiration, NAD  Heart: well-perfused  Abdomen: Soft, NT, ND  : Normal circ'd phallus, meatus normal in size and position, BL testicles palpable, no masses, nontender, no abnormalities of epididymi  JAIMIE 6/22: Normal rectal tone, no hemorrhoids. Prostate smooth and normal, no nodules 30 gm SV not palpable. Perineum and anus normal.  Lymphatic: groin nodes negative  Skin: The skin is warm and dry  Ext: No c/c/e.  Neuro: grossly intact, AOx3    RADIOLOGY:  No recent relevant imaging available for review.    LABS:  I personally reviewed the following lab values:  Lab Results   Component Value Date    WBC 6.79 03/14/2022    HGB 14.3 03/14/2022    HCT 45.6 03/14/2022     03/14/2022     03/14/2022    K 4.8 03/14/2022     03/14/2022    CREATININE 1.3 03/14/2022    BUN 12 03/14/2022    CO2 23 03/14/2022    TSH 1.817 03/14/2022    PSA 0.77 06/16/2022    HGBA1C 6.2 (H) 03/14/2022    CHOL 89 (L) 03/14/2022    TRIG 130 03/14/2022    HDL 32 (L) 03/14/2022    ALT 98 (H) 03/14/2022    AST 36 03/14/2022       Assessment/Plan:   Narendra Amador Oliva is a 56 y.o. male with:    ED - 0.15ml injected, patient with some response, instructed to go home and stimulate, will call in 3 hours to ensure erection has resolved    Prostate Cancer Screening - PSA and JAIMIE normal, continue annual screening    - f/u 3 months    Thank you for allowing me the opportunity to participate in this patient's care.     William Clarke MD  Urology

## 2022-06-27 ENCOUNTER — HOSPITAL ENCOUNTER (EMERGENCY)
Facility: HOSPITAL | Age: 57
Discharge: HOME | End: 2022-06-27
Attending: INTERNAL MEDICINE
Payer: COMMERCIAL

## 2022-06-27 VITALS
DIASTOLIC BLOOD PRESSURE: 83 MMHG | HEIGHT: 73 IN | OXYGEN SATURATION: 97 % | TEMPERATURE: 98 F | RESPIRATION RATE: 16 BRPM | BODY MASS INDEX: 35.52 KG/M2 | HEART RATE: 67 BPM | WEIGHT: 268 LBS | SYSTOLIC BLOOD PRESSURE: 141 MMHG

## 2022-06-27 DIAGNOSIS — N48.30 PRIAPISM: Primary | ICD-10-CM

## 2022-06-27 PROCEDURE — 63600175 PHARM REV CODE 636 W HCPCS: Mod: ER | Performed by: INTERNAL MEDICINE

## 2022-06-27 PROCEDURE — 99284 EMERGENCY DEPT VISIT MOD MDM: CPT | Mod: 25,ER

## 2022-06-27 PROCEDURE — 54220 IRRG CRPRA CAVRNOSA PRIAPISM: CPT | Mod: ER

## 2022-06-27 PROCEDURE — 96372 THER/PROPH/DIAG INJ SC/IM: CPT | Mod: 59 | Performed by: INTERNAL MEDICINE

## 2022-06-27 RX ORDER — PHENYLEPHRINE HYDROCHLORIDE 10 MG/ML
200 INJECTION INTRAVENOUS
Status: DISCONTINUED | OUTPATIENT
Start: 2022-06-27 | End: 2022-06-27

## 2022-06-27 RX ORDER — KETOROLAC TROMETHAMINE 30 MG/ML
60 INJECTION, SOLUTION INTRAMUSCULAR; INTRAVENOUS
Status: COMPLETED | OUTPATIENT
Start: 2022-06-27 | End: 2022-06-27

## 2022-06-27 RX ORDER — PHENYLEPHRINE HYDROCHLORIDE 10 MG/ML
500 INJECTION INTRAVENOUS
Status: COMPLETED | OUTPATIENT
Start: 2022-06-27 | End: 2022-06-27

## 2022-06-27 RX ORDER — PROMETHAZINE HYDROCHLORIDE 25 MG/ML
25 INJECTION, SOLUTION INTRAMUSCULAR; INTRAVENOUS
Status: COMPLETED | OUTPATIENT
Start: 2022-06-27 | End: 2022-06-27

## 2022-06-27 RX ORDER — ONDANSETRON 2 MG/ML
4 INJECTION INTRAMUSCULAR; INTRAVENOUS
Status: DISCONTINUED | OUTPATIENT
Start: 2022-06-27 | End: 2022-06-27

## 2022-06-27 RX ORDER — IBUPROFEN 600 MG/1
600 TABLET ORAL 3 TIMES DAILY
Qty: 30 TABLET | Refills: 0 | Status: SHIPPED | OUTPATIENT
Start: 2022-06-27 | End: 2022-08-12

## 2022-06-27 RX ADMIN — PHENYLEPHRINE HYDROCHLORIDE 500 MCG: 10 INJECTION INTRAVENOUS at 03:06

## 2022-06-27 RX ADMIN — PROMETHAZINE HYDROCHLORIDE 25 MG: 25 INJECTION INTRAMUSCULAR; INTRAVENOUS at 02:06

## 2022-06-27 RX ADMIN — KETOROLAC TROMETHAMINE 60 MG: 30 INJECTION, SOLUTION INTRAMUSCULAR at 02:06

## 2022-06-27 NOTE — ED PROVIDER NOTES
"Encounter Date: 6/27/2022       History     Chief Complaint   Patient presents with    priapism     Pt has had sustained erection x 4.5hrs     56-year-old male presents to the emergency department complaining of an erection lasting more than 4 hours at home.  Patient states he recently received a TriMix injection from his urologist for erectile dysfunction.  He denies fever/chills/penile trauma.    The history is provided by the patient. No  was used.     Review of patient's allergies indicates:   Allergen Reactions    Demerol (pf) [meperidine (pf)] Other (See Comments)     Pt reports,"They lost my blood pressure."    Percocet [oxycodone-acetaminophen] Itching     itching    Demerol [meperidine]      Past Medical History:   Diagnosis Date    Diabetes mellitus 8 years    BS 84 in the room 08/11/2021    Hypertension     Sleep apnea     Thyroid disease      Past Surgical History:   Procedure Laterality Date    BACK SURGERY      COLONOSCOPY N/A 12/29/2020    Procedure: COLONOSCOPY;  Surgeon: William Cotter MD;  Location: [facility];  Service: Endoscopy;  Laterality: N/A;  Will need Rapid doesn't live here    COLONOSCOPY N/A 2/10/2022    Procedure: COLONOSCOPY;  Surgeon: Wili Espinosa MD;  Location: [facility];  Service: Endoscopy;  Laterality: N/A;    LAPAROSCOPIC RIGHT COLON RESECTION N/A 2/2/2021    Procedure: COLECTOMY, RIGHT, LAPAROSCOPIC, ERAS low;  Surgeon: Melonie Santoyo MD;  Location: [facility] OR [address] [place] [address];  Service: Colon and Rectal;  Laterality: N/A;  needs rapid covid test     Family History   Problem Relation Age of Onset    Hypertension Mother     Diabetes Mother     Breast cancer Mother     Diabetes Maternal Grandmother     Hypertension Brother     Stroke Father      Social History     Tobacco Use    Smoking status: Never Smoker    Smokeless tobacco: Never Used   Substance Use Topics    Alcohol use: Not Currently    Drug use: Never     Review of Systems "   Constitutional: Negative for fever.   Respiratory: Negative for shortness of breath.    Cardiovascular: Negative for chest pain.   Genitourinary: Positive for penile pain. Negative for dysuria, frequency, hematuria, penile discharge and testicular pain.   All other systems reviewed and are negative.      Physical Exam     Initial Vitals [06/27/22 0128]   BP Pulse Resp Temp SpO2   (!) 161/98 72 18 97.9 °F (36.6 °C) 97 %      MAP       --         Physical Exam    Nursing note and vitals reviewed.  Constitutional: He is not diaphoretic. No distress.   HENT:   Head: Normocephalic and atraumatic.   Eyes: Conjunctivae are normal.   Neck:   Normal range of motion.  Cardiovascular: Normal rate and regular rhythm.   Pulmonary/Chest: Breath sounds normal. No respiratory distress.   Abdominal: Abdomen is soft. Bowel sounds are normal. There is no abdominal tenderness.   Genitourinary:    Penis normal.   No discharge found.    Genitourinary Comments: Penis is fully erect on initial physical exam.  There is no erythema/induration/ecchymosis.     Musculoskeletal:         General: Normal range of motion.      Cervical back: Normal range of motion.     Neurological: He is alert. He has normal strength.   Skin: Skin is warm and dry.         ED Course   Procedures  Labs Reviewed - No data to display       Imaging Results    None          Medications   ketorolac injection 60 mg (60 mg Intramuscular Given 6/27/22 0258)   promethazine injection 25 mg (25 mg Intramuscular Given 6/27/22 0258)   phenylephrine injection 500 mcg (500 mcg Intracavernosal Given 6/27/22 0347)     Medical Decision Making:   ED Management:  Procedure:  corpus cavernosum drainage/phenylephrine injection  1. Area was prepped with chlorhexidine  2. 18 gauge needle was used to aspirate approximately 6 mL blood from right corpus cavernosum  3. 25 gauge needle was used to inject 250 mcg phenylephrine.  A 2nd 250 mcg injection was performed  Patient reports decreased  pain level and flaccidity of penis was achieved prior to discharge.  Patient was advised to follow-up with urology today for re-evaluation/return to the emergency department if condition worsens.  Instructions for priapism were given prior to discharge.                      Clinical Impression:   Final diagnoses:  [N48.30] Priapism (Primary)          ED Disposition Condition    Discharge Stable        ED Prescriptions     Medication Sig Dispense Start Date End Date Auth. Provider    ibuprofen (ADVIL,MOTRIN) 600 MG tablet Take 1 tablet (600 mg total) by mouth 3 (three) times daily. 30 tablet 6/27/2022  Jonathon Diaz MD        Follow-up Information     Follow up With Specialties Details Why Contact Info    Wliliam Clarke MD Urology Schedule an appointment as soon as possible for a visit today For reevaluation [id number] [facility]  [place] [id number]  [phone]             Jonathon Diaz MD  06/27/22 0514

## 2022-07-15 DIAGNOSIS — R79.89 LOW TESTOSTERONE: Primary | ICD-10-CM

## 2022-07-15 NOTE — TELEPHONE ENCOUNTER
No new care gaps identified.  [facility] Embedded Care Gaps. Reference number: [id number]. 7/15/2022   2:06:37 PM CDT

## 2022-07-18 ENCOUNTER — HOSPITAL ENCOUNTER (OUTPATIENT)
Dept: RADIOLOGY | Facility: HOSPITAL | Age: 57
Discharge: HOME | End: 2022-07-18
Attending: STUDENT IN AN ORGANIZED HEALTH CARE EDUCATION/TRAINING PROGRAM
Payer: COMMERCIAL

## 2022-07-18 ENCOUNTER — OFFICE VISIT (OUTPATIENT)
Dept: OTOLARYNGOLOGY | Facility: CLINIC | Age: 57
End: 2022-07-18
Payer: COMMERCIAL

## 2022-07-18 VITALS — BODY MASS INDEX: 34.85 KG/M2 | TEMPERATURE: 98 F | WEIGHT: 264.13 LBS

## 2022-07-18 DIAGNOSIS — J34.3 HYPERTROPHY OF BOTH INFERIOR NASAL TURBINATES: ICD-10-CM

## 2022-07-18 DIAGNOSIS — J32.9 CHRONIC SINUSITIS, UNSPECIFIED LOCATION: ICD-10-CM

## 2022-07-18 DIAGNOSIS — J34.89 NASAL OBSTRUCTION: ICD-10-CM

## 2022-07-18 DIAGNOSIS — J30.9 ALLERGIC RHINITIS, UNSPECIFIED SEASONALITY, UNSPECIFIED TRIGGER: Primary | ICD-10-CM

## 2022-07-18 DIAGNOSIS — J34.2 NASAL SEPTAL DEVIATION: ICD-10-CM

## 2022-07-18 DIAGNOSIS — J34.89 CONCHA BULLOSA: ICD-10-CM

## 2022-07-18 PROCEDURE — 4010F ACE/ARB THERAPY RXD/TAKEN: CPT | Mod: CPTII,S$GLB,, | Performed by: STUDENT IN AN ORGANIZED HEALTH CARE EDUCATION/TRAINING PROGRAM

## 2022-07-18 PROCEDURE — 99999 PR PBB SHADOW E&M-EST. PATIENT-LVL V: CPT | Mod: PBBFAC,,, | Performed by: STUDENT IN AN ORGANIZED HEALTH CARE EDUCATION/TRAINING PROGRAM

## 2022-07-18 PROCEDURE — 99214 OFFICE O/P EST MOD 30 MIN: CPT | Mod: S$GLB,,, | Performed by: STUDENT IN AN ORGANIZED HEALTH CARE EDUCATION/TRAINING PROGRAM

## 2022-07-18 PROCEDURE — 3008F BODY MASS INDEX DOCD: CPT | Mod: CPTII,S$GLB,, | Performed by: STUDENT IN AN ORGANIZED HEALTH CARE EDUCATION/TRAINING PROGRAM

## 2022-07-18 PROCEDURE — 99999 PR PBB SHADOW E&M-EST. PATIENT-LVL V: ICD-10-PCS | Mod: PBBFAC,,, | Performed by: STUDENT IN AN ORGANIZED HEALTH CARE EDUCATION/TRAINING PROGRAM

## 2022-07-18 PROCEDURE — 3072F PR LOW RISK FOR RETINOPATHY: ICD-10-PCS | Mod: CPTII,S$GLB,, | Performed by: STUDENT IN AN ORGANIZED HEALTH CARE EDUCATION/TRAINING PROGRAM

## 2022-07-18 PROCEDURE — 4010F PR ACE/ARB THEARPY RXD/TAKEN: ICD-10-PCS | Mod: CPTII,S$GLB,, | Performed by: STUDENT IN AN ORGANIZED HEALTH CARE EDUCATION/TRAINING PROGRAM

## 2022-07-18 PROCEDURE — 3008F PR BODY MASS INDEX (BMI) DOCUMENTED: ICD-10-PCS | Mod: CPTII,S$GLB,, | Performed by: STUDENT IN AN ORGANIZED HEALTH CARE EDUCATION/TRAINING PROGRAM

## 2022-07-18 PROCEDURE — 3044F HG A1C LEVEL LT 7.0%: CPT | Mod: CPTII,S$GLB,, | Performed by: STUDENT IN AN ORGANIZED HEALTH CARE EDUCATION/TRAINING PROGRAM

## 2022-07-18 PROCEDURE — 99214 PR OFFICE/OUTPT VISIT, EST, LEVL IV, 30-39 MIN: ICD-10-PCS | Mod: S$GLB,,, | Performed by: STUDENT IN AN ORGANIZED HEALTH CARE EDUCATION/TRAINING PROGRAM

## 2022-07-18 PROCEDURE — 70486 CT MAXILLOFACIAL W/O DYE: CPT | Mod: TC

## 2022-07-18 PROCEDURE — 1159F PR MEDICATION LIST DOCUMENTED IN MEDICAL RECORD: ICD-10-PCS | Mod: CPTII,S$GLB,, | Performed by: STUDENT IN AN ORGANIZED HEALTH CARE EDUCATION/TRAINING PROGRAM

## 2022-07-18 PROCEDURE — 1159F MED LIST DOCD IN RCRD: CPT | Mod: CPTII,S$GLB,, | Performed by: STUDENT IN AN ORGANIZED HEALTH CARE EDUCATION/TRAINING PROGRAM

## 2022-07-18 PROCEDURE — 3044F PR MOST RECENT HEMOGLOBIN A1C LEVEL <7.0%: ICD-10-PCS | Mod: CPTII,S$GLB,, | Performed by: STUDENT IN AN ORGANIZED HEALTH CARE EDUCATION/TRAINING PROGRAM

## 2022-07-18 PROCEDURE — 3072F LOW RISK FOR RETINOPATHY: CPT | Mod: CPTII,S$GLB,, | Performed by: STUDENT IN AN ORGANIZED HEALTH CARE EDUCATION/TRAINING PROGRAM

## 2022-07-18 NOTE — PROGRESS NOTES
Chief complaint:    Chief Complaint   Patient presents with    Follow-up     CT resuts         Referring Provider:  No referring provider defined for this encounter.      History of present illness:     Mr. English is a 57 y.o. presenting for evaluation of sinus congestion.     The patient reports the following allergy/sinus symptoms:     Headaches - 3-4 times/day, forehead    Nasal congestion, purulent anterior drainage, and PND - present all the time  Coughing - at night    No fever, no hyposmia.     Started after COVID in January. Symptoms have been present for almost continuously since then.    Treatment has included: Zytrec or Xyzal, mucinex, abx given 1 month ago for tooth issue. Had no effect on nasal issues.     Prior to the last few months patient has had about 0 sinus infections in the past 12 months.   Prior sinus surgery: no.    Current medications: ibuprofen 800, mucinex, and xyzal. No help with any of it.    Allergy history: yes, when he was young, not bad as an adult       Return clinic visit, 4/11/22  Completed abx and steroids. Since then, symptoms have minimally improved. Major complaints are nasal drip (clear) and nasal congestion (both sides), and some forehead pressure.     Return clinic visit, 5/31/22  Using saline, flonase, and Atrovent.   Post nasal drip has improved, but nasal congestion has not. Bilateral, sometimes worse on either side. Worse at night, but present all the time.   Intermittent facial pressure/pain, anterior rhinorrhea.     Return clinic visit, 7/18/22  No major change since last visit.   Continued L>R nasal obstruction, all the time, worse at night. Also with facial pressure/pain and purulent rhinorrhea.   Using astelin, flonase, atrovent without much change. Also completed a course of steroids and antibiotics. Gets improvement with abx/steroids then returns.    History      Past Medical History:   Past Medical History:   Diagnosis Date    Diabetes mellitus 8 years    BS  "84 in the room 08/11/2021    Hypertension     Sleep apnea     Thyroid disease          Past Surgical History:  Past Surgical History:   Procedure Laterality Date    BACK SURGERY      COLONOSCOPY N/A 12/29/2020    Procedure: COLONOSCOPY;  Surgeon: William Cotter MD;  Location: [facility] ENDO;  Service: Endoscopy;  Laterality: N/A;  Will need Rapid doesn't live here    COLONOSCOPY N/A 2/10/2022    Procedure: COLONOSCOPY;  Surgeon: Wili Espinosa MD;  Location: [facility] ENDO;  Service: Endoscopy;  Laterality: N/A;    LAPAROSCOPIC RIGHT COLON RESECTION N/A 2/2/2021    Procedure: COLECTOMY, RIGHT, LAPAROSCOPIC, ERAS low;  Surgeon: Melonie Santoyo MD;  Location: [facility] OR [address] [place] [address];  Service: Colon and Rectal;  Laterality: N/A;  needs rapid covid test         Medications: Medication list reviewed. He  has a current medication list which includes the following prescription(s): amlodipine, atorvastatin, azelastine, benazepril, clonazepam, colchicine, flash glucose scanning reader, flash glucose sensor, fluticasone propionate, fluticasone propionate, freestyle lakhwinder 14 day sensor, hydralazine, ibuprofen, indomethacin, ipratropium, levocetirizine, levothyroxine, metformin, metoprolol succinate, omeprazole, ozempic, alprostadil, prednisone, testosterone, toujeo solostar u-300 insulin, and vascepa, and the following Facility-Administered Medications: gabapentin.     Allergies:   Review of patient's allergies indicates:   Allergen Reactions    Demerol (pf) [meperidine (pf)] Other (See Comments)     Pt reports,"They lost my blood pressure."    Percocet [oxycodone-acetaminophen] Itching     itching    Demerol [meperidine]          Family history: family history includes Breast cancer in his mother; Diabetes in his maternal grandmother and mother; Hypertension in his brother and mother; Stroke in his father.         Social History          Alcohol use:  reports previous alcohol use.            Tobacco:  reports that " he has never smoked. He has never used smokeless tobacco.         Physical Examination      Vitals: Temperature 97.7 °F (36.5 °C), temperature source Temporal, weight 119.8 kg (264 lb 1.8 oz).      General: Well developed, well nourished, well hydrated.     Voice: no dysphonia, no dysarthria      Head/Face: Normocephalic, atraumatic. No scars or lesions. Facial musculature equal.     Eyes: No scleral icterus or conjunctival hemorrhage. EOMI. PERRLA.     Ears:     · Right ear: No gross deformity. EAC is clear of debris and erythema. TM are intact with a pneumatized middle ear. No signs of retraction, fluid or infection.      · Left ear: No gross deformity. EAC is clear of debris and erythema. TM are intact with a pneumatized middle ear. No signs of retraction, fluid or infection.      Nose: No gross deformity or lesions. No purulent discharge.     Mouth/Oropharynx: Lips without any lesions. No mucosal lesions within the oropharynx. No tonsillar exudate or lesions. Pharyngeal walls symmetrical. Uvula midline. Tongue midline without lesions.     Neurologic: Moving all extremities without gross abnormality.CN II-XII grossly intact. House-Brackmann 1/6. No signs of nystagmus.        Data reviewed      Review of records:      I reviewed records from the referring provider's office visits describing the history, workup, and/or treatment of this problem thus far.    Imaging  I have independently reviewed the following imaging with the findings noted below:      CT sinus, 07/18/2022  Bilateral toya bullosa  S-shaped septal deviation, left mid-septal spur  Inferior turbinate reduction   Left frontal thickening and obstruction of outflow  Bilateral anterior ethmoid disease       Procedure Note - Rigid Nasal Endoscopy (previous)    Surgeon: Viktor Ferrell MD  Anesthesia: topical oxymetazoline and 4% lidocaine.    Technique: The nose was sprayed with oxymetazoline and 4% lidocaine. With the patient in the upright position, a  2.7mm 30-degree endscope was inserted into the patient's right and left nare.  Where visible, nasal secretions and mucosal crusting were removed with a suction. The overall appearance of the nasal cavity and paranasal sinuses were noted and the findings are described below.     Findings: The nasal septum was intact and was deviated to the left with a mid-septal spur.  The inferior turbinates were moderately enlarged. There was not any evidence of nasal polyps, masses, or lesions within the nasal cavity.  Mucopurulent drainage was not noted at the middle meatus, frontal recess, or sphenoethmoidal recess.  There was some thick mucuoid drainage at bilateral middle meatus, significant middle turbinate and inferior turbinate bogginess      Assessment/Plan:    Chronic Rhinosinusitis following URI  Vasomotor rhinitis   Septal deviation  Inferior turbinate hypertrophy    Narendra  presents today for initial evaluation.  The patient presents with significant evidence of chronic sinusitis.My recommendation is treatment of chronic rhinosinusitis with a course of oral steroids and prolonged course of antibiotics. The patient will return in 3-4 weeks after completion of treatment. If the patient has persistent symptoms of sinusitis with nasal endoscopy findings consistent with sinusitis we will proceed with a CT scan. We discussed the possible need for sinus surgery in the event of persistent sinusitis resistant to medical management. We discussed at length the risk of sinus surgery including, but not limited to, recurrence of disease, bleeding, infection, septal perforation, tooth or cheek numbness, vision changes, orbital injury, CSF leak and changes in smell.  The patient had opportunity to ask questions and I answered all of them to their satisfaction.     Update, 4/11/22  Minimal response to abx/steroids. Endoscopy consistent with rhinitis, no sinusitis. Recommend Atrovent BID and Flonase daily. Continue OTC oral  antihistamine. Will reassess symptoms in 1-2 months.    Update, 5/31/22  Continue atrovent  Addition of astelin  CT sinus for persistent sinonasal symptoms minimal responsive to prior treatment  Discussed septoplasty and inferior turbinate reduction if no evidence of sinus disease and insignificant improvement with addition of astelin    Update, 718/22    Incomplete response to medical therapy and continued evidence of Chronic Rhinosinusitis and nasal obstruciton. My recommendation is for endoscopic sinus surgery including:     Left frontal sinusotomy   Bilateral anterior ethmoidectomy  Septoplasty  Inferior turbinate reduction   Lizzy bullosa excision    We discussed the need for postoperative debridements as well as chronic allergy management postoperatively.    Risks of sinus surgery were discussed including, but not limited to bleeding, infection, scar, lack of improvement or recurrence of symptoms, cerebrospinal fluid leak, injury to eyes. Additional risks of septoplasty include septal perforation, upper teeth and gum numbness, and nasal collapse were discussed. Additional risks of septoplasty include septal perforation, upper teeth and gum numbness, and nasal collapse were discussed.              Viktor Ferrell MD  [facility]   [facility] - [address]  [place] [address]  P: [phone]  F: [phone]

## 2022-07-18 NOTE — H&P (VIEW-ONLY)
Chief complaint:    Chief Complaint   Patient presents with    Follow-up     CT resuts         Referring Provider:  No referring provider defined for this encounter.      History of present illness:     Mr. English is a 57 y.o. presenting for evaluation of sinus congestion.     The patient reports the following allergy/sinus symptoms:     Headaches - 3-4 times/day, forehead    Nasal congestion, purulent anterior drainage, and PND - present all the time  Coughing - at night    No fever, no hyposmia.     Started after COVID in January. Symptoms have been present for almost continuously since then.    Treatment has included: Zytrec or Xyzal, mucinex, abx given 1 month ago for tooth issue. Had no effect on nasal issues.     Prior to the last few months patient has had about 0 sinus infections in the past 12 months.   Prior sinus surgery: no.    Current medications: ibuprofen 800, mucinex, and xyzal. No help with any of it.    Allergy history: yes, when he was young, not bad as an adult       Return clinic visit, 4/11/22  Completed abx and steroids. Since then, symptoms have minimally improved. Major complaints are nasal drip (clear) and nasal congestion (both sides), and some forehead pressure.     Return clinic visit, 5/31/22  Using saline, flonase, and Atrovent.   Post nasal drip has improved, but nasal congestion has not. Bilateral, sometimes worse on either side. Worse at night, but present all the time.   Intermittent facial pressure/pain, anterior rhinorrhea.     Return clinic visit, 7/18/22  No major change since last visit.   Continued L>R nasal obstruction, all the time, worse at night. Also with facial pressure/pain and purulent rhinorrhea.   Using astelin, flonase, atrovent without much change. Also completed a course of steroids and antibiotics. Gets improvement with abx/steroids then returns.    History      Past Medical History:   Past Medical History:   Diagnosis Date    Diabetes mellitus 8 years    BS  "84 in the room 08/11/2021    Hypertension     Sleep apnea     Thyroid disease          Past Surgical History:  Past Surgical History:   Procedure Laterality Date    BACK SURGERY      COLONOSCOPY N/A 12/29/2020    Procedure: COLONOSCOPY;  Surgeon: William Cotter MD;  Location: [facility] ENDO;  Service: Endoscopy;  Laterality: N/A;  Will need Rapid doesn't live here    COLONOSCOPY N/A 2/10/2022    Procedure: COLONOSCOPY;  Surgeon: Wili Espinosa MD;  [facility] ENDO;  Service: Endoscopy;  Laterality: N/A;    LAPAROSCOPIC RIGHT COLON RESECTION N/A 2/2/2021    Procedure: COLECTOMY, RIGHT, LAPAROSCOPIC, ERAS low;  Surgeon: Melonie Santoyo MD;  Location: [facility] OR [address] [place] [address];  Service: Colon and Rectal;  Laterality: N/A;  needs rapid covid test         Medications: Medication list reviewed. He  has a current medication list which includes the following prescription(s): amlodipine, atorvastatin, azelastine, benazepril, clonazepam, colchicine, flash glucose scanning reader, flash glucose sensor, fluticasone propionate, fluticasone propionate, freestyle lakhwinder 14 day sensor, hydralazine, ibuprofen, indomethacin, ipratropium, levocetirizine, levothyroxine, metformin, metoprolol succinate, omeprazole, ozempic, alprostadil, prednisone, testosterone, toujeo solostar u-300 insulin, and vascepa, and the following Facility-Administered Medications: gabapentin.     Allergies:   Review of patient's allergies indicates:   Allergen Reactions    Demerol (pf) [meperidine (pf)] Other (See Comments)     Pt reports,"They lost my blood pressure."    Percocet [oxycodone-acetaminophen] Itching     itching    Demerol [meperidine]          Family history: family history includes Breast cancer in his mother; Diabetes in his maternal grandmother and mother; Hypertension in his brother and mother; Stroke in his father.         Social History          Alcohol use:  reports previous alcohol use.            Tobacco:  reports that " he has never smoked. He has never used smokeless tobacco.         Physical Examination      Vitals: Temperature 97.7 °F (36.5 °C), temperature source Temporal, weight 119.8 kg (264 lb 1.8 oz).      General: Well developed, well nourished, well hydrated.     Voice: no dysphonia, no dysarthria      Head/Face: Normocephalic, atraumatic. No scars or lesions. Facial musculature equal.     Eyes: No scleral icterus or conjunctival hemorrhage. EOMI. PERRLA.     Ears:     · Right ear: No gross deformity. EAC is clear of debris and erythema. TM are intact with a pneumatized middle ear. No signs of retraction, fluid or infection.      · Left ear: No gross deformity. EAC is clear of debris and erythema. TM are intact with a pneumatized middle ear. No signs of retraction, fluid or infection.      Nose: No gross deformity or lesions. No purulent discharge.     Mouth/Oropharynx: Lips without any lesions. No mucosal lesions within the oropharynx. No tonsillar exudate or lesions. Pharyngeal walls symmetrical. Uvula midline. Tongue midline without lesions.     Neurologic: Moving all extremities without gross abnormality.CN II-XII grossly intact. House-Brackmann 1/6. No signs of nystagmus.        Data reviewed      Review of records:      I reviewed records from the referring provider's office visits describing the history, workup, and/or treatment of this problem thus far.    Imaging  I have independently reviewed the following imaging with the findings noted below:      CT sinus, 07/18/2022  Bilateral toya bullosa  S-shaped septal deviation, left mid-septal spur  Inferior turbinate reduction   Left frontal thickening and obstruction of outflow  Bilateral anterior ethmoid disease       Procedure Note - Rigid Nasal Endoscopy (previous)    Surgeon: Viktor Ferrell MD  Anesthesia: topical oxymetazoline and 4% lidocaine.    Technique: The nose was sprayed with oxymetazoline and 4% lidocaine. With the patient in the upright position, a  2.7mm 30-degree endscope was inserted into the patient's right and left nare.  Where visible, nasal secretions and mucosal crusting were removed with a suction. The overall appearance of the nasal cavity and paranasal sinuses were noted and the findings are described below.     Findings: The nasal septum was intact and was deviated to the left with a mid-septal spur.  The inferior turbinates were moderately enlarged. There was not any evidence of nasal polyps, masses, or lesions within the nasal cavity.  Mucopurulent drainage was not noted at the middle meatus, frontal recess, or sphenoethmoidal recess.  There was some thick mucuoid drainage at bilateral middle meatus, significant middle turbinate and inferior turbinate bogginess      Assessment/Plan:    Chronic Rhinosinusitis following URI  Vasomotor rhinitis   Septal deviation  Inferior turbinate hypertrophy    Narendra  presents today for initial evaluation.  The patient presents with significant evidence of chronic sinusitis.My recommendation is treatment of chronic rhinosinusitis with a course of oral steroids and prolonged course of antibiotics. The patient will return in 3-4 weeks after completion of treatment. If the patient has persistent symptoms of sinusitis with nasal endoscopy findings consistent with sinusitis we will proceed with a CT scan. We discussed the possible need for sinus surgery in the event of persistent sinusitis resistant to medical management. We discussed at length the risk of sinus surgery including, but not limited to, recurrence of disease, bleeding, infection, septal perforation, tooth or cheek numbness, vision changes, orbital injury, CSF leak and changes in smell.  The patient had opportunity to ask questions and I answered all of them to their satisfaction.     Update, 4/11/22  Minimal response to abx/steroids. Endoscopy consistent with rhinitis, no sinusitis. Recommend Atrovent BID and Flonase daily. Continue OTC oral  antihistamine. Will reassess symptoms in 1-2 months.    Update, 5/31/22  Continue atrovent  Addition of astelin  CT sinus for persistent sinonasal symptoms minimal responsive to prior treatment  Discussed septoplasty and inferior turbinate reduction if no evidence of sinus disease and insignificant improvement with addition of astelin    Update, 718/22    Incomplete response to medical therapy and continued evidence of Chronic Rhinosinusitis and nasal obstruciton. My recommendation is for endoscopic sinus surgery including:     Left frontal sinusotomy   Bilateral anterior ethmoidectomy  Septoplasty  Inferior turbinate reduction   Lizzy bullosa excision    We discussed the need for postoperative debridements as well as chronic allergy management postoperatively.    Risks of sinus surgery were discussed including, but not limited to bleeding, infection, scar, lack of improvement or recurrence of symptoms, cerebrospinal fluid leak, injury to eyes. Additional risks of septoplasty include septal perforation, upper teeth and gum numbness, and nasal collapse were discussed. Additional risks of septoplasty include septal perforation, upper teeth and gum numbness, and nasal collapse were discussed.              Viktor Ferrell MD  [facility]   [facility] - [address]  [place] [address]  P: [phone]  F: [phone]

## 2022-07-20 RX ORDER — TESTOSTERONE 20.25 MG/1.25G
GEL TOPICAL
Qty: 75 G | Refills: 0 | Status: SHIPPED | OUTPATIENT
Start: 2022-07-20 | End: 2022-09-08 | Stop reason: SDUPTHER

## 2022-08-03 ENCOUNTER — PATIENT MESSAGE (OUTPATIENT)
Dept: CARDIOLOGY | Facility: CLINIC | Age: 57
End: 2022-08-03
Payer: COMMERCIAL

## 2022-08-03 ENCOUNTER — HOSPITAL ENCOUNTER (OUTPATIENT)
Dept: CARDIOLOGY | Facility: HOSPITAL | Age: 57
Discharge: HOME | End: 2022-08-03
Attending: STUDENT IN AN ORGANIZED HEALTH CARE EDUCATION/TRAINING PROGRAM
Payer: COMMERCIAL

## 2022-08-03 ENCOUNTER — PATIENT MESSAGE (OUTPATIENT)
Dept: SURGERY | Facility: HOSPITAL | Age: 57
End: 2022-08-03
Payer: COMMERCIAL

## 2022-08-03 DIAGNOSIS — J34.2 NASAL SEPTAL DEVIATION: ICD-10-CM

## 2022-08-03 DIAGNOSIS — J34.3 HYPERTROPHY OF BOTH INFERIOR NASAL TURBINATES: ICD-10-CM

## 2022-08-03 PROCEDURE — 93005 ELECTROCARDIOGRAM TRACING: CPT

## 2022-08-03 PROCEDURE — 93010 EKG 12-LEAD: ICD-10-PCS | Mod: ,,, | Performed by: INTERNAL MEDICINE

## 2022-08-03 PROCEDURE — 93010 ELECTROCARDIOGRAM REPORT: CPT | Mod: ,,, | Performed by: INTERNAL MEDICINE

## 2022-08-03 NOTE — TELEPHONE ENCOUNTER
Please let the patient know that the pre-op anesthesia team will be reviewing the results of the EKG.

## 2022-08-12 ENCOUNTER — HOSPITAL ENCOUNTER (OUTPATIENT)
Dept: PREADMISSION TESTING | Facility: HOSPITAL | Age: 57
Discharge: HOME | End: 2022-08-12
Attending: STUDENT IN AN ORGANIZED HEALTH CARE EDUCATION/TRAINING PROGRAM
Payer: COMMERCIAL

## 2022-08-12 ENCOUNTER — PATIENT MESSAGE (OUTPATIENT)
Dept: CARDIOLOGY | Facility: CLINIC | Age: 57
End: 2022-08-12
Payer: COMMERCIAL

## 2022-08-12 VITALS
SYSTOLIC BLOOD PRESSURE: 175 MMHG | RESPIRATION RATE: 18 BRPM | OXYGEN SATURATION: 97 % | TEMPERATURE: 98 F | HEART RATE: 77 BPM | DIASTOLIC BLOOD PRESSURE: 85 MMHG

## 2022-08-12 DIAGNOSIS — E03.9 HYPOTHYROIDISM, UNSPECIFIED TYPE: ICD-10-CM

## 2022-08-12 DIAGNOSIS — J32.9 CHRONIC RHINOSINUSITIS: ICD-10-CM

## 2022-08-12 DIAGNOSIS — C18.7 MALIGNANT NEOPLASM OF SIGMOID COLON: ICD-10-CM

## 2022-08-12 DIAGNOSIS — E78.5 HYPERLIPIDEMIA, UNSPECIFIED HYPERLIPIDEMIA TYPE: ICD-10-CM

## 2022-08-12 DIAGNOSIS — E11.9 TYPE 2 DIABETES MELLITUS WITHOUT COMPLICATION, WITH LONG-TERM CURRENT USE OF INSULIN: ICD-10-CM

## 2022-08-12 DIAGNOSIS — Z79.4 TYPE 2 DIABETES MELLITUS WITHOUT COMPLICATION, WITH LONG-TERM CURRENT USE OF INSULIN: ICD-10-CM

## 2022-08-12 DIAGNOSIS — I10 HYPERTENSION, UNSPECIFIED TYPE: ICD-10-CM

## 2022-08-12 DIAGNOSIS — M10.9 GOUT, UNSPECIFIED CAUSE, UNSPECIFIED CHRONICITY, UNSPECIFIED SITE: ICD-10-CM

## 2022-08-12 DIAGNOSIS — Z01.818 PRE-OP TESTING: Primary | ICD-10-CM

## 2022-08-12 DIAGNOSIS — N48.30 PRIAPISM: ICD-10-CM

## 2022-08-12 DIAGNOSIS — G47.33 OSA (OBSTRUCTIVE SLEEP APNEA): ICD-10-CM

## 2022-08-12 NOTE — ASSESSMENT & PLAN NOTE
- pt reports BP of 140s/ 80s at home recheck in /83  - continue hydralazine, metoprolol, benazepril, amlodipine; take amlodipine and hydralazine AM of surgery   - take metoprolol evening prior to surgery as normally would  - Continue to follow up with PCP  And cards  - follows with Lisa last visit 3/2022 no prior MI or stents

## 2022-08-12 NOTE — ASSESSMENT & PLAN NOTE
- pt presets at the request of Dr. Ferrell who plans on performing a FESS on 8/17/22  - no recent abx use; sinus sx at baseline   - Known risk factors for perioperative complications: Diabetes mellitus    Difficulty with intubation is not anticipated. Glidescope used by student with colon resection x 1 attempt     Cardiac Risk Estimation:  Per Revised Cardiac Risk Index patient is a Class I risk with a 3.9% risk of a major cardiac event.      1.) Preoperative workup as follows: ECG, hemoglobin, hematocrit, electrolytes, creatinine, glucose, liver function studies.  2.) Change in medication regimen before surgery: hold NSAIDs, avoid indomethacin, hold metformin 24 hours prior to surgery, take half dose of evening insulin evening prior to surgery.  3.) Prophylaxis for cardiac events with perioperative beta-blockers: take metoprolol evening prior to surgery.  4.) Invasive hemodynamic monitoring perioperatively: not indicated.  5.) Deep vein thrombosis prophylaxis postoperatively: intermittent pneumatic compression boots and regimen to be chosen by surgical team.  6.) Surveillance for postoperative MI with ECG immediately postoperatively and on postoperati ve days 1 and 2 AND troponin levels 24 hours postoperatively and on day 4 or hospital discharge (whichever comes first): not indicated.  7.) Current medications which may produce withdrawal symptoms if withheld perioperatively: none  8.) Other measures: none

## 2022-08-12 NOTE — DISCHARGE INSTRUCTIONS
To confirm, your doctor has instructed you that surgery is scheduled for 8/17/2022.     Pre admit office will call the afternoon prior to surgery between 1PM and 3PM with final arrival time.     IMPORTANT INSTRUCTIONS!   Do not eat, drink, or smoke after 12 midnight, including water.  OK to brush teeth, but no gum, candy, or mints!     Take only these medicines with a small swallow of water-morning of surgery.   Amlodipine, Lipitor, Hydralazine, Synthroid, Xyzal, and Vascepa        Pre-operative instructions:     Please review the Pre-Operative Instruction booklet that you were given.     Bathing Instructions--See page 6 in the Pre-operative booklet.     Prevention of surgical site infections:     -Keep incisions clean and dry.    -Do not soak/submerge incisions in water until completely healed.    -Do not apply lotions, powders, creams, or deodorants to site.    -Always make sure hands are cleaned with antibacterial soap/ alcohol-based  prior to touching the surgical site. (This includes doctors, nurses, staff, and yourself.)      Signs and symptoms:    -Redness and pain around the area where you had surgery    -Drainage of cloudy fluid from your surgical wound    -Fever over 100.4     Additional comments or instructions:   Received a copy of Pre-operative instructions booklet, FAQ surgical site infection sheet, and packets of hibiclens (if indicated).

## 2022-08-12 NOTE — H&P
Preoperative History and Physical  [facility]                                                                   Chief Complaint: Preoperative evaluation     History of Present Illness:      Narendra English Sr. is a 57 y.o. male with PMH of DM2, HTN, HLD, hypothyroidism, gout, FARA with CPAP use, h/o Colon Cancer  who presents to the office today for a preoperative consultation at the request of Dr. Ferrell who plans on performing FESS  on August 17.     Functional Status:      The patient is able to climb a flight of stairs ( climbs about 5 flights of stairs daily at work). The patient is able to ambulate  without difficulty. The patient's functional status is affected by the surgical problem. The patient's functional status is not affected by shortness of breath, chest pain, dyspnea on exertion and fatigue.    MET score greater than 4    Past Medical History:      Past Medical History:   Diagnosis Date    Cancer     Diabetes mellitus 8 years    BS 84 in the room 08/11/2021    Hypertension     Sleep apnea     Thyroid disease         Past Surgical History:      Past Surgical History:   Procedure Laterality Date    BACK SURGERY      COLONOSCOPY N/A 12/29/2020    Procedure: COLONOSCOPY;  Surgeon: William Cotter MD;  Location: [facility];  Service: Endoscopy;  Laterality: N/A;  Will need Rapid doesn't live here    COLONOSCOPY N/A 2/10/2022    Procedure: COLONOSCOPY;  Surgeon: Wili Espinosa MD;  Location: [facility];  Service: Endoscopy;  Laterality: N/A;    LAPAROSCOPIC RIGHT COLON RESECTION N/A 2/2/2021    Procedure: COLECTOMY, RIGHT, LAPAROSCOPIC, ERAS low;  Surgeon: Melonie Santoyo MD;  Location: [address];  Service: Colon and Rectal;  Laterality: N/A;  needs rapid covid test    TONSILLECTOMY          Social History:      Social History     Socioeconomic History    Marital status:    Tobacco Use    Smoking status: Never  "Smoker    Smokeless tobacco: Never Used   Substance and Sexual Activity    Alcohol use: Not Currently    Drug use: Never    Sexual activity: Yes     Partners: Female        Family History:      Family History   Problem Relation Age of Onset    Hypertension Mother     Diabetes Mother     Breast cancer Mother     Diabetes Maternal Grandmother     Hypertension Brother     Stroke Father        Allergies:      Review of patient's allergies indicates:   Allergen Reactions    Demerol (pf) [meperidine (pf)] Other (See Comments)     Pt reports,"They lost my blood pressure."    Percocet [oxycodone-acetaminophen] Itching     itching    Demerol [meperidine]        Medications:      Current Outpatient Medications   Medication Sig    amLODIPine (NORVASC) 10 MG tablet TAKE 1 TABLET(10 MG) BY MOUTH EVERY DAY    atorvastatin (LIPITOR) 40 MG tablet TAKE 1 TABLET(40 MG) BY MOUTH EVERY DAY    azelastine (ASTELIN) 137 mcg (0.1 %) nasal spray 1 spray (137 mcg total) by Nasal route 2 (two) times daily.    benazepriL (LOTENSIN) 40 MG tablet Take 1 tablet (40 mg total) by mouth once daily.    clonazePAM (KLONOPIN) 2 MG Tab TAKE 1 TABLET BY MOUTH EVERY EVENING    colchicine (COLCRYS) 0.6 mg tablet TAKE 1 TABLET(0.6 MG) BY MOUTH TWICE DAILY    flash glucose scanning reader Misc 1 application.    flash glucose sensor Kit 1 application.    fluticasone propionate (FLONASE) 50 mcg/actuation nasal spray 1 spray (50 mcg total) by Each Nostril route once daily.    hydrALAZINE (APRESOLINE) 10 MG tablet Take 1 tablet (10 mg total) by mouth every 12 (twelve) hours.    indomethacin (INDOCIN SR) 75 mg CpSR CR capsule Take 75 mg by mouth 2 (two) times daily with meals.    ipratropium (ATROVENT) 21 mcg (0.03 %) nasal spray USE 2 SPRAYS IN EACH NOSTRIL TWICE DAILY    levocetirizine (XYZAL) 5 MG tablet Take 5 mg by mouth every evening.    levothyroxine (SYNTHROID) 112 MCG tablet TAKE 1 TABLET(112 MCG) BY MOUTH BEFORE BREAKFAST    " metFORMIN (GLUCOPHAGE) 1000 MG tablet TAKE 1 TABLET(1000 MG) BY MOUTH TWICE DAILY WITH MEALS    metoprolol succinate (TOPROL-XL) 50 MG 24 hr tablet Take 1 tablet (50 mg total) by mouth once daily.    OZEMPIC 1 mg/dose (4 mg/3 mL) INJECT 1 MG INTO THE SKIN ONCE WEEKLY    pep injection Inject 0.15 ml as directed     For compounding pharmacy use:   Add PAPAVERINE 30 mg  Add PHENTOLAMINE 1 mg  Add ALPROSTADIL 20 mcg    testosterone (ANDROGEL) 20.25 mg/1.25 gram (1.62 %) GlPm PLACE 2 PUMPS(40.5 MG) ONTO THE SKIN ONCE DAILY AT 6 AM    TOUJEO SOLOSTAR U-300 INSULIN 300 unit/mL (1.5 mL) InPn pen INJECT 85 UNITS INTO THE SKIN EVERY DAY    VASCEPA 1 gram Cap Take 2 capsules (2,000 mg total) by mouth 2 (two) times daily.     No current facility-administered medications for this encounter.     Facility-Administered Medications Ordered in Other Encounters   Medication    gabapentin capsule 300 mg       Vitals:      Vitals:    08/12/22 0945   BP: (!) 175/85   Pulse: 77   Resp: 18   Temp: 97.9 °F (36.6 °C)       Review of Systems:        Constitutional: Negative for fever, chills, weight loss, malaise/fatigue and diaphoresis.   HENT: Negative for hearing loss, ear pain, nosebleeds, , sore throat, neck pain, tinnitus and ear discharge.  Nasal congestion   Eyes: Negative for blurred vision, double vision, photophobia, pain, discharge and redness.   Respiratory: Negative for cough, hemoptysis, sputum production, shortness of breath, wheezing and stridor.    Cardiovascular: Negative for chest pain, palpitations, orthopnea, claudication, leg swelling and PND. No prior MI, no stents, no prior cath   Gastrointestinal: Negative for heartburn, nausea, vomiting, abdominal pain, diarrhea, constipation, blood in stool and melena.   Genitourinary: Negative for dysuria, urgency, frequency, hematuria and flank pain.   Musculoskeletal: Negative for myalgias, back pain, joint pain and falls. Neck pain   Skin: Negative for itching and rash.    Neurological: Negative for dizziness, tingling, tremors, sensory change, speech change, focal weakness, seizures, loss of consciousness, weakness and headaches.   Endo/Heme/Allergies: Negative for environmental allergies and polydipsia. Does not bruise/bleed easily.   Psychiatric/Behavioral: Negative for depression, suicidal ideas, hallucinations, memory loss and substance abuse. The patient is not nervous/anxious and does not have insomnia.    All 14 systems reviewed and negative except as noted above.    Physical Exam:      Constitutional: Appears well-developed, well-nourished and in no acute distress.  Patient is oriented to person, place, and time.   Head: Normocephalic and atraumatic. Mucous membranes moist.  Neck: Neck supple no mass.   Cardiovascular: Normal rate and regular rhythm.  S1 S2 appreciated by ascultation.  Pulmonary/Chest: Effort normal and clear to auscultation bilaterally. No respiratory distress.   Abdomen: Soft. Non-tender and non-distended. Bowel sounds are normal.   Neurological: Patient is alert and oriented to person, place and time. Moves all extremities.  Skin: Warm and dry. No lesions.  Extremities: No clubbing, cyanosis or edema.    Laboratory data:      Reviewed and noted in plan where applicable. Please see chart for full laboratory data.    No results for input(s): CPK, CPKMB, TROPONINI, MB in the last 24 hours. No results for input(s): POCTGLUCOSE in the last 24 hours.     No results found for: INR, PROTIME    Lab Results   Component Value Date    WBC 6.79 03/14/2022    HGB 14.3 03/14/2022    HCT 45.6 03/14/2022    MCV 97 03/14/2022     03/14/2022       No results for input(s): GLU, NA, K, CL, CO2, BUN, CREATININE, CALCIUM, MG in the last 24 hours.    Predictors of intubation difficulty:       Morbid obesity? BMI34   Anatomically abnormal facies? no   Prominent incisors? no   Receding mandible? no   Short, thick neck? Thick   Neck range of motion: normal   Dentition:  Chipped teeth present (left upper central insicor)  Based on the Modified Mallampati, patient is a mallampati score: IV (only hard palate visible)with improvement to grade III with phonation ; glidescope successfully used x 1 attempt     Cardiographics:      EC/3/22 SR with 1st degree AVb, LAD, RBBB, T wave abn consider inferior ischemia; when compared to 2021 EKG tracings appear similar; cards also reviewed EKG  Echocardiogram: 2021 stress echo  · Concentric remodeling and normal systolic function.  · There were no arrhythmias during stress.  · The estimated ejection fraction is 60%.  · Normal left ventricular diastolic function.  · With normal right ventricular systolic function.  · The stress echo portion of this study is negative for myocardial ischemia.  · The ECG portion of this study is negative for myocardial ischemia.      Imaging:      Chest x-ray: none     Assessment and Plan:      Chronic rhinosinusitis  - pt presets at the request of Dr. Ferrell who plans on performing a FESS on 22  - no recent abx use; sinus sx at baseline   - Known risk factors for perioperative complications: Diabetes mellitus    Difficulty with intubation is not anticipated. Glidescope used by student with colon resection x 1 attempt     Cardiac Risk Estimation:  Per Revised Cardiac Risk Index patient is a Class I risk with a 3.9% risk of a major cardiac event.      1.) Preoperative workup as follows: ECG, hemoglobin, hematocrit, electrolytes, creatinine, glucose, liver function studies.  2.) Change in medication regimen before surgery: hold NSAIDs, avoid indomethacin, hold metformin 24 hours prior to surgery, take half dose of evening insulin evening prior to surgery.  3.) Prophylaxis for cardiac events with perioperative beta-blockers: take metoprolol evening prior to surgery.  4.) Invasive hemodynamic monitoring perioperatively: not indicated.  5.) Deep vein thrombosis prophylaxis postoperatively: intermittent  pneumatic compression boots and regimen to be chosen by surgical team.  6.) Surveillance for postoperative MI with ECG immediately postoperatively and on postoperati ve days 1 and 2 AND troponin levels 24 hours postoperatively and on day 4 or hospital discharge (whichever comes first): not indicated.  7.) Current medications which may produce withdrawal symptoms if withheld perioperatively: none  8.) Other measures: none          Hypertension  - pt reports BP of 140s/ 80s at home recheck in /83  - continue hydralazine, metoprolol, benazepril, amlodipine; take amlodipine and hydralazine AM of surgery   - take metoprolol evening prior to surgery as normally would  - Continue to follow up with PCP  And joseph  - follows with Lisa last visit 3/2022 no prior MI or stents      Hyperlipidemia  - continue home Vascepa and statin   - Continue to follow up with PCP       Type 2 diabetes mellitus without complication, with long-term current use of insulin  - A1c pending; last 3/14/22 6.2; pt reprots AM glucose low 100s   - continue home ozempic,  metformin , and insulin   ]- hold metformin 24 hours prior to surgery  - take half of dose of insulin evening prior to surgery   - Continue to follow up with PCP       Hypothyroidism  - TSH 3/2022 WNL  - continue home levothyroxine   - Continue to follow up with PCP       FARA (obstructive sleep apnea)  - pt reports compliance with CPAP     Gout  - on colchicine , continue  - indomethacin PRN flares  - last flare 1 week ago ; no longer on indomethacin     Malignant neoplasm of sigmoid colon  S/p R colectomy 2/2021 no chemo no radiation  - continue follow up with oncology     Priapism  - no reoccurrence since ED visit  - no ED meds 48 hours prior to surgery

## 2022-08-12 NOTE — ASSESSMENT & PLAN NOTE
- on colchicine , continue  - indomethacin PRN flares  - last flare 1 week ago ; no longer on indomethacin

## 2022-08-12 NOTE — ASSESSMENT & PLAN NOTE
- A1c pending; last 3/14/22 6.2; pt reprots AM glucose low 100s   - continue home ozempic,  metformin , and insulin   ]- hold metformin 24 hours prior to surgery  - take half of dose of insulin evening prior to surgery   - Continue to follow up with PCP

## 2022-08-15 ENCOUNTER — ANESTHESIA EVENT (OUTPATIENT)
Dept: SURGERY | Facility: HOSPITAL | Age: 57
End: 2022-08-15
Payer: COMMERCIAL

## 2022-08-15 DIAGNOSIS — R07.9 CHEST PAIN, UNSPECIFIED TYPE: ICD-10-CM

## 2022-08-15 DIAGNOSIS — E78.5 HYPERLIPIDEMIA, UNSPECIFIED HYPERLIPIDEMIA TYPE: ICD-10-CM

## 2022-08-15 DIAGNOSIS — I10 HYPERTENSION, UNSPECIFIED TYPE: Primary | ICD-10-CM

## 2022-08-15 DIAGNOSIS — Z01.818 PRE-OP TESTING: Primary | ICD-10-CM

## 2022-08-15 DIAGNOSIS — R94.31 NONSPECIFIC ABNORMAL ELECTROCARDIOGRAM (ECG) (EKG): ICD-10-CM

## 2022-08-15 DIAGNOSIS — E11.9 TYPE 2 DIABETES MELLITUS WITHOUT COMPLICATION, WITHOUT LONG-TERM CURRENT USE OF INSULIN: ICD-10-CM

## 2022-08-15 DIAGNOSIS — Z01.818 PRE-OP EVALUATION: ICD-10-CM

## 2022-08-15 RX ORDER — HYDRALAZINE HYDROCHLORIDE 25 MG/1
25 TABLET, FILM COATED ORAL EVERY 12 HOURS
Qty: 60 TABLET | Refills: 11 | Status: SHIPPED | OUTPATIENT
Start: 2022-08-15 | End: 2022-08-29 | Stop reason: SDUPTHER

## 2022-08-15 NOTE — ANESTHESIA PREPROCEDURE EVALUATION
08/15/2022  Narendra English . is a 57 y.o., male.      Pre-op Assessment    I have reviewed the Patient Summary Reports.     I have reviewed the Nursing Notes. I have reviewed the NPO Status.   I have reviewed the Medications.     Review of Systems  Anesthesia Hx:  No problems with previous Anesthesia Grade 1 view with Dan 2,   Grade 1 view with videolaryngoscope. History of prior surgery of interest to airway management or planning: Previous anesthesia: General Airway issues documented on chart review include mask, easy, videolaryngoscope used  Denies Family Hx of Anesthesia complications.   Denies Personal Hx of Anesthesia complications.   Social:  Non-Smoker    Hematology/Oncology:  Hematology Normal       -- Cancer in past history:  Oncology Comments: Colon, s/p R colectomy.     Cardiovascular:   Hypertension Pt had cards w/u recently for CP, NEG stress echo, NL EF.  NO recurrence.    Pulmonary:   Sleep Apnea    Education provided regarding risk of obstructive sleep apnea     Renal/:  Renal/ Normal     Hepatic/GI:  Hepatic/GI Normal    Neurological:  Neurology Normal    Endocrine:   Diabetes, type 2 Hypothyroidism    Psych:  Psychiatric Normal           Physical Exam    Airway:  Mallampati: IV / III  Mouth Opening: Small, but > 3cm  TM Distance: Normal  Tongue: Large  Neck ROM: Normal ROM    Dental:  Intact, Periodontal disease    Chest/Lungs:  Clear to auscultation, Normal Respiratory Rate    Heart:  Rate: Normal  Rhythm: Regular Rhythm        Anesthesia Plan  Type of Anesthesia, risks & benefits discussed:    Anesthesia Type: Gen ETT  Intra-op Monitoring Plan: Standard ASA Monitors  Post Op Pain Control Plan: multimodal analgesia and IV/PO Opioids PRN  Induction:  IV  Informed Consent: Informed consent signed with the Patient and all parties understand the risks and agree with anesthesia  plan.  All questions answered.   ASA Score: 3  Day of Surgery Review of History & Physical: H&P Update referred to the surgeon/provider.    Ready For Surgery From Anesthesia Perspective.     .

## 2022-08-17 ENCOUNTER — HOSPITAL ENCOUNTER (OUTPATIENT)
Facility: HOSPITAL | Age: 57
Discharge: HOME | End: 2022-08-17
Attending: STUDENT IN AN ORGANIZED HEALTH CARE EDUCATION/TRAINING PROGRAM | Admitting: STUDENT IN AN ORGANIZED HEALTH CARE EDUCATION/TRAINING PROGRAM
Payer: COMMERCIAL

## 2022-08-17 ENCOUNTER — ANESTHESIA (OUTPATIENT)
Dept: SURGERY | Facility: HOSPITAL | Age: 57
End: 2022-08-17
Payer: COMMERCIAL

## 2022-08-17 VITALS
RESPIRATION RATE: 16 BRPM | WEIGHT: 264.75 LBS | TEMPERATURE: 98 F | OXYGEN SATURATION: 97 % | BODY MASS INDEX: 35.09 KG/M2 | DIASTOLIC BLOOD PRESSURE: 92 MMHG | HEART RATE: 68 BPM | SYSTOLIC BLOOD PRESSURE: 148 MMHG | HEIGHT: 73 IN

## 2022-08-17 DIAGNOSIS — J34.89 NASAL OBSTRUCTION: ICD-10-CM

## 2022-08-17 DIAGNOSIS — J34.89 CONCHA BULLOSA: ICD-10-CM

## 2022-08-17 DIAGNOSIS — J32.4 CHRONIC PANSINUSITIS: ICD-10-CM

## 2022-08-17 DIAGNOSIS — J34.2 NASAL SEPTAL DEVIATION: ICD-10-CM

## 2022-08-17 DIAGNOSIS — J34.3 HYPERTROPHY OF INFERIOR NASAL TURBINATE: Primary | ICD-10-CM

## 2022-08-17 LAB
POCT GLUCOSE: 103 MG/DL (ref 70–110)
POCT GLUCOSE: 119 MG/DL (ref 70–110)

## 2022-08-17 PROCEDURE — 31256 PR NASAL/SINUS ENDOSCOPY,OPEN MAXILL SINUS: ICD-10-PCS | Mod: 50,51,, | Performed by: STUDENT IN AN ORGANIZED HEALTH CARE EDUCATION/TRAINING PROGRAM

## 2022-08-17 PROCEDURE — 31256 EXPLORATION MAXILLARY SINUS: CPT | Mod: 50,51,, | Performed by: STUDENT IN AN ORGANIZED HEALTH CARE EDUCATION/TRAINING PROGRAM

## 2022-08-17 PROCEDURE — 25000003 PHARM REV CODE 250: Performed by: NURSE ANESTHETIST, CERTIFIED REGISTERED

## 2022-08-17 PROCEDURE — D9220A PRA ANESTHESIA: Mod: ANES,,, | Performed by: ANESTHESIOLOGY

## 2022-08-17 PROCEDURE — 30520 REPAIR OF NASAL SEPTUM: CPT | Mod: ,,, | Performed by: STUDENT IN AN ORGANIZED HEALTH CARE EDUCATION/TRAINING PROGRAM

## 2022-08-17 PROCEDURE — 31276 NSL/SINS NDSC FRNT TISS RMVL: CPT | Mod: 51,LT,, | Performed by: STUDENT IN AN ORGANIZED HEALTH CARE EDUCATION/TRAINING PROGRAM

## 2022-08-17 PROCEDURE — 88305 TISSUE EXAM BY PATHOLOGIST: CPT | Mod: 26,,, | Performed by: STUDENT IN AN ORGANIZED HEALTH CARE EDUCATION/TRAINING PROGRAM

## 2022-08-17 PROCEDURE — D9220A PRA ANESTHESIA: ICD-10-PCS | Mod: CRNA,,, | Performed by: NURSE ANESTHETIST, CERTIFIED REGISTERED

## 2022-08-17 PROCEDURE — 61782 PR STEREOTACTIC COMP ASSIST PROC,CRANIAL,EXTRADURAL: ICD-10-PCS | Mod: ,,, | Performed by: STUDENT IN AN ORGANIZED HEALTH CARE EDUCATION/TRAINING PROGRAM

## 2022-08-17 PROCEDURE — 31254 NSL/SINS NDSC W/PRTL ETHMDCT: CPT | Mod: 50,51,, | Performed by: STUDENT IN AN ORGANIZED HEALTH CARE EDUCATION/TRAINING PROGRAM

## 2022-08-17 PROCEDURE — 82962 GLUCOSE BLOOD TEST: CPT | Performed by: STUDENT IN AN ORGANIZED HEALTH CARE EDUCATION/TRAINING PROGRAM

## 2022-08-17 PROCEDURE — 63600175 PHARM REV CODE 636 W HCPCS: Performed by: NURSE ANESTHETIST, CERTIFIED REGISTERED

## 2022-08-17 PROCEDURE — 31254 PR NASAL/SINUS ENDOSCOPY,REMV PART ETHMOID: ICD-10-PCS | Mod: 50,51,, | Performed by: STUDENT IN AN ORGANIZED HEALTH CARE EDUCATION/TRAINING PROGRAM

## 2022-08-17 PROCEDURE — 31276 PR NASAL/SINUS ENDOSCOPY,EXPLOR FRONTAL SINUS: ICD-10-PCS | Mod: 51,LT,, | Performed by: STUDENT IN AN ORGANIZED HEALTH CARE EDUCATION/TRAINING PROGRAM

## 2022-08-17 PROCEDURE — 30520 PR REPAIR, NASAL SEPTUM: ICD-10-PCS | Mod: ,,, | Performed by: STUDENT IN AN ORGANIZED HEALTH CARE EDUCATION/TRAINING PROGRAM

## 2022-08-17 PROCEDURE — 36000711: Performed by: STUDENT IN AN ORGANIZED HEALTH CARE EDUCATION/TRAINING PROGRAM

## 2022-08-17 PROCEDURE — 31240 NSL/SNS NDSC CNCH BULL RESCJ: CPT | Mod: 50,51,, | Performed by: STUDENT IN AN ORGANIZED HEALTH CARE EDUCATION/TRAINING PROGRAM

## 2022-08-17 PROCEDURE — 30802 PR RF ABLATION INF TURBINATE SUBMUCOSAL: ICD-10-PCS | Mod: 51,,, | Performed by: STUDENT IN AN ORGANIZED HEALTH CARE EDUCATION/TRAINING PROGRAM

## 2022-08-17 PROCEDURE — 63600175 PHARM REV CODE 636 W HCPCS: Performed by: STUDENT IN AN ORGANIZED HEALTH CARE EDUCATION/TRAINING PROGRAM

## 2022-08-17 PROCEDURE — D9220A PRA ANESTHESIA: ICD-10-PCS | Mod: ANES,,, | Performed by: ANESTHESIOLOGY

## 2022-08-17 PROCEDURE — 71000033 HC RECOVERY, INTIAL HOUR: Performed by: STUDENT IN AN ORGANIZED HEALTH CARE EDUCATION/TRAINING PROGRAM

## 2022-08-17 PROCEDURE — 27201423 OPTIME MED/SURG SUP & DEVICES STERILE SUPPLY: Performed by: STUDENT IN AN ORGANIZED HEALTH CARE EDUCATION/TRAINING PROGRAM

## 2022-08-17 PROCEDURE — 63600175 PHARM REV CODE 636 W HCPCS: Performed by: ANESTHESIOLOGY

## 2022-08-17 PROCEDURE — 37000008 HC ANESTHESIA 1ST 15 MINUTES: Performed by: STUDENT IN AN ORGANIZED HEALTH CARE EDUCATION/TRAINING PROGRAM

## 2022-08-17 PROCEDURE — 36000710: Performed by: STUDENT IN AN ORGANIZED HEALTH CARE EDUCATION/TRAINING PROGRAM

## 2022-08-17 PROCEDURE — 25000003 PHARM REV CODE 250: Performed by: STUDENT IN AN ORGANIZED HEALTH CARE EDUCATION/TRAINING PROGRAM

## 2022-08-17 PROCEDURE — 88305 TISSUE EXAM BY PATHOLOGIST: CPT | Performed by: STUDENT IN AN ORGANIZED HEALTH CARE EDUCATION/TRAINING PROGRAM

## 2022-08-17 PROCEDURE — 31240 PR NASAL/SINUS ENDOSCOPY,RMV CONCHA BULL: ICD-10-PCS | Mod: 50,51,, | Performed by: STUDENT IN AN ORGANIZED HEALTH CARE EDUCATION/TRAINING PROGRAM

## 2022-08-17 PROCEDURE — 37000009 HC ANESTHESIA EA ADD 15 MINS: Performed by: STUDENT IN AN ORGANIZED HEALTH CARE EDUCATION/TRAINING PROGRAM

## 2022-08-17 PROCEDURE — D9220A PRA ANESTHESIA: Mod: CRNA,,, | Performed by: NURSE ANESTHETIST, CERTIFIED REGISTERED

## 2022-08-17 PROCEDURE — 88305 TISSUE EXAM BY PATHOLOGIST: ICD-10-PCS | Mod: 26,,, | Performed by: STUDENT IN AN ORGANIZED HEALTH CARE EDUCATION/TRAINING PROGRAM

## 2022-08-17 PROCEDURE — 61782 SCAN PROC CRANIAL EXTRA: CPT | Mod: ,,, | Performed by: STUDENT IN AN ORGANIZED HEALTH CARE EDUCATION/TRAINING PROGRAM

## 2022-08-17 PROCEDURE — 30802 ABLATE INF TURBINATE SUBMUC: CPT | Mod: 51,,, | Performed by: STUDENT IN AN ORGANIZED HEALTH CARE EDUCATION/TRAINING PROGRAM

## 2022-08-17 RX ORDER — ACETAMINOPHEN 10 MG/ML
INJECTION, SOLUTION INTRAVENOUS
Status: DISCONTINUED | OUTPATIENT
Start: 2022-08-17 | End: 2022-08-17

## 2022-08-17 RX ORDER — ALBUTEROL SULFATE 0.83 MG/ML
2.5 SOLUTION RESPIRATORY (INHALATION) EVERY 4 HOURS PRN
Status: DISCONTINUED | OUTPATIENT
Start: 2022-08-17 | End: 2022-08-17 | Stop reason: HOSPADM

## 2022-08-17 RX ORDER — OXYMETAZOLINE HCL 0.05 %
SPRAY, NON-AEROSOL (ML) NASAL
Status: DISCONTINUED | OUTPATIENT
Start: 2022-08-17 | End: 2022-08-17 | Stop reason: HOSPADM

## 2022-08-17 RX ORDER — EPINEPHRINE 1 MG/ML
INJECTION INTRAMUSCULAR; INTRAVENOUS; SUBCUTANEOUS
Status: DISCONTINUED
Start: 2022-08-17 | End: 2022-08-17 | Stop reason: HOSPADM

## 2022-08-17 RX ORDER — LIDOCAINE HYDROCHLORIDE 20 MG/ML
INJECTION, SOLUTION EPIDURAL; INFILTRATION; INTRACAUDAL; PERINEURAL
Status: DISCONTINUED | OUTPATIENT
Start: 2022-08-17 | End: 2022-08-17

## 2022-08-17 RX ORDER — BACITRACIN ZINC 500 UNIT/G
OINTMENT (GRAM) TOPICAL
Status: DISCONTINUED
Start: 2022-08-17 | End: 2022-08-17 | Stop reason: HOSPADM

## 2022-08-17 RX ORDER — PROPOFOL 10 MG/ML
VIAL (ML) INTRAVENOUS
Status: DISCONTINUED | OUTPATIENT
Start: 2022-08-17 | End: 2022-08-17

## 2022-08-17 RX ORDER — PROPOFOL 10 MG/ML
VIAL (ML) INTRAVENOUS CONTINUOUS PRN
Status: DISCONTINUED | OUTPATIENT
Start: 2022-08-17 | End: 2022-08-17

## 2022-08-17 RX ORDER — OXYMETAZOLINE HCL 0.05 %
SPRAY, NON-AEROSOL (ML) NASAL
Status: DISCONTINUED
Start: 2022-08-17 | End: 2022-08-17 | Stop reason: HOSPADM

## 2022-08-17 RX ORDER — FENTANYL CITRATE 50 UG/ML
INJECTION, SOLUTION INTRAMUSCULAR; INTRAVENOUS
Status: DISCONTINUED | OUTPATIENT
Start: 2022-08-17 | End: 2022-08-17

## 2022-08-17 RX ORDER — ONDANSETRON 2 MG/ML
INJECTION INTRAMUSCULAR; INTRAVENOUS
Status: DISCONTINUED | OUTPATIENT
Start: 2022-08-17 | End: 2022-08-17

## 2022-08-17 RX ORDER — EPINEPHRINE 0.1 MG/ML
INJECTION INTRAVENOUS
Status: DISCONTINUED | OUTPATIENT
Start: 2022-08-17 | End: 2022-08-17 | Stop reason: HOSPADM

## 2022-08-17 RX ORDER — ONDANSETRON 2 MG/ML
4 INJECTION INTRAMUSCULAR; INTRAVENOUS ONCE AS NEEDED
Status: DISCONTINUED | OUTPATIENT
Start: 2022-08-17 | End: 2022-08-17 | Stop reason: HOSPADM

## 2022-08-17 RX ORDER — MIDAZOLAM HYDROCHLORIDE 1 MG/ML
INJECTION INTRAMUSCULAR; INTRAVENOUS
Status: DISCONTINUED | OUTPATIENT
Start: 2022-08-17 | End: 2022-08-17

## 2022-08-17 RX ORDER — HYDROCODONE BITARTRATE AND ACETAMINOPHEN 5; 325 MG/1; MG/1
1 TABLET ORAL EVERY 6 HOURS PRN
Qty: 20 TABLET | Refills: 0 | Status: SHIPPED | OUTPATIENT
Start: 2022-08-17 | End: 2022-11-01

## 2022-08-17 RX ORDER — DEXAMETHASONE SODIUM PHOSPHATE 4 MG/ML
INJECTION, SOLUTION INTRA-ARTICULAR; INTRALESIONAL; INTRAMUSCULAR; INTRAVENOUS; SOFT TISSUE
Status: DISCONTINUED | OUTPATIENT
Start: 2022-08-17 | End: 2022-08-17

## 2022-08-17 RX ORDER — DIPHENHYDRAMINE HYDROCHLORIDE 50 MG/ML
25 INJECTION INTRAMUSCULAR; INTRAVENOUS EVERY 6 HOURS PRN
Status: DISCONTINUED | OUTPATIENT
Start: 2022-08-17 | End: 2022-08-17 | Stop reason: HOSPADM

## 2022-08-17 RX ORDER — SUCCINYLCHOLINE CHLORIDE 20 MG/ML
INJECTION INTRAMUSCULAR; INTRAVENOUS
Status: DISCONTINUED | OUTPATIENT
Start: 2022-08-17 | End: 2022-08-17

## 2022-08-17 RX ORDER — SODIUM CHLORIDE, SODIUM LACTATE, POTASSIUM CHLORIDE, CALCIUM CHLORIDE 600; 310; 30; 20 MG/100ML; MG/100ML; MG/100ML; MG/100ML
INJECTION, SOLUTION INTRAVENOUS CONTINUOUS
Status: DISCONTINUED | OUTPATIENT
Start: 2022-08-17 | End: 2023-03-08

## 2022-08-17 RX ORDER — ROCURONIUM BROMIDE 10 MG/ML
INJECTION, SOLUTION INTRAVENOUS
Status: DISCONTINUED | OUTPATIENT
Start: 2022-08-17 | End: 2022-08-17

## 2022-08-17 RX ADMIN — SUGAMMADEX 200 MG: 100 INJECTION, SOLUTION INTRAVENOUS at 05:08

## 2022-08-17 RX ADMIN — SUCCINYLCHOLINE CHLORIDE 140 MG: 20 INJECTION, SOLUTION INTRAMUSCULAR; INTRAVENOUS at 03:08

## 2022-08-17 RX ADMIN — ROCURONIUM BROMIDE 5 MG: 10 INJECTION, SOLUTION INTRAVENOUS at 03:08

## 2022-08-17 RX ADMIN — ROCURONIUM BROMIDE 45 MG: 10 INJECTION, SOLUTION INTRAVENOUS at 03:08

## 2022-08-17 RX ADMIN — LIDOCAINE HYDROCHLORIDE 100 MG: 20 INJECTION, SOLUTION EPIDURAL; INFILTRATION; INTRACAUDAL; PERINEURAL at 03:08

## 2022-08-17 RX ADMIN — PROPOFOL 200 MG: 10 INJECTION, EMULSION INTRAVENOUS at 03:08

## 2022-08-17 RX ADMIN — REMIFENTANIL HYDROCHLORIDE 0.05 MCG/KG/MIN: 1 INJECTION, POWDER, LYOPHILIZED, FOR SOLUTION INTRAVENOUS at 03:08

## 2022-08-17 RX ADMIN — ONDANSETRON 4 MG: 2 INJECTION, SOLUTION INTRAMUSCULAR; INTRAVENOUS at 05:08

## 2022-08-17 RX ADMIN — FENTANYL CITRATE 100 MCG: 50 INJECTION, SOLUTION INTRAMUSCULAR; INTRAVENOUS at 03:08

## 2022-08-17 RX ADMIN — DEXTROSE 3 G: 50 INJECTION, SOLUTION INTRAVENOUS at 03:08

## 2022-08-17 RX ADMIN — ACETAMINOPHEN 1000 MG: 10 INJECTION, SOLUTION INTRAVENOUS at 03:08

## 2022-08-17 RX ADMIN — PROPOFOL 150 MCG/KG/MIN: 10 INJECTION, EMULSION INTRAVENOUS at 03:08

## 2022-08-17 RX ADMIN — SODIUM CHLORIDE, SODIUM LACTATE, POTASSIUM CHLORIDE, AND CALCIUM CHLORIDE: 600; 310; 30; 20 INJECTION, SOLUTION INTRAVENOUS at 02:08

## 2022-08-17 RX ADMIN — MIDAZOLAM HYDROCHLORIDE 2 MG: 1 INJECTION INTRAMUSCULAR; INTRAVENOUS at 02:08

## 2022-08-17 RX ADMIN — DEXAMETHASONE SODIUM PHOSPHATE 12 MG: 4 INJECTION, SOLUTION INTRA-ARTICULAR; INTRALESIONAL; INTRAMUSCULAR; INTRAVENOUS; SOFT TISSUE at 03:08

## 2022-08-17 RX ADMIN — ONDANSETRON 4 MG: 2 INJECTION, SOLUTION INTRAMUSCULAR; INTRAVENOUS at 03:08

## 2022-08-17 NOTE — BRIEF OP NOTE
[facility]  Brief Operative Note     SUMMARY     Surgery Date: 8/17/2022     Surgeon(s) and Role:     * Viktor Ferrell MD - Primary    Assisting Surgeon: None    Pre-op Diagnosis:  Chronic sinusitis, unspecified location [J32.9]  Hypertrophy of both inferior nasal turbinates [J34.3]  Nasal septal deviation [J34.2]  Nasal obstruction [J34.89]  Lizzy bullosa [J34.89]    Post-op Diagnosis:  Post-Op Diagnosis Codes:     * Chronic sinusitis, unspecified location [J32.9]     * Hypertrophy of both inferior nasal turbinates [J34.3]     * Nasal septal deviation [J34.2]     * Nasal obstruction [J34.89]     * Lizzy bullosa [J34.89]    Procedure(s) (LRB):  FESS, WITH NASAL SEPTOPLASTY, WITH IMAGING GUIDANCE (Bilateral)  REDUCTION, NASAL TURBINATE (Bilateral)    Anesthesia: General    Findings/Key Components:  See op note    Estimated Blood Loss: 25 mL         Specimens:   Specimen (24h ago, onward)             Start     Ordered    08/17/22 1711  Specimen to Pathology, Surgery ENT  Once        Comments: Pre-op Diagnosis: Chronic sinusitis, unspecified location [J32.9]Hypertrophy of both inferior nasal turbinates [J34.3]Nasal septal deviation [J34.2]Nasal obstruction [J34.89]Lizzy bullosa [J34.89]Procedure(s):FESS, WITH NASAL SEPTOPLASTY, WITH IMAGING GUIDANCEREDUCTION, NASAL TURBINATE Number of specimens: 2Name of specimens: 1.  Left Sinus Contents2.  Septal Cartilage - GROSS ONLY     References:    Click here for ordering Quick Tip   Question Answer Comment   Procedure Type: ENT    Specimen Class: Routine/Screening    Which provider would you like to cc? VIKTOR FERRELL    Release to patient Immediate        08/17/22 1711                Discharge Note    SUMMARY     Admit Date: 8/17/2022    Discharge Date and Time: No discharge date for patient encounter.    Attending Physician: Viktor Ferrell MD     Discharge Provider: Viktor Ferrell    Final Diagnosis: Post-Op Diagnosis Codes:     * Chronic sinusitis,  unspecified location [J32.9]     * Hypertrophy of both inferior nasal turbinates [J34.3]     * Nasal septal deviation [J34.2]     * Nasal obstruction [J34.89]     * Lizzy bullosa [J34.89]    Disposition: Home or Self Care, discharged in good condition    Follow Up/Patient Instructions:       Medications:  Reconciled Home Medications:   Current Discharge Medication List      CONTINUE these medications which have NOT CHANGED    Details   amLODIPine (NORVASC) 10 MG tablet TAKE 1 TABLET(10 MG) BY MOUTH EVERY DAY  Qty: 90 tablet, Refills: 1    Comments: ZERO refills remain on this prescription. Your patient is requesting advance approval of refills for this medication to PREVENT ANY MISSED DOSES      atorvastatin (LIPITOR) 40 MG tablet TAKE 1 TABLET(40 MG) BY MOUTH EVERY DAY  Qty: 90 tablet, Refills: 3      azelastine (ASTELIN) 137 mcg (0.1 %) nasal spray 1 spray (137 mcg total) by Nasal route 2 (two) times daily.  Qty: 30 mL, Refills: 3      benazepriL (LOTENSIN) 40 MG tablet Take 1 tablet (40 mg total) by mouth once daily.  Qty: 90 tablet, Refills: 3    Comments: .      clonazePAM (KLONOPIN) 2 MG Tab TAKE 1 TABLET BY MOUTH EVERY EVENING  Qty: 30 tablet, Refills: 1      colchicine (COLCRYS) 0.6 mg tablet TAKE 1 TABLET(0.6 MG) BY MOUTH TWICE DAILY  Qty: 180 tablet, Refills: 1      fluticasone propionate (FLONASE) 50 mcg/actuation nasal spray 1 spray (50 mcg total) by Each Nostril route once daily.  Qty: 16 g, Refills: 0      hydrALAZINE (APRESOLINE) 25 MG tablet Take 1 tablet (25 mg total) by mouth every 12 (twelve) hours.  Qty: 60 tablet, Refills: 11    Comments: .  Associated Diagnoses: Chest pain, unspecified type; Nonspecific abnormal electrocardiogram (ECG) (EKG); Hypertension, unspecified type; Pre-op evaluation; Hyperlipidemia, unspecified hyperlipidemia type; Type 2 diabetes mellitus without complication, without long-term current use of insulin      indomethacin (INDOCIN SR) 75 mg CpSR CR capsule Take 75 mg by  mouth 2 (two) times daily with meals.      ipratropium (ATROVENT) 21 mcg (0.03 %) nasal spray USE 2 SPRAYS IN EACH NOSTRIL TWICE DAILY  Qty: 30 mL, Refills: 0      levocetirizine (XYZAL) 5 MG tablet Take 5 mg by mouth every evening.      levothyroxine (SYNTHROID) 112 MCG tablet TAKE 1 TABLET(112 MCG) BY MOUTH BEFORE BREAKFAST  Qty: 90 tablet, Refills: 3      metFORMIN (GLUCOPHAGE) 1000 MG tablet TAKE 1 TABLET(1000 MG) BY MOUTH TWICE DAILY WITH MEALS  Qty: 180 tablet, Refills: 1      metoprolol succinate (TOPROL-XL) 50 MG 24 hr tablet Take 1 tablet (50 mg total) by mouth once daily.  Qty: 90 tablet, Refills: 1    Comments: .      testosterone (ANDROGEL) 20.25 mg/1.25 gram (1.62 %) GlPm PLACE 2 PUMPS(40.5 MG) ONTO THE SKIN ONCE DAILY AT 6 AM  Qty: 75 g, Refills: 0      TOUJEO SOLOSTAR U-300 INSULIN 300 unit/mL (1.5 mL) InPn pen INJECT 85 UNITS INTO THE SKIN EVERY DAY  Qty: 4 pen, Refills: 1      VASCEPA 1 gram Cap Take 2 capsules (2,000 mg total) by mouth 2 (two) times daily.  Qty: 90 capsule, Refills: 1      flash glucose scanning reader Misc 1 application.      flash glucose sensor Kit 1 application.      OZEMPIC 1 mg/dose (4 mg/3 mL) INJECT 1 MG INTO THE SKIN ONCE WEEKLY  Qty: 4 pen, Refills: 11    Comments: ZERO refills remain on this prescription. Your patient is requesting advance approval of refills for this medication to PREVENT ANY MISSED DOSES      pep injection Inject 0.15 ml as directed     For compounding pharmacy use:   Add PAPAVERINE 30 mg  Add PHENTOLAMINE 1 mg  Add ALPROSTADIL 20 mcg  Qty: 1 vial, Refills: 5    Associated Diagnoses: Erectile dysfunction, unspecified erectile dysfunction type           No discharge procedures on file.

## 2022-08-17 NOTE — ANESTHESIA PROCEDURE NOTES
Intubation    Date/Time: 8/17/2022 3:09 PM  Performed by: Yola Gordon CRNA  Authorized by: Katie Shukla MD     Intubation:     Induction:  Intravenous    Intubated:  Postinduction    Mask Ventilation:  Easy with oral airway    Attempts:  1    Attempted By:  CRNA    Method of Intubation:  Video laryngoscopy    Blade:  Glidescope 3    Laryngeal View Grade: Grade I - full view of cords      Difficult Airway Encountered?: No      Complications:  None    Airway Device:  Oral endotracheal tube    Airway Device Size:  7.5    Style/Cuff Inflation:  Cuffed (inflated to minimal occlusive pressure)    Inflation Amount (mL):  8    Tube secured:  22    Secured at:  The lips    Placement Verified By:  Capnometry    Complicating Factors:  Large/floppy epiglottis, obesity, short neck and poor neck/head extension    Findings Post-Intubation:  BS equal bilateral and atraumatic/condition of teeth unchanged

## 2022-08-17 NOTE — TRANSFER OF CARE
"Anesthesia Transfer of Care Note    Patient: Narendra English .    Procedure(s) Performed: Procedure(s) (LRB):  FESS, WITH NASAL SEPTOPLASTY, WITH IMAGING GUIDANCE (Bilateral)  REDUCTION, NASAL TURBINATE (Bilateral)    Patient location: PACU    Anesthesia Type: general    Transport from OR: Transported from OR on room air with adequate spontaneous ventilation    Post pain: adequate analgesia    Post assessment: no apparent anesthetic complications    Post vital signs: stable    Level of consciousness: awake    Nausea/Vomiting: no nausea/vomiting    Complications: none    Transfer of care protocol was followed      Last vitals:   Visit Vitals  /63 (BP Location: Left arm, Patient Position: Sitting)   Pulse 69   Temp 36.6 °C (97.9 °F) (Temporal)   Resp 12   Ht 6' 1" (1.854 m)   Wt 120.1 kg (264 lb 12.4 oz)   SpO2 97%   BMI 34.93 kg/m²     "

## 2022-08-17 NOTE — CARE UPDATE
Assumed care of pt. from ALBERTO Ken. Pt. drowsy at this time. Will respond to verbal stimuli. Family at bedside. No acute distress noted. Safety measures intact and ongoing. Will continue to monitor per POC.

## 2022-08-17 NOTE — PATIENT INSTRUCTIONS
Sinus Surgery  Discharge Instructions:     - If you were prescribed an antibiotic and steroid before surgery, be sure to complete the prescription after surgery. You may also be given a new prescription for an antibiotic and/or steroid. Refer to your After Visit Summary, and take as directed.  - Start saline irrigations as soon as you are not having any further bloody oozing. This may take a day or two. Please wait to resume other nasal sprays.    - Spray saline gently into the splints in each side of your nose to help keep them clear of crusting. This will help you breathe thrugh your nose until the splints are removed in clinic.     - You may have bloody mucous from your nose for a few days. This is expected. Please call if there is significant bleeding.   - No nose blowing, picking, strenuous activity, straining, or use of a straw. Sleep with the head of the bed elevated.    - if you need to sneeze, do so with your mouth open.  - There is packing placed high in the sinuses.  At your follow up the nose will suctioned clear of crusting and packing. If you have some thick material that looks like tissue come out of the nose or go down your throat it is likely dislodged packing. If this were to happen, let our office know.  - If you have splints in the nose, they will be removed at the time of your follow up as well.    Activity/Restrictions:    - Avoid heavy lifting, straining or strenuous activities until instructed otherwise     Diet:   - Resume your regular diet, as tolerated     Pain Instructions:   - Try using acetaminophen/Tylenol and ibuprofen/Motrin to control your pain. If this does not bring you relief or the pain remains severe, a stronger pain medication has been prescribed to you.   - DO NOT MIX NARCOTIC PAIN MEDICATIONS OR TAKE NARCOTIC PRESCRIPTIONS AT THE SAME TIME (PERCODET, LORTAB, ROXICODONE, ETC.)   - DO NOT DRIVE OR OPERATE HEAVY MACHINERY WHILE ON NARCOTICS    - DO NOT TAKE MORE THAN 4 GRAMS  (4000mg) OF TYLENOL (ACETAMINOPHEN) IN 24 HOURS     Follow Up:    - A follow up appointment has been scheduled for you as outlined below:   Future Appointments   Date Time Provider Department Center   8/22/2022  8:45 AM Viktor Ferrell MD [facility] ENT  Medical C   9/7/2022 12:20 PM Alma Layne MD [facility] PHYS [facility]   9/21/2022  8:20 AM Elisabet Llamas NP [facility] IM  Medical C   10/6/2022  8:30 AM [facility] CT1 LIMIT 500 LBS [facility] CT SCAN [facility]       Call your doctor or go to the emergency room if you have:    - Fever of 101.5 degrees or higher   - Severe pain that has increased greatly since your surgery or is uncontrolled by your current pain medications   - Heavy nose bleeding with bright red blood or large blood clots  - Nausea and vomiting that does not go away   - Chest pain/shortness of breath   - Any other acute events, problems, or concerns     For any questions, please call our clinic our leave us a My Chart message. [facility]: [phone], then ask for ENT Clinic.   For after hours questions and/or urgent concerns, call the same number above ([phone]) and ask for the on-call ENT physician.

## 2022-08-17 NOTE — OP NOTE
DATE OF PROCEDURE: 8/17/2022     PRE-OPERATIVE DIAGNOSIS:   Chronic rhinosinusitis with nasal polyposis   Chronic rhinosinusitis without nasal polyposis   Recurrent acute rhinosinusitis   Allergic fungal sinusitis   Nasal obstruction   Septal deviation   Inferior turbinate hypertrophy      POST-OPERATIVE DIAGNOSIS:   Same      PROCEDURE:   Endoscopic septoplasty   Bilateral inferior turbinate submucosal resection  Nasal endoscopy with resection of bilateral toya bullosa   Bilateral nasal endoscopy with maxillary antrostomy   Bilateral nasal endoscopy with anterior ethmoidectomy    Left nasal endoscopy with frontal sinusotomy and frontal sinus exploration   Functional endoscopic sinus surgery with CT image guided electromagnetic system    SURGEON:   Viktor Ferrell MD     ANESTHESIA:   General endotracheal anesthesia     ESTIMATED BLOOD LOSS:   25 mL      SPECIMENS:   Septal bone and cartilage - gross only   Left sinus contents    COMPLICATIONS:   None apparent        OPERATIVE INDICATIONS:   Narendra English Sr. is a 57 y.o. male with a history of chronic rhinosinusitis and nasal obstruction secondary to nasal septal deviation and inferior turbinate hypertrophy. He presents today for endoscopic sinus surgery, septoplasty and inferior turbinate reduction.       OPERATIVE FINDINGS:   Polypoid mucosal thickening at the left frontal outflow tract  Bilateral polypoid thickening in anterior ethmoids  Left septal deviation with large left septal spur      OPERATIVE DETAILS:   Anesthesia and Prep -- After being properly identified in the preoperative holding area, the patient was brought into the operating suite. The patient was placed supine on the operating table. A pre-procedural time-out was performed. Pre-operative antibiotics and steroids were administered. After induction of general anesthesia, the patient was successfully intubated with confirmation of tube placement via CO2 return. The bed was then turned 180  Pt ambulatory to triage c/o migraine since Wednesday.  Pt reports hx same.    degrees. Eyes were taped closed with steristrips. Pledgets soaked in 1:1000 fluorescein dyed epinephrine were placed in each nostril. The face was draped sterilely.     Image Guidance Registration and Prep -- The registration sticker for the [employer] image guidance system was placed on the forehead. The face was registered with good correlation. Landmarks were checked with the probe which showed satisfactory accuracy.      Endoscopic septoplasty -- The septum was injected bilaterally with 1% lidocaine with 1:100,000 epinephrine. A 15 blade was utilized to make a michael-transfixion incision in the left nasal cavity. A Sandeep elevator was then used to elevate a sub-mucoperichondrial flap. Once adequate room was available, the 0 degree endoscopic was placed into the flap and the septal cartilage was visualized. A suction freer was utilized to raise the flap back to the bony-cartilaginous junction. A caudal transcartilaginous cut was made using the Sandeep elevator taking care to leave at least a 1cm caudal strut. The opposing mucoperichondrial flap was then elevated off of the cartilage and bone of the right side. Then, a Digna forceps was used to cut away the cartilage working anterior to posterior. As the cartilage was removed superiorly, care was taken to leave at least a 1cm dorsal strut. The bony septum and spur were removed with Digna and Ronguers. The nose was then inspected and the septum was seen to be midline. The working room in the nose was much improved. The hemitransfixion incision was closed with interrupted 5-0 fast absorbing plain gut suture.     Left nasal endoscopy with resection of bilatearl toya bullosa -- The left middle turbinate was medialized and image guidance was used to identify an appropriate location to enter on the lateral surface of the turbinate. The microdebrider was used to enter the toya bullosa and mucosa and bone were removed opening the entire cavity. The edges were  smoothed and the turbinate was noted to be completely open.    Bilateral maxillary antrostomy was then necessitated for access to the frontal and ethmoid sinuses.    Left nasal endoscopy with maxillary antrostomy -- 1% Lidocaine with 1:100,000 epinephrine was used to infiltrate the axilla of the middle turbinate, head of the middle turbinate and the sphenopalatine artery. The middle turbinate was medialized using a freer. The maxillary seeker was inserted and slipped behind the uncinate process. This was pulled anteriorly exposing the edge. A pediatric back-biter was inserted and the uncinate was cut anteriorly toward but not into the nasolacrimal duct. The microdebrider was used to remove the uncinate process above and below the cut made by the back-biter. A curved suction was inserted into the maxillary sinus through the antrostomy and pushed posteriorly and inferiorly to enlarge the opening. The microdebrider was used to clean up the edges of bone and mucosa. A 30 degree endoscope was then attached and used to view the sinus. The natural os was identified and the maxillary antrostomy was complete.      Left nasal endoscopy with anterior ethmoidectomy -- The 0 degree endoscope was reattached and the ethmoid bulla was identified.  A J-curette was slipped behind the bulla and gentle pressure was applied anteriorly to fracture the bulla. The microdebrider was taken and used to remove bone and mucosa opening the bulla. Removal extended laterally near the lamina papyracea and superiorly toward skull base. The microdebrider was used to remove thin bone and clean up mucosal edges. This significantly improved the maxillary sinus outflow tract.     Left nasal endoscopy with frontal sinusotomy and frontal sinus exploration -- 30 and 45 degree scopes were used to identify landmarks of the frontal recess. The CT scan was consulted to identify the drainage pattern. The agger nasi was removed, exposing the natural ostium. The  sinus was entered through the natural ostium with the frontal sinus navigation probe. Surrounding bone and mucosa was removed with  punch. The frontal recess was opened in an anterior-posterior dimension from the frontal beak to the skull base and in a medial-lateral dimension from the middle turbinate to the lamina papyracea.      Right nasal endoscopy with resection of right toya bullosa -- The right middle turbinate was medialized and image guidance was used to identify an appropriate location to enter on the lateral surface of the turbinate. The microdebrider was used to enter the toya bullosa and mucosa and bone were removed opening the entire cavity. The edges were smoothed and the turbinate was noted to be completely open.     Right nasal endoscopy with maxillary antrostomy -- 1% Lidocaine with 1:100,000 epinephrine was used to infiltrate the axilla of the middle turbinate, head of the middle turbinate and the sphenopalatine artery. The middle turbinate was medialized using a freer. The maxillary seeker was inserted and slipped behind the uncinate process. This was pulled anteriorly exposing the edge. A pediatric back-biter was inserted and the uncinate was cut anteriorly toward but not into the nasolacrimal duct. The microdebrider was used to remove the uncinate process above and below the cut made by the back-biter. A curved suction was inserted into the maxillary sinus through the antrostomy and pushed posteriorly and inferiorly to enlarge the opening. The microdebrider was used to clean up the edges of bone and mucosa. A 30 degree endoscope was then attached and used to view the sinus. The natural os was identified and the maxillary antrostomy was complete.      Right nasal endoscopy with anterior ethmoidectomy -- The 0 degree endoscope was reattached and the ethmoid bulla was identified.  A J-curette was slipped behind the bulla and gentle pressure was applied anteriorly to fracture the bulla.  The microdebrider was taken and used to remove bone and mucosa opening the bulla. Removal extended laterally near the lamina papyracea and superiorly toward skull base. The microdebrider was used to remove thin bone and clean up mucosal edges. This significantly improved the maxillary sinus outflow tract.     Attention was turned to the inferior turbinate reduction. 1% lidocaine with epinephrine 1:100,000 was injected into the inferior turbinates and an 11cm x 2mm Inferior Tubinate blade was inserted just anterior to the head of the inferior turbinate. The inferior turbinate was reduced by advancing the blade submucosally and removing and disrupting tissue between the mucosa and the bone of the turbinate. Moving the blade back and forth while rotating it through four quadrants was performed until the mucosa could be seen to collapse onto the bone. The same procedure was performed on the opposite side. The turbinates were out-fractured using a [place] elevator.      Conclusion -- Both sides were inspected and no orbital fat or evidence of cerebral spinal fluid leak were observed. Both sides were irrigated and clot removed.      Small PosiSepX packs (0.5 x 1.5cm) were inserted lateral to the middle turbinates bilaterally.      A flexible suction catheter was used to remove fluid and blood from the oropharynx and hypopharynx.     Steri strips were removed from the eyelids and the eyes were checked for tension or proptosis, none was observed. The image guidance sticker was removed. The patient's skin was cleaned. The patient was returned to the care of the anesthesia team. They were then weaned from the anesthetic and transported to the PACU in stable condition.

## 2022-08-18 ENCOUNTER — PATIENT MESSAGE (OUTPATIENT)
Dept: INTERNAL MEDICINE | Facility: CLINIC | Age: 57
End: 2022-08-18
Payer: COMMERCIAL

## 2022-08-18 ENCOUNTER — TELEPHONE (OUTPATIENT)
Dept: OTOLARYNGOLOGY | Facility: CLINIC | Age: 57
End: 2022-08-18
Payer: COMMERCIAL

## 2022-08-18 ENCOUNTER — PATIENT MESSAGE (OUTPATIENT)
Dept: OTOLARYNGOLOGY | Facility: CLINIC | Age: 57
End: 2022-08-18
Payer: COMMERCIAL

## 2022-08-18 NOTE — TELEPHONE ENCOUNTER
Pt stated pharmacy will not fill narcotic due to concurrent benzo RX. Request staff to call pharmacy. Attempt was made pharmacy not open at this time. Pt also advised he is having intermittent bleeding from nose, some dark blood and some bright red. Per Dr Winkler, advised this is expected and of little concern. Advised to call office if blood begins steadily flowing. Verbalized understanding.

## 2022-08-18 NOTE — ANESTHESIA POSTPROCEDURE EVALUATION
Anesthesia Post Evaluation    Patient: Narendra Hopkins Amador Oliva    Procedure(s) Performed: Procedure(s) (LRB):  FESS, WITH NASAL SEPTOPLASTY, WITH IMAGING GUIDANCE (Bilateral)  REDUCTION, NASAL TURBINATE (Bilateral)    Final Anesthesia Type: general      Patient location during evaluation: PACU  Patient participation: Yes- Able to Participate  Level of consciousness: awake and alert and oriented  Post-procedure vital signs: reviewed and stable  Pain management: adequate  Airway patency: patent    PONV status at discharge: No PONV  Anesthetic complications: no      Cardiovascular status: blood pressure returned to baseline, stable and hemodynamically stable  Respiratory status: unassisted  Hydration status: euvolemic  Follow-up not needed.          Vitals Value Taken Time   /92 08/17/22 1827   Temp 36.6 °C (97.9 °F) 08/17/22 1827   Pulse 68 08/17/22 1827   Resp 16 08/17/22 1827   SpO2 97 % 08/17/22 1827         No case tracking events are documented in the log.      Pain/Shayan Score: Shayan Score: 7 (8/17/2022  6:30 PM)

## 2022-08-18 NOTE — CARE UPDATE
Physical Therapy  2/7/18     Patient chart reviewed. Discussed with RN. Patient currently intubated, sedated, no command following. Not appropriate for mobility with PT at this time.      Recommend with nursing patient to complete as able in order to promote cardiopulmonary systems, maintain strength, endurance and independence:   -bed in chair position with foot board on 3x/day 30-60 mins max each  -passive ROM B UEs and LEs during bathing to prevent contractures  -positioning to prevent edema and contractures.      Petrona Moreno, PT, DPT    Pt. ready for discharge. Discharge instructions given to pt. and family. Verbalized understanding. IV dc'd with catheter tip intact. Safety measures remained intact throughout stay. Dc'd via w/c per staff to personal vehicle. Condition stable upon discharge.

## 2022-08-19 ENCOUNTER — PATIENT MESSAGE (OUTPATIENT)
Dept: OTOLARYNGOLOGY | Facility: CLINIC | Age: 57
End: 2022-08-19
Payer: COMMERCIAL

## 2022-08-19 PROCEDURE — 99283 EMERGENCY DEPT VISIT LOW MDM: CPT

## 2022-08-20 ENCOUNTER — HOSPITAL ENCOUNTER (EMERGENCY)
Facility: HOSPITAL | Age: 57
Discharge: HOME | End: 2022-08-20
Attending: EMERGENCY MEDICINE
Payer: COMMERCIAL

## 2022-08-20 VITALS
SYSTOLIC BLOOD PRESSURE: 168 MMHG | OXYGEN SATURATION: 95 % | DIASTOLIC BLOOD PRESSURE: 86 MMHG | RESPIRATION RATE: 16 BRPM | HEART RATE: 74 BPM | HEIGHT: 73 IN | WEIGHT: 264.56 LBS | BODY MASS INDEX: 35.06 KG/M2 | TEMPERATURE: 98 F

## 2022-08-20 DIAGNOSIS — I10 ESSENTIAL HYPERTENSION: ICD-10-CM

## 2022-08-20 DIAGNOSIS — R04.0 EPISTAXIS: Primary | ICD-10-CM

## 2022-08-20 PROCEDURE — 30905 CONTROL OF NOSEBLEED: CPT | Mod: LT,,, | Performed by: STUDENT IN AN ORGANIZED HEALTH CARE EDUCATION/TRAINING PROGRAM

## 2022-08-20 PROCEDURE — 99024 POSTOP FOLLOW-UP VISIT: CPT | Mod: ,,, | Performed by: STUDENT IN AN ORGANIZED HEALTH CARE EDUCATION/TRAINING PROGRAM

## 2022-08-20 PROCEDURE — 99024 PR POST-OP FOLLOW-UP VISIT: ICD-10-PCS | Mod: ,,, | Performed by: STUDENT IN AN ORGANIZED HEALTH CARE EDUCATION/TRAINING PROGRAM

## 2022-08-20 PROCEDURE — 25000003 PHARM REV CODE 250: Performed by: EMERGENCY MEDICINE

## 2022-08-20 PROCEDURE — 30905 PR CTRL 2SEBLEED,POST,W/PACKS &/OR CAUT: ICD-10-PCS | Mod: LT,,, | Performed by: STUDENT IN AN ORGANIZED HEALTH CARE EDUCATION/TRAINING PROGRAM

## 2022-08-20 RX ORDER — ONDANSETRON 4 MG/1
4 TABLET, ORALLY DISINTEGRATING ORAL
Status: COMPLETED | OUTPATIENT
Start: 2022-08-20 | End: 2022-08-20

## 2022-08-20 RX ORDER — HYDROCODONE BITARTRATE AND ACETAMINOPHEN 10; 325 MG/1; MG/1
1 TABLET ORAL
Status: COMPLETED | OUTPATIENT
Start: 2022-08-20 | End: 2022-08-20

## 2022-08-20 RX ORDER — HYDRALAZINE HYDROCHLORIDE 25 MG/1
25 TABLET, FILM COATED ORAL ONCE
Status: COMPLETED | OUTPATIENT
Start: 2022-08-20 | End: 2022-08-20

## 2022-08-20 RX ORDER — CLONIDINE HYDROCHLORIDE 0.1 MG/1
0.1 TABLET ORAL
Status: COMPLETED | OUTPATIENT
Start: 2022-08-20 | End: 2022-08-20

## 2022-08-20 RX ORDER — AMLODIPINE BESYLATE 5 MG/1
10 TABLET ORAL
Status: COMPLETED | OUTPATIENT
Start: 2022-08-20 | End: 2022-08-20

## 2022-08-20 RX ORDER — OXYMETAZOLINE HCL 0.05 %
1 SPRAY, NON-AEROSOL (ML) NASAL
Status: COMPLETED | OUTPATIENT
Start: 2022-08-20 | End: 2022-08-20

## 2022-08-20 RX ADMIN — ONDANSETRON 4 MG: 4 TABLET, ORALLY DISINTEGRATING ORAL at 04:08

## 2022-08-20 RX ADMIN — OXYMETAZOLINE HYDROCHLORIDE 1 SPRAY: 5 SPRAY NASAL at 05:08

## 2022-08-20 RX ADMIN — HYDROCODONE BITARTRATE AND ACETAMINOPHEN 1 TABLET: 10; 325 TABLET ORAL at 04:08

## 2022-08-20 RX ADMIN — CLONIDINE HYDROCHLORIDE 0.1 MG: 0.1 TABLET ORAL at 05:08

## 2022-08-20 RX ADMIN — AMLODIPINE BESYLATE 10 MG: 5 TABLET ORAL at 05:08

## 2022-08-20 RX ADMIN — HYDRALAZINE HYDROCHLORIDE 25 MG: 25 TABLET, FILM COATED ORAL at 05:08

## 2022-08-20 NOTE — FIRST PROVIDER EVALUATION
Medical screening exam completed.  I have conducted a focused provider triage encounter, findings are as follows:    Brief history of present illness:  Pt presents with c/o nosebleed x2 days after nasal septoplasty.  States it started bleeding worse tonight.    There were no vitals filed for this visit.    Pertinent physical exam:      Brief workup plan:      Preliminary workup initiated; this workup will be continued and followed by the physician or advanced practice provider that is assigned to the patient when roomed.

## 2022-08-20 NOTE — ED PROVIDER NOTES
"SCRIBE #1 NOTE: I, Arnav Domo, am scribing for, and in the presence of, Vazquez Rivero MD. I have scribed the entire note.      History      Chief Complaint   Patient presents with    Epistaxis     Nasal septal surgery 2 days ago at the [facility], pt states continuous  bleeding since. Spoke with MD and they stated bleeding post op was normal but tonight per wife it was actively flowing. Bleeding scant amount at triage.       Review of patient's allergies indicates:   Allergen Reactions    Demerol (pf) [meperidine (pf)] Other (See Comments)     Pt reports,"They lost my blood pressure."    Percocet [oxycodone-acetaminophen] Itching     itching    Demerol [meperidine]         HPI   HPI    8/20/2022, 3:58 AM   History obtained from the patient      History of Present Illness: Narendra English Sr. is a 57 y.o. male patient who presents to the Emergency Department for epistaxis, onset 2 days PTA. Pt states that his sxs started following a nasal septoplasty on 8/17/22 with Dr. Ferrell (ENT). Symptoms are constant and moderate in severity. No mitigating or exacerbating factors reported. Associated sxs include n/v. Patient denies any fever, chills, SOB, CP, weakness, numbness, dizziness, headache, and all other sxs at this time. No prior Tx reported. No further complaints or concerns at this time.     Arrival mode: Personal vehicle    PCP: Skyla Ardon MD       Past Medical History:  Past Medical History:   Diagnosis Date    Cancer     Colon    Diabetes mellitus 8 years    BS 84 in the room 08/11/2021    Hypertension     Sleep apnea     Thyroid disease        Past Surgical History:  Past Surgical History:   Procedure Laterality Date    BACK SURGERY      COLONOSCOPY N/A 12/29/2020    Procedure: COLONOSCOPY;  Surgeon: William Cotter MD;  Location: [facility];  Service: Endoscopy;  Laterality: N/A;  Will need Rapid doesn't live here    COLONOSCOPY N/A 2/10/2022    Procedure: COLONOSCOPY;  Surgeon: Wili ELLIS" MD Jesús;  Location: [facility] ENDO;  Service: Endoscopy;  Laterality: N/A;    FESS, WITH NASAL SEPTOPLASTY, WITH IMAGING GUIDANCE Bilateral 8/17/2022    Procedure: FESS, WITH NASAL SEPTOPLASTY, WITH IMAGING GUIDANCE;  Surgeon: Viktor Ferrell MD;  Location: [facility] OR;  Service: ENT;  Laterality: Bilateral;    LAPAROSCOPIC RIGHT COLON RESECTION N/A 2/2/2021    Procedure: COLECTOMY, RIGHT, LAPAROSCOPIC, ERAS low;  Surgeon: Melonie Santoyo MD;  Location: [address];  Service: Colon and Rectal;  Laterality: N/A;  needs rapid covid test    NASAL TURBINATE REDUCTION Bilateral 8/17/2022    Procedure: REDUCTION, NASAL TURBINATE;  Surgeon: Viktor Ferrell MD;  Location: [facility] OR;  Service: ENT;  Laterality: Bilateral;    TONSILLECTOMY           Family History:  Family History   Problem Relation Age of Onset    Hypertension Mother     Diabetes Mother     Breast cancer Mother     Diabetes Maternal Grandmother     Hypertension Brother     Stroke Father        Social History:  Social History     Tobacco Use    Smoking status: Never Smoker    Smokeless tobacco: Never Used   Substance and Sexual Activity    Alcohol use: Not Currently    Drug use: Never    Sexual activity: Yes     Partners: Female       ROS   Review of Systems   Constitutional: Negative for chills and fever.   HENT: Positive for nosebleeds. Negative for sore throat.    Respiratory: Negative for shortness of breath.    Cardiovascular: Negative for chest pain.   Gastrointestinal: Positive for nausea and vomiting. Negative for diarrhea.   Genitourinary: Negative for dysuria.   Musculoskeletal: Negative for back pain.   Skin: Negative for rash.   Neurological: Negative for dizziness, weakness, light-headedness, numbness and headaches.   Hematological: Does not bruise/bleed easily.   All other systems reviewed and are negative.    Physical Exam      Initial Vitals [08/19/22 2326]   BP Pulse Resp Temp SpO2   (!) 180/105 97 18 98.4 °F (36.9 °C) 100 %  "     MAP       --          Physical Exam  Nursing Notes and Vital Signs Reviewed.  Constitutional: Patient is in no acute distress. Well-developed and well-nourished.  Head: Atraumatic. Normocephalic.  Eyes: PERRL. EOM intact. Conjunctivae are not pale. No scleral icterus.  ENT: Mucous membranes are moist. Oropharynx is clear and symmetric. Active epistaxis from the L nare. Surgical stint visualized in the R nare  Neck: Supple. Full ROM.   Cardiovascular: Regular rate. Regular rhythm. No murmurs, rubs, or gallops. Distal pulses are 2+ and symmetric.  Pulmonary/Chest: No respiratory distress. Clear to auscultation bilaterally. No wheezing or rales.  Abdominal: Soft and non-distended.  There is no tenderness.  No rebound, guarding, or rigidity.  Musculoskeletal: Moves all extremities. No obvious deformities. No edema.  Skin: Warm and dry.  Neurological:  Alert, awake, and appropriate.  Normal speech.  No acute focal neurological deficits are appreciated.  Psychiatric: Normal affect. Good eye contact. Appropriate in content.    ED Course    Procedures  ED Vital Signs:  Vitals:    08/19/22 2326 08/20/22 0232 08/20/22 0302 08/20/22 0422   BP: (!) 180/105 (!) 184/101 (!) 188/96    Pulse: 97 91 86    Resp: 18   20   Temp: 98.4 °F (36.9 °C)      TempSrc: Oral      SpO2: 100% 100% 100%    Weight: 120 kg (264 lb 8.8 oz)      Height: 6' 1" (1.854 m)       08/20/22 0432 08/20/22 0520   BP: (!) 195/103 (!) 164/88   Pulse: 83 74   Resp:     Temp:     TempSrc:     SpO2: 98% 95%   Weight:     Height:         Abnormal Lab Results:  Labs Reviewed - No data to display     Imaging Results:  Imaging Results    None                 The Emergency Provider reviewed the vital signs and test results, which are outlined above.    ED Discussion     4:11 AM: Discussed pt's case with pt's surgeon, Dr. Ferrell (ENT), who will come evaluate pt at bedside.    5:00 AM: Dr. Ferrell (ENT) is at bedside.    5:17 AM: Dr. Ferrell (ENT) has placed packing " in the pt's L nare, epistaxis is controlled at this time.    5:29 AM: Reassessed pt at this time. Discussed with pt all pertinent ED information. Discussed pt dx and plan of tx. Gave pt all f/u and return to the ED instructions. All questions and concerns were addressed at this time. Pt expresses understanding of information and instructions, and is comfortable with plan to discharge. Pt is stable for discharge.    I discussed with patient and/or family/caretaker that evaluation in the ED does not suggest any emergent or life threatening medical conditions requiring immediate intervention beyond what was provided in the ED, and I believe patient is safe for discharge.  Regardless, an unremarkable evaluation in the ED does not preclude the development or presence of a serious of life threatening condition. As such, patient was instructed to return immediately for any worsening or change in current symptoms.         ED Medication(s):  Medications   HYDROcodone-acetaminophen  mg per tablet 1 tablet (1 tablet Oral Given 8/20/22 0422)   ondansetron disintegrating tablet 4 mg (4 mg Oral Given 8/20/22 0422)   oxymetazoline 0.05 % nasal spray 1 spray (1 spray Each Nostril Given 8/20/22 0500)   cloNIDine tablet 0.1 mg (0.1 mg Oral Given 8/20/22 0527)        Follow-up Information     Viktor Ferrell MD.    Specialty: Otolaryngology  Contact information:  [address]  [place] [address]  [phone]             [facility] - Emergency Dept..    Specialty: Emergency Medicine  Why: As needed, If symptoms worsen  Contact information:  [address] [facility] [address] [phone]                      New Prescriptions    No medications on file         Medical Decision Making              Scribe Attestation:   Scribe #1: I performed the above scribed service and the documentation accurately describes the services I performed. I attest to the accuracy of the note.    Attending:   Physician  Attestation Statement for Scribe #1: I, Vazquez Rivero MD, personally performed the services described in this documentation, as scribed by Arnav Oliveros, in my presence, and it is both accurate and complete.          Clinical Impression       ICD-10-CM ICD-9-CM   1. Epistaxis  R04.0 784.7       Disposition:   Disposition: Discharged  Condition: Stable         Vazquez Rivero MD  08/20/22 5418

## 2022-08-20 NOTE — CONSULTS
Otolaryngology Consultation Note       Referring Physician: Dr. Vazquez Rivero MD       Chief Complaint/Reason for Consult: epistaxis          History of Presenting Illness:      Narendra English Sr. is a  57 y.o. male s/p FESS, septoplasty on 8/17 presenting with epistaxis. Since surgery has had persistent bleeding from left nare. Soaking drip pad multiple times per hour. Not able to sleep at night. Some going down back of his throat when he tries to sleep. Exacerbated by lying down. No treatment tried. Minimal pain.    Since arrival, he has been afebrile, hypertensive (180-189 SBP), with normal rate. Does have HTN, poorly controlled recently, and unable to take his BP med yesterday due to nausea/bleeding.             Past Medical History:   Diagnosis Date    Cancer     Colon    Diabetes mellitus 8 years    BS 84 in the room 08/11/2021    Hypertension     Sleep apnea     Thyroid disease              Past Surgical History:   Procedure Laterality Date    BACK SURGERY      COLONOSCOPY N/A 12/29/2020    Procedure: COLONOSCOPY;  Surgeon: William Cotter MD;  Location: [facility];  Service: Endoscopy;  Laterality: N/A;  Will need Rapid doesn't live here    COLONOSCOPY N/A 2/10/2022    Procedure: COLONOSCOPY;  Surgeon: Wili Espinosa MD;  Location: [facility];  Service: Endoscopy;  Laterality: N/A;    FESS, WITH NASAL SEPTOPLASTY, WITH IMAGING GUIDANCE Bilateral 8/17/2022    Procedure: FESS, WITH NASAL SEPTOPLASTY, WITH IMAGING GUIDANCE;  Surgeon: Viktor Ferrell MD;  Location: [facility];  Service: ENT;  Laterality: Bilateral;    LAPAROSCOPIC RIGHT COLON RESECTION N/A 2/2/2021    Procedure: COLECTOMY, RIGHT, LAPAROSCOPIC, ERAS low;  Surgeon: Melonie Santoyo MD;  Location: [address];  Service: Colon and Rectal;  Laterality: N/A;  needs rapid covid test    NASAL TURBINATE REDUCTION Bilateral 8/17/2022    Procedure: REDUCTION, NASAL TURBINATE;  Surgeon: Viktor Ferrell MD;  Location: [facility];   Service: ENT;  Laterality: Bilateral;    TONSILLECTOMY            Family History   Problem Relation Age of Onset    Hypertension Mother     Diabetes Mother     Breast cancer Mother     Diabetes Maternal Grandmother     Hypertension Brother     Stroke Father              Social History     Socioeconomic History    Marital status:    Tobacco Use    Smoking status: Never Smoker    Smokeless tobacco: Never Used   Substance and Sexual Activity    Alcohol use: Not Currently    Drug use: Never    Sexual activity: Yes     Partners: Female             Current Facility-Administered Medications on File Prior to Encounter   Medication Dose Route Frequency Provider Last Rate Last Admin    gabapentin capsule 300 mg  300 mg Oral TID Dali Lim NP   300 mg at 02/04/21 0810    lactated ringers infusion   Intravenous Continuous Katie Shukla MD   Stopped at 08/17/22 1741     Current Outpatient Medications on File Prior to Encounter   Medication Sig Dispense Refill    atorvastatin (LIPITOR) 40 MG tablet TAKE 1 TABLET(40 MG) BY MOUTH EVERY DAY 90 tablet 3    azelastine (ASTELIN) 137 mcg (0.1 %) nasal spray 1 spray (137 mcg total) by Nasal route 2 (two) times daily. 30 mL 3    benazepriL (LOTENSIN) 40 MG tablet Take 1 tablet (40 mg total) by mouth once daily. 90 tablet 3    clonazePAM (KLONOPIN) 2 MG Tab TAKE 1 TABLET BY MOUTH EVERY EVENING 30 tablet 1    colchicine (COLCRYS) 0.6 mg tablet TAKE 1 TABLET(0.6 MG) BY MOUTH TWICE DAILY 180 tablet 1    flash glucose scanning reader Misc 1 application.      flash glucose sensor Kit 1 application.      fluticasone propionate (FLONASE) 50 mcg/actuation nasal spray 1 spray (50 mcg total) by Each Nostril route once daily. 16 g 0    hydrALAZINE (APRESOLINE) 25 MG tablet Take 1 tablet (25 mg total) by mouth every 12 (twelve) hours. 60 tablet 11    HYDROcodone-acetaminophen (NORCO) 5-325 mg per tablet Take 1 tablet by mouth every 6 (six) hours as needed for  "Pain. 20 tablet 0    indomethacin (INDOCIN SR) 75 mg CpSR CR capsule Take 75 mg by mouth 2 (two) times daily with meals.      ipratropium (ATROVENT) 21 mcg (0.03 %) nasal spray USE 2 SPRAYS IN EACH NOSTRIL TWICE DAILY 30 mL 0    levothyroxine (SYNTHROID) 112 MCG tablet TAKE 1 TABLET(112 MCG) BY MOUTH BEFORE BREAKFAST 90 tablet 3    metFORMIN (GLUCOPHAGE) 1000 MG tablet TAKE 1 TABLET(1000 MG) BY MOUTH TWICE DAILY WITH MEALS 180 tablet 1    metoprolol succinate (TOPROL-XL) 50 MG 24 hr tablet Take 1 tablet (50 mg total) by mouth once daily. 90 tablet 1    OZEMPIC 1 mg/dose (4 mg/3 mL) INJECT 1 MG INTO THE SKIN ONCE WEEKLY 4 pen 11    testosterone (ANDROGEL) 20.25 mg/1.25 gram (1.62 %) GlPm PLACE 2 PUMPS(40.5 MG) ONTO THE SKIN ONCE DAILY AT 6 AM 75 g 0    TOUJEO SOLOSTAR U-300 INSULIN 300 unit/mL (1.5 mL) InPn pen INJECT 85 UNITS INTO THE SKIN EVERY DAY 4 pen 1    VASCEPA 1 gram Cap Take 2 capsules (2,000 mg total) by mouth 2 (two) times daily. 90 capsule 1    amLODIPine (NORVASC) 10 MG tablet TAKE 1 TABLET(10 MG) BY MOUTH EVERY DAY 90 tablet 1    levocetirizine (XYZAL) 5 MG tablet Take 5 mg by mouth every evening.      pep injection Inject 0.15 ml as directed     For compounding pharmacy use:   Add PAPAVERINE 30 mg  Add PHENTOLAMINE 1 mg  Add ALPROSTADIL 20 mcg 1 vial 5             Review of patient's allergies indicates:   Allergen Reactions    Demerol (pf) [meperidine (pf)] Other (See Comments)     Pt reports,"They lost my blood pressure."    Percocet [oxycodone-acetaminophen] Itching     itching    Demerol [meperidine]               Review of Systems:   Constitutional: Negative for chills and fever.   HENT: Positive for nosebleeds. Negative for sore throat.    Respiratory: Negative for shortness of breath.    Cardiovascular: Negative for chest pain.   Gastrointestinal: Positive for nausea and vomiting. Negative for diarrhea.   Genitourinary: Negative for dysuria.   Musculoskeletal: Negative for back " pain.   Skin: Negative for rash.   Neurological: Negative for dizziness, weakness, light-headedness, numbness and headaches.   Hematological: Does not bruise/bleed easily.   All other systems reviewed and are negative.      OBJECTIVE:     Vital Signs:     Vitals:    08/20/22 0520   BP: (!) 164/88   Pulse: 74   Resp:    Temp:              I&O     No intake or output data in the 24 hours ending 08/20/22 0522          Physical Exam     General:  No acute distress. Voice strong     Head/Face: Normocephalic, atraumatic. No scars or lesions. Facial sensation intact bilaterally in distribution of V1-V3. Facial nerve intact and equal bilaterally.  No facial lacerations.      Eyes: PERRL, EOMI     Ears: No gross deformity. Normal external canal.      Hearing: Normal speech reception.     Nose: No gross deformity or lesions. No purulent discharge.  Obvious bleeding from left nare, splints in place    Mouth/Oropharynx: Lips without any lesions. Dentition fair. No mucosal lesions within the oropharynx.  No significant bleeding down posterior oropharynx, no clot     Neck: Trachea midline. No masses. No thyromegaly or nodules palpated. No crepitus.     Lymphatic: No lymphadenopathy in the neck.     Respiratory: No stridor or distress.     Cardiovascular: Regular rate and rhythm.     Extremities: No edema or cyanosis. Warm and well-perfused.     Skin: No scars or lesions on face or neck.     Neurologic: Moving all extremities without gross abnormality.         Review of Ancillary Data / Diagnostic Tests:     Op note reviewed, 8/17/22  Bilateral max, ethmoids, left frontal, b/l toya, septoplasty, inferior turbinate reduction     Labs:     Lab Results   Component Value Date    WBC 6.79 03/14/2022    HGB 14.3 03/14/2022    HCT 45.6 03/14/2022     03/14/2022    CHOL 89 (L) 03/14/2022    TRIG 130 03/14/2022    HDL 32 (L) 03/14/2022    ALT 72 (H) 08/12/2022    AST 38 08/12/2022     08/12/2022    K 4.1 08/12/2022      08/12/2022    CREATININE 1.0 08/12/2022    BUN 15 08/12/2022    CO2 23 08/12/2022    TSH 1.817 03/14/2022    PSA 0.77 06/16/2022    HGBA1C 6.2 (H) 03/14/2022          Procedure - control of epistaxis, anterior and posterior, complex with packing     Description: Risks, benefits, and alternatives of the procedure were discussed with the patient, and the patient consented to the control of epistaxis.  The nasal cavity was sprayed with a topical decongestant and topical anesthetic. After adequate anesthesia was obtained, the septal splits were removed. Afrin was applied. Clot was suctioned clear from the left nasal cavity. Active bleeding was noted. With a nasal speculum an anterior source was not identified. Therefore, ctrol of epistaxis was performed for the left side with placement of an 8 cm merocel pack. This was placed atraumatically, then hydrated with Afrin. At the end of the procedure there was no further bleeding from the nose and sinuses.  The patient tolerated the procedure well with no complications.    Nasal Findings  -     The area(s) causing the epistaxis were found to be originating from left posterior nasal cavity.      ASSESSMENT:     57 y.o. male s/p FESS, septoplasty on 8/17 with epistaxis (left) in setting of uncontrolled HTN. Splints removed and packing placed.        RECOMMENDATIONS:   - tight HTN control, resume regular home meds, PRN while in house  - observation x 1 hour, if no further significant bleeding can be d/c and keep f/u for Monday         Electronically signed by: Viktor Ferrell

## 2022-08-22 ENCOUNTER — OFFICE VISIT (OUTPATIENT)
Dept: OTOLARYNGOLOGY | Facility: CLINIC | Age: 57
End: 2022-08-22
Payer: COMMERCIAL

## 2022-08-22 VITALS
DIASTOLIC BLOOD PRESSURE: 98 MMHG | BODY MASS INDEX: 34.23 KG/M2 | SYSTOLIC BLOOD PRESSURE: 180 MMHG | TEMPERATURE: 97 F | WEIGHT: 259.5 LBS

## 2022-08-22 DIAGNOSIS — J34.2 NASAL SEPTAL DEVIATION: ICD-10-CM

## 2022-08-22 DIAGNOSIS — I16.0 HYPERTENSIVE URGENCY: ICD-10-CM

## 2022-08-22 DIAGNOSIS — J34.89 CONCHA BULLOSA: ICD-10-CM

## 2022-08-22 DIAGNOSIS — J32.9 CHRONIC SINUSITIS, UNSPECIFIED LOCATION: Primary | ICD-10-CM

## 2022-08-22 DIAGNOSIS — J34.3 HYPERTROPHY OF BOTH INFERIOR NASAL TURBINATES: ICD-10-CM

## 2022-08-22 PROCEDURE — 1159F PR MEDICATION LIST DOCUMENTED IN MEDICAL RECORD: ICD-10-PCS | Mod: CPTII,S$GLB,, | Performed by: STUDENT IN AN ORGANIZED HEALTH CARE EDUCATION/TRAINING PROGRAM

## 2022-08-22 PROCEDURE — 3080F PR MOST RECENT DIASTOLIC BLOOD PRESSURE >= 90 MM HG: ICD-10-PCS | Mod: CPTII,S$GLB,, | Performed by: STUDENT IN AN ORGANIZED HEALTH CARE EDUCATION/TRAINING PROGRAM

## 2022-08-22 PROCEDURE — 3080F DIAST BP >= 90 MM HG: CPT | Mod: CPTII,S$GLB,, | Performed by: STUDENT IN AN ORGANIZED HEALTH CARE EDUCATION/TRAINING PROGRAM

## 2022-08-22 PROCEDURE — 3072F PR LOW RISK FOR RETINOPATHY: ICD-10-PCS | Mod: CPTII,S$GLB,, | Performed by: STUDENT IN AN ORGANIZED HEALTH CARE EDUCATION/TRAINING PROGRAM

## 2022-08-22 PROCEDURE — 3077F SYST BP >= 140 MM HG: CPT | Mod: CPTII,S$GLB,, | Performed by: STUDENT IN AN ORGANIZED HEALTH CARE EDUCATION/TRAINING PROGRAM

## 2022-08-22 PROCEDURE — 3077F PR MOST RECENT SYSTOLIC BLOOD PRESSURE >= 140 MM HG: ICD-10-PCS | Mod: CPTII,S$GLB,, | Performed by: STUDENT IN AN ORGANIZED HEALTH CARE EDUCATION/TRAINING PROGRAM

## 2022-08-22 PROCEDURE — 99024 PR POST-OP FOLLOW-UP VISIT: ICD-10-PCS | Mod: S$GLB,,, | Performed by: STUDENT IN AN ORGANIZED HEALTH CARE EDUCATION/TRAINING PROGRAM

## 2022-08-22 PROCEDURE — 99999 PR PBB SHADOW E&M-EST. PATIENT-LVL IV: CPT | Mod: PBBFAC,,, | Performed by: STUDENT IN AN ORGANIZED HEALTH CARE EDUCATION/TRAINING PROGRAM

## 2022-08-22 PROCEDURE — 4010F ACE/ARB THERAPY RXD/TAKEN: CPT | Mod: CPTII,S$GLB,, | Performed by: STUDENT IN AN ORGANIZED HEALTH CARE EDUCATION/TRAINING PROGRAM

## 2022-08-22 PROCEDURE — 99024 POSTOP FOLLOW-UP VISIT: CPT | Mod: S$GLB,,, | Performed by: STUDENT IN AN ORGANIZED HEALTH CARE EDUCATION/TRAINING PROGRAM

## 2022-08-22 PROCEDURE — 1159F MED LIST DOCD IN RCRD: CPT | Mod: CPTII,S$GLB,, | Performed by: STUDENT IN AN ORGANIZED HEALTH CARE EDUCATION/TRAINING PROGRAM

## 2022-08-22 PROCEDURE — 3072F LOW RISK FOR RETINOPATHY: CPT | Mod: CPTII,S$GLB,, | Performed by: STUDENT IN AN ORGANIZED HEALTH CARE EDUCATION/TRAINING PROGRAM

## 2022-08-22 PROCEDURE — 3008F BODY MASS INDEX DOCD: CPT | Mod: CPTII,S$GLB,, | Performed by: STUDENT IN AN ORGANIZED HEALTH CARE EDUCATION/TRAINING PROGRAM

## 2022-08-22 PROCEDURE — 3044F PR MOST RECENT HEMOGLOBIN A1C LEVEL <7.0%: ICD-10-PCS | Mod: CPTII,S$GLB,, | Performed by: STUDENT IN AN ORGANIZED HEALTH CARE EDUCATION/TRAINING PROGRAM

## 2022-08-22 PROCEDURE — 99999 PR PBB SHADOW E&M-EST. PATIENT-LVL IV: ICD-10-PCS | Mod: PBBFAC,,, | Performed by: STUDENT IN AN ORGANIZED HEALTH CARE EDUCATION/TRAINING PROGRAM

## 2022-08-22 PROCEDURE — 4010F PR ACE/ARB THEARPY RXD/TAKEN: ICD-10-PCS | Mod: CPTII,S$GLB,, | Performed by: STUDENT IN AN ORGANIZED HEALTH CARE EDUCATION/TRAINING PROGRAM

## 2022-08-22 PROCEDURE — 3008F PR BODY MASS INDEX (BMI) DOCUMENTED: ICD-10-PCS | Mod: CPTII,S$GLB,, | Performed by: STUDENT IN AN ORGANIZED HEALTH CARE EDUCATION/TRAINING PROGRAM

## 2022-08-22 PROCEDURE — 3044F HG A1C LEVEL LT 7.0%: CPT | Mod: CPTII,S$GLB,, | Performed by: STUDENT IN AN ORGANIZED HEALTH CARE EDUCATION/TRAINING PROGRAM

## 2022-08-22 NOTE — PROGRESS NOTES
Chief complaint:    Chief Complaint   Patient presents with    Follow-up     Post op FESS, packing in place - reports still bleeding         Referring Provider:  No referring provider defined for this encounter.      History of present illness:     Mr. English is a 57 y.o. presenting for evaluation of sinus congestion.     The patient reports the following allergy/sinus symptoms:     Headaches - 3-4 times/day, forehead    Nasal congestion, purulent anterior drainage, and PND - present all the time  Coughing - at night    No fever, no hyposmia.     Started after COVID in January. Symptoms have been present for almost continuously since then.    Treatment has included: Zytrec or Xyzal, mucinex, abx given 1 month ago for tooth issue. Had no effect on nasal issues.     Prior to the last few months patient has had about 0 sinus infections in the past 12 months.   Prior sinus surgery: no.    Current medications: ibuprofen 800, mucinex, and xyzal. No help with any of it.    Allergy history: yes, when he was young, not bad as an adult       Return clinic visit, 4/11/22  Completed abx and steroids. Since then, symptoms have minimally improved. Major complaints are nasal drip (clear) and nasal congestion (both sides), and some forehead pressure.     Return clinic visit, 5/31/22  Using saline, flonase, and Atrovent.   Post nasal drip has improved, but nasal congestion has not. Bilateral, sometimes worse on either side. Worse at night, but present all the time.   Intermittent facial pressure/pain, anterior rhinorrhea.     Return clinic visit, 7/18/22  No major change since last visit.   Continued L>R nasal obstruction, all the time, worse at night. Also with facial pressure/pain and purulent rhinorrhea.   Using astelin, flonase, atrovent without much change. Also completed a course of steroids and antibiotics. Gets improvement with abx/steroids then returns.    Return clinic visit, 8/22/22  S/p FESS and septoplasty. Epistaxis  requiring packing on POD2. Since then, bleeding has greatly slowed. Still spitting up some dark blood from time to time. No bright red blood.           History      Past Medical History:   Past Medical History:   Diagnosis Date    Cancer     Colon    Diabetes mellitus 8 years    BS 84 in the room 08/11/2021    Hypertension     Sleep apnea     Thyroid disease          Past Surgical History:  Past Surgical History:   Procedure Laterality Date    BACK SURGERY      COLONOSCOPY N/A 12/29/2020    Procedure: COLONOSCOPY;  Surgeon: William Cotter MD;  Location: [facility] ENDO;  Service: Endoscopy;  Laterality: N/A;  Will need Rapid doesn't live here    COLONOSCOPY N/A 2/10/2022    Procedure: COLONOSCOPY;  Surgeon: Wili Espinosa MD;  Location: [facility] ENDO;  Service: Endoscopy;  Laterality: N/A;    FESS, WITH NASAL SEPTOPLASTY, WITH IMAGING GUIDANCE Bilateral 8/17/2022    Procedure: FESS, WITH NASAL SEPTOPLASTY, WITH IMAGING GUIDANCE;  Surgeon: Viktor Ferrell MD;  Location: [facility] OR;  Service: ENT;  Laterality: Bilateral;    LAPAROSCOPIC RIGHT COLON RESECTION N/A 2/2/2021    Procedure: COLECTOMY, RIGHT, LAPAROSCOPIC, ERAS low;  Surgeon: Melonie Santoyo MD;  Location: [address];  Service: Colon and Rectal;  Laterality: N/A;  needs rapid covid test    NASAL TURBINATE REDUCTION Bilateral 8/17/2022    Procedure: REDUCTION, NASAL TURBINATE;  Surgeon: Viktor Ferrell MD;  Location: [facility] OR;  Service: ENT;  Laterality: Bilateral;    TONSILLECTOMY           Medications: Medication list reviewed. He  has a current medication list which includes the following prescription(s): amlodipine, atorvastatin, azelastine, benazepril, clonazepam, colchicine, flash glucose scanning reader, flash glucose sensor, fluticasone propionate, hydralazine, hydrocodone-acetaminophen, indomethacin, ipratropium, levocetirizine, levothyroxine, metformin, metoprolol succinate, ozempic, alprostadil, testosterone, toujeo solostar  "u-300 insulin, and vascepa, and the following Facility-Administered Medications: gabapentin and lactated ringers.     Allergies:   Review of patient's allergies indicates:   Allergen Reactions    Demerol (pf) [meperidine (pf)] Other (See Comments)     Pt reports,"They lost my blood pressure."    Percocet [oxycodone-acetaminophen] Itching     itching    Demerol [meperidine]          Family history: family history includes Breast cancer in his mother; Diabetes in his maternal grandmother and mother; Hypertension in his brother and mother; Stroke in his father.         Social History          Alcohol use:  reports previous alcohol use.            Tobacco:  reports that he has never smoked. He has never used smokeless tobacco.         Physical Examination      Vitals: Blood pressure (!) 180/98, temperature 97.2 °F (36.2 °C), temperature source Temporal, weight 117.7 kg (259 lb 7.7 oz).      General: Well developed, well nourished, well hydrated.     Voice: no dysphonia, no dysarthria      Head/Face: Normocephalic, atraumatic. No scars or lesions. Facial musculature equal.     Eyes: No scleral icterus or conjunctival hemorrhage. EOMI. PERRLA.     Ears:     · Right ear: No gross deformity. EAC is clear of debris and erythema. TM are intact with a pneumatized middle ear. No signs of retraction, fluid or infection.      · Left ear: No gross deformity. EAC is clear of debris and erythema. TM are intact with a pneumatized middle ear. No signs of retraction, fluid or infection.      Nose: No gross deformity or lesions. Left merocel in place. No active bleeding. Small streak of dark blood down posterior oropharynx. Right nare dry    Mouth/Oropharynx: Lips without any lesions. No mucosal lesions within the oropharynx. No tonsillar exudate or lesions. Pharyngeal walls symmetrical. Uvula midline. Tongue midline without lesions.     Neurologic: Moving all extremities without gross abnormality.CN II-XII grossly intact. " House-Brackmann 1/6. No signs of nystagmus.        Data reviewed      Review of records:      I reviewed records from the referring provider's office visits describing the history, workup, and/or treatment of this problem thus far.    Imaging  I have independently reviewed the following imaging with the findings noted below:      CT sinus, 08/22/2022  Bilateral toya bullosa  S-shaped septal deviation, left mid-septal spur  Inferior turbinate reduction   Left frontal thickening and obstruction of outflow  Bilateral anterior ethmoid disease     Op note, 8/20/22     PROCEDURE:   Endoscopic septoplasty   Bilateral inferior turbinate submucosal resection  Nasal endoscopy with resection of bilateral tyoa bullosa   Bilateral nasal endoscopy with maxillary antrostomy   Bilateral nasal endoscopy with anterior ethmoidectomy    Left nasal endoscopy with frontal sinusotomy and frontal sinus exploration   Functional endoscopic sinus surgery with CT image guided electromagnetic system     OPERATIVE FINDINGS:   Polypoid mucosal thickening at the left frontal outflow tract  Bilateral polypoid thickening in anterior ethmoids  Left septal deviation with large left septal spur      Assessment/Plan:    Chronic Rhinosinusitis following URI  Vasomotor rhinitis   Septal deviation  Inferior turbinate hypertrophy    Narendra  presents today for initial evaluation.  The patient presents with significant evidence of chronic sinusitis.My recommendation is treatment of chronic rhinosinusitis with a course of oral steroids and prolonged course of antibiotics. The patient will return in 3-4 weeks after completion of treatment. If the patient has persistent symptoms of sinusitis with nasal endoscopy findings consistent with sinusitis we will proceed with a CT scan. We discussed the possible need for sinus surgery in the event of persistent sinusitis resistant to medical management. We discussed at length the risk of sinus surgery including, but  not limited to, recurrence of disease, bleeding, infection, septal perforation, tooth or cheek numbness, vision changes, orbital injury, CSF leak and changes in smell.  The patient had opportunity to ask questions and I answered all of them to their satisfaction.     Update 8/22/22  S/p FESS and septoplasty with post op epistaxis complicated by hypertensive urgency (-190 in ED and again here today). Pack still in place. Slow dark oozing at times. Will leave until Wednesday. BP today 180/90 again. Will message his PCP about altering regimen to get BP under better control.             Viktor Ferrell MD  [facility]   [facility]  [id number] [place] [address].  [place] [address]  P: [phone]  F: [phone]

## 2022-08-23 ENCOUNTER — TELEPHONE (OUTPATIENT)
Dept: OTOLARYNGOLOGY | Facility: CLINIC | Age: 57
End: 2022-08-23
Payer: COMMERCIAL

## 2022-08-23 NOTE — TELEPHONE ENCOUNTER
Pt's wife called stating pt had sx last  Wed, and has been bleeding ever since. States he went to ER on Fri, stents were taken out and packing placed. States they have used Afrin to help stop the bleeding. . Wife states she is an RN and is not stupid. She knows that an artery was knpicked causing an arterial bleed because nothing tammy would bleed like this. States he was seen on Mon and was told to come back on Wed at 7:30. States pt is extremely upset because now Dr. Ferrell has a sx at that time and cant see pt until later in the day. Wife states she is his only ride and can only bring him in at 7:30. Wife states pt was yelling at her on phone and was on phone with an . Wife states he is gonna have to be seen today or go to ER today due to the bleeding. Informed I will contact Dr. Ferrell to receive direction on how to proceed. Dr. Ferrell is currently on the phone with the wife now and has already spoken to pt.

## 2022-08-23 NOTE — TELEPHONE ENCOUNTER
Spoke with Mr. English on the phone in regards to changing appointment time for tomorrow.    Since pack placement the bleeding has significantly slowed. On my exam Monday there was no active bleeding and only minimal dark old blood along the posterior oropharynx. We elected to keep the pack in at that time as it had been in place only 2 days and his blood pressures were still extremely elevated in clinic. The plan was to keep the pack for 2-3 more days and work on tighter blood pressure control. I elected to add an appointment on for Wednesday for him during my OR day, instead of waiting until my next clinic day, Thursday. We discussed that if bleeding does not resolve by Wednesday's appointment for pack removal, we would plan to return to the OR for control of epistaxis.    His appointment for Wednesday was originally scheduled for 7:30, but had to be moved to 10:30 due to a change in the OR schedule. Our staff called to inform the patient of this, but he began shouting before hanging up. I then called the patient to speak him regarding the need to move the appointment back, and reiterated that, other than the time, the above plan had not changed. He was very agitated and shouting that he would not have a ride because of the later time. He said his wife was leaving town and he had no other options for a ride. I mentioned other options such as social work aiding in finding a ride, but the patient hung up.     I then called his wife and reiterated the above plan. She was understanding and stated she would be able to bring the patient to the appointment, but he would likely need a ride after - likely from his daughter or with an arranged ride. All questions were answered and she agreed and felt comfortable with the above plan.

## 2022-08-23 NOTE — TELEPHONE ENCOUNTER
Spoke with wife and instructed to increase hydralazine to 50mg twice a day.  They will be at [facility] tomorrow for 10:30 appt with Dr Ferrell.

## 2022-08-23 NOTE — TELEPHONE ENCOUNTER
----- Message from Kassidy Ardon sent at 8/23/2022 11:27 AM CDT -----  Contact: Payton/Wife  Payton is calling in regards to schedule an appointment . Payton states that patients has been bleeding since surgery. Please call her back at [phone].          Thanks  CF

## 2022-08-23 NOTE — PROGRESS NOTES
Chief complaint:    Chief Complaint   Patient presents with    Other     Post op FESS evaluation          Referring Provider:  No referring provider defined for this encounter.      History of present illness:     Mr. English is a 57 y.o. presenting for evaluation of sinus congestion.     The patient reports the following allergy/sinus symptoms:     Headaches - 3-4 times/day, forehead    Nasal congestion, purulent anterior drainage, and PND - present all the time  Coughing - at night    No fever, no hyposmia.     Started after COVID in January. Symptoms have been present for almost continuously since then.    Treatment has included: Zytrec or Xyzal, mucinex, abx given 1 month ago for tooth issue. Had no effect on nasal issues.     Prior to the last few months patient has had about 0 sinus infections in the past 12 months.   Prior sinus surgery: no.    Current medications: ibuprofen 800, mucinex, and xyzal. No help with any of it.    Allergy history: yes, when he was young, not bad as an adult       Return clinic visit, 4/11/22  Completed abx and steroids. Since then, symptoms have minimally improved. Major complaints are nasal drip (clear) and nasal congestion (both sides), and some forehead pressure.     Return clinic visit, 5/31/22  Using saline, flonase, and Atrovent.   Post nasal drip has improved, but nasal congestion has not. Bilateral, sometimes worse on either side. Worse at night, but present all the time.   Intermittent facial pressure/pain, anterior rhinorrhea.     Return clinic visit, 7/18/22  No major change since last visit.   Continued L>R nasal obstruction, all the time, worse at night. Also with facial pressure/pain and purulent rhinorrhea.   Using astelin, flonase, atrovent without much change. Also completed a course of steroids and antibiotics. Gets improvement with abx/steroids then returns.    Return clinic visit, 8/22/22  S/p FESS and septoplasty. Epistaxis requiring packing on POD2. Since  then, bleeding has greatly slowed. Still spitting up some dark blood from time to time. No bright red blood.     Return clinic visit, 8/24/22  Some slow oozing after pack placement, much improved. HTN to 180-190, but now 150s since doubling hydralazine.         History      Past Medical History:   Past Medical History:   Diagnosis Date    Cancer     Colon    Diabetes mellitus 8 years    BS 84 in the room 08/11/2021    Hypertension     Sleep apnea     Thyroid disease          Past Surgical History:  Past Surgical History:   Procedure Laterality Date    BACK SURGERY      COLONOSCOPY N/A 12/29/2020    Procedure: COLONOSCOPY;  Surgeon: William Cotter MD;  Location: [facility] ENDO;  Service: Endoscopy;  Laterality: N/A;  Will need Rapid doesn't live here    COLONOSCOPY N/A 2/10/2022    Procedure: COLONOSCOPY;  Surgeon: Wili Espinosa MD;  Location: [facility];  Service: Endoscopy;  Laterality: N/A;    FESS, WITH NASAL SEPTOPLASTY, WITH IMAGING GUIDANCE Bilateral 8/17/2022    Procedure: FESS, WITH NASAL SEPTOPLASTY, WITH IMAGING GUIDANCE;  Surgeon: Viktor Ferrell MD;  Location: [facility];  Service: ENT;  Laterality: Bilateral;    LAPAROSCOPIC RIGHT COLON RESECTION N/A 2/2/2021    Procedure: COLECTOMY, RIGHT, LAPAROSCOPIC, ERAS low;  Surgeon: Melonie Santoyo MD;  Location: [address];  Service: Colon and Rectal;  Laterality: N/A;  needs rapid covid test    NASAL TURBINATE REDUCTION Bilateral 8/17/2022    Procedure: REDUCTION, NASAL TURBINATE;  Surgeon: Viktor Ferrell MD;  Location: [facility] OR;  Service: ENT;  Laterality: Bilateral;    TONSILLECTOMY           Medications: Medication list reviewed. He  has a current medication list which includes the following prescription(s): amlodipine, atorvastatin, azelastine, benazepril, clonazepam, colchicine, flash glucose scanning reader, flash glucose sensor, fluticasone propionate, hydralazine, hydrocodone-acetaminophen, indomethacin, ipratropium,  "levocetirizine, levothyroxine, metformin, metoprolol succinate, ozempic, testosterone, toujeo solostar u-300 insulin, vascepa, and alprostadil, and the following Facility-Administered Medications: gabapentin and lactated ringers.     Allergies:   Review of patient's allergies indicates:   Allergen Reactions    Demerol (pf) [meperidine (pf)] Other (See Comments)     Pt reports,"They lost my blood pressure."    Percocet [oxycodone-acetaminophen] Itching     itching    Demerol [meperidine]          Family history: family history includes Breast cancer in his mother; Diabetes in his maternal grandmother and mother; Hypertension in his brother and mother; Stroke in his father.         Social History          Alcohol use:  reports previous alcohol use.            Tobacco:  reports that he has never smoked. He has never used smokeless tobacco.         Physical Examination      Vitals: Blood pressure (!) 156/86, pulse 69, temperature 98.4 °F (36.9 °C), temperature source Temporal.      General: Well developed, well nourished, well hydrated.     Voice: no dysphonia, no dysarthria      Head/Face: Normocephalic, atraumatic. No scars or lesions. Facial musculature equal.     Eyes: No scleral icterus or conjunctival hemorrhage. EOMI. PERRLA.     Ears:     · Right ear: No gross deformity. EAC is clear of debris and erythema. TM are intact with a pneumatized middle ear. No signs of retraction, fluid or infection.      · Left ear: No gross deformity. EAC is clear of debris and erythema. TM are intact with a pneumatized middle ear. No signs of retraction, fluid or infection.      Nose: No gross deformity or lesions. Pack in place, dry. Removed.    Mouth/Oropharynx: Lips without any lesions. No mucosal lesions within the oropharynx. No tonsillar exudate or lesions. Pharyngeal walls symmetrical. Uvula midline. Tongue midline without lesions. Dry.     Neurologic: Moving all extremities without gross abnormality.CN II-XII grossly " "intact. House-Brackmann 1/6. No signs of nystagmus.        Data reviewed      Review of records:      I reviewed records from the referring provider's office visits describing the history, workup, and/or treatment of this problem thus far.    Imaging  I have independently reviewed the following imaging with the findings noted below:      CT sinus, 08/24/2022  Bilateral toya bullosa  S-shaped septal deviation, left mid-septal spur  Inferior turbinate reduction   Left frontal thickening and obstruction of outflow  Bilateral anterior ethmoid disease     Op note, 8/20/22     PROCEDURE:   Endoscopic septoplasty   Bilateral inferior turbinate submucosal resection  Nasal endoscopy with resection of bilateral toya bullosa   Bilateral nasal endoscopy with maxillary antrostomy   Bilateral nasal endoscopy with anterior ethmoidectomy    Left nasal endoscopy with frontal sinusotomy and frontal sinus exploration   Functional endoscopic sinus surgery with CT image guided electromagnetic system     OPERATIVE FINDINGS:   Polypoid mucosal thickening at the left frontal outflow tract  Bilateral polypoid thickening in anterior ethmoids  Left septal deviation with large left septal spur      Pathology reviewed  1. "left sinus contents", excision:       -  Sinonasal mucosa with chronic inflammation       - No eosinophils seen   2. "septal cartilage", excision:       - Portion of cartilage     Procedure Note - Rigid Nasal Endoscopy with debridement    Surgeon: Viktor Ferrell MD  Anesthesia: topical oxymetazoline and 4% lidocaine.    Indication: Narendra English Sr. is a 57 y.o. male underwent recent sinus surgery. Debridement was indicated to remove crusting and clot and visualize at the surgical site.    Technique: The nose was sprayed with oxymetazoline and 4% lidocaine. With the patient in the upright position, a 2.7mm 30-degree endscope was inserted into the patient's right and left nare.  Where visible, nasal secretions and " mucosal crusting were removed with a suction. The overall appearance of the nasal cavity and paranasal sinuses were noted and the findings are described below.     Findings: residual packing and crusting removed. Patent sinus cavities. Small area of dried blood along the left inferior septum - small piece of surgicel placed. No active bleeding.       Assessment/Plan:    Chronic Rhinosinusitis following URI  Vasomotor rhinitis   Septal deviation  Inferior turbinate hypertrophy      S/p FESS and septoplasty with post op epistaxis complicated by hypertensive urgency (resolved, pack removed, HTN better controlled); debrided today  - continue saline x 1 week  - hold irrigations and flonase for 1 more week  - Return to clinic 3 weeks sooner if concerns arise      Epistaxis - resolved, pack removed, HTN better controlled, keep PCP f/u 2 weeks              Viktor Ferrell MD  [facility]   [facility]  [address]  [place] [address]  P: [phone]  F: [phone]

## 2022-08-23 NOTE — TELEPHONE ENCOUNTER
----- Message from Viktor Ferrell MD sent at 8/23/2022 12:28 PM CDT -----  Regarding: FW: post op bleeding complicated by hypertensive urgency  Thank you!  ----- Message -----  From: Skyla Ardon MD  Sent: 8/23/2022   8:15 AM CDT  To: Viktor Ferrell MD  Subject: FW: post op bleeding complicated by hyperten#    We can increase his Hydralazine to 50 Twice a day for now.  He can double up to get the pressure down.    ----- Message -----  From: Narendra Gonzalez MD  Sent: 8/22/2022   7:30 PM CDT  To: Skyla Ardon MD  Subject: RE: post op bleeding complicated by hyperten#    I would increase hydralazine if needed., happy to see him again. thanks  ----- Message -----  From: Skyla Ardon MD  Sent: 8/22/2022   4:39 PM CDT  To: Narendra Gonzalez MD  Subject: FW: post op bleeding complicated by hyperten#    I saw that you had been titrating his BP meds.  Not sure if spiked after sx due to pain.     I want going to increase Hydralazine again, thoughts?    ----- Message -----  From: Viktor Ferrell MD  Sent: 8/22/2022   9:21 AM CDT  To: Skyla Ardon MD  Subject: post op bleeding complicated by hypertensive#    Hi Dr. Ardon,     I did a septoplasty and sinus surgery on Mr. English on Wednesday. He presented with persistent bleeding over the weekend  and I had to pack him. The pack is still in place and bleeding greatly reduced, but still a very slow venous trickle. His SBP were 180-190 in the ED and again here today was 180/98. It will be hard to get the bleeding totally under control, even if we went to the OR, with SBP that high. Is there anything that can be adjusted in his regimen to help better control this?    Thank you so much for your help    Viktor Ferrell

## 2022-08-23 NOTE — TELEPHONE ENCOUNTER
"Call placed to patient per Dr Ferrell.  Pt was scheduled to be seen between Dr Ferrell's surgical cases on 8/24/22 @7:30a at [facility].  Per Dr Ferrell pt needs to come at 11am because he will be in a surgical case at [facility] at 7am.  Pt stated he would not come at that time and would just go to ER.  Informed pt that the ER will not remove any surgical packing and will call Dr Ferrell to remove it or another one of our ENT providers.  Then stated he did not need to see an [facility].  Pt became irate and began yelling at this nurse that "Dr Ferrell needs to handle this patient before other surgeries".  Asked pt to please stop yelling at me and that per the discussion he had with Dr Ferrell at his office visit he wanted to see him on 8/24/22 to evaluate him and determine if he needed to bring him back down to the OR.  Pt continued to yell after being asked numerous times to stop screaming.  Pt hung up the phone as I was attempting to hand the phone to Dr Ferrell so he could attempt to explain the situation and finish the conversation.  "

## 2022-08-24 ENCOUNTER — OFFICE VISIT (OUTPATIENT)
Dept: OTOLARYNGOLOGY | Facility: CLINIC | Age: 57
End: 2022-08-24
Payer: COMMERCIAL

## 2022-08-24 VITALS — SYSTOLIC BLOOD PRESSURE: 156 MMHG | DIASTOLIC BLOOD PRESSURE: 86 MMHG | HEART RATE: 69 BPM | TEMPERATURE: 98 F

## 2022-08-24 DIAGNOSIS — J32.9 CHRONIC SINUSITIS, UNSPECIFIED LOCATION: Primary | ICD-10-CM

## 2022-08-24 DIAGNOSIS — J34.3 HYPERTROPHY OF BOTH INFERIOR NASAL TURBINATES: ICD-10-CM

## 2022-08-24 DIAGNOSIS — J34.2 NASAL SEPTAL DEVIATION: ICD-10-CM

## 2022-08-24 DIAGNOSIS — R04.0 EPISTAXIS: ICD-10-CM

## 2022-08-24 DIAGNOSIS — J34.89 CONCHA BULLOSA: ICD-10-CM

## 2022-08-24 LAB
FINAL PATHOLOGIC DIAGNOSIS: NORMAL
GROSS: NORMAL
Lab: NORMAL

## 2022-08-24 PROCEDURE — 3077F SYST BP >= 140 MM HG: CPT | Mod: CPTII,S$GLB,, | Performed by: STUDENT IN AN ORGANIZED HEALTH CARE EDUCATION/TRAINING PROGRAM

## 2022-08-24 PROCEDURE — 99999 PR PBB SHADOW E&M-EST. PATIENT-LVL III: ICD-10-PCS | Mod: PBBFAC,,, | Performed by: STUDENT IN AN ORGANIZED HEALTH CARE EDUCATION/TRAINING PROGRAM

## 2022-08-24 PROCEDURE — 3044F PR MOST RECENT HEMOGLOBIN A1C LEVEL <7.0%: ICD-10-PCS | Mod: CPTII,S$GLB,, | Performed by: STUDENT IN AN ORGANIZED HEALTH CARE EDUCATION/TRAINING PROGRAM

## 2022-08-24 PROCEDURE — 1159F PR MEDICATION LIST DOCUMENTED IN MEDICAL RECORD: ICD-10-PCS | Mod: CPTII,S$GLB,, | Performed by: STUDENT IN AN ORGANIZED HEALTH CARE EDUCATION/TRAINING PROGRAM

## 2022-08-24 PROCEDURE — 4010F ACE/ARB THERAPY RXD/TAKEN: CPT | Mod: CPTII,S$GLB,, | Performed by: STUDENT IN AN ORGANIZED HEALTH CARE EDUCATION/TRAINING PROGRAM

## 2022-08-24 PROCEDURE — 99213 OFFICE O/P EST LOW 20 MIN: CPT | Mod: 25,24,S$GLB, | Performed by: STUDENT IN AN ORGANIZED HEALTH CARE EDUCATION/TRAINING PROGRAM

## 2022-08-24 PROCEDURE — 3072F PR LOW RISK FOR RETINOPATHY: ICD-10-PCS | Mod: CPTII,S$GLB,, | Performed by: STUDENT IN AN ORGANIZED HEALTH CARE EDUCATION/TRAINING PROGRAM

## 2022-08-24 PROCEDURE — 3077F PR MOST RECENT SYSTOLIC BLOOD PRESSURE >= 140 MM HG: ICD-10-PCS | Mod: CPTII,S$GLB,, | Performed by: STUDENT IN AN ORGANIZED HEALTH CARE EDUCATION/TRAINING PROGRAM

## 2022-08-24 PROCEDURE — 1159F MED LIST DOCD IN RCRD: CPT | Mod: CPTII,S$GLB,, | Performed by: STUDENT IN AN ORGANIZED HEALTH CARE EDUCATION/TRAINING PROGRAM

## 2022-08-24 PROCEDURE — 3044F HG A1C LEVEL LT 7.0%: CPT | Mod: CPTII,S$GLB,, | Performed by: STUDENT IN AN ORGANIZED HEALTH CARE EDUCATION/TRAINING PROGRAM

## 2022-08-24 PROCEDURE — 31237 PR NASAL/SINUS ENDOSCOPY,BX/RMV POLYP/DEBRID: ICD-10-PCS | Mod: 50,79,S$GLB, | Performed by: STUDENT IN AN ORGANIZED HEALTH CARE EDUCATION/TRAINING PROGRAM

## 2022-08-24 PROCEDURE — 3079F DIAST BP 80-89 MM HG: CPT | Mod: CPTII,S$GLB,, | Performed by: STUDENT IN AN ORGANIZED HEALTH CARE EDUCATION/TRAINING PROGRAM

## 2022-08-24 PROCEDURE — 99213 PR OFFICE/OUTPT VISIT, EST, LEVL III, 20-29 MIN: ICD-10-PCS | Mod: 25,24,S$GLB, | Performed by: STUDENT IN AN ORGANIZED HEALTH CARE EDUCATION/TRAINING PROGRAM

## 2022-08-24 PROCEDURE — 3079F PR MOST RECENT DIASTOLIC BLOOD PRESSURE 80-89 MM HG: ICD-10-PCS | Mod: CPTII,S$GLB,, | Performed by: STUDENT IN AN ORGANIZED HEALTH CARE EDUCATION/TRAINING PROGRAM

## 2022-08-24 PROCEDURE — 31237 NSL/SINS NDSC SURG BX POLYPC: CPT | Mod: 50,79,S$GLB, | Performed by: STUDENT IN AN ORGANIZED HEALTH CARE EDUCATION/TRAINING PROGRAM

## 2022-08-24 PROCEDURE — 4010F PR ACE/ARB THEARPY RXD/TAKEN: ICD-10-PCS | Mod: CPTII,S$GLB,, | Performed by: STUDENT IN AN ORGANIZED HEALTH CARE EDUCATION/TRAINING PROGRAM

## 2022-08-24 PROCEDURE — 99999 PR PBB SHADOW E&M-EST. PATIENT-LVL III: CPT | Mod: PBBFAC,,, | Performed by: STUDENT IN AN ORGANIZED HEALTH CARE EDUCATION/TRAINING PROGRAM

## 2022-08-24 PROCEDURE — 3072F LOW RISK FOR RETINOPATHY: CPT | Mod: CPTII,S$GLB,, | Performed by: STUDENT IN AN ORGANIZED HEALTH CARE EDUCATION/TRAINING PROGRAM

## 2022-09-01 ENCOUNTER — PATIENT MESSAGE (OUTPATIENT)
Dept: CARDIOLOGY | Facility: CLINIC | Age: 57
End: 2022-09-01
Payer: COMMERCIAL

## 2022-09-07 ENCOUNTER — PATIENT MESSAGE (OUTPATIENT)
Dept: OTOLARYNGOLOGY | Facility: CLINIC | Age: 57
End: 2022-09-07
Payer: COMMERCIAL

## 2022-09-07 DIAGNOSIS — I10 HYPERTENSION, UNSPECIFIED TYPE: ICD-10-CM

## 2022-09-07 DIAGNOSIS — E11.9 TYPE 2 DIABETES MELLITUS WITHOUT COMPLICATION, WITHOUT LONG-TERM CURRENT USE OF INSULIN: ICD-10-CM

## 2022-09-07 DIAGNOSIS — Z01.818 PRE-OP EVALUATION: ICD-10-CM

## 2022-09-07 DIAGNOSIS — R94.31 NONSPECIFIC ABNORMAL ELECTROCARDIOGRAM (ECG) (EKG): ICD-10-CM

## 2022-09-07 DIAGNOSIS — R07.9 CHEST PAIN, UNSPECIFIED TYPE: ICD-10-CM

## 2022-09-07 DIAGNOSIS — E78.5 HYPERLIPIDEMIA, UNSPECIFIED HYPERLIPIDEMIA TYPE: ICD-10-CM

## 2022-09-07 RX ORDER — HYDRALAZINE HYDROCHLORIDE 25 MG/1
50 TABLET, FILM COATED ORAL EVERY 12 HOURS
Qty: 120 TABLET | Refills: 11 | Status: SHIPPED | OUTPATIENT
Start: 2022-09-07 | End: 2022-09-29 | Stop reason: SDUPTHER

## 2022-09-07 NOTE — TELEPHONE ENCOUNTER
Offer him a visit today with another provider. Otherwise, he has an appointment with tomorrow. Thanks

## 2022-09-08 ENCOUNTER — PATIENT MESSAGE (OUTPATIENT)
Dept: OTOLARYNGOLOGY | Facility: CLINIC | Age: 57
End: 2022-09-08

## 2022-09-08 ENCOUNTER — OFFICE VISIT (OUTPATIENT)
Dept: INTERNAL MEDICINE | Facility: CLINIC | Age: 57
End: 2022-09-08
Payer: COMMERCIAL

## 2022-09-08 ENCOUNTER — OFFICE VISIT (OUTPATIENT)
Dept: OTOLARYNGOLOGY | Facility: CLINIC | Age: 57
End: 2022-09-08
Payer: COMMERCIAL

## 2022-09-08 VITALS
SYSTOLIC BLOOD PRESSURE: 152 MMHG | BODY MASS INDEX: 35.11 KG/M2 | DIASTOLIC BLOOD PRESSURE: 89 MMHG | TEMPERATURE: 98 F | HEART RATE: 74 BPM | WEIGHT: 266.13 LBS

## 2022-09-08 VITALS
DIASTOLIC BLOOD PRESSURE: 82 MMHG | WEIGHT: 261.13 LBS | BODY MASS INDEX: 34.45 KG/M2 | SYSTOLIC BLOOD PRESSURE: 146 MMHG | HEART RATE: 82 BPM | TEMPERATURE: 99 F | RESPIRATION RATE: 16 BRPM | OXYGEN SATURATION: 96 %

## 2022-09-08 DIAGNOSIS — Z79.4 TYPE 2 DIABETES MELLITUS WITHOUT COMPLICATION, WITH LONG-TERM CURRENT USE OF INSULIN: ICD-10-CM

## 2022-09-08 DIAGNOSIS — E11.59 HYPERTENSION ASSOCIATED WITH DIABETES: Primary | ICD-10-CM

## 2022-09-08 DIAGNOSIS — E11.9 TYPE 2 DIABETES MELLITUS WITHOUT COMPLICATION, WITH LONG-TERM CURRENT USE OF INSULIN: ICD-10-CM

## 2022-09-08 DIAGNOSIS — I15.2 HYPERTENSION ASSOCIATED WITH DIABETES: Primary | ICD-10-CM

## 2022-09-08 DIAGNOSIS — E03.9 HYPOTHYROIDISM (ACQUIRED): ICD-10-CM

## 2022-09-08 DIAGNOSIS — E11.69 HYPERLIPIDEMIA ASSOCIATED WITH TYPE 2 DIABETES MELLITUS: ICD-10-CM

## 2022-09-08 DIAGNOSIS — E78.5 HYPERLIPIDEMIA ASSOCIATED WITH TYPE 2 DIABETES MELLITUS: ICD-10-CM

## 2022-09-08 DIAGNOSIS — J34.2 NASAL SEPTAL DEVIATION: ICD-10-CM

## 2022-09-08 DIAGNOSIS — J34.3 HYPERTROPHY OF BOTH INFERIOR NASAL TURBINATES: ICD-10-CM

## 2022-09-08 DIAGNOSIS — M10.9 GOUT, UNSPECIFIED CAUSE, UNSPECIFIED CHRONICITY, UNSPECIFIED SITE: ICD-10-CM

## 2022-09-08 DIAGNOSIS — J32.9 CHRONIC SINUSITIS, UNSPECIFIED LOCATION: Primary | ICD-10-CM

## 2022-09-08 DIAGNOSIS — R79.89 LOW TESTOSTERONE: ICD-10-CM

## 2022-09-08 PROCEDURE — 31237 PR NASAL/SINUS ENDOSCOPY,BX/RMV POLYP/DEBRID: ICD-10-PCS | Mod: 79,50,S$GLB, | Performed by: STUDENT IN AN ORGANIZED HEALTH CARE EDUCATION/TRAINING PROGRAM

## 2022-09-08 PROCEDURE — 99214 OFFICE O/P EST MOD 30 MIN: CPT | Mod: S$GLB,,, | Performed by: FAMILY MEDICINE

## 2022-09-08 PROCEDURE — 4010F PR ACE/ARB THEARPY RXD/TAKEN: ICD-10-PCS | Mod: CPTII,S$GLB,, | Performed by: FAMILY MEDICINE

## 2022-09-08 PROCEDURE — 3044F PR MOST RECENT HEMOGLOBIN A1C LEVEL <7.0%: ICD-10-PCS | Mod: CPTII,S$GLB,, | Performed by: FAMILY MEDICINE

## 2022-09-08 PROCEDURE — 1159F PR MEDICATION LIST DOCUMENTED IN MEDICAL RECORD: ICD-10-PCS | Mod: CPTII,S$GLB,, | Performed by: FAMILY MEDICINE

## 2022-09-08 PROCEDURE — 3044F HG A1C LEVEL LT 7.0%: CPT | Mod: CPTII,S$GLB,, | Performed by: STUDENT IN AN ORGANIZED HEALTH CARE EDUCATION/TRAINING PROGRAM

## 2022-09-08 PROCEDURE — 1159F MED LIST DOCD IN RCRD: CPT | Mod: CPTII,S$GLB,, | Performed by: STUDENT IN AN ORGANIZED HEALTH CARE EDUCATION/TRAINING PROGRAM

## 2022-09-08 PROCEDURE — 99214 PR OFFICE/OUTPT VISIT, EST, LEVL IV, 30-39 MIN: ICD-10-PCS | Mod: S$GLB,,, | Performed by: FAMILY MEDICINE

## 2022-09-08 PROCEDURE — 3008F PR BODY MASS INDEX (BMI) DOCUMENTED: ICD-10-PCS | Mod: CPTII,S$GLB,, | Performed by: FAMILY MEDICINE

## 2022-09-08 PROCEDURE — 3079F DIAST BP 80-89 MM HG: CPT | Mod: CPTII,S$GLB,, | Performed by: STUDENT IN AN ORGANIZED HEALTH CARE EDUCATION/TRAINING PROGRAM

## 2022-09-08 PROCEDURE — 3008F BODY MASS INDEX DOCD: CPT | Mod: CPTII,S$GLB,, | Performed by: FAMILY MEDICINE

## 2022-09-08 PROCEDURE — 3072F PR LOW RISK FOR RETINOPATHY: ICD-10-PCS | Mod: CPTII,S$GLB,, | Performed by: FAMILY MEDICINE

## 2022-09-08 PROCEDURE — 3077F PR MOST RECENT SYSTOLIC BLOOD PRESSURE >= 140 MM HG: ICD-10-PCS | Mod: CPTII,S$GLB,, | Performed by: STUDENT IN AN ORGANIZED HEALTH CARE EDUCATION/TRAINING PROGRAM

## 2022-09-08 PROCEDURE — 4010F ACE/ARB THERAPY RXD/TAKEN: CPT | Mod: CPTII,S$GLB,, | Performed by: STUDENT IN AN ORGANIZED HEALTH CARE EDUCATION/TRAINING PROGRAM

## 2022-09-08 PROCEDURE — 1160F RVW MEDS BY RX/DR IN RCRD: CPT | Mod: CPTII,S$GLB,, | Performed by: FAMILY MEDICINE

## 2022-09-08 PROCEDURE — 99024 PR POST-OP FOLLOW-UP VISIT: ICD-10-PCS | Mod: S$GLB,,, | Performed by: STUDENT IN AN ORGANIZED HEALTH CARE EDUCATION/TRAINING PROGRAM

## 2022-09-08 PROCEDURE — 99999 PR PBB SHADOW E&M-EST. PATIENT-LVL V: CPT | Mod: PBBFAC,,, | Performed by: STUDENT IN AN ORGANIZED HEALTH CARE EDUCATION/TRAINING PROGRAM

## 2022-09-08 PROCEDURE — 3077F PR MOST RECENT SYSTOLIC BLOOD PRESSURE >= 140 MM HG: ICD-10-PCS | Mod: CPTII,S$GLB,, | Performed by: FAMILY MEDICINE

## 2022-09-08 PROCEDURE — 99999 PR PBB SHADOW E&M-EST. PATIENT-LVL V: ICD-10-PCS | Mod: PBBFAC,,, | Performed by: STUDENT IN AN ORGANIZED HEALTH CARE EDUCATION/TRAINING PROGRAM

## 2022-09-08 PROCEDURE — 3044F HG A1C LEVEL LT 7.0%: CPT | Mod: CPTII,S$GLB,, | Performed by: FAMILY MEDICINE

## 2022-09-08 PROCEDURE — 1159F PR MEDICATION LIST DOCUMENTED IN MEDICAL RECORD: ICD-10-PCS | Mod: CPTII,S$GLB,, | Performed by: STUDENT IN AN ORGANIZED HEALTH CARE EDUCATION/TRAINING PROGRAM

## 2022-09-08 PROCEDURE — 3072F LOW RISK FOR RETINOPATHY: CPT | Mod: CPTII,S$GLB,, | Performed by: STUDENT IN AN ORGANIZED HEALTH CARE EDUCATION/TRAINING PROGRAM

## 2022-09-08 PROCEDURE — 3077F SYST BP >= 140 MM HG: CPT | Mod: CPTII,S$GLB,, | Performed by: STUDENT IN AN ORGANIZED HEALTH CARE EDUCATION/TRAINING PROGRAM

## 2022-09-08 PROCEDURE — 1160F PR REVIEW ALL MEDS BY PRESCRIBER/CLIN PHARMACIST DOCUMENTED: ICD-10-PCS | Mod: CPTII,S$GLB,, | Performed by: FAMILY MEDICINE

## 2022-09-08 PROCEDURE — 99999 PR PBB SHADOW E&M-EST. PATIENT-LVL V: CPT | Mod: PBBFAC,,, | Performed by: FAMILY MEDICINE

## 2022-09-08 PROCEDURE — 31237 NSL/SINS NDSC SURG BX POLYPC: CPT | Mod: 79,50,S$GLB, | Performed by: STUDENT IN AN ORGANIZED HEALTH CARE EDUCATION/TRAINING PROGRAM

## 2022-09-08 PROCEDURE — 4010F PR ACE/ARB THEARPY RXD/TAKEN: ICD-10-PCS | Mod: CPTII,S$GLB,, | Performed by: STUDENT IN AN ORGANIZED HEALTH CARE EDUCATION/TRAINING PROGRAM

## 2022-09-08 PROCEDURE — 3072F PR LOW RISK FOR RETINOPATHY: ICD-10-PCS | Mod: CPTII,S$GLB,, | Performed by: STUDENT IN AN ORGANIZED HEALTH CARE EDUCATION/TRAINING PROGRAM

## 2022-09-08 PROCEDURE — 99999 PR PBB SHADOW E&M-EST. PATIENT-LVL V: ICD-10-PCS | Mod: PBBFAC,,, | Performed by: FAMILY MEDICINE

## 2022-09-08 PROCEDURE — 99024 POSTOP FOLLOW-UP VISIT: CPT | Mod: S$GLB,,, | Performed by: STUDENT IN AN ORGANIZED HEALTH CARE EDUCATION/TRAINING PROGRAM

## 2022-09-08 PROCEDURE — 4010F ACE/ARB THERAPY RXD/TAKEN: CPT | Mod: CPTII,S$GLB,, | Performed by: FAMILY MEDICINE

## 2022-09-08 PROCEDURE — 3044F PR MOST RECENT HEMOGLOBIN A1C LEVEL <7.0%: ICD-10-PCS | Mod: CPTII,S$GLB,, | Performed by: STUDENT IN AN ORGANIZED HEALTH CARE EDUCATION/TRAINING PROGRAM

## 2022-09-08 PROCEDURE — 3079F PR MOST RECENT DIASTOLIC BLOOD PRESSURE 80-89 MM HG: ICD-10-PCS | Mod: CPTII,S$GLB,, | Performed by: FAMILY MEDICINE

## 2022-09-08 PROCEDURE — 3079F DIAST BP 80-89 MM HG: CPT | Mod: CPTII,S$GLB,, | Performed by: FAMILY MEDICINE

## 2022-09-08 PROCEDURE — 3077F SYST BP >= 140 MM HG: CPT | Mod: CPTII,S$GLB,, | Performed by: FAMILY MEDICINE

## 2022-09-08 PROCEDURE — 3008F BODY MASS INDEX DOCD: CPT | Mod: CPTII,S$GLB,, | Performed by: STUDENT IN AN ORGANIZED HEALTH CARE EDUCATION/TRAINING PROGRAM

## 2022-09-08 PROCEDURE — 3008F PR BODY MASS INDEX (BMI) DOCUMENTED: ICD-10-PCS | Mod: CPTII,S$GLB,, | Performed by: STUDENT IN AN ORGANIZED HEALTH CARE EDUCATION/TRAINING PROGRAM

## 2022-09-08 PROCEDURE — 1159F MED LIST DOCD IN RCRD: CPT | Mod: CPTII,S$GLB,, | Performed by: FAMILY MEDICINE

## 2022-09-08 PROCEDURE — 3072F LOW RISK FOR RETINOPATHY: CPT | Mod: CPTII,S$GLB,, | Performed by: FAMILY MEDICINE

## 2022-09-08 PROCEDURE — 3079F PR MOST RECENT DIASTOLIC BLOOD PRESSURE 80-89 MM HG: ICD-10-PCS | Mod: CPTII,S$GLB,, | Performed by: STUDENT IN AN ORGANIZED HEALTH CARE EDUCATION/TRAINING PROGRAM

## 2022-09-08 RX ORDER — AMOXICILLIN AND CLAVULANATE POTASSIUM 875; 125 MG/1; MG/1
1 TABLET, FILM COATED ORAL EVERY 12 HOURS
Qty: 20 TABLET | Refills: 0 | Status: SHIPPED | OUTPATIENT
Start: 2022-09-08 | End: 2022-09-18

## 2022-09-08 RX ORDER — COLCHICINE 0.6 MG/1
0.6 TABLET ORAL 2 TIMES DAILY
Qty: 180 TABLET | Refills: 3 | Status: SHIPPED | OUTPATIENT
Start: 2022-09-08 | End: 2023-09-19

## 2022-09-08 RX ORDER — METOPROLOL SUCCINATE 50 MG/1
50 TABLET, EXTENDED RELEASE ORAL DAILY
Qty: 90 TABLET | Refills: 3 | Status: SHIPPED | OUTPATIENT
Start: 2022-09-08 | End: 2023-02-22 | Stop reason: SDUPTHER

## 2022-09-08 RX ORDER — METFORMIN HYDROCHLORIDE 1000 MG/1
1000 TABLET ORAL 2 TIMES DAILY WITH MEALS
Qty: 180 TABLET | Refills: 3 | Status: SHIPPED | OUTPATIENT
Start: 2022-09-08 | End: 2023-12-05

## 2022-09-08 RX ORDER — BENAZEPRIL HYDROCHLORIDE 40 MG/1
40 TABLET ORAL DAILY
Qty: 90 TABLET | Refills: 3 | Status: SHIPPED | OUTPATIENT
Start: 2022-09-08 | End: 2023-02-22 | Stop reason: SDUPTHER

## 2022-09-08 RX ORDER — PREDNISONE 10 MG/1
TABLET ORAL
Qty: 24 TABLET | Refills: 0 | Status: SHIPPED | OUTPATIENT
Start: 2022-09-08 | End: 2022-12-02

## 2022-09-08 RX ORDER — TESTOSTERONE 20.25 MG/1.25G
GEL TOPICAL
Qty: 75 G | Refills: 0 | Status: SHIPPED | OUTPATIENT
Start: 2022-09-08 | End: 2022-09-12 | Stop reason: SDUPTHER

## 2022-09-08 NOTE — ADDENDUM NOTE
Addended by: KEMAL MIDDLETON on: 9/8/2022 03:51 PM     Modules accepted: Orders     Additional Notes: Patient consent was obtained to proceed with the visit and recommended plan of care after discussion of all risks and benefits, including the risks of COVID-19 exposure.\\n Render Risk Assessment In Note?: no Detail Level: Simple

## 2022-09-08 NOTE — PROGRESS NOTES
Chief complaint:    Chief Complaint   Patient presents with    Follow-up     C/o continued bleeding.         Referring Provider:  No referring provider defined for this encounter.      History of present illness:     Mr. English is a 57 y.o. presenting for evaluation of sinus congestion.     The patient reports the following allergy/sinus symptoms:     Headaches - 3-4 times/day, forehead    Nasal congestion, purulent anterior drainage, and PND - present all the time  Coughing - at night    No fever, no hyposmia.     Started after COVID in January. Symptoms have been present for almost continuously since then.    Treatment has included: Zytrec or Xyzal, mucinex, abx given 1 month ago for tooth issue. Had no effect on nasal issues.     Prior to the last few months patient has had about 0 sinus infections in the past 12 months.   Prior sinus surgery: no.    Current medications: ibuprofen 800, mucinex, and xyzal. No help with any of it.    Allergy history: yes, when he was young, not bad as an adult       Return clinic visit, 4/11/22  Completed abx and steroids. Since then, symptoms have minimally improved. Major complaints are nasal drip (clear) and nasal congestion (both sides), and some forehead pressure.     Return clinic visit, 5/31/22  Using saline, flonase, and Atrovent.   Post nasal drip has improved, but nasal congestion has not. Bilateral, sometimes worse on either side. Worse at night, but present all the time.   Intermittent facial pressure/pain, anterior rhinorrhea.     Return clinic visit, 7/18/22  No major change since last visit.   Continued L>R nasal obstruction, all the time, worse at night. Also with facial pressure/pain and purulent rhinorrhea.   Using astelin, flonase, atrovent without much change. Also completed a course of steroids and antibiotics. Gets improvement with abx/steroids then returns.    Return clinic visit, 8/22/22  S/p FESS and septoplasty. Epistaxis requiring packing on POD2. Since  then, bleeding has greatly slowed. Still spitting up some dark blood from time to time. No bright red blood.     Return clinic visit, 8/24/22  Some slow oozing after pack placement, much improved. HTN to 180-190, but now 150s since doubling hydralazine.     Return clinic visit, 9/8/22  Overall doing well. However, still having times when he coughs up some phlegm which can be blood-tinged. Not bright red. Also with congestion on the left side and some pressure as well.     History      Past Medical History:   Past Medical History:   Diagnosis Date    Cancer     Colon    Diabetes mellitus 8 years    BS 84 in the room 08/11/2021    Hypertension     Sleep apnea     Thyroid disease          Past Surgical History:  Past Surgical History:   Procedure Laterality Date    BACK SURGERY      COLONOSCOPY N/A 12/29/2020    Procedure: COLONOSCOPY;  Surgeon: William Cotter MD;  Location: [facility];  Service: Endoscopy;  Laterality: N/A;  Will need Rapid doesn't live here    COLONOSCOPY N/A 2/10/2022    Procedure: COLONOSCOPY;  Surgeon: Wili Espinosa MD;  Location: [facility];  Service: Endoscopy;  Laterality: N/A;    FESS, WITH NASAL SEPTOPLASTY, WITH IMAGING GUIDANCE Bilateral 8/17/2022    Procedure: FESS, WITH NASAL SEPTOPLASTY, WITH IMAGING GUIDANCE;  Surgeon: Viktor Ferrell MD;  Location: [facility] OR;  Service: ENT;  Laterality: Bilateral;    LAPAROSCOPIC RIGHT COLON RESECTION N/A 2/2/2021    Procedure: COLECTOMY, RIGHT, LAPAROSCOPIC, ERAS low;  Surgeon: Melonie Santoyo MD;  Location: [address];  Service: Colon and Rectal;  Laterality: N/A;  needs rapid covid test    NASAL TURBINATE REDUCTION Bilateral 8/17/2022    Procedure: REDUCTION, NASAL TURBINATE;  Surgeon: Viktor Ferrell MD;  Location: [facility] OR;  Service: ENT;  Laterality: Bilateral;    TONSILLECTOMY           Medications: Medication list reviewed. He  has a current medication list which includes the following prescription(s): amlodipine, atorvastatin,  "azelastine, benazepril, clonazepam, colchicine, flash glucose scanning reader, flash glucose sensor, fluticasone propionate, hydralazine, hydrocodone-acetaminophen, indomethacin, ipratropium, levocetirizine, levothyroxine, metformin, metoprolol succinate, ozempic, alprostadil, testosterone, toujeo solostar u-300 insulin, and vascepa, and the following Facility-Administered Medications: gabapentin and lactated ringers.     Allergies:   Review of patient's allergies indicates:   Allergen Reactions    Demerol (pf) [meperidine (pf)] Other (See Comments)     Pt reports,"They lost my blood pressure."    Percocet [oxycodone-acetaminophen] Itching     itching    Demerol [meperidine]          Family history: family history includes Breast cancer in his mother; Diabetes in his maternal grandmother and mother; Hypertension in his brother and mother; Stroke in his father.         Social History          Alcohol use:  reports that he does not currently use alcohol.            Tobacco:  reports that he has never smoked. He has never used smokeless tobacco.         Physical Examination      Vitals: Blood pressure (!) 152/89, pulse 74, temperature 97.9 °F (36.6 °C), temperature source Temporal, weight 120.7 kg (266 lb 1.5 oz).      General: Well developed, well nourished, well hydrated.     Voice: no dysphonia, no dysarthria      Head/Face: Normocephalic, atraumatic. No scars or lesions. Facial musculature equal.     Eyes: No scleral icterus or conjunctival hemorrhage. EOMI. PERRLA.     Ears:     Right ear: No gross deformity. EAC is clear of debris and erythema. TM are intact with a pneumatized middle ear. No signs of retraction, fluid or infection.      Left ear: No gross deformity. EAC is clear of debris and erythema. TM are intact with a pneumatized middle ear. No signs of retraction, fluid or infection.      Nose: No gross deformity or lesions. Septum midline    Mouth/Oropharynx: Lips without any lesions. No mucosal lesions " "within the oropharynx. No tonsillar exudate or lesions. Pharyngeal walls symmetrical. Uvula midline. Tongue midline without lesions. Dry.     Neurologic: Moving all extremities without gross abnormality.CN II-XII grossly intact. House-Brackmann 1/6. No signs of nystagmus.        Data reviewed      Review of records:      I reviewed records from the referring provider's office visits describing the history, workup, and/or treatment of this problem thus far.    Imaging  I have independently reviewed the following imaging with the findings noted below:      CT sinus, 09/08/2022  Bilateral toya bullosa  S-shaped septal deviation, left mid-septal spur  Inferior turbinate reduction   Left frontal thickening and obstruction of outflow  Bilateral anterior ethmoid disease     Op note, 8/20/22     PROCEDURE:   Endoscopic septoplasty   Bilateral inferior turbinate submucosal resection  Nasal endoscopy with resection of bilateral toya bullosa   Bilateral nasal endoscopy with maxillary antrostomy   Bilateral nasal endoscopy with anterior ethmoidectomy    Left nasal endoscopy with frontal sinusotomy and frontal sinus exploration   Functional endoscopic sinus surgery with CT image guided electromagnetic system     OPERATIVE FINDINGS:   Polypoid mucosal thickening at the left frontal outflow tract  Bilateral polypoid thickening in anterior ethmoids  Left septal deviation with large left septal spur      Pathology reviewed  1. "left sinus contents", excision:       -  Sinonasal mucosa with chronic inflammation       - No eosinophils seen   2. "septal cartilage", excision:       - Portion of cartilage     Procedure Note - Rigid Nasal Endoscopy with debridement    Surgeon: Viktor Ferrell MD  Anesthesia: topical oxymetazoline and 4% lidocaine.    Indication: Narendra English . is a 57 y.o. male underwent recent sinus surgery. Debridement was indicated to remove crusting and clot and visualize at the surgical site.    Technique: " The nose was sprayed with oxymetazoline and 4% lidocaine. With the patient in the upright position, a 2.7mm 30-degree endscope was inserted into the patient's right and left nare.  Where visible, nasal secretions and mucosal crusting were removed with a suction. The overall appearance of the nasal cavity and paranasal sinuses were noted and the findings are described below.     Findings: residual rusting removed. Septum midline, mild granulation along inferior left septum, intact. Patent sinus cavities. Mucopurulence from left middle meatus. No bleeding.       Assessment/Plan:    Chronic Rhinosinusitis following URI  Vasomotor rhinitis   Septal deviation  Inferior turbinate hypertrophy      S/p FESS and septoplasty with post op epistaxis complicated by hypertensive urgency (resolved, pack removed, HTN better controlled); debrided today;     - will treat with round of abx/steroids, if occasional bloody sputum not improved in 2 weeks, would plan for further workup   -ok to start rgular sprays  - continue saline  - Return to clinic 3-4 weeks    We discussed risks of steroids including:  Short-term risks include irritability, weight gain (fluid retention), feelings of jitteriness, increase heart rate (tachycardia), flushing, increased blood glucose, insomnia, stomach ulcer, glaucoma (vision changes/eye pain), and a rare risk of damage to joints necessitating joint replacement    Long-term risks include weight gain, glaucoma, cataracts, osteoporosis, hypertension, and osteonecrosis of joints. For complete list of risks, see medication insert. Regular eye exams, bone density studies, calcium supplementation and possible bisphosphonate therapy (please discuss further with your primary care physician).                 Viktor Ferrell MD  [facility]   [facility]  [address] [place] [address]  P: [phone]  F: [phone]

## 2022-09-09 PROBLEM — E11.69 HYPERLIPIDEMIA ASSOCIATED WITH TYPE 2 DIABETES MELLITUS: Status: ACTIVE | Noted: 2022-09-09

## 2022-09-09 PROBLEM — E11.59 HYPERTENSION ASSOCIATED WITH DIABETES: Status: ACTIVE | Noted: 2021-06-29

## 2022-09-09 PROBLEM — E78.5 HYPERLIPIDEMIA ASSOCIATED WITH TYPE 2 DIABETES MELLITUS: Status: ACTIVE | Noted: 2022-09-09

## 2022-09-09 PROBLEM — I15.2 HYPERTENSION ASSOCIATED WITH DIABETES: Status: ACTIVE | Noted: 2021-06-29

## 2022-09-09 NOTE — PROGRESS NOTES
Subjective:       Patient ID: Narendra English Sr. is a 57 y.o. male.    Chief Complaint: Hypertension    Patient presents to clinic today to establish care. Previous PCP Dr. Ardon. He is concerned about BPs running 152-158/73-82 at home. He is on four medications. Recent medication adjustment per Cardiology about 2-3 weeks ago. Patient is otherwise without concerns today.     Review of Systems   Constitutional:  Negative for chills, fatigue, fever and unexpected weight change.   Eyes:  Negative for visual disturbance.   Respiratory:  Negative for shortness of breath.    Cardiovascular:  Negative for chest pain.   Musculoskeletal:  Negative for myalgias.   Neurological:  Negative for headaches.       Objective:      Physical Exam  Vitals reviewed.   Constitutional:       General: He is not in acute distress.     Appearance: He is well-developed.   HENT:      Head: Normocephalic and atraumatic.   Eyes:      General: Lids are normal. No scleral icterus.     Extraocular Movements: Extraocular movements intact.      Conjunctiva/sclera: Conjunctivae normal.      Pupils: Pupils are equal, round, and reactive to light.   Pulmonary:      Effort: Pulmonary effort is normal.   Neurological:      Mental Status: He is alert and oriented to person, place, and time.      Cranial Nerves: No cranial nerve deficit.      Gait: Gait normal.   Psychiatric:         Mood and Affect: Mood and affect normal.       Assessment:       1. Hypertension associated with diabetes    2. Hyperlipidemia associated with type 2 diabetes mellitus    3. Type 2 diabetes mellitus without complication, with long-term current use of insulin    4. Hypothyroidism (acquired)    5. Low testosterone    6. Gout, unspecified cause, unspecified chronicity, unspecified site          Plan:   1. Hypertension associated with diabetes  Assessment & Plan:  Mild elevation today; recent medication adjustment per Cardiology; will arrange follow up with Dr. Gonzalez;  advised to continue current medications; patient expressed understanding and agreement with plan.    Orders:  -     Ambulatory referral/consult to Cardiology  -     metoprolol succinate (TOPROL-XL) 50 MG 24 hr tablet  -     benazepriL (LOTENSIN) 40 MG tablet    2. Hyperlipidemia associated with type 2 diabetes mellitus  Assessment & Plan:  Status pending lab, continue atorvastatin     Orders:  -     Lipid Panel    3. Type 2 diabetes mellitus without complication, with long-term current use of insulin  Assessment & Plan:  Status pending lab, continue current medications    Orders:  -     Ambulatory referral/consult to Optometry  -     Microalbumin/Creatinine Ratio, Urine  -     Hemoglobin A1C  -     metFORMIN (GLUCOPHAGE) 1000 MG tablet    4. Hypothyroidism (acquired)  Assessment & Plan:  Status pending lab, continue levothyroxine     Orders:  -     TSH  -     T4, Free    5. Low testosterone  Assessment & Plan:  Single refill of testosterone given, referral to establish care with Urology; advised I do not treat testosterone deficiency; patient expressed understanding and agreement with plan.    Orders:  -     Ambulatory referral/consult to Urology  -     testosterone (ANDROGEL) 20.25 mg/1.25 gram (1.62 %) GlPm    6. Gout, unspecified cause, unspecified chronicity, unspecified site  Assessment & Plan:  Stable on colchicine    Orders:  -     colchicine (COLCRYS) 0.6 mg tablet      Health Maintenance reviewed/updated.

## 2022-09-09 NOTE — ASSESSMENT & PLAN NOTE
Single refill of testosterone given, referral to establish care with Urology; advised I do not treat testosterone deficiency; patient expressed understanding and agreement with plan.

## 2022-09-09 NOTE — ASSESSMENT & PLAN NOTE
Mild elevation today; recent medication adjustment per Cardiology; will arrange follow up with Dr. Gonzalez; advised to continue current medications; patient expressed understanding and agreement with plan.

## 2022-09-10 ENCOUNTER — PATIENT MESSAGE (OUTPATIENT)
Dept: INTERNAL MEDICINE | Facility: CLINIC | Age: 57
End: 2022-09-10
Payer: COMMERCIAL

## 2022-09-10 DIAGNOSIS — R79.89 LOW TESTOSTERONE: ICD-10-CM

## 2022-09-12 ENCOUNTER — TELEPHONE (OUTPATIENT)
Dept: PAIN MEDICINE | Facility: CLINIC | Age: 57
End: 2022-09-12
Payer: COMMERCIAL

## 2022-09-12 RX ORDER — TESTOSTERONE 20.25 MG/1.25G
GEL TOPICAL
Qty: 75 G | Refills: 0 | Status: SHIPPED | OUTPATIENT
Start: 2022-09-12 | End: 2022-11-02 | Stop reason: SDUPTHER

## 2022-09-13 ENCOUNTER — OFFICE VISIT (OUTPATIENT)
Dept: PAIN MEDICINE | Facility: CLINIC | Age: 57
End: 2022-09-13
Payer: COMMERCIAL

## 2022-09-13 VITALS
HEIGHT: 73 IN | DIASTOLIC BLOOD PRESSURE: 74 MMHG | HEART RATE: 68 BPM | SYSTOLIC BLOOD PRESSURE: 152 MMHG | BODY MASS INDEX: 35.44 KG/M2 | WEIGHT: 267.44 LBS

## 2022-09-13 DIAGNOSIS — M54.12 CERVICAL RADICULOPATHY: Primary | ICD-10-CM

## 2022-09-13 DIAGNOSIS — M47.812 CERVICAL SPONDYLOSIS: ICD-10-CM

## 2022-09-13 DIAGNOSIS — M50.30 DDD (DEGENERATIVE DISC DISEASE), CERVICAL: ICD-10-CM

## 2022-09-13 PROCEDURE — 1159F MED LIST DOCD IN RCRD: CPT | Mod: CPTII,S$GLB,, | Performed by: ANESTHESIOLOGY

## 2022-09-13 PROCEDURE — 1159F PR MEDICATION LIST DOCUMENTED IN MEDICAL RECORD: ICD-10-PCS | Mod: CPTII,S$GLB,, | Performed by: ANESTHESIOLOGY

## 2022-09-13 PROCEDURE — 99203 OFFICE O/P NEW LOW 30 MIN: CPT | Mod: S$GLB,,, | Performed by: ANESTHESIOLOGY

## 2022-09-13 PROCEDURE — 3077F PR MOST RECENT SYSTOLIC BLOOD PRESSURE >= 140 MM HG: ICD-10-PCS | Mod: CPTII,S$GLB,, | Performed by: ANESTHESIOLOGY

## 2022-09-13 PROCEDURE — 3077F SYST BP >= 140 MM HG: CPT | Mod: CPTII,S$GLB,, | Performed by: ANESTHESIOLOGY

## 2022-09-13 PROCEDURE — 3072F PR LOW RISK FOR RETINOPATHY: ICD-10-PCS | Mod: CPTII,S$GLB,, | Performed by: ANESTHESIOLOGY

## 2022-09-13 PROCEDURE — 3044F PR MOST RECENT HEMOGLOBIN A1C LEVEL <7.0%: ICD-10-PCS | Mod: CPTII,S$GLB,, | Performed by: ANESTHESIOLOGY

## 2022-09-13 PROCEDURE — 3078F DIAST BP <80 MM HG: CPT | Mod: CPTII,S$GLB,, | Performed by: ANESTHESIOLOGY

## 2022-09-13 PROCEDURE — 1160F RVW MEDS BY RX/DR IN RCRD: CPT | Mod: CPTII,S$GLB,, | Performed by: ANESTHESIOLOGY

## 2022-09-13 PROCEDURE — 3078F PR MOST RECENT DIASTOLIC BLOOD PRESSURE < 80 MM HG: ICD-10-PCS | Mod: CPTII,S$GLB,, | Performed by: ANESTHESIOLOGY

## 2022-09-13 PROCEDURE — 99999 PR PBB SHADOW E&M-EST. PATIENT-LVL III: ICD-10-PCS | Mod: PBBFAC,,, | Performed by: ANESTHESIOLOGY

## 2022-09-13 PROCEDURE — 3044F HG A1C LEVEL LT 7.0%: CPT | Mod: CPTII,S$GLB,, | Performed by: ANESTHESIOLOGY

## 2022-09-13 PROCEDURE — 3072F LOW RISK FOR RETINOPATHY: CPT | Mod: CPTII,S$GLB,, | Performed by: ANESTHESIOLOGY

## 2022-09-13 PROCEDURE — 3008F PR BODY MASS INDEX (BMI) DOCUMENTED: ICD-10-PCS | Mod: CPTII,S$GLB,, | Performed by: ANESTHESIOLOGY

## 2022-09-13 PROCEDURE — 3008F BODY MASS INDEX DOCD: CPT | Mod: CPTII,S$GLB,, | Performed by: ANESTHESIOLOGY

## 2022-09-13 PROCEDURE — 1160F PR REVIEW ALL MEDS BY PRESCRIBER/CLIN PHARMACIST DOCUMENTED: ICD-10-PCS | Mod: CPTII,S$GLB,, | Performed by: ANESTHESIOLOGY

## 2022-09-13 PROCEDURE — 99999 PR PBB SHADOW E&M-EST. PATIENT-LVL III: CPT | Mod: PBBFAC,,, | Performed by: ANESTHESIOLOGY

## 2022-09-13 PROCEDURE — 99203 PR OFFICE/OUTPT VISIT, NEW, LEVL III, 30-44 MIN: ICD-10-PCS | Mod: S$GLB,,, | Performed by: ANESTHESIOLOGY

## 2022-09-13 PROCEDURE — 4010F ACE/ARB THERAPY RXD/TAKEN: CPT | Mod: CPTII,S$GLB,, | Performed by: ANESTHESIOLOGY

## 2022-09-13 PROCEDURE — 4010F PR ACE/ARB THEARPY RXD/TAKEN: ICD-10-PCS | Mod: CPTII,S$GLB,, | Performed by: ANESTHESIOLOGY

## 2022-09-13 RX ORDER — PREGABALIN 75 MG/1
75 CAPSULE ORAL 2 TIMES DAILY
Qty: 60 CAPSULE | Refills: 2 | Status: SHIPPED | OUTPATIENT
Start: 2022-09-13 | End: 2022-12-11

## 2022-09-13 RX ORDER — DICLOFENAC SODIUM 10 MG/G
2 GEL TOPICAL 4 TIMES DAILY PRN
Qty: 100 G | Refills: 1 | Status: SHIPPED | OUTPATIENT
Start: 2022-09-13 | End: 2023-09-11

## 2022-09-13 NOTE — PROGRESS NOTES
New Patient Interventional Pain Note (Initial Visit)    Referring Physician: Tabitha Melgoza MD    PCP: Tabitha Melgoza MD    Chief Complaint:   Chief Complaint   Patient presents with    Neck Pain        SUBJECTIVE:    Narendra English Sr. is a 57 y.o. male who presents to the clinic for the evaluation of neck pain. The pain started 12 years ago and symptoms have been worsening.  Patient reports that 10 years ago he received a series of injections that gave him relief.   The pain is described as a aching throbbing pain that starts near the top of his neck and then radiates down his left upper extremity and numbness and tingling pain to the left forearm.  Pain is worse with flexion and lifting, better with heat and rest.  Pain is rated a 7/10.  The pain is rated with a score of  2/10 on the BEST day and a score of 9/10 on the WORST day.  Symptoms interfere with daily activity and work. The pain is exacerbated by Flexing and Lifting.  The pain is mitigated by heat and rest.     Patient denies night fever/night sweats, urinary incontinence, bowel incontinence, significant weight loss, significant motor weakness, and loss of sensations.      Non-Pharmacologic Treatments:  Physical Therapy/Home Exercise: yes  Ice/Heat:yes  TENS: no  Acupuncture: no  Massage: no  Chiropractic: no        Previous Pain Medications:  NSAIDs, Tylenol, muscle relaxers, neuropathic, opioids, topicals     report:  Reviewed and consistent with medication use as prescribed.    Pain Procedures:   Previous history of epidural steroid injections 10 years ago with significant relief      Imaging:   CT CERVICAL SPINE WO CONTRAST  5/18/22    COMPARISON:     No prior study.     FINDINGS:     Automated exposure control was used for dose reduction.     No acute fracture, subluxation, dislocation or osseous destructive lesion.     Straightening and moderate reversal of lordosis. Mild dextrocurvature cervicothoracic junction.     Moderate  degenerative change anteriorly at C1-2. Minimal anterior, lateral and posterior spondylosis C2-3 and C3-4. Mild/moderate anterior to lateral spondylosis with minimal mild posterior spondylosis C4-5. Moderate to moderate severe anterior to lateral spondylosis and mild posterior spondylosis C5-6 and C6-7. Moderate anterior to lateral spondylosis with minimal posterior spondylosis C7-T1. Ossification posterior longitudinal ligament at C6, C6-7, C7 and C7-T1. Mild and moderate facet arthropathy, greatest on the right at C5-6 and C6-7 and on the left at C3-4.     C1-2:  No significant narrowing of canal.     C2-3:  Minimal disc bulge and posterior spondylosis. Minimal ligament flavum thickening. Minimal narrowing of canal. Moderate to severe right and moderate left foraminal narrowing.     C3-4:  Mild central disc bulge and minimal posterior spondylosis. Mild effacement of left lateral recess with minimal overall narrowing of canal. Severe bilateral foraminal narrowing, greater on the left.     C4-5:  Moderate lobular disc bulge or protrusion, greatest at midline. Minimal mild posterior spondylosis. Moderate severe narrowing left lateral recess with moderate overall narrowing of canal. Severe bilateral foraminal narrowing, greater on the right.     C5-6:  Moderate disc spur complex, greatest at midline and left of midline. Severe effacement of left lateral recess with moderate severe overall narrowing of canal. Severe bilateral foraminal narrowing.     C6-7:  Moderate to severe disc spur complex as well as ossification posterior longitudinal ligament. Mild moderately ligament flavum thickening. Severe narrowing of canal, greater on the left. Severe bilateral foraminal narrowing.     C7-T1:  Mild central disc spur complex. Mild ossification posterior longitudinal ligament. Moderate ligament flavum thickening. Moderate severe overall narrowing of canal. Moderate severe bilateral foraminal narrowing.     The visualized  "surrounding cervical soft tissues show no acute findings.    Past Medical History:   Diagnosis Date    Cancer     Colon    Diabetes mellitus 8 years    BS 84 in the room 08/11/2021    Hypertension     Sleep apnea     Thyroid disease      Past Surgical History:   Procedure Laterality Date    BACK SURGERY      COLONOSCOPY N/A 12/29/2020    Procedure: COLONOSCOPY;  Surgeon: William Cotter MD;  Location: [facility] ENDO;  Service: Endoscopy;  Laterality: N/A;  Will need Rapid doesn't live here    COLONOSCOPY N/A 2/10/2022    Procedure: COLONOSCOPY;  Surgeon: Wili Espinosa MD;  Location: [facility] ENDO;  Service: Endoscopy;  Laterality: N/A;    FESS, WITH NASAL SEPTOPLASTY, WITH IMAGING GUIDANCE Bilateral 8/17/2022    Procedure: FESS, WITH NASAL SEPTOPLASTY, WITH IMAGING GUIDANCE;  Surgeon: Viktor Ferrell MD;  Location: [facility] OR;  Service: ENT;  Laterality: Bilateral;    LAPAROSCOPIC RIGHT COLON RESECTION N/A 2/2/2021    Procedure: COLECTOMY, RIGHT, LAPAROSCOPIC, ERAS low;  Surgeon: Melonie Santoyo MD;  Location: [address];  Service: Colon and Rectal;  Laterality: N/A;  needs rapid covid test    NASAL TURBINATE REDUCTION Bilateral 8/17/2022    Procedure: REDUCTION, NASAL TURBINATE;  Surgeon: Viktor Ferrell MD;  Location: [facility] OR;  Service: ENT;  Laterality: Bilateral;    TONSILLECTOMY       Social History     Socioeconomic History    Marital status:    Tobacco Use    Smoking status: Never    Smokeless tobacco: Never   Substance and Sexual Activity    Alcohol use: Not Currently    Drug use: Never    Sexual activity: Yes     Partners: Female     Family History   Problem Relation Age of Onset    Hypertension Mother     Diabetes Mother     Breast cancer Mother     Diabetes Maternal Grandmother     Hypertension Brother     Stroke Father        Review of patient's allergies indicates:   Allergen Reactions    Demerol (pf) [meperidine (pf)] Other (See Comments)     Pt reports,"They lost my blood pressure."    " Percocet [oxycodone-acetaminophen] Itching     itching    Demerol [meperidine]        Current Outpatient Medications   Medication Sig    amLODIPine (NORVASC) 10 MG tablet TAKE 1 TABLET(10 MG) BY MOUTH EVERY DAY    amoxicillin-clavulanate 875-125mg (AUGMENTIN) 875-125 mg per tablet Take 1 tablet by mouth every 12 (twelve) hours. for 10 days    atorvastatin (LIPITOR) 40 MG tablet TAKE 1 TABLET(40 MG) BY MOUTH EVERY DAY    azelastine (ASTELIN) 137 mcg (0.1 %) nasal spray 1 spray (137 mcg total) by Nasal route 2 (two) times daily.    benazepriL (LOTENSIN) 40 MG tablet Take 1 tablet (40 mg total) by mouth once daily.    clonazePAM (KLONOPIN) 2 MG Tab TAKE 1 TABLET BY MOUTH EVERY EVENING    colchicine (COLCRYS) 0.6 mg tablet Take 1 tablet (0.6 mg total) by mouth 2 (two) times daily.    flash glucose scanning reader Misc 1 application.    flash glucose sensor Kit 1 application.    fluticasone propionate (FLONASE) 50 mcg/actuation nasal spray 1 spray (50 mcg total) by Each Nostril route once daily.    hydrALAZINE (APRESOLINE) 25 MG tablet Take 2 tablets (50 mg total) by mouth every 12 (twelve) hours.    HYDROcodone-acetaminophen (NORCO) 5-325 mg per tablet Take 1 tablet by mouth every 6 (six) hours as needed for Pain.    indomethacin (INDOCIN SR) 75 mg CpSR CR capsule Take 75 mg by mouth 2 (two) times daily with meals.    ipratropium (ATROVENT) 21 mcg (0.03 %) nasal spray USE 2 SPRAYS IN EACH NOSTRIL TWICE DAILY    levocetirizine (XYZAL) 5 MG tablet Take 5 mg by mouth every evening.    levothyroxine (SYNTHROID) 112 MCG tablet TAKE 1 TABLET(112 MCG) BY MOUTH BEFORE BREAKFAST    metFORMIN (GLUCOPHAGE) 1000 MG tablet Take 1 tablet (1,000 mg total) by mouth 2 (two) times daily with meals.    metoprolol succinate (TOPROL-XL) 50 MG 24 hr tablet Take 1 tablet (50 mg total) by mouth once daily.    OZEMPIC 1 mg/dose (4 mg/3 mL) INJECT 1 MG INTO THE SKIN ONCE WEEKLY    pep injection Inject 0.15 ml as directed     For compounding  pharmacy use:   Add PAPAVERINE 30 mg  Add PHENTOLAMINE 1 mg  Add ALPROSTADIL 20 mcg    predniSONE (DELTASONE) 10 MG tablet Take 3 tablets a day for 4 days (1 before breakfast, 1 after lunch, 1 before bed). Then take 2 tablets a day for 4 days (1 before breakfast, 1 before bed). Then take 1 tablet a day for 4 days (1 before breakfast).    testosterone (ANDROGEL) 20.25 mg/1.25 gram (1.62 %) GlPm PLACE 2 PUMPS(40.5 MG) ONTO THE SKIN ONCE DAILY AT 6 AM  Strength: 20.25 mg/1.25 gram (1.62 %)    TOUJEO SOLOSTAR U-300 INSULIN 300 unit/mL (1.5 mL) InPn pen INJECT 85 UNITS INTO THE SKIN EVERY DAY    VASCEPA 1 gram Cap Take 2 capsules (2,000 mg total) by mouth 2 (two) times daily.    diclofenac sodium (VOLTAREN) 1 % Gel Apply 2 g topically 4 (four) times daily as needed (Pain).    pregabalin (LYRICA) 75 MG capsule Take 1 capsule (75 mg total) by mouth 2 (two) times daily.     No current facility-administered medications for this visit.     Facility-Administered Medications Ordered in Other Visits   Medication    gabapentin capsule 300 mg    lactated ringers infusion         ROS  Review of Systems   Constitutional:  Negative for activity change, appetite change and fever.   HENT:  Negative for facial swelling, rhinorrhea and sore throat.    Eyes:  Negative for pain and redness.   Respiratory:  Negative for cough, chest tightness, shortness of breath, wheezing and stridor.    Cardiovascular:  Negative for chest pain, palpitations and leg swelling.   Gastrointestinal:  Negative for abdominal pain, blood in stool, constipation, diarrhea, nausea and vomiting.   Endocrine: Negative for polydipsia, polyphagia and polyuria.   Genitourinary:  Negative for dysuria, hematuria and urgency.   Musculoskeletal:  Positive for arthralgias, myalgias, neck pain and neck stiffness. Negative for back pain, gait problem and joint swelling.   Skin:  Negative for rash.   Allergic/Immunologic: Negative for food allergies.   Neurological:  Positive  "for numbness. Negative for dizziness, tremors, seizures, syncope, facial asymmetry, speech difficulty, weakness, light-headedness and headaches.   Psychiatric/Behavioral:  Negative for agitation, hallucinations, self-injury and suicidal ideas. The patient is not nervous/anxious and is not hyperactive.           OBJECTIVE:  BP (!) 152/74   Pulse 68   Ht 6' 1" (1.854 m)   Wt 121.3 kg (267 lb 6.7 oz)   BMI 35.28 kg/m²         Physical Exam  Constitutional:       General: He is not in acute distress.     Appearance: Normal appearance. He is not ill-appearing.   HENT:      Head: Normocephalic and atraumatic.      Nose: No congestion or rhinorrhea.      Mouth/Throat:      Mouth: Mucous membranes are moist.   Eyes:      Extraocular Movements: Extraocular movements intact.      Pupils: Pupils are equal, round, and reactive to light.   Cardiovascular:      Pulses: Normal pulses.   Pulmonary:      Effort: Pulmonary effort is normal.   Abdominal:      General: Abdomen is flat.      Palpations: Abdomen is soft.   Musculoskeletal:      Cervical back: Normal range of motion and neck supple.   Skin:     General: Skin is warm and dry.      Capillary Refill: Capillary refill takes less than 2 seconds.   Neurological:      General: No focal deficit present.      Mental Status: He is alert and oriented to person, place, and time.      Cranial Nerves: No cranial nerve deficit.      Sensory: No sensory deficit.      Motor: No weakness or abnormal muscle tone.      Gait: Gait normal.      Deep Tendon Reflexes: Babinski sign absent on the right side. Babinski sign absent on the left side.      Reflex Scores:       Tricep reflexes are 2+ on the right side and 2+ on the left side.       Bicep reflexes are 2+ on the right side and 2+ on the left side.       Brachioradialis reflexes are 2+ on the right side and 2+ on the left side.       Patellar reflexes are 2+ on the right side and 2+ on the left side.       Achilles reflexes are 2+ on " the right side and 2+ on the left side.  Psychiatric:         Mood and Affect: Mood normal.         Behavior: Behavior normal.         Thought Content: Thought content normal.         Musculoskeletal:    Cervical Exam  Incision: no  Pain with Flexion: yes  Pain with Extension: no  Paraspinous TTP:  Left-greater-than-right  Facet TTP:  C3-C4  Spurling:  Positive on the left  ROM:  Decreased secondary to pain      LABS:  Lab Results   Component Value Date    WBC 6.79 03/14/2022    HGB 14.3 03/14/2022    HCT 45.6 03/14/2022    MCV 97 03/14/2022     03/14/2022       CMP  Sodium   Date Value Ref Range Status   08/12/2022 140 136 - 145 mmol/L Final     Potassium   Date Value Ref Range Status   08/12/2022 4.1 3.5 - 5.1 mmol/L Final     Chloride   Date Value Ref Range Status   08/12/2022 106 95 - 110 mmol/L Final     CO2   Date Value Ref Range Status   08/12/2022 23 23 - 29 mmol/L Final     Glucose   Date Value Ref Range Status   08/12/2022 101 70 - 110 mg/dL Final     BUN   Date Value Ref Range Status   08/12/2022 15 6 - 20 mg/dL Final     Creatinine   Date Value Ref Range Status   08/12/2022 1.0 0.5 - 1.4 mg/dL Final     Calcium   Date Value Ref Range Status   08/12/2022 9.3 8.7 - 10.5 mg/dL Final     Total Protein   Date Value Ref Range Status   08/12/2022 6.6 6.0 - 8.4 g/dL Final     Albumin   Date Value Ref Range Status   08/12/2022 4.1 3.6 - 5.1 g/dL Final   08/12/2022 3.8 3.5 - 5.2 g/dL Final     Total Bilirubin   Date Value Ref Range Status   08/12/2022 0.5 0.1 - 1.0 mg/dL Final     Comment:     For infants and newborns, interpretation of results should be based  on gestational age, weight and in agreement with clinical  observations.    Premature Infant recommended reference ranges:  Up to 24 hours.............<8.0 mg/dL  Up to 48 hours............<12.0 mg/dL  3-5 days..................<15.0 mg/dL  6-29 days.................<15.0 mg/dL       Alkaline Phosphatase   Date Value Ref Range Status   08/12/2022 50  (L) 55 - 135 U/L Final     AST   Date Value Ref Range Status   08/12/2022 38 10 - 40 U/L Final     ALT   Date Value Ref Range Status   08/12/2022 72 (H) 10 - 44 U/L Final     Anion Gap   Date Value Ref Range Status   08/12/2022 11 8 - 16 mmol/L Final     eGFR if    Date Value Ref Range Status   03/14/2022 >60.0 >60 mL/min/1.73 m^2 Final     eGFR if non    Date Value Ref Range Status   03/14/2022 >60.0 >60 mL/min/1.73 m^2 Final     Comment:     Calculation used to obtain the estimated glomerular filtration  rate (eGFR) is the CKD-EPI equation.          Lab Results   Component Value Date    HGBA1C 6.2 (H) 03/14/2022             ASSESSMENT:       57 y.o. year old male with neck pain, consistent with     1. Cervical radiculopathy  Ambulatory referral/consult to Physical/Occupational Therapy    IR SALBADOR Cervical/THoracic w/ Img    Case Request-RAD/Other Procedure Area: C7/T1 IL SALBADOR    diclofenac sodium (VOLTAREN) 1 % Gel    pregabalin (LYRICA) 75 MG capsule      2. DDD (degenerative disc disease), cervical        3. Cervical spondylosis          Cervical radiculopathy  -     Ambulatory referral/consult to Physical/Occupational Therapy; Future; Expected date: 09/20/2022  -     IR SALBADOR Cervical/THoracic w/ Img; Future; Expected date: 09/13/2022  -     Case Request-RAD/Other Procedure Area: C7/T1 IL SALBADOR  -     diclofenac sodium (VOLTAREN) 1 % Gel; Apply 2 g topically 4 (four) times daily as needed (Pain).  Dispense: 100 g; Refill: 1  -     pregabalin (LYRICA) 75 MG capsule; Take 1 capsule (75 mg total) by mouth 2 (two) times daily.  Dispense: 60 capsule; Refill: 2    DDD (degenerative disc disease), cervical    Cervical spondylosis             PLAN:   - Interventions:   Schedule patient for a C7-T1 interlaminar epidural steroid injection for cervical radiculopathy  - Anticoagulation use:   no no anticoagulation    - Medications:   Start Voltaren gel 4 times a day as needed for neck pain   Start  Lyrica 75 mg twice a day   Continue indomethacin for gout    - Therapy:    Refer to physical therapy for neck pain with left cervical radicular pain    - Imaging:   CT and cervical spine reviewed and findings discussed with patient.  Significant for diffuse foraminal stenosis and uncinate hypertrophy was noted at lateral recess stenosis, left greater than right, at C6-C7 and C7-T1    - Consults:   None at this time    - Counseled patient regarding the importance of physical therapy    - Patient Questions: Answered all of the patient's questions regarding diagnosis, therapy, and treatment    - Follow up visit: return to clinic 2-4 weeks after procedure        The above plan and management options were discussed at length with patient. Patient is in agreement with the above and verbalized understanding.    I discussed the goals of interventional chronic pain management with the patient on today's visit.  I explained the utility of injections for diagnostic and therapeutic purposes.  We discussed a multimodal approach to pain including treating the patient's given worst pain at any given time.  We will use a systematic approach to addressing pain.  We will also adopt a multimodal approach that includes injections, adjuvant medications, physical therapy, at times psychiatry.  There may be a limited role for opioid use intermittently in the treatment of pain, more particularly for acute pain although no one approach can be used as a sole treatment modality.    I emphasized the importance of regular exercise, core strengthening and stretching, diet and weight loss as a cornerstone of long-term pain management.      Leonard Mart MD  Interventional Pain Management  [facility]    Disclaimer:  This note was prepared using voice recognition system and is likely to have sound alike errors that may have been overlooked even after proof reading.  Please call me with any questions

## 2022-09-14 ENCOUNTER — LAB VISIT (OUTPATIENT)
Dept: LAB | Facility: HOSPITAL | Age: 57
End: 2022-09-14
Attending: FAMILY MEDICINE
Payer: COMMERCIAL

## 2022-09-14 DIAGNOSIS — E78.5 HYPERLIPIDEMIA ASSOCIATED WITH TYPE 2 DIABETES MELLITUS: ICD-10-CM

## 2022-09-14 DIAGNOSIS — E11.69 HYPERLIPIDEMIA ASSOCIATED WITH TYPE 2 DIABETES MELLITUS: ICD-10-CM

## 2022-09-14 DIAGNOSIS — E03.9 HYPOTHYROIDISM (ACQUIRED): ICD-10-CM

## 2022-09-14 DIAGNOSIS — E11.9 TYPE 2 DIABETES MELLITUS WITHOUT COMPLICATION, WITH LONG-TERM CURRENT USE OF INSULIN: ICD-10-CM

## 2022-09-14 DIAGNOSIS — Z79.4 TYPE 2 DIABETES MELLITUS WITHOUT COMPLICATION, WITH LONG-TERM CURRENT USE OF INSULIN: ICD-10-CM

## 2022-09-14 LAB
CHOLEST SERPL-MCNC: 83 MG/DL (ref 120–199)
CHOLEST/HDLC SERPL: 2.9 {RATIO} (ref 2–5)
ESTIMATED AVG GLUCOSE: 143 MG/DL (ref 68–131)
HBA1C MFR BLD: 6.6 % (ref 4–5.6)
HDLC SERPL-MCNC: 29 MG/DL (ref 40–75)
HDLC SERPL: 34.9 % (ref 20–50)
LDLC SERPL CALC-MCNC: 40.6 MG/DL (ref 63–159)
NONHDLC SERPL-MCNC: 54 MG/DL
T4 FREE SERPL-MCNC: 1.01 NG/DL (ref 0.71–1.51)
TRIGL SERPL-MCNC: 67 MG/DL (ref 30–150)
TSH SERPL DL<=0.005 MIU/L-ACNC: 1.15 UIU/ML (ref 0.4–4)

## 2022-09-14 PROCEDURE — 36415 COLL VENOUS BLD VENIPUNCTURE: CPT | Mod: PO | Performed by: FAMILY MEDICINE

## 2022-09-14 PROCEDURE — 80061 LIPID PANEL: CPT | Performed by: FAMILY MEDICINE

## 2022-09-14 PROCEDURE — 84443 ASSAY THYROID STIM HORMONE: CPT | Performed by: FAMILY MEDICINE

## 2022-09-14 PROCEDURE — 84439 ASSAY OF FREE THYROXINE: CPT | Performed by: FAMILY MEDICINE

## 2022-09-14 PROCEDURE — 83036 HEMOGLOBIN GLYCOSYLATED A1C: CPT | Performed by: FAMILY MEDICINE

## 2022-09-22 ENCOUNTER — PATIENT MESSAGE (OUTPATIENT)
Dept: INTERNAL MEDICINE | Facility: CLINIC | Age: 57
End: 2022-09-22
Payer: COMMERCIAL

## 2022-09-23 ENCOUNTER — PATIENT MESSAGE (OUTPATIENT)
Dept: INTERNAL MEDICINE | Facility: CLINIC | Age: 57
End: 2022-09-23
Payer: COMMERCIAL

## 2022-09-29 ENCOUNTER — OFFICE VISIT (OUTPATIENT)
Dept: OTOLARYNGOLOGY | Facility: CLINIC | Age: 57
End: 2022-09-29
Payer: COMMERCIAL

## 2022-09-29 ENCOUNTER — OFFICE VISIT (OUTPATIENT)
Dept: CARDIOLOGY | Facility: CLINIC | Age: 57
End: 2022-09-29
Payer: COMMERCIAL

## 2022-09-29 ENCOUNTER — OFFICE VISIT (OUTPATIENT)
Dept: OPHTHALMOLOGY | Facility: CLINIC | Age: 57
End: 2022-09-29
Payer: COMMERCIAL

## 2022-09-29 ENCOUNTER — PATIENT MESSAGE (OUTPATIENT)
Dept: UROLOGY | Facility: CLINIC | Age: 57
End: 2022-09-29
Payer: COMMERCIAL

## 2022-09-29 VITALS
DIASTOLIC BLOOD PRESSURE: 81 MMHG | BODY MASS INDEX: 36.46 KG/M2 | HEART RATE: 71 BPM | SYSTOLIC BLOOD PRESSURE: 141 MMHG | HEIGHT: 73 IN | WEIGHT: 275.13 LBS | OXYGEN SATURATION: 96 %

## 2022-09-29 VITALS — BODY MASS INDEX: 36.46 KG/M2 | HEIGHT: 73 IN | WEIGHT: 275.13 LBS

## 2022-09-29 DIAGNOSIS — I10 HYPERTENSION, UNSPECIFIED TYPE: ICD-10-CM

## 2022-09-29 DIAGNOSIS — R79.89 LOW TESTOSTERONE: ICD-10-CM

## 2022-09-29 DIAGNOSIS — E78.5 HYPERLIPIDEMIA, UNSPECIFIED HYPERLIPIDEMIA TYPE: ICD-10-CM

## 2022-09-29 DIAGNOSIS — E78.1 HYPERTRIGLYCERIDEMIA: ICD-10-CM

## 2022-09-29 DIAGNOSIS — I10 BENIGN ESSENTIAL HTN: Primary | ICD-10-CM

## 2022-09-29 DIAGNOSIS — J31.0 RHINITIS MEDICAMENTOSA: ICD-10-CM

## 2022-09-29 DIAGNOSIS — E11.9 TYPE 2 DIABETES MELLITUS WITHOUT COMPLICATION, WITHOUT LONG-TERM CURRENT USE OF INSULIN: ICD-10-CM

## 2022-09-29 DIAGNOSIS — I15.2 HYPERTENSION ASSOCIATED WITH DIABETES: ICD-10-CM

## 2022-09-29 DIAGNOSIS — G47.33 OSA (OBSTRUCTIVE SLEEP APNEA): ICD-10-CM

## 2022-09-29 DIAGNOSIS — Z79.4 TYPE 2 DIABETES MELLITUS WITHOUT COMPLICATION, WITH LONG-TERM CURRENT USE OF INSULIN: ICD-10-CM

## 2022-09-29 DIAGNOSIS — Z01.818 PRE-OP EVALUATION: ICD-10-CM

## 2022-09-29 DIAGNOSIS — T48.5X5A RHINITIS MEDICAMENTOSA: ICD-10-CM

## 2022-09-29 DIAGNOSIS — E11.9 TYPE 2 DIABETES MELLITUS WITHOUT COMPLICATION, WITH LONG-TERM CURRENT USE OF INSULIN: ICD-10-CM

## 2022-09-29 DIAGNOSIS — R07.9 CHEST PAIN, UNSPECIFIED TYPE: ICD-10-CM

## 2022-09-29 DIAGNOSIS — E03.9 HYPOTHYROIDISM (ACQUIRED): ICD-10-CM

## 2022-09-29 DIAGNOSIS — E11.9 DIABETES MELLITUS TYPE 2 WITHOUT RETINOPATHY: Primary | ICD-10-CM

## 2022-09-29 DIAGNOSIS — H52.7 REFRACTIVE ERRORS: ICD-10-CM

## 2022-09-29 DIAGNOSIS — E11.59 HYPERTENSION ASSOCIATED WITH DIABETES: ICD-10-CM

## 2022-09-29 DIAGNOSIS — R94.31 NONSPECIFIC ABNORMAL ELECTROCARDIOGRAM (ECG) (EKG): ICD-10-CM

## 2022-09-29 DIAGNOSIS — J32.9 CHRONIC SINUSITIS, UNSPECIFIED LOCATION: Primary | ICD-10-CM

## 2022-09-29 DIAGNOSIS — J34.2 NASAL SEPTAL DEVIATION: ICD-10-CM

## 2022-09-29 PROCEDURE — 92014 COMPRE OPH EXAM EST PT 1/>: CPT | Mod: S$GLB,,, | Performed by: OPTOMETRIST

## 2022-09-29 PROCEDURE — 3060F POS MICROALBUMINURIA REV: CPT | Mod: CPTII,S$GLB,, | Performed by: INTERNAL MEDICINE

## 2022-09-29 PROCEDURE — 3008F BODY MASS INDEX DOCD: CPT | Mod: CPTII,S$GLB,, | Performed by: INTERNAL MEDICINE

## 2022-09-29 PROCEDURE — 99214 OFFICE O/P EST MOD 30 MIN: CPT | Mod: S$GLB,,, | Performed by: INTERNAL MEDICINE

## 2022-09-29 PROCEDURE — 99999 PR PBB SHADOW E&M-EST. PATIENT-LVL V: CPT | Mod: PBBFAC,,, | Performed by: INTERNAL MEDICINE

## 2022-09-29 PROCEDURE — 3060F POS MICROALBUMINURIA REV: CPT | Mod: CPTII,S$GLB,, | Performed by: OPTOMETRIST

## 2022-09-29 PROCEDURE — 3060F POS MICROALBUMINURIA REV: CPT | Mod: CPTII,S$GLB,, | Performed by: STUDENT IN AN ORGANIZED HEALTH CARE EDUCATION/TRAINING PROGRAM

## 2022-09-29 PROCEDURE — 3072F LOW RISK FOR RETINOPATHY: CPT | Mod: CPTII,S$GLB,, | Performed by: STUDENT IN AN ORGANIZED HEALTH CARE EDUCATION/TRAINING PROGRAM

## 2022-09-29 PROCEDURE — 4010F ACE/ARB THERAPY RXD/TAKEN: CPT | Mod: CPTII,S$GLB,, | Performed by: INTERNAL MEDICINE

## 2022-09-29 PROCEDURE — 3060F PR POS MICROALBUMINURIA RESULT DOCUMENTED/REVIEW: ICD-10-PCS | Mod: CPTII,S$GLB,, | Performed by: INTERNAL MEDICINE

## 2022-09-29 PROCEDURE — 3066F PR DOCUMENTATION OF TREATMENT FOR NEPHROPATHY: ICD-10-PCS | Mod: CPTII,S$GLB,, | Performed by: STUDENT IN AN ORGANIZED HEALTH CARE EDUCATION/TRAINING PROGRAM

## 2022-09-29 PROCEDURE — 99999 PR PBB SHADOW E&M-EST. PATIENT-LVL II: ICD-10-PCS | Mod: PBBFAC,,, | Performed by: OPTOMETRIST

## 2022-09-29 PROCEDURE — 92015 PR REFRACTION: ICD-10-PCS | Mod: S$GLB,,, | Performed by: OPTOMETRIST

## 2022-09-29 PROCEDURE — 4010F ACE/ARB THERAPY RXD/TAKEN: CPT | Mod: CPTII,S$GLB,, | Performed by: OPTOMETRIST

## 2022-09-29 PROCEDURE — 3060F PR POS MICROALBUMINURIA RESULT DOCUMENTED/REVIEW: ICD-10-PCS | Mod: CPTII,S$GLB,, | Performed by: OPTOMETRIST

## 2022-09-29 PROCEDURE — 3044F HG A1C LEVEL LT 7.0%: CPT | Mod: CPTII,S$GLB,, | Performed by: INTERNAL MEDICINE

## 2022-09-29 PROCEDURE — 1160F RVW MEDS BY RX/DR IN RCRD: CPT | Mod: CPTII,S$GLB,, | Performed by: INTERNAL MEDICINE

## 2022-09-29 PROCEDURE — 3066F NEPHROPATHY DOC TX: CPT | Mod: CPTII,S$GLB,, | Performed by: OPTOMETRIST

## 2022-09-29 PROCEDURE — 1159F PR MEDICATION LIST DOCUMENTED IN MEDICAL RECORD: ICD-10-PCS | Mod: CPTII,S$GLB,, | Performed by: INTERNAL MEDICINE

## 2022-09-29 PROCEDURE — 3077F PR MOST RECENT SYSTOLIC BLOOD PRESSURE >= 140 MM HG: ICD-10-PCS | Mod: CPTII,S$GLB,, | Performed by: INTERNAL MEDICINE

## 2022-09-29 PROCEDURE — 3077F SYST BP >= 140 MM HG: CPT | Mod: CPTII,S$GLB,, | Performed by: INTERNAL MEDICINE

## 2022-09-29 PROCEDURE — 4010F PR ACE/ARB THEARPY RXD/TAKEN: ICD-10-PCS | Mod: CPTII,S$GLB,, | Performed by: INTERNAL MEDICINE

## 2022-09-29 PROCEDURE — 3079F PR MOST RECENT DIASTOLIC BLOOD PRESSURE 80-89 MM HG: ICD-10-PCS | Mod: CPTII,S$GLB,, | Performed by: INTERNAL MEDICINE

## 2022-09-29 PROCEDURE — 31237 NSL/SINS NDSC SURG BX POLYPC: CPT | Mod: 59,LT,S$GLB, | Performed by: STUDENT IN AN ORGANIZED HEALTH CARE EDUCATION/TRAINING PROGRAM

## 2022-09-29 PROCEDURE — 99999 PR PBB SHADOW E&M-EST. PATIENT-LVL III: CPT | Mod: PBBFAC,,, | Performed by: STUDENT IN AN ORGANIZED HEALTH CARE EDUCATION/TRAINING PROGRAM

## 2022-09-29 PROCEDURE — 1159F PR MEDICATION LIST DOCUMENTED IN MEDICAL RECORD: ICD-10-PCS | Mod: CPTII,S$GLB,, | Performed by: STUDENT IN AN ORGANIZED HEALTH CARE EDUCATION/TRAINING PROGRAM

## 2022-09-29 PROCEDURE — 1159F MED LIST DOCD IN RCRD: CPT | Mod: CPTII,S$GLB,, | Performed by: OPTOMETRIST

## 2022-09-29 PROCEDURE — 1159F MED LIST DOCD IN RCRD: CPT | Mod: CPTII,S$GLB,, | Performed by: STUDENT IN AN ORGANIZED HEALTH CARE EDUCATION/TRAINING PROGRAM

## 2022-09-29 PROCEDURE — 3044F PR MOST RECENT HEMOGLOBIN A1C LEVEL <7.0%: ICD-10-PCS | Mod: CPTII,S$GLB,, | Performed by: STUDENT IN AN ORGANIZED HEALTH CARE EDUCATION/TRAINING PROGRAM

## 2022-09-29 PROCEDURE — 92014 PR EYE EXAM, EST PATIENT,COMPREHESV: ICD-10-PCS | Mod: S$GLB,,, | Performed by: OPTOMETRIST

## 2022-09-29 PROCEDURE — 3008F PR BODY MASS INDEX (BMI) DOCUMENTED: ICD-10-PCS | Mod: CPTII,S$GLB,, | Performed by: STUDENT IN AN ORGANIZED HEALTH CARE EDUCATION/TRAINING PROGRAM

## 2022-09-29 PROCEDURE — 3044F HG A1C LEVEL LT 7.0%: CPT | Mod: CPTII,S$GLB,, | Performed by: STUDENT IN AN ORGANIZED HEALTH CARE EDUCATION/TRAINING PROGRAM

## 2022-09-29 PROCEDURE — 4010F PR ACE/ARB THEARPY RXD/TAKEN: ICD-10-PCS | Mod: CPTII,S$GLB,, | Performed by: OPTOMETRIST

## 2022-09-29 PROCEDURE — 92015 DETERMINE REFRACTIVE STATE: CPT | Mod: S$GLB,,, | Performed by: OPTOMETRIST

## 2022-09-29 PROCEDURE — 3066F NEPHROPATHY DOC TX: CPT | Mod: CPTII,S$GLB,, | Performed by: STUDENT IN AN ORGANIZED HEALTH CARE EDUCATION/TRAINING PROGRAM

## 2022-09-29 PROCEDURE — 3060F PR POS MICROALBUMINURIA RESULT DOCUMENTED/REVIEW: ICD-10-PCS | Mod: CPTII,S$GLB,, | Performed by: STUDENT IN AN ORGANIZED HEALTH CARE EDUCATION/TRAINING PROGRAM

## 2022-09-29 PROCEDURE — 3072F LOW RISK FOR RETINOPATHY: CPT | Mod: CPTII,S$GLB,, | Performed by: INTERNAL MEDICINE

## 2022-09-29 PROCEDURE — 3072F PR LOW RISK FOR RETINOPATHY: ICD-10-PCS | Mod: CPTII,S$GLB,, | Performed by: STUDENT IN AN ORGANIZED HEALTH CARE EDUCATION/TRAINING PROGRAM

## 2022-09-29 PROCEDURE — 99999 PR PBB SHADOW E&M-EST. PATIENT-LVL III: ICD-10-PCS | Mod: PBBFAC,,, | Performed by: STUDENT IN AN ORGANIZED HEALTH CARE EDUCATION/TRAINING PROGRAM

## 2022-09-29 PROCEDURE — 3008F BODY MASS INDEX DOCD: CPT | Mod: CPTII,S$GLB,, | Performed by: STUDENT IN AN ORGANIZED HEALTH CARE EDUCATION/TRAINING PROGRAM

## 2022-09-29 PROCEDURE — 3008F PR BODY MASS INDEX (BMI) DOCUMENTED: ICD-10-PCS | Mod: CPTII,S$GLB,, | Performed by: INTERNAL MEDICINE

## 2022-09-29 PROCEDURE — 3044F PR MOST RECENT HEMOGLOBIN A1C LEVEL <7.0%: ICD-10-PCS | Mod: CPTII,S$GLB,, | Performed by: INTERNAL MEDICINE

## 2022-09-29 PROCEDURE — 4010F ACE/ARB THERAPY RXD/TAKEN: CPT | Mod: CPTII,S$GLB,, | Performed by: STUDENT IN AN ORGANIZED HEALTH CARE EDUCATION/TRAINING PROGRAM

## 2022-09-29 PROCEDURE — 3072F PR LOW RISK FOR RETINOPATHY: ICD-10-PCS | Mod: CPTII,S$GLB,, | Performed by: INTERNAL MEDICINE

## 2022-09-29 PROCEDURE — 1160F PR REVIEW ALL MEDS BY PRESCRIBER/CLIN PHARMACIST DOCUMENTED: ICD-10-PCS | Mod: CPTII,S$GLB,, | Performed by: INTERNAL MEDICINE

## 2022-09-29 PROCEDURE — 3044F PR MOST RECENT HEMOGLOBIN A1C LEVEL <7.0%: ICD-10-PCS | Mod: CPTII,S$GLB,, | Performed by: OPTOMETRIST

## 2022-09-29 PROCEDURE — 3044F HG A1C LEVEL LT 7.0%: CPT | Mod: CPTII,S$GLB,, | Performed by: OPTOMETRIST

## 2022-09-29 PROCEDURE — 99999 PR PBB SHADOW E&M-EST. PATIENT-LVL V: ICD-10-PCS | Mod: PBBFAC,,, | Performed by: INTERNAL MEDICINE

## 2022-09-29 PROCEDURE — 3079F DIAST BP 80-89 MM HG: CPT | Mod: CPTII,S$GLB,, | Performed by: INTERNAL MEDICINE

## 2022-09-29 PROCEDURE — 99214 PR OFFICE/OUTPT VISIT, EST, LEVL IV, 30-39 MIN: ICD-10-PCS | Mod: S$GLB,,, | Performed by: INTERNAL MEDICINE

## 2022-09-29 PROCEDURE — 99213 OFFICE O/P EST LOW 20 MIN: CPT | Mod: 25,24,S$GLB, | Performed by: STUDENT IN AN ORGANIZED HEALTH CARE EDUCATION/TRAINING PROGRAM

## 2022-09-29 PROCEDURE — 31237 PR NASAL/SINUS ENDOSCOPY,BX/RMV POLYP/DEBRID: ICD-10-PCS | Mod: 59,LT,S$GLB, | Performed by: STUDENT IN AN ORGANIZED HEALTH CARE EDUCATION/TRAINING PROGRAM

## 2022-09-29 PROCEDURE — 1159F PR MEDICATION LIST DOCUMENTED IN MEDICAL RECORD: ICD-10-PCS | Mod: CPTII,S$GLB,, | Performed by: OPTOMETRIST

## 2022-09-29 PROCEDURE — 99999 PR PBB SHADOW E&M-EST. PATIENT-LVL II: CPT | Mod: PBBFAC,,, | Performed by: OPTOMETRIST

## 2022-09-29 PROCEDURE — 3066F NEPHROPATHY DOC TX: CPT | Mod: CPTII,S$GLB,, | Performed by: INTERNAL MEDICINE

## 2022-09-29 PROCEDURE — 99213 PR OFFICE/OUTPT VISIT, EST, LEVL III, 20-29 MIN: ICD-10-PCS | Mod: 25,24,S$GLB, | Performed by: STUDENT IN AN ORGANIZED HEALTH CARE EDUCATION/TRAINING PROGRAM

## 2022-09-29 PROCEDURE — 4010F PR ACE/ARB THEARPY RXD/TAKEN: ICD-10-PCS | Mod: CPTII,S$GLB,, | Performed by: STUDENT IN AN ORGANIZED HEALTH CARE EDUCATION/TRAINING PROGRAM

## 2022-09-29 PROCEDURE — 3066F PR DOCUMENTATION OF TREATMENT FOR NEPHROPATHY: ICD-10-PCS | Mod: CPTII,S$GLB,, | Performed by: INTERNAL MEDICINE

## 2022-09-29 PROCEDURE — 3066F PR DOCUMENTATION OF TREATMENT FOR NEPHROPATHY: ICD-10-PCS | Mod: CPTII,S$GLB,, | Performed by: OPTOMETRIST

## 2022-09-29 PROCEDURE — 1159F MED LIST DOCD IN RCRD: CPT | Mod: CPTII,S$GLB,, | Performed by: INTERNAL MEDICINE

## 2022-09-29 RX ORDER — HYDRALAZINE HYDROCHLORIDE 100 MG/1
100 TABLET, FILM COATED ORAL EVERY 8 HOURS
Qty: 270 TABLET | Refills: 1 | Status: SHIPPED | OUTPATIENT
Start: 2022-09-29 | End: 2023-02-22 | Stop reason: SDUPTHER

## 2022-09-29 RX ORDER — AZELASTINE 1 MG/ML
1 SPRAY, METERED NASAL 2 TIMES DAILY
Qty: 30 ML | Refills: 3 | Status: SHIPPED | OUTPATIENT
Start: 2022-09-29 | End: 2022-10-11 | Stop reason: SDUPTHER

## 2022-09-29 RX ORDER — PREDNISONE 10 MG/1
TABLET ORAL
Qty: 24 TABLET | Refills: 0 | Status: SHIPPED | OUTPATIENT
Start: 2022-09-29 | End: 2022-12-02

## 2022-09-29 RX ORDER — SEMAGLUTIDE 2.68 MG/ML
2 INJECTION, SOLUTION SUBCUTANEOUS
Qty: 10 ML | Refills: 1 | Status: SHIPPED | OUTPATIENT
Start: 2022-09-29 | End: 2022-10-14 | Stop reason: ALTCHOICE

## 2022-09-29 NOTE — PROGRESS NOTES
HPI    NIDDM exam.  Decrease near visual acuity.  Patient wears +2.00 OTC Readers.  Left readers today  Last eye exam 08/11/2021 TRF.  Update glasses RX.  Lab Results       Component                Value               Date                       HGBA1C                   6.6 (H)             09/14/2022              Last edited by Armida Viera MA on 9/29/2022  1:10 PM.            Assessment /Plan     For exam results, see Encounter Report.    Diabetes mellitus type 2 without retinopathy    Type 2 diabetes mellitus without complication, with long-term current use of insulin  -     Ambulatory referral/consult to Optometry    Refractive errors      No Background Diabetic Retinopathy    Dispense Final Rx for glasses or may use OTC glasses.  RTC 1 year  Discussed above and answered questions.

## 2022-09-29 NOTE — PROGRESS NOTES
Chief complaint:    Chief Complaint   Patient presents with    Follow-up         Referring Provider:  No referring provider defined for this encounter.      History of present illness:     Mr. English is a 57 y.o. presenting for evaluation of sinus congestion.     The patient reports the following allergy/sinus symptoms:     Headaches - 3-4 times/day, forehead    Nasal congestion, purulent anterior drainage, and PND - present all the time  Coughing - at night    No fever, no hyposmia.     Started after COVID in January. Symptoms have been present for almost continuously since then.    Treatment has included: Zytrec or Xyzal, mucinex, abx given 1 month ago for tooth issue. Had no effect on nasal issues.     Prior to the last few months patient has had about 0 sinus infections in the past 12 months.   Prior sinus surgery: no.    Current medications: ibuprofen 800, mucinex, and xyzal. No help with any of it.    Allergy history: yes, when he was young, not bad as an adult       Return clinic visit, 4/11/22  Completed abx and steroids. Since then, symptoms have minimally improved. Major complaints are nasal drip (clear) and nasal congestion (both sides), and some forehead pressure.     Return clinic visit, 5/31/22  Using saline, flonase, and Atrovent.   Post nasal drip has improved, but nasal congestion has not. Bilateral, sometimes worse on either side. Worse at night, but present all the time.   Intermittent facial pressure/pain, anterior rhinorrhea.     Return clinic visit, 7/18/22  No major change since last visit.   Continued L>R nasal obstruction, all the time, worse at night. Also with facial pressure/pain and purulent rhinorrhea.   Using astelin, flonase, atrovent without much change. Also completed a course of steroids and antibiotics. Gets improvement with abx/steroids then returns.    Return clinic visit, 8/22/22  S/p FESS and septoplasty. Epistaxis requiring packing on POD2. Since then, bleeding has greatly  slowed. Still spitting up some dark blood from time to time. No bright red blood.     Return clinic visit, 8/24/22  Some slow oozing after pack placement, much improved. HTN to 180-190, but now 150s since doubling hydralazine.     Return clinic visit, 9/8/22  Overall doing well. However, still having times when he coughs up some phlegm which can be blood-tinged. Not bright red. Also with congestion on the left side and some pressure as well.     Return clinic visit, 9/29/22  No further bleeding.   Since last visit, continued and worsened left sided nasal obstruction. Using afrin several times a day.    Wakes him up at night with CPAP.     History      Past Medical History:   Past Medical History:   Diagnosis Date    Cancer     Colon    Diabetes mellitus 8 years    BS 84 in the room 08/11/2021    Hypertension     Sleep apnea     Thyroid disease          Past Surgical History:  Past Surgical History:   Procedure Laterality Date    BACK SURGERY      COLONOSCOPY N/A 12/29/2020    Procedure: COLONOSCOPY;  Surgeon: William Cotter MD;  Location: [facility];  Service: Endoscopy;  Laterality: N/A;  Will need Rapid doesn't live here    COLONOSCOPY N/A 2/10/2022    Procedure: COLONOSCOPY;  Surgeon: Wili Espinosa MD;  Location: [facility];  Service: Endoscopy;  Laterality: N/A;    FESS, WITH NASAL SEPTOPLASTY, WITH IMAGING GUIDANCE Bilateral 8/17/2022    Procedure: FESS, WITH NASAL SEPTOPLASTY, WITH IMAGING GUIDANCE;  Surgeon: Viktor Ferrell MD;  Location: [facility] OR;  Service: ENT;  Laterality: Bilateral;    LAPAROSCOPIC RIGHT COLON RESECTION N/A 2/2/2021    Procedure: COLECTOMY, RIGHT, LAPAROSCOPIC, ERAS low;  Surgeon: Melonie Santoyo MD;  Location: [address];  Service: Colon and Rectal;  Laterality: N/A;  needs rapid covid test    NASAL TURBINATE REDUCTION Bilateral 8/17/2022    Procedure: REDUCTION, NASAL TURBINATE;  Surgeon: Viktor Ferrell MD;  Location: [facility] OR;  Service: ENT;  Laterality: Bilateral;  "   TONSILLECTOMY           Medications: Medication list reviewed. He  has a current medication list which includes the following prescription(s): amlodipine, atorvastatin, azelastine, benazepril, clonazepam, colchicine, diclofenac sodium, flash glucose scanning reader, flash glucose sensor, fluticasone propionate, hydralazine, hydrocodone-acetaminophen, indomethacin, toujeo solostar u-300 insulin, ipratropium, levocetirizine, levothyroxine, metformin, metoprolol succinate, ozempic, alprostadil, prednisone, pregabalin, testosterone, vascepa, and prednisone, and the following Facility-Administered Medications: gabapentin and lactated ringers.     Allergies:   Review of patient's allergies indicates:   Allergen Reactions    Demerol (pf) [meperidine (pf)] Other (See Comments)     Pt reports,"They lost my blood pressure."    Percocet [oxycodone-acetaminophen] Itching     itching    Demerol [meperidine]          Family history: family history includes Breast cancer in his mother; Diabetes in his maternal grandmother and mother; Hypertension in his brother and mother; Stroke in his father.         Social History          Alcohol use:  reports that he does not currently use alcohol.            Tobacco:  reports that he has never smoked. He has never used smokeless tobacco.         Physical Examination      Vitals: Height 6' 1" (1.854 m), weight 124.8 kg (275 lb 2.2 oz).      General: Well developed, well nourished, well hydrated.     Voice: no dysphonia, no dysarthria      Head/Face: Normocephalic, atraumatic. No scars or lesions. Facial musculature equal.     Eyes: No scleral icterus or conjunctival hemorrhage. EOMI. PERRLA.     Ears:     Right ear: No gross deformity. EAC is clear of debris and erythema. TM are intact with a pneumatized middle ear. No signs of retraction, fluid or infection.      Left ear: No gross deformity. EAC is clear of debris and erythema. TM are intact with a pneumatized middle ear. No signs of " "retraction, fluid or infection.      Nose: No gross deformity or lesions. Septum midline    Mouth/Oropharynx: Lips without any lesions. No mucosal lesions within the oropharynx. No tonsillar exudate or lesions. Pharyngeal walls symmetrical. Uvula midline. Tongue midline without lesions. Dry.     Neurologic: Moving all extremities without gross abnormality.CN II-XII grossly intact. House-Brackmann 1/6. No signs of nystagmus.        Data reviewed      Review of records:      I reviewed records from the referring provider's office visits describing the history, workup, and/or treatment of this problem thus far.    Imaging  I have independently reviewed the following imaging with the findings noted below:      CT sinus, 09/29/2022  Bilateral toya bullosa  S-shaped septal deviation, left mid-septal spur  Inferior turbinate reduction   Left frontal thickening and obstruction of outflow  Bilateral anterior ethmoid disease     Op note, 8/20/22     PROCEDURE:   Endoscopic septoplasty   Bilateral inferior turbinate submucosal resection  Nasal endoscopy with resection of bilateral toya bullosa   Bilateral nasal endoscopy with maxillary antrostomy   Bilateral nasal endoscopy with anterior ethmoidectomy    Left nasal endoscopy with frontal sinusotomy and frontal sinus exploration   Functional endoscopic sinus surgery with CT image guided electromagnetic system     OPERATIVE FINDINGS:   Polypoid mucosal thickening at the left frontal outflow tract  Bilateral polypoid thickening in anterior ethmoids  Left septal deviation with large left septal spur      Pathology reviewed  1. "left sinus contents", excision:       -  Sinonasal mucosa with chronic inflammation       - No eosinophils seen   2. "septal cartilage", excision:       - Portion of cartilage     Procedure Note - Rigid Nasal Endoscopy with debridement    Surgeon: Viktor Ferrell MD  Anesthesia: topical oxymetazoline and 4% lidocaine.    Indication: Narendra English Sr. " is a 57 y.o. male underwent recent sinus surgery. Debridement was indicated to remove crusting and clot and visualize at the surgical site.    Technique: The nose was sprayed with oxymetazoline and 4% lidocaine. With the patient in the upright position, a 2.7mm 30-degree endscope was inserted into the patient's right and left nare.  Where visible, nasal secretions and mucosal crusting were removed with a suction. The overall appearance of the nasal cavity and paranasal sinuses were noted and the findings are described below.     Findings: residual crusting removed from the left middle meatus. Septum midline, mild granulation along inferior left septum now healed. Patent sinus and nasal cavities. No further mucopurulence and no polyps      Assessment/Plan:    1. Chronic sinusitis, unspecified location    2. Nasal septal deviation    3. Rhinitis medicamentosa        S/p FESS and septoplasty with post op epistaxis complicated by hypertensive urgency (resolved, pack removed, HTN better controlled); developed post op sinus infection (resolved)    Now with consistent congestion secondary to RM (new problem today). Will wean and treat with rounds of steroids during weaning.     Will add astelin.   Continue flonase.                   Viktor Ferrell MD  [facility]   [facility]  [id number] [place] [address].  [place] [id number]  P: [phone]  F: [phone]

## 2022-09-29 NOTE — PROGRESS NOTES
Subjective:   Patient ID:  Narendra English Sr. is a 57 y.o. male who presents for cardiac consult of No chief complaint on file.    Referring Physician:    Tabitha Melgoza MD [[id number]] [address] [facility] [place] [address]      Reason for consult: HTN    HPI  The patient came in today for cardiac consult of No chief complaint on file.    Narendra English Sr. is a 57 y.o. male pt with HTN, HLD, elevated TGs, DM2, FARA, obesity, gout, hypothyroidism, low T presents for CV eval of HTN     3/15/22 - Dr. Gonzalez Visit  This pleasant patient 56 years of age who has history hypertension hyperlipidemia and evidence of type 2 diabetes without complications.  The patient has had exertional chest pain present in the emergency room with nonspecific EKG changes negative troponin levels.  Follow-up in the office today.  EKG shows evidence of right bundle branch block no acute changes.  Otherwise patient is stable.  He has had no exertional symptoms.  There is no significant family history of coronary disease.  Patient nonsmoker no history of drug or alcohol abuse otherwise stable.  States that the pain is very intermittent and is not typical.  This visit finds patient feeling well.  No exertional symptoms chest pain shortness of breath.  Stress test showed showed no evidence cardiac ischemia normal LV function blood pressure still elevated I will add hydralazine 10 mg b.i.d. to his current regimen of metoprolol 50 mg daily, amlodipine 10 mg daily and benazepril 40 mg daily.  Of note hydrochlorothiazide in the past has given the patient gout and is not to be used.  Overall stable and he is to have endoscopy this week for follow-up.  He should do well with procedure and has no issues with cardiac events for this and should be at low cardiac risk for procedure.  Is is advised patient doing well.  Stable heart and blood pressure on medication renewed cluster profiles are excellent he continues on statin  medications.    9/29/22  Pt of Dr. Gonzalez seen in March 2022 here for eval of HTN. Stress ECHO in 2/22 neg with normal bi V function.   Recent BP elevated 150s/80s. Pt is on Norvasc 10, benazepril 40 , hydral 50 q 12, toprol 50    BP today 140/80, HR 70. BMI 36 - 275 lbs. BP at . He used to weight > 300 lbs, had colon ca; is on ozempic 1mg.     Patient feels no chest pain, no sob, no leg swelling, no PND, no palpitation, no dizziness, no syncope, no CNS symptoms.    Patient has fairly good exercise tolerance.    Patient is compliant with medications.      Stress ECHO 2/2022  Summary    Concentric remodeling and normal systolic function.  There were no arrhythmias during stress.  The estimated ejection fraction is 60%.  Normal left ventricular diastolic function.  With normal right ventricular systolic function.  The stress echo portion of this study is negative for myocardial ischemia.  The ECG portion of this study is negative for myocardial ischemia.    Past Medical History:   Diagnosis Date    Cancer     Colon    Diabetes mellitus 8 years    BS 84 in the room 08/11/2021    Hypertension     Sleep apnea     Thyroid disease        Past Surgical History:   Procedure Laterality Date    BACK SURGERY      COLONOSCOPY N/A 12/29/2020    Procedure: COLONOSCOPY;  Surgeon: William Cotter MD;  Location: [facility];  Service: Endoscopy;  Laterality: N/A;  Will need Rapid doesn't live here    COLONOSCOPY N/A 2/10/2022    Procedure: COLONOSCOPY;  Surgeon: Wili Espinosa MD;  Location: [facility];  Service: Endoscopy;  Laterality: N/A;    FESS, WITH NASAL SEPTOPLASTY, WITH IMAGING GUIDANCE Bilateral 8/17/2022    Procedure: FESS, WITH NASAL SEPTOPLASTY, WITH IMAGING GUIDANCE;  Surgeon: Viktor Ferrell MD;  Location: [facility] OR;  Service: ENT;  Laterality: Bilateral;    LAPAROSCOPIC RIGHT COLON RESECTION N/A 2/2/2021    Procedure: COLECTOMY, RIGHT, LAPAROSCOPIC, ERAS low;  Surgeon: Melonie Santoyo MD;  Location:  [facility] OR 2ND FLR;  Service: Colon and Rectal;  Laterality: N/A;  needs rapid covid test    NASAL TURBINATE REDUCTION Bilateral 8/17/2022    Procedure: REDUCTION, NASAL TURBINATE;  Surgeon: Viktor Ferrell MD;  Location: [facility] OR;  Service: ENT;  Laterality: Bilateral;    TONSILLECTOMY         Social History     Tobacco Use    Smoking status: Never    Smokeless tobacco: Never   Substance Use Topics    Alcohol use: Not Currently    Drug use: Never       Family History   Problem Relation Age of Onset    Hypertension Mother     Diabetes Mother     Breast cancer Mother     Diabetes Maternal Grandmother     Hypertension Brother     Stroke Father        Patient's Medications   New Prescriptions    SEMAGLUTIDE (OZEMPIC) 2 MG/DOSE (8 MG/3 ML) PNIJ    Inject 2 mg into the skin every 7 days.   Previous Medications    AMLODIPINE (NORVASC) 10 MG TABLET    TAKE 1 TABLET(10 MG) BY MOUTH EVERY DAY    ATORVASTATIN (LIPITOR) 40 MG TABLET    TAKE 1 TABLET(40 MG) BY MOUTH EVERY DAY    AZELASTINE (ASTELIN) 137 MCG (0.1 %) NASAL SPRAY    1 spray (137 mcg total) by Nasal route 2 (two) times daily.    BENAZEPRIL (LOTENSIN) 40 MG TABLET    Take 1 tablet (40 mg total) by mouth once daily.    CLONAZEPAM (KLONOPIN) 2 MG TAB    TAKE 1 TABLET BY MOUTH EVERY EVENING    COLCHICINE (COLCRYS) 0.6 MG TABLET    Take 1 tablet (0.6 mg total) by mouth 2 (two) times daily.    DICLOFENAC SODIUM (VOLTAREN) 1 % GEL    Apply 2 g topically 4 (four) times daily as needed (Pain).    FLASH GLUCOSE SCANNING READER MISC    1 application.    FLASH GLUCOSE SENSOR KIT    1 application.    FLUTICASONE PROPIONATE (FLONASE) 50 MCG/ACTUATION NASAL SPRAY    1 spray (50 mcg total) by Each Nostril route once daily.    HYDROCODONE-ACETAMINOPHEN (NORCO) 5-325 MG PER TABLET    Take 1 tablet by mouth every 6 (six) hours as needed for Pain.    INDOMETHACIN (INDOCIN SR) 75 MG CPSR CR CAPSULE    Take 75 mg by mouth 2 (two) times daily with meals.    INSULIN GLARGINE, TOUJEO, (TOUJEO  SOLOSTAR U-300 INSULIN) 300 UNIT/ML (1.5 ML) INPN PEN    Inject 85 Units into the skin once daily.    IPRATROPIUM (ATROVENT) 21 MCG (0.03 %) NASAL SPRAY    USE 2 SPRAYS IN EACH NOSTRIL TWICE DAILY    LEVOCETIRIZINE (XYZAL) 5 MG TABLET    Take 5 mg by mouth every evening.    LEVOTHYROXINE (SYNTHROID) 112 MCG TABLET    TAKE 1 TABLET(112 MCG) BY MOUTH BEFORE BREAKFAST    METFORMIN (GLUCOPHAGE) 1000 MG TABLET    Take 1 tablet (1,000 mg total) by mouth 2 (two) times daily with meals.    METOPROLOL SUCCINATE (TOPROL-XL) 50 MG 24 HR TABLET    Take 1 tablet (50 mg total) by mouth once daily.    PEP INJECTION    Inject 0.15 ml as directed     For compounding pharmacy use:   Add PAPAVERINE 30 mg  Add PHENTOLAMINE 1 mg  Add ALPROSTADIL 20 mcg    PREDNISONE (DELTASONE) 10 MG TABLET    Take 3 tablets a day for 4 days (1 before breakfast, 1 after lunch, 1 before bed). Then take 2 tablets a day for 4 days (1 before breakfast, 1 before bed). Then take 1 tablet a day for 4 days (1 before breakfast).    PREDNISONE (DELTASONE) 10 MG TABLET    Take 3 tablets a day for 4 days (1 before breakfast, 1 after lunch, 1 before bed). Then take 2 tablets a day for 4 days (1 before breakfast, 1 before bed). Then take 1 tablet a day for 4 days (1 before breakfast).    PREGABALIN (LYRICA) 75 MG CAPSULE    Take 1 capsule (75 mg total) by mouth 2 (two) times daily.    TESTOSTERONE (ANDROGEL) 20.25 MG/1.25 GRAM (1.62 %) GLPM    PLACE 2 PUMPS(40.5 MG) ONTO THE SKIN ONCE DAILY AT 6 AM  Strength: 20.25 mg/1.25 gram (1.62 %)    VASCEPA 1 GRAM CAP    Take 2 capsules (2,000 mg total) by mouth 2 (two) times daily.   Modified Medications    Modified Medication Previous Medication    HYDRALAZINE (APRESOLINE) 100 MG TABLET hydrALAZINE (APRESOLINE) 25 MG tablet       Take 1 tablet (100 mg total) by mouth every 8 (eight) hours.    Take 2 tablets (50 mg total) by mouth every 12 (twelve) hours.   Discontinued Medications    OZEMPIC 1 MG/DOSE (4 MG/3 ML)    INJECT 1  "MG INTO THE SKIN ONCE WEEKLY       Review of Systems   Constitutional: Negative.    HENT: Negative.     Eyes: Negative.    Respiratory: Negative.     Cardiovascular: Negative.    Gastrointestinal: Negative.    Genitourinary: Negative.    Musculoskeletal: Negative.    Skin: Negative.    Neurological: Negative.    Endo/Heme/Allergies: Negative.    Psychiatric/Behavioral: Negative.     All 12 systems otherwise negative.    Wt Readings from Last 3 Encounters:   09/29/22 124.8 kg (275 lb 2.2 oz)   09/29/22 124.8 kg (275 lb 2.2 oz)   09/13/22 121.3 kg (267 lb 6.7 oz)     Temp Readings from Last 3 Encounters:   09/08/22 98.8 °F (37.1 °C) (Temporal)   09/08/22 97.9 °F (36.6 °C) (Temporal)   08/24/22 98.4 °F (36.9 °C) (Temporal)     BP Readings from Last 3 Encounters:   09/29/22 (!) 141/81   09/13/22 (!) 152/74   09/08/22 (!) 146/82     Pulse Readings from Last 3 Encounters:   09/29/22 71   09/13/22 68   09/08/22 82       BP (!) 141/81   Pulse 71   Ht 6' 1" (1.854 m)   Wt 124.8 kg (275 lb 2.2 oz)   SpO2 96%   BMI 36.30 kg/m²     Objective:   Physical Exam  Vitals and nursing note reviewed.   Constitutional:       General: He is not in acute distress.     Appearance: He is well-developed. He is obese. He is not diaphoretic.   HENT:      Head: Normocephalic and atraumatic.      Nose: Nose normal.   Eyes:      General: No scleral icterus.     Conjunctiva/sclera: Conjunctivae normal.   Neck:      Thyroid: No thyromegaly.      Vascular: No JVD.   Cardiovascular:      Rate and Rhythm: Normal rate and regular rhythm.      Heart sounds: S1 normal and S2 normal. No murmur heard.    No friction rub. No gallop. No S3 or S4 sounds.   Pulmonary:      Effort: Pulmonary effort is normal. No respiratory distress.      Breath sounds: Normal breath sounds. No stridor. No wheezing or rales.   Chest:      Chest wall: No tenderness.   Abdominal:      General: Bowel sounds are normal. There is no distension.      Palpations: Abdomen is soft. " There is no mass.      Tenderness: There is no abdominal tenderness. There is no rebound.   Genitourinary:     Comments: Deferred  Musculoskeletal:         General: No tenderness or deformity. Normal range of motion.      Cervical back: Normal range of motion and neck supple.   Lymphadenopathy:      Cervical: No cervical adenopathy.   Skin:     General: Skin is warm and dry.      Coloration: Skin is not pale.      Findings: No erythema or rash.   Neurological:      Mental Status: He is alert and oriented to person, place, and time.      Motor: No abnormal muscle tone.      Coordination: Coordination normal.   Psychiatric:         Behavior: Behavior normal.         Thought Content: Thought content normal.         Judgment: Judgment normal.       Lab Results   Component Value Date     08/12/2022    K 4.1 08/12/2022     08/12/2022    CO2 23 08/12/2022    BUN 15 08/12/2022    CREATININE 1.0 08/12/2022     08/12/2022    HGBA1C 6.6 (H) 09/14/2022    MG 1.7 02/03/2021    AST 38 08/12/2022    ALT 72 (H) 08/12/2022    ALBUMIN 4.1 08/12/2022    ALBUMIN 3.8 08/12/2022    PROT 6.6 08/12/2022    BILITOT 0.5 08/12/2022    WBC 6.79 03/14/2022    HGB 14.3 03/14/2022    HCT 45.6 03/14/2022    MCV 97 03/14/2022     03/14/2022    TSH 1.149 09/14/2022    CHOL 83 (L) 09/14/2022    HDL 29 (L) 09/14/2022    LDLCALC 40.6 (L) 09/14/2022    TRIG 67 09/14/2022    BNP <10 11/15/2021     Assessment:      1. Benign essential HTN    2. Hypertension associated with diabetes    3. Hyperlipidemia, unspecified hyperlipidemia type    4. Hypertriglyceridemia    5. FARA (obstructive sleep apnea)    6. Hypothyroidism (acquired)    7. Type 2 diabetes mellitus without complication, with long-term current use of insulin    8. BMI 36.0-36.9,adult    9. Low testosterone    10. Chest pain, unspecified type    11. Nonspecific abnormal electrocardiogram (ECG) (EKG)    12. Hypertension, unspecified type    13. Pre-op evaluation    14.  Type 2 diabetes mellitus without complication, without long-term current use of insulin        Plan:     HTN - elevated  - titrate meds   - increase hydral to 100mg TID    2. HLD, elevated TGs  - cont meds and titrate  - well controlled    3. DM2 A1c 6.6  - cont tx - on Ozempic - increase to 2mg    4. Hypothyroidsm TSH1.1  - cont Synthroid    5. FARA  - cont CPAP    6. Obesity, BMI 36  - cont weight loss with diet/exercise     7. Low T  - cont T supplements as needed  - monitor BP     F/u with Dr. Gonzalez or myself depending on sched/pt pref - can see in [facility] office    Thank you for allowing me to participate in this patient's care. Please do not hesitate to contact me with any questions or concerns. Consult note has been forwarded to the referral physician.

## 2022-09-29 NOTE — PATIENT INSTRUCTIONS
Rhinitis Medicamentosa     Weaning off Afrin  Empty half a bottle of Afrin and mix with saline. Continue to use this a few times a day. Then once that bottle is half empty, refill it with saline again. Repeat these steps 3-4 times. At that point the bottle will be extremely diluted of Afrin and you should be able to discontinue its use at that time.     To help the expected worsening of your nasal congestion during the weaning process we will treat with a steroid taper (prednisone for 12 days) to help with your symptoms. Take as instructed.    From then on, you can continue to use nasal saline and Flonase nasal spray daily.       Also continue the Astelin nasal spray twice daily.

## 2022-10-03 ENCOUNTER — PATIENT MESSAGE (OUTPATIENT)
Dept: INTERNAL MEDICINE | Facility: CLINIC | Age: 57
End: 2022-10-03
Payer: COMMERCIAL

## 2022-10-05 ENCOUNTER — PATIENT MESSAGE (OUTPATIENT)
Dept: UROLOGY | Facility: CLINIC | Age: 57
End: 2022-10-05
Payer: COMMERCIAL

## 2022-10-06 ENCOUNTER — HOSPITAL ENCOUNTER (OUTPATIENT)
Dept: RADIOLOGY | Facility: HOSPITAL | Age: 57
Discharge: HOME | End: 2022-10-06
Attending: COLON & RECTAL SURGERY
Payer: COMMERCIAL

## 2022-10-06 DIAGNOSIS — R91.1 SOLITARY PULMONARY NODULE: Primary | ICD-10-CM

## 2022-10-06 DIAGNOSIS — R91.1 SOLITARY PULMONARY NODULE: ICD-10-CM

## 2022-10-06 PROCEDURE — 71250 CT THORAX DX C-: CPT | Mod: 26,,, | Performed by: RADIOLOGY

## 2022-10-06 PROCEDURE — 71250 CT THORAX DX C-: CPT | Mod: TC

## 2022-10-06 PROCEDURE — 71250 CT CHEST WITHOUT CONTRAST: ICD-10-PCS | Mod: 26,,, | Performed by: RADIOLOGY

## 2022-10-07 NOTE — TELEPHONE ENCOUNTER
Check with diabetes department on how to order that. Insurance may not cover, find out what the reason patient wants this too please.

## 2022-10-07 NOTE — TELEPHONE ENCOUNTER
Please be advised per pt portal message.  Pt PCP asking diabetes to assist with how pt can order and address needs

## 2022-10-08 ENCOUNTER — PATIENT MESSAGE (OUTPATIENT)
Dept: CARDIOLOGY | Facility: CLINIC | Age: 57
End: 2022-10-08
Payer: COMMERCIAL

## 2022-10-10 ENCOUNTER — PATIENT MESSAGE (OUTPATIENT)
Dept: INTERNAL MEDICINE | Facility: CLINIC | Age: 57
End: 2022-10-10
Payer: COMMERCIAL

## 2022-10-10 ENCOUNTER — TELEPHONE (OUTPATIENT)
Dept: PAIN MEDICINE | Facility: CLINIC | Age: 57
End: 2022-10-10
Payer: COMMERCIAL

## 2022-10-10 DIAGNOSIS — E11.9 TYPE 2 DIABETES MELLITUS WITHOUT COMPLICATION, WITH LONG-TERM CURRENT USE OF INSULIN: Primary | ICD-10-CM

## 2022-10-10 DIAGNOSIS — Z79.4 TYPE 2 DIABETES MELLITUS WITHOUT COMPLICATION, WITH LONG-TERM CURRENT USE OF INSULIN: Primary | ICD-10-CM

## 2022-10-10 NOTE — TELEPHONE ENCOUNTER
Spoke with the pt and he stated that he had to cancel his procedure due to personal reasons, and he stated that he will call back when he is ready to reschedule.

## 2022-10-11 ENCOUNTER — TELEPHONE (OUTPATIENT)
Dept: SURGERY | Facility: CLINIC | Age: 57
End: 2022-10-11
Payer: COMMERCIAL

## 2022-10-11 NOTE — TELEPHONE ENCOUNTER
Called patient to relate Dr. Santoyo's message that CT is stable and that she recommends another CT in 6 months. Patient verbalized understanding. CT scheduled for 04/2023.

## 2022-10-12 NOTE — TELEPHONE ENCOUNTER
Pt states he needs refill of Ozempic, Clonzepam, and Colchicine.  However pt sent previous message asking about alternative for Ozempic due to it being cost prohibitive. Please advise and I will pend appropriate Rx

## 2022-10-13 NOTE — TELEPHONE ENCOUNTER
Patient is new to me and only seen me once. I suggest he schedule an appointment to discuss diabetes treatment and klonopin refill. I sent refill of colchicine at his initial visit.

## 2022-10-13 NOTE — TELEPHONE ENCOUNTER
I sent one refill of klonopin since Dr. Ardon was prescribing. What is the issue with ozempic? Is it cost or availability?

## 2022-10-14 RX ORDER — TIRZEPATIDE 2.5 MG/.5ML
2.5 INJECTION, SOLUTION SUBCUTANEOUS
Qty: 4 PEN | Refills: 0 | Status: SHIPPED | OUTPATIENT
Start: 2022-10-14 | End: 2022-11-11

## 2022-10-24 ENCOUNTER — PATIENT MESSAGE (OUTPATIENT)
Dept: UROLOGY | Facility: CLINIC | Age: 57
End: 2022-10-24
Payer: COMMERCIAL

## 2022-10-26 RX ORDER — SILDENAFIL 100 MG/1
100 TABLET, FILM COATED ORAL DAILY PRN
Qty: 10 TABLET | Refills: 5 | Status: SHIPPED | OUTPATIENT
Start: 2022-10-26 | End: 2022-12-09 | Stop reason: SDUPTHER

## 2022-10-31 ENCOUNTER — TELEPHONE (OUTPATIENT)
Dept: PAIN MEDICINE | Facility: CLINIC | Age: 57
End: 2022-10-31
Payer: COMMERCIAL

## 2022-11-01 ENCOUNTER — OFFICE VISIT (OUTPATIENT)
Dept: INTERNAL MEDICINE | Facility: CLINIC | Age: 57
End: 2022-11-01
Payer: COMMERCIAL

## 2022-11-01 ENCOUNTER — TELEPHONE (OUTPATIENT)
Dept: PAIN MEDICINE | Facility: CLINIC | Age: 57
End: 2022-11-01
Payer: COMMERCIAL

## 2022-11-01 VITALS
DIASTOLIC BLOOD PRESSURE: 66 MMHG | SYSTOLIC BLOOD PRESSURE: 124 MMHG | WEIGHT: 256.94 LBS | HEIGHT: 73 IN | OXYGEN SATURATION: 98 % | HEART RATE: 71 BPM | BODY MASS INDEX: 34.05 KG/M2 | TEMPERATURE: 98 F

## 2022-11-01 DIAGNOSIS — B35.4 TINEA CORPORIS: ICD-10-CM

## 2022-11-01 DIAGNOSIS — G47.26 SHIFT WORK SLEEP DISORDER: ICD-10-CM

## 2022-11-01 DIAGNOSIS — E11.9 TYPE 2 DIABETES MELLITUS WITHOUT COMPLICATION, WITH LONG-TERM CURRENT USE OF INSULIN: ICD-10-CM

## 2022-11-01 DIAGNOSIS — Z79.4 TYPE 2 DIABETES MELLITUS WITHOUT COMPLICATION, WITH LONG-TERM CURRENT USE OF INSULIN: ICD-10-CM

## 2022-11-01 DIAGNOSIS — J06.9 VIRAL URI WITH COUGH: Primary | ICD-10-CM

## 2022-11-01 LAB
CTP QC/QA: YES
CTP QC/QA: YES
POC MOLECULAR INFLUENZA A AGN: NEGATIVE
POC MOLECULAR INFLUENZA B AGN: NEGATIVE
SARS-COV-2 RDRP RESP QL NAA+PROBE: NEGATIVE

## 2022-11-01 PROCEDURE — 3074F SYST BP LT 130 MM HG: CPT | Mod: CPTII,S$GLB,, | Performed by: FAMILY MEDICINE

## 2022-11-01 PROCEDURE — 3060F PR POS MICROALBUMINURIA RESULT DOCUMENTED/REVIEW: ICD-10-PCS | Mod: CPTII,S$GLB,, | Performed by: FAMILY MEDICINE

## 2022-11-01 PROCEDURE — 99214 OFFICE O/P EST MOD 30 MIN: CPT | Mod: S$GLB,,, | Performed by: FAMILY MEDICINE

## 2022-11-01 PROCEDURE — 3072F LOW RISK FOR RETINOPATHY: CPT | Mod: CPTII,S$GLB,, | Performed by: FAMILY MEDICINE

## 2022-11-01 PROCEDURE — 87502 INFLUENZA DNA AMP PROBE: CPT | Mod: QW,S$GLB,, | Performed by: FAMILY MEDICINE

## 2022-11-01 PROCEDURE — 3060F POS MICROALBUMINURIA REV: CPT | Mod: CPTII,S$GLB,, | Performed by: FAMILY MEDICINE

## 2022-11-01 PROCEDURE — 99214 PR OFFICE/OUTPT VISIT, EST, LEVL IV, 30-39 MIN: ICD-10-PCS | Mod: S$GLB,,, | Performed by: FAMILY MEDICINE

## 2022-11-01 PROCEDURE — 3008F BODY MASS INDEX DOCD: CPT | Mod: CPTII,S$GLB,, | Performed by: FAMILY MEDICINE

## 2022-11-01 PROCEDURE — 3066F PR DOCUMENTATION OF TREATMENT FOR NEPHROPATHY: ICD-10-PCS | Mod: CPTII,S$GLB,, | Performed by: FAMILY MEDICINE

## 2022-11-01 PROCEDURE — 3066F NEPHROPATHY DOC TX: CPT | Mod: CPTII,S$GLB,, | Performed by: FAMILY MEDICINE

## 2022-11-01 PROCEDURE — 1159F MED LIST DOCD IN RCRD: CPT | Mod: CPTII,S$GLB,, | Performed by: FAMILY MEDICINE

## 2022-11-01 PROCEDURE — 1159F PR MEDICATION LIST DOCUMENTED IN MEDICAL RECORD: ICD-10-PCS | Mod: CPTII,S$GLB,, | Performed by: FAMILY MEDICINE

## 2022-11-01 PROCEDURE — 3074F PR MOST RECENT SYSTOLIC BLOOD PRESSURE < 130 MM HG: ICD-10-PCS | Mod: CPTII,S$GLB,, | Performed by: FAMILY MEDICINE

## 2022-11-01 PROCEDURE — 3072F PR LOW RISK FOR RETINOPATHY: ICD-10-PCS | Mod: CPTII,S$GLB,, | Performed by: FAMILY MEDICINE

## 2022-11-01 PROCEDURE — 87502 POCT INFLUENZA A/B MOLECULAR: ICD-10-PCS | Mod: QW,S$GLB,, | Performed by: FAMILY MEDICINE

## 2022-11-01 PROCEDURE — 4010F PR ACE/ARB THEARPY RXD/TAKEN: ICD-10-PCS | Mod: CPTII,S$GLB,, | Performed by: FAMILY MEDICINE

## 2022-11-01 PROCEDURE — 3044F HG A1C LEVEL LT 7.0%: CPT | Mod: CPTII,S$GLB,, | Performed by: FAMILY MEDICINE

## 2022-11-01 PROCEDURE — 3008F PR BODY MASS INDEX (BMI) DOCUMENTED: ICD-10-PCS | Mod: CPTII,S$GLB,, | Performed by: FAMILY MEDICINE

## 2022-11-01 PROCEDURE — 87635 SARS-COV-2 COVID-19 AMP PRB: CPT | Mod: QW,S$GLB,, | Performed by: FAMILY MEDICINE

## 2022-11-01 PROCEDURE — 1160F RVW MEDS BY RX/DR IN RCRD: CPT | Mod: CPTII,S$GLB,, | Performed by: FAMILY MEDICINE

## 2022-11-01 PROCEDURE — 3078F DIAST BP <80 MM HG: CPT | Mod: CPTII,S$GLB,, | Performed by: FAMILY MEDICINE

## 2022-11-01 PROCEDURE — 99999 PR PBB SHADOW E&M-EST. PATIENT-LVL V: ICD-10-PCS | Mod: PBBFAC,,, | Performed by: FAMILY MEDICINE

## 2022-11-01 PROCEDURE — 4010F ACE/ARB THERAPY RXD/TAKEN: CPT | Mod: CPTII,S$GLB,, | Performed by: FAMILY MEDICINE

## 2022-11-01 PROCEDURE — 1160F PR REVIEW ALL MEDS BY PRESCRIBER/CLIN PHARMACIST DOCUMENTED: ICD-10-PCS | Mod: CPTII,S$GLB,, | Performed by: FAMILY MEDICINE

## 2022-11-01 PROCEDURE — 3044F PR MOST RECENT HEMOGLOBIN A1C LEVEL <7.0%: ICD-10-PCS | Mod: CPTII,S$GLB,, | Performed by: FAMILY MEDICINE

## 2022-11-01 PROCEDURE — 87635: ICD-10-PCS | Mod: QW,S$GLB,, | Performed by: FAMILY MEDICINE

## 2022-11-01 PROCEDURE — 99999 PR PBB SHADOW E&M-EST. PATIENT-LVL V: CPT | Mod: PBBFAC,,, | Performed by: FAMILY MEDICINE

## 2022-11-01 PROCEDURE — 3078F PR MOST RECENT DIASTOLIC BLOOD PRESSURE < 80 MM HG: ICD-10-PCS | Mod: CPTII,S$GLB,, | Performed by: FAMILY MEDICINE

## 2022-11-01 RX ORDER — CLOTRIMAZOLE 1 %
CREAM (GRAM) TOPICAL
Qty: 113 G | Refills: 1 | Status: SHIPPED | OUTPATIENT
Start: 2022-11-01 | End: 2022-12-20

## 2022-11-01 RX ORDER — TIRZEPATIDE 5 MG/.5ML
5 INJECTION, SOLUTION SUBCUTANEOUS
Qty: 12 PEN | Refills: 3 | Status: SHIPPED | OUTPATIENT
Start: 2022-11-01 | End: 2023-02-22

## 2022-11-01 RX ORDER — TRAZODONE HYDROCHLORIDE 50 MG/1
50 TABLET ORAL NIGHTLY
Qty: 30 TABLET | Refills: 5 | Status: SHIPPED | OUTPATIENT
Start: 2022-11-01 | End: 2023-05-29

## 2022-11-01 NOTE — TELEPHONE ENCOUNTER
LVM to schedule pt procedure.    Physical Discharge Summary Addendum:  Date: 11/1/2021  Total Number of Visits: 1  Referred by: DEONTE Cervantes*  Medical Diagnosis (from order):   Diagnosis Information      Diagnosis    V58.89, E888.9 (ICD-9-CM) - W19.XXXD (ICD-10-CM) - Fall, subsequent encounter    724.2 (ICD-9-CM) - M54.50 (ICD-10-CM) - Acute left-sided low back pain without sciatica    724.1 (ICD-9-CM) - M54.6 (ICD-10-CM) - Acute left-sided thoracic back pain    723.1 (ICD-9-CM) - M54.2 (ICD-10-CM) - Neck pain on left side                Patient discharged due to not scheduling more appointments.  Status of goals: unable to reassess due to patient not returning      Physical Therapy     Referred by: DEONTE Cervantes*; Medical Diagnosis (from order):    Diagnosis Information      Diagnosis    V58.89, E888.9 (ICD-9-CM) - W19.XXXD (ICD-10-CM) - Fall, subsequent encounter    724.2 (ICD-9-CM) - M54.5 (ICD-10-CM) - Acute left-sided low back pain without sciatica    724.1 (ICD-9-CM) - M54.6 (ICD-10-CM) - Acute left-sided thoracic back pain    723.1 (ICD-9-CM) - M54.2 (ICD-10-CM) - Neck pain on left side                Initial Evaluation    Visit:  1   Treatment Diagnosis: lumbar, symptoms with increased pain/symptoms, impaired posture, impaired range of motion, impaired muscle length/flexibility, impaired joint play/mobility, impaired mobility, impaired activity tolerance, impaired motor function/performance/coordination, impaired gait, impaired tissue mobility, impaired strength  Date of onset: 8/18/2021  Chart reviewed at time of initial evaluation (relevant co-morbidities, allergies, tests and medications listed): Hx of lipoma resection,   Diagnostic Tests: X-ray: reviewed.      SUBJECTIVE                                                                                                               Patient slipped on a bucket of water when cleaning on 8/18. She went to the ED and negative x-rays noted. Patient did follow up with her  PCP who provided her stretches to start doing. She was also prescribed a muscle relaxer as well.  Patient reports no improvement in her pain with exercises or medication. She reports that heat does help a little bit. She had a lipoma removed on 8/31 from the right side of her thoracic region.  Patient also indicates that she is leaving for [email or web address] on Tuesday for 3 weeks as well.   Pain / Symptoms:  Pain/symptom is: constant  Pain rating (out of 10): Current: 4 ; Best: 4; Worst: 7Location: Across the lumbar, up on the left side and right side laterally.    Quality / Description: discomfort.  Alleviating Factors: avoiding movement in involved area, heat.   Progression since onset: no change  Function:   Limitations / Exacerbation Factors: pain, increased time, difficulty, bed mobility, grooming/hygiene/self-care activities, sleep disturbed, work - limited or modified, house/yard work, grocery shopping, standing tasks, sitting tasks, bending/squatting/lifting, lifting/carrying, pushing/pulling, squatting/lifting and reaching, all types of transfers, community distances, household distances  Prior Level of Function: pain free ADLs and IADLs. no limitation in involved extremity,  Patient Goals: decreased pain, increased strength, increased motion and independence with ADLs/IADLs    Prior treatment: no therapies  Discharged from hospital, home health, or skilled nursing facility in last 30 days: no    Home Environment:   Patient lives with children  Type of home: single level home  Assistance available: as needed  Feels safe at home/work/school.  2 or more falls or an unexplained fall with injury in the last year:  No    OBJECTIVE                                                                                                                     Range of Motion (ROM)   (degrees unless noted; active unless noted; norms in ( ); negative=lacking to 0, positive=beyond 0)   Lumbar:    - Flexion (60-80):  75%     - Extension  (25):  25%     - Rotation (30-45):        • Left:  50%  pain         • Right:  50%  pain     - Side Bend (25-35):        • Left:  50%  pain         • Right:  50%  pain     Strength  (out of 5 unless noted, standard test position unless noted, lbs tested with hand held dynamometer)   Hip:    - Flexion:        • Left: 4-, pain        • Right: 4-, pain    - Extension:        • Left: 4-, pain        • Right: 4- and pain  Knee:    - Flexion:        • Left: 4-, pain        • Right: pain and 4-    - Extension:        • Left: 4-, pain        • Right: 4-, pain      Palpation:   Left Lower Extremity: Gluteus Darnell:hypertonic, tenderness, trigger point; Gluteus Medius:hypertonic, trigger point, tenderness;   Right Lower Extremity: Gluteus Darnell:hypertonic, tenderness, trigger point; Gluteus Medius:tenderness, trigger point, hypertonic;   Spine - Left: Lumbar Paraspinals: hypertonic, tenderness and trigger point; Quadratus Lumborum: hypertonic, trigger point and tenderness;   Spine - Right: Lumbar Paraspinals: hypertonic, tenderness and trigger point; Quadratus Lumborum: hypertonic, tenderness and trigger point;     Alignment:   Standing:     - Iliac Crest: Left: superior,  Right: inferior     Muscle Flexibility:   - Hip External Rotators: Left: moderate limitation Right: moderate limitation  - Hip Internal Rotators: Left: moderate limitation Right: moderate limitation  - Knee Flexors: Left: moderate limitation Right: moderate limitation    Joint Play:   Comments/Details: Unable to assess       Outcome Measures:   OSWESTRY Total Scored: 15  OSWESTRY Total Possible Score: 50  OSWESTRY Score Calculated: 30 %  (0-20% = minimal disability; 20-40% = moderate disability; 40-60% = severe disability; 60-80% = crippled; % = bed bound) see flowsheet for additional documentation  TREATMENT                                                                                                                initial evaluation  completed    Therapeutic Exercise:  Access Code: [email or web address]  URL: [email or web address]  Date: 09/09/2021  Prepared by: Rick Houser    Exercises  Hook Lying Single Knee to Chest Stretch with Towel - 2 x daily - 7 x weekly - 1 sets - 5 reps - 10 hold  Seated Flexion Stretch - 2 x daily - 7 x weekly - 1 sets - 5 reps - 10 hold  Seated Hamstring Stretch - 2 x daily - 7 x weekly - 1 sets - 5 reps - 10 hold  Child's Pose Stretch - 2 x daily - 7 x weekly - 1 sets - 10 reps - 5 hold      Skilled input: verbal instruction/cues and tactile instruction/cues    Writer verbally educated and received verbal consent for hand placement, positioning of patient, and techniques to be performed today from patient for hand placement and palpation for techniques, clothing adjustments for techniques and therapist position for techniques as described above and how they are pertinent to the patient's plan of care.    Home Exercise Program/Education Materials: Access Code: [email or web address]  URL: [email or web address]  Date: 09/09/2021  Prepared by: Rick Houser    Exercises  Hook Lying Single Knee to Chest Stretch with Towel - 2 x daily - 7 x weekly - 1 sets - 5 reps - 10 hold  Seated Flexion Stretch - 2 x daily - 7 x weekly - 1 sets - 5 reps - 10 hold  Seated Hamstring Stretch - 2 x daily - 7 x weekly - 1 sets - 5 reps - 10 hold  Child's Pose Stretch - 2 x daily - 7 x weekly - 1 sets - 10 reps - 5 hold       ASSESSMENT                                                                                                           50 year old female patient has reported functional limitations listed above impacted by signs and symptoms consistent with below   • Involved: lumbar   • Symptoms/impairments: increased pain/symptoms, impaired posture, impaired range of motion, impaired muscle length/flexibility, impaired joint play/mobility, impaired mobility, impaired activity tolerance, impaired motor  function/performance/coordination, impaired gait, impaired tissue mobility and impaired strength  Patient appears to be having lumbar pain secondary to fall and musculoskeletal in nature. Patient presents with impaired tissue mobility, tissue hypertonicity, decreased hamstring and piriformis tissue length, altered gait mechanics, and decreased Lower Extremity strength. Patient has difficulty with transfers, ambulation, and lifting. She is normally highly independent with all task and no pain normally. Patient will continue to benefit from skilled PT to address above impairments and return to prior level of function.     Prognosis: patient will benefit from skilled therapy  Rehabilitative potential is: good  Predicted patient presentation: Low (stable) - Patient comorbidities and complexities, as defined above, will have little effect on progress for prescribed plan of care.  Patient Education:   Who will be receiving education: patient  Are they ready to learn: yes  Preferred learning style: verbal and written  Barriers to learning: language  Results of above outlined education: Verbalizes understanding and Demonstrates understanding      PLAN                                                                                                                         The following skilled interventions to be implemented to achieve goals listed below:  Activities of Daily Living/Self Care (31471)  Dry Needling  Gait Training (59291)  Manual Therapy (20671)  Neuromuscular Re-Education (70223)  Therapeutic Activity (66230)  Therapeutic Exercise (55172)  Electrical Stimulation (unattended, 63045 or )    Frequency / Duration: 1 times per week tapering as patient progresses for an estimated total of 8 visits for 8 weeks    Patient involved in and agreed to plan of care and goals.  Patient given attendance policy at time of initial evaluation.  Suggestions for next session as indicated: Progress per plan of care, lumbar  mobility, light manual, trunk stabilization      GOALS                                                                                                                           Decrease pain/symptoms to 2/10  Improve involved strength to 5/5 for improved work function  The above improvements in impairments to assist in obtaining goals listed below  Long Term Goals: to be met by end of plan of care  1. Patient will ambulate without reported difficulty independently  2. Patient will lift 25# without reported pain for house cleaning and care such as sweeping, vacuuming, dusting   3. Patient will bend/squat without reported pain for house cleaning and care such as sweeping, vacuuming, dusting  4. Oswestry: Patient will complete form to reflect an improved score to less than or equal to 10% to indicate patient reported improvement in function/disability/impairment (minimal detectable change: 12%).  5. Patient will be independent with progressed and modified home exercise program.      Therapy procedure time and total treatment time can be found documented on the Time Entry flowsheet

## 2022-11-01 NOTE — PROGRESS NOTES
Subjective:       Patient ID: Narendra English Sr. is a 57 y.o. male.    Chief Complaint: Follow-up    Patient presents to clinic today with several concerns.    Rash on left arm x 8-9 days that itches. No known sick contacts. No pets. Noted a line near it seems to be spreading, otherwise same size. He has tried gold bond and cortisone cream without relief. Reports using cortisone cream bid x 4 days without relief, but no worse.    URI symptoms x 2-3 days. Nasal congestion. No cough. Spitting up yellow phlegm. No fever or chills. No known sick contacts. No CP or SOB.     On mounjaro. -150 after meals, fasting 100-110. Desires to increase his dose.    Shift worker, takes klonopin for sleep issues. He takes it nightly, has been taking it for a little over a year. Started by Dr. Durbin, previous PCP. Dr. Ardon (PCP prior to me, continued medication). No other treatments have been considered. Patient open to other options.    Review of Systems   Constitutional:  Negative for chills, fatigue, fever and unexpected weight change.   HENT:  Positive for congestion.    Eyes:  Negative for visual disturbance.   Respiratory:  Negative for shortness of breath.    Cardiovascular:  Negative for chest pain.   Musculoskeletal:  Negative for myalgias.   Skin:  Positive for rash.   Neurological:  Negative for headaches.       Objective:      Physical Exam  Vitals reviewed.   Constitutional:       General: He is not in acute distress.     Appearance: He is well-developed.   HENT:      Head: Normocephalic and atraumatic.      Right Ear: Tympanic membrane normal.      Left Ear: Tympanic membrane normal.      Nose: Mucosal edema and rhinorrhea present.      Mouth/Throat:      Mouth: Mucous membranes are moist.      Pharynx: Oropharynx is clear. Uvula midline.   Eyes:      General: Lids are normal. No scleral icterus.     Extraocular Movements: Extraocular movements intact.      Conjunctiva/sclera: Conjunctivae normal.      Pupils:  Pupils are equal, round, and reactive to light.   Cardiovascular:      Rate and Rhythm: Normal rate and regular rhythm.      Heart sounds: No murmur heard.    No friction rub. No gallop.   Pulmonary:      Effort: Pulmonary effort is normal.      Breath sounds: Normal breath sounds. No decreased breath sounds, wheezing, rhonchi or rales.   Skin:         Neurological:      Mental Status: He is alert and oriented to person, place, and time.      Cranial Nerves: No cranial nerve deficit.      Gait: Gait normal.   Psychiatric:         Mood and Affect: Mood and affect normal.       Assessment:       1. Cough, unspecified type    2. Type 2 diabetes mellitus without complication, with long-term current use of insulin    3. Shift work sleep disorder    4. Tinea corporis          Plan:   1. Cough, unspecified type  -     POCT Influenza A/B Molecular  -     POCT COVID-19 Rapid Screening    2. Type 2 diabetes mellitus without complication, with long-term current use of insulin  -     tirzepatide (MOUNJARO) 5 mg/0.5 mL PnIj; Inject 5 mg into the skin every 7 days.  Dispense: 12 pen; Refill: 3    3. Shift work sleep disorder  -     traZODone (DESYREL) 50 MG tablet; Take 1 tablet (50 mg total) by mouth every evening.  Dispense: 30 tablet; Refill: 5    4. Tinea corporis  -     clotrimazole (LOTRIMIN) 1 % cream; AAA bid until rash is clear x 7 days  Dispense: 113 g; Refill: 1      Health Maintenance reviewed/updated.  Advised he can consider covid booster when feeling better.

## 2022-11-01 NOTE — ASSESSMENT & PLAN NOTE
After 4th dose of 2.5 mg mounjaro can increase to 5 mg for next dose; patient expressed understanding and agreement with plan.

## 2022-11-01 NOTE — PATIENT INSTRUCTIONS
"Start trazodone nightly and decrease your klonopin to 1 mg nightly x 2 weeks  If tolerating that, then continue trazodone nightly and decrease klonopin to 0.5 mg nightly. We will follow up in 1 month for next steps/monitoring.    Start increased dose of Mounjaro 1 week after last dose of Mounjaro 2.5 mg weekly.    The bivalent covid booster is now available. You can schedule an appointment for the vaccine through [facility] or ask  to assist you with scheduling an appointment for the vaccine.    The bivalent vaccines, known as the "Omicron" vaccines, target both the original strain of COVID-19 as well as the Omicron variant, which is now the predominant strain in the [place].    The Omicron booster is authorized for individuals 12 years and older and is given two months after last dose of primary or booster shot.   "

## 2022-11-01 NOTE — PROGRESS NOTES
Subjective:       Patient ID: Narendra English Sr. is a 57 y.o. male.    Chief Complaint: Follow-up    HPI  Review of Systems      Objective:      Physical Exam    Assessment:       1. Cough, unspecified type          Plan:   1. Cough, unspecified type  -     POCT Influenza A/B Molecular  -     POCT COVID-19 Rapid Screening        Health Maintenance reviewed/updated.

## 2022-11-01 NOTE — ASSESSMENT & PLAN NOTE
After discussion, we have agreed to start him on trazodone nightly and wean klonopin as tolerated; He will wean klonopin to 1 mg nightly x 2 weeks, if tolerating will then decrease to 0.5 mg nightly; follow up in 1 month for next steps/monitoring. Patient is otherwise without concerns today.

## 2022-11-02 ENCOUNTER — OFFICE VISIT (OUTPATIENT)
Dept: UROLOGY | Facility: CLINIC | Age: 57
End: 2022-11-02
Payer: COMMERCIAL

## 2022-11-02 VITALS
HEART RATE: 62 BPM | BODY MASS INDEX: 33.91 KG/M2 | DIASTOLIC BLOOD PRESSURE: 78 MMHG | SYSTOLIC BLOOD PRESSURE: 121 MMHG | WEIGHT: 257 LBS

## 2022-11-02 DIAGNOSIS — R79.89 LOW TESTOSTERONE: ICD-10-CM

## 2022-11-02 PROBLEM — B35.4 TINEA CORPORIS: Status: RESOLVED | Noted: 2022-11-01 | Resolved: 2022-11-02

## 2022-11-02 PROBLEM — J34.2 NASAL SEPTAL DEVIATION: Status: RESOLVED | Noted: 2022-08-17 | Resolved: 2022-11-02

## 2022-11-02 PROBLEM — N48.30 PRIAPISM: Status: RESOLVED | Noted: 2022-06-27 | Resolved: 2022-11-02

## 2022-11-02 PROBLEM — G47.26 SHIFT WORK SLEEP DISORDER: Status: RESOLVED | Noted: 2022-11-01 | Resolved: 2022-11-02

## 2022-11-02 PROBLEM — J34.89 NASAL OBSTRUCTION: Status: RESOLVED | Noted: 2022-08-17 | Resolved: 2022-11-02

## 2022-11-02 PROBLEM — J32.9 CHRONIC RHINOSINUSITIS: Status: RESOLVED | Noted: 2022-08-12 | Resolved: 2022-11-02

## 2022-11-02 PROBLEM — J06.9 VIRAL URI WITH COUGH: Status: RESOLVED | Noted: 2022-11-01 | Resolved: 2022-11-02

## 2022-11-02 PROCEDURE — 3066F PR DOCUMENTATION OF TREATMENT FOR NEPHROPATHY: ICD-10-PCS | Mod: CPTII,S$GLB,, | Performed by: NURSE PRACTITIONER

## 2022-11-02 PROCEDURE — 99999 PR PBB SHADOW E&M-EST. PATIENT-LVL V: ICD-10-PCS | Mod: PBBFAC,,, | Performed by: NURSE PRACTITIONER

## 2022-11-02 PROCEDURE — 3072F PR LOW RISK FOR RETINOPATHY: ICD-10-PCS | Mod: CPTII,S$GLB,, | Performed by: NURSE PRACTITIONER

## 2022-11-02 PROCEDURE — 3074F SYST BP LT 130 MM HG: CPT | Mod: CPTII,S$GLB,, | Performed by: NURSE PRACTITIONER

## 2022-11-02 PROCEDURE — 3008F PR BODY MASS INDEX (BMI) DOCUMENTED: ICD-10-PCS | Mod: CPTII,S$GLB,, | Performed by: NURSE PRACTITIONER

## 2022-11-02 PROCEDURE — 3060F PR POS MICROALBUMINURIA RESULT DOCUMENTED/REVIEW: ICD-10-PCS | Mod: CPTII,S$GLB,, | Performed by: NURSE PRACTITIONER

## 2022-11-02 PROCEDURE — 3066F NEPHROPATHY DOC TX: CPT | Mod: CPTII,S$GLB,, | Performed by: NURSE PRACTITIONER

## 2022-11-02 PROCEDURE — 3044F PR MOST RECENT HEMOGLOBIN A1C LEVEL <7.0%: ICD-10-PCS | Mod: CPTII,S$GLB,, | Performed by: NURSE PRACTITIONER

## 2022-11-02 PROCEDURE — 3074F PR MOST RECENT SYSTOLIC BLOOD PRESSURE < 130 MM HG: ICD-10-PCS | Mod: CPTII,S$GLB,, | Performed by: NURSE PRACTITIONER

## 2022-11-02 PROCEDURE — 99999 PR PBB SHADOW E&M-EST. PATIENT-LVL V: CPT | Mod: PBBFAC,,, | Performed by: NURSE PRACTITIONER

## 2022-11-02 PROCEDURE — 4010F PR ACE/ARB THEARPY RXD/TAKEN: ICD-10-PCS | Mod: CPTII,S$GLB,, | Performed by: NURSE PRACTITIONER

## 2022-11-02 PROCEDURE — 99214 OFFICE O/P EST MOD 30 MIN: CPT | Mod: S$GLB,,, | Performed by: NURSE PRACTITIONER

## 2022-11-02 PROCEDURE — 3060F POS MICROALBUMINURIA REV: CPT | Mod: CPTII,S$GLB,, | Performed by: NURSE PRACTITIONER

## 2022-11-02 PROCEDURE — 3044F HG A1C LEVEL LT 7.0%: CPT | Mod: CPTII,S$GLB,, | Performed by: NURSE PRACTITIONER

## 2022-11-02 PROCEDURE — 4010F ACE/ARB THERAPY RXD/TAKEN: CPT | Mod: CPTII,S$GLB,, | Performed by: NURSE PRACTITIONER

## 2022-11-02 PROCEDURE — 99214 PR OFFICE/OUTPT VISIT, EST, LEVL IV, 30-39 MIN: ICD-10-PCS | Mod: S$GLB,,, | Performed by: NURSE PRACTITIONER

## 2022-11-02 PROCEDURE — 3008F BODY MASS INDEX DOCD: CPT | Mod: CPTII,S$GLB,, | Performed by: NURSE PRACTITIONER

## 2022-11-02 PROCEDURE — 3078F DIAST BP <80 MM HG: CPT | Mod: CPTII,S$GLB,, | Performed by: NURSE PRACTITIONER

## 2022-11-02 PROCEDURE — 3078F PR MOST RECENT DIASTOLIC BLOOD PRESSURE < 80 MM HG: ICD-10-PCS | Mod: CPTII,S$GLB,, | Performed by: NURSE PRACTITIONER

## 2022-11-02 PROCEDURE — 1159F MED LIST DOCD IN RCRD: CPT | Mod: CPTII,S$GLB,, | Performed by: NURSE PRACTITIONER

## 2022-11-02 PROCEDURE — 1159F PR MEDICATION LIST DOCUMENTED IN MEDICAL RECORD: ICD-10-PCS | Mod: CPTII,S$GLB,, | Performed by: NURSE PRACTITIONER

## 2022-11-02 PROCEDURE — 3072F LOW RISK FOR RETINOPATHY: CPT | Mod: CPTII,S$GLB,, | Performed by: NURSE PRACTITIONER

## 2022-11-02 RX ORDER — TESTOSTERONE 20.25 MG/1.25G
GEL TOPICAL
Qty: 75 G | Refills: 0 | Status: SHIPPED | OUTPATIENT
Start: 2022-11-02 | End: 2022-11-04 | Stop reason: SDUPTHER

## 2022-11-02 NOTE — PROGRESS NOTES
Chief Complaint:   Hypogonadism  HPI:   Patient is a 57-year-old male that is presenting with hypogonadism.  Is currently on AndroGel.  Recent testosterone level was at the low side of normal.  Patient states that he would like to consider additional options to testosterone.    Allergies:  Demerol (pf) [meperidine (pf)], Percocet [oxycodone-acetaminophen], and Demerol [meperidine]    Medications:  has a current medication list which includes the following prescription(s): amlodipine, atorvastatin, azelastine, benazepril, clonazepam, clotrimazole, colchicine, flash glucose scanning reader, flash glucose sensor, fluticasone propionate, hydralazine, indomethacin, toujeo solostar u-300 insulin, levothyroxine, metformin, metoprolol succinate, alprostadil, prednisone, prednisone, pregabalin, sildenafil, mounjaro, mounjaro, trazodone, vascepa, diclofenac sodium, and testosterone, and the following Facility-Administered Medications: gabapentin and lactated ringers.    Review of Systems:  General: No fever, chills, fatigability, or weight loss.  Skin: No rashes, itching, or changes in color or texture of skin.  Chest: Denies MEJÍA, cyanosis, wheezing, cough, and sputum production.  Abdomen: Appetite fine. No weight loss. Denies diarrhea, abdominal pain, hematemesis, or blood in stool.  Musculoskeletal: No joint stiffness or swelling. Denies back pain.  : As above.  All other review of systems negative.    PMH:   has a past medical history of Cancer, Diabetes mellitus (8 years), Hypertension, Sleep apnea, and Thyroid disease.    PSH:   has a past surgical history that includes Colonoscopy (N/A, 12/29/2020); Laparoscopic right colon resection (N/A, 02/02/2021); Back surgery; Colonoscopy (N/A, 02/10/2022); Tonsillectomy; fess, with nasal septoplasty, with imaging guidance (Bilateral, 08/17/2022); Nasal turbinate reduction (Bilateral, 08/17/2022); and Colon surgery (02/06/21).    FamHx: family history includes Breast cancer in  his mother; Cancer in his mother; Cataracts in his maternal grandmother; Diabetes in his maternal grandmother and mother; Hypertension in his brother, brother, and mother; Stroke in his father.    SocHx:  reports that he has never smoked. He has never used smokeless tobacco. He reports that he does not drink alcohol and does not use drugs.      Physical Exam:  Vitals:    11/02/22 1145   BP: 121/78   Pulse: 62     General:  Morbidly obese male, A&Ox3, no apparent distress, no deformities  Neck: No masses, normal thyroid  Lungs: normal inspiration, no use of accessory muscles  Heart: normal pulse, no arrhythmias  Abdomen: Soft, NT, ND, no masses, no hernias, no hepatosplenomegaly    Labs/Studies:    Latest Reference Range & Units 08/12/22 10:36   Testosterone 250 - 1100 ng/dL 328       Impression/Plan:   Hypogonadism  Prescription for AndroGel was sent to his local pharmacy.  Patient scheduled with MD to discuss testopel.

## 2022-11-06 ENCOUNTER — PATIENT MESSAGE (OUTPATIENT)
Dept: INTERNAL MEDICINE | Facility: CLINIC | Age: 57
End: 2022-11-06
Payer: COMMERCIAL

## 2022-11-06 DIAGNOSIS — R21 RASH: Primary | ICD-10-CM

## 2022-11-07 ENCOUNTER — PATIENT MESSAGE (OUTPATIENT)
Dept: PAIN MEDICINE | Facility: CLINIC | Age: 57
End: 2022-11-07
Payer: COMMERCIAL

## 2022-11-08 ENCOUNTER — OFFICE VISIT (OUTPATIENT)
Dept: INTERNAL MEDICINE | Facility: CLINIC | Age: 57
End: 2022-11-08
Payer: COMMERCIAL

## 2022-11-08 ENCOUNTER — TELEPHONE (OUTPATIENT)
Dept: INTERNAL MEDICINE | Facility: CLINIC | Age: 57
End: 2022-11-08
Payer: COMMERCIAL

## 2022-11-08 ENCOUNTER — IMMUNIZATION (OUTPATIENT)
Dept: PRIMARY CARE CLINIC | Facility: CLINIC | Age: 57
End: 2022-11-08
Payer: COMMERCIAL

## 2022-11-08 VITALS
TEMPERATURE: 98 F | RESPIRATION RATE: 18 BRPM | WEIGHT: 262.88 LBS | BODY MASS INDEX: 34.84 KG/M2 | SYSTOLIC BLOOD PRESSURE: 120 MMHG | HEIGHT: 73 IN | OXYGEN SATURATION: 96 % | DIASTOLIC BLOOD PRESSURE: 66 MMHG | HEART RATE: 64 BPM

## 2022-11-08 DIAGNOSIS — R21 RASH: Primary | ICD-10-CM

## 2022-11-08 DIAGNOSIS — Z23 NEED FOR VACCINATION: Primary | ICD-10-CM

## 2022-11-08 PROCEDURE — 3044F HG A1C LEVEL LT 7.0%: CPT | Mod: CPTII,S$GLB,, | Performed by: PHYSICIAN ASSISTANT

## 2022-11-08 PROCEDURE — 3072F PR LOW RISK FOR RETINOPATHY: ICD-10-PCS | Mod: CPTII,S$GLB,, | Performed by: PHYSICIAN ASSISTANT

## 2022-11-08 PROCEDURE — 99213 PR OFFICE/OUTPT VISIT, EST, LEVL III, 20-29 MIN: ICD-10-PCS | Mod: S$GLB,,, | Performed by: PHYSICIAN ASSISTANT

## 2022-11-08 PROCEDURE — 3060F POS MICROALBUMINURIA REV: CPT | Mod: CPTII,S$GLB,, | Performed by: PHYSICIAN ASSISTANT

## 2022-11-08 PROCEDURE — 1159F PR MEDICATION LIST DOCUMENTED IN MEDICAL RECORD: ICD-10-PCS | Mod: CPTII,S$GLB,, | Performed by: PHYSICIAN ASSISTANT

## 2022-11-08 PROCEDURE — 0134A COVID-19, MRNA, LNP-S, BIVALENT BOOSTER, PF, 50 MCG/0.5 ML: CPT | Mod: CV19,PBBFAC | Performed by: FAMILY MEDICINE

## 2022-11-08 PROCEDURE — 3008F PR BODY MASS INDEX (BMI) DOCUMENTED: ICD-10-PCS | Mod: CPTII,S$GLB,, | Performed by: PHYSICIAN ASSISTANT

## 2022-11-08 PROCEDURE — 3078F PR MOST RECENT DIASTOLIC BLOOD PRESSURE < 80 MM HG: ICD-10-PCS | Mod: CPTII,S$GLB,, | Performed by: PHYSICIAN ASSISTANT

## 2022-11-08 PROCEDURE — 3008F BODY MASS INDEX DOCD: CPT | Mod: CPTII,S$GLB,, | Performed by: PHYSICIAN ASSISTANT

## 2022-11-08 PROCEDURE — 3044F PR MOST RECENT HEMOGLOBIN A1C LEVEL <7.0%: ICD-10-PCS | Mod: CPTII,S$GLB,, | Performed by: PHYSICIAN ASSISTANT

## 2022-11-08 PROCEDURE — 99999 PR PBB SHADOW E&M-EST. PATIENT-LVL V: CPT | Mod: PBBFAC,,, | Performed by: PHYSICIAN ASSISTANT

## 2022-11-08 PROCEDURE — 1160F PR REVIEW ALL MEDS BY PRESCRIBER/CLIN PHARMACIST DOCUMENTED: ICD-10-PCS | Mod: CPTII,S$GLB,, | Performed by: PHYSICIAN ASSISTANT

## 2022-11-08 PROCEDURE — 3074F SYST BP LT 130 MM HG: CPT | Mod: CPTII,S$GLB,, | Performed by: PHYSICIAN ASSISTANT

## 2022-11-08 PROCEDURE — 3074F PR MOST RECENT SYSTOLIC BLOOD PRESSURE < 130 MM HG: ICD-10-PCS | Mod: CPTII,S$GLB,, | Performed by: PHYSICIAN ASSISTANT

## 2022-11-08 PROCEDURE — 1160F RVW MEDS BY RX/DR IN RCRD: CPT | Mod: CPTII,S$GLB,, | Performed by: PHYSICIAN ASSISTANT

## 2022-11-08 PROCEDURE — 3072F LOW RISK FOR RETINOPATHY: CPT | Mod: CPTII,S$GLB,, | Performed by: PHYSICIAN ASSISTANT

## 2022-11-08 PROCEDURE — 99213 OFFICE O/P EST LOW 20 MIN: CPT | Mod: S$GLB,,, | Performed by: PHYSICIAN ASSISTANT

## 2022-11-08 PROCEDURE — 3078F DIAST BP <80 MM HG: CPT | Mod: CPTII,S$GLB,, | Performed by: PHYSICIAN ASSISTANT

## 2022-11-08 PROCEDURE — 3066F PR DOCUMENTATION OF TREATMENT FOR NEPHROPATHY: ICD-10-PCS | Mod: CPTII,S$GLB,, | Performed by: PHYSICIAN ASSISTANT

## 2022-11-08 PROCEDURE — 1159F MED LIST DOCD IN RCRD: CPT | Mod: CPTII,S$GLB,, | Performed by: PHYSICIAN ASSISTANT

## 2022-11-08 PROCEDURE — 99999 PR PBB SHADOW E&M-EST. PATIENT-LVL V: ICD-10-PCS | Mod: PBBFAC,,, | Performed by: PHYSICIAN ASSISTANT

## 2022-11-08 PROCEDURE — 3060F PR POS MICROALBUMINURIA RESULT DOCUMENTED/REVIEW: ICD-10-PCS | Mod: CPTII,S$GLB,, | Performed by: PHYSICIAN ASSISTANT

## 2022-11-08 PROCEDURE — 3066F NEPHROPATHY DOC TX: CPT | Mod: CPTII,S$GLB,, | Performed by: PHYSICIAN ASSISTANT

## 2022-11-08 PROCEDURE — 91313 COVID-19, MRNA, LNP-S, BIVALENT BOOSTER, PF, 50 MCG/0.5 ML: CPT | Mod: PBBFAC | Performed by: FAMILY MEDICINE

## 2022-11-08 PROCEDURE — 4010F ACE/ARB THERAPY RXD/TAKEN: CPT | Mod: CPTII,S$GLB,, | Performed by: PHYSICIAN ASSISTANT

## 2022-11-08 PROCEDURE — 4010F PR ACE/ARB THEARPY RXD/TAKEN: ICD-10-PCS | Mod: CPTII,S$GLB,, | Performed by: PHYSICIAN ASSISTANT

## 2022-11-08 RX ORDER — TRIAMCINOLONE ACETONIDE 0.25 MG/G
OINTMENT TOPICAL 2 TIMES DAILY
Qty: 80 G | Refills: 0 | Status: ON HOLD | OUTPATIENT
Start: 2022-11-08 | End: 2022-12-01 | Stop reason: HOSPADM

## 2022-11-08 NOTE — TELEPHONE ENCOUNTER
----- Message from Amy Ardon sent at 11/8/2022  9:34 AM CST -----  Keysha with [facility] is calling in regards to see if fax was received requesting for chart note. Caller can be reached at [phone]

## 2022-11-09 ENCOUNTER — TELEPHONE (OUTPATIENT)
Dept: INTERNAL MEDICINE | Facility: CLINIC | Age: 57
End: 2022-11-09
Payer: COMMERCIAL

## 2022-11-09 ENCOUNTER — OFFICE VISIT (OUTPATIENT)
Dept: DERMATOLOGY | Facility: CLINIC | Age: 57
End: 2022-11-09
Payer: COMMERCIAL

## 2022-11-09 DIAGNOSIS — L40.9 PSORIASIS: ICD-10-CM

## 2022-11-09 PROCEDURE — 3066F PR DOCUMENTATION OF TREATMENT FOR NEPHROPATHY: ICD-10-PCS | Mod: CPTII,S$GLB,, | Performed by: STUDENT IN AN ORGANIZED HEALTH CARE EDUCATION/TRAINING PROGRAM

## 2022-11-09 PROCEDURE — 1160F RVW MEDS BY RX/DR IN RCRD: CPT | Mod: CPTII,S$GLB,, | Performed by: STUDENT IN AN ORGANIZED HEALTH CARE EDUCATION/TRAINING PROGRAM

## 2022-11-09 PROCEDURE — 3072F LOW RISK FOR RETINOPATHY: CPT | Mod: CPTII,S$GLB,, | Performed by: STUDENT IN AN ORGANIZED HEALTH CARE EDUCATION/TRAINING PROGRAM

## 2022-11-09 PROCEDURE — 3044F PR MOST RECENT HEMOGLOBIN A1C LEVEL <7.0%: ICD-10-PCS | Mod: CPTII,S$GLB,, | Performed by: STUDENT IN AN ORGANIZED HEALTH CARE EDUCATION/TRAINING PROGRAM

## 2022-11-09 PROCEDURE — 99999 PR PBB SHADOW E&M-EST. PATIENT-LVL II: CPT | Mod: PBBFAC,,, | Performed by: STUDENT IN AN ORGANIZED HEALTH CARE EDUCATION/TRAINING PROGRAM

## 2022-11-09 PROCEDURE — 1159F MED LIST DOCD IN RCRD: CPT | Mod: CPTII,S$GLB,, | Performed by: STUDENT IN AN ORGANIZED HEALTH CARE EDUCATION/TRAINING PROGRAM

## 2022-11-09 PROCEDURE — 3044F HG A1C LEVEL LT 7.0%: CPT | Mod: CPTII,S$GLB,, | Performed by: STUDENT IN AN ORGANIZED HEALTH CARE EDUCATION/TRAINING PROGRAM

## 2022-11-09 PROCEDURE — 3066F NEPHROPATHY DOC TX: CPT | Mod: CPTII,S$GLB,, | Performed by: STUDENT IN AN ORGANIZED HEALTH CARE EDUCATION/TRAINING PROGRAM

## 2022-11-09 PROCEDURE — 99203 PR OFFICE/OUTPT VISIT, NEW, LEVL III, 30-44 MIN: ICD-10-PCS | Mod: S$GLB,,, | Performed by: STUDENT IN AN ORGANIZED HEALTH CARE EDUCATION/TRAINING PROGRAM

## 2022-11-09 PROCEDURE — 99203 OFFICE O/P NEW LOW 30 MIN: CPT | Mod: S$GLB,,, | Performed by: STUDENT IN AN ORGANIZED HEALTH CARE EDUCATION/TRAINING PROGRAM

## 2022-11-09 PROCEDURE — 1160F PR REVIEW ALL MEDS BY PRESCRIBER/CLIN PHARMACIST DOCUMENTED: ICD-10-PCS | Mod: CPTII,S$GLB,, | Performed by: STUDENT IN AN ORGANIZED HEALTH CARE EDUCATION/TRAINING PROGRAM

## 2022-11-09 PROCEDURE — 3072F PR LOW RISK FOR RETINOPATHY: ICD-10-PCS | Mod: CPTII,S$GLB,, | Performed by: STUDENT IN AN ORGANIZED HEALTH CARE EDUCATION/TRAINING PROGRAM

## 2022-11-09 PROCEDURE — 3060F PR POS MICROALBUMINURIA RESULT DOCUMENTED/REVIEW: ICD-10-PCS | Mod: CPTII,S$GLB,, | Performed by: STUDENT IN AN ORGANIZED HEALTH CARE EDUCATION/TRAINING PROGRAM

## 2022-11-09 PROCEDURE — 99999 PR PBB SHADOW E&M-EST. PATIENT-LVL II: ICD-10-PCS | Mod: PBBFAC,,, | Performed by: STUDENT IN AN ORGANIZED HEALTH CARE EDUCATION/TRAINING PROGRAM

## 2022-11-09 PROCEDURE — 3060F POS MICROALBUMINURIA REV: CPT | Mod: CPTII,S$GLB,, | Performed by: STUDENT IN AN ORGANIZED HEALTH CARE EDUCATION/TRAINING PROGRAM

## 2022-11-09 PROCEDURE — 1159F PR MEDICATION LIST DOCUMENTED IN MEDICAL RECORD: ICD-10-PCS | Mod: CPTII,S$GLB,, | Performed by: STUDENT IN AN ORGANIZED HEALTH CARE EDUCATION/TRAINING PROGRAM

## 2022-11-09 PROCEDURE — 4010F ACE/ARB THERAPY RXD/TAKEN: CPT | Mod: CPTII,S$GLB,, | Performed by: STUDENT IN AN ORGANIZED HEALTH CARE EDUCATION/TRAINING PROGRAM

## 2022-11-09 PROCEDURE — 4010F PR ACE/ARB THEARPY RXD/TAKEN: ICD-10-PCS | Mod: CPTII,S$GLB,, | Performed by: STUDENT IN AN ORGANIZED HEALTH CARE EDUCATION/TRAINING PROGRAM

## 2022-11-09 RX ORDER — TRIAMCINOLONE ACETONIDE 1 MG/G
OINTMENT TOPICAL 2 TIMES DAILY
Qty: 80 G | Refills: 1 | Status: SHIPPED | OUTPATIENT
Start: 2022-11-09 | End: 2023-01-27 | Stop reason: SDUPTHER

## 2022-11-09 NOTE — PROGRESS NOTES
Subjective:       Patient ID: Narendra English Sr. is a 57 y.o. male.    Chief Complaint: Rash and Follow-up      Patient Care Team:  Tabitha Melgoza MD as PCP - General (Family Medicine)  Lia Álvarez PA-C as Physician Assistant (Internal Medicine)    Rash  Pertinent negatives include no fever.   Follow-up  Associated symptoms include a rash. Pertinent negatives include no arthralgias, chills, fever or joint swelling.   Patient is new to me.    Narendra English Sr. is a 57 y.o. male who presents today with complaints of Rash and Follow-up  Patient presents for follow-up from pruritic rash to left anterior shoulder.  He was treated with clotrimazole cream with little relief.  He denies spreading of rash.  Has not seen dermatologist yet.    Review of Systems   Constitutional:  Negative for chills and fever.   Musculoskeletal:  Negative for arthralgias and joint swelling.   Skin:  Positive for rash.     Objective:      Physical Exam  Vitals and nursing note reviewed.   Constitutional:       General: He is not in acute distress.     Appearance: He is well-developed.   HENT:      Head: Normocephalic and atraumatic.   Eyes:      General: Lids are normal. No scleral icterus.     Extraocular Movements: Extraocular movements intact.      Conjunctiva/sclera: Conjunctivae normal.   Pulmonary:      Effort: Pulmonary effort is normal.   Skin:         Neurological:      Mental Status: He is alert.      Cranial Nerves: No cranial nerve deficit.   Psychiatric:         Mood and Affect: Mood and affect normal.       Assessment:       1. Rash          Plan:   1. Rash  -     triamcinolone acetonide 0.025% (KENALOG) 0.025 % Oint; Apply topically 2 (two) times daily.  Dispense: 80 g; Refill: 0      Continue clotrimazole cream in addition to triamcinolone.  Schedule with Dermatology if it persists.

## 2022-11-09 NOTE — TELEPHONE ENCOUNTER
"Patient prescription form    Your fax has been successfully sent to [phone] at [phone].  ------------------------------------------------------------  From: [id number]  ------------------------------------------------------------  11/9/2022 2:52:32 PM Transmission Record          Sent to [phone] with remote ID "          Result: (4/3;0/0) Line broken (no loop current)          Page record: NONE SENT          Elapsed time: 00:03 on channel 13    11/9/2022 2:57:40 PM Transmission Record          Sent to [phone] with remote ID "          Result: (0/339;4/11) Ringing, no answer          Page record: NONE SENT          Elapsed time: 00:44 on channel 23    11/9/2022 3:03:35 PM Transmission Record          Sent to [phone] with remote ID "[id number]"          Result: (0/339;0/0) Success          Page record: 1 - 11          Elapsed time: 03:36 on channel 44    "

## 2022-11-09 NOTE — PROGRESS NOTES
Patient Information  Name: Narendra English Sr.  : 1965  MRN: [id number]     Referring Physician:  Dr. Salgado   Primary Care Physician:  Dr. Tabitha Melgoza MD   Date of Visit: 2022      Subjective:       Narendra English Sr. is a 57 y.o. male who presents for rash    HPI  Patient with new complaint of lesion(s)  Location: left upper arm  Duration: 2 months  Symptoms: itchy  Relieving factors/Previous treatments: topical clotrimazole without improvement    Patient was last seen:Visit date not found     Prior notes by myself reviewed.   Clinical documentation obtained by nursing staff reviewed.    Review of Systems   Skin:  Positive for itching and rash.      Objective:    Physical Exam   Constitutional: He appears well-developed and well-nourished. No distress.   Neurological: He is alert and oriented to person, place, and time. He is not disoriented.   Psychiatric: He has a normal mood and affect.   Skin:   Areas Examined (abnormalities noted in diagram):   LUE Inspection Performed            Diagram Legend     Erythematous scaling macule/papule c/w actinic keratosis       Vascular papule c/w angioma      Pigmented verrucoid papule/plaque c/w seborrheic keratosis      Yellow umbilicated papule c/w sebaceous hyperplasia      Irregularly shaped tan macule c/w lentigo     1-2 mm smooth white papules consistent with Milia      Movable subcutaneous cyst with punctum c/w epidermal inclusion cyst      Subcutaneous movable cyst c/w pilar cyst      Firm pink to brown papule c/w dermatofibroma      Pedunculated fleshy papule(s) c/w skin tag(s)      Evenly pigmented macule c/w junctional nevus     Mildly variegated pigmented, slightly irregular-bordered macule c/w mildly atypical nevus      Flesh colored to evenly pigmented papule c/w intradermal nevus       Pink pearly papule/plaque c/w basal cell carcinoma      Erythematous hyperkeratotic cursted plaque c/w SCC      Surgical scar with no sign of skin  cancer recurrence      Open and closed comedones      Inflammatory papules and pustules      Verrucoid papule consistent consistent with wart     Erythematous eczematous patches and plaques     Dystrophic onycholytic nail with subungual debris c/w onychomycosis     Umbilicated papule    Erythematous-base heme-crusted tan verrucoid plaque consistent with inflamed seborrheic keratosis     Erythematous Silvery Scaling Plaque c/w Psoriasis     See annotation      No images are attached to the encounter or orders placed in the encounter.    [] Data reviewed  [] Independent review of test  [] Management discussed with another provider    Assessment / Plan:        Psoriasis  -     triamcinolone acetonide 0.1% (KENALOG) 0.1 % ointment; Apply topically 2 (two) times daily.  Dispense: 80 g; Refill: 1  Counseling on topical steroids:  Patient counseled that the prolonged use of topical steroids can result in the increased appearance superficial blood vessels (telangiectasias) lightening (hypopigmentation), and   thinning of the skin ( atrophy).  Patient understands to avoid using high potency steroids in skin folds, the groin or the face.  The patient verbalized understanding of proper use and possible adverse effects of topical steroids.  All patient's questions and concerns were addressed.           LOS NUMBER AND COMPLEXITY OF PROBLEMS    COMPLEXITY OF DATA RISK TOTAL TIME (m)   60522  91147 [] 1 self-limited or minor problem [x] Minimal to none [] No treatment recommended or patient to monitor 15-29  10-19   19389  25107 Low  [] 2 or > self limited or minor problems  [] 1 stable chronic illness  [x] 1 acute, uncomplicated illness or injury Limited (2)  [] Prior external notes from each unique source  [] Review result of each unique test  [] Order each unique test []  Low  OTC medications, minor skin biopsy 30-44 20-29   60751  12684 Moderate  []  1 or > chronic illness with progression, exacerbation or SE of treatment  []   2 or more stable chronic illnesses  []  1 acute illness with systemic symptoms  []  1 acute complicated injury  []  1 undiagnosed new problem with uncertain prognosis Moderate (1/3 below)  []  3 or more data items        *Now includes assessment requiring independent historian  []  Independent interpretation of a test  []  Discuss management/test with another provider Moderate  [x]  Prescription drug mgmt  []  Minor surgery with risk discussed  []  Mgmt limited by social determinates 45-59  30-39   52202  35424 High  []  1 or more chronic illness with severe exacerbation, progression or SE of treatment  []  1 acute or chronic illness/injury that poses a threat to life or bodily function Extensive (2/3 below)  []  3 or more data items        *Now includes assessment requiring independent historian.  []  Independent interpretation of a test  []  Discuss management/test with another provider High  []  Major surgery with risk discussed  []  Drug therapy requiring intensive monitoring for toxicity  []  Hospitalization  []  Decision for DNR 60-74  40-54      No follow-ups on file.    Wendi Jin MD, FAAD  [facility]

## 2022-11-14 ENCOUNTER — PATIENT MESSAGE (OUTPATIENT)
Dept: INTERNAL MEDICINE | Facility: CLINIC | Age: 57
End: 2022-11-14
Payer: COMMERCIAL

## 2022-11-14 DIAGNOSIS — E11.9 TYPE 2 DIABETES MELLITUS WITHOUT COMPLICATION, WITH LONG-TERM CURRENT USE OF INSULIN: Primary | ICD-10-CM

## 2022-11-14 DIAGNOSIS — Z79.4 TYPE 2 DIABETES MELLITUS WITHOUT COMPLICATION, WITH LONG-TERM CURRENT USE OF INSULIN: Primary | ICD-10-CM

## 2022-11-14 NOTE — TELEPHONE ENCOUNTER
Please advise per pt portal message.  I am trying to look it up to pend but cannot find it.    LOV 11/08/2022

## 2022-11-15 NOTE — TELEPHONE ENCOUNTER
Hi Dr. Melgoza, Would you please enter referral [id number] for CGM training and our team will contact to schedule training. Thank you, Vesna

## 2022-11-18 ENCOUNTER — PATIENT MESSAGE (OUTPATIENT)
Dept: DERMATOLOGY | Facility: CLINIC | Age: 57
End: 2022-11-18
Payer: COMMERCIAL

## 2022-11-23 ENCOUNTER — OFFICE VISIT (OUTPATIENT)
Dept: DERMATOLOGY | Facility: CLINIC | Age: 57
End: 2022-11-23
Payer: COMMERCIAL

## 2022-11-23 DIAGNOSIS — L40.9 PSORIASIS: Primary | ICD-10-CM

## 2022-11-23 DIAGNOSIS — K13.0 CHEILITIS: ICD-10-CM

## 2022-11-23 PROCEDURE — 3044F HG A1C LEVEL LT 7.0%: CPT | Mod: CPTII,S$GLB,, | Performed by: STUDENT IN AN ORGANIZED HEALTH CARE EDUCATION/TRAINING PROGRAM

## 2022-11-23 PROCEDURE — 3044F PR MOST RECENT HEMOGLOBIN A1C LEVEL <7.0%: ICD-10-PCS | Mod: CPTII,S$GLB,, | Performed by: STUDENT IN AN ORGANIZED HEALTH CARE EDUCATION/TRAINING PROGRAM

## 2022-11-23 PROCEDURE — 3060F POS MICROALBUMINURIA REV: CPT | Mod: CPTII,S$GLB,, | Performed by: STUDENT IN AN ORGANIZED HEALTH CARE EDUCATION/TRAINING PROGRAM

## 2022-11-23 PROCEDURE — 99999 PR PBB SHADOW E&M-EST. PATIENT-LVL III: CPT | Mod: PBBFAC,,, | Performed by: STUDENT IN AN ORGANIZED HEALTH CARE EDUCATION/TRAINING PROGRAM

## 2022-11-23 PROCEDURE — 3072F PR LOW RISK FOR RETINOPATHY: ICD-10-PCS | Mod: CPTII,S$GLB,, | Performed by: STUDENT IN AN ORGANIZED HEALTH CARE EDUCATION/TRAINING PROGRAM

## 2022-11-23 PROCEDURE — 99214 PR OFFICE/OUTPT VISIT, EST, LEVL IV, 30-39 MIN: ICD-10-PCS | Mod: S$GLB,,, | Performed by: STUDENT IN AN ORGANIZED HEALTH CARE EDUCATION/TRAINING PROGRAM

## 2022-11-23 PROCEDURE — 4010F PR ACE/ARB THEARPY RXD/TAKEN: ICD-10-PCS | Mod: CPTII,S$GLB,, | Performed by: STUDENT IN AN ORGANIZED HEALTH CARE EDUCATION/TRAINING PROGRAM

## 2022-11-23 PROCEDURE — 3066F PR DOCUMENTATION OF TREATMENT FOR NEPHROPATHY: ICD-10-PCS | Mod: CPTII,S$GLB,, | Performed by: STUDENT IN AN ORGANIZED HEALTH CARE EDUCATION/TRAINING PROGRAM

## 2022-11-23 PROCEDURE — 4010F ACE/ARB THERAPY RXD/TAKEN: CPT | Mod: CPTII,S$GLB,, | Performed by: STUDENT IN AN ORGANIZED HEALTH CARE EDUCATION/TRAINING PROGRAM

## 2022-11-23 PROCEDURE — 1160F PR REVIEW ALL MEDS BY PRESCRIBER/CLIN PHARMACIST DOCUMENTED: ICD-10-PCS | Mod: CPTII,S$GLB,, | Performed by: STUDENT IN AN ORGANIZED HEALTH CARE EDUCATION/TRAINING PROGRAM

## 2022-11-23 PROCEDURE — 3066F NEPHROPATHY DOC TX: CPT | Mod: CPTII,S$GLB,, | Performed by: STUDENT IN AN ORGANIZED HEALTH CARE EDUCATION/TRAINING PROGRAM

## 2022-11-23 PROCEDURE — 1159F PR MEDICATION LIST DOCUMENTED IN MEDICAL RECORD: ICD-10-PCS | Mod: CPTII,S$GLB,, | Performed by: STUDENT IN AN ORGANIZED HEALTH CARE EDUCATION/TRAINING PROGRAM

## 2022-11-23 PROCEDURE — 1159F MED LIST DOCD IN RCRD: CPT | Mod: CPTII,S$GLB,, | Performed by: STUDENT IN AN ORGANIZED HEALTH CARE EDUCATION/TRAINING PROGRAM

## 2022-11-23 PROCEDURE — 1160F RVW MEDS BY RX/DR IN RCRD: CPT | Mod: CPTII,S$GLB,, | Performed by: STUDENT IN AN ORGANIZED HEALTH CARE EDUCATION/TRAINING PROGRAM

## 2022-11-23 PROCEDURE — 3072F LOW RISK FOR RETINOPATHY: CPT | Mod: CPTII,S$GLB,, | Performed by: STUDENT IN AN ORGANIZED HEALTH CARE EDUCATION/TRAINING PROGRAM

## 2022-11-23 PROCEDURE — 99214 OFFICE O/P EST MOD 30 MIN: CPT | Mod: S$GLB,,, | Performed by: STUDENT IN AN ORGANIZED HEALTH CARE EDUCATION/TRAINING PROGRAM

## 2022-11-23 PROCEDURE — 3060F PR POS MICROALBUMINURIA RESULT DOCUMENTED/REVIEW: ICD-10-PCS | Mod: CPTII,S$GLB,, | Performed by: STUDENT IN AN ORGANIZED HEALTH CARE EDUCATION/TRAINING PROGRAM

## 2022-11-23 PROCEDURE — 99999 PR PBB SHADOW E&M-EST. PATIENT-LVL III: ICD-10-PCS | Mod: PBBFAC,,, | Performed by: STUDENT IN AN ORGANIZED HEALTH CARE EDUCATION/TRAINING PROGRAM

## 2022-11-23 RX ORDER — HYDROCORTISONE 25 MG/G
OINTMENT TOPICAL 2 TIMES DAILY
Qty: 30 G | Refills: 1 | Status: SHIPPED | OUTPATIENT
Start: 2022-11-23 | End: 2023-10-20

## 2022-11-23 RX ORDER — CLOBETASOL PROPIONATE 0.5 MG/G
OINTMENT TOPICAL
Qty: 60 G | Refills: 3 | Status: SHIPPED | OUTPATIENT
Start: 2022-11-23 | End: 2023-01-27 | Stop reason: SDUPTHER

## 2022-11-23 NOTE — PROGRESS NOTES
Patient Information  Name: Narendra English Sr.  : 1965  MRN: [id number]     Referring Physician:  Dr. Duke ref. provider found   Primary Care Physician:  Dr. Tabitha Melgoza MD   Date of Visit: 2022      Subjective:       Narendra English Sr. is a 57 y.o. male who presents for   Chief Complaint   Patient presents with    Follow-up     Psoriasis. Full body. Tx triamcinolone cream . Non improving.      HPI  Patient with hx of psoriasis, here for follow up. He reports getting new lesions on body. Has been using TAC cream without improvement.    Patient with new complaint of lesion(s)  Location: lips  Duration: few weeks  Symptoms: dryness  Relieving factors/Previous treatments: OTC products    Patient was last seen:2022     Prior notes by myself reviewed.   Clinical documentation obtained by nursing staff reviewed.    Review of Systems   Skin:  Positive for rash. Negative for itching.      Objective:    Physical Exam   Constitutional: He appears well-developed and well-nourished. No distress.   Neurological: He is alert and oriented to person, place, and time. He is not disoriented.   Psychiatric: He has a normal mood and affect.   Skin:   Areas Examined (abnormalities noted in diagram):   Chest / Axilla Inspection Performed  Abdomen Inspection Performed  Back Inspection Performed  RUE Inspected  LUE Inspection Performed                 Diagram Legend     Erythematous scaling macule/papule c/w actinic keratosis       Vascular papule c/w angioma      Pigmented verrucoid papule/plaque c/w seborrheic keratosis      Yellow umbilicated papule c/w sebaceous hyperplasia      Irregularly shaped tan macule c/w lentigo     1-2 mm smooth white papules consistent with Milia      Movable subcutaneous cyst with punctum c/w epidermal inclusion cyst      Subcutaneous movable cyst c/w pilar cyst      Firm pink to brown papule c/w dermatofibroma      Pedunculated fleshy papule(s) c/w skin tag(s)      Evenly  pigmented macule c/w junctional nevus     Mildly variegated pigmented, slightly irregular-bordered macule c/w mildly atypical nevus      Flesh colored to evenly pigmented papule c/w intradermal nevus       Pink pearly papule/plaque c/w basal cell carcinoma      Erythematous hyperkeratotic cursted plaque c/w SCC      Surgical scar with no sign of skin cancer recurrence      Open and closed comedones      Inflammatory papules and pustules      Verrucoid papule consistent consistent with wart     Erythematous eczematous patches and plaques     Dystrophic onycholytic nail with subungual debris c/w onychomycosis     Umbilicated papule    Erythematous-base heme-crusted tan verrucoid plaque consistent with inflamed seborrheic keratosis     Erythematous Silvery Scaling Plaque c/w Psoriasis     See annotation      No images are attached to the encounter or orders placed in the encounter.    [] Data reviewed  [] Independent review of test  [] Management discussed with another provider    Assessment / Plan:        Psoriasis  -     clobetasol 0.05% (TEMOVATE) 0.05 % Oint; AAA bid  Dispense: 60 g; Refill: 3  Counseling on topical steroids:  Patient counseled that the prolonged use of topical steroids can result in the increased appearance superficial blood vessels (telangiectasias) lightening (hypopigmentation), and   thinning of the skin ( atrophy).  Patient understands to avoid using high potency steroids in skin folds, the groin or the face.  The patient verbalized understanding of proper use and possible adverse effects of topical steroids.  All patient's questions and concerns were addressed.  - Consider biopsy if no improvement on topical clobetasol    Cheilitis  -     hydrocortisone 2.5 % ointment; Apply topically 2 (two) times daily. Use on lips  Dispense: 30 g; Refill: 1           LOS NUMBER AND COMPLEXITY OF PROBLEMS    COMPLEXITY OF DATA RISK TOTAL TIME (m)   15771  92375 [] 1 self-limited or minor problem [x] Minimal  to none [] No treatment recommended or patient to monitor 15-29  10-19   49590  28474 Low  [] 2 or > self limited or minor problems  [] 1 stable chronic illness  [x] 1 acute, uncomplicated illness or injury Limited (2)  [] Prior external notes from each unique source  [] Review result of each unique test  [] Order each unique test []  Low  OTC medications, minor skin biopsy 30-44  20-29   21125  56370 Moderate  [x]  1 or > chronic illness with progression, exacerbation or SE of treatment  []  2 or more stable chronic illnesses  []  1 acute illness with systemic symptoms  []  1 acute complicated injury  []  1 undiagnosed new problem with uncertain prognosis Moderate (1/3 below)  []  3 or more data items        *Now includes assessment requiring independent historian  []  Independent interpretation of a test  []  Discuss management/test with another provider Moderate  [x]  Prescription drug mgmt  []  Minor surgery with risk discussed  []  Mgmt limited by social determinates 45-59  30-39   50461  76125 High  []  1 or more chronic illness with severe exacerbation, progression or SE of treatment  []  1 acute or chronic illness/injury that poses a threat to life or bodily function Extensive (2/3 below)  []  3 or more data items        *Now includes assessment requiring independent historian.  []  Independent interpretation of a test  []  Discuss management/test with another provider High  []  Major surgery with risk discussed  []  Drug therapy requiring intensive monitoring for toxicity  []  Hospitalization  []  Decision for DNR 60-74  40-54      No follow-ups on file.    Wendi Jin MD, FAAD  [facility]

## 2022-11-28 NOTE — PRE-PROCEDURE INSTRUCTIONS
Spoke with patient regarding procedure scheduled on 12/01/2022    Arrival time 1145    Has patient been sick with fever or on antibiotics within the last 7 days? No    Does the patient have any open wounds, sores or rashes? No    Does the patient have any recent fractures? No    Has patient received a vaccination within the last 7 days? No    Received the COVID vaccination? Yes    Has the patient stopped all medications as directed? Instructed pt to hold all DM meds morning of procedure.    Does patient have a pacemaker and or defibrillator? No    Does the patient have a ride to and from procedure and someone reliable to remain with patient? Yes. Payton León    Is the patient diabetic? Yes    Does the patient have sleep apnea? Or use O2 at home? Yes and Yes. Does not use supplemental O2.     Is the patient receiving sedation? yes    Is the patient instructed to remain NPO beginning at midnight the night before their procedure? Yes    Procedure location confirmed with patient? Yes    Covid- Denies signs/symptoms. Instructed to notify PAT/MD if any changes.

## 2022-12-01 ENCOUNTER — HOSPITAL ENCOUNTER (OUTPATIENT)
Facility: HOSPITAL | Age: 57
Discharge: HOME | End: 2022-12-01
Attending: ANESTHESIOLOGY | Admitting: ANESTHESIOLOGY
Payer: COMMERCIAL

## 2022-12-01 VITALS
SYSTOLIC BLOOD PRESSURE: 148 MMHG | WEIGHT: 274.25 LBS | TEMPERATURE: 98 F | BODY MASS INDEX: 36.35 KG/M2 | RESPIRATION RATE: 16 BRPM | HEIGHT: 73 IN | OXYGEN SATURATION: 95 % | DIASTOLIC BLOOD PRESSURE: 86 MMHG | HEART RATE: 67 BPM

## 2022-12-01 DIAGNOSIS — M54.12 CERVICAL RADICULOPATHY: Primary | ICD-10-CM

## 2022-12-01 LAB — POCT GLUCOSE: 100 MG/DL (ref 70–110)

## 2022-12-01 PROCEDURE — 62321 NJX INTERLAMINAR CRV/THRC: CPT | Performed by: ANESTHESIOLOGY

## 2022-12-01 PROCEDURE — 62321 PR INJ CERV/THORAC, W/GUIDANCE: ICD-10-PCS | Mod: ,,, | Performed by: ANESTHESIOLOGY

## 2022-12-01 PROCEDURE — 63600175 PHARM REV CODE 636 W HCPCS: Performed by: ANESTHESIOLOGY

## 2022-12-01 PROCEDURE — 25000003 PHARM REV CODE 250: Performed by: ANESTHESIOLOGY

## 2022-12-01 PROCEDURE — 62321 NJX INTERLAMINAR CRV/THRC: CPT | Mod: ,,, | Performed by: ANESTHESIOLOGY

## 2022-12-01 PROCEDURE — 25500020 PHARM REV CODE 255: Performed by: ANESTHESIOLOGY

## 2022-12-01 RX ORDER — ONDANSETRON 2 MG/ML
4 INJECTION INTRAMUSCULAR; INTRAVENOUS ONCE AS NEEDED
Status: DISCONTINUED | OUTPATIENT
Start: 2022-12-01 | End: 2022-12-01 | Stop reason: HOSPADM

## 2022-12-01 RX ORDER — FENTANYL CITRATE 50 UG/ML
INJECTION, SOLUTION INTRAMUSCULAR; INTRAVENOUS
Status: DISCONTINUED | OUTPATIENT
Start: 2022-12-01 | End: 2022-12-01 | Stop reason: HOSPADM

## 2022-12-01 RX ORDER — LIDOCAINE HYDROCHLORIDE 10 MG/ML
INJECTION, SOLUTION EPIDURAL; INFILTRATION; INTRACAUDAL; PERINEURAL
Status: DISCONTINUED | OUTPATIENT
Start: 2022-12-01 | End: 2022-12-01 | Stop reason: HOSPADM

## 2022-12-01 RX ORDER — DEXAMETHASONE SODIUM PHOSPHATE 100 MG/10ML
INJECTION INTRAMUSCULAR; INTRAVENOUS
Status: DISCONTINUED | OUTPATIENT
Start: 2022-12-01 | End: 2022-12-01 | Stop reason: HOSPADM

## 2022-12-01 RX ORDER — MIDAZOLAM HYDROCHLORIDE 1 MG/ML
INJECTION, SOLUTION INTRAMUSCULAR; INTRAVENOUS
Status: DISCONTINUED | OUTPATIENT
Start: 2022-12-01 | End: 2022-12-01 | Stop reason: HOSPADM

## 2022-12-01 NOTE — PLAN OF CARE
Per Dr. Johns refill approved as noted in med profile and e-scribed to preferred pharmacy.     Pt discharged home, awake, alert, oriented x's 4,  denies any pain, no apparent distress noted. All questions and concerns addressed and answered, pt verbalizes understanding of discharge process, pt meets discharge criteria and is being discharged to car via wheelchair.

## 2022-12-01 NOTE — PLAN OF CARE
Patient prepped for procedure.    Received faxed response from [address] approved 02/21/2020 to 12/-31/2021  Approval # [id number]

## 2022-12-01 NOTE — DISCHARGE INSTRUCTIONS

## 2022-12-01 NOTE — DISCHARGE SUMMARY
Discharge Note  Short Stay      SUMMARY     Admit Date: 12/1/2022    Attending Physician: Leonard Mart MD        Discharge Physician: Leonard Mart MD        Discharge Date: 12/1/2022 1:01 PM    Procedure(s) (LRB):  C7/T1 IL SALBADOR (N/A)    Final Diagnosis: Cervical radiculopathy [M54.12]    Disposition: Home or self care    Patient Instructions:   Current Discharge Medication List        CONTINUE these medications which have NOT CHANGED    Details   amLODIPine (NORVASC) 10 MG tablet TAKE 1 TABLET(10 MG) BY MOUTH EVERY DAY  Qty: 90 tablet, Refills: 1    Comments: ZERO refills remain on this prescription. Your patient is requesting advance approval of refills for this medication to PREVENT ANY MISSED DOSES      atorvastatin (LIPITOR) 40 MG tablet TAKE 1 TABLET(40 MG) BY MOUTH EVERY DAY  Qty: 90 tablet, Refills: 3      benazepriL (LOTENSIN) 40 MG tablet Take 1 tablet (40 mg total) by mouth once daily.  Qty: 90 tablet, Refills: 3    Associated Diagnoses: Hypertension associated with diabetes      clonazePAM (KLONOPIN) 2 MG Tab TAKE 1 TABLET BY MOUTH EVERY EVENING  Qty: 30 tablet, Refills: 2      hydrALAZINE (APRESOLINE) 100 MG tablet Take 1 tablet (100 mg total) by mouth every 8 (eight) hours.  Qty: 270 tablet, Refills: 1    Comments: .  Associated Diagnoses: Hyperlipidemia, unspecified hyperlipidemia type; Chest pain, unspecified type; Nonspecific abnormal electrocardiogram (ECG) (EKG); Hypertension, unspecified type; Pre-op evaluation; Type 2 diabetes mellitus without complication, without long-term current use of insulin      levothyroxine (SYNTHROID) 112 MCG tablet TAKE 1 TABLET(112 MCG) BY MOUTH BEFORE BREAKFAST  Qty: 90 tablet, Refills: 3      metFORMIN (GLUCOPHAGE) 1000 MG tablet Take 1 tablet (1,000 mg total) by mouth 2 (two) times daily with meals.  Qty: 180 tablet, Refills: 3    Associated Diagnoses: Type 2 diabetes mellitus without complication, with long-term current use of insulin      metoprolol  succinate (TOPROL-XL) 50 MG 24 hr tablet Take 1 tablet (50 mg total) by mouth once daily.  Qty: 90 tablet, Refills: 3    Associated Diagnoses: Hypertension associated with diabetes      pregabalin (LYRICA) 75 MG capsule Take 1 capsule (75 mg total) by mouth 2 (two) times daily.  Qty: 60 capsule, Refills: 2    Associated Diagnoses: Cervical radiculopathy      traZODone (DESYREL) 50 MG tablet Take 1 tablet (50 mg total) by mouth every evening.  Qty: 30 tablet, Refills: 5    Associated Diagnoses: Shift work sleep disorder      azelastine (ASTELIN) 137 mcg (0.1 %) nasal spray 1 spray (137 mcg total) by Nasal route 2 (two) times daily.  Qty: 30 mL, Refills: 3    Associated Diagnoses: Chronic sinusitis, unspecified location      clobetasol 0.05% (TEMOVATE) 0.05 % Oint AAA bid  Qty: 60 g, Refills: 3    Associated Diagnoses: Psoriasis      clotrimazole (LOTRIMIN) 1 % cream AAA bid until rash is clear x 7 days  Qty: 113 g, Refills: 1    Associated Diagnoses: Tinea corporis      colchicine (COLCRYS) 0.6 mg tablet Take 1 tablet (0.6 mg total) by mouth 2 (two) times daily.  Qty: 180 tablet, Refills: 3    Associated Diagnoses: Gout, unspecified cause, unspecified chronicity, unspecified site      diclofenac sodium (VOLTAREN) 1 % Gel Apply 2 g topically 4 (four) times daily as needed (Pain).  Qty: 100 g, Refills: 1    Associated Diagnoses: Cervical radiculopathy      flash glucose scanning reader Misc 1 application.      flash glucose sensor Kit 1 application.      fluticasone propionate (FLONASE) 50 mcg/actuation nasal spray 1 spray (50 mcg total) by Each Nostril route once daily.  Qty: 16 g, Refills: 0      hydrocortisone 2.5 % ointment Apply topically 2 (two) times daily. Use on lips  Qty: 30 g, Refills: 1    Associated Diagnoses: Cheilitis      indomethacin (INDOCIN SR) 75 mg CpSR CR capsule Take 75 mg by mouth 2 (two) times daily with meals.      insulin glargine, TOUJEO, (TOUJEO SOLOSTAR U-300 INSULIN) 300 unit/mL (1.5 mL)  InPn pen Inject 85 Units into the skin once daily.  Qty: 6 pen, Refills: 2      pep injection Inject 0.15 ml as directed     For compounding pharmacy use:   Add PAPAVERINE 30 mg  Add PHENTOLAMINE 1 mg  Add ALPROSTADIL 20 mcg  Qty: 1 vial, Refills: 5    Associated Diagnoses: Erectile dysfunction, unspecified erectile dysfunction type      !! predniSONE (DELTASONE) 10 MG tablet Take 3 tablets a day for 4 days (1 before breakfast, 1 after lunch, 1 before bed). Then take 2 tablets a day for 4 days (1 before breakfast, 1 before bed). Then take 1 tablet a day for 4 days (1 before breakfast).  Qty: 24 tablet, Refills: 0      !! predniSONE (DELTASONE) 10 MG tablet Take 3 tablets a day for 4 days (1 before breakfast, 1 after lunch, 1 before bed). Then take 2 tablets a day for 4 days (1 before breakfast, 1 before bed). Then take 1 tablet a day for 4 days (1 before breakfast).  Qty: 24 tablet, Refills: 0      sildenafiL (VIAGRA) 100 MG tablet Take 1 tablet (100 mg total) by mouth daily as needed for Erectile Dysfunction.  Qty: 10 tablet, Refills: 5      testosterone (ANDROGEL) 20.25 mg/1.25 gram (1.62 %) GlPm PLACE 2 PUMPS(40.5 MG) ONTO THE SKIN ONCE DAILY AT 6 AM  Strength: 20.25 mg/1.25 gram (1.62 %)  Qty: 75 g, Refills: 0    Associated Diagnoses: Low testosterone      tirzepatide (MOUNJARO) 5 mg/0.5 mL PnIj Inject 5 mg into the skin every 7 days.  Qty: 12 pen, Refills: 3    Associated Diagnoses: Type 2 diabetes mellitus without complication, with long-term current use of insulin      triamcinolone acetonide 0.025% (KENALOG) 0.025 % Oint Apply topically 2 (two) times daily.  Qty: 80 g, Refills: 0    Associated Diagnoses: Rash      triamcinolone acetonide 0.1% (KENALOG) 0.1 % ointment Apply topically 2 (two) times daily.  Qty: 80 g, Refills: 1    Associated Diagnoses: Psoriasis      VASCEPA 1 gram Cap Take 2 capsules (2,000 mg total) by mouth 2 (two) times daily.  Qty: 360 capsule, Refills: 1       !! - Potential duplicate  medications found. Please discuss with provider.              Discharge Diagnosis: Cervical radiculopathy [M54.12]  Condition on Discharge: Stable with no complications to procedure   Diet on Discharge: Same as before.  Activity: as per instruction sheet.  Discharge to: Home with a responsible adult.  Follow up: 2-4 weeks       Please call the office at [phone] if you experience any weakness or loss of sensation, fever > 101.5, pain uncontrolled with oral medications, persistent nausea/vomiting/or diarrhea, redness or drainage from the incisions, or any other worrisome concerns. If physician on call was not reached or could not communicate with our office for any reason please go to the nearest emergency department

## 2022-12-01 NOTE — H&P
"HPI  Patient presenting for Procedure(s) (LRB):  C7/T1 IL SALBADOR (N/A)     Patient on Anti-coagulation No    No health changes since previous encounter    Past Medical History:   Diagnosis Date    Cancer     Colon    Diabetes mellitus 8 years    BS 84 in the room 08/11/2021    Hypertension     Sleep apnea     Thyroid disease      Past Surgical History:   Procedure Laterality Date    BACK SURGERY      COLON SURGERY  02/06/21    COLONOSCOPY N/A 12/29/2020    Procedure: COLONOSCOPY;  Surgeon: William Cotter MD;  Location: [facility] END[facility];  Service: Endoscopy;  Laterality: N/A;  Will need Rapid doesn't live here    COLONOSCOPY N/A 02/10/2022    Procedure: COLONOSCOPY;  Surgeon: Wili Espinosa MD;  Location: [facility] ENDO;  Service: Endoscopy;  Laterality: N/A;    FESS, WITH NASAL SEPTOPLASTY, WITH IMAGING GUIDANCE Bilateral 08/17/2022    Procedure: FESS, WITH NASAL SEPTOPLASTY, WITH IMAGING GUIDANCE;  Surgeon: Viktor Ferrell MD;  Location: [facility] OR;  Service: ENT;  Laterality: Bilateral;    LAPAROSCOPIC RIGHT COLON RESECTION N/A 02/02/2021    Procedure: COLECTOMY, RIGHT, LAPAROSCOPIC, ERAS low;  Surgeon: Melonie Santoyo MD;  Location: [facility] OR [address] [place] [address];  Service: Colon and Rectal;  Laterality: N/A;  needs rapid covid test    NASAL TURBINATE REDUCTION Bilateral 08/17/2022    Procedure: REDUCTION, NASAL TURBINATE;  Surgeon: Viktor Ferrell MD;  Location: [facility] OR;  Service: ENT;  Laterality: Bilateral;    TONSILLECTOMY       Review of patient's allergies indicates:   Allergen Reactions    Demerol (pf) [meperidine (pf)] Other (See Comments)     Pt reports,"They lost my blood pressure."    Percocet [oxycodone-acetaminophen] Itching     itching    Demerol [meperidine]         Current Facility-Administered Medications on File Prior to Encounter   Medication Dose Route Frequency Provider Last Rate Last Admin    gabapentin capsule 300 mg  300 mg Oral TID Dali Lim NP   300 mg at 02/04/21 0810    lactated ringers infusion "   Intravenous Continuous Katie Shukla MD   Stopped at 08/17/22 3285     Current Outpatient Medications on File Prior to Encounter   Medication Sig Dispense Refill    amLODIPine (NORVASC) 10 MG tablet TAKE 1 TABLET(10 MG) BY MOUTH EVERY DAY 90 tablet 1    atorvastatin (LIPITOR) 40 MG tablet TAKE 1 TABLET(40 MG) BY MOUTH EVERY DAY 90 tablet 3    benazepriL (LOTENSIN) 40 MG tablet Take 1 tablet (40 mg total) by mouth once daily. 90 tablet 3    levothyroxine (SYNTHROID) 112 MCG tablet TAKE 1 TABLET(112 MCG) BY MOUTH BEFORE BREAKFAST 90 tablet 3    metFORMIN (GLUCOPHAGE) 1000 MG tablet Take 1 tablet (1,000 mg total) by mouth 2 (two) times daily with meals. 180 tablet 3    metoprolol succinate (TOPROL-XL) 50 MG 24 hr tablet Take 1 tablet (50 mg total) by mouth once daily. 90 tablet 3    pregabalin (LYRICA) 75 MG capsule Take 1 capsule (75 mg total) by mouth 2 (two) times daily. 60 capsule 2    colchicine (COLCRYS) 0.6 mg tablet Take 1 tablet (0.6 mg total) by mouth 2 (two) times daily. 180 tablet 3    diclofenac sodium (VOLTAREN) 1 % Gel Apply 2 g topically 4 (four) times daily as needed (Pain). 100 g 1    flash glucose scanning reader Misc 1 application.      flash glucose sensor Kit 1 application.      fluticasone propionate (FLONASE) 50 mcg/actuation nasal spray 1 spray (50 mcg total) by Each Nostril route once daily. 16 g 0    indomethacin (INDOCIN SR) 75 mg CpSR CR capsule Take 75 mg by mouth 2 (two) times daily with meals.      pep injection Inject 0.15 ml as directed     For compounding pharmacy use:   Add PAPAVERINE 30 mg  Add PHENTOLAMINE 1 mg  Add ALPROSTADIL 20 mcg (Patient not taking: Reported on 11/8/2022) 1 vial 5    predniSONE (DELTASONE) 10 MG tablet Take 3 tablets a day for 4 days (1 before breakfast, 1 after lunch, 1 before bed). Then take 2 tablets a day for 4 days (1 before breakfast, 1 before bed). Then take 1 tablet a day for 4 days (1 before breakfast). 24 tablet 0    VASCEPA 1 gram Cap Take 2  "capsules (2,000 mg total) by mouth 2 (two) times daily. 360 capsule 1        PMHx, PSHx, Allergies, Medications reviewed in epic    ROS negative except pain complaints in HPI    OBJECTIVE:    BP (!) 177/90 (BP Location: Right arm, Patient Position: Sitting)   Pulse 71   Temp 97.3 °F (36.3 °C) (Temporal)   Resp 16   Ht 6' 1" (1.854 m)   Wt 124.4 kg (274 lb 4 oz)   SpO2 98%   BMI 36.18 kg/m²     PHYSICAL EXAMINATION:    GENERAL: Well appearing, in no acute distress, alert and oriented x3.  PSYCH:  Mood and affect appropriate.  SKIN: Skin color, texture, turgor normal, no rashes or lesions which will impact the procedure.  CV: RRR with palpation of the radial artery.  PULM: No evidence of respiratory difficulty, symmetric chest rise. Clear to auscultation.  NEURO: Cranial nerves grossly intact.    Plan:    Proceed with procedure as planned Procedure(s) (LRB):  C7/T1 IL SLABADOR (N/A)    Leonard Mart MD  12/01/2022            "

## 2022-12-01 NOTE — OP NOTE
Cervical Interlaminar Epidural Steroid Injection under Fluoroscopic Guidance.     INFORMED CONSENT: The procedure, risks, benefits and options were discussed with patient. There are no contraindications to the procedure. The patient expressed understanding and agreed to proceed. The personnel performing the procedure was discussed.    Date of procedure 12/01/2022    Time-out taken to identify patient and procedure side prior to starting the procedure.                     PROCEDURE: Cervical Interlaminar epidural steroid injection C7/T1 under fluoroscopic guidance.     Pre Procedure diagnosis:    Cervical radiculopathy [M54.12]  1. Cervical radiculopathy        Post-Procedure diagnosis:   same      PHYSICIAN: Leonard Mart MD    ASSISTANTS: None     MEDICATIONS INJECTED: 2ml Decadron PF 10mg/ml and 1ml  Lidocaine 1%    LOCAL ANESTHETIC INJECTED: 3ml  Lidocaine 1%     ESTIMATED BLOOD LOSS: none.     COMPLICATIONS: none.     Sedation: Conscious sedation provided by M.D    SEDATION MEDICATIONS: local/IV sedation: Versed 2 mg and fentanyl 50 mcg IV.  Conscious sedation ordered by MD.  Patient reevaluated and sedation administered by MD and monitored by RN.  Total sedation time was less than 10 min.      Total sedation time was <10 min      TECHNIQUE: With the patient laying in a prone position, the area was prepped and draped in the usual sterile fashion using ChloraPrep and a fenestrated drape. Local anesthetic was given using a 27-gauge needle by raising a wheal and going down to the hub of the needle over the C7/T1 interlaminar space.  The interlaminar space was then approached with a 3.5 inch 18-gauge Touhy needle was introduced under fluoroscopic guidance in the AP and Lateral view. Once the Ligamentum flavum was encountered loss of resistance to saline was used to enter the epidural space. With positive loss of resistance and negative CSF or Blood, 2mL contrast dye Omnipaque (300mg/ml) was injected to confirm  placement and there was no vascular runoff. The medication was then injected slowly. The patient tolerated the procedure well.         The patient was monitored for approximately 30 minutes after the procedure.  Patient was given post procedure and discharge instructions to follow at home.  The patient was discharged in a stable condition

## 2022-12-02 ENCOUNTER — PATIENT MESSAGE (OUTPATIENT)
Dept: PAIN MEDICINE | Facility: CLINIC | Age: 57
End: 2022-12-02
Payer: COMMERCIAL

## 2022-12-02 ENCOUNTER — OFFICE VISIT (OUTPATIENT)
Dept: OTOLARYNGOLOGY | Facility: CLINIC | Age: 57
End: 2022-12-02
Payer: COMMERCIAL

## 2022-12-02 DIAGNOSIS — J32.9 CHRONIC SINUSITIS, UNSPECIFIED LOCATION: Primary | ICD-10-CM

## 2022-12-02 DIAGNOSIS — J34.2 NASAL SEPTAL DEVIATION: ICD-10-CM

## 2022-12-02 DIAGNOSIS — M54.12 CERVICAL RADICULOPATHY: Primary | ICD-10-CM

## 2022-12-02 DIAGNOSIS — T48.5X5A RHINITIS MEDICAMENTOSA: ICD-10-CM

## 2022-12-02 DIAGNOSIS — J34.2 NASAL SEPTAL DEFORMITY: ICD-10-CM

## 2022-12-02 DIAGNOSIS — J34.3 HYPERTROPHY OF BOTH INFERIOR NASAL TURBINATES: ICD-10-CM

## 2022-12-02 DIAGNOSIS — J31.0 RHINITIS MEDICAMENTOSA: ICD-10-CM

## 2022-12-02 PROCEDURE — 3066F NEPHROPATHY DOC TX: CPT | Mod: CPTII,S$GLB,, | Performed by: STUDENT IN AN ORGANIZED HEALTH CARE EDUCATION/TRAINING PROGRAM

## 2022-12-02 PROCEDURE — 99999 PR PBB SHADOW E&M-EST. PATIENT-LVL I: ICD-10-PCS | Mod: PBBFAC,,, | Performed by: STUDENT IN AN ORGANIZED HEALTH CARE EDUCATION/TRAINING PROGRAM

## 2022-12-02 PROCEDURE — 3060F POS MICROALBUMINURIA REV: CPT | Mod: CPTII,S$GLB,, | Performed by: STUDENT IN AN ORGANIZED HEALTH CARE EDUCATION/TRAINING PROGRAM

## 2022-12-02 PROCEDURE — 3066F PR DOCUMENTATION OF TREATMENT FOR NEPHROPATHY: ICD-10-PCS | Mod: CPTII,S$GLB,, | Performed by: STUDENT IN AN ORGANIZED HEALTH CARE EDUCATION/TRAINING PROGRAM

## 2022-12-02 PROCEDURE — 99999 PR PBB SHADOW E&M-EST. PATIENT-LVL I: CPT | Mod: PBBFAC,,, | Performed by: STUDENT IN AN ORGANIZED HEALTH CARE EDUCATION/TRAINING PROGRAM

## 2022-12-02 PROCEDURE — 99213 OFFICE O/P EST LOW 20 MIN: CPT | Mod: 25,S$GLB,, | Performed by: STUDENT IN AN ORGANIZED HEALTH CARE EDUCATION/TRAINING PROGRAM

## 2022-12-02 PROCEDURE — 1159F MED LIST DOCD IN RCRD: CPT | Mod: CPTII,S$GLB,, | Performed by: STUDENT IN AN ORGANIZED HEALTH CARE EDUCATION/TRAINING PROGRAM

## 2022-12-02 PROCEDURE — 3072F LOW RISK FOR RETINOPATHY: CPT | Mod: CPTII,S$GLB,, | Performed by: STUDENT IN AN ORGANIZED HEALTH CARE EDUCATION/TRAINING PROGRAM

## 2022-12-02 PROCEDURE — 31231 PR NASAL ENDOSCOPY, DX: ICD-10-PCS | Mod: S$GLB,,, | Performed by: STUDENT IN AN ORGANIZED HEALTH CARE EDUCATION/TRAINING PROGRAM

## 2022-12-02 PROCEDURE — 3072F PR LOW RISK FOR RETINOPATHY: ICD-10-PCS | Mod: CPTII,S$GLB,, | Performed by: STUDENT IN AN ORGANIZED HEALTH CARE EDUCATION/TRAINING PROGRAM

## 2022-12-02 PROCEDURE — 3060F PR POS MICROALBUMINURIA RESULT DOCUMENTED/REVIEW: ICD-10-PCS | Mod: CPTII,S$GLB,, | Performed by: STUDENT IN AN ORGANIZED HEALTH CARE EDUCATION/TRAINING PROGRAM

## 2022-12-02 PROCEDURE — 3044F PR MOST RECENT HEMOGLOBIN A1C LEVEL <7.0%: ICD-10-PCS | Mod: CPTII,S$GLB,, | Performed by: STUDENT IN AN ORGANIZED HEALTH CARE EDUCATION/TRAINING PROGRAM

## 2022-12-02 PROCEDURE — 4010F ACE/ARB THERAPY RXD/TAKEN: CPT | Mod: CPTII,S$GLB,, | Performed by: STUDENT IN AN ORGANIZED HEALTH CARE EDUCATION/TRAINING PROGRAM

## 2022-12-02 PROCEDURE — 99213 PR OFFICE/OUTPT VISIT, EST, LEVL III, 20-29 MIN: ICD-10-PCS | Mod: 25,S$GLB,, | Performed by: STUDENT IN AN ORGANIZED HEALTH CARE EDUCATION/TRAINING PROGRAM

## 2022-12-02 PROCEDURE — 4010F PR ACE/ARB THEARPY RXD/TAKEN: ICD-10-PCS | Mod: CPTII,S$GLB,, | Performed by: STUDENT IN AN ORGANIZED HEALTH CARE EDUCATION/TRAINING PROGRAM

## 2022-12-02 PROCEDURE — 1159F PR MEDICATION LIST DOCUMENTED IN MEDICAL RECORD: ICD-10-PCS | Mod: CPTII,S$GLB,, | Performed by: STUDENT IN AN ORGANIZED HEALTH CARE EDUCATION/TRAINING PROGRAM

## 2022-12-02 PROCEDURE — 31231 NASAL ENDOSCOPY DX: CPT | Mod: S$GLB,,, | Performed by: STUDENT IN AN ORGANIZED HEALTH CARE EDUCATION/TRAINING PROGRAM

## 2022-12-02 PROCEDURE — 3044F HG A1C LEVEL LT 7.0%: CPT | Mod: CPTII,S$GLB,, | Performed by: STUDENT IN AN ORGANIZED HEALTH CARE EDUCATION/TRAINING PROGRAM

## 2022-12-02 RX ORDER — METHYLPREDNISOLONE 4 MG/1
TABLET ORAL
Qty: 21 EACH | Refills: 0 | Status: SHIPPED | OUTPATIENT
Start: 2022-12-02 | End: 2022-12-23

## 2022-12-05 ENCOUNTER — TELEPHONE (OUTPATIENT)
Dept: DERMATOLOGY | Facility: CLINIC | Age: 57
End: 2022-12-05
Payer: COMMERCIAL

## 2022-12-05 NOTE — TELEPHONE ENCOUNTER
Spoke with patient about canceling appointment with Jonathan Lepe on 12/28/2022 due to her being out on maternity leave.   Patient states he will keep appt he has with Dr. Jin on 1/27.

## 2022-12-06 ENCOUNTER — PATIENT MESSAGE (OUTPATIENT)
Dept: INTERNAL MEDICINE | Facility: CLINIC | Age: 57
End: 2022-12-06
Payer: COMMERCIAL

## 2022-12-06 NOTE — TELEPHONE ENCOUNTER
Please advise per pt portal message     Pt still has open referral to Diabetic education and management, but no appt scheduled.  Do I need to route this to them?

## 2022-12-08 ENCOUNTER — PATIENT MESSAGE (OUTPATIENT)
Dept: DERMATOLOGY | Facility: CLINIC | Age: 57
End: 2022-12-08
Payer: COMMERCIAL

## 2022-12-09 ENCOUNTER — OFFICE VISIT (OUTPATIENT)
Dept: UROLOGY | Facility: CLINIC | Age: 57
End: 2022-12-09
Payer: COMMERCIAL

## 2022-12-09 VITALS
HEIGHT: 73 IN | RESPIRATION RATE: 18 BRPM | TEMPERATURE: 97 F | DIASTOLIC BLOOD PRESSURE: 75 MMHG | SYSTOLIC BLOOD PRESSURE: 138 MMHG | HEART RATE: 57 BPM | WEIGHT: 268.19 LBS | BODY MASS INDEX: 35.54 KG/M2

## 2022-12-09 DIAGNOSIS — R79.89 LOW TESTOSTERONE: Primary | ICD-10-CM

## 2022-12-09 DIAGNOSIS — N52.9 ERECTILE DYSFUNCTION, UNSPECIFIED ERECTILE DYSFUNCTION TYPE: ICD-10-CM

## 2022-12-09 PROCEDURE — 99999 PR PBB SHADOW E&M-EST. PATIENT-LVL V: ICD-10-PCS | Mod: PBBFAC,,, | Performed by: UROLOGY

## 2022-12-09 PROCEDURE — 3060F POS MICROALBUMINURIA REV: CPT | Mod: CPTII,S$GLB,, | Performed by: UROLOGY

## 2022-12-09 PROCEDURE — 3066F NEPHROPATHY DOC TX: CPT | Mod: CPTII,S$GLB,, | Performed by: UROLOGY

## 2022-12-09 PROCEDURE — 4010F ACE/ARB THERAPY RXD/TAKEN: CPT | Mod: CPTII,S$GLB,, | Performed by: UROLOGY

## 2022-12-09 PROCEDURE — 3075F PR MOST RECENT SYSTOLIC BLOOD PRESS GE 130-139MM HG: ICD-10-PCS | Mod: CPTII,S$GLB,, | Performed by: UROLOGY

## 2022-12-09 PROCEDURE — 99999 PR PBB SHADOW E&M-EST. PATIENT-LVL V: CPT | Mod: PBBFAC,,, | Performed by: UROLOGY

## 2022-12-09 PROCEDURE — 1159F MED LIST DOCD IN RCRD: CPT | Mod: CPTII,S$GLB,, | Performed by: UROLOGY

## 2022-12-09 PROCEDURE — 3066F PR DOCUMENTATION OF TREATMENT FOR NEPHROPATHY: ICD-10-PCS | Mod: CPTII,S$GLB,, | Performed by: UROLOGY

## 2022-12-09 PROCEDURE — 1160F PR REVIEW ALL MEDS BY PRESCRIBER/CLIN PHARMACIST DOCUMENTED: ICD-10-PCS | Mod: CPTII,S$GLB,, | Performed by: UROLOGY

## 2022-12-09 PROCEDURE — 3008F PR BODY MASS INDEX (BMI) DOCUMENTED: ICD-10-PCS | Mod: CPTII,S$GLB,, | Performed by: UROLOGY

## 2022-12-09 PROCEDURE — 99214 OFFICE O/P EST MOD 30 MIN: CPT | Mod: S$GLB,,, | Performed by: UROLOGY

## 2022-12-09 PROCEDURE — 3044F PR MOST RECENT HEMOGLOBIN A1C LEVEL <7.0%: ICD-10-PCS | Mod: CPTII,S$GLB,, | Performed by: UROLOGY

## 2022-12-09 PROCEDURE — 3075F SYST BP GE 130 - 139MM HG: CPT | Mod: CPTII,S$GLB,, | Performed by: UROLOGY

## 2022-12-09 PROCEDURE — 3078F PR MOST RECENT DIASTOLIC BLOOD PRESSURE < 80 MM HG: ICD-10-PCS | Mod: CPTII,S$GLB,, | Performed by: UROLOGY

## 2022-12-09 PROCEDURE — 1160F RVW MEDS BY RX/DR IN RCRD: CPT | Mod: CPTII,S$GLB,, | Performed by: UROLOGY

## 2022-12-09 PROCEDURE — 99214 PR OFFICE/OUTPT VISIT, EST, LEVL IV, 30-39 MIN: ICD-10-PCS | Mod: S$GLB,,, | Performed by: UROLOGY

## 2022-12-09 PROCEDURE — 3072F PR LOW RISK FOR RETINOPATHY: ICD-10-PCS | Mod: CPTII,S$GLB,, | Performed by: UROLOGY

## 2022-12-09 PROCEDURE — 3044F HG A1C LEVEL LT 7.0%: CPT | Mod: CPTII,S$GLB,, | Performed by: UROLOGY

## 2022-12-09 PROCEDURE — 3008F BODY MASS INDEX DOCD: CPT | Mod: CPTII,S$GLB,, | Performed by: UROLOGY

## 2022-12-09 PROCEDURE — 1159F PR MEDICATION LIST DOCUMENTED IN MEDICAL RECORD: ICD-10-PCS | Mod: CPTII,S$GLB,, | Performed by: UROLOGY

## 2022-12-09 PROCEDURE — 4010F PR ACE/ARB THEARPY RXD/TAKEN: ICD-10-PCS | Mod: CPTII,S$GLB,, | Performed by: UROLOGY

## 2022-12-09 PROCEDURE — 3072F LOW RISK FOR RETINOPATHY: CPT | Mod: CPTII,S$GLB,, | Performed by: UROLOGY

## 2022-12-09 PROCEDURE — 3078F DIAST BP <80 MM HG: CPT | Mod: CPTII,S$GLB,, | Performed by: UROLOGY

## 2022-12-09 PROCEDURE — 3060F PR POS MICROALBUMINURIA RESULT DOCUMENTED/REVIEW: ICD-10-PCS | Mod: CPTII,S$GLB,, | Performed by: UROLOGY

## 2022-12-09 RX ORDER — SILDENAFIL 100 MG/1
100 TABLET, FILM COATED ORAL DAILY PRN
Qty: 6 TABLET | Refills: 10 | Status: SHIPPED | OUTPATIENT
Start: 2022-12-09 | End: 2023-03-08

## 2022-12-09 NOTE — PROGRESS NOTES
Chief Complaint:   Encounter Diagnoses   Name Primary?    Low testosterone Yes    Erectile dysfunction, unspecified erectile dysfunction type        HPI:   12/9/22- new patient to me who comes in to discuss testosterone pellet insertion, he has failed injections and creams.  Currently taking neither, Viagra works well, TriMix caused priapism.  06/16/2022 - 55 yo male that presents today for evaluation of ED.  Patient notes issues for the last 3-4 years but notes that over the last six months it has gotten worse.  He notes difficulty both achieving and maintaining an erection.  He has previously tried both Cialis and Viagra and both of these cause in to have a very bad headache that make it on tolerable to take these medications, though they do work.  Otherwise he voids with a good stream and feels like he empties well subjectively.  He denies any gross hematuria or family history of  cancers.  Denies prior stones or urologic procedures.    Allergies:  Demerol (pf) [meperidine (pf)], Percocet [oxycodone-acetaminophen], and Demerol [meperidine]    Medications:  has a current medication list which includes the following prescription(s): amlodipine, atorvastatin, azelastine, benazepril, clobetasol 0.05%, clonazepam, clotrimazole, colchicine, diclofenac sodium, flash glucose scanning reader, flash glucose sensor, fluticasone propionate, hydralazine, hydrocortisone, indomethacin, toujeo solostar u-300 insulin, levothyroxine, metformin, methylprednisolone, metoprolol succinate, alprostadil, pregabalin, sildenafil, testosterone, mounjaro, trazodone, triamcinolone acetonide 0.1%, and vascepa, and the following Facility-Administered Medications: gabapentin and lactated ringers.    Review of Systems:  General: No fever, chills, fatigability, or weight loss.  Skin: No rashes, itching, or changes in color or texture of skin.  Chest: Denies MEJÍA, cyanosis, wheezing, cough, and sputum production.  Abdomen: Appetite fine. No  weight loss. Denies diarrhea, abdominal pain, hematemesis, or blood in stool.  Musculoskeletal: No joint stiffness or swelling. Denies back pain.  : As above.  All other review of systems negative.    PMH:   has a past medical history of Cancer, Diabetes mellitus (8 years), Hypertension, Sleep apnea, and Thyroid disease.    PSH:   has a past surgical history that includes Colonoscopy (N/A, 12/29/2020); Laparoscopic right colon resection (N/A, 02/02/2021); Back surgery; Colonoscopy (N/A, 02/10/2022); Tonsillectomy; fess, with nasal septoplasty, with imaging guidance (Bilateral, 08/17/2022); Nasal turbinate reduction (Bilateral, 08/17/2022); Colon surgery (02/06/21); and Epidural steroid injection into cervical spine (N/A, 12/1/2022).    FamHx: family history includes Breast cancer in his mother; Cancer in his mother; Cataracts in his maternal grandmother; Diabetes in his maternal grandmother and mother; Hypertension in his brother, brother, and mother; Stroke in his father.    SocHx:  reports that he has never smoked. He has never used smokeless tobacco. He reports that he does not drink alcohol and does not use drugs.      Physical Exam:  Vitals:    12/09/22 1537   Resp: 18     General: A&Ox3, no apparent distress, no deformities  Neck: No masses, normal ROM  Lungs: normal inspiration, no use of accessory muscles  Heart: normal pulse, no arrhythmias  Abdomen: Soft, NT, ND, no masses, no hernias, no hepatosplenomegaly  Skin: The skin is warm and dry. No jaundice.  Ext: No c/c/e.  JAIMIE: 6/22: Normal rectal tone, no hemorrhoids. Prostate smooth and normal, no nodules 30 gm SV not palpable. Perineum and anus normal.    Labs/Studies:   Testosterone 328 8/22   PSA 0.77 6/22    Impression/Plan:       1. Hypogonadism- AndroGel and shots have failed.  Patient would now like to attempt testopel placement, will go ahead and arrange and schedule for testopel insertion.  After insertional have him follow back up in 3 months  with labs.  No need for labs today as he is off all medical therapy.    2. Erectile dysfunction- sildenafil 100 mg works well for the patient, of note TriMix causedpriapism.  Refill has been sent in to his pharmacy.

## 2022-12-13 ENCOUNTER — PATIENT MESSAGE (OUTPATIENT)
Dept: PAIN MEDICINE | Facility: CLINIC | Age: 57
End: 2022-12-13
Payer: COMMERCIAL

## 2022-12-20 ENCOUNTER — TELEPHONE (OUTPATIENT)
Dept: UROLOGY | Facility: CLINIC | Age: 57
End: 2022-12-20
Payer: COMMERCIAL

## 2022-12-20 ENCOUNTER — PATIENT MESSAGE (OUTPATIENT)
Dept: UROLOGY | Facility: CLINIC | Age: 57
End: 2022-12-20
Payer: COMMERCIAL

## 2022-12-20 ENCOUNTER — OFFICE VISIT (OUTPATIENT)
Dept: PAIN MEDICINE | Facility: CLINIC | Age: 57
End: 2022-12-20
Payer: COMMERCIAL

## 2022-12-20 DIAGNOSIS — M47.812 CERVICAL SPONDYLOSIS: ICD-10-CM

## 2022-12-20 DIAGNOSIS — M48.02 CERVICAL STENOSIS OF SPINAL CANAL: ICD-10-CM

## 2022-12-20 DIAGNOSIS — M54.12 CERVICAL RADICULOPATHY: Primary | ICD-10-CM

## 2022-12-20 DIAGNOSIS — M50.30 DDD (DEGENERATIVE DISC DISEASE), CERVICAL: ICD-10-CM

## 2022-12-20 PROCEDURE — 4010F ACE/ARB THERAPY RXD/TAKEN: CPT | Mod: CPTII,95,, | Performed by: ANESTHESIOLOGY

## 2022-12-20 PROCEDURE — 99212 OFFICE O/P EST SF 10 MIN: CPT | Mod: 95,,, | Performed by: ANESTHESIOLOGY

## 2022-12-20 PROCEDURE — 4010F PR ACE/ARB THEARPY RXD/TAKEN: ICD-10-PCS | Mod: CPTII,95,, | Performed by: ANESTHESIOLOGY

## 2022-12-20 PROCEDURE — 99212 PR OFFICE/OUTPT VISIT, EST, LEVL II, 10-19 MIN: ICD-10-PCS | Mod: 95,,, | Performed by: ANESTHESIOLOGY

## 2022-12-20 PROCEDURE — 3072F PR LOW RISK FOR RETINOPATHY: ICD-10-PCS | Mod: CPTII,95,, | Performed by: ANESTHESIOLOGY

## 2022-12-20 PROCEDURE — 3066F PR DOCUMENTATION OF TREATMENT FOR NEPHROPATHY: ICD-10-PCS | Mod: CPTII,95,, | Performed by: ANESTHESIOLOGY

## 2022-12-20 PROCEDURE — 3060F POS MICROALBUMINURIA REV: CPT | Mod: CPTII,95,, | Performed by: ANESTHESIOLOGY

## 2022-12-20 PROCEDURE — 3044F PR MOST RECENT HEMOGLOBIN A1C LEVEL <7.0%: ICD-10-PCS | Mod: CPTII,95,, | Performed by: ANESTHESIOLOGY

## 2022-12-20 PROCEDURE — 3060F PR POS MICROALBUMINURIA RESULT DOCUMENTED/REVIEW: ICD-10-PCS | Mod: CPTII,95,, | Performed by: ANESTHESIOLOGY

## 2022-12-20 PROCEDURE — 3066F NEPHROPATHY DOC TX: CPT | Mod: CPTII,95,, | Performed by: ANESTHESIOLOGY

## 2022-12-20 PROCEDURE — 3072F LOW RISK FOR RETINOPATHY: CPT | Mod: CPTII,95,, | Performed by: ANESTHESIOLOGY

## 2022-12-20 PROCEDURE — 3044F HG A1C LEVEL LT 7.0%: CPT | Mod: CPTII,95,, | Performed by: ANESTHESIOLOGY

## 2022-12-20 NOTE — TELEPHONE ENCOUNTER
Left message for pt to return call     ----- Message from Kassidy Ardon sent at 12/20/2022  9:35 AM CST -----  Contact: Narendra Mackey needs a call back at [phone], Regards to getting his procedure reschedule.    Thanks  Td

## 2022-12-20 NOTE — PROGRESS NOTES
Interventional Pain Progress Note     The patient location is: home  The chief complaint leading to consultation is: chronic pain      Visit type: audiovisual     Face to Face time with patient: 10 minute encounter  10 minutes of total time spent on the encounter, which includes face to face time and non-face to face time preparing to see the patient (eg, review of tests), Obtaining and/or reviewing separately obtained history, Documenting clinical information in the electronic or other health record, Independently interpreting results (not separately reported) and communicating results to the patient/family/caregiver, or Care coordination (not separately reported).      Each patient to whom he or she provides medical services by telemedicine is:  (1) informed of the relationship between the physician and patient and the respective role of any other health care provider with respect to management of the patient; and (2) notified that he or she may decline to receive medical services by telemedicine and may withdraw from such care at any time.      Referring Physician: No ref. provider found    PCP: Tabitha Melgoza MD    Chief Complaint:   Neck Pain    Interval History (12/20/2022):  Patient returns to clinic after procedure.  Patient reports minimal relief from C7-T1 interlaminar epidural steroid injection on 12/01/2022.  Patient had episode of right upper extremity neuropathy for 2 days after the procedure.  This has resolved with Medrol Dosepak.  Pain is described as an aching stabbing feeling across his neck.  He denies any significant radiation to his upper extremities.  Pain is worse with lifting, better with heat and rest.  Pain is rated a 7/10. Denies any fevers, chills, changes in gait, weakness, or bowel and bladder incontinence         SUBJECTIVE:    Narendra Hopkins Amador Oliva is a 57 y.o. male who presents to the clinic for the evaluation of neck pain. The pain started 12 years ago and symptoms have been  worsening.  Patient reports that 10 years ago he received a series of injections that gave him relief.   The pain is described as a aching throbbing pain that starts near the top of his neck and then radiates down his left upper extremity and numbness and tingling pain to the left forearm.  Pain is worse with flexion and lifting, better with heat and rest.  Pain is rated a 7/10.  The pain is rated with a score of  2/10 on the BEST day and a score of 9/10 on the WORST day.  Symptoms interfere with daily activity and work. The pain is exacerbated by Flexing and Lifting.  The pain is mitigated by heat and rest.     Patient denies night fever/night sweats, urinary incontinence, bowel incontinence, significant weight loss, significant motor weakness, and loss of sensations.      Non-Pharmacologic Treatments:  Physical Therapy/Home Exercise: yes  Ice/Heat:yes  TENS: no  Acupuncture: no  Massage: no  Chiropractic: no        Previous Pain Medications:  NSAIDs, Tylenol, muscle relaxers, neuropathic, opioids, topicals     report:  Reviewed and consistent with medication use as prescribed.    Pain Procedures:   12/01/2022:  C7-T1 interlaminar epidural steroid injections, minimal relief      Imaging:   CT CERVICAL SPINE WO CONTRAST  5/18/22    COMPARISON:     No prior study.     FINDINGS:     Automated exposure control was used for dose reduction.     No acute fracture, subluxation, dislocation or osseous destructive lesion.     Straightening and moderate reversal of lordosis. Mild dextrocurvature cervicothoracic junction.     Moderate degenerative change anteriorly at C1-2. Minimal anterior, lateral and posterior spondylosis C2-3 and C3-4. Mild/moderate anterior to lateral spondylosis with minimal mild posterior spondylosis C4-5. Moderate to moderate severe anterior to lateral spondylosis and mild posterior spondylosis C5-6 and C6-7. Moderate anterior to lateral spondylosis with minimal posterior spondylosis C7-T1.  Ossification posterior longitudinal ligament at C6, C6-7, C7 and C7-T1. Mild and moderate facet arthropathy, greatest on the right at C5-6 and C6-7 and on the left at C3-4.     C1-2:  No significant narrowing of canal.     C2-3:  Minimal disc bulge and posterior spondylosis. Minimal ligament flavum thickening. Minimal narrowing of canal. Moderate to severe right and moderate left foraminal narrowing.     C3-4:  Mild central disc bulge and minimal posterior spondylosis. Mild effacement of left lateral recess with minimal overall narrowing of canal. Severe bilateral foraminal narrowing, greater on the left.     C4-5:  Moderate lobular disc bulge or protrusion, greatest at midline. Minimal mild posterior spondylosis. Moderate severe narrowing left lateral recess with moderate overall narrowing of canal. Severe bilateral foraminal narrowing, greater on the right.     C5-6:  Moderate disc spur complex, greatest at midline and left of midline. Severe effacement of left lateral recess with moderate severe overall narrowing of canal. Severe bilateral foraminal narrowing.     C6-7:  Moderate to severe disc spur complex as well as ossification posterior longitudinal ligament. Mild moderately ligament flavum thickening. Severe narrowing of canal, greater on the left. Severe bilateral foraminal narrowing.     C7-T1:  Mild central disc spur complex. Mild ossification posterior longitudinal ligament. Moderate ligament flavum thickening. Moderate severe overall narrowing of canal. Moderate severe bilateral foraminal narrowing.     The visualized surrounding cervical soft tissues show no acute findings.    Past Medical History:   Diagnosis Date    Cancer     Colon    Diabetes mellitus 8 years    BS 84 in the room 08/11/2021    Hypertension     Sleep apnea     Thyroid disease      Past Surgical History:   Procedure Laterality Date    BACK SURGERY      COLON SURGERY  02/06/21    COLONOSCOPY N/A 12/29/2020    Procedure: COLONOSCOPY;   "Surgeon: William Cotter MD;  Location: [facility] ENDO;  Service: Endoscopy;  Laterality: N/A;  Will need Rapid doesn't live here    COLONOSCOPY N/A 02/10/2022    Procedure: COLONOSCOPY;  Surgeon: Wili Espinosa MD;  Location: [facility] ENDO;  Service: Endoscopy;  Laterality: N/A;    EPIDURAL STEROID INJECTION INTO CERVICAL SPINE N/A 12/1/2022    Procedure: C7/T1 IL SALBADOR;  Surgeon: Leonard Mart MD;  Location: [facility] PAIN MGT;  Service: Pain Management;  Laterality: N/A;    FESS, WITH NASAL SEPTOPLASTY, WITH IMAGING GUIDANCE Bilateral 08/17/2022    Procedure: FESS, WITH NASAL SEPTOPLASTY, WITH IMAGING GUIDANCE;  Surgeon: Viktor Ferrell MD;  Location: [facility] OR;  Service: ENT;  Laterality: Bilateral;    LAPAROSCOPIC RIGHT COLON RESECTION N/A 02/02/2021    Procedure: COLECTOMY, RIGHT, LAPAROSCOPIC, ERAS low;  Surgeon: Melonie Santoyo MD;  Location: [address];  Service: Colon and Rectal;  Laterality: N/A;  needs rapid covid test    NASAL TURBINATE REDUCTION Bilateral 08/17/2022    Procedure: REDUCTION, NASAL TURBINATE;  Surgeon: Viktor Ferrell MD;  Location: [facility] OR;  Service: ENT;  Laterality: Bilateral;    TONSILLECTOMY       Social History     Socioeconomic History    Marital status: Single   Tobacco Use    Smoking status: Never    Smokeless tobacco: Never   Substance and Sexual Activity    Alcohol use: Never    Drug use: Never    Sexual activity: Yes     Partners: Female     Birth control/protection: None     Family History   Problem Relation Age of Onset    Hypertension Mother     Diabetes Mother     Breast cancer Mother     Cancer Mother     Stroke Father     Hypertension Brother     Cataracts Maternal Grandmother     Diabetes Maternal Grandmother     Hypertension Brother        Review of patient's allergies indicates:   Allergen Reactions    Demerol (pf) [meperidine (pf)] Other (See Comments)     Pt reports,"They lost my blood pressure."    Percocet [oxycodone-acetaminophen] Itching     itching    " Demerol [meperidine]        Current Outpatient Medications   Medication Sig    amLODIPine (NORVASC) 10 MG tablet TAKE 1 TABLET(10 MG) BY MOUTH EVERY DAY    atorvastatin (LIPITOR) 40 MG tablet TAKE 1 TABLET(40 MG) BY MOUTH EVERY DAY    azelastine (ASTELIN) 137 mcg (0.1 %) nasal spray 1 spray (137 mcg total) by Nasal route 2 (two) times daily.    benazepriL (LOTENSIN) 40 MG tablet Take 1 tablet (40 mg total) by mouth once daily.    clobetasol 0.05% (TEMOVATE) 0.05 % Oint AAA bid    clonazePAM (KLONOPIN) 2 MG Tab TAKE 1 TABLET BY MOUTH EVERY EVENING    clotrimazole (LOTRIMIN) 1 % cream AAA bid until rash is clear x 7 days    colchicine (COLCRYS) 0.6 mg tablet Take 1 tablet (0.6 mg total) by mouth 2 (two) times daily.    diclofenac sodium (VOLTAREN) 1 % Gel Apply 2 g topically 4 (four) times daily as needed (Pain).    flash glucose scanning reader Misc 1 application.    flash glucose sensor Kit 1 application.    fluticasone propionate (FLONASE) 50 mcg/actuation nasal spray 1 spray (50 mcg total) by Each Nostril route once daily.    hydrALAZINE (APRESOLINE) 100 MG tablet Take 1 tablet (100 mg total) by mouth every 8 (eight) hours.    hydrocortisone 2.5 % ointment Apply topically 2 (two) times daily. Use on lips    indomethacin (INDOCIN SR) 75 mg CpSR CR capsule Take 75 mg by mouth 2 (two) times daily with meals.    insulin glargine, TOUJEO, (TOUJEO SOLOSTAR U-300 INSULIN) 300 unit/mL (1.5 mL) InPn pen Inject 85 Units into the skin once daily.    levothyroxine (SYNTHROID) 112 MCG tablet TAKE 1 TABLET(112 MCG) BY MOUTH BEFORE BREAKFAST    metFORMIN (GLUCOPHAGE) 1000 MG tablet Take 1 tablet (1,000 mg total) by mouth 2 (two) times daily with meals.    methylPREDNISolone (MEDROL DOSEPACK) 4 mg tablet use as directed    metoprolol succinate (TOPROL-XL) 50 MG 24 hr tablet Take 1 tablet (50 mg total) by mouth once daily.    pregabalin (LYRICA) 75 MG capsule TAKE 1 CAPSULE(75 MG) BY MOUTH TWICE DAILY    sildenafiL (VIAGRA) 100 MG  tablet Take 1 tablet (100 mg total) by mouth daily as needed for Erectile Dysfunction.    tirzepatide (MOUNJARO) 5 mg/0.5 mL PnIj Inject 5 mg into the skin every 7 days.    traZODone (DESYREL) 50 MG tablet Take 1 tablet (50 mg total) by mouth every evening.    triamcinolone acetonide 0.1% (KENALOG) 0.1 % ointment Apply topically 2 (two) times daily.    VASCEPA 1 gram Cap Take 2 capsules (2,000 mg total) by mouth 2 (two) times daily.     No current facility-administered medications for this visit.     Facility-Administered Medications Ordered in Other Visits   Medication    gabapentin capsule 300 mg    lactated ringers infusion         ROS  Review of Systems   Constitutional:  Negative for activity change, appetite change and fever.   Respiratory:  Negative for cough, chest tightness, shortness of breath, wheezing and stridor.    Cardiovascular:  Negative for chest pain, palpitations and leg swelling.   Gastrointestinal:  Negative for abdominal pain, blood in stool, constipation, diarrhea, nausea and vomiting.   Endocrine: Negative for polydipsia, polyphagia and polyuria.   Genitourinary:  Negative for dysuria, hematuria and urgency.   Musculoskeletal:  Positive for arthralgias, myalgias, neck pain and neck stiffness. Negative for back pain, gait problem and joint swelling.   Skin:  Negative for rash.   Allergic/Immunologic: Negative for food allergies.   Neurological:  Positive for numbness. Negative for dizziness, tremors, seizures, syncope, facial asymmetry, speech difficulty, weakness, light-headedness and headaches.   Psychiatric/Behavioral:  Negative for agitation, hallucinations, self-injury and suicidal ideas. The patient is not nervous/anxious and is not hyperactive.           OBJECTIVE:  Virtual visit    LABS:  Lab Results   Component Value Date    WBC 6.79 03/14/2022    HGB 14.3 03/14/2022    HCT 45.6 03/14/2022    MCV 97 03/14/2022     03/14/2022       CMP  Sodium   Date Value Ref Range Status    08/12/2022 140 136 - 145 mmol/L Final     Potassium   Date Value Ref Range Status   08/12/2022 4.1 3.5 - 5.1 mmol/L Final     Chloride   Date Value Ref Range Status   08/12/2022 106 95 - 110 mmol/L Final     CO2   Date Value Ref Range Status   08/12/2022 23 23 - 29 mmol/L Final     Glucose   Date Value Ref Range Status   08/12/2022 101 70 - 110 mg/dL Final     BUN   Date Value Ref Range Status   08/12/2022 15 6 - 20 mg/dL Final     Creatinine   Date Value Ref Range Status   08/12/2022 1.0 0.5 - 1.4 mg/dL Final     Calcium   Date Value Ref Range Status   08/12/2022 9.3 8.7 - 10.5 mg/dL Final     Total Protein   Date Value Ref Range Status   08/12/2022 6.6 6.0 - 8.4 g/dL Final     Albumin   Date Value Ref Range Status   08/12/2022 4.1 3.6 - 5.1 g/dL Final   08/12/2022 3.8 3.5 - 5.2 g/dL Final     Total Bilirubin   Date Value Ref Range Status   08/12/2022 0.5 0.1 - 1.0 mg/dL Final     Comment:     For infants and newborns, interpretation of results should be based  on gestational age, weight and in agreement with clinical  observations.    Premature Infant recommended reference ranges:  Up to 24 hours.............<8.0 mg/dL  Up to 48 hours............<12.0 mg/dL  3-5 days..................<15.0 mg/dL  6-29 days.................<15.0 mg/dL       Alkaline Phosphatase   Date Value Ref Range Status   08/12/2022 50 (L) 55 - 135 U/L Final     AST   Date Value Ref Range Status   08/12/2022 38 10 - 40 U/L Final     ALT   Date Value Ref Range Status   08/12/2022 72 (H) 10 - 44 U/L Final     Anion Gap   Date Value Ref Range Status   08/12/2022 11 8 - 16 mmol/L Final     eGFR if    Date Value Ref Range Status   03/14/2022 >60.0 >60 mL/min/1.73 m^2 Final     eGFR if non    Date Value Ref Range Status   03/14/2022 >60.0 >60 mL/min/1.73 m^2 Final     Comment:     Calculation used to obtain the estimated glomerular filtration  rate (eGFR) is the CKD-EPI equation.          Lab Results   Component  Value Date    HGBA1C 6.6 (H) 09/14/2022             ASSESSMENT:       57 y.o. year old male with neck pain, consistent with     1. Cervical radiculopathy        2. DDD (degenerative disc disease), cervical        3. Cervical spondylosis        4. Cervical stenosis of spinal canal          Cervical radiculopathy    DDD (degenerative disc disease), cervical    Cervical spondylosis    Cervical stenosis of spinal canal             PLAN:   - Interventions:    None at this time.  Can consider cervical medial branch blocks if patient is not a candidate for surgical intervention.  -12/01/2022:  C7-T1 interlaminar epidural steroid injections, minimal relief  - Anticoagulation use:   no no anticoagulation    - Medications:   Continue Voltaren gel 4 times a day as needed for neck pain   Continue Lyrica 75 mg twice a day   Continue indomethacin for gout    - Therapy:    Refer to physical therapy for neck pain with left cervical radicular pain    - Imaging:   CT and cervical spine reviewed.  Significant for diffuse foraminal stenosis and uncinate hypertrophy was noted at lateral recess stenosis, left greater than right, at C6-C7 and C7-T1    - Consults:   Refer to Neurosurgery for evaluation of neck pain with noted foraminal and canal stenosis that has not responded to cervical epidural steroid injection.        - Patient Questions: Answered all of the patient's questions regarding diagnosis, therapy, and treatment    - Follow up visit: return to clinic p.r.n.        The above plan and management options were discussed at length with patient. Patient is in agreement with the above and verbalized understanding.    I discussed the goals of interventional chronic pain management with the patient on today's visit.  I explained the utility of injections for diagnostic and therapeutic purposes.  We discussed a multimodal approach to pain including treating the patient's given worst pain at any given time.  We will use a systematic  approach to addressing pain.  We will also adopt a multimodal approach that includes injections, adjuvant medications, physical therapy, at times psychiatry.  There may be a limited role for opioid use intermittently in the treatment of pain, more particularly for acute pain although no one approach can be used as a sole treatment modality.    I emphasized the importance of regular exercise, core strengthening and stretching, diet and weight loss as a cornerstone of long-term pain management.      Leonadr Mart MD  Interventional Pain Management  [facility]    Disclaimer:  This note was prepared using voice recognition system and is likely to have sound alike errors that may have been overlooked even after proof reading.  Please call me with any questions

## 2022-12-25 ENCOUNTER — PATIENT MESSAGE (OUTPATIENT)
Dept: DERMATOLOGY | Facility: CLINIC | Age: 57
End: 2022-12-25
Payer: COMMERCIAL

## 2022-12-27 ENCOUNTER — OFFICE VISIT (OUTPATIENT)
Dept: OTOLARYNGOLOGY | Facility: CLINIC | Age: 57
End: 2022-12-27
Payer: COMMERCIAL

## 2022-12-27 VITALS — TEMPERATURE: 98 F | WEIGHT: 274.06 LBS | BODY MASS INDEX: 36.15 KG/M2

## 2022-12-27 DIAGNOSIS — J32.9 CHRONIC SINUSITIS, UNSPECIFIED LOCATION: Primary | ICD-10-CM

## 2022-12-27 DIAGNOSIS — J34.2 NASAL SEPTAL DEVIATION: ICD-10-CM

## 2022-12-27 DIAGNOSIS — J34.89 NASAL OBSTRUCTION: ICD-10-CM

## 2022-12-27 PROCEDURE — 3008F BODY MASS INDEX DOCD: CPT | Mod: CPTII,S$GLB,, | Performed by: OTOLARYNGOLOGY

## 2022-12-27 PROCEDURE — 1159F PR MEDICATION LIST DOCUMENTED IN MEDICAL RECORD: ICD-10-PCS | Mod: CPTII,S$GLB,, | Performed by: OTOLARYNGOLOGY

## 2022-12-27 PROCEDURE — 99214 OFFICE O/P EST MOD 30 MIN: CPT | Mod: S$GLB,,, | Performed by: OTOLARYNGOLOGY

## 2022-12-27 PROCEDURE — 3060F POS MICROALBUMINURIA REV: CPT | Mod: CPTII,S$GLB,, | Performed by: OTOLARYNGOLOGY

## 2022-12-27 PROCEDURE — 3044F PR MOST RECENT HEMOGLOBIN A1C LEVEL <7.0%: ICD-10-PCS | Mod: CPTII,S$GLB,, | Performed by: OTOLARYNGOLOGY

## 2022-12-27 PROCEDURE — 3008F PR BODY MASS INDEX (BMI) DOCUMENTED: ICD-10-PCS | Mod: CPTII,S$GLB,, | Performed by: OTOLARYNGOLOGY

## 2022-12-27 PROCEDURE — 3044F HG A1C LEVEL LT 7.0%: CPT | Mod: CPTII,S$GLB,, | Performed by: OTOLARYNGOLOGY

## 2022-12-27 PROCEDURE — 4010F ACE/ARB THERAPY RXD/TAKEN: CPT | Mod: CPTII,S$GLB,, | Performed by: OTOLARYNGOLOGY

## 2022-12-27 PROCEDURE — 4010F PR ACE/ARB THEARPY RXD/TAKEN: ICD-10-PCS | Mod: CPTII,S$GLB,, | Performed by: OTOLARYNGOLOGY

## 2022-12-27 PROCEDURE — 3072F PR LOW RISK FOR RETINOPATHY: ICD-10-PCS | Mod: CPTII,S$GLB,, | Performed by: OTOLARYNGOLOGY

## 2022-12-27 PROCEDURE — 99999 PR PBB SHADOW E&M-EST. PATIENT-LVL IV: ICD-10-PCS | Mod: PBBFAC,,, | Performed by: OTOLARYNGOLOGY

## 2022-12-27 PROCEDURE — 99214 PR OFFICE/OUTPT VISIT, EST, LEVL IV, 30-39 MIN: ICD-10-PCS | Mod: S$GLB,,, | Performed by: OTOLARYNGOLOGY

## 2022-12-27 PROCEDURE — 3066F NEPHROPATHY DOC TX: CPT | Mod: CPTII,S$GLB,, | Performed by: OTOLARYNGOLOGY

## 2022-12-27 PROCEDURE — 3072F LOW RISK FOR RETINOPATHY: CPT | Mod: CPTII,S$GLB,, | Performed by: OTOLARYNGOLOGY

## 2022-12-27 PROCEDURE — 3060F PR POS MICROALBUMINURIA RESULT DOCUMENTED/REVIEW: ICD-10-PCS | Mod: CPTII,S$GLB,, | Performed by: OTOLARYNGOLOGY

## 2022-12-27 PROCEDURE — 3066F PR DOCUMENTATION OF TREATMENT FOR NEPHROPATHY: ICD-10-PCS | Mod: CPTII,S$GLB,, | Performed by: OTOLARYNGOLOGY

## 2022-12-27 PROCEDURE — 1159F MED LIST DOCD IN RCRD: CPT | Mod: CPTII,S$GLB,, | Performed by: OTOLARYNGOLOGY

## 2022-12-27 PROCEDURE — 99999 PR PBB SHADOW E&M-EST. PATIENT-LVL IV: CPT | Mod: PBBFAC,,, | Performed by: OTOLARYNGOLOGY

## 2022-12-27 NOTE — PROGRESS NOTES
Referring Provider:    Tabitha Melgoza Md  [id number] [place] [address] [place] [address]  Subjective:   Patient: Narendra English Sr. [id number], :1965   Visit date:2022 10:41 AM    Chief Complaint:  Nasal Congestion (Pt states had surgery for deviated septum with Dr. Ferrell and would like a second opinion. States congestion worse since surgery)    HPI:    Prior notes reviewed by myself.  Clinical documentation obtained by nursing staff reviewed.     56 y/o gentleman here for second opinion regarding his chronic nasal congestion.  He had a FESS/Septoplasty/SMRT with Dr. Ferrell in August.  Since that time, his sinus issues have improved but he continues to have left greater than right nasal congestion.  This nasal congestion is severe enough to keep him awake at night.  He is using topical nasal steroid spray but he has not tried breathe right strips.  He has an appointment with Dr. Ferrell later in January.        Objective:     Physical Exam:  Vitals:  Temp 98.1 °F (36.7 °C) (Temporal)   Wt 124.3 kg (274 lb 0.5 oz)   BMI 36.15 kg/m²   General appearance:  Well developed, well nourished    Ears:  Otoscopy of external auditory canals and tympanic membranes was normal, clinical speech reception thresholds grossly intact, no mass/lesion of auricle.    Nose:  No masses/lesions of external nose, nasal mucosa, septum with 2+ caudal septal deviation left, and turbinates were within normal limits.    Mouth:  No mass/lesion of lips, teeth, gums, hard/soft palate, tongue, tonsils, or oropharynx.    Neck & Lymphatics:  No cervical lymphadenopathy, no neck mass/crepitus/ asymmetry, trachea is midline, no thyroid enlargement/tenderness/mass.        [x]  Data Reviewed:    Lab Results   Component Value Date    WBC 6.79 2022    HGB 14.3 2022    HCT 45.6 2022    MCV 97 2022    EOSINOPHIL 4.1 2022       Reviewed op note from Dr. Ferrell  Reviewed preop CT sinus report and images      Encounter Form Printed    Encounter form printed on 07/18/22 07:45 AM         CT Sinuses without Contrast  Status: Final result     MyChart Results Release    [employer] Status: Active  Results Release     PACS Images for [facility] Viewer     Show images for CT Sinuses without Contrast  All Reviewers List    Viktor Ferrell MD on 7/18/2022 09:12     CT Sinuses without Contrast  Order: [id number]  Status: Final result     Visible to patient: Yes (not seen)     Next appt: 01/18/2023 at 07:30 AM in Urology (Shad Acosta MD)     Dx: Nasal obstruction; Nasal septal devia...     0 Result Notes  Details    Reading Physician Reading Date Result Priority   Magen Yeung MD  [phone] 7/18/2022 Routine     Narrative & Impression  EXAMINATION:  CT SINUSES WITHOUT CONTRAST     CLINICAL HISTORY:  Chronic sinusitis, unspecifiedchronic sinusitis;     TECHNIQUE:  CT scan without contrast, multiplanar reconstructions     COMPARISON:  No comparison studies are available.     FINDINGS:  The facial bones are intact.     Tiny right maxillary sinus mucous retention cyst.  Bilateral sinus mucoperiosteal thickening measuring up to 3 mm.  Patchy bilateral ethmoid air cell disease also measuring up to 3 mm.  Right sphenoid sinus mucoperiosteal thickening measuring up to 3 mm.  The rest of paranasal sinuses are clear.  The visualized portions of the mastoid air cells, middle ears and ear canals are clear.  The frontoethmoidal recesses and ostiomeatal complexes are patent.  Mild rightward nasal septal deviation.     The orbits and globes are intact.     The skull and scalp are intact. The visualized portions of the intracranial contents are intact.  Airways are patent.  Neck soft tissues are normal.     Multiple dental caries noted.  No definite apical abscesses.     There are degenerative changes between the anterior arch of C1 and the dens.  There is a bone island within the C3 vertebral body.     Impression:     1.   Negative for acute process involving the facial bones.     2.  Mild paranasal sinus disease in distribution described above.  Maximum thickness is 3 mm.     3.  Mild rightward nasal septal deviation.  Multiple dental caries.  Other nonemergent findings as described above.     All CT scans at this facility are performed  using dose modulation techniques as appropriate to performed exam including the following:  automated exposure control; adjustment of mA and/or kV according to the patients size (this includes techniques or standardized protocols for targeted exams where dose is matched to indication/reason for exam: i.e. extremities or head);  iterative reconstruction technique.        Electronically signed by: Magen Yeung MD  Date:                                            07/18/2022  Time:                                           08:52           Exam Ended: 07/18/22 08:37 Last Resulted: 07/18/22 08:52       Order Details     View Encounter     Lab and Collection Details     Routing     Result History     View Encounter Conversation           Result Care Coordination      Patient Communication     Add Comments   Add Notifications  Back to Top             CT Sinuses without Contrast: Patient Communication     Add Comments   Add Notifications      External Result Report    External Result Report     Narrative & Impression    EXAMINATION:  CT SINUSES WITHOUT CONTRAST     CLINICAL HISTORY:  Chronic sinusitis, unspecifiedchronic sinusitis;     TECHNIQUE:  CT scan without contrast, multiplanar reconstructions     COMPARISON:  No comparison studies are available.     FINDINGS:  The facial bones are intact.     Tiny right maxillary sinus mucous retention cyst.  Bilateral sinus mucoperiosteal thickening measuring up to 3 mm.  Patchy bilateral ethmoid air cell disease also measuring up to 3 mm.  Right sphenoid sinus mucoperiosteal thickening measuring up to 3 mm.  The rest of paranasal sinuses are clear.  The visualized  portions of the mastoid air cells, middle ears and ear canals are clear.  The frontoethmoidal recesses and ostiomeatal complexes are patent.  Mild rightward nasal septal deviation.     The orbits and globes are intact.     The skull and scalp are intact. The visualized portions of the intracranial contents are intact.  Airways are patent.  Neck soft tissues are normal.     Multiple dental caries noted.  No definite apical abscesses.     There are degenerative changes between the anterior arch of C1 and the dens.  There is a bone island within the C3 vertebral body.     Impression:     1.  Negative for acute process involving the facial bones.     2.  Mild paranasal sinus disease in distribution described above.  Maximum thickness is 3 mm.     3.  Mild rightward nasal septal deviation.  Multiple dental caries.  Other nonemergent findings as described above.     All CT scans at this facility are performed  using dose modulation techniques as appropriate to performed exam including the following:  automated exposure control; adjustment of mA and/or kV according to the patients size (this includes techniques or standardized protocols for targeted exams where dose is matched to indication/reason for exam: i.e. extremities or head);  iterative reconstruction technique.        Electronically signed by: Magen Yeung MD  Date:                                            07/18/2022  Time:                                           08:52    Encounter    View Encounter             Signed by    Signed Time Phone Pager   Magen Yeung MD 7/18/2022 08:52 [phone]      Reviewed By    Viktor Ferrell MD on 7/18/2022 09:12           Exam Details    Performed Procedure Technologist Supporting Staff Performing Physician   CT Sinuses without Contrast Jun Diaz, RT Laura Manzano, RT Julia Cardozo,           Appointment Date/Status Modality Department    7/18/2022     Completed [facility] CT1 LIMIT 500 LBS [facility] CT SCAN            Begin Exam End Exam  End Exam Questionnaires   7/18/2022  8:25 AM 7/18/2022  8:37 AM  IMAGING END ALL              Reason for Exam  Priority: Routine  chronic sinusitis   Dx: Chronic sinusitis, unspecified location [J32.9 (ICD-10-CM)]; Nasal septal deviation [J34.2 (ICD-10-CM)]; Hypertrophy of both inferior nasal turbinates [J34.3 (ICD-10-CM)]; Nasal obstruction [J34.89 (ICD-10-CM)]         Order Report     Order Details        Assessment & Plan:   Chronic sinusitis, unspecified location    Nasal septal deviation    Nasal obstruction        We reviewed his history, preop CT and exam in detail.  I explained that he does have a caudal septal deviation which was obviously present preoperatively.  He understands that the approach for his previous surgery and that for correcting a severe caudal septal deviation are very different.  Dr. Ferrell has discussed the possibility of an open septorhinoplasty with correction of this caudal septal deviation in the past.  I recommended that he keep his follow-up and consider this procedure.  He is going to try breathe right strips and continuing with nasal steroid spray in the meantime.

## 2022-12-27 NOTE — Clinical Note
Hey - just wanted to give you a heads up.  I saw this patient who requested a 2nd opinion regarding his nasal obstruction.  He had a Fess/septo/turbs with you in August.  Since that time he continues to have left-sided nasal obstruction/congestion that appears to be due to a caudal septal deviation.  I explained that the approach for this is very different than his original surgery and that he were better suited to correct this than any of the other partners here.  I think he was just feeling squeamish about the OSRP approach.  He is going to keep his appt with you.  Thanks!

## 2023-01-08 RX ORDER — INSULIN GLARGINE 300 U/ML
85 INJECTION, SOLUTION SUBCUTANEOUS DAILY
Qty: 6 PEN | Refills: 2 | Status: SHIPPED | OUTPATIENT
Start: 2023-01-08 | End: 2023-03-14 | Stop reason: SDUPTHER

## 2023-01-20 ENCOUNTER — PROCEDURE VISIT (OUTPATIENT)
Dept: UROLOGY | Facility: CLINIC | Age: 58
End: 2023-01-20
Attending: ANESTHESIOLOGY
Payer: COMMERCIAL

## 2023-01-20 DIAGNOSIS — N52.9 ERECTILE DYSFUNCTION, UNSPECIFIED ERECTILE DYSFUNCTION TYPE: ICD-10-CM

## 2023-01-20 DIAGNOSIS — E29.1 HYPOGONADISM MALE: Primary | ICD-10-CM

## 2023-01-20 DIAGNOSIS — Z12.5 PROSTATE CANCER SCREENING: ICD-10-CM

## 2023-01-20 PROCEDURE — S0189 TESTOSTERONE PELLET 75 MG: HCPCS | Mod: S$GLB,,, | Performed by: UROLOGY

## 2023-01-20 PROCEDURE — 11980 IMPLANT HORMONE PELLET(S): CPT | Mod: S$GLB,,, | Performed by: UROLOGY

## 2023-01-20 PROCEDURE — S0189 PR TESTOSTERONE PELLET 75 MG: ICD-10-PCS | Mod: S$GLB,,, | Performed by: UROLOGY

## 2023-01-20 PROCEDURE — 11980 PR IMPLANT,HORMONE,SUBCUTANEOUS: ICD-10-PCS | Mod: S$GLB,,, | Performed by: UROLOGY

## 2023-01-20 RX ORDER — LEVOFLOXACIN 500 MG/1
500 TABLET, FILM COATED ORAL DAILY
Qty: 3 TABLET | Refills: 0 | Status: SHIPPED | OUTPATIENT
Start: 2023-01-20 | End: 2023-01-23

## 2023-01-20 NOTE — PROCEDURES
Procedures  Chief Complaint:   Encounter Diagnoses   Name Primary?    Hypogonadism male Yes    Prostate cancer screening     Erectile dysfunction, unspecified erectile dysfunction type        HPI:   1/20/23- here today for his first testopel insertion.  06/16/2022 - 55 yo male that presents today for evaluation of ED.  Patient notes issues for the last 3-4 years but notes that over the last six months it has gotten worse.  He notes difficulty both achieving and maintaining an erection.  He has previously tried both Cialis and Viagra and both of these cause in to have a very bad headache that make it on tolerable to take these medications, though they do work.  Otherwise he voids with a good stream and feels like he empties well subjectively.  He denies any gross hematuria or family history of  cancers.  Denies prior stones or urologic procedures.    Allergies:  Demerol (pf) [meperidine (pf)], Percocet [oxycodone-acetaminophen], and Demerol [meperidine]    Medications:  has a current medication list which includes the following prescription(s): amlodipine, atorvastatin, azelastine, benazepril, clobetasol 0.05%, clonazepam, colchicine, diclofenac sodium, flash glucose scanning reader, flash glucose sensor, fluticasone propionate, hydralazine, hydrocortisone, indomethacin, toujeo solostar u-300 insulin, levothyroxine, metformin, metoprolol succinate, pregabalin, sildenafil, mounjaro, trazodone, triamcinolone acetonide 0.1%, and vascepa, and the following Facility-Administered Medications: gabapentin and lactated ringers.    Review of Systems:  General: No fever, chills, fatigability, or weight loss.  Skin: No rashes, itching, or changes in color or texture of skin.  Chest: Denies MEJÍA, cyanosis, wheezing, cough, and sputum production.  Abdomen: Appetite fine. No weight loss. Denies diarrhea, abdominal pain, hematemesis, or blood in stool.  Musculoskeletal: No joint stiffness or swelling. Denies back pain.  : As  above.  All other review of systems negative.    PMH:   has a past medical history of Cancer, Diabetes mellitus (8 years), Hypertension, Sleep apnea, and Thyroid disease.    PSH:   has a past surgical history that includes Colonoscopy (N/A, 12/29/2020); Laparoscopic right colon resection (N/A, 02/02/2021); Back surgery; Colonoscopy (N/A, 02/10/2022); Tonsillectomy; fess, with nasal septoplasty, with imaging guidance (Bilateral, 08/17/2022); Nasal turbinate reduction (Bilateral, 08/17/2022); Colon surgery (02/06/21); and Epidural steroid injection into cervical spine (N/A, 12/1/2022).    FamHx: family history includes Breast cancer in his mother; Cancer in his mother; Cataracts in his maternal grandmother; Diabetes in his maternal grandmother and mother; Hypertension in his brother, brother, and mother; Stroke in his father.    SocHx:  reports that he has never smoked. He has never used smokeless tobacco. He reports that he does not drink alcohol and does not use drugs.      Physical Exam:  There were no vitals filed for this visit.    General: A&Ox3, no apparent distress, no deformities  Neck: No masses, normal ROM  Lungs: normal inspiration, no use of accessory muscles  Heart: normal pulse, no arrhythmias  Abdomen: Soft, NT, ND, no masses, no hernias, no hepatosplenomegaly  Skin: The skin is warm and dry. No jaundice.  Ext: No c/c/e.  JAIMIE: 6/22: Normal rectal tone, no hemorrhoids. Prostate smooth and normal, no nodules 30 gm SV not palpable. Perineum and anus normal.    Labs/Studies:   Testopel  1/20/23  Testosterone 328 8/22   PSA 0.77 6/22    Patient was sterilely prepped and draped, then positioned in the left side up, lateral decubitus position.  Lidocaine was used for local anesthesia.  Eleven blade was used to incise the skin, included trocars with the testopel kit were then used.  They were inserted within the incision site and we placed the pellets in a V location.  10 pellets total were inserted without  difficulty.  Trocars were removed and pressure was applied for 2 min.  Steri-Strips applied for local coverage, he will return for lab assessment in 3 months.      Impression/Plan:       1. Hypogonadism- AndroGel and shots have failed.  Patient tolerated testopel insertion well today, call with any issues.  Otherwise will see him in 3 months with labs to determine interval.    2. Erectile dysfunction- sildenafil 100 mg works well for the patient, of note TriMix caused priapism.

## 2023-01-27 ENCOUNTER — OFFICE VISIT (OUTPATIENT)
Dept: DERMATOLOGY | Facility: CLINIC | Age: 58
End: 2023-01-27
Payer: COMMERCIAL

## 2023-01-27 DIAGNOSIS — L40.9 PSORIASIS: ICD-10-CM

## 2023-01-27 PROCEDURE — 99214 PR OFFICE/OUTPT VISIT, EST, LEVL IV, 30-39 MIN: ICD-10-PCS | Mod: S$GLB,,, | Performed by: STUDENT IN AN ORGANIZED HEALTH CARE EDUCATION/TRAINING PROGRAM

## 2023-01-27 PROCEDURE — 1160F RVW MEDS BY RX/DR IN RCRD: CPT | Mod: CPTII,S$GLB,, | Performed by: STUDENT IN AN ORGANIZED HEALTH CARE EDUCATION/TRAINING PROGRAM

## 2023-01-27 PROCEDURE — 99214 OFFICE O/P EST MOD 30 MIN: CPT | Mod: S$GLB,,, | Performed by: STUDENT IN AN ORGANIZED HEALTH CARE EDUCATION/TRAINING PROGRAM

## 2023-01-27 PROCEDURE — 99999 PR PBB SHADOW E&M-EST. PATIENT-LVL III: ICD-10-PCS | Mod: PBBFAC,,, | Performed by: STUDENT IN AN ORGANIZED HEALTH CARE EDUCATION/TRAINING PROGRAM

## 2023-01-27 PROCEDURE — 1160F PR REVIEW ALL MEDS BY PRESCRIBER/CLIN PHARMACIST DOCUMENTED: ICD-10-PCS | Mod: CPTII,S$GLB,, | Performed by: STUDENT IN AN ORGANIZED HEALTH CARE EDUCATION/TRAINING PROGRAM

## 2023-01-27 PROCEDURE — 1159F MED LIST DOCD IN RCRD: CPT | Mod: CPTII,S$GLB,, | Performed by: STUDENT IN AN ORGANIZED HEALTH CARE EDUCATION/TRAINING PROGRAM

## 2023-01-27 PROCEDURE — 99999 PR PBB SHADOW E&M-EST. PATIENT-LVL III: CPT | Mod: PBBFAC,,, | Performed by: STUDENT IN AN ORGANIZED HEALTH CARE EDUCATION/TRAINING PROGRAM

## 2023-01-27 PROCEDURE — 1159F PR MEDICATION LIST DOCUMENTED IN MEDICAL RECORD: ICD-10-PCS | Mod: CPTII,S$GLB,, | Performed by: STUDENT IN AN ORGANIZED HEALTH CARE EDUCATION/TRAINING PROGRAM

## 2023-01-27 RX ORDER — TRIAMCINOLONE ACETONIDE 1 MG/G
OINTMENT TOPICAL 2 TIMES DAILY
Qty: 454 G | Refills: 2 | Status: SHIPPED | OUTPATIENT
Start: 2023-01-27 | End: 2023-05-29

## 2023-01-27 RX ORDER — CLOBETASOL PROPIONATE 0.5 MG/G
OINTMENT TOPICAL
Qty: 60 G | Refills: 3 | Status: SHIPPED | OUTPATIENT
Start: 2023-01-27 | End: 2023-10-12 | Stop reason: SDUPTHER

## 2023-01-27 NOTE — PROGRESS NOTES
Patient Information  Name: Narendra English Sr.  : 1965  MRN: [id number]     Referring Physician:  Dr. Duke ref. provider found   Primary Care Physician:  Dr. Tabitha Melgoza MD   Date of Visit: 2023      Subjective:       Narendra English Sr. is a 57 y.o. male who presents for   Chief Complaint   Patient presents with    Follow-up     Psoriasis. Improved a little but has returned      HPI  Patient with hx of psoriasis, here for follow up. Has been using topical clobetasol with good control however states that he runs out of the medication too quickly. When he runs out of medication, the psoriasis would come back.  Patient was last seen:Visit date not found     Prior notes by myself reviewed.   Clinical documentation obtained by nursing staff reviewed.    Review of Systems   Skin:  Positive for rash. Negative for itching.      Objective:    Physical Exam   Constitutional: He appears well-developed and well-nourished. No distress.   Neurological: He is alert and oriented to person, place, and time. He is not disoriented.   Psychiatric: He has a normal mood and affect.   Skin:   Areas Examined (abnormalities noted in diagram):   Head / Face Inspection Performed  Neck Inspection Performed  Chest / Axilla Inspection Performed            Diagram Legend     Erythematous scaling macule/papule c/w actinic keratosis       Vascular papule c/w angioma      Pigmented verrucoid papule/plaque c/w seborrheic keratosis      Yellow umbilicated papule c/w sebaceous hyperplasia      Irregularly shaped tan macule c/w lentigo     1-2 mm smooth white papules consistent with Milia      Movable subcutaneous cyst with punctum c/w epidermal inclusion cyst      Subcutaneous movable cyst c/w pilar cyst      Firm pink to brown papule c/w dermatofibroma      Pedunculated fleshy papule(s) c/w skin tag(s)      Evenly pigmented macule c/w junctional nevus     Mildly variegated pigmented, slightly irregular-bordered macule c/w  mildly atypical nevus      Flesh colored to evenly pigmented papule c/w intradermal nevus       Pink pearly papule/plaque c/w basal cell carcinoma      Erythematous hyperkeratotic cursted plaque c/w SCC      Surgical scar with no sign of skin cancer recurrence      Open and closed comedones      Inflammatory papules and pustules      Verrucoid papule consistent consistent with wart     Erythematous eczematous patches and plaques     Dystrophic onycholytic nail with subungual debris c/w onychomycosis     Umbilicated papule    Erythematous-base heme-crusted tan verrucoid plaque consistent with inflamed seborrheic keratosis     Erythematous Silvery Scaling Plaque c/w Psoriasis     See annotation      No images are attached to the encounter or orders placed in the encounter.    [] Data reviewed  [] Independent review of test  [] Management discussed with another provider    Assessment / Plan:        Psoriasis  -     triamcinolone acetonide 0.1% (KENALOG) 0.1 % ointment; Apply topically 2 (two) times daily. Use up to 2 weeks at a time  Dispense: 454 g; Refill: 2  -     clobetasol 0.05% (TEMOVATE) 0.05 % Oint; AAA bid  Dispense: 60 g; Refill: 3  Counseling on topical steroids:  Patient counseled that the prolonged use of topical steroids can result in the increased appearance superficial blood vessels (telangiectasias) lightening (hypopigmentation), and   thinning of the skin ( atrophy).  Patient understands to avoid using high potency steroids in skin folds, the groin or the face.  The patient verbalized understanding of proper use and possible adverse effects of topical steroids.  All patient's questions and concerns were addressed.               LOS NUMBER AND COMPLEXITY OF PROBLEMS    COMPLEXITY OF DATA RISK TOTAL TIME (m)   52019  76593 [] 1 self-limited or minor problem [x] Minimal to none [] No treatment recommended or patient to monitor 15-29  10-19   22138  97001 Low  [] 2 or > self limited or minor problems  []  1 stable chronic illness  [] 1 acute, uncomplicated illness or injury Limited (2)  [] Prior external notes from each unique source  [] Review result of each unique test  [] Order each unique test []  Low  OTC medications, minor skin biopsy 30-44  20-29   24109  21250 Moderate  [x]  1 or > chronic illness with progression, exacerbation or SE of treatment  []  2 or more stable chronic illnesses  []  1 acute illness with systemic symptoms  []  1 acute complicated injury  []  1 undiagnosed new problem with uncertain prognosis Moderate (1/3 below)  []  3 or more data items        *Now includes assessment requiring independent historian  []  Independent interpretation of a test  []  Discuss management/test with another provider Moderate  [x]  Prescription drug mgmt  []  Minor surgery with risk discussed  []  Mgmt limited by social determinates 45-59  30-39   42109  78851 High  []  1 or more chronic illness with severe exacerbation, progression or SE of treatment  []  1 acute or chronic illness/injury that poses a threat to life or bodily function Extensive (2/3 below)  []  3 or more data items        *Now includes assessment requiring independent historian.  []  Independent interpretation of a test  []  Discuss management/test with another provider High  []  Major surgery with risk discussed  []  Drug therapy requiring intensive monitoring for toxicity  []  Hospitalization  []  Decision for DNR 60-74  40-54      No follow-ups on file.    Wendi Jin MD, FAAD  [facility]

## 2023-02-22 ENCOUNTER — OFFICE VISIT (OUTPATIENT)
Dept: CARDIOLOGY | Facility: CLINIC | Age: 58
End: 2023-02-22
Payer: COMMERCIAL

## 2023-02-22 VITALS
DIASTOLIC BLOOD PRESSURE: 74 MMHG | WEIGHT: 268.75 LBS | HEART RATE: 67 BPM | OXYGEN SATURATION: 96 % | BODY MASS INDEX: 35.46 KG/M2 | SYSTOLIC BLOOD PRESSURE: 128 MMHG

## 2023-02-22 DIAGNOSIS — R79.89 LOW TESTOSTERONE: ICD-10-CM

## 2023-02-22 DIAGNOSIS — E11.9 TYPE 2 DIABETES MELLITUS WITHOUT COMPLICATION, WITHOUT LONG-TERM CURRENT USE OF INSULIN: ICD-10-CM

## 2023-02-22 DIAGNOSIS — Z79.4 TYPE 2 DIABETES MELLITUS WITHOUT COMPLICATION, WITH LONG-TERM CURRENT USE OF INSULIN: ICD-10-CM

## 2023-02-22 DIAGNOSIS — R94.31 NONSPECIFIC ABNORMAL ELECTROCARDIOGRAM (ECG) (EKG): ICD-10-CM

## 2023-02-22 DIAGNOSIS — E11.9 TYPE 2 DIABETES MELLITUS WITHOUT COMPLICATION, WITH LONG-TERM CURRENT USE OF INSULIN: ICD-10-CM

## 2023-02-22 DIAGNOSIS — E11.59 HYPERTENSION ASSOCIATED WITH DIABETES: Primary | ICD-10-CM

## 2023-02-22 DIAGNOSIS — E78.1 HYPERTRIGLYCERIDEMIA: ICD-10-CM

## 2023-02-22 DIAGNOSIS — G47.33 OSA (OBSTRUCTIVE SLEEP APNEA): ICD-10-CM

## 2023-02-22 DIAGNOSIS — E78.5 HYPERLIPIDEMIA, UNSPECIFIED HYPERLIPIDEMIA TYPE: ICD-10-CM

## 2023-02-22 DIAGNOSIS — R07.9 CHEST PAIN, UNSPECIFIED TYPE: ICD-10-CM

## 2023-02-22 DIAGNOSIS — I15.2 HYPERTENSION ASSOCIATED WITH DIABETES: Primary | ICD-10-CM

## 2023-02-22 DIAGNOSIS — E03.9 HYPOTHYROIDISM (ACQUIRED): ICD-10-CM

## 2023-02-22 DIAGNOSIS — I10 BENIGN ESSENTIAL HTN: ICD-10-CM

## 2023-02-22 DIAGNOSIS — Z01.818 PRE-OP EVALUATION: ICD-10-CM

## 2023-02-22 DIAGNOSIS — I10 HYPERTENSION, UNSPECIFIED TYPE: ICD-10-CM

## 2023-02-22 PROCEDURE — 3074F SYST BP LT 130 MM HG: CPT | Mod: CPTII,S$GLB,, | Performed by: INTERNAL MEDICINE

## 2023-02-22 PROCEDURE — 3008F BODY MASS INDEX DOCD: CPT | Mod: CPTII,S$GLB,, | Performed by: INTERNAL MEDICINE

## 2023-02-22 PROCEDURE — 4010F ACE/ARB THERAPY RXD/TAKEN: CPT | Mod: CPTII,S$GLB,, | Performed by: INTERNAL MEDICINE

## 2023-02-22 PROCEDURE — 1159F PR MEDICATION LIST DOCUMENTED IN MEDICAL RECORD: ICD-10-PCS | Mod: CPTII,S$GLB,, | Performed by: INTERNAL MEDICINE

## 2023-02-22 PROCEDURE — 1160F PR REVIEW ALL MEDS BY PRESCRIBER/CLIN PHARMACIST DOCUMENTED: ICD-10-PCS | Mod: CPTII,S$GLB,, | Performed by: INTERNAL MEDICINE

## 2023-02-22 PROCEDURE — 3074F PR MOST RECENT SYSTOLIC BLOOD PRESSURE < 130 MM HG: ICD-10-PCS | Mod: CPTII,S$GLB,, | Performed by: INTERNAL MEDICINE

## 2023-02-22 PROCEDURE — 99999 PR PBB SHADOW E&M-EST. PATIENT-LVL IV: ICD-10-PCS | Mod: PBBFAC,,, | Performed by: INTERNAL MEDICINE

## 2023-02-22 PROCEDURE — 4010F PR ACE/ARB THEARPY RXD/TAKEN: ICD-10-PCS | Mod: CPTII,S$GLB,, | Performed by: INTERNAL MEDICINE

## 2023-02-22 PROCEDURE — 1160F RVW MEDS BY RX/DR IN RCRD: CPT | Mod: CPTII,S$GLB,, | Performed by: INTERNAL MEDICINE

## 2023-02-22 PROCEDURE — 99214 PR OFFICE/OUTPT VISIT, EST, LEVL IV, 30-39 MIN: ICD-10-PCS | Mod: S$GLB,,, | Performed by: INTERNAL MEDICINE

## 2023-02-22 PROCEDURE — 99214 OFFICE O/P EST MOD 30 MIN: CPT | Mod: S$GLB,,, | Performed by: INTERNAL MEDICINE

## 2023-02-22 PROCEDURE — 3078F PR MOST RECENT DIASTOLIC BLOOD PRESSURE < 80 MM HG: ICD-10-PCS | Mod: CPTII,S$GLB,, | Performed by: INTERNAL MEDICINE

## 2023-02-22 PROCEDURE — 3078F DIAST BP <80 MM HG: CPT | Mod: CPTII,S$GLB,, | Performed by: INTERNAL MEDICINE

## 2023-02-22 PROCEDURE — 99999 PR PBB SHADOW E&M-EST. PATIENT-LVL IV: CPT | Mod: PBBFAC,,, | Performed by: INTERNAL MEDICINE

## 2023-02-22 PROCEDURE — 3008F PR BODY MASS INDEX (BMI) DOCUMENTED: ICD-10-PCS | Mod: CPTII,S$GLB,, | Performed by: INTERNAL MEDICINE

## 2023-02-22 PROCEDURE — 1159F MED LIST DOCD IN RCRD: CPT | Mod: CPTII,S$GLB,, | Performed by: INTERNAL MEDICINE

## 2023-02-22 RX ORDER — ICOSAPENT ETHYL 1000 MG/1
2 CAPSULE ORAL 2 TIMES DAILY
Qty: 360 CAPSULE | Refills: 1 | Status: SHIPPED | OUTPATIENT
Start: 2023-02-22 | End: 2023-12-27 | Stop reason: SDUPTHER

## 2023-02-22 RX ORDER — BENAZEPRIL HYDROCHLORIDE 40 MG/1
40 TABLET ORAL DAILY
Qty: 90 TABLET | Refills: 3 | Status: SHIPPED | OUTPATIENT
Start: 2023-02-22 | End: 2024-01-29 | Stop reason: SDUPTHER

## 2023-02-22 RX ORDER — METOPROLOL SUCCINATE 50 MG/1
50 TABLET, EXTENDED RELEASE ORAL DAILY
Qty: 90 TABLET | Refills: 3 | Status: SHIPPED | OUTPATIENT
Start: 2023-02-22 | End: 2024-01-29 | Stop reason: SDUPTHER

## 2023-02-22 RX ORDER — HYDRALAZINE HYDROCHLORIDE 100 MG/1
100 TABLET, FILM COATED ORAL EVERY 8 HOURS
Qty: 270 TABLET | Refills: 1 | Status: SHIPPED | OUTPATIENT
Start: 2023-02-22 | End: 2024-01-29 | Stop reason: SDUPTHER

## 2023-02-22 RX ORDER — ATORVASTATIN CALCIUM 20 MG/1
20 TABLET, FILM COATED ORAL NIGHTLY
Qty: 90 TABLET | Refills: 3 | Status: SHIPPED | OUTPATIENT
Start: 2023-02-22 | End: 2024-03-11 | Stop reason: SDUPTHER

## 2023-02-22 RX ORDER — AMLODIPINE BESYLATE 10 MG/1
10 TABLET ORAL DAILY
Qty: 90 TABLET | Refills: 1 | Status: SHIPPED | OUTPATIENT
Start: 2023-02-22 | End: 2024-01-29 | Stop reason: SDUPTHER

## 2023-02-22 NOTE — PROGRESS NOTES
Subjective:   Patient ID:  Narendra English Sr. is a 57 y.o. male who presents for cardiac consult of No chief complaint on file.      Referring Physician:    Tabitha Melgoza MD [[id number] [facility] [place] [address]      Reason for consult: HTN    HPI  The patient came in today for cardiac consult of No chief complaint on file.      Narendra English Sr. is a 57 y.o. male pt with HTN, HLD, elevated TGs, DM2, FARA, obesity, gout, hypothyroidism, low T presents for CV eval of HTN     3/15/22 - Dr. Gonzalez Visit  This pleasant patient 56 years of age who has history hypertension hyperlipidemia and evidence of type 2 diabetes without complications.  The patient has had exertional chest pain present in the emergency room with nonspecific EKG changes negative troponin levels.  Follow-up in the office today.  EKG shows evidence of right bundle branch block no acute changes.  Otherwise patient is stable.  He has had no exertional symptoms.  There is no significant family history of coronary disease.  Patient nonsmoker no history of drug or alcohol abuse otherwise stable.  States that the pain is very intermittent and is not typical.  This visit finds patient feeling well.  No exertional symptoms chest pain shortness of breath.  Stress test showed showed no evidence cardiac ischemia normal LV function blood pressure still elevated I will add hydralazine 10 mg b.i.d. to his current regimen of metoprolol 50 mg daily, amlodipine 10 mg daily and benazepril 40 mg daily.  Of note hydrochlorothiazide in the past has given the patient gout and is not to be used.  Overall stable and he is to have endoscopy this week for follow-up.  He should do well with procedure and has no issues with cardiac events for this and should be at low cardiac risk for procedure.  Is is advised patient doing well.  Stable heart and blood pressure on medication renewed cluster profiles are excellent he continues on statin  medications.    9/29/22  Pt of Dr. Gonzalez seen in March 2022 here for eval of HTN. Stress ECHO in 2/22 neg with normal bi V function.   Recent BP elevated 150s/80s. Pt is on Norvasc 10, benazepril 40 , hydral 50 q 12, toprol 50  BP today 140/80, HR 70. BMI 36 - 275 lbs. BP at . He used to weight > 300 lbs, had colon ca; is on ozempic 1mg.     2/22/23  BP and Hr well controlled. BMI 35 - 268 lbs. Ozempic changed to Mounjaro.     Patient feels no chest pain, no sob, no leg swelling, no PND, no palpitation, no dizziness, no syncope, no CNS symptoms.    Patient has fairly good exercise tolerance.    Patient is compliant with medications.      Stress ECHO 2/2022  Summary    Concentric remodeling and normal systolic function.  There were no arrhythmias during stress.  The estimated ejection fraction is 60%.  Normal left ventricular diastolic function.  With normal right ventricular systolic function.  The stress echo portion of this study is negative for myocardial ischemia.  The ECG portion of this study is negative for myocardial ischemia.    Past Medical History:   Diagnosis Date    Cancer     Colon    Diabetes mellitus 8 years    BS 84 in the room 08/11/2021    Hypertension     Sleep apnea     Thyroid disease        Past Surgical History:   Procedure Laterality Date    BACK SURGERY      COLON SURGERY  02/06/21    COLONOSCOPY N/A 12/29/2020    Procedure: COLONOSCOPY;  Surgeon: William Cotter MD;  Location: [facility] ENDO;  Service: Endoscopy;  Laterality: N/A;  Will need Rapid doesn't live here    COLONOSCOPY N/A 02/10/2022    Procedure: COLONOSCOPY;  Surgeon: Wili Espinosa MD;  Location: [facility] ENDO;  Service: Endoscopy;  Laterality: N/A;    EPIDURAL STEROID INJECTION INTO CERVICAL SPINE N/A 12/1/2022    Procedure: C7/T1 IL SALBADOR;  Surgeon: Leonard Mart MD;  Location: [facility] PAIN MGT;  Service: Pain Management;  Laterality: N/A;    FESS, WITH NASAL SEPTOPLASTY, WITH IMAGING GUIDANCE Bilateral 08/17/2022     Procedure: FESS, WITH NASAL SEPTOPLASTY, WITH IMAGING GUIDANCE;  Surgeon: Viktor Ferrell MD;  Location: [facility] OR;  Service: ENT;  Laterality: Bilateral;    LAPAROSCOPIC RIGHT COLON RESECTION N/A 02/02/2021    Procedure: COLECTOMY, RIGHT, LAPAROSCOPIC, ERAS low;  Surgeon: Melonie Santoyo MD;  Location: [facility] OR 2ND FLR;  Service: Colon and Rectal;  Laterality: N/A;  needs rapid covid test    NASAL TURBINATE REDUCTION Bilateral 08/17/2022    Procedure: REDUCTION, NASAL TURBINATE;  Surgeon: Viktor Ferrell MD;  Location: [facility] OR;  Service: ENT;  Laterality: Bilateral;    TONSILLECTOMY         Social History     Tobacco Use    Smoking status: Never    Smokeless tobacco: Never   Substance Use Topics    Alcohol use: Never    Drug use: Never       Family History   Problem Relation Age of Onset    Hypertension Mother     Diabetes Mother     Breast cancer Mother     Cancer Mother     Stroke Father     Hypertension Brother     Cataracts Maternal Grandmother     Diabetes Maternal Grandmother     Hypertension Brother        Patient's Medications   New Prescriptions    No medications on file   Previous Medications    AMLODIPINE (NORVASC) 10 MG TABLET    TAKE 1 TABLET(10 MG) BY MOUTH EVERY DAY    ATORVASTATIN (LIPITOR) 40 MG TABLET    TAKE 1 TABLET(40 MG) BY MOUTH EVERY DAY    AZELASTINE (ASTELIN) 137 MCG (0.1 %) NASAL SPRAY    1 spray (137 mcg total) by Nasal route 2 (two) times daily.    BENAZEPRIL (LOTENSIN) 40 MG TABLET    Take 1 tablet (40 mg total) by mouth once daily.    CLOBETASOL 0.05% (TEMOVATE) 0.05 % OINT    AAA bid    CLONAZEPAM (KLONOPIN) 2 MG TAB    TAKE 1 TABLET BY MOUTH EVERY EVENING    COLCHICINE (COLCRYS) 0.6 MG TABLET    Take 1 tablet (0.6 mg total) by mouth 2 (two) times daily.    DICLOFENAC SODIUM (VOLTAREN) 1 % GEL    Apply 2 g topically 4 (four) times daily as needed (Pain).    FLASH GLUCOSE SCANNING READER MISC    1 application.    FLASH GLUCOSE SENSOR KIT    1 application.    FLUTICASONE  PROPIONATE (FLONASE) 50 MCG/ACTUATION NASAL SPRAY    1 spray (50 mcg total) by Each Nostril route once daily.    HYDRALAZINE (APRESOLINE) 100 MG TABLET    Take 1 tablet (100 mg total) by mouth every 8 (eight) hours.    HYDROCORTISONE 2.5 % OINTMENT    Apply topically 2 (two) times daily. Use on lips    INDOMETHACIN (INDOCIN SR) 75 MG CPSR CR CAPSULE    Take 75 mg by mouth 2 (two) times daily with meals.    INSULIN GLARGINE, TOUJEO, (TOUJEO SOLOSTAR U-300 INSULIN) 300 UNIT/ML (1.5 ML) INPN PEN    Inject 85 Units into the skin once daily.    LEVOTHYROXINE (SYNTHROID) 112 MCG TABLET    TAKE 1 TABLET(112 MCG) BY MOUTH BEFORE BREAKFAST    METFORMIN (GLUCOPHAGE) 1000 MG TABLET    Take 1 tablet (1,000 mg total) by mouth 2 (two) times daily with meals.    METOPROLOL SUCCINATE (TOPROL-XL) 50 MG 24 HR TABLET    Take 1 tablet (50 mg total) by mouth once daily.    PREGABALIN (LYRICA) 75 MG CAPSULE    TAKE 1 CAPSULE(75 MG) BY MOUTH TWICE DAILY    SILDENAFIL (VIAGRA) 100 MG TABLET    Take 1 tablet (100 mg total) by mouth daily as needed for Erectile Dysfunction.    TIRZEPATIDE (MOUNJARO) 5 MG/0.5 ML PNIJ    Inject 5 mg into the skin every 7 days.    TRAZODONE (DESYREL) 50 MG TABLET    Take 1 tablet (50 mg total) by mouth every evening.    TRIAMCINOLONE ACETONIDE 0.1% (KENALOG) 0.1 % OINTMENT    Apply topically 2 (two) times daily. Use up to 2 weeks at a time    VASCEPA 1 GRAM CAP    Take 2 capsules (2,000 mg total) by mouth 2 (two) times daily.   Modified Medications    No medications on file   Discontinued Medications    No medications on file       Review of Systems   Constitutional: Negative.    HENT: Negative.     Eyes: Negative.    Respiratory: Negative.     Cardiovascular: Negative.    Gastrointestinal: Negative.    Genitourinary: Negative.    Musculoskeletal: Negative.    Skin: Negative.    Neurological: Negative.    Endo/Heme/Allergies: Negative.    Psychiatric/Behavioral: Negative.     All 12 systems otherwise  negative.    Wt Readings from Last 3 Encounters:   12/27/22 124.3 kg (274 lb 0.5 oz)   12/09/22 121.6 kg (268 lb 3 oz)   12/01/22 124.4 kg (274 lb 4 oz)     Temp Readings from Last 3 Encounters:   12/27/22 98.1 °F (36.7 °C) (Temporal)   12/09/22 97.4 °F (36.3 °C)   12/01/22 98.2 °F (36.8 °C) (Temporal)     BP Readings from Last 3 Encounters:   12/09/22 138/75   12/01/22 (!) 148/86   11/08/22 120/66     Pulse Readings from Last 3 Encounters:   12/09/22 (!) 57   12/01/22 67   11/08/22 64       There were no vitals taken for this visit.    Objective:   Physical Exam  Vitals and nursing note reviewed.   Constitutional:       General: He is not in acute distress.     Appearance: He is well-developed. He is obese. He is not diaphoretic.   HENT:      Head: Normocephalic and atraumatic.      Nose: Nose normal.   Eyes:      General: No scleral icterus.     Conjunctiva/sclera: Conjunctivae normal.   Neck:      Thyroid: No thyromegaly.      Vascular: No JVD.   Cardiovascular:      Rate and Rhythm: Normal rate and regular rhythm.      Heart sounds: S1 normal and S2 normal. No murmur heard.    No friction rub. No gallop. No S3 or S4 sounds.   Pulmonary:      Effort: Pulmonary effort is normal. No respiratory distress.      Breath sounds: Normal breath sounds. No stridor. No wheezing or rales.   Chest:      Chest wall: No tenderness.   Abdominal:      General: Bowel sounds are normal. There is no distension.      Palpations: Abdomen is soft. There is no mass.      Tenderness: There is no abdominal tenderness. There is no rebound.   Genitourinary:     Comments: Deferred  Musculoskeletal:         General: No tenderness or deformity. Normal range of motion.      Cervical back: Normal range of motion and neck supple.   Lymphadenopathy:      Cervical: No cervical adenopathy.   Skin:     General: Skin is warm and dry.      Coloration: Skin is not pale.      Findings: No erythema or rash.   Neurological:      Mental Status: He is alert  and oriented to person, place, and time.      Motor: No abnormal muscle tone.      Coordination: Coordination normal.   Psychiatric:         Behavior: Behavior normal.         Thought Content: Thought content normal.         Judgment: Judgment normal.       Lab Results   Component Value Date     08/12/2022    K 4.1 08/12/2022     08/12/2022    CO2 23 08/12/2022    BUN 15 08/12/2022    CREATININE 1.0 08/12/2022     08/12/2022    HGBA1C 6.6 (H) 09/14/2022    MG 1.7 02/03/2021    AST 38 08/12/2022    ALT 72 (H) 08/12/2022    ALBUMIN 4.1 08/12/2022    ALBUMIN 3.8 08/12/2022    PROT 6.6 08/12/2022    BILITOT 0.5 08/12/2022    WBC 6.79 03/14/2022    HGB 14.3 03/14/2022    HCT 45.6 03/14/2022    MCV 97 03/14/2022     03/14/2022    TSH 1.149 09/14/2022    CHOL 83 (L) 09/14/2022    HDL 29 (L) 09/14/2022    LDLCALC 40.6 (L) 09/14/2022    TRIG 67 09/14/2022    BNP <10 11/15/2021     Assessment:      1. Hypertension associated with diabetes    2. Benign essential HTN    3. Hyperlipidemia, unspecified hyperlipidemia type    4. Hypertriglyceridemia    5. FARA (obstructive sleep apnea)    6. Hypothyroidism (acquired)    7. Type 2 diabetes mellitus without complication, with long-term current use of insulin    8. BMI 36.0-36.9,adult    9. Low testosterone    10. Chest pain, unspecified type    11. Nonspecific abnormal electrocardiogram (ECG) (EKG)    12. Type 2 diabetes mellitus without complication, without long-term current use of insulin          Plan:     HTN - stable now  - titrate meds   - increased hydral to 100mg TID    2. HLD,   - cont meds and titrate  - well controlled - dec statin dose    3. DM2 A1c 6.6  - cont tx - on Ozempic - increase to 2mg - changed to Mounjaro - increase to 7.5 mg    4. Hypothyroidsm TSH1.1  - cont Synthroid    5. FARA  - cont CPAP    6. Obesity, BMI 36 --> BMI 35 - 268 lbs.   - cont weight loss with diet/exercise     7. Low T  - cont T supplements as needed  - monitor BP      F/u with Dr. Gonzalez or myself depending on sched/pt pref - can see in [facility] office    Thank you for allowing me to participate in this patient's care. Please do not hesitate to contact me with any questions or concerns. Consult note has been forwarded to the referral physician.

## 2023-03-01 ENCOUNTER — PATIENT MESSAGE (OUTPATIENT)
Dept: CARDIOLOGY | Facility: CLINIC | Age: 58
End: 2023-03-01
Payer: COMMERCIAL

## 2023-03-01 DIAGNOSIS — E11.69 TYPE 2 DIABETES MELLITUS WITH OTHER SPECIFIED COMPLICATION, UNSPECIFIED WHETHER LONG TERM INSULIN USE: Primary | ICD-10-CM

## 2023-03-02 ENCOUNTER — OFFICE VISIT (OUTPATIENT)
Dept: NEUROSURGERY | Facility: CLINIC | Age: 58
End: 2023-03-02
Payer: COMMERCIAL

## 2023-03-02 ENCOUNTER — OFFICE VISIT (OUTPATIENT)
Dept: OTOLARYNGOLOGY | Facility: CLINIC | Age: 58
End: 2023-03-02
Payer: COMMERCIAL

## 2023-03-02 ENCOUNTER — PATIENT MESSAGE (OUTPATIENT)
Dept: PHYSICAL MEDICINE AND REHAB | Facility: CLINIC | Age: 58
End: 2023-03-02
Payer: COMMERCIAL

## 2023-03-02 VITALS
RESPIRATION RATE: 18 BRPM | HEART RATE: 67 BPM | BODY MASS INDEX: 36.05 KG/M2 | SYSTOLIC BLOOD PRESSURE: 153 MMHG | WEIGHT: 272 LBS | DIASTOLIC BLOOD PRESSURE: 77 MMHG | HEIGHT: 73 IN

## 2023-03-02 VITALS
HEART RATE: 65 BPM | BODY MASS INDEX: 35.95 KG/M2 | DIASTOLIC BLOOD PRESSURE: 82 MMHG | TEMPERATURE: 98 F | SYSTOLIC BLOOD PRESSURE: 151 MMHG | WEIGHT: 272.5 LBS

## 2023-03-02 DIAGNOSIS — M48.02 SPINAL STENOSIS, CERVICAL REGION: ICD-10-CM

## 2023-03-02 DIAGNOSIS — M54.2 CERVICALGIA: Primary | ICD-10-CM

## 2023-03-02 DIAGNOSIS — M54.12 CERVICAL RADICULOPATHY: ICD-10-CM

## 2023-03-02 DIAGNOSIS — J34.89 NASAL OBSTRUCTION: ICD-10-CM

## 2023-03-02 DIAGNOSIS — J34.89 ADHESIONS OF NASAL SEPTUM AND TURBINATES: ICD-10-CM

## 2023-03-02 DIAGNOSIS — J34.2 NASAL SEPTAL DEVIATION: Primary | ICD-10-CM

## 2023-03-02 PROCEDURE — 4010F PR ACE/ARB THEARPY RXD/TAKEN: ICD-10-PCS | Mod: CPTII,S$GLB,, | Performed by: STUDENT IN AN ORGANIZED HEALTH CARE EDUCATION/TRAINING PROGRAM

## 2023-03-02 PROCEDURE — 3078F DIAST BP <80 MM HG: CPT | Mod: CPTII,S$GLB,, | Performed by: PHYSICIAN ASSISTANT

## 2023-03-02 PROCEDURE — 99999 PR PBB SHADOW E&M-EST. PATIENT-LVL V: ICD-10-PCS | Mod: PBBFAC,,, | Performed by: STUDENT IN AN ORGANIZED HEALTH CARE EDUCATION/TRAINING PROGRAM

## 2023-03-02 PROCEDURE — 99204 OFFICE O/P NEW MOD 45 MIN: CPT | Mod: S$GLB,,, | Performed by: PHYSICIAN ASSISTANT

## 2023-03-02 PROCEDURE — 3079F DIAST BP 80-89 MM HG: CPT | Mod: CPTII,S$GLB,, | Performed by: STUDENT IN AN ORGANIZED HEALTH CARE EDUCATION/TRAINING PROGRAM

## 2023-03-02 PROCEDURE — 3077F SYST BP >= 140 MM HG: CPT | Mod: CPTII,S$GLB,, | Performed by: STUDENT IN AN ORGANIZED HEALTH CARE EDUCATION/TRAINING PROGRAM

## 2023-03-02 PROCEDURE — 3077F PR MOST RECENT SYSTOLIC BLOOD PRESSURE >= 140 MM HG: ICD-10-PCS | Mod: CPTII,S$GLB,, | Performed by: PHYSICIAN ASSISTANT

## 2023-03-02 PROCEDURE — 3079F PR MOST RECENT DIASTOLIC BLOOD PRESSURE 80-89 MM HG: ICD-10-PCS | Mod: CPTII,S$GLB,, | Performed by: STUDENT IN AN ORGANIZED HEALTH CARE EDUCATION/TRAINING PROGRAM

## 2023-03-02 PROCEDURE — 3077F PR MOST RECENT SYSTOLIC BLOOD PRESSURE >= 140 MM HG: ICD-10-PCS | Mod: CPTII,S$GLB,, | Performed by: STUDENT IN AN ORGANIZED HEALTH CARE EDUCATION/TRAINING PROGRAM

## 2023-03-02 PROCEDURE — 4010F ACE/ARB THERAPY RXD/TAKEN: CPT | Mod: CPTII,S$GLB,, | Performed by: STUDENT IN AN ORGANIZED HEALTH CARE EDUCATION/TRAINING PROGRAM

## 2023-03-02 PROCEDURE — 3008F BODY MASS INDEX DOCD: CPT | Mod: CPTII,S$GLB,, | Performed by: PHYSICIAN ASSISTANT

## 2023-03-02 PROCEDURE — 31231 PR NASAL ENDOSCOPY, DX: ICD-10-PCS | Mod: S$GLB,,, | Performed by: STUDENT IN AN ORGANIZED HEALTH CARE EDUCATION/TRAINING PROGRAM

## 2023-03-02 PROCEDURE — 1159F PR MEDICATION LIST DOCUMENTED IN MEDICAL RECORD: ICD-10-PCS | Mod: CPTII,S$GLB,, | Performed by: PHYSICIAN ASSISTANT

## 2023-03-02 PROCEDURE — 99214 OFFICE O/P EST MOD 30 MIN: CPT | Mod: 25,S$GLB,, | Performed by: STUDENT IN AN ORGANIZED HEALTH CARE EDUCATION/TRAINING PROGRAM

## 2023-03-02 PROCEDURE — 4010F PR ACE/ARB THEARPY RXD/TAKEN: ICD-10-PCS | Mod: CPTII,S$GLB,, | Performed by: PHYSICIAN ASSISTANT

## 2023-03-02 PROCEDURE — 3008F BODY MASS INDEX DOCD: CPT | Mod: CPTII,S$GLB,, | Performed by: STUDENT IN AN ORGANIZED HEALTH CARE EDUCATION/TRAINING PROGRAM

## 2023-03-02 PROCEDURE — 99214 PR OFFICE/OUTPT VISIT, EST, LEVL IV, 30-39 MIN: ICD-10-PCS | Mod: 25,S$GLB,, | Performed by: STUDENT IN AN ORGANIZED HEALTH CARE EDUCATION/TRAINING PROGRAM

## 2023-03-02 PROCEDURE — 3008F PR BODY MASS INDEX (BMI) DOCUMENTED: ICD-10-PCS | Mod: CPTII,S$GLB,, | Performed by: PHYSICIAN ASSISTANT

## 2023-03-02 PROCEDURE — 99204 PR OFFICE/OUTPT VISIT, NEW, LEVL IV, 45-59 MIN: ICD-10-PCS | Mod: S$GLB,,, | Performed by: PHYSICIAN ASSISTANT

## 2023-03-02 PROCEDURE — 3078F PR MOST RECENT DIASTOLIC BLOOD PRESSURE < 80 MM HG: ICD-10-PCS | Mod: CPTII,S$GLB,, | Performed by: PHYSICIAN ASSISTANT

## 2023-03-02 PROCEDURE — 99999 PR PBB SHADOW E&M-EST. PATIENT-LVL V: ICD-10-PCS | Mod: PBBFAC,,, | Performed by: PHYSICIAN ASSISTANT

## 2023-03-02 PROCEDURE — 3008F PR BODY MASS INDEX (BMI) DOCUMENTED: ICD-10-PCS | Mod: CPTII,S$GLB,, | Performed by: STUDENT IN AN ORGANIZED HEALTH CARE EDUCATION/TRAINING PROGRAM

## 2023-03-02 PROCEDURE — 31231 NASAL ENDOSCOPY DX: CPT | Mod: S$GLB,,, | Performed by: STUDENT IN AN ORGANIZED HEALTH CARE EDUCATION/TRAINING PROGRAM

## 2023-03-02 PROCEDURE — 99999 PR PBB SHADOW E&M-EST. PATIENT-LVL V: CPT | Mod: PBBFAC,,, | Performed by: PHYSICIAN ASSISTANT

## 2023-03-02 PROCEDURE — 3077F SYST BP >= 140 MM HG: CPT | Mod: CPTII,S$GLB,, | Performed by: PHYSICIAN ASSISTANT

## 2023-03-02 PROCEDURE — 1159F MED LIST DOCD IN RCRD: CPT | Mod: CPTII,S$GLB,, | Performed by: PHYSICIAN ASSISTANT

## 2023-03-02 PROCEDURE — 4010F ACE/ARB THERAPY RXD/TAKEN: CPT | Mod: CPTII,S$GLB,, | Performed by: PHYSICIAN ASSISTANT

## 2023-03-02 PROCEDURE — 99999 PR PBB SHADOW E&M-EST. PATIENT-LVL V: CPT | Mod: PBBFAC,,, | Performed by: STUDENT IN AN ORGANIZED HEALTH CARE EDUCATION/TRAINING PROGRAM

## 2023-03-02 RX ORDER — PREGABALIN 100 MG/1
100 CAPSULE ORAL 2 TIMES DAILY
Qty: 60 CAPSULE | Refills: 0 | Status: SHIPPED | OUTPATIENT
Start: 2023-03-02 | End: 2023-03-31 | Stop reason: SDUPTHER
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
## 2023-03-02 NOTE — PROGRESS NOTES
Chief complaint:    Chief Complaint   Patient presents with    Follow-up    Sinus Problem         Referring Provider:  No referring provider defined for this encounter.      History of present illness:     Mr. English is a 57 y.o. presenting for evaluation of sinus congestion.     The patient reports the following allergy/sinus symptoms:     Headaches - 3-4 times/day, forehead    Nasal congestion, purulent anterior drainage, and PND - present all the time  Coughing - at night    No fever, no hyposmia.     Started after COVID in January. Symptoms have been present for almost continuously since then.    Treatment has included: Zytrec or Xyzal, mucinex, abx given 1 month ago for tooth issue. Had no effect on nasal issues.     Prior to the last few months patient has had about 0 sinus infections in the past 12 months.   Prior sinus surgery: no.    Current medications: ibuprofen 800, mucinex, and xyzal. No help with any of it.    Allergy history: yes, when he was young, not bad as an adult       Return clinic visit, 4/11/22  Completed abx and steroids. Since then, symptoms have minimally improved. Major complaints are nasal drip (clear) and nasal congestion (both sides), and some forehead pressure.     Return clinic visit, 5/31/22  Using saline, flonase, and Atrovent.   Post nasal drip has improved, but nasal congestion has not. Bilateral, sometimes worse on either side. Worse at night, but present all the time.   Intermittent facial pressure/pain, anterior rhinorrhea.     Return clinic visit, 7/18/22  No major change since last visit.   Continued L>R nasal obstruction, all the time, worse at night. Also with facial pressure/pain and purulent rhinorrhea.   Using astelin, flonase, atrovent without much change. Also completed a course of steroids and antibiotics. Gets improvement with abx/steroids then returns.    Return clinic visit, 8/22/22  S/p FESS and septoplasty. Epistaxis requiring packing on POD2. Since then,  bleeding has greatly slowed. Still spitting up some dark blood from time to time. No bright red blood.     Return clinic visit, 8/24/22  Some slow oozing after pack placement, much improved. HTN to 180-190, but now 150s since doubling hydralazine.     Return clinic visit, 9/8/22  Overall doing well. However, still having times when he coughs up some phlegm which can be blood-tinged. Not bright red. Also with congestion on the left side and some pressure as well.     Return clinic visit, 9/29/22  No further bleeding.   Since last visit, continued and worsened left sided nasal obstruction. Using afrin several times a day.    Wakes him up at night with CPAP.     Return clinic visit, 12/2/22  Continues to have daily L sided nasal obstruction  Using saline irrigations daily.  Using flonase  Used astelin for 1 month, not much benefit  No use of Afrin    Return clinic visit, 3/2/23  Consistently using flonase  Continued L>R nasal obstruction, all the time, worse at night. Very bothersome  Using breath right with some improvement, has been tolerating CPAP  BP much better controlled  Improved facial pressure/pain, purulent rhinorrhea    History      Past Medical History:   Past Medical History:   Diagnosis Date    Cancer     Colon    Diabetes mellitus 8 years    BS 84 in the room 08/11/2021    Hypertension     Sleep apnea     Thyroid disease          Past Surgical History:  Past Surgical History:   Procedure Laterality Date    BACK SURGERY      COLON SURGERY  02/06/21    COLONOSCOPY N/A 12/29/2020    Procedure: COLONOSCOPY;  Surgeon: William Cotter MD;  Location: [facility];  Service: Endoscopy;  Laterality: N/A;  Will need Rapid doesn't live here    COLONOSCOPY N/A 02/10/2022    Procedure: COLONOSCOPY;  Surgeon: Wili Espinosa MD;  Location: [facility];  Service: Endoscopy;  Laterality: N/A;    EPIDURAL STEROID INJECTION INTO CERVICAL SPINE N/A 12/1/2022    Procedure: C7/T1 IL SALBADOR;  Surgeon: Leonard Mart MD;   "Location: [facility] PAIN MGT;  Service: Pain Management;  Laterality: N/A;    FESS, WITH NASAL SEPTOPLASTY, WITH IMAGING GUIDANCE Bilateral 08/17/2022    Procedure: FESS, WITH NASAL SEPTOPLASTY, WITH IMAGING GUIDANCE;  Surgeon: Viktor Ferrell MD;  Location: [facility] OR;  Service: ENT;  Laterality: Bilateral;    LAPAROSCOPIC RIGHT COLON RESECTION N/A 02/02/2021    Procedure: COLECTOMY, RIGHT, LAPAROSCOPIC, ERAS low;  Surgeon: Melonie Santoyo MD;  Location: [facility] OR [address] [place] [address];  Service: Colon and Rectal;  Laterality: N/A;  needs rapid covid test    NASAL TURBINATE REDUCTION Bilateral 08/17/2022    Procedure: REDUCTION, NASAL TURBINATE;  Surgeon: Viktor Ferrell MD;  Location: [facility] OR;  Service: ENT;  Laterality: Bilateral;    TONSILLECTOMY           Medications: Medication list reviewed. He  has a current medication list which includes the following prescription(s): amlodipine, atorvastatin, azelastine, benazepril, clobetasol 0.05%, clonazepam, colchicine, flash glucose scanning reader, flash glucose sensor, fluticasone propionate, hydralazine, hydrocortisone, indomethacin, toujeo solostar u-300 insulin, levothyroxine, metformin, metoprolol succinate, pregabalin, sildenafil, tirzepatide, trazodone, triamcinolone acetonide 0.1%, vascepa, and diclofenac sodium, and the following Facility-Administered Medications: gabapentin and lactated ringers.     Allergies:   Review of patient's allergies indicates:   Allergen Reactions    Demerol (pf) [meperidine (pf)] Other (See Comments)     Pt reports,"They lost my blood pressure."    Percocet [oxycodone-acetaminophen] Itching     itching    Demerol [meperidine]          Family history: family history includes Breast cancer in his mother; Cancer in his mother; Cataracts in his maternal grandmother; Diabetes in his maternal grandmother and mother; Hypertension in his brother, brother, and mother; Stroke in his father.         Social History          Alcohol use:  reports no history of " alcohol use.            Tobacco:  reports that he has never smoked. He has never been exposed to tobacco smoke. He has never used smokeless tobacco.         Physical Examination      Vitals: Blood pressure (!) 151/82, pulse 65, temperature 98 °F (36.7 °C), temperature source Tympanic, weight 123.6 kg (272 lb 7.8 oz).      General: Well developed, well nourished, well hydrated.     Voice: no dysphonia, no dysarthria      Head/Face: Normocephalic, atraumatic. No scars or lesions. Facial musculature equal.     Eyes: No scleral icterus or conjunctival hemorrhage. EOMI. PERRLA.     Ears:     Right ear: No gross deformity. EAC is clear of debris and erythema. TM are intact with a pneumatized middle ear. No signs of retraction, fluid or infection.      Left ear: No gross deformity. EAC is clear of debris and erythema. TM are intact with a pneumatized middle ear. No signs of retraction, fluid or infection.      Nose:   Nasal skin:  thick   Nasal septum: severe left caudal deviation, straight posterior septum   Nasal valves:   moderate weakening of ULCs  Modified Sharkey maneuver:  positive bilaterally, more pronounced on the left   Dorsum:  straight   Tip:  ptotic, under rotated    Tip support:  poor   Inferior turbinates:  normal   Nasal mucosa by anterior rhinoscopy:  normal        Mouth/Oropharynx: Lips without any lesions. No mucosal lesions within the oropharynx. No tonsillar exudate or lesions. Pharyngeal walls symmetrical. Uvula midline. Tongue midline without lesions.    Neurologic: Moving all extremities without gross abnormality.CN II-XII grossly intact. House-Brackmann 1/6. No signs of nystagmus.        Data reviewed      Review of records:      I reviewed records from the referring provider's office visits describing the history, workup, and/or treatment of this problem thus far.    Imaging  I have independently reviewed the following imaging with the findings noted below:      CT sinus,  Bilateral toya  "bullosa  S-shaped septal deviation, left mid-septal spur  Inferior turbinate reduction   Left frontal thickening and obstruction of outflow  Bilateral anterior ethmoid disease     Op note, 8/20/22     PROCEDURE:   Endoscopic septoplasty   Bilateral inferior turbinate submucosal resection  Nasal endoscopy with resection of bilateral toya bullosa   Bilateral nasal endoscopy with maxillary antrostomy   Bilateral nasal endoscopy with anterior ethmoidectomy    Left nasal endoscopy with frontal sinusotomy and frontal sinus exploration   Functional endoscopic sinus surgery with CT image guided electromagnetic system     OPERATIVE FINDINGS:   Polypoid mucosal thickening at the left frontal outflow tract  Bilateral polypoid thickening in anterior ethmoids  Left septal deviation with large left septal spur      Pathology reviewed  1. "left sinus contents", excision:       -  Sinonasal mucosa with chronic inflammation       - No eosinophils seen   2. "septal cartilage", excision:       - Portion of cartilage     Procedure Note - Rigid Nasal Endoscopy    Surgeon: Viktor Ferrell MD  Anesthesia: topical oxymetazoline and 4% lidocaine.    Technique: The nose was sprayed with oxymetazoline and 4% lidocaine. With the patient in the upright position, a 2.7mm 30-degree endscope was inserted into the patient's right and left nare.  Where visible, nasal secretions and mucosal crusting were removed with a suction. The overall appearance of the nasal cavity and paranasal sinuses were noted and the findings are described below.     Findings:  Mid and posterior septum midline without perforation. Widely patent sinus and nasal cavities. No further mucopurulence and no polyps. There are small adhesions of the middle turbinates to the lateral nasal side wall bilaterally      Assessment/Plan:    1. Nasal septal deviation    2. Nasal obstruction    3. Adhesions of nasal septum and turbinates            S/p FESS and septoplasty 8/17/22 with post " op epistaxis complicated by hypertensive urgency (resolved, pack removed, HTN better controlled); developed post op sinus infection (resolved)    Now with consistent congestion secondary to RM (new problem today). Will wean and treat with rounds of steroids during weaning.     Will add astelin.   Continue flonase.       Update 3/2/23    The patient has ha history of nasal obstruction, nasal septal deformity, and chronic sinus disease. We had previously discussed that comprehensive care would include open septorhinoplasty and FESS. Despite risks and possible submaximal benefit he elected to proceed with septoplasty and FESS. Since surgery, his sinus symptoms have greatly improved and the sinuses are widely patent, though small adhesions have formed at the MT to the nasal sidewalls. However, his nasal obstruction (left sided) has persisted.     The nasal obstruction prolonged and persistent severe nasal obstruction (many years) which has been unresponsive to appropriate medications (nasal steroids, antihistamines, and decongestants for >6 consecutive weeks) and treatment of underlying sinus disease and is due to caudal septal deviation and weakness causing an anatomic mechanical nasal airway obstruction - resulting in narrowing of the nasal airway due to the deviated septum itself and ptosis of the nasal tip. Due to this patient's particular nasal septal defect as noted above, an open septorhinoplasty is medically necessary and required to appropriately reconstruct the nose and improve nasal breathing.    The risks, benefits and alternatives of open functional septorhinoplasty with possible ear cartilage graft were discussed and questions answered. He has elected to proceed with preauthorization and photos were taken. I think that he would best benefit from an open rhinoplasty approach with extracorporeal septoplasty, possibly reconstructed on a PDS plate. He would also benefit from butterfly vs.  grafting to  widen the middle vault and provide an increase in cross-sectional diameter of the nasal airway. This sequence of maneuvers cannot be done without doing an open rhinoplasty.     To comprehensively reconstruct and optimize the nasal airway, the following may be needed:     Ear cartilage graft: no      Donor rib cartilage graft:  yes     Inferior turbinate reduction:  no     Will also plan for lysis of intranasal adhesions at the time of surgery.                   Viktor Ferrell MD  [facility]   [facility] - [address]  [place] [address]  P: [phone]  F: [phone]

## 2023-03-03 ENCOUNTER — PATIENT MESSAGE (OUTPATIENT)
Dept: INTERNAL MEDICINE | Facility: CLINIC | Age: 58
End: 2023-03-03
Payer: COMMERCIAL

## 2023-03-07 ENCOUNTER — OFFICE VISIT (OUTPATIENT)
Dept: UROLOGY | Facility: CLINIC | Age: 58
End: 2023-03-07
Payer: COMMERCIAL

## 2023-03-07 VITALS
BODY MASS INDEX: 36.05 KG/M2 | HEART RATE: 85 BPM | SYSTOLIC BLOOD PRESSURE: 132 MMHG | HEIGHT: 73 IN | TEMPERATURE: 97 F | DIASTOLIC BLOOD PRESSURE: 79 MMHG | WEIGHT: 272 LBS

## 2023-03-07 DIAGNOSIS — N52.9 ERECTILE DYSFUNCTION, UNSPECIFIED ERECTILE DYSFUNCTION TYPE: Primary | ICD-10-CM

## 2023-03-07 PROCEDURE — 3008F BODY MASS INDEX DOCD: CPT | Mod: CPTII,S$GLB,, | Performed by: UROLOGY

## 2023-03-07 PROCEDURE — 1160F PR REVIEW ALL MEDS BY PRESCRIBER/CLIN PHARMACIST DOCUMENTED: ICD-10-PCS | Mod: CPTII,S$GLB,, | Performed by: UROLOGY

## 2023-03-07 PROCEDURE — 99214 PR OFFICE/OUTPT VISIT, EST, LEVL IV, 30-39 MIN: ICD-10-PCS | Mod: S$GLB,,, | Performed by: UROLOGY

## 2023-03-07 PROCEDURE — 4010F ACE/ARB THERAPY RXD/TAKEN: CPT | Mod: CPTII,S$GLB,, | Performed by: UROLOGY

## 2023-03-07 PROCEDURE — 3075F PR MOST RECENT SYSTOLIC BLOOD PRESS GE 130-139MM HG: ICD-10-PCS | Mod: CPTII,S$GLB,, | Performed by: UROLOGY

## 2023-03-07 PROCEDURE — 1159F PR MEDICATION LIST DOCUMENTED IN MEDICAL RECORD: ICD-10-PCS | Mod: CPTII,S$GLB,, | Performed by: UROLOGY

## 2023-03-07 PROCEDURE — 99999 PR PBB SHADOW E&M-EST. PATIENT-LVL V: ICD-10-PCS | Mod: PBBFAC,,, | Performed by: UROLOGY

## 2023-03-07 PROCEDURE — 3075F SYST BP GE 130 - 139MM HG: CPT | Mod: CPTII,S$GLB,, | Performed by: UROLOGY

## 2023-03-07 PROCEDURE — 99214 OFFICE O/P EST MOD 30 MIN: CPT | Mod: S$GLB,,, | Performed by: UROLOGY

## 2023-03-07 PROCEDURE — 3078F PR MOST RECENT DIASTOLIC BLOOD PRESSURE < 80 MM HG: ICD-10-PCS | Mod: CPTII,S$GLB,, | Performed by: UROLOGY

## 2023-03-07 PROCEDURE — 3078F DIAST BP <80 MM HG: CPT | Mod: CPTII,S$GLB,, | Performed by: UROLOGY

## 2023-03-07 PROCEDURE — 3008F PR BODY MASS INDEX (BMI) DOCUMENTED: ICD-10-PCS | Mod: CPTII,S$GLB,, | Performed by: UROLOGY

## 2023-03-07 PROCEDURE — 1160F RVW MEDS BY RX/DR IN RCRD: CPT | Mod: CPTII,S$GLB,, | Performed by: UROLOGY

## 2023-03-07 PROCEDURE — 99999 PR PBB SHADOW E&M-EST. PATIENT-LVL V: CPT | Mod: PBBFAC,,, | Performed by: UROLOGY

## 2023-03-07 PROCEDURE — 4010F PR ACE/ARB THEARPY RXD/TAKEN: ICD-10-PCS | Mod: CPTII,S$GLB,, | Performed by: UROLOGY

## 2023-03-07 PROCEDURE — 1159F MED LIST DOCD IN RCRD: CPT | Mod: CPTII,S$GLB,, | Performed by: UROLOGY

## 2023-03-07 RX ORDER — TADALAFIL 20 MG/1
20 TABLET ORAL DAILY
Qty: 30 TABLET | Refills: 11 | Status: SHIPPED | OUTPATIENT
Start: 2023-03-07 | End: 2023-10-20

## 2023-03-07 NOTE — PROGRESS NOTES
Chief Complaint:  ED    HPI:   03/07/2023 - returns for follow-up, feels like testicle is working but notes that he might need a slightly higher dose, having continued issues with ED, TriMix worked too well and gave him priapism, sildenafil not working hardly at all, has not tried Cialis but would like to    06/24/2022 - presents for trimix teaching, wife is a nurse and he notes that she will be able to help    06/16/2022 - 55 yo male that presents today for evaluation of ED.  Patient notes issues for the last 3-4 years but notes that over the last six months it has gotten worse.  He notes difficulty both achieving and maintaining an erection.  He has previously tried both Cialis and Viagra and both of these cause in to have a very bad headache that make it on tolerable to take these medications, though they do work.  Otherwise he voids with a good stream and feels like he empties well subjectively.  He denies any gross hematuria or family history of  cancers.  Denies prior stones or urologic procedures.      PMH:  Past Medical History:   Diagnosis Date    Cancer     Colon    Diabetes mellitus 8 years    BS 84 in the room 08/11/2021    Hypertension     Sleep apnea     Thyroid disease        PSH:  Past Surgical History:   Procedure Laterality Date    BACK SURGERY      COLON SURGERY  02/06/21    COLONOSCOPY N/A 12/29/2020    Procedure: COLONOSCOPY;  Surgeon: William Cotter MD;  Location: [facility];  Service: Endoscopy;  Laterality: N/A;  Will need Rapid doesn't live here    COLONOSCOPY N/A 02/10/2022    Procedure: COLONOSCOPY;  Surgeon: Wili Espinosa MD;  Location: [facility] ENDO;  Service: Endoscopy;  Laterality: N/A;    EPIDURAL STEROID INJECTION INTO CERVICAL SPINE N/A 12/1/2022    Procedure: C7/T1 IL SALBADOR;  Surgeon: Leonard Mart MD;  Location: [facility] PAIN MGT;  Service: Pain Management;  Laterality: N/A;    FESS, WITH NASAL SEPTOPLASTY, WITH IMAGING GUIDANCE Bilateral 08/17/2022    Procedure: FESS, WITH  NASAL SEPTOPLASTY, WITH IMAGING GUIDANCE;  Surgeon: Viktor Ferrell MD;  Location: [facility] OR;  Service: ENT;  Laterality: Bilateral;    LAPAROSCOPIC RIGHT COLON RESECTION N/A 02/02/2021    Procedure: COLECTOMY, RIGHT, LAPAROSCOPIC, ERAS low;  Surgeon: Melonie Santoyo MD;  Location: [facility] OR 2ND FLR;  Service: Colon and Rectal;  Laterality: N/A;  needs rapid covid test    NASAL TURBINATE REDUCTION Bilateral 08/17/2022    Procedure: REDUCTION, NASAL TURBINATE;  Surgeon: Viktor Ferrell MD;  Location: [facility] OR;  Service: ENT;  Laterality: Bilateral;    TONSILLECTOMY         Family History:  Family History   Problem Relation Age of Onset    Hypertension Mother     Diabetes Mother     Breast cancer Mother     Cancer Mother     Stroke Father     Hypertension Brother     Cataracts Maternal Grandmother     Diabetes Maternal Grandmother     Hypertension Brother        Social History:  Social History     Tobacco Use    Smoking status: Never     Passive exposure: Never    Smokeless tobacco: Never   Substance Use Topics    Alcohol use: Never    Drug use: Never        Review of Systems:  General: No fever, chills  Skin: No rashes  Chest:  Denies cough and sputum production  Heart: Denies chest pain  Resp: Denies dyspnea  Abdomen: Denies diarrhea, abdominal pain, hematemesis, or blood in stool.  Musculoskeletal: No joint stiffness or swelling. Denies back pain.  : see HPI  Neuro: no dizziness or weakness    Allergies:  Demerol (pf) [meperidine (pf)], Percocet [oxycodone-acetaminophen], and Demerol [meperidine]    Medications:    Current Outpatient Medications:     amLODIPine (NORVASC) 10 MG tablet, Take 1 tablet (10 mg total) by mouth once daily., Disp: 90 tablet, Rfl: 1    atorvastatin (LIPITOR) 20 MG tablet, Take 1 tablet (20 mg total) by mouth every evening., Disp: 90 tablet, Rfl: 3    azelastine (ASTELIN) 137 mcg (0.1 %) nasal spray, 1 spray (137 mcg total) by Nasal route 2 (two) times daily., Disp: 30 mL, Rfl: 3     benazepriL (LOTENSIN) 40 MG tablet, Take 1 tablet (40 mg total) by mouth once daily., Disp: 90 tablet, Rfl: 3    clobetasol 0.05% (TEMOVATE) 0.05 % Oint, AAA bid, Disp: 60 g, Rfl: 3    clonazePAM (KLONOPIN) 2 MG Tab, Take 1 tablet (2 mg total) by mouth every evening., Disp: 30 tablet, Rfl: 0    colchicine (COLCRYS) 0.6 mg tablet, Take 1 tablet (0.6 mg total) by mouth 2 (two) times daily., Disp: 180 tablet, Rfl: 3    flash glucose scanning reader Misc, 1 application., Disp: , Rfl:     flash glucose sensor Kit, 1 application., Disp: , Rfl:     fluticasone propionate (FLONASE) 50 mcg/actuation nasal spray, 1 spray (50 mcg total) by Each Nostril route once daily., Disp: 16 g, Rfl: 0    hydrALAZINE (APRESOLINE) 100 MG tablet, Take 1 tablet (100 mg total) by mouth every 8 (eight) hours., Disp: 270 tablet, Rfl: 1    hydrocortisone 2.5 % ointment, Apply topically 2 (two) times daily. Use on lips, Disp: 30 g, Rfl: 1    indomethacin (INDOCIN SR) 75 mg CpSR CR capsule, Take 75 mg by mouth 2 (two) times daily with meals., Disp: , Rfl:     insulin glargine, TOUJEO, (TOUJEO SOLOSTAR U-300 INSULIN) 300 unit/mL (1.5 mL) InPn pen, Inject 85 Units into the skin once daily., Disp: 6 pen, Rfl: 2    levothyroxine (SYNTHROID) 112 MCG tablet, TAKE 1 TABLET(112 MCG) BY MOUTH BEFORE BREAKFAST, Disp: 90 tablet, Rfl: 3    metFORMIN (GLUCOPHAGE) 1000 MG tablet, Take 1 tablet (1,000 mg total) by mouth 2 (two) times daily with meals., Disp: 180 tablet, Rfl: 3    metoprolol succinate (TOPROL-XL) 50 MG 24 hr tablet, Take 1 tablet (50 mg total) by mouth once daily., Disp: 90 tablet, Rfl: 3    pregabalin (LYRICA) 100 MG capsule, Take 1 capsule (100 mg total) by mouth 2 (two) times daily., Disp: 60 capsule, Rfl: 0    sildenafiL (VIAGRA) 100 MG tablet, Take 1 tablet (100 mg total) by mouth daily as needed for Erectile Dysfunction., Disp: 6 tablet, Rfl: 10    tirzepatide 7.5 mg/0.5 mL PnIj, Inject 7.5 mg into the skin every 7 days., Disp: 4 pen, Rfl:  1    traZODone (DESYREL) 50 MG tablet, Take 1 tablet (50 mg total) by mouth every evening., Disp: 30 tablet, Rfl: 5    triamcinolone acetonide 0.1% (KENALOG) 0.1 % ointment, Apply topically 2 (two) times daily. Use up to 2 weeks at a time, Disp: 454 g, Rfl: 2    VASCEPA 1 gram Cap, Take 2 capsules (2,000 mg total) by mouth 2 (two) times daily., Disp: 360 capsule, Rfl: 1    diclofenac sodium (VOLTAREN) 1 % Gel, Apply 2 g topically 4 (four) times daily as needed (Pain)., Disp: 100 g, Rfl: 1    tadalafiL (CIALIS) 20 MG Tab, Take 1 tablet (20 mg total) by mouth once daily., Disp: 30 tablet, Rfl: 11  No current facility-administered medications for this visit.    Facility-Administered Medications Ordered in Other Visits:     gabapentin capsule 300 mg, 300 mg, Oral, TID, Dali Lim NP, 300 mg at 02/04/21 0810    lactated ringers infusion, , Intravenous, Continuous, Katie Shukla MD, Stopped at 08/17/22 1741    Physical Exam:  General: awake, alert, cooperative  Head: NC/AT  Ears: external ears normal  Eyes: sclera normal  Lungs: normal inspiration, NAD  Heart: well-perfused  Abdomen: Soft, NT, ND  : Normal circ'd phallus, meatus normal in size and position, BL testicles palpable, no masses, nontender, no abnormalities of epididymi  JAIMIE 6/22: Normal rectal tone, no hemorrhoids. Prostate smooth and normal, no nodules 30 gm SV not palpable. Perineum and anus normal.  Lymphatic: groin nodes negative  Skin: The skin is warm and dry  Ext: No c/c/e.  Neuro: grossly intact, AOx3    RADIOLOGY:  No recent relevant imaging available for review.    LABS:  I personally reviewed the following lab values:  Lab Results   Component Value Date    WBC 6.79 03/14/2022    HGB 14.3 03/14/2022    HCT 45.6 03/14/2022     03/14/2022     08/12/2022    K 4.1 08/12/2022     08/12/2022    CREATININE 1.0 08/12/2022    BUN 15 08/12/2022    CO2 23 08/12/2022    TSH 1.149 09/14/2022    PSA 0.77 06/16/2022    HGBA1C 6.6 (H)  09/14/2022    CHOL 83 (L) 09/14/2022    TRIG 67 09/14/2022    HDL 29 (L) 09/14/2022    ALT 72 (H) 08/12/2022    AST 38 08/12/2022       Assessment/Plan:   Narendra Amador Oliva is a 57 y.o. male with:    ED - switch from viagra to daily cialis, SEs reviewed    Prostate Cancer Screening - PSA and JAIMIE normal, continue annual screening    Low T - f/u with Dr Acosta as scheduled    William Clarke MD  Urology

## 2023-03-08 ENCOUNTER — OFFICE VISIT (OUTPATIENT)
Dept: INTERNAL MEDICINE | Facility: CLINIC | Age: 58
End: 2023-03-08
Payer: COMMERCIAL

## 2023-03-08 VITALS
DIASTOLIC BLOOD PRESSURE: 66 MMHG | BODY MASS INDEX: 35.34 KG/M2 | RESPIRATION RATE: 18 BRPM | HEART RATE: 71 BPM | WEIGHT: 266.63 LBS | HEIGHT: 73 IN | TEMPERATURE: 98 F | OXYGEN SATURATION: 97 % | SYSTOLIC BLOOD PRESSURE: 128 MMHG

## 2023-03-08 DIAGNOSIS — Z00.00 ROUTINE GENERAL MEDICAL EXAMINATION AT A HEALTH CARE FACILITY: Primary | ICD-10-CM

## 2023-03-08 DIAGNOSIS — C18.7 MALIGNANT NEOPLASM OF SIGMOID COLON: ICD-10-CM

## 2023-03-08 DIAGNOSIS — I15.2 HYPERTENSION ASSOCIATED WITH DIABETES: ICD-10-CM

## 2023-03-08 DIAGNOSIS — E78.5 HYPERLIPIDEMIA ASSOCIATED WITH TYPE 2 DIABETES MELLITUS: ICD-10-CM

## 2023-03-08 DIAGNOSIS — E11.59 TYPE 2 DIABETES MELLITUS WITH OTHER CIRCULATORY COMPLICATION, WITH LONG-TERM CURRENT USE OF INSULIN: ICD-10-CM

## 2023-03-08 DIAGNOSIS — F39 MOOD DISORDER: ICD-10-CM

## 2023-03-08 DIAGNOSIS — E11.69 HYPERLIPIDEMIA ASSOCIATED WITH TYPE 2 DIABETES MELLITUS: ICD-10-CM

## 2023-03-08 DIAGNOSIS — E03.9 HYPOTHYROIDISM (ACQUIRED): ICD-10-CM

## 2023-03-08 DIAGNOSIS — R79.89 LOW TESTOSTERONE: ICD-10-CM

## 2023-03-08 DIAGNOSIS — G47.33 OSA (OBSTRUCTIVE SLEEP APNEA): ICD-10-CM

## 2023-03-08 DIAGNOSIS — E11.59 HYPERTENSION ASSOCIATED WITH DIABETES: ICD-10-CM

## 2023-03-08 DIAGNOSIS — M10.9 GOUT, UNSPECIFIED CAUSE, UNSPECIFIED CHRONICITY, UNSPECIFIED SITE: ICD-10-CM

## 2023-03-08 DIAGNOSIS — Z79.4 TYPE 2 DIABETES MELLITUS WITH OTHER CIRCULATORY COMPLICATION, WITH LONG-TERM CURRENT USE OF INSULIN: ICD-10-CM

## 2023-03-08 PROBLEM — Z12.5 PROSTATE CANCER SCREENING: Status: RESOLVED | Noted: 2023-01-20 | Resolved: 2023-03-08

## 2023-03-08 PROBLEM — E78.1 HYPERTRIGLYCERIDEMIA: Status: RESOLVED | Noted: 2019-11-25 | Resolved: 2023-03-08

## 2023-03-08 PROBLEM — I10 BENIGN ESSENTIAL HTN: Status: RESOLVED | Noted: 2019-04-30 | Resolved: 2023-03-08

## 2023-03-08 PROCEDURE — 1159F MED LIST DOCD IN RCRD: CPT | Mod: CPTII,S$GLB,, | Performed by: PHYSICIAN ASSISTANT

## 2023-03-08 PROCEDURE — 4010F PR ACE/ARB THEARPY RXD/TAKEN: ICD-10-PCS | Mod: CPTII,S$GLB,, | Performed by: PHYSICIAN ASSISTANT

## 2023-03-08 PROCEDURE — 99396 PREV VISIT EST AGE 40-64: CPT | Mod: S$GLB,,, | Performed by: PHYSICIAN ASSISTANT

## 2023-03-08 PROCEDURE — 99999 PR PBB SHADOW E&M-EST. PATIENT-LVL V: CPT | Mod: PBBFAC,,, | Performed by: PHYSICIAN ASSISTANT

## 2023-03-08 PROCEDURE — 3008F PR BODY MASS INDEX (BMI) DOCUMENTED: ICD-10-PCS | Mod: CPTII,S$GLB,, | Performed by: PHYSICIAN ASSISTANT

## 2023-03-08 PROCEDURE — 3074F PR MOST RECENT SYSTOLIC BLOOD PRESSURE < 130 MM HG: ICD-10-PCS | Mod: CPTII,S$GLB,, | Performed by: PHYSICIAN ASSISTANT

## 2023-03-08 PROCEDURE — 1160F PR REVIEW ALL MEDS BY PRESCRIBER/CLIN PHARMACIST DOCUMENTED: ICD-10-PCS | Mod: CPTII,S$GLB,, | Performed by: PHYSICIAN ASSISTANT

## 2023-03-08 PROCEDURE — 3074F SYST BP LT 130 MM HG: CPT | Mod: CPTII,S$GLB,, | Performed by: PHYSICIAN ASSISTANT

## 2023-03-08 PROCEDURE — 1159F PR MEDICATION LIST DOCUMENTED IN MEDICAL RECORD: ICD-10-PCS | Mod: CPTII,S$GLB,, | Performed by: PHYSICIAN ASSISTANT

## 2023-03-08 PROCEDURE — 4010F ACE/ARB THERAPY RXD/TAKEN: CPT | Mod: CPTII,S$GLB,, | Performed by: PHYSICIAN ASSISTANT

## 2023-03-08 PROCEDURE — 3008F BODY MASS INDEX DOCD: CPT | Mod: CPTII,S$GLB,, | Performed by: PHYSICIAN ASSISTANT

## 2023-03-08 PROCEDURE — 99999 PR PBB SHADOW E&M-EST. PATIENT-LVL V: ICD-10-PCS | Mod: PBBFAC,,, | Performed by: PHYSICIAN ASSISTANT

## 2023-03-08 PROCEDURE — 99396 PR PREVENTIVE VISIT,EST,40-64: ICD-10-PCS | Mod: S$GLB,,, | Performed by: PHYSICIAN ASSISTANT

## 2023-03-08 PROCEDURE — 1160F RVW MEDS BY RX/DR IN RCRD: CPT | Mod: CPTII,S$GLB,, | Performed by: PHYSICIAN ASSISTANT

## 2023-03-08 PROCEDURE — 3078F DIAST BP <80 MM HG: CPT | Mod: CPTII,S$GLB,, | Performed by: PHYSICIAN ASSISTANT

## 2023-03-08 PROCEDURE — 3078F PR MOST RECENT DIASTOLIC BLOOD PRESSURE < 80 MM HG: ICD-10-PCS | Mod: CPTII,S$GLB,, | Performed by: PHYSICIAN ASSISTANT

## 2023-03-08 NOTE — PROGRESS NOTES
Subjective:       Patient ID: Narendra English Sr. is a 57 y.o. male.    Chief Complaint: Annual Exam      Patient presents to clinic today for annual physical exam.      Review of Systems   Constitutional:  Positive for fatigue (chronic, shift worker). Negative for chills, fever and unexpected weight change.   HENT:  Positive for congestion (seasonal) and rhinorrhea (seasonal). Negative for dental problem, ear pain, hearing loss and trouble swallowing.    Eyes:  Negative for pain and visual disturbance.   Respiratory:  Negative for cough and shortness of breath.    Cardiovascular:  Negative for chest pain, palpitations and leg swelling.   Gastrointestinal:  Negative for abdominal distention, abdominal pain, blood in stool, constipation, diarrhea, nausea and vomiting.   Genitourinary:  Negative for difficulty urinating, scrotal swelling and testicular pain.   Musculoskeletal:  Positive for myalgias (chronic). Negative for arthralgias.   Skin:  Negative for rash.   Neurological:  Positive for weakness (chronic), numbness (chronic) and headaches (chronic). Negative for dizziness.   Hematological:  Negative for adenopathy. Bruises/bleeds easily (chronic).   Psychiatric/Behavioral:  Positive for dysphoric mood (chronic). Negative for sleep disturbance. The patient is not nervous/anxious.    Objective:      Physical Exam  Vitals and nursing note reviewed.   Constitutional:       General: He is not in acute distress.     Appearance: He is well-developed. He is obese.   HENT:      Head: Normocephalic and atraumatic.      Right Ear: Tympanic membrane, ear canal and external ear normal.      Left Ear: Tympanic membrane, ear canal and external ear normal.      Nose: Nose normal.      Mouth/Throat:      Lips: Pink.      Mouth: Mucous membranes are moist.      Pharynx: Oropharynx is clear. Uvula midline.   Eyes:      General: Lids are normal. No scleral icterus.     Conjunctiva/sclera: Conjunctivae normal.      Pupils: Pupils  are equal, round, and reactive to light.   Neck:      Thyroid: No thyromegaly.   Cardiovascular:      Rate and Rhythm: Normal rate and regular rhythm.      Pulses: Normal pulses.   Pulmonary:      Effort: Pulmonary effort is normal.      Breath sounds: Normal breath sounds. No wheezing or rales.   Abdominal:      General: Bowel sounds are normal. There is no distension.      Palpations: Abdomen is soft. There is no mass.      Tenderness: There is no abdominal tenderness.   Musculoskeletal:         General: No tenderness. Normal range of motion.      Cervical back: Normal range of motion and neck supple.      Right lower leg: No edema.      Left lower leg: No edema.   Lymphadenopathy:      Cervical: No cervical adenopathy.   Skin:     General: Skin is warm and dry.      Findings: No rash.   Neurological:      Mental Status: He is alert.      Cranial Nerves: No cranial nerve deficit.   Psychiatric:         Mood and Affect: Mood and affect normal.       Protective Sensation (w/ 10 gram monofilament):  Right: Intact  Left: Intact    Visual Inspection:  Normal -  Bilateral and Nails Intact - without Evidence of Foot Deformity- Bilateral    Pedal Pulses:   Right: Present  Left: Present    Posterior Tibialis Pulses:   Right:Present  Left: Present   Assessment:       1. Routine general medical examination at a health care facility    2. Hypertension associated with diabetes    3. Hyperlipidemia associated with type 2 diabetes mellitus    4. Type 2 diabetes mellitus with other circulatory complication, with long-term current use of insulin    5. Hypothyroidism (acquired)    6. FARA (obstructive sleep apnea)    7. Gout, unspecified cause, unspecified chronicity, unspecified site    8. Malignant neoplasm of sigmoid colon    9. Mood disorder    10. Low testosterone          Plan:   1. Routine general medical examination at a health care facility    2. Hypertension associated with diabetes  Assessment & Plan:  /66,  controlled, continue amlodipine, benazepril, hydralazine, metoprolol    Orders:  -     CBC Auto Differential; Future; Expected date: 03/08/2023    3. Hyperlipidemia associated with type 2 diabetes mellitus  Assessment & Plan:  Status pending lab, continue atorvastatin, Vascepa    Orders:  -     Comprehensive Metabolic Panel; Future; Expected date: 03/08/2023  -     Lipid Panel; Future; Expected date: 03/08/2023  -     Comprehensive Metabolic Panel; Future; Expected date: 09/08/2023  -     Lipid Panel; Future; Expected date: 09/08/2023    4. Type 2 diabetes mellitus with other circulatory complication, with long-term current use of insulin  Assessment & Plan:  Status pending lab, continue insulin, mounjaro, metformin    Orders:  -     Hemoglobin A1C; Future; Expected date: 03/08/2023  -     Microalbumin/Creatinine Ratio, Urine; Future; Expected date: 03/08/2023  -     Hemoglobin A1C; Future; Expected date: 09/08/2023    5. Hypothyroidism (acquired)  Assessment & Plan:  Status pending lab, continue levothyroxine     Orders:  -     TSH; Future; Expected date: 03/08/2023  -     T4, Free; Future; Expected date: 03/08/2023  -     TSH; Future; Expected date: 09/08/2023  -     T4, Free; Future; Expected date: 09/08/2023    6. 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 Gout, unspecified cause, unspecified chronicity, unspecified site  Assessment & Plan:  Continue colchicine      8. Malignant neoplasm of sigmoid colon  Overview:  Just Diagnosed this month at [facility]      9. Mood disorder  Assessment & Plan:  Takes Klonopin at night      10. Low testosterone  Overview:  Followed by Urology, continue current treatment plan           Fasting labs ASAP  6 month f/u with Dr. Melgoza scheduled with fasting labs \[facility] [place] [facility]\""   Health Maintenance reviewed/updated.

## 2023-03-09 ENCOUNTER — LAB VISIT (OUTPATIENT)
Dept: LAB | Facility: HOSPITAL | Age: 58
End: 2023-03-09
Attending: PHYSICIAN ASSISTANT
Payer: COMMERCIAL

## 2023-03-09 DIAGNOSIS — E03.9 HYPOTHYROIDISM (ACQUIRED): ICD-10-CM

## 2023-03-09 DIAGNOSIS — I15.2 HYPERTENSION ASSOCIATED WITH DIABETES: ICD-10-CM

## 2023-03-09 DIAGNOSIS — E29.1 HYPOGONADISM MALE: ICD-10-CM

## 2023-03-09 DIAGNOSIS — E11.69 HYPERLIPIDEMIA ASSOCIATED WITH TYPE 2 DIABETES MELLITUS: ICD-10-CM

## 2023-03-09 DIAGNOSIS — Z79.4 TYPE 2 DIABETES MELLITUS WITH OTHER CIRCULATORY COMPLICATION, WITH LONG-TERM CURRENT USE OF INSULIN: ICD-10-CM

## 2023-03-09 DIAGNOSIS — E11.59 HYPERTENSION ASSOCIATED WITH DIABETES: ICD-10-CM

## 2023-03-09 DIAGNOSIS — E11.59 TYPE 2 DIABETES MELLITUS WITH OTHER CIRCULATORY COMPLICATION, WITH LONG-TERM CURRENT USE OF INSULIN: ICD-10-CM

## 2023-03-09 DIAGNOSIS — E78.5 HYPERLIPIDEMIA ASSOCIATED WITH TYPE 2 DIABETES MELLITUS: ICD-10-CM

## 2023-03-09 LAB
ALBUMIN SERPL BCP-MCNC: 4.1 G/DL (ref 3.5–5.2)
ALBUMIN SERPL BCP-MCNC: 4.1 G/DL (ref 3.5–5.2)
ALBUMIN/CREAT UR: 13.3 UG/MG (ref 0–30)
ALP SERPL-CCNC: 38 U/L (ref 55–135)
ALP SERPL-CCNC: 38 U/L (ref 55–135)
ALT SERPL W/O P-5'-P-CCNC: 60 U/L (ref 10–44)
ALT SERPL W/O P-5'-P-CCNC: 60 U/L (ref 10–44)
ANION GAP SERPL CALC-SCNC: 11 MMOL/L (ref 8–16)
AST SERPL-CCNC: 36 U/L (ref 10–40)
AST SERPL-CCNC: 36 U/L (ref 10–40)
BASOPHILS # BLD AUTO: 0.07 K/UL (ref 0–0.2)
BASOPHILS NFR BLD: 1.2 % (ref 0–1.9)
BILIRUB DIRECT SERPL-MCNC: 0.3 MG/DL (ref 0.1–0.3)
BILIRUB SERPL-MCNC: 0.5 MG/DL (ref 0.1–1)
BILIRUB SERPL-MCNC: 0.5 MG/DL (ref 0.1–1)
BUN SERPL-MCNC: 17 MG/DL (ref 6–20)
CALCIUM SERPL-MCNC: 9.3 MG/DL (ref 8.7–10.5)
CHLORIDE SERPL-SCNC: 109 MMOL/L (ref 95–110)
CHOLEST SERPL-MCNC: 93 MG/DL (ref 120–199)
CHOLEST/HDLC SERPL: 2.7 {RATIO} (ref 2–5)
CO2 SERPL-SCNC: 26 MMOL/L (ref 23–29)
COMPLEXED PSA SERPL-MCNC: 0.63 NG/ML (ref 0–4)
CREAT SERPL-MCNC: 1.1 MG/DL (ref 0.5–1.4)
CREAT UR-MCNC: 135 MG/DL (ref 23–375)
DIFFERENTIAL METHOD: ABNORMAL
EOSINOPHIL # BLD AUTO: 0.1 K/UL (ref 0–0.5)
EOSINOPHIL NFR BLD: 2.2 % (ref 0–8)
ERYTHROCYTE [DISTWIDTH] IN BLOOD BY AUTOMATED COUNT: 14.6 % (ref 11.5–14.5)
EST. GFR  (NO RACE VARIABLE): >60 ML/MIN/1.73 M^2
ESTIMATED AVG GLUCOSE: 114 MG/DL (ref 68–131)
GLUCOSE SERPL-MCNC: 70 MG/DL (ref 70–110)
HBA1C MFR BLD: 5.6 % (ref 4–5.6)
HCT VFR BLD AUTO: 45.6 % (ref 40–54)
HDLC SERPL-MCNC: 34 MG/DL (ref 40–75)
HDLC SERPL: 36.6 % (ref 20–50)
HGB BLD-MCNC: 14.2 G/DL (ref 14–18)
IMM GRANULOCYTES # BLD AUTO: 0.01 K/UL (ref 0–0.04)
IMM GRANULOCYTES NFR BLD AUTO: 0.2 % (ref 0–0.5)
LDLC SERPL CALC-MCNC: 37.8 MG/DL (ref 63–159)
LYMPHOCYTES # BLD AUTO: 1.8 K/UL (ref 1–4.8)
LYMPHOCYTES NFR BLD: 30.9 % (ref 18–48)
MCH RBC QN AUTO: 30.4 PG (ref 27–31)
MCHC RBC AUTO-ENTMCNC: 31.1 G/DL (ref 32–36)
MCV RBC AUTO: 98 FL (ref 82–98)
MICROALBUMIN UR DL<=1MG/L-MCNC: 18 UG/ML
MONOCYTES # BLD AUTO: 0.8 K/UL (ref 0.3–1)
MONOCYTES NFR BLD: 13.4 % (ref 4–15)
NEUTROPHILS # BLD AUTO: 3 K/UL (ref 1.8–7.7)
NEUTROPHILS NFR BLD: 52.1 % (ref 38–73)
NONHDLC SERPL-MCNC: 59 MG/DL
NRBC BLD-RTO: 0 /100 WBC
PLATELET # BLD AUTO: 238 K/UL (ref 150–450)
PMV BLD AUTO: 11.4 FL (ref 9.2–12.9)
POTASSIUM SERPL-SCNC: 4.2 MMOL/L (ref 3.5–5.1)
PROT SERPL-MCNC: 6.9 G/DL (ref 6–8.4)
PROT SERPL-MCNC: 6.9 G/DL (ref 6–8.4)
RBC # BLD AUTO: 4.67 M/UL (ref 4.6–6.2)
SODIUM SERPL-SCNC: 146 MMOL/L (ref 136–145)
T4 FREE SERPL-MCNC: 0.96 NG/DL (ref 0.71–1.51)
TESTOST SERPL-MCNC: 699 NG/DL (ref 304–1227)
TRIGL SERPL-MCNC: 106 MG/DL (ref 30–150)
TSH SERPL DL<=0.005 MIU/L-ACNC: 2.79 UIU/ML (ref 0.4–4)
WBC # BLD AUTO: 5.8 K/UL (ref 3.9–12.7)

## 2023-03-09 PROCEDURE — 80053 COMPREHEN METABOLIC PANEL: CPT | Performed by: PHYSICIAN ASSISTANT

## 2023-03-09 PROCEDURE — 82570 ASSAY OF URINE CREATININE: CPT | Performed by: PHYSICIAN ASSISTANT

## 2023-03-09 PROCEDURE — 80076 HEPATIC FUNCTION PANEL: CPT | Performed by: UROLOGY

## 2023-03-09 PROCEDURE — 82672 ASSAY OF ESTROGEN: CPT | Performed by: UROLOGY

## 2023-03-09 PROCEDURE — 36415 COLL VENOUS BLD VENIPUNCTURE: CPT | Mod: PO | Performed by: UROLOGY

## 2023-03-09 PROCEDURE — 80061 LIPID PANEL: CPT | Performed by: PHYSICIAN ASSISTANT

## 2023-03-09 PROCEDURE — 85025 COMPLETE CBC W/AUTO DIFF WBC: CPT | Performed by: PHYSICIAN ASSISTANT

## 2023-03-09 PROCEDURE — 83036 HEMOGLOBIN GLYCOSYLATED A1C: CPT | Performed by: PHYSICIAN ASSISTANT

## 2023-03-09 PROCEDURE — 84443 ASSAY THYROID STIM HORMONE: CPT | Performed by: PHYSICIAN ASSISTANT

## 2023-03-09 PROCEDURE — 84153 ASSAY OF PSA TOTAL: CPT | Performed by: UROLOGY

## 2023-03-09 PROCEDURE — 84403 ASSAY OF TOTAL TESTOSTERONE: CPT | Performed by: UROLOGY

## 2023-03-09 PROCEDURE — 84439 ASSAY OF FREE THYROXINE: CPT | Performed by: PHYSICIAN ASSISTANT

## 2023-03-13 LAB — ESTROGEN SERPL-MCNC: 65 PG/ML

## 2023-03-23 ENCOUNTER — HOSPITAL ENCOUNTER (OUTPATIENT)
Dept: RADIOLOGY | Facility: HOSPITAL | Age: 58
Discharge: HOME | End: 2023-03-23
Attending: PHYSICIAN ASSISTANT
Payer: COMMERCIAL

## 2023-03-23 DIAGNOSIS — M54.2 CERVICALGIA: ICD-10-CM

## 2023-03-23 DIAGNOSIS — M48.02 SPINAL STENOSIS, CERVICAL REGION: ICD-10-CM

## 2023-03-23 PROCEDURE — 72141 MRI CERVICAL SPINE WITHOUT CONTRAST: ICD-10-PCS | Mod: 26,,, | Performed by: STUDENT IN AN ORGANIZED HEALTH CARE EDUCATION/TRAINING PROGRAM

## 2023-03-23 PROCEDURE — 72141 MRI NECK SPINE W/O DYE: CPT | Mod: TC

## 2023-03-23 PROCEDURE — 72141 MRI NECK SPINE W/O DYE: CPT | Mod: 26,,, | Performed by: STUDENT IN AN ORGANIZED HEALTH CARE EDUCATION/TRAINING PROGRAM

## 2023-03-26 ENCOUNTER — PATIENT MESSAGE (OUTPATIENT)
Dept: CARDIOLOGY | Facility: CLINIC | Age: 58
End: 2023-03-26
Payer: COMMERCIAL

## 2023-03-30 ENCOUNTER — OFFICE VISIT (OUTPATIENT)
Dept: NEUROSURGERY | Facility: CLINIC | Age: 58
End: 2023-03-30
Payer: COMMERCIAL

## 2023-03-30 ENCOUNTER — PATIENT MESSAGE (OUTPATIENT)
Dept: PAIN MEDICINE | Facility: CLINIC | Age: 58
End: 2023-03-30
Payer: COMMERCIAL

## 2023-03-30 VITALS
HEIGHT: 73 IN | HEART RATE: 69 BPM | SYSTOLIC BLOOD PRESSURE: 130 MMHG | WEIGHT: 266 LBS | DIASTOLIC BLOOD PRESSURE: 74 MMHG | BODY MASS INDEX: 35.25 KG/M2

## 2023-03-30 DIAGNOSIS — M54.2 CERVICALGIA: Primary | ICD-10-CM

## 2023-03-30 DIAGNOSIS — M50.30 DDD (DEGENERATIVE DISC DISEASE), CERVICAL: ICD-10-CM

## 2023-03-30 DIAGNOSIS — M54.12 CERVICAL RADICULOPATHY: ICD-10-CM

## 2023-03-30 PROCEDURE — 1159F MED LIST DOCD IN RCRD: CPT | Mod: CPTII,S$GLB,, | Performed by: PHYSICIAN ASSISTANT

## 2023-03-30 PROCEDURE — 1159F PR MEDICATION LIST DOCUMENTED IN MEDICAL RECORD: ICD-10-PCS | Mod: CPTII,S$GLB,, | Performed by: PHYSICIAN ASSISTANT

## 2023-03-30 PROCEDURE — 3008F BODY MASS INDEX DOCD: CPT | Mod: CPTII,S$GLB,, | Performed by: PHYSICIAN ASSISTANT

## 2023-03-30 PROCEDURE — 4010F ACE/ARB THERAPY RXD/TAKEN: CPT | Mod: CPTII,S$GLB,, | Performed by: PHYSICIAN ASSISTANT

## 2023-03-30 PROCEDURE — 3044F PR MOST RECENT HEMOGLOBIN A1C LEVEL <7.0%: ICD-10-PCS | Mod: CPTII,S$GLB,, | Performed by: PHYSICIAN ASSISTANT

## 2023-03-30 PROCEDURE — 3078F DIAST BP <80 MM HG: CPT | Mod: CPTII,S$GLB,, | Performed by: PHYSICIAN ASSISTANT

## 2023-03-30 PROCEDURE — 3066F NEPHROPATHY DOC TX: CPT | Mod: CPTII,S$GLB,, | Performed by: PHYSICIAN ASSISTANT

## 2023-03-30 PROCEDURE — 3078F PR MOST RECENT DIASTOLIC BLOOD PRESSURE < 80 MM HG: ICD-10-PCS | Mod: CPTII,S$GLB,, | Performed by: PHYSICIAN ASSISTANT

## 2023-03-30 PROCEDURE — 3066F PR DOCUMENTATION OF TREATMENT FOR NEPHROPATHY: ICD-10-PCS | Mod: CPTII,S$GLB,, | Performed by: PHYSICIAN ASSISTANT

## 2023-03-30 PROCEDURE — 99214 PR OFFICE/OUTPT VISIT, EST, LEVL IV, 30-39 MIN: ICD-10-PCS | Mod: S$GLB,,, | Performed by: PHYSICIAN ASSISTANT

## 2023-03-30 PROCEDURE — 3044F HG A1C LEVEL LT 7.0%: CPT | Mod: CPTII,S$GLB,, | Performed by: PHYSICIAN ASSISTANT

## 2023-03-30 PROCEDURE — 99999 PR PBB SHADOW E&M-EST. PATIENT-LVL IV: CPT | Mod: PBBFAC,,, | Performed by: PHYSICIAN ASSISTANT

## 2023-03-30 PROCEDURE — 3061F PR NEG MICROALBUMINURIA RESULT DOCUMENTED/REVIEW: ICD-10-PCS | Mod: CPTII,S$GLB,, | Performed by: PHYSICIAN ASSISTANT

## 2023-03-30 PROCEDURE — 3061F NEG MICROALBUMINURIA REV: CPT | Mod: CPTII,S$GLB,, | Performed by: PHYSICIAN ASSISTANT

## 2023-03-30 PROCEDURE — 3008F PR BODY MASS INDEX (BMI) DOCUMENTED: ICD-10-PCS | Mod: CPTII,S$GLB,, | Performed by: PHYSICIAN ASSISTANT

## 2023-03-30 PROCEDURE — 3075F SYST BP GE 130 - 139MM HG: CPT | Mod: CPTII,S$GLB,, | Performed by: PHYSICIAN ASSISTANT

## 2023-03-30 PROCEDURE — 99214 OFFICE O/P EST MOD 30 MIN: CPT | Mod: S$GLB,,, | Performed by: PHYSICIAN ASSISTANT

## 2023-03-30 PROCEDURE — 3075F PR MOST RECENT SYSTOLIC BLOOD PRESS GE 130-139MM HG: ICD-10-PCS | Mod: CPTII,S$GLB,, | Performed by: PHYSICIAN ASSISTANT

## 2023-03-30 PROCEDURE — 99999 PR PBB SHADOW E&M-EST. PATIENT-LVL IV: ICD-10-PCS | Mod: PBBFAC,,, | Performed by: PHYSICIAN ASSISTANT

## 2023-03-30 PROCEDURE — 4010F PR ACE/ARB THEARPY RXD/TAKEN: ICD-10-PCS | Mod: CPTII,S$GLB,, | Performed by: PHYSICIAN ASSISTANT
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
## 2023-03-31 ENCOUNTER — PATIENT MESSAGE (OUTPATIENT)
Dept: NEUROSURGERY | Facility: CLINIC | Age: 58
End: 2023-03-31
Payer: COMMERCIAL

## 2023-03-31 RX ORDER — PREGABALIN 100 MG/1
100 CAPSULE ORAL 2 TIMES DAILY
Qty: 60 CAPSULE | Refills: 0 | Status: SHIPPED | OUTPATIENT
Start: 2023-03-31 | End: 2023-04-28

## 2023-04-10 ENCOUNTER — PATIENT MESSAGE (OUTPATIENT)
Dept: SURGERY | Facility: HOSPITAL | Age: 58
End: 2023-04-10
Payer: COMMERCIAL

## 2023-04-10 ENCOUNTER — HOSPITAL ENCOUNTER (OUTPATIENT)
Dept: RADIOLOGY | Facility: HOSPITAL | Age: 58
Discharge: HOME | End: 2023-04-10
Attending: COLON & RECTAL SURGERY
Payer: COMMERCIAL

## 2023-04-10 DIAGNOSIS — R91.1 SOLITARY PULMONARY NODULE: Primary | ICD-10-CM

## 2023-04-10 DIAGNOSIS — R91.1 SOLITARY PULMONARY NODULE: ICD-10-CM

## 2023-04-10 PROCEDURE — 71250 CT CHEST WITHOUT CONTRAST: ICD-10-PCS | Mod: 26,,, | Performed by: RADIOLOGY

## 2023-04-10 PROCEDURE — 71250 CT THORAX DX C-: CPT | Mod: TC

## 2023-04-10 PROCEDURE — 71250 CT THORAX DX C-: CPT | Mod: 26,,, | Performed by: RADIOLOGY

## 2023-04-11 ENCOUNTER — OFFICE VISIT (OUTPATIENT)
Dept: PULMONOLOGY | Facility: CLINIC | Age: 58
End: 2023-04-11
Payer: COMMERCIAL

## 2023-04-11 ENCOUNTER — ANESTHESIA EVENT (OUTPATIENT)
Dept: SURGERY | Facility: HOSPITAL | Age: 58
End: 2023-04-11
Payer: COMMERCIAL

## 2023-04-11 VITALS
RESPIRATION RATE: 17 BRPM | DIASTOLIC BLOOD PRESSURE: 82 MMHG | WEIGHT: 269.38 LBS | HEART RATE: 69 BPM | SYSTOLIC BLOOD PRESSURE: 140 MMHG | BODY MASS INDEX: 35.7 KG/M2 | HEIGHT: 73 IN | OXYGEN SATURATION: 98 %

## 2023-04-11 DIAGNOSIS — R91.1 SOLITARY PULMONARY NODULE: ICD-10-CM

## 2023-04-11 PROCEDURE — 4010F PR ACE/ARB THEARPY RXD/TAKEN: ICD-10-PCS | Mod: CPTII,S$GLB,, | Performed by: INTERNAL MEDICINE

## 2023-04-11 PROCEDURE — 3008F PR BODY MASS INDEX (BMI) DOCUMENTED: ICD-10-PCS | Mod: CPTII,S$GLB,, | Performed by: INTERNAL MEDICINE

## 2023-04-11 PROCEDURE — 3066F NEPHROPATHY DOC TX: CPT | Mod: CPTII,S$GLB,, | Performed by: INTERNAL MEDICINE

## 2023-04-11 PROCEDURE — 99999 PR PBB SHADOW E&M-EST. PATIENT-LVL V: CPT | Mod: PBBFAC,,, | Performed by: INTERNAL MEDICINE

## 2023-04-11 PROCEDURE — 4010F ACE/ARB THERAPY RXD/TAKEN: CPT | Mod: CPTII,S$GLB,, | Performed by: INTERNAL MEDICINE

## 2023-04-11 PROCEDURE — 99999 PR PBB SHADOW E&M-EST. PATIENT-LVL V: ICD-10-PCS | Mod: PBBFAC,,, | Performed by: INTERNAL MEDICINE

## 2023-04-11 PROCEDURE — 1159F PR MEDICATION LIST DOCUMENTED IN MEDICAL RECORD: ICD-10-PCS | Mod: CPTII,S$GLB,, | Performed by: INTERNAL MEDICINE

## 2023-04-11 PROCEDURE — 1160F PR REVIEW ALL MEDS BY PRESCRIBER/CLIN PHARMACIST DOCUMENTED: ICD-10-PCS | Mod: CPTII,S$GLB,, | Performed by: INTERNAL MEDICINE

## 2023-04-11 PROCEDURE — 3077F PR MOST RECENT SYSTOLIC BLOOD PRESSURE >= 140 MM HG: ICD-10-PCS | Mod: CPTII,S$GLB,, | Performed by: INTERNAL MEDICINE

## 2023-04-11 PROCEDURE — 3079F PR MOST RECENT DIASTOLIC BLOOD PRESSURE 80-89 MM HG: ICD-10-PCS | Mod: CPTII,S$GLB,, | Performed by: INTERNAL MEDICINE

## 2023-04-11 PROCEDURE — 3066F PR DOCUMENTATION OF TREATMENT FOR NEPHROPATHY: ICD-10-PCS | Mod: CPTII,S$GLB,, | Performed by: INTERNAL MEDICINE

## 2023-04-11 PROCEDURE — 3079F DIAST BP 80-89 MM HG: CPT | Mod: CPTII,S$GLB,, | Performed by: INTERNAL MEDICINE

## 2023-04-11 PROCEDURE — 99204 OFFICE O/P NEW MOD 45 MIN: CPT | Mod: S$GLB,,, | Performed by: INTERNAL MEDICINE

## 2023-04-11 PROCEDURE — 3077F SYST BP >= 140 MM HG: CPT | Mod: CPTII,S$GLB,, | Performed by: INTERNAL MEDICINE

## 2023-04-11 PROCEDURE — 3061F NEG MICROALBUMINURIA REV: CPT | Mod: CPTII,S$GLB,, | Performed by: INTERNAL MEDICINE

## 2023-04-11 PROCEDURE — 3044F PR MOST RECENT HEMOGLOBIN A1C LEVEL <7.0%: ICD-10-PCS | Mod: CPTII,S$GLB,, | Performed by: INTERNAL MEDICINE

## 2023-04-11 PROCEDURE — 1159F MED LIST DOCD IN RCRD: CPT | Mod: CPTII,S$GLB,, | Performed by: INTERNAL MEDICINE

## 2023-04-11 PROCEDURE — 3044F HG A1C LEVEL LT 7.0%: CPT | Mod: CPTII,S$GLB,, | Performed by: INTERNAL MEDICINE

## 2023-04-11 PROCEDURE — 3008F BODY MASS INDEX DOCD: CPT | Mod: CPTII,S$GLB,, | Performed by: INTERNAL MEDICINE

## 2023-04-11 PROCEDURE — 99204 PR OFFICE/OUTPT VISIT, NEW, LEVL IV, 45-59 MIN: ICD-10-PCS | Mod: S$GLB,,, | Performed by: INTERNAL MEDICINE

## 2023-04-11 PROCEDURE — 3061F PR NEG MICROALBUMINURIA RESULT DOCUMENTED/REVIEW: ICD-10-PCS | Mod: CPTII,S$GLB,, | Performed by: INTERNAL MEDICINE

## 2023-04-11 PROCEDURE — 1160F RVW MEDS BY RX/DR IN RCRD: CPT | Mod: CPTII,S$GLB,, | Performed by: INTERNAL MEDICINE

## 2023-04-12 NOTE — PROGRESS NOTES
Subjective:      Patient ID: Narendra English Sr. is a 57 y.o. male.    Chief Complaint: Solitary Pulmonary Nodule     HPI    57-year-old male with a history of hypertension, hyperlipidemia, obesity who was a never smoker who has had lung nodules noted on CT imaging for several years that have been followed.  Most recent imaging showed possible increase 1 of the nodules and he is referred here for that reason.  He has no pulmonary symptoms and no pulmonary complaints.    Past Medical History:   Diagnosis Date    Cancer     Colon    Diabetes mellitus 8 years    BS 84 in the room 08/11/2021    Hypertension     Sleep apnea     Thyroid disease      Past Surgical History:   Procedure Laterality Date    BACK SURGERY      COLON SURGERY  02/06/21    COLONOSCOPY N/A 12/29/2020    Procedure: COLONOSCOPY;  Surgeon: William Cotter MD;  Location: [facility] ENDO;  Service: Endoscopy;  Laterality: N/A;  Will need Rapid doesn't live here    COLONOSCOPY N/A 02/10/2022    Procedure: COLONOSCOPY;  Surgeon: Wili Espinosa MD;  Location: [facility] ENDO;  Service: Endoscopy;  Laterality: N/A;    EPIDURAL STEROID INJECTION INTO CERVICAL SPINE N/A 12/1/2022    Procedure: C7/T1 IL SALBADOR;  Surgeon: Leonard Mart MD;  Location: [facility] PAIN MGT;  Service: Pain Management;  Laterality: N/A;    FESS, WITH NASAL SEPTOPLASTY, WITH IMAGING GUIDANCE Bilateral 08/17/2022    Procedure: FESS, WITH NASAL SEPTOPLASTY, WITH IMAGING GUIDANCE;  Surgeon: Viktor Ferrell MD;  Location: [facility] OR;  Service: ENT;  Laterality: Bilateral;    LAPAROSCOPIC RIGHT COLON RESECTION N/A 02/02/2021    Procedure: COLECTOMY, RIGHT, LAPAROSCOPIC, ERAS low;  Surgeon: Melonie Santoyo MD;  [facility] OR [facility] FLR;  Service: Colon and Rectal;  Laterality: N/A;  needs rapid covid test    NASAL TURBINATE REDUCTION Bilateral 08/17/2022    Procedure: REDUCTION, NASAL TURBINATE;  Surgeon: Viktor Ferrell MD;  Location: [facility] OR;  Service: ENT;  Laterality: Bilateral;     TONSILLECTOMY       Social History     Tobacco Use    Smoking status: Never     Passive exposure: Never    Smokeless tobacco: Never   Substance Use Topics    Alcohol use: Never    Drug use: Never     Family History   Problem Relation Age of Onset    Hypertension Mother     Diabetes Mother     Breast cancer Mother     Cancer Mother     Stroke Father     Hypertension Brother     Cataracts Maternal Grandmother     Diabetes Maternal Grandmother     Hypertension Brother        Review of Systems as per HPI otherwise negative    Objective:     Physical Exam   Constitutional: He is oriented to person, place, and time. He appears well-developed. No distress.   HENT:   Mouth/Throat: No oropharyngeal exudate.   Neck: No JVD present.   Cardiovascular: Normal rate and regular rhythm.   Pulmonary/Chest: Normal expansion, symmetric chest wall expansion and effort normal.   Abdominal: Soft.   Musculoskeletal:      Cervical back: Neck supple.   Neurological: He is alert and oriented to person, place, and time.   Psychiatric: He has a normal mood and affect.   Nursing note and vitals reviewed.       Assessment:     1. Solitary pulmonary nodule         Orders Placed This Encounter   Procedures    CT Chest Without Contrast     Standing Status:   Future     Standing Expiration Date:   4/11/2024     Order Specific Question:   May the Radiologist modify the order per protocol to meet the clinical needs of the patient?     Answer:   Yes     I personally reviewed CT images and agree with the radiologist's interpretation as below     Right lower lobe 8 mm, previously 8 mm (image 215 series 4)  Right lower lobe 8 mm, previously 6 mm (image 244 series 4)  Right lower lobe 8 mm, previously 8 mm (image 271 series 4)  Right lower lobe 9 mm, previously 8 mm (image 321 series 4)  Left upper lobe 5 mm, previously 5 mm (image 115 series 4)  Left lower lobe 4 mm, previously 4 mm (image 2225 series 4).  Left lower lobe 4 mm, previously 4 mm (image 271  series 4)     There are a few additional tiny pulmonary nodules.     Stable thin pleural parenchymal bands of scarring lingula, right middle lobe, bibasilar sedation.  Otherwise, lungs are clear.     The visualized portion of the upper abdominal viscera is unremarkable.     Multilevel advanced thoracic spondylosis.           Impression:   Multiple predominant stable bilateral subcentimeter pulmonary nodules, largest 9 mm right lower lobe with interval enlargement of a single right lower lobe pulmonary nodule from 6 mm to now 8 mm compared with October 2022.  Recommend continued follow-up.      Plan:     Possible slight interval growth of pulmonary nodule.  All of these nodules are right at or below the limit of PET detection.  PET not likely to be helpful.  Given their relative stability over time it is unlikely to be malignant process but I think does warrant closer observation.  To that end we will get repeat CT in 6 months.  Should any of these nodules her past detection limit of PET we will consider this.  The nodules are generally peripheral and may be amenable to CT-guided biopsy should this be necessary as well.  I discussed all of the above with the patient in detail who voiced understanding and agreement

## 2023-04-18 ENCOUNTER — HOSPITAL ENCOUNTER (OUTPATIENT)
Dept: PREADMISSION TESTING | Facility: HOSPITAL | Age: 58
Discharge: HOME | End: 2023-04-18
Attending: STUDENT IN AN ORGANIZED HEALTH CARE EDUCATION/TRAINING PROGRAM
Payer: COMMERCIAL

## 2023-04-18 VITALS
TEMPERATURE: 97 F | OXYGEN SATURATION: 96 % | RESPIRATION RATE: 14 BRPM | DIASTOLIC BLOOD PRESSURE: 70 MMHG | SYSTOLIC BLOOD PRESSURE: 129 MMHG | HEART RATE: 66 BPM

## 2023-04-18 DIAGNOSIS — I15.2 HYPERTENSION ASSOCIATED WITH DIABETES: ICD-10-CM

## 2023-04-18 DIAGNOSIS — J34.89 NASAL OBSTRUCTION: ICD-10-CM

## 2023-04-18 DIAGNOSIS — E03.9 HYPOTHYROIDISM (ACQUIRED): ICD-10-CM

## 2023-04-18 DIAGNOSIS — M10.9 GOUT, UNSPECIFIED CAUSE, UNSPECIFIED CHRONICITY, UNSPECIFIED SITE: ICD-10-CM

## 2023-04-18 DIAGNOSIS — E11.59 HYPERTENSION ASSOCIATED WITH DIABETES: ICD-10-CM

## 2023-04-18 DIAGNOSIS — G47.33 OSA (OBSTRUCTIVE SLEEP APNEA): ICD-10-CM

## 2023-04-18 DIAGNOSIS — E11.59 TYPE 2 DIABETES MELLITUS WITH OTHER CIRCULATORY COMPLICATION, WITH LONG-TERM CURRENT USE OF INSULIN: ICD-10-CM

## 2023-04-18 DIAGNOSIS — J34.2 NASAL SEPTAL DEVIATION: ICD-10-CM

## 2023-04-18 DIAGNOSIS — Z79.4 TYPE 2 DIABETES MELLITUS WITH OTHER CIRCULATORY COMPLICATION, WITH LONG-TERM CURRENT USE OF INSULIN: ICD-10-CM

## 2023-04-18 DIAGNOSIS — J34.89 ADHESIONS OF NASAL SEPTUM AND TURBINATES: ICD-10-CM

## 2023-04-18 DIAGNOSIS — Z01.818 PREOP EXAMINATION: Primary | ICD-10-CM

## 2023-04-18 PROCEDURE — 93005 ELECTROCARDIOGRAM TRACING: CPT

## 2023-04-18 PROCEDURE — 93010 ELECTROCARDIOGRAM REPORT: CPT | Mod: ,,, | Performed by: INTERNAL MEDICINE

## 2023-04-18 PROCEDURE — 93010 EKG 12-LEAD: ICD-10-PCS | Mod: ,,, | Performed by: INTERNAL MEDICINE

## 2023-04-18 NOTE — H&P
Preoperative History and Physical  [facility]                                                                   Chief Complaint: Preoperative evaluation     History of Present Illness:      Narendra English Sr. is a 57 y.o. male with a PMHx of DM, HTN, HLD, Gout, Hypothyroidism, FARA, NSD, Nasal obstruction, and adhesions who presents to the office today for a preoperative consultation at the request of Dr. Ferrell who plans on performing Rhinoplasty on April 19.     Functional Status:      The patient is able to climb a flight of stairs. The patient is able to ambulate without difficulty. The patient's functional status is not affected by the surgical problem. The patient's functional status is not affected by shortness of breath, chest pain, dyspnea on exertion and fatigue.      MET score greater than 4    Past Medical History:      Past Medical History:   Diagnosis Date    Cancer     Colon    Diabetes mellitus 8 years    BS 84 in the room 08/11/2021    Hypertension     Sleep apnea     Thyroid disease         Past Surgical History:      Past Surgical History:   Procedure Laterality Date    BACK SURGERY      COLON SURGERY  02/06/21    COLONOSCOPY N/A 12/29/2020    Procedure: COLONOSCOPY;  Surgeon: William Cotter MD;  Location: [facility] ENDO;  Service: Endoscopy;  Laterality: N/A;  Will need Rapid doesn't live here    COLONOSCOPY N/A 02/10/2022    Procedure: COLONOSCOPY;  Surgeon: Wili Espinosa MD;  Location: [facility];  Service: Endoscopy;  Laterality: N/A;    EPIDURAL STEROID INJECTION INTO CERVICAL SPINE N/A 12/1/2022    Procedure: C7/T1 IL SALBADOR;  Surgeon: Leonard Mart MD;  Location: [facility] PAIN MGT;  Service: Pain Management;  Laterality: N/A;    FESS, WITH NASAL SEPTOPLASTY, WITH IMAGING GUIDANCE Bilateral 08/17/2022    Procedure: FESS, WITH NASAL SEPTOPLASTY, WITH IMAGING GUIDANCE;  Surgeon: Viktor Ferrell MD;  Location: [facility] OR;  Service: ENT;   "Laterality: Bilateral;    LAPAROSCOPIC RIGHT COLON RESECTION N/A 02/02/2021    Procedure: COLECTOMY, RIGHT, LAPAROSCOPIC, ERAS low;  Surgeon: Melonie Santoyo MD;  Location: [facility] OR [address] [place] [address];  Service: Colon and Rectal;  Laterality: N/A;  needs rapid covid test    NASAL TURBINATE REDUCTION Bilateral 08/17/2022    Procedure: REDUCTION, NASAL TURBINATE;  Surgeon: Viktor Ferrell MD;  Location: [facility] OR;  Service: ENT;  Laterality: Bilateral;    TONSILLECTOMY          Social History:      Social History     Socioeconomic History    Marital status: Single   Tobacco Use    Smoking status: Never     Passive exposure: Never    Smokeless tobacco: Never   Substance and Sexual Activity    Alcohol use: Never    Drug use: Never    Sexual activity: Yes     Partners: Female     Birth control/protection: None        Family History:      Family History   Problem Relation Age of Onset    Hypertension Mother     Diabetes Mother     Breast cancer Mother     Cancer Mother     Stroke Father     No Known Problems Sister     No Known Problems Sister     Hypertension Brother     Hypertension Brother     Cataracts Maternal Grandmother     Diabetes Maternal Grandmother     Lung cancer Maternal Grandfather     No Known Problems Paternal Grandfather     No Known Problems Paternal Grandmother        Allergies:      Review of patient's allergies indicates:   Allergen Reactions    Demerol (pf) [meperidine (pf)] Other (See Comments)     Pt reports,"They lost my blood pressure."    Percocet [oxycodone-acetaminophen] Itching     itching    Demerol [meperidine]        Medications:      Current Outpatient Medications   Medication Sig    amLODIPine (NORVASC) 10 MG tablet Take 1 tablet (10 mg total) by mouth once daily.    atorvastatin (LIPITOR) 20 MG tablet Take 1 tablet (20 mg total) by mouth every evening.    benazepriL (LOTENSIN) 40 MG tablet Take 1 tablet (40 mg total) by mouth once daily.    clonazePAM (KLONOPIN) 2 MG Tab Take 1 tablet (2 mg " total) by mouth every evening.    colchicine (COLCRYS) 0.6 mg tablet Take 1 tablet (0.6 mg total) by mouth 2 (two) times daily.    hydrALAZINE (APRESOLINE) 100 MG tablet Take 1 tablet (100 mg total) by mouth every 8 (eight) hours.    insulin glargine, TOUJEO, (TOUJEO SOLOSTAR U-300 INSULIN) 300 unit/mL (1.5 mL) InPn pen Inject 85 Units into the skin once daily. (Patient taking differently: Inject 85 Units into the skin every evening.)    levothyroxine (SYNTHROID) 112 MCG tablet Take 1 tablet (112 mcg total) by mouth before breakfast.    metFORMIN (GLUCOPHAGE) 1000 MG tablet Take 1 tablet (1,000 mg total) by mouth 2 (two) times daily with meals.    metoprolol succinate (TOPROL-XL) 50 MG 24 hr tablet Take 1 tablet (50 mg total) by mouth once daily. (Patient taking differently: Take 50 mg by mouth every evening.)    pregabalin (LYRICA) 100 MG capsule Take 1 capsule (100 mg total) by mouth 2 (two) times daily.    traZODone (DESYREL) 50 MG tablet Take 1 tablet (50 mg total) by mouth every evening.    VASCEPA 1 gram Cap Take 2 capsules (2,000 mg total) by mouth 2 (two) times daily.    clobetasol 0.05% (TEMOVATE) 0.05 % Oint AAA bid    diclofenac sodium (VOLTAREN) 1 % Gel Apply 2 g topically 4 (four) times daily as needed (Pain).    flash glucose scanning reader Misc 1 application.    flash glucose sensor Kit 1 application.    fluticasone propionate (FLONASE) 50 mcg/actuation nasal spray 1 spray (50 mcg total) by Each Nostril route once daily.    hydrocortisone 2.5 % ointment Apply topically 2 (two) times daily. Use on lips    indomethacin (INDOCIN SR) 75 mg CpSR CR capsule Take 75 mg by mouth 2 (two) times daily with meals.    tadalafiL (CIALIS) 20 MG Tab Take 1 tablet (20 mg total) by mouth once daily.    tirzepatide 10 mg/0.5 mL PnIj Inject 10 mg into the skin every 7 days. (Patient not taking: Reported on 4/18/2023)    triamcinolone acetonide 0.1% (KENALOG) 0.1 % ointment Apply topically 2 (two) times daily. Use up to  "2 weeks at a time     No current facility-administered medications for this encounter.       Vitals:      Vitals:    04/18/23 1107   BP: 129/70   Pulse: 66   Resp: 14   Temp: 97.3 °F (36.3 °C)       Review of Systems:        Constitutional: Negative for fever, chills, weight loss, malaise/fatigue and diaphoresis.   HENT: Negative for hearing loss, ear pain, nosebleeds, sore throat, neck pain, tinnitus and ear discharge.  Positive for nasal and sinus pressure and congestion, stating "I just can't breathe through my nose".  Eyes: Negative for blurred vision, double vision, photophobia, pain, discharge and redness.   Respiratory: Negative for cough, hemoptysis, sputum production, shortness of breath, wheezing and stridor.    Cardiovascular: Negative for chest pain, palpitations, orthopnea, claudication, leg swelling and PND.   Gastrointestinal: Negative for heartburn, nausea, vomiting, abdominal pain, diarrhea, constipation, blood in stool and melena.   Genitourinary: Negative for dysuria, urgency, frequency, hematuria and flank pain.   Musculoskeletal: Negative for myalgias, back pain, joint pain and falls.   Skin: Negative for itching and rash.   Neurological: Negative for dizziness, tingling, tremors, sensory change, speech change, focal weakness, seizures, loss of consciousness, weakness and headaches.   Endo/Heme/Allergies: Negative for environmental allergies and polydipsia. Does not bruise/bleed easily.   Psychiatric/Behavioral: Negative for depression, suicidal ideas, hallucinations, memory loss and substance abuse. The patient is not nervous/anxious and does not have insomnia.    All 14 systems reviewed and negative except as noted above.    Physical Exam:      Constitutional: Appears well-developed, well-nourished and in no acute distress.  Patient is oriented to person, place, and time.   Head: Normocephalic and atraumatic. Mucous membranes moist.  Neck: Neck supple no mass.   Cardiovascular: Normal rate and " regular rhythm.  S1 S2 appreciated by ascultation.  Pulmonary/Chest: Effort normal and clear to auscultation bilaterally. No respiratory distress.   Abdomen: Soft. Non-tender and non-distended. Bowel sounds are normal.   Neurological: Patient is alert and oriented to person, place and time. Moves all extremities.  Skin: Warm and dry. No lesions.  Extremities: No clubbing, cyanosis or edema.    Laboratory data:      Reviewed and noted in plan where applicable. Please see chart for full laboratory data.    No results for input(s): CPK, CPKMB, TROPONINI, MB in the last 24 hours. No results for input(s): POCTGLUCOSE in the last 24 hours.     No results found for: INR, PROTIME    Lab Results   Component Value Date    WBC 5.80 03/09/2023    HGB 14.2 03/09/2023    HCT 45.6 03/09/2023    MCV 98 03/09/2023     03/09/2023       No results for input(s): GLU, NA, K, CL, CO2, BUN, CREATININE, CALCIUM, MG in the last 24 hours.    Predictors of intubation difficulty:       Morbid obesity? yes    Anatomically abnormal facies? no   Prominent incisors? no   Receding mandible? no   Short, thick neck? yes   Neck range of motion: normal   Dentition:  Missing teeth to upper.  Based on the Modified Mallampati, patient is a mallampati score: IV (only hard palate visible). Very narrow oral opening.  Evaluated by anesthesia, ok to proceed here tomorrow from their standpoint.    Cardiographics:      ECG:  Sinus rhythm with 1st degree AVB, right BBB.    Echocardiogram in February showed;    Concentric remodeling and normal systolic function.  There were no arrhythmias during stress.  The estimated ejection fraction is 60%.  Normal left ventricular diastolic function.  With normal right ventricular systolic function.  The stress echo portion of this study is negative for myocardial ischemia.  The ECG portion of this study is negative for myocardial ischemia.    Imaging:      Chest x-ray: not indicated    Assessment and Plan:      Nasal  septal deviation  - Patient presents today at the request of Dr. Ferrell who plans on performing a Rhinoplasty on 4/19.    Known risk factors for perioperative complications: Diabetes mellitus    Morbid Obesity  FARA    Difficulty with intubation is not anticipated based on review of prior anesthesia records.  Mallampati IV noted, evaluated by anesthesia, ok to proceed here tomorrow.    Cardiac Risk Estimation: Based on the Revised Cardiac Risk index, patient is a Class II risk with a 6.0% risk of a major cardiac event in a low risk procedure.    1.) Preoperative workup as follows: ECG, hemoglobin, hematocrit, electrolytes, creatinine, glucose, liver function studies.  2.) Change in medication regimen before surgery: discontinue ASA 6 days before surgery, discontinue NSAIDs 5 days before surgery, hold Metformin 24 hours prior to surgery. Patient was instructed to only take 1/2 his usual dose of insulin tonight, with NO insulin the morning of surgery.  3.) Prophylaxis for cardiac events with perioperative beta-blockers: not indicated.  4.) Invasive hemodynamic monitoring perioperatively: not indicated.  5.) Deep vein thrombosis prophylaxis postoperatively: intermittent pneumatic compression boots and regimen to be chosen by surgical team.  6.) Surveillance for postoperative MI with ECG immediately postoperatively and on postoperati ve days 1 and 2 AND troponin levels 24 hours postoperatively and on day 4 or hospital discharge (whichever comes first): not indicated.  7.) Current medications which may produce withdrawal symptoms if withheld perioperatively: None.  8.) Other measures: None.    Nasal obstruction  - See plan as per above.    Adhesions of nasal septum and turbinates  - See plan as per above.    Hypertension associated with diabetes  - BP well controlled.  - Continue Hydralazine and Toprol XL.  - Patient was instructed to continue Lotensin for now, but to hold the morning of surgery to avoid hypotension  associated with anesthesia, and resume postoperatively as directed.    Type 2 diabetes mellitus with circulatory disorder, with long-term current use of insulin  - A1c 5.6 on 3/9.  - Continue Toujeo with instructions to only take 1/2 his usual dose of insulin tonight with no insulin the morning of surgery.  - Hold Metformin and resume postoperatively as directed.  - ADA diet.    Hypothyroidism (acquired)  - Continue Synthroid.    FARA (obstructive sleep apnea)  - CPAP qhs.    Gout  - Continue home medications.        Electronically signed by Evita Francois DNP, BINGP on 4/18/2023 at 10:48 AM.

## 2023-04-18 NOTE — H&P (VIEW-ONLY)
Preoperative History and Physical  [facility]                                                                   Chief Complaint: Preoperative evaluation     History of Present Illness:      Narendra English Sr. is a 57 y.o. male with a PMHx of DM, HTN, HLD, Gout, Hypothyroidism, FARA, NSD, Nasal obstruction, and adhesions who presents to the office today for a preoperative consultation at the request of Dr. Ferrell who plans on performing Rhinoplasty on April 19.     Functional Status:      The patient is able to climb a flight of stairs. The patient is able to ambulate without difficulty. The patient's functional status is not affected by the surgical problem. The patient's functional status is not affected by shortness of breath, chest pain, dyspnea on exertion and fatigue.      MET score greater than 4    Past Medical History:      Past Medical History:   Diagnosis Date    Cancer     Colon    Diabetes mellitus 8 years    BS 84 in the room 08/11/2021    Hypertension     Sleep apnea     Thyroid disease         Past Surgical History:      Past Surgical History:   Procedure Laterality Date    BACK SURGERY      COLON SURGERY  02/06/21    COLONOSCOPY N/A 12/29/2020    Procedure: COLONOSCOPY;  Surgeon: William Cotter MD;  Location: [facility] ENDO;  Service: Endoscopy;  Laterality: N/A;  Will need Rapid doesn't live here    COLONOSCOPY N/A 02/10/2022    Procedure: COLONOSCOPY;  Surgeon: Wili Espinosa MD;  [facility];  Service: Endoscopy;  Laterality: N/A;    EPIDURAL STEROID INJECTION INTO CERVICAL SPINE N/A 12/1/2022    Procedure: C7/T1 IL SALBADOR;  Surgeon: Leonard Mart MD;  Location: [facility] PAIN MGT;  Service: Pain Management;  Laterality: N/A;    FESS, WITH NASAL SEPTOPLASTY, WITH IMAGING GUIDANCE Bilateral 08/17/2022    Procedure: FESS, WITH NASAL SEPTOPLASTY, WITH IMAGING GUIDANCE;  Surgeon: Vkitor Ferrell MD;  Location: [facility] OR;  Service: ENT;   "Laterality: Bilateral;    LAPAROSCOPIC RIGHT COLON RESECTION N/A 02/02/2021    Procedure: COLECTOMY, RIGHT, LAPAROSCOPIC, ERAS low;  Surgeon: Melonie Santoyo MD;  Location: [facility] OR [address] [place] [address];  Service: Colon and Rectal;  Laterality: N/A;  needs rapid covid test    NASAL TURBINATE REDUCTION Bilateral 08/17/2022    Procedure: REDUCTION, NASAL TURBINATE;  Surgeon: Viktor Ferrell MD;  Location: [facility] OR;  Service: ENT;  Laterality: Bilateral;    TONSILLECTOMY          Social History:      Social History     Socioeconomic History    Marital status: Single   Tobacco Use    Smoking status: Never     Passive exposure: Never    Smokeless tobacco: Never   Substance and Sexual Activity    Alcohol use: Never    Drug use: Never    Sexual activity: Yes     Partners: Female     Birth control/protection: None        Family History:      Family History   Problem Relation Age of Onset    Hypertension Mother     Diabetes Mother     Breast cancer Mother     Cancer Mother     Stroke Father     No Known Problems Sister     No Known Problems Sister     Hypertension Brother     Hypertension Brother     Cataracts Maternal Grandmother     Diabetes Maternal Grandmother     Lung cancer Maternal Grandfather     No Known Problems Paternal Grandfather     No Known Problems Paternal Grandmother        Allergies:      Review of patient's allergies indicates:   Allergen Reactions    Demerol (pf) [meperidine (pf)] Other (See Comments)     Pt reports,"They lost my blood pressure."    Percocet [oxycodone-acetaminophen] Itching     itching    Demerol [meperidine]        Medications:      Current Outpatient Medications   Medication Sig    amLODIPine (NORVASC) 10 MG tablet Take 1 tablet (10 mg total) by mouth once daily.    atorvastatin (LIPITOR) 20 MG tablet Take 1 tablet (20 mg total) by mouth every evening.    benazepriL (LOTENSIN) 40 MG tablet Take 1 tablet (40 mg total) by mouth once daily.    clonazePAM (KLONOPIN) 2 MG Tab Take 1 tablet (2 mg " total) by mouth every evening.    colchicine (COLCRYS) 0.6 mg tablet Take 1 tablet (0.6 mg total) by mouth 2 (two) times daily.    hydrALAZINE (APRESOLINE) 100 MG tablet Take 1 tablet (100 mg total) by mouth every 8 (eight) hours.    insulin glargine, TOUJEO, (TOUJEO SOLOSTAR U-300 INSULIN) 300 unit/mL (1.5 mL) InPn pen Inject 85 Units into the skin once daily. (Patient taking differently: Inject 85 Units into the skin every evening.)    levothyroxine (SYNTHROID) 112 MCG tablet Take 1 tablet (112 mcg total) by mouth before breakfast.    metFORMIN (GLUCOPHAGE) 1000 MG tablet Take 1 tablet (1,000 mg total) by mouth 2 (two) times daily with meals.    metoprolol succinate (TOPROL-XL) 50 MG 24 hr tablet Take 1 tablet (50 mg total) by mouth once daily. (Patient taking differently: Take 50 mg by mouth every evening.)    pregabalin (LYRICA) 100 MG capsule Take 1 capsule (100 mg total) by mouth 2 (two) times daily.    traZODone (DESYREL) 50 MG tablet Take 1 tablet (50 mg total) by mouth every evening.    VASCEPA 1 gram Cap Take 2 capsules (2,000 mg total) by mouth 2 (two) times daily.    clobetasol 0.05% (TEMOVATE) 0.05 % Oint AAA bid    diclofenac sodium (VOLTAREN) 1 % Gel Apply 2 g topically 4 (four) times daily as needed (Pain).    flash glucose scanning reader Misc 1 application.    flash glucose sensor Kit 1 application.    fluticasone propionate (FLONASE) 50 mcg/actuation nasal spray 1 spray (50 mcg total) by Each Nostril route once daily.    hydrocortisone 2.5 % ointment Apply topically 2 (two) times daily. Use on lips    indomethacin (INDOCIN SR) 75 mg CpSR CR capsule Take 75 mg by mouth 2 (two) times daily with meals.    tadalafiL (CIALIS) 20 MG Tab Take 1 tablet (20 mg total) by mouth once daily.    tirzepatide 10 mg/0.5 mL PnIj Inject 10 mg into the skin every 7 days. (Patient not taking: Reported on 4/18/2023)    triamcinolone acetonide 0.1% (KENALOG) 0.1 % ointment Apply topically 2 (two) times daily. Use up to  "2 weeks at a time     No current facility-administered medications for this encounter.       Vitals:      Vitals:    04/18/23 1107   BP: 129/70   Pulse: 66   Resp: 14   Temp: 97.3 °F (36.3 °C)       Review of Systems:        Constitutional: Negative for fever, chills, weight loss, malaise/fatigue and diaphoresis.   HENT: Negative for hearing loss, ear pain, nosebleeds, sore throat, neck pain, tinnitus and ear discharge.  Positive for nasal and sinus pressure and congestion, stating "I just can't breathe through my nose".  Eyes: Negative for blurred vision, double vision, photophobia, pain, discharge and redness.   Respiratory: Negative for cough, hemoptysis, sputum production, shortness of breath, wheezing and stridor.    Cardiovascular: Negative for chest pain, palpitations, orthopnea, claudication, leg swelling and PND.   Gastrointestinal: Negative for heartburn, nausea, vomiting, abdominal pain, diarrhea, constipation, blood in stool and melena.   Genitourinary: Negative for dysuria, urgency, frequency, hematuria and flank pain.   Musculoskeletal: Negative for myalgias, back pain, joint pain and falls.   Skin: Negative for itching and rash.   Neurological: Negative for dizziness, tingling, tremors, sensory change, speech change, focal weakness, seizures, loss of consciousness, weakness and headaches.   Endo/Heme/Allergies: Negative for environmental allergies and polydipsia. Does not bruise/bleed easily.   Psychiatric/Behavioral: Negative for depression, suicidal ideas, hallucinations, memory loss and substance abuse. The patient is not nervous/anxious and does not have insomnia.    All 14 systems reviewed and negative except as noted above.    Physical Exam:      Constitutional: Appears well-developed, well-nourished and in no acute distress.  Patient is oriented to person, place, and time.   Head: Normocephalic and atraumatic. Mucous membranes moist.  Neck: Neck supple no mass.   Cardiovascular: Normal rate and " regular rhythm.  S1 S2 appreciated by ascultation.  Pulmonary/Chest: Effort normal and clear to auscultation bilaterally. No respiratory distress.   Abdomen: Soft. Non-tender and non-distended. Bowel sounds are normal.   Neurological: Patient is alert and oriented to person, place and time. Moves all extremities.  Skin: Warm and dry. No lesions.  Extremities: No clubbing, cyanosis or edema.    Laboratory data:      Reviewed and noted in plan where applicable. Please see chart for full laboratory data.    No results for input(s): CPK, CPKMB, TROPONINI, MB in the last 24 hours. No results for input(s): POCTGLUCOSE in the last 24 hours.     No results found for: INR, PROTIME    Lab Results   Component Value Date    WBC 5.80 03/09/2023    HGB 14.2 03/09/2023    HCT 45.6 03/09/2023    MCV 98 03/09/2023     03/09/2023       No results for input(s): GLU, NA, K, CL, CO2, BUN, CREATININE, CALCIUM, MG in the last 24 hours.    Predictors of intubation difficulty:       Morbid obesity? yes    Anatomically abnormal facies? no   Prominent incisors? no   Receding mandible? no   Short, thick neck? yes   Neck range of motion: normal   Dentition:  Missing teeth to upper.  Based on the Modified Mallampati, patient is a mallampati score: IV (only hard palate visible). Very narrow oral opening.  Evaluated by anesthesia, ok to proceed here tomorrow from their standpoint.    Cardiographics:      ECG:  Sinus rhythm with 1st degree AVB, right BBB.    Echocardiogram in February showed;    Concentric remodeling and normal systolic function.  There were no arrhythmias during stress.  The estimated ejection fraction is 60%.  Normal left ventricular diastolic function.  With normal right ventricular systolic function.  The stress echo portion of this study is negative for myocardial ischemia.  The ECG portion of this study is negative for myocardial ischemia.    Imaging:      Chest x-ray: not indicated    Assessment and Plan:      Nasal  septal deviation  - Patient presents today at the request of Dr. Ferrell who plans on performing a Rhinoplasty on 4/19.    Known risk factors for perioperative complications: Diabetes mellitus    Morbid Obesity  FARA    Difficulty with intubation is not anticipated based on review of prior anesthesia records.  Mallampati IV noted, evaluated by anesthesia, ok to proceed here tomorrow.    Cardiac Risk Estimation: Based on the Revised Cardiac Risk index, patient is a Class II risk with a 6.0% risk of a major cardiac event in a low risk procedure.    1.) Preoperative workup as follows: ECG, hemoglobin, hematocrit, electrolytes, creatinine, glucose, liver function studies.  2.) Change in medication regimen before surgery: discontinue ASA 6 days before surgery, discontinue NSAIDs 5 days before surgery, hold Metformin 24 hours prior to surgery. Patient was instructed to only take 1/2 his usual dose of insulin tonight, with NO insulin the morning of surgery.  3.) Prophylaxis for cardiac events with perioperative beta-blockers: not indicated.  4.) Invasive hemodynamic monitoring perioperatively: not indicated.  5.) Deep vein thrombosis prophylaxis postoperatively: intermittent pneumatic compression boots and regimen to be chosen by surgical team.  6.) Surveillance for postoperative MI with ECG immediately postoperatively and on postoperati ve days 1 and 2 AND troponin levels 24 hours postoperatively and on day 4 or hospital discharge (whichever comes first): not indicated.  7.) Current medications which may produce withdrawal symptoms if withheld perioperatively: None.  8.) Other measures: None.    Nasal obstruction  - See plan as per above.    Adhesions of nasal septum and turbinates  - See plan as per above.    Hypertension associated with diabetes  - BP well controlled.  - Continue Hydralazine and Toprol XL.  - Patient was instructed to continue Lotensin for now, but to hold the morning of surgery to avoid hypotension  associated with anesthesia, and resume postoperatively as directed.    Type 2 diabetes mellitus with circulatory disorder, with long-term current use of insulin  - A1c 5.6 on 3/9.  - Continue Toujeo with instructions to only take 1/2 his usual dose of insulin tonight with no insulin the morning of surgery.  - Hold Metformin and resume postoperatively as directed.  - ADA diet.    Hypothyroidism (acquired)  - Continue Synthroid.    FARA (obstructive sleep apnea)  - CPAP qhs.    Gout  - Continue home medications.        Electronically signed by Evita Francois DNP, BINGP on 4/18/2023 at 10:48 AM.

## 2023-04-18 NOTE — PROGRESS NOTES
To confirm, your doctor has instructed you that surgery is scheduled for 4.19.2023.       Pre admit office will call the afternoon prior to surgery between 1PM and 3PM with arrival time.    Surgery will be at [facility],  The address is [address] [address]. [place]  [address].      IMPORTANT INSTRUCTIONS!    Do not eat or drink after 12 midnight, including water.   Do not smoke or use chewing tobacco after 12 midnight  OK to brush teeth, but no gum, candy, or mints!      Take only these medicines with a small swallow of water-morning of surgery.     Norvasc, hydralazine, synthroid         ____ Stop Aspirin, Ibuprofen, Motrin and Aleve at least 5-7 days before surgery, unless otherwise instructed by your doctor, or the nurse.   You MAY use Tylenol/acetaminophen until day of surgery.      ____  If you take diabetic medication, do NOT take morning of surgery unless instructed by Doctor. Metformin must be stopped 24 hrs prior to surgery time.       ____ Stop taking any Fish Oil supplements or Vitamins at least 5 days prior to surgery, unless instructed otherwise by your Doctor.       Please notify MD office if you have an active infection, currently taking antibiotics or received a vaccination within the past 7 days.      Bathing Instructions: The night before surgery and the morning prior to coming to the hospital:    - Shower & rinse your body as usual with anti-bacterial Soap (Dial or Lever 2000)   -Hibiclens (if indicated) use AFTER anti-bacterial soap; 1 packet PM/1 packet in AM on surgical site only   -Do not use hibiclens on your head, face, or genitals.    -Do not wash with anti-bacterial soap after you use the hibiclens.    -Do not shave surgical site 5-7 days prior to surgery.    -Pubic hair 7 days prior to surgery (gyn pt's).      Pediatric patients do not need to use anti-bacterial soap or Hibiclens.             After Bathing:   __ No powder, lotions, creams, or body spray to skin     __No  deodorant for any breast procedure, PORT, or upper arm surgery     __ No makeup, mascara, nail polish or artificial nails        **SURGERY WILL BE CANCELLED IF ARTIFICIAL/NAIL POLISH IS PRESENT!!!**    __ Please remove all piercings and leave all jewelry at home.    **SURGERY WILL BE CANCELLED IF PIERCINGS ARE PRESENT!!!**      __ Dentures, Hearing Aids and Contact Lens need to be removed prior to the start of surgery.      __ Wear clean, loose-fitting clothing. Allow for dressings/bandages/surgical equipment     __ You must have transportation, and they MUST stay the entire time.       [facility] Visitor/Ride Policy:   Only 1 adult allowed (over the age of 18) to accompany you into Pre-op/Recovery Surgery Dept and must stay through the entire length of admission.     Must have a ride home from a responsible adult that you know and trust.      Pediatric Patients are allowed 2 adult visitors.     Medical Transport, Uber or Lyft can only be used if patient has a responsible adult to accompany them during ride home.         Post-Op Instructions: You will receive surgery post-op instructions by your Discharge Nurse prior to going home.     Surgical Site Infection:   Prevention of surgical site infections:   -Keep incisions clean and dry.   -Do not soak/submerge incisions in water until completely healed.   -Do not apply lotions, powders, creams, or deodorants to site.   -Always make sure hands are cleaned with antibacterial soap/ alcohol-based   prior to touching the surgical site.       Signs and symptoms:               -Redness and pain around the area where you had surgery               -Drainage of cloudy fluid from your surgical wound               -Fever over 100.4 or chills     >>>Call Surgeon office/on-call Surgeon if you experience any of these signs & symptoms post-surgery @ [phone].      *Please Call [facility] Pre-Admit Department for surgery instruction  questions:  [phone]    *Payment questions:  [phone]    *Billing questions:  [phone]

## 2023-04-18 NOTE — ANESTHESIA PREPROCEDURE EVALUATION
04/18/2023  Narendra English Sr. is a 57 y.o., male.    Past Medical History:   Diagnosis Date    Cancer     Colon    Diabetes mellitus 8 years    BS 84 in the room 08/11/2021    Hypertension     Sleep apnea     Thyroid disease      Past Surgical History:   Procedure Laterality Date    BACK SURGERY      COLON SURGERY  02/06/21    COLONOSCOPY N/A 12/29/2020    Procedure: COLONOSCOPY;  Surgeon: William Cotter MD;  Location: [facility] ENDO;  Service: Endoscopy;  Laterality: N/A;  Will need Rapid doesn't live here    COLONOSCOPY N/A 02/10/2022    Procedure: COLONOSCOPY;  Surgeon: Wili Espinosa MD;  Location: [facility] ENDO;  Service: Endoscopy;  Laterality: N/A;    EPIDURAL STEROID INJECTION INTO CERVICAL SPINE N/A 12/1/2022    Procedure: C7/T1 IL SALBADOR;  Surgeon: Leonard Mart MD;  Location: [facility] PAIN MGT;  Service: Pain Management;  Laterality: N/A;    FESS, WITH NASAL SEPTOPLASTY, WITH IMAGING GUIDANCE Bilateral 08/17/2022    Procedure: FESS, WITH NASAL SEPTOPLASTY, WITH IMAGING GUIDANCE;  Surgeon: Viktor Ferrell MD;  Location: [facility] OR;  Service: ENT;  Laterality: Bilateral;    LAPAROSCOPIC RIGHT COLON RESECTION N/A 02/02/2021    Procedure: COLECTOMY, RIGHT, LAPAROSCOPIC, ERAS low;  Surgeon: Melonie Santoyo MD;  Location: [address];  Service: Colon and Rectal;  Laterality: N/A;  needs rapid covid test    NASAL TURBINATE REDUCTION Bilateral 08/17/2022    Procedure: REDUCTION, NASAL TURBINATE;  Surgeon: Viktor Ferrell MD;  Location: [facility] OR;  Service: ENT;  Laterality: Bilateral;    TONSILLECTOMY           Pre-op Assessment    I have reviewed the Patient Summary Reports.    I have reviewed the NPO Status.   I have reviewed the Medications.     Review of Systems  Anesthesia Hx:  No problems with previous Anesthesia  History of prior surgery of interest to airway management or  planning: Denies Family Hx of Anesthesia complications.   Denies Personal Hx of Anesthesia complications.   Social:  Non-Smoker    Hematology/Oncology:  Hematology Normal       -- Cancer in past history:    Cardiovascular:   Hypertension hyperlipidemia ECG:  Sinus rhythm with 1st degree AVB, right BBB.     Echocardiogram in February showed;  Concentric remodeling and normal systolic function.    There were no arrhythmias during stress.    The estimated ejection fraction is 60%.  Normal left ventricular diastolic function.  With normal right ventricular systolic function.  The stress echo portion of this study is negative for myocardial ischemia.  The ECG portion of this study is negative for myocardial ischemia.    Based on  Revised Cardiac Risk index, patient is a Class II risk with a 6.0% risk of a major cardiac event in a low risk procedure.   Pulmonary:   Sleep Apnea    Renal/:  Renal/ Normal     Hepatic/GI:  Hepatic/GI Normal    Musculoskeletal:   Arthritis     Neurological:  Neurology Normal    Endocrine:   Diabetes, well controlled Hypothyroidism    Psych:   Psychiatric History          Physical Exam  General: Alert and Oriented    Airway:  Mallampati: IV   Mouth Opening: Small, but > 3cm  TM Distance: 4 - 6 cm  Tongue: Large  Neck ROM: Extension Decreased  Neck: Girth Increased  Difficulty with intubation is not anticipated based on review of prior anesthesia records.  Mallampati IV noted, evaluated by anesthesia, ok to proceed here tomorrow  Dental:  Intact  Multiple cracked and chipped teeth    Use c mac      Anesthesia Plan  Type of Anesthesia, risks & benefits discussed:    Anesthesia Type: Gen ETT  Intra-op Monitoring Plan: Standard ASA Monitors  Post Op Pain Control Plan: multimodal analgesia and IV/PO Opioids PRN  Induction:  IV  Airway Plan: Direct  Informed Consent: Informed consent signed with the Patient and all parties understand the risks and agree with anesthesia plan.  All questions  answered.   ASA Score: 2  Day of Surgery Review of History & Physical: H&P Update referred to the surgeon/provider.    Ready For Surgery From Anesthesia Perspective.     .

## 2023-04-18 NOTE — DISCHARGE INSTRUCTIONS
To confirm, your doctor has instructed you that surgery is scheduled for 4.19.2023.       Pre admit office will call the afternoon prior to surgery between 1PM and 3PM with arrival time.    Surgery will be at [facility],  The address is [address] [address]. [place]  [address].      IMPORTANT INSTRUCTIONS!    Do not eat or drink after 12 midnight, including water.   Do not smoke or use chewing tobacco after 12 midnight  OK to brush teeth, but no gum, candy, or mints!      Take only these medicines with a small swallow of water-morning of surgery.     Norvasc, hydralazine, synthroid         ____ Stop Aspirin, Ibuprofen, Motrin and Aleve at least 5-7 days before surgery, unless otherwise instructed by your doctor, or the nurse.   You MAY use Tylenol/acetaminophen until day of surgery.      ____  If you take diabetic medication, do NOT take morning of surgery unless instructed by Doctor. Metformin must be stopped 24 hrs prior to surgery time.       ____ Stop taking any Fish Oil supplements or Vitamins at least 5 days prior to surgery, unless instructed otherwise by your Doctor.       Please notify MD office if you have an active infection, currently taking antibiotics or received a vaccination within the past 7 days.      Bathing Instructions: The night before surgery and the morning prior to coming to the hospital:    - Shower & rinse your body as usual with anti-bacterial Soap (Dial or Lever 2000)   -Hibiclens (if indicated) use AFTER anti-bacterial soap; 1 packet PM/1 packet in AM on surgical site only   -Do not use hibiclens on your head, face, or genitals.    -Do not wash with anti-bacterial soap after you use the hibiclens.    -Do not shave surgical site 5-7 days prior to surgery.    -Pubic hair 7 days prior to surgery (gyn pt's).      Pediatric patients do not need to use anti-bacterial soap or Hibiclens.             After Bathing:   __ No powder, lotions, creams, or body spray to skin     __No  deodorant for any breast procedure, PORT, or upper arm surgery     __ No makeup, mascara, nail polish or artificial nails        **SURGERY WILL BE CANCELLED IF ARTIFICIAL/NAIL POLISH IS PRESENT!!!**    __ Please remove all piercings and leave all jewelry at home.    **SURGERY WILL BE CANCELLED IF PIERCINGS ARE PRESENT!!!**      __ Dentures, Hearing Aids and Contact Lens need to be removed prior to the start of surgery.      __ Wear clean, loose-fitting clothing. Allow for dressings/bandages/surgical equipment     __ You must have transportation, and they MUST stay the entire time.       [facility] Visitor/Ride Policy:   Only 1 adult allowed (over the age of 18) to accompany you into Pre-op/Recovery Surgery Dept and must stay through the entire length of admission.     Must have a ride home from a responsible adult that you know and trust.      Pediatric Patients are allowed 2 adult visitors.     Medical Transport, Uber or Lyft can only be used if patient has a responsible adult to accompany them during ride home.         Post-Op Instructions: You will receive surgery post-op instructions by your Discharge Nurse prior to going home.     Surgical Site Infection:   Prevention of surgical site infections:   -Keep incisions clean and dry.   -Do not soak/submerge incisions in water until completely healed.   -Do not apply lotions, powders, creams, or deodorants to site.   -Always make sure hands are cleaned with antibacterial soap/ alcohol-based   prior to touching the surgical site.       Signs and symptoms:               -Redness and pain around the area where you had surgery               -Drainage of cloudy fluid from your surgical wound               -Fever over 100.4 or chills     >>>Call Surgeon office/on-call Surgeon if you experience any of these signs & symptoms post-surgery @ [phone].      *Please Call [facility] Pre-Admit Department for surgery instruction  questions:  [phone]    *Payment questions:  [phone]    *Billing questions:  [phone]

## 2023-04-18 NOTE — ASSESSMENT & PLAN NOTE
- Patient presents today at the request of Dr. Ferrell who plans on performing a Rhinoplasty on 4/19.    Known risk factors for perioperative complications: Diabetes mellitus    Morbid Obesity  FARA    Difficulty with intubation is not anticipated based on review of prior anesthesia records.  Mallampati IV noted, evaluated by anesthesia, ok to proceed here tomorrow.    Cardiac Risk Estimation: Based on the Revised Cardiac Risk index, patient is a Class II risk with a 6.0% risk of a major cardiac event in a low risk procedure.    1.) Preoperative workup as follows: ECG, hemoglobin, hematocrit, electrolytes, creatinine, glucose, liver function studies.  2.) Change in medication regimen before surgery: discontinue ASA 6 days before surgery, discontinue NSAIDs 5 days before surgery, hold Metformin 24 hours prior to surgery. Patient was instructed to only take 1/2 his usual dose of insulin tonight, with NO insulin the morning of surgery.  3.) Prophylaxis for cardiac events with perioperative beta-blockers: not indicated.  4.) Invasive hemodynamic monitoring perioperatively: not indicated.  5.) Deep vein thrombosis prophylaxis postoperatively: intermittent pneumatic compression boots and regimen to be chosen by surgical team.  6.) Surveillance for postoperative MI with ECG immediately postoperatively and on postoperati ve days 1 and 2 AND troponin levels 24 hours postoperatively and on day 4 or hospital discharge (whichever comes first): not indicated.  7.) Current medications which may produce withdrawal symptoms if withheld perioperatively: None.  8.) Other measures: None.

## 2023-04-18 NOTE — ASSESSMENT & PLAN NOTE
- BP well controlled.  - Continue Hydralazine and Toprol XL.  - Patient was instructed to continue Lotensin for now, but to hold the morning of surgery to avoid hypotension associated with anesthesia, and resume postoperatively as directed.

## 2023-04-18 NOTE — ASSESSMENT & PLAN NOTE
- A1c 5.6 on 3/9.  - Continue Toujeo with instructions to only take 1/2 his usual dose of insulin tonight with no insulin the morning of surgery.  - Hold Metformin and resume postoperatively as directed.  - ADA diet.

## 2023-04-19 ENCOUNTER — HOSPITAL ENCOUNTER (OUTPATIENT)
Facility: HOSPITAL | Age: 58
Discharge: HOME | End: 2023-04-19
Attending: STUDENT IN AN ORGANIZED HEALTH CARE EDUCATION/TRAINING PROGRAM | Admitting: STUDENT IN AN ORGANIZED HEALTH CARE EDUCATION/TRAINING PROGRAM
Payer: COMMERCIAL

## 2023-04-19 ENCOUNTER — ANESTHESIA (OUTPATIENT)
Dept: SURGERY | Facility: HOSPITAL | Age: 58
End: 2023-04-19
Payer: COMMERCIAL

## 2023-04-19 VITALS
TEMPERATURE: 98 F | BODY MASS INDEX: 35.64 KG/M2 | WEIGHT: 268.94 LBS | SYSTOLIC BLOOD PRESSURE: 141 MMHG | RESPIRATION RATE: 20 BRPM | HEIGHT: 73 IN | HEART RATE: 79 BPM | OXYGEN SATURATION: 95 % | DIASTOLIC BLOOD PRESSURE: 74 MMHG

## 2023-04-19 DIAGNOSIS — J34.89 NASAL OBSTRUCTION: ICD-10-CM

## 2023-04-19 DIAGNOSIS — J32.4 CHRONIC PANSINUSITIS: ICD-10-CM

## 2023-04-19 DIAGNOSIS — J34.89 ADHESIONS OF NASAL SEPTUM AND TURBINATES: ICD-10-CM

## 2023-04-19 DIAGNOSIS — M95.0 NASAL DEFORMITY, ACQUIRED: Primary | ICD-10-CM

## 2023-04-19 LAB — POCT GLUCOSE: 70 MG/DL (ref 70–110)

## 2023-04-19 PROCEDURE — 82962 GLUCOSE BLOOD TEST: CPT | Performed by: STUDENT IN AN ORGANIZED HEALTH CARE EDUCATION/TRAINING PROGRAM

## 2023-04-19 PROCEDURE — D9220A PRA ANESTHESIA: Mod: ANES,,, | Performed by: ANESTHESIOLOGY

## 2023-04-19 PROCEDURE — 30420 PR RECONSTR NOSE+MAJ SEPTAL REPAIR: ICD-10-PCS | Mod: ,,, | Performed by: STUDENT IN AN ORGANIZED HEALTH CARE EDUCATION/TRAINING PROGRAM

## 2023-04-19 PROCEDURE — 63600175 PHARM REV CODE 636 W HCPCS: Performed by: NURSE ANESTHETIST, CERTIFIED REGISTERED

## 2023-04-19 PROCEDURE — 30420 RECONSTRUCTION OF NOSE: CPT | Mod: ,,, | Performed by: STUDENT IN AN ORGANIZED HEALTH CARE EDUCATION/TRAINING PROGRAM

## 2023-04-19 PROCEDURE — 25000003 PHARM REV CODE 250: Performed by: STUDENT IN AN ORGANIZED HEALTH CARE EDUCATION/TRAINING PROGRAM

## 2023-04-19 PROCEDURE — 71000033 HC RECOVERY, INTIAL HOUR: Performed by: STUDENT IN AN ORGANIZED HEALTH CARE EDUCATION/TRAINING PROGRAM

## 2023-04-19 PROCEDURE — 37000009 HC ANESTHESIA EA ADD 15 MINS: Performed by: STUDENT IN AN ORGANIZED HEALTH CARE EDUCATION/TRAINING PROGRAM

## 2023-04-19 PROCEDURE — 37000008 HC ANESTHESIA 1ST 15 MINUTES: Performed by: STUDENT IN AN ORGANIZED HEALTH CARE EDUCATION/TRAINING PROGRAM

## 2023-04-19 PROCEDURE — 25000003 PHARM REV CODE 250: Performed by: NURSE ANESTHETIST, CERTIFIED REGISTERED

## 2023-04-19 PROCEDURE — 36000707: Performed by: STUDENT IN AN ORGANIZED HEALTH CARE EDUCATION/TRAINING PROGRAM

## 2023-04-19 PROCEDURE — 36000706: Performed by: STUDENT IN AN ORGANIZED HEALTH CARE EDUCATION/TRAINING PROGRAM

## 2023-04-19 PROCEDURE — C9046 COCAINE HCL NASAL SOLUTION: HCPCS | Performed by: STUDENT IN AN ORGANIZED HEALTH CARE EDUCATION/TRAINING PROGRAM

## 2023-04-19 PROCEDURE — 27800903 OPTIME MED/SURG SUP & DEVICES OTHER IMPLANTS: Performed by: STUDENT IN AN ORGANIZED HEALTH CARE EDUCATION/TRAINING PROGRAM

## 2023-04-19 PROCEDURE — 71000015 HC POSTOP RECOV 1ST HR: Performed by: STUDENT IN AN ORGANIZED HEALTH CARE EDUCATION/TRAINING PROGRAM

## 2023-04-19 PROCEDURE — D9220A PRA ANESTHESIA: ICD-10-PCS | Mod: CRNA,,, | Performed by: NURSE ANESTHETIST, CERTIFIED REGISTERED

## 2023-04-19 PROCEDURE — 63600175 PHARM REV CODE 636 W HCPCS: Performed by: STUDENT IN AN ORGANIZED HEALTH CARE EDUCATION/TRAINING PROGRAM

## 2023-04-19 PROCEDURE — 27201423 OPTIME MED/SURG SUP & DEVICES STERILE SUPPLY: Performed by: STUDENT IN AN ORGANIZED HEALTH CARE EDUCATION/TRAINING PROGRAM

## 2023-04-19 PROCEDURE — D9220A PRA ANESTHESIA: ICD-10-PCS | Mod: ANES,,, | Performed by: ANESTHESIOLOGY

## 2023-04-19 PROCEDURE — D9220A PRA ANESTHESIA: Mod: CRNA,,, | Performed by: NURSE ANESTHETIST, CERTIFIED REGISTERED

## 2023-04-19 DEVICE — IMPLANTABLE DEVICE: Type: IMPLANTABLE DEVICE | Site: NOSE | Status: FUNCTIONAL

## 2023-04-19 RX ORDER — OXYMETAZOLINE HCL 0.05 %
SPRAY, NON-AEROSOL (ML) NASAL
Status: DISCONTINUED
Start: 2023-04-19 | End: 2023-04-19 | Stop reason: HOSPADM

## 2023-04-19 RX ORDER — MUPIROCIN 20 MG/G
OINTMENT TOPICAL
Status: DISCONTINUED | OUTPATIENT
Start: 2023-04-19 | End: 2023-04-19 | Stop reason: HOSPADM

## 2023-04-19 RX ORDER — METOCLOPRAMIDE HYDROCHLORIDE 5 MG/ML
10 INJECTION INTRAMUSCULAR; INTRAVENOUS ONCE
Status: DISCONTINUED | OUTPATIENT
Start: 2023-04-19 | End: 2023-04-19 | Stop reason: HOSPADM

## 2023-04-19 RX ORDER — BUPIVACAINE HYDROCHLORIDE AND EPINEPHRINE 2.5; 5 MG/ML; UG/ML
INJECTION, SOLUTION EPIDURAL; INFILTRATION; INTRACAUDAL; PERINEURAL
Status: DISCONTINUED
Start: 2023-04-19 | End: 2023-04-19 | Stop reason: HOSPADM

## 2023-04-19 RX ORDER — ACETAMINOPHEN 10 MG/ML
INJECTION, SOLUTION INTRAVENOUS
Status: DISCONTINUED | OUTPATIENT
Start: 2023-04-19 | End: 2023-04-19

## 2023-04-19 RX ORDER — ROCURONIUM BROMIDE 10 MG/ML
INJECTION, SOLUTION INTRAVENOUS
Status: DISCONTINUED | OUTPATIENT
Start: 2023-04-19 | End: 2023-04-19

## 2023-04-19 RX ORDER — COCAINE HYDROCHLORIDE 40 MG/ML
SOLUTION NASAL
Status: DISCONTINUED | OUTPATIENT
Start: 2023-04-19 | End: 2023-04-19 | Stop reason: HOSPADM

## 2023-04-19 RX ORDER — ONDANSETRON HYDROCHLORIDE 2 MG/ML
INJECTION, SOLUTION INTRAMUSCULAR; INTRAVENOUS
Status: DISCONTINUED | OUTPATIENT
Start: 2023-04-19 | End: 2023-04-19

## 2023-04-19 RX ORDER — HYDROCODONE BITARTRATE AND ACETAMINOPHEN 5; 325 MG/1; MG/1
1 TABLET ORAL EVERY 6 HOURS PRN
Qty: 20 TABLET | Refills: 0 | Status: SHIPPED | OUTPATIENT
Start: 2023-04-19 | End: 2023-05-25

## 2023-04-19 RX ORDER — OXYMETAZOLINE HCL 0.05 %
SPRAY, NON-AEROSOL (ML) NASAL
Status: DISCONTINUED | OUTPATIENT
Start: 2023-04-19 | End: 2023-04-19 | Stop reason: HOSPADM

## 2023-04-19 RX ORDER — DIPHENHYDRAMINE HYDROCHLORIDE 50 MG/ML
25 INJECTION INTRAMUSCULAR; INTRAVENOUS EVERY 6 HOURS PRN
Status: DISCONTINUED | OUTPATIENT
Start: 2023-04-19 | End: 2023-04-19 | Stop reason: HOSPADM

## 2023-04-19 RX ORDER — DEXMEDETOMIDINE HYDROCHLORIDE 100 UG/ML
INJECTION, SOLUTION INTRAVENOUS
Status: DISCONTINUED | OUTPATIENT
Start: 2023-04-19 | End: 2023-04-19

## 2023-04-19 RX ORDER — MIDAZOLAM HYDROCHLORIDE 1 MG/ML
INJECTION INTRAMUSCULAR; INTRAVENOUS
Status: DISCONTINUED | OUTPATIENT
Start: 2023-04-19 | End: 2023-04-19

## 2023-04-19 RX ORDER — DEXAMETHASONE SODIUM PHOSPHATE 4 MG/ML
INJECTION, SOLUTION INTRA-ARTICULAR; INTRALESIONAL; INTRAMUSCULAR; INTRAVENOUS; SOFT TISSUE
Status: DISCONTINUED | OUTPATIENT
Start: 2023-04-19 | End: 2023-04-19

## 2023-04-19 RX ORDER — COCAINE HYDROCHLORIDE 40 MG/ML
SOLUTION NASAL
Status: DISCONTINUED
Start: 2023-04-19 | End: 2023-04-19 | Stop reason: HOSPADM

## 2023-04-19 RX ORDER — ONDANSETRON 2 MG/ML
4 INJECTION INTRAMUSCULAR; INTRAVENOUS DAILY PRN
Status: DISCONTINUED | OUTPATIENT
Start: 2023-04-19 | End: 2023-04-19 | Stop reason: HOSPADM

## 2023-04-19 RX ORDER — FENTANYL CITRATE 50 UG/ML
INJECTION, SOLUTION INTRAMUSCULAR; INTRAVENOUS
Status: DISCONTINUED | OUTPATIENT
Start: 2023-04-19 | End: 2023-04-19

## 2023-04-19 RX ORDER — FENTANYL CITRATE 50 UG/ML
25 INJECTION, SOLUTION INTRAMUSCULAR; INTRAVENOUS EVERY 5 MIN PRN
Status: DISCONTINUED | OUTPATIENT
Start: 2023-04-19 | End: 2023-04-19 | Stop reason: HOSPADM

## 2023-04-19 RX ORDER — BACITRACIN ZINC 500 UNIT/G
OINTMENT (GRAM) TOPICAL
Status: DISCONTINUED
Start: 2023-04-19 | End: 2023-04-19 | Stop reason: HOSPADM

## 2023-04-19 RX ORDER — CEPHALEXIN 500 MG/1
500 CAPSULE ORAL EVERY 6 HOURS
Qty: 20 CAPSULE | Refills: 0 | Status: SHIPPED | OUTPATIENT
Start: 2023-04-19 | End: 2023-04-24

## 2023-04-19 RX ORDER — SODIUM CHLORIDE, SODIUM LACTATE, POTASSIUM CHLORIDE, CALCIUM CHLORIDE 600; 310; 30; 20 MG/100ML; MG/100ML; MG/100ML; MG/100ML
1000 INJECTION, SOLUTION INTRAVENOUS CONTINUOUS
Status: DISCONTINUED | OUTPATIENT
Start: 2023-04-19 | End: 2023-04-19 | Stop reason: HOSPADM

## 2023-04-19 RX ORDER — LIDOCAINE HYDROCHLORIDE 20 MG/ML
INJECTION, SOLUTION EPIDURAL; INFILTRATION; INTRACAUDAL; PERINEURAL
Status: DISCONTINUED | OUTPATIENT
Start: 2023-04-19 | End: 2023-04-19

## 2023-04-19 RX ORDER — PROPOFOL 10 MG/ML
VIAL (ML) INTRAVENOUS
Status: DISCONTINUED | OUTPATIENT
Start: 2023-04-19 | End: 2023-04-19

## 2023-04-19 RX ORDER — EPHEDRINE SULFATE 50 MG/ML
INJECTION, SOLUTION INTRAVENOUS
Status: DISCONTINUED | OUTPATIENT
Start: 2023-04-19 | End: 2023-04-19

## 2023-04-19 RX ORDER — SUCCINYLCHOLINE CHLORIDE 20 MG/ML
INJECTION INTRAMUSCULAR; INTRAVENOUS
Status: DISCONTINUED | OUTPATIENT
Start: 2023-04-19 | End: 2023-04-19

## 2023-04-19 RX ORDER — MUPIROCIN 20 MG/G
OINTMENT TOPICAL
Status: DISCONTINUED
Start: 2023-04-19 | End: 2023-04-19 | Stop reason: HOSPADM

## 2023-04-19 RX ADMIN — MIDAZOLAM HYDROCHLORIDE 2 MG: 1 INJECTION INTRAMUSCULAR; INTRAVENOUS at 10:04

## 2023-04-19 RX ADMIN — ONDANSETRON HYDROCHLORIDE 4 MG: 2 INJECTION, SOLUTION INTRAMUSCULAR; INTRAVENOUS at 12:04

## 2023-04-19 RX ADMIN — DEXMEDETOMIDINE HYDROCHLORIDE 4 MCG: 100 INJECTION, SOLUTION INTRAVENOUS at 12:04

## 2023-04-19 RX ADMIN — EPHEDRINE SULFATE 10 MG: 50 INJECTION INTRAVENOUS at 11:04

## 2023-04-19 RX ADMIN — DEXMEDETOMIDINE HYDROCHLORIDE 4 MCG: 100 INJECTION, SOLUTION INTRAVENOUS at 01:04

## 2023-04-19 RX ADMIN — FENTANYL CITRATE 25 MCG: 0.05 INJECTION, SOLUTION INTRAMUSCULAR; INTRAVENOUS at 12:04

## 2023-04-19 RX ADMIN — FENTANYL CITRATE 50 MCG: 0.05 INJECTION, SOLUTION INTRAMUSCULAR; INTRAVENOUS at 01:04

## 2023-04-19 RX ADMIN — LIDOCAINE HYDROCHLORIDE 100 MG: 20 INJECTION, SOLUTION INTRAVENOUS at 10:04

## 2023-04-19 RX ADMIN — DEXAMETHASONE SODIUM PHOSPHATE 12 MG: 4 INJECTION, SOLUTION INTRA-ARTICULAR; INTRALESIONAL; INTRAMUSCULAR; INTRAVENOUS; SOFT TISSUE at 11:04

## 2023-04-19 RX ADMIN — DEXTROSE 3 G: 50 INJECTION, SOLUTION INTRAVENOUS at 10:04

## 2023-04-19 RX ADMIN — EPHEDRINE SULFATE 10 MG: 50 INJECTION INTRAVENOUS at 12:04

## 2023-04-19 RX ADMIN — FENTANYL CITRATE 100 MCG: 0.05 INJECTION, SOLUTION INTRAMUSCULAR; INTRAVENOUS at 10:04

## 2023-04-19 RX ADMIN — SODIUM CHLORIDE, SODIUM LACTATE, POTASSIUM CHLORIDE, AND CALCIUM CHLORIDE: 600; 310; 30; 20 INJECTION, SOLUTION INTRAVENOUS at 12:04

## 2023-04-19 RX ADMIN — FENTANYL CITRATE 25 MCG: 0.05 INJECTION, SOLUTION INTRAMUSCULAR; INTRAVENOUS at 01:04

## 2023-04-19 RX ADMIN — PROPOFOL 200 MG: 10 INJECTION, EMULSION INTRAVENOUS at 10:04

## 2023-04-19 RX ADMIN — ACETAMINOPHEN 1000 MG: 10 INJECTION, SOLUTION INTRAVENOUS at 11:04

## 2023-04-19 RX ADMIN — SUCCINYLCHOLINE CHLORIDE 200 MG: 20 INJECTION, SOLUTION INTRAMUSCULAR; INTRAVENOUS; PARENTERAL at 10:04

## 2023-04-19 RX ADMIN — ROCURONIUM BROMIDE 10 MG: 10 SOLUTION INTRAVENOUS at 10:04

## 2023-04-19 RX ADMIN — SODIUM CHLORIDE, SODIUM LACTATE, POTASSIUM CHLORIDE, AND CALCIUM CHLORIDE 1000 ML: 600; 310; 30; 20 INJECTION, SOLUTION INTRAVENOUS at 09:04

## 2023-04-19 NOTE — OP NOTE
DATE OF PROCEDURE:  04/19/2023     PRE-OPERATIVE DIAGNOSIS:   Nasal septal deformity   Nasal obstruction   Intranasal adhesions     POST-OPERATIVE DIAGNOSIS:   same      PROCEDURE:   Rhinoplasty with major septal repair   Nasal endoscopy with intranasal lysis of adhesions     SURGEON:   Viktor Ferrell MD      ANESTHESIA:   General      ESTIMATED BLOOD LOSS:   50 mL      SPECIMENS:   * No specimens in log *       COMPLICATIONS:   None apparent      INDICATIONS:    Narendra English Sr. is a 57 y.o. male with nasal septal deformity who presents today for septorhinoplasty. Risks, benefits, and expectations were thoroughly discussed and the patient/patient's family wished to proceed. Informed consent was obtained. All questions were answered.       OPERATIVE DETAILS:   After informed consent was obtained, the patient was?taken to the operating room and placed in the supine position.??Once general anesthesia was induced, a time-out was performed and perioperative antibiotics and steroids were administered.    The nose was decongested with Afrin and then decongested and anesthetized with 4% cocaine on pledgets.  The nose was then anesthetized with a 1:1 mixture of 1% lidocaine with 1:100K epinephrine and 0.25% bupivicaine with 1:200K epinephrine.  The patient was then prepped and draped in a sterile fashion.     Nasal endoscopy with intranasal lysis of adhesions was preformed first. The zero degree endoscope was used to visualize the nasal and sinus cavities. Findings were significant for well healed sinus cavities, but a adhesion noted between the middle turbinate and nasal sidewall bilaterally. Local anesthetic was injected into the adhesions and they were divided with a 15 blade. Then the edges freshened with a microdebrider. A small trimmed piece of Posi-sepX packing was placed in the right middle meatus.    A 15 blade knife was used to make an inverted-V transcolumellar incision and this was connected up  bilaterally to marginal incisions.  The soft tissue envelope was elevated off the columella, tip, and dorsum of the nose.  A Ronald periosteal elevator was used to elevate periosteum off the nasal bones.  The interdomal ligament was divided, and the upper lateral cartilages were  from the dorsal septal remnant. His examination was notable for a severely weakened and deviated caudal septum to the right. Mucoperichondrial flaps were elevated off the cartilaginous septum. Once the site or prior septoplasty was encountered the mucoperichondrial flaps were divided sharply with a 15 blade for a small distance  Once the flaps were elevated, it was clear the caudal septum was severely deviated to the left, particularly at its base along the nasal floor. Therefore the decision was made to proceed with extracorporeal septoplasty, by removing the entire remaining caudal and dorsal septum, leaving behind a 12 x 12 cm dorsal strut.       Given his  lack of grafting material, an irradiated rib was chosen from the tissue bank and brought onto the operative field after the appropriate implant timeout. A suture foil was used to make a template for the septal construction. Rib cartilage was trimmed and into two pieces and suture-secured together to create the new septal construct, based on the template. It was then secured to the right-side of the remnant dorsal septum and the anterior nasal spine with a  5-0 Prolene. There was excellent correction of the previous septal deviation and the airway was patent.     Next we turned to repair of the middle third. The septal construct extended between the septum and the ULC's, acting as a  graft. A separate 12 mm  graft was then carved from the rib graft.  This was placed between the septum and upper lateral cartilage on the right to act as  grafts in order to improve the patient's nasal valve collapse.  The grafts were then sutured in place with 5-0 Prolene  suture.  Excellent correction at the middle third was achieved.      The medial crurae of the lower lateral cartilages were then secured on the new stable septal construct in a tongue and groove fashion with 4-0 plain gut suture on a Justice needle. Finally, an interdomal suture and a lateral tensions suture were placed with a 6-0 and 5-0 prolene, respectively.     The transcolumellar incision was closed with 5-0 FAST gut suture, and the marginal incisions were closed with 4-0 Vicryl.   ?     Bilateral Braun splints were then placed and secured at the columella with a 3-0 Prolene.  The dorsum was dressed with mastisol, paper tape, and an Aquaplast splint.  The pharynx was suctioned and ointment was placed over the transcolumellar incision.     The patient was returned to the care of the Anesthesia, awakened, extubated, and transferred to the PACU in good condition.?

## 2023-04-19 NOTE — ANESTHESIA POSTPROCEDURE EVALUATION
Anesthesia Post Evaluation    Patient: Narendra Hopkins Amador Oliva    Procedure(s) Performed: Procedure(s) (LRB):  RHINOPLASTY (Bilateral)  ENDOSCOPY, NOSE (Bilateral)  LYSIS, ADHESIONS (Bilateral)    Final Anesthesia Type: general      Patient location during evaluation: PACU  Patient participation: Yes- Able to Participate  Level of consciousness: awake  Post-procedure vital signs: reviewed and stable  Pain management: adequate  Airway patency: patent    PONV status at discharge: No PONV  Anesthetic complications: no      Cardiovascular status: stable  Respiratory status: unassisted  Hydration status: euvolemic  Follow-up not needed.          Vitals Value Taken Time   /74 04/19/23 1400   Temp 36.7 °C (98.1 °F) 04/19/23 1325   Pulse 78 04/19/23 1410   Resp 22 04/19/23 1409   SpO2 96 % 04/19/23 1409   Vitals shown include unvalidated device data.      Event Time   Out of Recovery 14:00:00         Pain/Shayan Score: Shayan Score: 10 (4/19/2023  1:45 PM)

## 2023-04-19 NOTE — PATIENT INSTRUCTIONS
Septorhinoplasty  Discharge Instructions:   - You may have some swelling of your nose, upper lip, cheeks, or around your eyes after nasal surgery. Your nose may be sore and will bleed. This may last for several days after surgery.   - The tip of your nose and your upper lip and gums may be numb. Feeling will return in a few weeks to a few months.   - Your sense of smell may not be as good after surgery, but this usually returns.   - You may a have a drip pad under your nose to collect mucus and blood. Change it when it bleeds through. You may stop using this once you fell it is not needed.   - You have nasal stents in your nose.  They have a channel to breath through, but this channel frequently clogs with blood and mucus.  You may irrigate your nose with nasal saline spray (available over the counter) several times a day to try to keep the breathing channels in the nasal stents open.   - There is a cast on the outside of your nose. This will be removed at your clinic appointment. Sometimes this falls off early, and that is okay. Just be careful not to bump your nose.    - Clean the nasal incision twice daily with half strength hydrogen peroxide then apply a thin layer of bacitracin twice daily for 5 days. After 5 days, switch to vaseline.    - Do not blow your nose when the stents are in place.   - Do not try to remove the stents, your doctor will do that.   - The stents may cause you to breath through your mouth.  Humidification and staying well hydrated can help keep your mouth from drying out.   - Do not blow your nose for 2 weeks after surgery. If you do have to sneeze, do so with your mouth open.    Activity/Restrictions:    - No heavy lifting, straining, or strenuous physical activity for two weeks. Sleep with the head of the bed elevated.     Diet:   - Resume your regular diet, as tolerated     Additional Instructions:   - Try using acetaminophen/Tylenol and ibuprofen/Motrin to control your pain. If this  does not bring you relief or the pain remains severe, a stronger pain medication has been prescribed to you.   - DO NOT MIX NARCOTIC PAIN MEDICATIONS OR TAKE NARCOTIC PRESCRIPTIONS AT THE SAME TIME (PERCODET, LORTAB, ROXICODONE, ETC.)   - DO NOT DRIVE OR OPERATE HEAVY MACHINERY WHILE ON NARCOTICS    - DO NOT TAKE MORE THAN 4 GRAMS (4000mg) OF TYLENOL (ACETAMINOPHEN) IN 24 HOURS     Follow Up:    - A follow up appointment has been scheduled for you as outlined below:   Future Appointments   Date Time Provider Department Center   4/28/2023  9:00 AM Viktor Ferrell MD [facility] ENT [facility]   5/5/2023  8:10 AM [facility] CT1 LIMIT 500 LBS [facility] CT SCAN [facility]   5/5/2023 10:30 AM Nathalia Dunne PA-C [facility] NEUSUR [facility]   5/26/2023 10:00 AM Shad Acosta MD [facility] UROLOGY  Medical C   9/5/2023  8:00 AM LAB[facility]ATORY, [place] [facility] LAB [place]   9/11/2023  9:20 AM Tabitha Melgoza MD [facility] IM  Medical C   9/18/2023  2:20 PM Kwame Guidry MD [facility] CARDIO [place]   9/29/2023  1:15 PM Shoaib Flannery OD [facility] OPHTHAL  Medical C   10/11/2023  9:30 AM [facility] CT1 LIMIT 500 LBS [facility] CT SCAN [facility]   10/11/2023 10:30 AM Homero Baker [facility]         Call your doctor or go to the emergency room if you have:    - Fever of 101.5 degrees or higher   - Severe pain that has increased greatly since your surgery or is uncontrolled by your current pain medications   - Increased redness around your incision (cut); incision pulling apart; thick, white drainage (pus), bleeding, or a large amount of fluid from your incision.   - Nausea and vomiting that does not go away   - Chest pain/shortness of breath   - Any other acute events, problems, or concerns     For any questions, please call our clinic our leave us a My Chart message. [facility] Line: [phone], then ask for ENT Clinic.   For after hours questions and/or urgent concerns, call the same number above ([phone]) and ask for  the on-call ENT physician.

## 2023-04-19 NOTE — TRANSFER OF CARE
"Anesthesia Transfer of Care Note    Patient: Narendra English .    Procedure(s) Performed: Procedure(s) (LRB):  RHINOPLASTY (Bilateral)  ENDOSCOPY, NOSE (Bilateral)    Patient location: PACU    Anesthesia Type: general    Transport from OR: Transported from OR on room air with adequate spontaneous ventilation    Post pain: adequate analgesia    Post assessment: no apparent anesthetic complications    Post vital signs: stable    Level of consciousness: awake    Nausea/Vomiting: no nausea/vomiting    Complications: none    Transfer of care protocol was followed      Last vitals:   Visit Vitals  BP (!) 156/80 (BP Location: Right arm, Patient Position: Sitting)   Pulse 81   Temp 36.7 °C (98.1 °F) (Temporal)   Resp 16   Ht 6' 1" (1.854 m)   Wt 122 kg (268 lb 15.4 oz)   SpO2 95%   BMI 35.49 kg/m²     "

## 2023-04-19 NOTE — ANESTHESIA PROCEDURE NOTES
Intubation    Date/Time: 4/19/2023 10:50 AM  Performed by: Tabitha Gaviria CRNA  Authorized by: Larry Goldstein MD     Intubation:     Induction:  Intravenous    Intubated:  Postinduction    Mask Ventilation:  Easy mask (2 person)    Attempts:  1    Attempted By:  CRNA    Method of Intubation:  Video laryngoscopy    Blade:  Glidescope 3    Laryngeal View Grade: Grade I - full view of cords      Difficult Airway Encountered?: No      Complications:  None    Airway Device:  Oral endotracheal tube    Airway Device Size:  7.0    Style/Cuff Inflation:  Cuffed    Inflation Amount (mL):  5    Tube secured:  24    Secured at:  The lips    Placement Verified By:  Capnometry and Revisualization with laryngoscopy (Bilateral Breath Sounds)    Complicating Factors:  Large/floppy epiglottis, obesity and poor neck/head extension    Findings Post-Intubation:  BS equal bilateral and atraumatic/condition of teeth unchanged

## 2023-04-19 NOTE — PLAN OF CARE
Reviewed and completed all discharge orders. Printed AVS and educated patient and friend of its entirety, including physician's orders, follow-up appt, medications, when to call, and when to report to the emergency room. Reviewed prescriptions, pharmacy information, and made sure there were no conflicts preventing the patient from obtaining the newly prescribed medications. I encouraged questions, answered them thoroughly, and evaluated my instructions via teach-back method. Patient has met all hospital discharge criteria at this point.

## 2023-04-19 NOTE — BRIEF OP NOTE
[facility]  Brief Operative Note     SUMMARY     Surgery Date: 4/19/2023     Surgeon(s) and Role:     * Viktor Ferrell MD - Primary    Assisting Surgeon: None    Pre-op Diagnosis:  Nasal septal deviation [J34.2]  Nasal obstruction [J34.89]  Adhesions of nasal septum and turbinates [J34.89]    Post-op Diagnosis:  Post-Op Diagnosis Codes:     * Nasal septal deviation [J34.2]     * Nasal obstruction [J34.89]     * Adhesions of nasal septum and turbinates [J34.89]    Procedure(s) (LRB):  RHINOPLASTY (Bilateral)  ENDOSCOPY, NOSE (Bilateral)    Anesthesia: General    Findings/Key Components:  see op note    Estimated Blood Loss: 25 mL         Specimens:   Specimen (24h ago, onward)      None            Discharge Note    SUMMARY     Admit Date: 4/19/2023    Discharge Date and Time: No discharge date for patient encounter.    Attending Physician: Viktor Ferrell MD     Discharge Provider: Viktor Ferrell    Final Diagnosis: Post-Op Diagnosis Codes:     * Nasal septal deviation [J34.2]     * Nasal obstruction [J34.89]     * Adhesions of nasal septum and turbinates [J34.89]    Disposition: Home or Self Care, discharged in good condition    Follow Up/Patient Instructions:       Medications:  Reconciled Home Medications:   Current Discharge Medication List        START taking these medications    Details   cephALEXin (KEFLEX) 500 MG capsule Take 1 capsule (500 mg total) by mouth every 6 (six) hours. for 5 days  Qty: 20 capsule, Refills: 0      HYDROcodone-acetaminophen (NORCO) 5-325 mg per tablet Take 1 tablet by mouth every 6 (six) hours as needed for Pain.  Qty: 20 tablet, Refills: 0    Comments: Quantity prescribed more than 7 day supply? No           CONTINUE these medications which have NOT CHANGED    Details   amLODIPine (NORVASC) 10 MG tablet Take 1 tablet (10 mg total) by mouth once daily.  Qty: 90 tablet, Refills: 1      atorvastatin (LIPITOR) 20 MG tablet Take 1 tablet (20 mg total) by mouth every  evening.  Qty: 90 tablet, Refills: 3      benazepriL (LOTENSIN) 40 MG tablet Take 1 tablet (40 mg total) by mouth once daily.  Qty: 90 tablet, Refills: 3    Comments: .  Associated Diagnoses: Hypertension associated with diabetes      clobetasol 0.05% (TEMOVATE) 0.05 % Oint AAA bid  Qty: 60 g, Refills: 3    Associated Diagnoses: Psoriasis      clonazePAM (KLONOPIN) 2 MG Tab Take 1 tablet (2 mg total) by mouth every evening.  Qty: 30 tablet, Refills: 0      colchicine (COLCRYS) 0.6 mg tablet Take 1 tablet (0.6 mg total) by mouth 2 (two) times daily.  Qty: 180 tablet, Refills: 3    Associated Diagnoses: Gout, unspecified cause, unspecified chronicity, unspecified site      fluticasone propionate (FLONASE) 50 mcg/actuation nasal spray 1 spray (50 mcg total) by Each Nostril route once daily.  Qty: 16 g, Refills: 0      hydrALAZINE (APRESOLINE) 100 MG tablet Take 1 tablet (100 mg total) by mouth every 8 (eight) hours.  Qty: 270 tablet, Refills: 1    Comments: .  Associated Diagnoses: Hyperlipidemia, unspecified hyperlipidemia type; Chest pain, unspecified type; Nonspecific abnormal electrocardiogram (ECG) (EKG); Type 2 diabetes mellitus without complication, without long-term current use of insulin; Hypertension, unspecified type; Pre-op evaluation      hydrocortisone 2.5 % ointment Apply topically 2 (two) times daily. Use on lips  Qty: 30 g, Refills: 1    Associated Diagnoses: Cheilitis      indomethacin (INDOCIN SR) 75 mg CpSR CR capsule Take 75 mg by mouth 2 (two) times daily with meals.      insulin glargine, TOUJEO, (TOUJEO SOLOSTAR U-300 INSULIN) 300 unit/mL (1.5 mL) InPn pen Inject 85 Units into the skin once daily.  Qty: 17 pen, Refills: 1      levothyroxine (SYNTHROID) 112 MCG tablet Take 1 tablet (112 mcg total) by mouth before breakfast.  Qty: 90 tablet, Refills: 1      metFORMIN (GLUCOPHAGE) 1000 MG tablet Take 1 tablet (1,000 mg total) by mouth 2 (two) times daily with meals.  Qty: 180 tablet, Refills: 3     Associated Diagnoses: Type 2 diabetes mellitus without complication, with long-term current use of insulin      metoprolol succinate (TOPROL-XL) 50 MG 24 hr tablet Take 1 tablet (50 mg total) by mouth once daily.  Qty: 90 tablet, Refills: 3    Comments: .  Associated Diagnoses: Hypertension associated with diabetes      pregabalin (LYRICA) 100 MG capsule Take 1 capsule (100 mg total) by mouth 2 (two) times daily.  Qty: 60 capsule, Refills: 0      tadalafiL (CIALIS) 20 MG Tab Take 1 tablet (20 mg total) by mouth once daily.  Qty: 30 tablet, Refills: 11    Associated Diagnoses: Erectile dysfunction, unspecified erectile dysfunction type      tirzepatide 10 mg/0.5 mL PnIj Inject 10 mg into the skin every 7 days.  Qty: 4 pen, Refills: 1      triamcinolone acetonide 0.1% (KENALOG) 0.1 % ointment Apply topically 2 (two) times daily. Use up to 2 weeks at a time  Qty: 454 g, Refills: 2    Associated Diagnoses: Psoriasis      VASCEPA 1 gram Cap Take 2 capsules (2,000 mg total) by mouth 2 (two) times daily.  Qty: 360 capsule, Refills: 1      diclofenac sodium (VOLTAREN) 1 % Gel Apply 2 g topically 4 (four) times daily as needed (Pain).  Qty: 100 g, Refills: 1    Associated Diagnoses: Cervical radiculopathy      flash glucose scanning reader Misc 1 application.      flash glucose sensor Kit 1 application.      traZODone (DESYREL) 50 MG tablet Take 1 tablet (50 mg total) by mouth every evening.  Qty: 30 tablet, Refills: 5    Associated Diagnoses: Shift work sleep disorder           No discharge procedures on file.

## 2023-04-21 LAB — POCT GLUCOSE: 98 MG/DL (ref 70–110)

## 2023-04-25 ENCOUNTER — PATIENT MESSAGE (OUTPATIENT)
Dept: UROLOGY | Facility: CLINIC | Age: 58
End: 2023-04-25
Payer: COMMERCIAL

## 2023-04-28 ENCOUNTER — OFFICE VISIT (OUTPATIENT)
Dept: OTOLARYNGOLOGY | Facility: CLINIC | Age: 58
End: 2023-04-28
Payer: COMMERCIAL

## 2023-04-28 VITALS — TEMPERATURE: 98 F | WEIGHT: 262.13 LBS | HEIGHT: 73 IN | BODY MASS INDEX: 34.74 KG/M2

## 2023-04-28 DIAGNOSIS — J34.2 NASAL SEPTAL DEFORMITY: Primary | ICD-10-CM

## 2023-04-28 PROCEDURE — 99999 PR PBB SHADOW E&M-EST. PATIENT-LVL IV: ICD-10-PCS | Mod: PBBFAC,,, | Performed by: STUDENT IN AN ORGANIZED HEALTH CARE EDUCATION/TRAINING PROGRAM

## 2023-04-28 PROCEDURE — 3008F PR BODY MASS INDEX (BMI) DOCUMENTED: ICD-10-PCS | Mod: CPTII,S$GLB,, | Performed by: STUDENT IN AN ORGANIZED HEALTH CARE EDUCATION/TRAINING PROGRAM

## 2023-04-28 PROCEDURE — 3044F PR MOST RECENT HEMOGLOBIN A1C LEVEL <7.0%: ICD-10-PCS | Mod: CPTII,S$GLB,, | Performed by: STUDENT IN AN ORGANIZED HEALTH CARE EDUCATION/TRAINING PROGRAM

## 2023-04-28 PROCEDURE — 99024 PR POST-OP FOLLOW-UP VISIT: ICD-10-PCS | Mod: S$GLB,,, | Performed by: STUDENT IN AN ORGANIZED HEALTH CARE EDUCATION/TRAINING PROGRAM

## 2023-04-28 PROCEDURE — 3044F HG A1C LEVEL LT 7.0%: CPT | Mod: CPTII,S$GLB,, | Performed by: STUDENT IN AN ORGANIZED HEALTH CARE EDUCATION/TRAINING PROGRAM

## 2023-04-28 PROCEDURE — 99999 PR PBB SHADOW E&M-EST. PATIENT-LVL IV: CPT | Mod: PBBFAC,,, | Performed by: STUDENT IN AN ORGANIZED HEALTH CARE EDUCATION/TRAINING PROGRAM

## 2023-04-28 PROCEDURE — 99024 POSTOP FOLLOW-UP VISIT: CPT | Mod: S$GLB,,, | Performed by: STUDENT IN AN ORGANIZED HEALTH CARE EDUCATION/TRAINING PROGRAM

## 2023-04-28 PROCEDURE — 3061F NEG MICROALBUMINURIA REV: CPT | Mod: CPTII,S$GLB,, | Performed by: STUDENT IN AN ORGANIZED HEALTH CARE EDUCATION/TRAINING PROGRAM

## 2023-04-28 PROCEDURE — 3066F NEPHROPATHY DOC TX: CPT | Mod: CPTII,S$GLB,, | Performed by: STUDENT IN AN ORGANIZED HEALTH CARE EDUCATION/TRAINING PROGRAM

## 2023-04-28 PROCEDURE — 3061F PR NEG MICROALBUMINURIA RESULT DOCUMENTED/REVIEW: ICD-10-PCS | Mod: CPTII,S$GLB,, | Performed by: STUDENT IN AN ORGANIZED HEALTH CARE EDUCATION/TRAINING PROGRAM

## 2023-04-28 PROCEDURE — 1159F PR MEDICATION LIST DOCUMENTED IN MEDICAL RECORD: ICD-10-PCS | Mod: CPTII,S$GLB,, | Performed by: STUDENT IN AN ORGANIZED HEALTH CARE EDUCATION/TRAINING PROGRAM

## 2023-04-28 PROCEDURE — 4010F PR ACE/ARB THEARPY RXD/TAKEN: ICD-10-PCS | Mod: CPTII,S$GLB,, | Performed by: STUDENT IN AN ORGANIZED HEALTH CARE EDUCATION/TRAINING PROGRAM

## 2023-04-28 PROCEDURE — 3066F PR DOCUMENTATION OF TREATMENT FOR NEPHROPATHY: ICD-10-PCS | Mod: CPTII,S$GLB,, | Performed by: STUDENT IN AN ORGANIZED HEALTH CARE EDUCATION/TRAINING PROGRAM

## 2023-04-28 PROCEDURE — 4010F ACE/ARB THERAPY RXD/TAKEN: CPT | Mod: CPTII,S$GLB,, | Performed by: STUDENT IN AN ORGANIZED HEALTH CARE EDUCATION/TRAINING PROGRAM

## 2023-04-28 PROCEDURE — 3008F BODY MASS INDEX DOCD: CPT | Mod: CPTII,S$GLB,, | Performed by: STUDENT IN AN ORGANIZED HEALTH CARE EDUCATION/TRAINING PROGRAM

## 2023-04-28 PROCEDURE — 1159F MED LIST DOCD IN RCRD: CPT | Mod: CPTII,S$GLB,, | Performed by: STUDENT IN AN ORGANIZED HEALTH CARE EDUCATION/TRAINING PROGRAM

## 2023-04-28 NOTE — PROGRESS NOTES
"Otolaryngology Post Operative Note      SUBJECTIVE:    Doing well since surgery. Minimal bleeding POD1      OBJECTIVE:      Temp 97.6 °F (36.4 °C) (Tympanic)   Ht 6' 1" (1.854 m)   Wt 118.9 kg (262 lb 2 oz)   BMI 34.58 kg/m²       General:  No acute distress.      HEENT:  transcolumellar  incision clean, dry and intact.  Cast and splints removed. Dorsum and tip with expected swelling. Good tip support, rotation, and projection. Septum midline and intact. Nasal cavities widely patent. Clot and secretions suctioned clear.     Respiratory: Non-labored breathing.          ASSESSMENT:     57 y.o. male with nasal septal deformity s/p septorhinoplasty on 4/19. Doing pawel well.      PLAN:    Continue saline TID and PRN  Switch from antibiotic ointment to vaseline for incision  Ok to resume use of nasal sprays in one week  Resume CPAP in 1 week  F/u 3 months, sooner with concerns   "

## 2023-04-29 NOTE — TELEPHONE ENCOUNTER
Care Due:                  Date            Visit Type   Department     Provider  --------------------------------------------------------------------------------                                EP -                              PRIMARY      [facility] INTERNAL  Last Visit: 03-      [facility] ([facility])   MEDICINE       Skyla Ardon  Next Visit: None Scheduled  None         None Found                                                            Last  Test          Frequency    Reason                     Performed    Due Date  --------------------------------------------------------------------------------    Uric Acid...  12 months..  colchicine...............  Not Found    Overdue    Powered by [email or web address]. Reference number: [id number].   4/10/2022 9:55:35 AM CDT   No

## 2023-05-03 ENCOUNTER — HOSPITAL ENCOUNTER (OUTPATIENT)
Dept: RADIOLOGY | Facility: HOSPITAL | Age: 58
Discharge: HOME | End: 2023-05-03
Attending: PHYSICIAN ASSISTANT
Payer: COMMERCIAL

## 2023-05-03 DIAGNOSIS — M54.2 CERVICALGIA: ICD-10-CM

## 2023-05-03 PROCEDURE — 72125 CT NECK SPINE W/O DYE: CPT | Mod: 26,,, | Performed by: RADIOLOGY

## 2023-05-03 PROCEDURE — 72125 CT NECK SPINE W/O DYE: CPT | Mod: TC,PN

## 2023-05-03 PROCEDURE — 72125 CT CERVICAL SPINE WITHOUT CONTRAST: ICD-10-PCS | Mod: 26,,, | Performed by: RADIOLOGY

## 2023-05-04 ENCOUNTER — OFFICE VISIT (OUTPATIENT)
Dept: NEUROSURGERY | Facility: CLINIC | Age: 58
End: 2023-05-04
Payer: COMMERCIAL

## 2023-05-04 DIAGNOSIS — M54.12 CERVICAL RADICULOPATHY: Primary | ICD-10-CM

## 2023-05-04 DIAGNOSIS — M48.02 SPINAL STENOSIS, CERVICAL REGION: ICD-10-CM

## 2023-05-04 DIAGNOSIS — M54.2 CERVICALGIA: ICD-10-CM

## 2023-05-04 DIAGNOSIS — M50.30 DDD (DEGENERATIVE DISC DISEASE), CERVICAL: ICD-10-CM

## 2023-05-04 PROCEDURE — 99442 PR PHYSICIAN TELEPHONE EVALUATION 11-20 MIN: ICD-10-PCS | Mod: 95,,, | Performed by: PHYSICIAN ASSISTANT

## 2023-05-04 PROCEDURE — 99442 PR PHYSICIAN TELEPHONE EVALUATION 11-20 MIN: CPT | Mod: 95,,, | Performed by: PHYSICIAN ASSISTANT

## 2023-05-04 PROCEDURE — 3066F NEPHROPATHY DOC TX: CPT | Mod: CPTII,95,, | Performed by: PHYSICIAN ASSISTANT

## 2023-05-04 PROCEDURE — 3044F HG A1C LEVEL LT 7.0%: CPT | Mod: CPTII,95,, | Performed by: PHYSICIAN ASSISTANT

## 2023-05-04 PROCEDURE — 4010F PR ACE/ARB THEARPY RXD/TAKEN: ICD-10-PCS | Mod: CPTII,95,, | Performed by: PHYSICIAN ASSISTANT

## 2023-05-04 PROCEDURE — 3066F PR DOCUMENTATION OF TREATMENT FOR NEPHROPATHY: ICD-10-PCS | Mod: CPTII,95,, | Performed by: PHYSICIAN ASSISTANT

## 2023-05-04 PROCEDURE — 4010F ACE/ARB THERAPY RXD/TAKEN: CPT | Mod: CPTII,95,, | Performed by: PHYSICIAN ASSISTANT

## 2023-05-04 PROCEDURE — 3061F PR NEG MICROALBUMINURIA RESULT DOCUMENTED/REVIEW: ICD-10-PCS | Mod: CPTII,95,, | Performed by: PHYSICIAN ASSISTANT

## 2023-05-04 PROCEDURE — 3044F PR MOST RECENT HEMOGLOBIN A1C LEVEL <7.0%: ICD-10-PCS | Mod: CPTII,95,, | Performed by: PHYSICIAN ASSISTANT

## 2023-05-04 PROCEDURE — 3061F NEG MICROALBUMINURIA REV: CPT | Mod: CPTII,95,, | Performed by: PHYSICIAN ASSISTANT

## 2023-05-04 NOTE — PROGRESS NOTES
"  The patient location is: home  The chief complaint leading to consultation is: follow-up regarding cervical spine  Visit type: audiovisual    Face to Face time with patient: 15 minutes of total time spent on the encounter, which includes face to face time and non-face to face time preparing to see the patient (eg, review of tests), Obtaining and/or reviewing separately obtained history, Documenting clinical information in the electronic or other health record, Independently interpreting results (not separately reported) and communicating results to the patient/family/caregiver, or Care coordination (not separately reported).   Each patient to whom he or she provides medical services by telemedicine is:  (1) informed of the relationship between the physician and patient and the respective role of any other health care provider with respect to management of the patient; and (2) notified that he or she may decline to receive medical services by telemedicine and may withdraw from such care at any time.    Notes:     The patient is scheduled for an audio visit today to discuss CT cervical spine.   Since his last visit the patient reports that the Pain is worse.   Starting to have burning sensation in L shoulder.  Shoulder feels asleep or burning.  Neck has constant dull pain.  Since his last visit, no recent falls.  In the past month, he has noticed the  strength in L hand is getting weaker.  "Starting to drop stuff."    CT cervical spine-  FINDINGS:  Seven non rib-bearing vertebral cervical vertebrae.  Again seen is mild reversal the normal cervical lordosis apex C6.  No significant listhesis.  No acute fracture.  No significant compression deformity.  The visualized posterior fossa is unremarkable.  The craniocervical junction is well maintained.   Diffuse congenital narrowing of the cervical spinal canal is redemonstrated.     C2-C3: Mild central canal narrowing.  No significant disc bulge.  Neural foramina " appear patent.   C3-C4: Uncinate hypertrophy.  Facet hypertrophy, left greater than right.  Ligamentum flavum thickening.  Mild diffuse posterior disc bulge.  Moderate to severe central canal narrowing.  Bilateral severe neural foraminal narrowing.   C4-C5: Mild posterior disc bulge and ligamentum flavum thickening.  Uncinate and facet hypertrophy.  Moderate to severe central canal stenosis.  Right greater than left severe neural foraminal narrowing.   C5-C6: Posterior disc osteophyte complex.  Uncinate hypertrophy.  Bilateral facet arthropathy.  Ligamentum flavum is not well demonstrated on CT at this level.  Bilateral facet arthropathy.  Moderate central canal stenosis.  Severe bilateral neural foraminal narrowing.     C6-C7: Prominent post area are disc osteophyte complex contributes to moderate to severe central canal stenosis.  Severe bilateral neural foraminal narrowing.     C7-T1: Small posterior disc osteophyte complex.  Mild facet arthropathy.  Mild to moderate central canal narrowing.  Mild neural foraminal stenosis bilaterally.   Surrounding soft tissues and visualized lung apices are unremarkable.     Impression:   Multilevel moderate to severe degenerative changes most pronounced at the C6/C7 level.  Detailed findings as above.    A/P:  CT findings discussed with pt today. He is aware of degenerative changes and stenosis involving C4-7.   Will place referral for pt to see ENT for preop assessment.  He will follow up with MD after for preop discussion.

## 2023-05-10 ENCOUNTER — PATIENT MESSAGE (OUTPATIENT)
Dept: INTERNAL MEDICINE | Facility: CLINIC | Age: 58
End: 2023-05-10
Payer: COMMERCIAL

## 2023-05-11 NOTE — TELEPHONE ENCOUNTER
I discussed weaning klonopin down and started trazodone in November 2022, which was about 6 months ago. He was advised to follow up with me in 1 month for next steps, I suggest he schedule a visit so we can discuss this further.

## 2023-05-13 ENCOUNTER — PATIENT MESSAGE (OUTPATIENT)
Dept: NEUROSURGERY | Facility: CLINIC | Age: 58
End: 2023-05-13
Payer: COMMERCIAL

## 2023-05-16 DIAGNOSIS — M48.02 SPINAL STENOSIS, CERVICAL REGION: Primary | ICD-10-CM

## 2023-05-16 DIAGNOSIS — M54.12 CERVICAL RADICULOPATHY: ICD-10-CM

## 2023-05-16 DIAGNOSIS — M50.30 DDD (DEGENERATIVE DISC DISEASE), CERVICAL: ICD-10-CM

## 2023-05-18 ENCOUNTER — OFFICE VISIT (OUTPATIENT)
Dept: PAIN MEDICINE | Facility: CLINIC | Age: 58
End: 2023-05-18
Payer: COMMERCIAL

## 2023-05-18 ENCOUNTER — HOSPITAL ENCOUNTER (OUTPATIENT)
Dept: RADIOLOGY | Facility: HOSPITAL | Age: 58
Discharge: HOME | End: 2023-05-18
Attending: PHYSICIAN ASSISTANT
Payer: COMMERCIAL

## 2023-05-18 VITALS
WEIGHT: 266.19 LBS | SYSTOLIC BLOOD PRESSURE: 131 MMHG | BODY MASS INDEX: 35.28 KG/M2 | DIASTOLIC BLOOD PRESSURE: 64 MMHG | HEIGHT: 73 IN | RESPIRATION RATE: 16 BRPM | HEART RATE: 76 BPM

## 2023-05-18 DIAGNOSIS — M54.16 LUMBAR RADICULOPATHY, CHRONIC: Primary | ICD-10-CM

## 2023-05-18 DIAGNOSIS — M54.16 LUMBAR RADICULOPATHY, CHRONIC: ICD-10-CM

## 2023-05-18 PROCEDURE — 72110 X-RAY EXAM L-2 SPINE 4/>VWS: CPT | Mod: TC

## 2023-05-18 PROCEDURE — 3061F PR NEG MICROALBUMINURIA RESULT DOCUMENTED/REVIEW: ICD-10-PCS | Mod: CPTII,S$GLB,, | Performed by: PHYSICIAN ASSISTANT

## 2023-05-18 PROCEDURE — 1160F PR REVIEW ALL MEDS BY PRESCRIBER/CLIN PHARMACIST DOCUMENTED: ICD-10-PCS | Mod: CPTII,S$GLB,, | Performed by: PHYSICIAN ASSISTANT

## 2023-05-18 PROCEDURE — 72110 X-RAY EXAM L-2 SPINE 4/>VWS: CPT | Mod: 26,,, | Performed by: RADIOLOGY

## 2023-05-18 PROCEDURE — 72110 XR LUMBAR SPINE AP AND LAT WITH FLEX/EXT: ICD-10-PCS | Mod: 26,,, | Performed by: RADIOLOGY

## 2023-05-18 PROCEDURE — 3066F NEPHROPATHY DOC TX: CPT | Mod: CPTII,S$GLB,, | Performed by: PHYSICIAN ASSISTANT

## 2023-05-18 PROCEDURE — 99214 PR OFFICE/OUTPT VISIT, EST, LEVL IV, 30-39 MIN: ICD-10-PCS | Mod: S$GLB,,, | Performed by: PHYSICIAN ASSISTANT

## 2023-05-18 PROCEDURE — 4010F ACE/ARB THERAPY RXD/TAKEN: CPT | Mod: CPTII,S$GLB,, | Performed by: PHYSICIAN ASSISTANT

## 2023-05-18 PROCEDURE — 3078F PR MOST RECENT DIASTOLIC BLOOD PRESSURE < 80 MM HG: ICD-10-PCS | Mod: CPTII,S$GLB,, | Performed by: PHYSICIAN ASSISTANT

## 2023-05-18 PROCEDURE — 99999 PR PBB SHADOW E&M-EST. PATIENT-LVL V: CPT | Mod: PBBFAC,,, | Performed by: PHYSICIAN ASSISTANT

## 2023-05-18 PROCEDURE — 3075F SYST BP GE 130 - 139MM HG: CPT | Mod: CPTII,S$GLB,, | Performed by: PHYSICIAN ASSISTANT

## 2023-05-18 PROCEDURE — 3066F PR DOCUMENTATION OF TREATMENT FOR NEPHROPATHY: ICD-10-PCS | Mod: CPTII,S$GLB,, | Performed by: PHYSICIAN ASSISTANT

## 2023-05-18 PROCEDURE — 99999 PR PBB SHADOW E&M-EST. PATIENT-LVL V: ICD-10-PCS | Mod: PBBFAC,,, | Performed by: PHYSICIAN ASSISTANT

## 2023-05-18 PROCEDURE — 3008F BODY MASS INDEX DOCD: CPT | Mod: CPTII,S$GLB,, | Performed by: PHYSICIAN ASSISTANT

## 2023-05-18 PROCEDURE — 99214 OFFICE O/P EST MOD 30 MIN: CPT | Mod: S$GLB,,, | Performed by: PHYSICIAN ASSISTANT

## 2023-05-18 PROCEDURE — 1159F PR MEDICATION LIST DOCUMENTED IN MEDICAL RECORD: ICD-10-PCS | Mod: CPTII,S$GLB,, | Performed by: PHYSICIAN ASSISTANT

## 2023-05-18 PROCEDURE — 3044F HG A1C LEVEL LT 7.0%: CPT | Mod: CPTII,S$GLB,, | Performed by: PHYSICIAN ASSISTANT

## 2023-05-18 PROCEDURE — 3078F DIAST BP <80 MM HG: CPT | Mod: CPTII,S$GLB,, | Performed by: PHYSICIAN ASSISTANT

## 2023-05-18 PROCEDURE — 4010F PR ACE/ARB THEARPY RXD/TAKEN: ICD-10-PCS | Mod: CPTII,S$GLB,, | Performed by: PHYSICIAN ASSISTANT

## 2023-05-18 PROCEDURE — 3061F NEG MICROALBUMINURIA REV: CPT | Mod: CPTII,S$GLB,, | Performed by: PHYSICIAN ASSISTANT

## 2023-05-18 PROCEDURE — 1160F RVW MEDS BY RX/DR IN RCRD: CPT | Mod: CPTII,S$GLB,, | Performed by: PHYSICIAN ASSISTANT

## 2023-05-18 PROCEDURE — 3044F PR MOST RECENT HEMOGLOBIN A1C LEVEL <7.0%: ICD-10-PCS | Mod: CPTII,S$GLB,, | Performed by: PHYSICIAN ASSISTANT

## 2023-05-18 PROCEDURE — 3008F PR BODY MASS INDEX (BMI) DOCUMENTED: ICD-10-PCS | Mod: CPTII,S$GLB,, | Performed by: PHYSICIAN ASSISTANT

## 2023-05-18 PROCEDURE — 3075F PR MOST RECENT SYSTOLIC BLOOD PRESS GE 130-139MM HG: ICD-10-PCS | Mod: CPTII,S$GLB,, | Performed by: PHYSICIAN ASSISTANT

## 2023-05-18 PROCEDURE — 1159F MED LIST DOCD IN RCRD: CPT | Mod: CPTII,S$GLB,, | Performed by: PHYSICIAN ASSISTANT

## 2023-05-18 RX ORDER — CYCLOBENZAPRINE HCL 10 MG
5-10 TABLET ORAL 2 TIMES DAILY PRN
Qty: 60 TABLET | Refills: 1 | Status: SHIPPED | OUTPATIENT
Start: 2023-05-18 | End: 2023-05-29

## 2023-05-18 RX ORDER — METHYLPREDNISOLONE 4 MG/1
TABLET ORAL
Qty: 1 EACH | Refills: 0 | Status: SHIPPED | OUTPATIENT
Start: 2023-05-18 | End: 2023-05-29

## 2023-05-18 RX ORDER — KETOROLAC TROMETHAMINE 30 MG/ML
30 INJECTION, SOLUTION INTRAMUSCULAR; INTRAVENOUS ONCE
Status: ACTIVE | OUTPATIENT
Start: 2023-05-18 | End: 2023-05-21

## 2023-05-18 RX ORDER — INSULIN GLARGINE 300 U/ML
INJECTION, SOLUTION SUBCUTANEOUS
Qty: 17 PEN | Refills: 0 | Status: SHIPPED | OUTPATIENT
Start: 2023-05-18 | End: 2023-07-24 | Stop reason: SDUPTHER

## 2023-05-18 RX ORDER — PREGABALIN 100 MG/1
100 CAPSULE ORAL 3 TIMES DAILY
Qty: 90 CAPSULE | Refills: 2 | Status: SHIPPED | OUTPATIENT
Start: 2023-05-18 | End: 2023-06-20 | Stop reason: DRUGHIGH
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
## 2023-05-18 NOTE — TELEPHONE ENCOUNTER
No care due was identified.  [facility] Embedded Care Due Messages. Reference number: [id number].   5/18/2023 4:06:03 PM CDT

## 2023-05-19 NOTE — TELEPHONE ENCOUNTER
Refill Decision Note   Narendra English  is requesting a refill authorization.  Brief Assessment and Rationale for Refill:  Approve     Medication Therapy Plan:         Comments:     Note composed:7:42 PM 05/18/2023             Appointments     Last Visit   11/1/2022 Tabitha Melgoza MD   Next Visit   9/11/2023 Tabitha Melgoza MD

## 2023-05-26 ENCOUNTER — PROCEDURE VISIT (OUTPATIENT)
Dept: UROLOGY | Facility: CLINIC | Age: 58
End: 2023-05-26
Payer: COMMERCIAL

## 2023-05-26 VITALS
BODY MASS INDEX: 35.09 KG/M2 | SYSTOLIC BLOOD PRESSURE: 107 MMHG | HEART RATE: 76 BPM | WEIGHT: 266 LBS | DIASTOLIC BLOOD PRESSURE: 71 MMHG

## 2023-05-26 DIAGNOSIS — N52.9 ERECTILE DYSFUNCTION, UNSPECIFIED ERECTILE DYSFUNCTION TYPE: ICD-10-CM

## 2023-05-26 DIAGNOSIS — E29.1 HYPOGONADISM MALE: Primary | ICD-10-CM

## 2023-05-26 PROCEDURE — S0189 PR TESTOSTERONE PELLET 75 MG: ICD-10-PCS | Mod: S$GLB,,, | Performed by: UROLOGY

## 2023-05-26 PROCEDURE — S0189 TESTOSTERONE PELLET 75 MG: HCPCS | Mod: S$GLB,,, | Performed by: UROLOGY

## 2023-05-26 PROCEDURE — 11980 IMPLANT HORMONE PELLET(S): CPT | Mod: S$GLB,,, | Performed by: UROLOGY

## 2023-05-26 PROCEDURE — 11980 PR IMPLANT,HORMONE,SUBCUTANEOUS: ICD-10-PCS | Mod: S$GLB,,, | Performed by: UROLOGY

## 2023-05-26 RX ORDER — LEVOFLOXACIN 500 MG/1
500 TABLET, FILM COATED ORAL DAILY
Qty: 3 TABLET | Refills: 0 | Status: SHIPPED | OUTPATIENT
Start: 2023-05-26 | End: 2023-05-29

## 2023-05-26 NOTE — PROCEDURES
Procedures  Chief Complaint:   Encounter Diagnoses   Name Primary?    Hypogonadism male Yes    Erectile dysfunction, unspecified erectile dysfunction type        HPI:   5/26/23- here today for his second testopel insertion.    06/16/2022 - 57 yo male that presents today for evaluation of ED.  Patient notes issues for the last 3-4 years but notes that over the last six months it has gotten worse.  He notes difficulty both achieving and maintaining an erection.  He has previously tried both Cialis and Viagra and both of these cause in to have a very bad headache that make it on tolerable to take these medications, though they do work.  Otherwise he voids with a good stream and feels like he empties well subjectively.  He denies any gross hematuria or family history of  cancers.  Denies prior stones or urologic procedures.    Allergies:  Demerol (pf) [meperidine (pf)], Percocet [oxycodone-acetaminophen], and Demerol [meperidine]    Medications:  has a current medication list which includes the following prescription(s): amlodipine, atorvastatin, azelastine, benazepril, clobetasol 0.05%, clonazepam, colchicine, diclofenac sodium, flash glucose scanning reader, flash glucose sensor, fluticasone propionate, hydralazine, hydrocortisone, indomethacin, toujeo solostar u-300 insulin, levothyroxine, metformin, metoprolol succinate, pregabalin, sildenafil, mounjaro, trazodone, triamcinolone acetonide 0.1%, and vascepa, and the following Facility-Administered Medications: gabapentin and lactated ringers.    Review of Systems:  General: No fever, chills, fatigability, or weight loss.  Skin: No rashes, itching, or changes in color or texture of skin.  Chest: Denies MEJÍA, cyanosis, wheezing, cough, and sputum production.  Abdomen: Appetite fine. No weight loss. Denies diarrhea, abdominal pain, hematemesis, or blood in stool.  Musculoskeletal: No joint stiffness or swelling. Denies back pain.  : As above.  All other review of  systems negative.    PMH:   has a past medical history of Cancer, Diabetes mellitus (8 years), Hypertension, Sleep apnea, and Thyroid disease.    PSH:   has a past surgical history that includes Colonoscopy (N/A, 12/29/2020); Laparoscopic right colon resection (N/A, 02/02/2021); Back surgery; Colonoscopy (N/A, 02/10/2022); Tonsillectomy; fess, with nasal septoplasty, with imaging guidance (Bilateral, 08/17/2022); Nasal turbinate reduction (Bilateral, 08/17/2022); Colon surgery (02/06/21); and Epidural steroid injection into cervical spine (N/A, 12/1/2022).    FamHx: family history includes Breast cancer in his mother; Cancer in his mother; Cataracts in his maternal grandmother; Diabetes in his maternal grandmother and mother; Hypertension in his brother, brother, and mother; Stroke in his father.    SocHx:  reports that he has never smoked. He has never used smokeless tobacco. He reports that he does not drink alcohol and does not use drugs.      Physical Exam:  There were no vitals filed for this visit.    General: A&Ox3, no apparent distress, no deformities  Neck: No masses, normal ROM  Lungs: normal inspiration, no use of accessory muscles  Heart: normal pulse, no arrhythmias  Abdomen: Soft, NT, ND, no masses, no hernias, no hepatosplenomegaly  Skin: The skin is warm and dry. No jaundice.  Ext: No c/c/e.  JAIMIE: 6/22: Normal rectal tone, no hemorrhoids. Prostate smooth and normal, no nodules 30 gm SV not palpable. Perineum and anus normal.    Labs/Studies:   Testopel  5/26/26, 1/20/23  Testosterone 328 8/22   PSA 0.77 6/22    Patient was sterilely prepped and draped, then positioned in the right side up, lateral decubitus position.  Lidocaine was used for local anesthesia.  Eleven blade was used to incise the skin, included trocars with the testopel kit were then used.  They were inserted within the incision site and we placed the pellets in a V location.  10 pellets total were inserted without difficulty.  Trocars  were removed and pressure was applied for 2 min.  Steri-Strips applied for local coverage, he will return for lab assessment in 3 months.      Impression/Plan:       1. Hypogonadism- AndroGel and shots have failed.  Patient tolerated testopel insertion today, call with any issues.  Otherwise will see him in 3 months with labs and repeat insertion.    2. Erectile dysfunction- sildenafil 100 mg works well for the patient, of note TriMix caused priapism.

## 2023-05-29 ENCOUNTER — OFFICE VISIT (OUTPATIENT)
Dept: INTERNAL MEDICINE | Facility: CLINIC | Age: 58
End: 2023-05-29
Payer: COMMERCIAL

## 2023-05-29 VITALS
TEMPERATURE: 98 F | OXYGEN SATURATION: 97 % | HEART RATE: 82 BPM | WEIGHT: 255.69 LBS | RESPIRATION RATE: 18 BRPM | SYSTOLIC BLOOD PRESSURE: 120 MMHG | BODY MASS INDEX: 33.74 KG/M2 | DIASTOLIC BLOOD PRESSURE: 62 MMHG

## 2023-05-29 DIAGNOSIS — G47.26 SHIFT WORK SLEEP DISORDER: Primary | ICD-10-CM

## 2023-05-29 DIAGNOSIS — Z23 NEED FOR PNEUMOCOCCAL VACCINATION: ICD-10-CM

## 2023-05-29 PROCEDURE — 90471 PNEUMOCOCCAL CONJUGATE VACCINE 20-VALENT: ICD-10-PCS | Mod: S$GLB,,, | Performed by: FAMILY MEDICINE

## 2023-05-29 PROCEDURE — 3066F PR DOCUMENTATION OF TREATMENT FOR NEPHROPATHY: ICD-10-PCS | Mod: CPTII,S$GLB,, | Performed by: FAMILY MEDICINE

## 2023-05-29 PROCEDURE — 90677 PNEUMOCOCCAL CONJUGATE VACCINE 20-VALENT: ICD-10-PCS | Mod: S$GLB,,, | Performed by: FAMILY MEDICINE

## 2023-05-29 PROCEDURE — 90471 IMMUNIZATION ADMIN: CPT | Mod: S$GLB,,, | Performed by: FAMILY MEDICINE

## 2023-05-29 PROCEDURE — 3061F PR NEG MICROALBUMINURIA RESULT DOCUMENTED/REVIEW: ICD-10-PCS | Mod: CPTII,S$GLB,, | Performed by: FAMILY MEDICINE

## 2023-05-29 PROCEDURE — 3074F PR MOST RECENT SYSTOLIC BLOOD PRESSURE < 130 MM HG: ICD-10-PCS | Mod: CPTII,S$GLB,, | Performed by: FAMILY MEDICINE

## 2023-05-29 PROCEDURE — 3078F DIAST BP <80 MM HG: CPT | Mod: CPTII,S$GLB,, | Performed by: FAMILY MEDICINE

## 2023-05-29 PROCEDURE — 3066F NEPHROPATHY DOC TX: CPT | Mod: CPTII,S$GLB,, | Performed by: FAMILY MEDICINE

## 2023-05-29 PROCEDURE — 1159F MED LIST DOCD IN RCRD: CPT | Mod: CPTII,S$GLB,, | Performed by: FAMILY MEDICINE

## 2023-05-29 PROCEDURE — 3074F SYST BP LT 130 MM HG: CPT | Mod: CPTII,S$GLB,, | Performed by: FAMILY MEDICINE

## 2023-05-29 PROCEDURE — 3008F BODY MASS INDEX DOCD: CPT | Mod: CPTII,S$GLB,, | Performed by: FAMILY MEDICINE

## 2023-05-29 PROCEDURE — 3044F HG A1C LEVEL LT 7.0%: CPT | Mod: CPTII,S$GLB,, | Performed by: FAMILY MEDICINE

## 2023-05-29 PROCEDURE — 99999 PR PBB SHADOW E&M-EST. PATIENT-LVL V: ICD-10-PCS | Mod: PBBFAC,,, | Performed by: FAMILY MEDICINE

## 2023-05-29 PROCEDURE — 99999 PR PBB SHADOW E&M-EST. PATIENT-LVL V: CPT | Mod: PBBFAC,,, | Performed by: FAMILY MEDICINE

## 2023-05-29 PROCEDURE — 1159F PR MEDICATION LIST DOCUMENTED IN MEDICAL RECORD: ICD-10-PCS | Mod: CPTII,S$GLB,, | Performed by: FAMILY MEDICINE

## 2023-05-29 PROCEDURE — 3061F NEG MICROALBUMINURIA REV: CPT | Mod: CPTII,S$GLB,, | Performed by: FAMILY MEDICINE

## 2023-05-29 PROCEDURE — 99214 OFFICE O/P EST MOD 30 MIN: CPT | Mod: 25,S$GLB,, | Performed by: FAMILY MEDICINE

## 2023-05-29 PROCEDURE — 90677 PCV20 VACCINE IM: CPT | Mod: S$GLB,,, | Performed by: FAMILY MEDICINE

## 2023-05-29 PROCEDURE — 4010F PR ACE/ARB THEARPY RXD/TAKEN: ICD-10-PCS | Mod: CPTII,S$GLB,, | Performed by: FAMILY MEDICINE

## 2023-05-29 PROCEDURE — 4010F ACE/ARB THERAPY RXD/TAKEN: CPT | Mod: CPTII,S$GLB,, | Performed by: FAMILY MEDICINE

## 2023-05-29 PROCEDURE — 3078F PR MOST RECENT DIASTOLIC BLOOD PRESSURE < 80 MM HG: ICD-10-PCS | Mod: CPTII,S$GLB,, | Performed by: FAMILY MEDICINE

## 2023-05-29 PROCEDURE — 3044F PR MOST RECENT HEMOGLOBIN A1C LEVEL <7.0%: ICD-10-PCS | Mod: CPTII,S$GLB,, | Performed by: FAMILY MEDICINE

## 2023-05-29 PROCEDURE — 99214 PR OFFICE/OUTPT VISIT, EST, LEVL IV, 30-39 MIN: ICD-10-PCS | Mod: 25,S$GLB,, | Performed by: FAMILY MEDICINE

## 2023-05-29 PROCEDURE — 3008F PR BODY MASS INDEX (BMI) DOCUMENTED: ICD-10-PCS | Mod: CPTII,S$GLB,, | Performed by: FAMILY MEDICINE

## 2023-05-29 RX ORDER — TRAZODONE HYDROCHLORIDE 100 MG/1
100 TABLET ORAL NIGHTLY
Qty: 30 TABLET | Refills: 5 | Status: SHIPPED | OUTPATIENT
Start: 2023-05-29 | End: 2024-02-01

## 2023-05-29 RX ORDER — CLONAZEPAM 1 MG/1
1 TABLET ORAL NIGHTLY
Qty: 30 TABLET | Refills: 1 | Status: SHIPPED | OUTPATIENT
Start: 2023-05-29 | End: 2023-06-29 | Stop reason: SDUPTHER

## 2023-05-29 RX ORDER — TIRZEPATIDE 12.5 MG/.5ML
12.5 INJECTION, SOLUTION SUBCUTANEOUS
Qty: 4 PEN | Refills: 1 | Status: SHIPPED | OUTPATIENT
Start: 2023-05-29 | End: 2023-07-22 | Stop reason: SDUPTHER

## 2023-05-29 NOTE — ASSESSMENT & PLAN NOTE
Increase trazodone to 100 mg qhs; continue klonopin 1 mg qhs; follow up in 1 month; plan to continue weaning off klonopin once therapeutic on trazodone; otherwise will refer to psychiatry; patient expressed understanding and agreement with plan.

## 2023-05-29 NOTE — PROGRESS NOTES
Subjective:       Patient ID: Narendra English Sr. is a 57 y.o. male.    Chief Complaint: Follow-up (Medication follow-up.)    Patient presents to clinic today for medication refill.  Patient reports minimal improvement on trazodone 50 mg.  He reports he is taking Klonopin 1 mg nightly.  He has been cutting 2 mg tablets in half.  Patient also needs refill on Mounjaro prescribed by his cardiologist.  Patient is otherwise without concerns today.    Review of Systems   Constitutional:  Negative for chills, fatigue, fever and unexpected weight change.   Eyes:  Negative for visual disturbance.   Respiratory:  Negative for shortness of breath.    Cardiovascular:  Negative for chest pain.   Musculoskeletal:  Negative for myalgias.   Neurological:  Negative for headaches.       Objective:      Physical Exam  Vitals reviewed.   Constitutional:       General: He is not in acute distress.     Appearance: He is well-developed.   HENT:      Head: Normocephalic and atraumatic.   Eyes:      General: Lids are normal. No scleral icterus.     Extraocular Movements: Extraocular movements intact.      Conjunctiva/sclera: Conjunctivae normal.      Pupils: Pupils are equal, round, and reactive to light.   Pulmonary:      Effort: Pulmonary effort is normal.   Neurological:      Mental Status: He is alert and oriented to person, place, and time.      Cranial Nerves: No cranial nerve deficit.      Gait: Gait normal.   Psychiatric:         Mood and Affect: Mood and affect normal.       Assessment:       1. Shift work sleep disorder    2. Need for pneumococcal vaccination        Plan:   1. Shift work sleep disorder  Assessment & Plan:  Increase trazodone to 100 mg qhs; continue klonopin 1 mg qhs; follow up in 1 month; plan to continue weaning off klonopin once therapeutic on trazodone; otherwise will refer to psychiatry; patient expressed understanding and agreement with plan.    Orders:  -     traZODone (DESYREL) 100 MG tablet; Take 1  tablet (100 mg total) by mouth every evening.  Dispense: 30 tablet; Refill: 5  -     clonazePAM (KLONOPIN) 1 MG tablet; Take 1 tablet (1 mg total) by mouth every evening.  Dispense: 30 tablet; Refill: 1    2. Need for pneumococcal vaccination  -     Pneumococcal Conjugate Vaccine (20 Valent) (IM)      Health Maintenance reviewed/updated.

## 2023-06-01 ENCOUNTER — OFFICE VISIT (OUTPATIENT)
Dept: OTOLARYNGOLOGY | Facility: CLINIC | Age: 58
End: 2023-06-01
Payer: COMMERCIAL

## 2023-06-01 VITALS — WEIGHT: 257.25 LBS | TEMPERATURE: 98 F | HEIGHT: 73 IN | BODY MASS INDEX: 34.09 KG/M2

## 2023-06-01 DIAGNOSIS — J34.2 NASAL SEPTAL DEFORMITY: ICD-10-CM

## 2023-06-01 DIAGNOSIS — J32.9 CHRONIC SINUSITIS, UNSPECIFIED LOCATION: Primary | ICD-10-CM

## 2023-06-01 DIAGNOSIS — J34.89 NASAL OBSTRUCTION: ICD-10-CM

## 2023-06-01 DIAGNOSIS — J34.89 ADHESIONS OF NASAL SEPTUM AND TURBINATES: ICD-10-CM

## 2023-06-01 DIAGNOSIS — J34.89 NASAL LESION: ICD-10-CM

## 2023-06-01 PROCEDURE — 31231 NASAL ENDOSCOPY DX: CPT | Mod: 79,S$GLB,, | Performed by: STUDENT IN AN ORGANIZED HEALTH CARE EDUCATION/TRAINING PROGRAM

## 2023-06-01 PROCEDURE — 3066F NEPHROPATHY DOC TX: CPT | Mod: CPTII,S$GLB,, | Performed by: STUDENT IN AN ORGANIZED HEALTH CARE EDUCATION/TRAINING PROGRAM

## 2023-06-01 PROCEDURE — 99024 PR POST-OP FOLLOW-UP VISIT: ICD-10-PCS | Mod: S$GLB,,, | Performed by: STUDENT IN AN ORGANIZED HEALTH CARE EDUCATION/TRAINING PROGRAM

## 2023-06-01 PROCEDURE — 3044F HG A1C LEVEL LT 7.0%: CPT | Mod: CPTII,S$GLB,, | Performed by: STUDENT IN AN ORGANIZED HEALTH CARE EDUCATION/TRAINING PROGRAM

## 2023-06-01 PROCEDURE — 3008F BODY MASS INDEX DOCD: CPT | Mod: CPTII,S$GLB,, | Performed by: STUDENT IN AN ORGANIZED HEALTH CARE EDUCATION/TRAINING PROGRAM

## 2023-06-01 PROCEDURE — 3061F NEG MICROALBUMINURIA REV: CPT | Mod: CPTII,S$GLB,, | Performed by: STUDENT IN AN ORGANIZED HEALTH CARE EDUCATION/TRAINING PROGRAM

## 2023-06-01 PROCEDURE — 31231 PR NASAL ENDOSCOPY, DX: ICD-10-PCS | Mod: 79,S$GLB,, | Performed by: STUDENT IN AN ORGANIZED HEALTH CARE EDUCATION/TRAINING PROGRAM

## 2023-06-01 PROCEDURE — 4010F PR ACE/ARB THEARPY RXD/TAKEN: ICD-10-PCS | Mod: CPTII,S$GLB,, | Performed by: STUDENT IN AN ORGANIZED HEALTH CARE EDUCATION/TRAINING PROGRAM

## 2023-06-01 PROCEDURE — 3066F PR DOCUMENTATION OF TREATMENT FOR NEPHROPATHY: ICD-10-PCS | Mod: CPTII,S$GLB,, | Performed by: STUDENT IN AN ORGANIZED HEALTH CARE EDUCATION/TRAINING PROGRAM

## 2023-06-01 PROCEDURE — 99024 POSTOP FOLLOW-UP VISIT: CPT | Mod: S$GLB,,, | Performed by: STUDENT IN AN ORGANIZED HEALTH CARE EDUCATION/TRAINING PROGRAM

## 2023-06-01 PROCEDURE — 4010F ACE/ARB THERAPY RXD/TAKEN: CPT | Mod: CPTII,S$GLB,, | Performed by: STUDENT IN AN ORGANIZED HEALTH CARE EDUCATION/TRAINING PROGRAM

## 2023-06-01 PROCEDURE — 3008F PR BODY MASS INDEX (BMI) DOCUMENTED: ICD-10-PCS | Mod: CPTII,S$GLB,, | Performed by: STUDENT IN AN ORGANIZED HEALTH CARE EDUCATION/TRAINING PROGRAM

## 2023-06-01 PROCEDURE — 99999 PR PBB SHADOW E&M-EST. PATIENT-LVL III: ICD-10-PCS | Mod: PBBFAC,,, | Performed by: STUDENT IN AN ORGANIZED HEALTH CARE EDUCATION/TRAINING PROGRAM

## 2023-06-01 PROCEDURE — 3044F PR MOST RECENT HEMOGLOBIN A1C LEVEL <7.0%: ICD-10-PCS | Mod: CPTII,S$GLB,, | Performed by: STUDENT IN AN ORGANIZED HEALTH CARE EDUCATION/TRAINING PROGRAM

## 2023-06-01 PROCEDURE — 99999 PR PBB SHADOW E&M-EST. PATIENT-LVL III: CPT | Mod: PBBFAC,,, | Performed by: STUDENT IN AN ORGANIZED HEALTH CARE EDUCATION/TRAINING PROGRAM

## 2023-06-01 PROCEDURE — 3061F PR NEG MICROALBUMINURIA RESULT DOCUMENTED/REVIEW: ICD-10-PCS | Mod: CPTII,S$GLB,, | Performed by: STUDENT IN AN ORGANIZED HEALTH CARE EDUCATION/TRAINING PROGRAM

## 2023-06-01 RX ORDER — MUPIROCIN 20 MG/G
OINTMENT TOPICAL 3 TIMES DAILY
Qty: 30 G | Refills: 0 | Status: SHIPPED | OUTPATIENT
Start: 2023-06-01 | End: 2023-06-15

## 2023-06-01 RX ORDER — AZELASTINE 1 MG/ML
1 SPRAY, METERED NASAL 2 TIMES DAILY
Qty: 30 ML | Refills: 3 | Status: SHIPPED | OUTPATIENT
Start: 2023-06-01 | End: 2023-10-20

## 2023-06-01 NOTE — PROGRESS NOTES
Chief complaint:    Chief Complaint   Patient presents with    Other     Sore on the right nasal wall          Referring Provider:  No referring provider defined for this encounter.      History of present illness:     Mr. English is a 57 y.o. presenting for evaluation of sinus and nasal symptoms.     Sore in right nostril since surgery. Scabbing over. Bled some.     Some nasal congestion recently as well and some facial pressure.     Overall the nasal obstruction is markedly improved. No purulent rhinorrhea.     Saline spray. Not using other sprays at this time.     History      Past Medical History:   Past Medical History:   Diagnosis Date    Cancer     Colon    Diabetes mellitus 8 years    BS 84 in the room 08/11/2021    Hypertension     Sleep apnea     Thyroid disease          Past Surgical History:  Past Surgical History:   Procedure Laterality Date    BACK SURGERY      COLON SURGERY  02/06/21    COLONOSCOPY N/A 12/29/2020    Procedure: COLONOSCOPY;  Surgeon: William Cotter MD;  Location: [facility];  Service: Endoscopy;  Laterality: N/A;  Will need Rapid doesn't live here    COLONOSCOPY N/A 02/10/2022    Procedure: COLONOSCOPY;  Surgeon: Wili Espinosa MD;  Location: [facility] ENDO;  Service: Endoscopy;  Laterality: N/A;    EPIDURAL STEROID INJECTION INTO CERVICAL SPINE N/A 12/1/2022    Procedure: C7/T1 IL SALBADOR;  Surgeon: Leonard Mart MD;  Location: [facility] PAIN MGT;  Service: Pain Management;  Laterality: N/A;    FESS, WITH NASAL SEPTOPLASTY, WITH IMAGING GUIDANCE Bilateral 08/17/2022    Procedure: FESS, WITH NASAL SEPTOPLASTY, WITH IMAGING GUIDANCE;  Surgeon: Viktor Ferrell MD;  Location: [facility] OR;  Service: ENT;  Laterality: Bilateral;    LAPAROSCOPIC RIGHT COLON RESECTION N/A 02/02/2021    Procedure: COLECTOMY, RIGHT, LAPAROSCOPIC, ERAS low;  Surgeon: Melonie Santoyo MD;  Location: [facility] OR [facility] FLR;  Service: Colon and Rectal;  Laterality: N/A;  needs rapid covid test    LYSIS OF ADHESIONS  "Bilateral 4/19/2023    Procedure: LYSIS, ADHESIONS;  Surgeon: Viktor Ferrell MD;  Location: [facility] OR;  Service: ENT;  Laterality: Bilateral;    NASAL ENDOSCOPY Bilateral 4/19/2023    Procedure: ENDOSCOPY, NOSE;  Surgeon: Viktor Ferrell MD;  Location: [facility] OR;  Service: ENT;  Laterality: Bilateral;    NASAL TURBINATE REDUCTION Bilateral 08/17/2022    Procedure: REDUCTION, NASAL TURBINATE;  Surgeon: Viktor Ferrell MD;  Location: [facility] OR;  Service: ENT;  Laterality: Bilateral;    RHINOPLASTY Bilateral 4/19/2023    Procedure: RHINOPLASTY;  Surgeon: Viktor Ferrell MD;  Location: [facility] OR;  Service: ENT;  Laterality: Bilateral;    TONSILLECTOMY           Medications: Medication list reviewed. He  has a current medication list which includes the following prescription(s): amlodipine, atorvastatin, azelastine, benazepril, clobetasol 0.05%, clonazepam, colchicine, diclofenac sodium, flash glucose scanning reader, flash glucose sensor, fluticasone propionate, hydralazine, hydrocortisone, indomethacin, levothyroxine, metformin, metoprolol succinate, mupirocin, pregabalin, tadalafil, mounjaro, toujeo solostar u-300 insulin, trazodone, and vascepa.     Allergies:   Review of patient's allergies indicates:   Allergen Reactions    Demerol (pf) [meperidine (pf)] Other (See Comments)     Pt reports,"They lost my blood pressure."    Percocet [oxycodone-acetaminophen] Itching     itching    Demerol [meperidine]          Family history: family history includes Breast cancer in his mother; Cancer in his mother; Cataracts in his maternal grandmother; Diabetes in his maternal grandmother and mother; Hypertension in his brother, brother, and mother; Lung cancer in his maternal grandfather; No Known Problems in his paternal grandfather, paternal grandmother, sister, and sister; Stroke in his father.         Social History          Alcohol use:  reports no history of alcohol use.            Tobacco:  reports that he has never smoked. " "He has never been exposed to tobacco smoke. He has never used smokeless tobacco.         Physical Examination      Vitals: Temperature 97.7 °F (36.5 °C), height 6' 1" (1.854 m), weight 116.7 kg (257 lb 4.4 oz).      General: Well developed, well nourished, well hydrated.     Voice: no dysphonia, no dysarthria      Head/Face: Normocephalic, atraumatic. No scars or lesions. Facial musculature equal.     Eyes: No scleral icterus or conjunctival hemorrhage. EOMI. PERRLA.     Ears:     Right ear: No gross deformity. EAC is clear of debris and erythema. TM are intact with a pneumatized middle ear. No signs of retraction, fluid or infection.      Left ear: No gross deformity. EAC is clear of debris and erythema. TM are intact with a pneumatized middle ear. No signs of retraction, fluid or infection.      Nose: septum midline. Well healed transcolumellar scar. Small scab in right anterior nasal vestibule under soft tissue triangle        Mouth/Oropharynx: Lips without any lesions. No mucosal lesions within the oropharynx. No tonsillar exudate or lesions. Pharyngeal walls symmetrical. Uvula midline. Tongue midline without lesions.    Neurologic: Moving all extremities without gross abnormality.CN II-XII grossly intact. House-Brackmann 1/6. No signs of nystagmus.        Data reviewed      Review of records:      I reviewed records from the referring provider's office visits describing the history, workup, and/or treatment of this problem thus far.    Imaging  I have independently reviewed the following imaging with the findings noted below:      CT sinus,  Bilateral toya bullosa  S-shaped septal deviation, left mid-septal spur  Inferior turbinate reduction   Left frontal thickening and obstruction of outflow  Bilateral anterior ethmoid disease     Op note, 8/20/22     PROCEDURE:   Endoscopic septoplasty   Bilateral inferior turbinate submucosal resection  Nasal endoscopy with resection of bilateral toya bullosa " "  Bilateral nasal endoscopy with maxillary antrostomy   Bilateral nasal endoscopy with anterior ethmoidectomy    Left nasal endoscopy with frontal sinusotomy and frontal sinus exploration   Functional endoscopic sinus surgery with CT image guided electromagnetic system     OPERATIVE FINDINGS:   Polypoid mucosal thickening at the left frontal outflow tract  Bilateral polypoid thickening in anterior ethmoids  Left septal deviation with large left septal spur      Pathology reviewed  1. "left sinus contents", excision:       -  Sinonasal mucosa with chronic inflammation       - No eosinophils seen   2. "septal cartilage", excision:       - Portion of cartilage     Procedure Note - Rigid Nasal Endoscopy    Surgeon: Viktor Ferrell MD  Anesthesia: topical oxymetazoline and 4% lidocaine.    Technique: The nose was sprayed with oxymetazoline and 4% lidocaine. With the patient in the upright position, a 2.7mm 30-degree endscope was inserted into the patient's right and left nare.  Where visible, nasal secretions and mucosal crusting were removed with a suction. The overall appearance of the nasal cavity and paranasal sinuses were noted and the findings are described below.     Findings:  septum midline and intact. Widely patent sinus and nasal cavities. No further mucopurulence and no polyps. There is a  small adhesions of the middle turbinates to the lateral nasal side wall on the right      Assessment/Plan:    1. Chronic sinusitis, unspecified location    2. Nasal septal deformity    3. Nasal obstruction    4. Adhesions of nasal septum and turbinates    5. Nasal lesion          S/p FESS and septoplasty 8/17/22 with post op epistaxis complicated by hypertensive urgency (resolved, pack removed, HTN better controlled)  S/p septorhinoplaty for persistent caudal septal deviation      Restart astelin  Continue saline  Mupirocin x 2 weeks             Viktor Ferrell MD  [facility]   [facility] " - [facility]  [address] [place] [address]  P: [phone]  F: [phone]

## 2023-06-12 ENCOUNTER — HOSPITAL ENCOUNTER (OUTPATIENT)
Dept: RADIOLOGY | Facility: HOSPITAL | Age: 58
Discharge: HOME | End: 2023-06-12
Attending: PHYSICIAN ASSISTANT
Payer: COMMERCIAL

## 2023-06-12 DIAGNOSIS — M54.16 LUMBAR RADICULOPATHY, CHRONIC: ICD-10-CM

## 2023-06-12 PROCEDURE — 72148 MRI LUMBAR SPINE W/O DYE: CPT | Mod: 26,,, | Performed by: RADIOLOGY

## 2023-06-12 PROCEDURE — 72148 MRI LUMBAR SPINE W/O DYE: CPT | Mod: TC

## 2023-06-12 PROCEDURE — 72148 MRI LUMBAR SPINE WITHOUT CONTRAST: ICD-10-PCS | Mod: 26,,, | Performed by: RADIOLOGY

## 2023-06-20 ENCOUNTER — OFFICE VISIT (OUTPATIENT)
Dept: GASTROENTEROLOGY | Facility: CLINIC | Age: 58
End: 2023-06-20
Payer: COMMERCIAL

## 2023-06-20 ENCOUNTER — OFFICE VISIT (OUTPATIENT)
Dept: PAIN MEDICINE | Facility: CLINIC | Age: 58
End: 2023-06-20
Payer: COMMERCIAL

## 2023-06-20 VITALS
HEART RATE: 83 BPM | WEIGHT: 265.88 LBS | DIASTOLIC BLOOD PRESSURE: 76 MMHG | HEIGHT: 73 IN | BODY MASS INDEX: 35.24 KG/M2 | SYSTOLIC BLOOD PRESSURE: 162 MMHG

## 2023-06-20 VITALS
SYSTOLIC BLOOD PRESSURE: 162 MMHG | BODY MASS INDEX: 35.22 KG/M2 | WEIGHT: 265.75 LBS | HEART RATE: 83 BPM | HEIGHT: 73 IN | DIASTOLIC BLOOD PRESSURE: 76 MMHG

## 2023-06-20 DIAGNOSIS — Z85.038 PERSONAL HISTORY OF COLON CANCER: ICD-10-CM

## 2023-06-20 DIAGNOSIS — M54.16 LUMBAR RADICULOPATHY, CHRONIC: Primary | ICD-10-CM

## 2023-06-20 DIAGNOSIS — K21.9 GASTROESOPHAGEAL REFLUX DISEASE WITHOUT ESOPHAGITIS: Primary | ICD-10-CM

## 2023-06-20 PROCEDURE — 3061F NEG MICROALBUMINURIA REV: CPT | Mod: CPTII,S$GLB,, | Performed by: PHYSICIAN ASSISTANT

## 2023-06-20 PROCEDURE — 3077F PR MOST RECENT SYSTOLIC BLOOD PRESSURE >= 140 MM HG: ICD-10-PCS | Mod: CPTII,S$GLB,, | Performed by: PHYSICIAN ASSISTANT

## 2023-06-20 PROCEDURE — 3008F BODY MASS INDEX DOCD: CPT | Mod: CPTII,S$GLB,, | Performed by: PHYSICIAN ASSISTANT

## 2023-06-20 PROCEDURE — 99214 OFFICE O/P EST MOD 30 MIN: CPT | Mod: S$GLB,,, | Performed by: INTERNAL MEDICINE

## 2023-06-20 PROCEDURE — 99999 PR PBB SHADOW E&M-EST. PATIENT-LVL IV: CPT | Mod: PBBFAC,,, | Performed by: PHYSICIAN ASSISTANT

## 2023-06-20 PROCEDURE — 1159F PR MEDICATION LIST DOCUMENTED IN MEDICAL RECORD: ICD-10-PCS | Mod: CPTII,S$GLB,, | Performed by: PHYSICIAN ASSISTANT

## 2023-06-20 PROCEDURE — 3077F SYST BP >= 140 MM HG: CPT | Mod: CPTII,S$GLB,, | Performed by: INTERNAL MEDICINE

## 2023-06-20 PROCEDURE — 1159F MED LIST DOCD IN RCRD: CPT | Mod: CPTII,S$GLB,, | Performed by: PHYSICIAN ASSISTANT

## 2023-06-20 PROCEDURE — 99999 PR PBB SHADOW E&M-EST. PATIENT-LVL IV: ICD-10-PCS | Mod: PBBFAC,,, | Performed by: PHYSICIAN ASSISTANT

## 2023-06-20 PROCEDURE — 3078F PR MOST RECENT DIASTOLIC BLOOD PRESSURE < 80 MM HG: ICD-10-PCS | Mod: CPTII,S$GLB,, | Performed by: INTERNAL MEDICINE

## 2023-06-20 PROCEDURE — 99999 PR PBB SHADOW E&M-EST. PATIENT-LVL V: ICD-10-PCS | Mod: PBBFAC,,, | Performed by: INTERNAL MEDICINE

## 2023-06-20 PROCEDURE — 3066F PR DOCUMENTATION OF TREATMENT FOR NEPHROPATHY: ICD-10-PCS | Mod: CPTII,S$GLB,, | Performed by: PHYSICIAN ASSISTANT

## 2023-06-20 PROCEDURE — 4010F PR ACE/ARB THEARPY RXD/TAKEN: ICD-10-PCS | Mod: CPTII,S$GLB,, | Performed by: INTERNAL MEDICINE

## 2023-06-20 PROCEDURE — 1160F RVW MEDS BY RX/DR IN RCRD: CPT | Mod: CPTII,S$GLB,, | Performed by: PHYSICIAN ASSISTANT

## 2023-06-20 PROCEDURE — 1159F MED LIST DOCD IN RCRD: CPT | Mod: CPTII,S$GLB,, | Performed by: INTERNAL MEDICINE

## 2023-06-20 PROCEDURE — 1160F PR REVIEW ALL MEDS BY PRESCRIBER/CLIN PHARMACIST DOCUMENTED: ICD-10-PCS | Mod: CPTII,S$GLB,, | Performed by: INTERNAL MEDICINE

## 2023-06-20 PROCEDURE — 3061F PR NEG MICROALBUMINURIA RESULT DOCUMENTED/REVIEW: ICD-10-PCS | Mod: CPTII,S$GLB,, | Performed by: INTERNAL MEDICINE

## 2023-06-20 PROCEDURE — 3066F PR DOCUMENTATION OF TREATMENT FOR NEPHROPATHY: ICD-10-PCS | Mod: CPTII,S$GLB,, | Performed by: INTERNAL MEDICINE

## 2023-06-20 PROCEDURE — 99214 PR OFFICE/OUTPT VISIT, EST, LEVL IV, 30-39 MIN: ICD-10-PCS | Mod: S$GLB,,, | Performed by: INTERNAL MEDICINE

## 2023-06-20 PROCEDURE — 3008F BODY MASS INDEX DOCD: CPT | Mod: CPTII,S$GLB,, | Performed by: INTERNAL MEDICINE

## 2023-06-20 PROCEDURE — 99999 PR PBB SHADOW E&M-EST. PATIENT-LVL V: CPT | Mod: PBBFAC,,, | Performed by: INTERNAL MEDICINE

## 2023-06-20 PROCEDURE — 3044F PR MOST RECENT HEMOGLOBIN A1C LEVEL <7.0%: ICD-10-PCS | Mod: CPTII,S$GLB,, | Performed by: INTERNAL MEDICINE

## 2023-06-20 PROCEDURE — 3044F PR MOST RECENT HEMOGLOBIN A1C LEVEL <7.0%: ICD-10-PCS | Mod: CPTII,S$GLB,, | Performed by: PHYSICIAN ASSISTANT

## 2023-06-20 PROCEDURE — 1160F PR REVIEW ALL MEDS BY PRESCRIBER/CLIN PHARMACIST DOCUMENTED: ICD-10-PCS | Mod: CPTII,S$GLB,, | Performed by: PHYSICIAN ASSISTANT

## 2023-06-20 PROCEDURE — 3061F NEG MICROALBUMINURIA REV: CPT | Mod: CPTII,S$GLB,, | Performed by: INTERNAL MEDICINE

## 2023-06-20 PROCEDURE — 4010F PR ACE/ARB THEARPY RXD/TAKEN: ICD-10-PCS | Mod: CPTII,S$GLB,, | Performed by: PHYSICIAN ASSISTANT

## 2023-06-20 PROCEDURE — 3078F DIAST BP <80 MM HG: CPT | Mod: CPTII,S$GLB,, | Performed by: PHYSICIAN ASSISTANT

## 2023-06-20 PROCEDURE — 3078F PR MOST RECENT DIASTOLIC BLOOD PRESSURE < 80 MM HG: ICD-10-PCS | Mod: CPTII,S$GLB,, | Performed by: PHYSICIAN ASSISTANT

## 2023-06-20 PROCEDURE — 3008F PR BODY MASS INDEX (BMI) DOCUMENTED: ICD-10-PCS | Mod: CPTII,S$GLB,, | Performed by: INTERNAL MEDICINE

## 2023-06-20 PROCEDURE — 3066F NEPHROPATHY DOC TX: CPT | Mod: CPTII,S$GLB,, | Performed by: INTERNAL MEDICINE

## 2023-06-20 PROCEDURE — 3066F NEPHROPATHY DOC TX: CPT | Mod: CPTII,S$GLB,, | Performed by: PHYSICIAN ASSISTANT

## 2023-06-20 PROCEDURE — 1160F RVW MEDS BY RX/DR IN RCRD: CPT | Mod: CPTII,S$GLB,, | Performed by: INTERNAL MEDICINE

## 2023-06-20 PROCEDURE — 3077F SYST BP >= 140 MM HG: CPT | Mod: CPTII,S$GLB,, | Performed by: PHYSICIAN ASSISTANT

## 2023-06-20 PROCEDURE — 99214 PR OFFICE/OUTPT VISIT, EST, LEVL IV, 30-39 MIN: ICD-10-PCS | Mod: S$GLB,,, | Performed by: PHYSICIAN ASSISTANT

## 2023-06-20 PROCEDURE — 4010F ACE/ARB THERAPY RXD/TAKEN: CPT | Mod: CPTII,S$GLB,, | Performed by: INTERNAL MEDICINE

## 2023-06-20 PROCEDURE — 99214 OFFICE O/P EST MOD 30 MIN: CPT | Mod: S$GLB,,, | Performed by: PHYSICIAN ASSISTANT

## 2023-06-20 PROCEDURE — 3078F DIAST BP <80 MM HG: CPT | Mod: CPTII,S$GLB,, | Performed by: INTERNAL MEDICINE

## 2023-06-20 PROCEDURE — 3061F PR NEG MICROALBUMINURIA RESULT DOCUMENTED/REVIEW: ICD-10-PCS | Mod: CPTII,S$GLB,, | Performed by: PHYSICIAN ASSISTANT

## 2023-06-20 PROCEDURE — 3077F PR MOST RECENT SYSTOLIC BLOOD PRESSURE >= 140 MM HG: ICD-10-PCS | Mod: CPTII,S$GLB,, | Performed by: INTERNAL MEDICINE

## 2023-06-20 PROCEDURE — 4010F ACE/ARB THERAPY RXD/TAKEN: CPT | Mod: CPTII,S$GLB,, | Performed by: PHYSICIAN ASSISTANT

## 2023-06-20 PROCEDURE — 3044F HG A1C LEVEL LT 7.0%: CPT | Mod: CPTII,S$GLB,, | Performed by: PHYSICIAN ASSISTANT

## 2023-06-20 PROCEDURE — 3044F HG A1C LEVEL LT 7.0%: CPT | Mod: CPTII,S$GLB,, | Performed by: INTERNAL MEDICINE

## 2023-06-20 PROCEDURE — 1159F PR MEDICATION LIST DOCUMENTED IN MEDICAL RECORD: ICD-10-PCS | Mod: CPTII,S$GLB,, | Performed by: INTERNAL MEDICINE

## 2023-06-20 PROCEDURE — 3008F PR BODY MASS INDEX (BMI) DOCUMENTED: ICD-10-PCS | Mod: CPTII,S$GLB,, | Performed by: PHYSICIAN ASSISTANT

## 2023-06-20 RX ORDER — OMEPRAZOLE 40 MG/1
40 CAPSULE, DELAYED RELEASE ORAL DAILY
Qty: 90 CAPSULE | Refills: 0 | Status: SHIPPED | OUTPATIENT
Start: 2023-06-20 | End: 2023-10-20

## 2023-06-20 RX ORDER — PREGABALIN 150 MG/1
150 CAPSULE ORAL 3 TIMES DAILY
Qty: 90 CAPSULE | Refills: 3 | Status: SHIPPED | OUTPATIENT
Start: 2023-06-20 | End: 2024-04-01 | Stop reason: SDUPTHER

## 2023-06-20 NOTE — ASSESSMENT & PLAN NOTE
Plan:   -Will start on Omeprazole 40mg PO daily   -Avoid trigger foods   -GERD lifestyle modifications   -Patient counseled on weight loss
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
## 2023-06-20 NOTE — PROGRESS NOTES
Clinic Consult:  [facility] Consultation Note    Reason for Consult:  The primary encounter diagnosis was Gastroesophageal reflux disease without esophagitis. A diagnosis of Personal history of colon cancer was also pertinent to this visit.    PCP: Tabitha Melgoza   No address on file    HPI:  This is a 57 y.o. male here for evaluation of GERD.   He has been experiencing GERD for the past few months.   He describes it as burning in chest and stomach.   He purchased Nexium over the counter but it has not helped.     History of colon cancer.   Last colonoscopy was in 2022.   Repeat colonoscopy in 3 years.       ROS:  As above HPI      Medical History:   Past Medical History:   Diagnosis Date    Cancer     Colon    Diabetes mellitus 8 years    BS 84 in the room 08/11/2021    Gastroesophageal reflux disease without esophagitis 6/20/2023    Hypertension     Sleep apnea     Thyroid disease        Surgical History:  Past Surgical History:   Procedure Laterality Date    BACK SURGERY      COLON SURGERY  02/06/21    COLONOSCOPY N/A 12/29/2020    Procedure: COLONOSCOPY;  Surgeon: William Cotter MD;  Location: [facility];  Service: Endoscopy;  Laterality: N/A;  Will need Rapid doesn't live here    COLONOSCOPY N/A 02/10/2022    Procedure: COLONOSCOPY;  Surgeon: Wili Espinosa MD;  Location: [facility];  Service: Endoscopy;  Laterality: N/A;    EPIDURAL STEROID INJECTION INTO CERVICAL SPINE N/A 12/1/2022    Procedure: C7/T1 IL SALBADOR;  Surgeon: Leonard Mart MD;  Location: [facility] PAIN MGT;  Service: Pain Management;  Laterality: N/A;    FESS, WITH NASAL SEPTOPLASTY, WITH IMAGING GUIDANCE Bilateral 08/17/2022    Procedure: FESS, WITH NASAL SEPTOPLASTY, WITH IMAGING GUIDANCE;  Surgeon: Viktor Ferrell MD;  Location: [facility] OR;  Service: ENT;  Laterality: Bilateral;    LAPAROSCOPIC RIGHT COLON RESECTION N/A 02/02/2021    Procedure: COLECTOMY, RIGHT, LAPAROSCOPIC, ERAS low;  Surgeon: Melonie Santoyo MD;   Location: [facility] OR 2ND FLR;  Service: Colon and Rectal;  Laterality: N/A;  needs rapid covid test    LYSIS OF ADHESIONS Bilateral 4/19/2023    Procedure: LYSIS, ADHESIONS;  Surgeon: Viktor Ferrell MD;  Location: [facility] OR;  Service: ENT;  Laterality: Bilateral;    NASAL ENDOSCOPY Bilateral 4/19/2023    Procedure: ENDOSCOPY, NOSE;  Surgeon: Viktor Ferrell MD;  Location: [facility] OR;  Service: ENT;  Laterality: Bilateral;    NASAL TURBINATE REDUCTION Bilateral 08/17/2022    Procedure: REDUCTION, NASAL TURBINATE;  Surgeon: Viktor Ferrell MD;  Location: [facility] OR;  Service: ENT;  Laterality: Bilateral;    RHINOPLASTY Bilateral 4/19/2023    Procedure: RHINOPLASTY;  Surgeon: Viktor Ferrell MD;  Location: [facility] OR;  Service: ENT;  Laterality: Bilateral;    TONSILLECTOMY         Family History:   Family History   Problem Relation Age of Onset    Hypertension Mother     Diabetes Mother     Breast cancer Mother     Cancer Mother     Stroke Father     No Known Problems Sister     No Known Problems Sister     Hypertension Brother     Hypertension Brother     Cataracts Maternal Grandmother     Diabetes Maternal Grandmother     Lung cancer Maternal Grandfather     No Known Problems Paternal Grandfather     No Known Problems Paternal Grandmother        Social History:   Social History     Tobacco Use    Smoking status: Never     Passive exposure: Never    Smokeless tobacco: Never   Substance Use Topics    Alcohol use: Never    Drug use: Never       Allergies: Reviewed    Home Medications:   Medication List with Changes/Refills   New Medications    OMEPRAZOLE (PRILOSEC) 40 MG CAPSULE    Take 1 capsule (40 mg total) by mouth once daily.   Current Medications    AMLODIPINE (NORVASC) 10 MG TABLET    Take 1 tablet (10 mg total) by mouth once daily.    ATORVASTATIN (LIPITOR) 20 MG TABLET    Take 1 tablet (20 mg total) by mouth every evening.    AZELASTINE (ASTELIN) 137 MCG (0.1 %) NASAL SPRAY    1 spray (137 mcg total) by Nasal route 2  "(two) times daily.    BENAZEPRIL (LOTENSIN) 40 MG TABLET    Take 1 tablet (40 mg total) by mouth once daily.    CLOBETASOL 0.05% (TEMOVATE) 0.05 % OINT    AAA bid    CLONAZEPAM (KLONOPIN) 1 MG TABLET    Take 1 tablet (1 mg total) by mouth every evening.    COLCHICINE (COLCRYS) 0.6 MG TABLET    Take 1 tablet (0.6 mg total) by mouth 2 (two) times daily.    DICLOFENAC SODIUM (VOLTAREN) 1 % GEL    Apply 2 g topically 4 (four) times daily as needed (Pain).    FLASH GLUCOSE SCANNING READER MISC    1 application.    FLASH GLUCOSE SENSOR KIT    1 application.    FLUTICASONE PROPIONATE (FLONASE) 50 MCG/ACTUATION NASAL SPRAY    1 spray (50 mcg total) by Each Nostril route once daily.    HYDRALAZINE (APRESOLINE) 100 MG TABLET    Take 1 tablet (100 mg total) by mouth every 8 (eight) hours.    HYDROCORTISONE 2.5 % OINTMENT    Apply topically 2 (two) times daily. Use on lips    INDOMETHACIN (INDOCIN SR) 75 MG CPSR CR CAPSULE    Take 75 mg by mouth 2 (two) times daily as needed.    LEVOTHYROXINE (SYNTHROID) 112 MCG TABLET    Take 1 tablet (112 mcg total) by mouth before breakfast.    METFORMIN (GLUCOPHAGE) 1000 MG TABLET    Take 1 tablet (1,000 mg total) by mouth 2 (two) times daily with meals.    METOPROLOL SUCCINATE (TOPROL-XL) 50 MG 24 HR TABLET    Take 1 tablet (50 mg total) by mouth once daily.    PREGABALIN (LYRICA) 150 MG CAPSULE    Take 1 capsule (150 mg total) by mouth 3 (three) times daily.    TADALAFIL (CIALIS) 20 MG TAB    Take 1 tablet (20 mg total) by mouth once daily.    TIRZEPATIDE (MOUNJARO) 12.5 MG/0.5 ML PNIJ    Inject 12.5 mg into the skin every 7 days.    TOUJEO SOLOSTAR U-300 INSULIN 300 UNIT/ML (1.5 ML) INPN PEN    INJECT 85 UNITS SUBCUTANEOUSLY ONCE DAILY    TRAZODONE (DESYREL) 100 MG TABLET    Take 1 tablet (100 mg total) by mouth every evening.    VASCEPA 1 GRAM CAP    Take 2 capsules (2,000 mg total) by mouth 2 (two) times daily.         Physical Exam:  Vital Signs:  BP (!) 162/76   Pulse 83   Ht 6' 1" " (1.854 m)   Wt 120.6 kg (265 lb 14 oz)   BMI 35.08 kg/m²   Body mass index is 35.08 kg/m².    Physical Exam  Constitutional:       Appearance: Normal appearance.   HENT:      Head: Normocephalic.   Eyes:      Conjunctiva/sclera: Conjunctivae normal.   Pulmonary:      Effort: Pulmonary effort is normal.   Abdominal:      General: There is no distension.      Palpations: Abdomen is soft.      Tenderness: There is no abdominal tenderness. There is no guarding.   Neurological:      Mental Status: He is alert.        Labs: Pertinent labs reviewed.      Assessment:  Problem List Items Addressed This Visit          Oncology    Personal history of colon cancer    Current Assessment & Plan     Plan:   -Due for repeat colonoscopy in 2025            GI    Gastroesophageal reflux disease without esophagitis - Primary    Current Assessment & Plan     Plan:   -Will start on Omeprazole 40mg PO daily   -Avoid trigger foods   -GERD lifestyle modifications   -Patient counseled on weight loss             Gastroesophageal reflux disease without esophagitis  -     omeprazole (PRILOSEC) 40 MG capsule; Take 1 capsule (40 mg total) by mouth once daily.  Dispense: 90 capsule; Refill: 0    Personal history of colon cancer                Follow up in about 3 months (around 9/20/2023).    Order summary:  No orders of the defined types were placed in this encounter.      Thank you so much for allowing me to participate in the care of Narendra English Sr.    Wili Espinosa MD  Gastroenterology and Hepatology  [facility]

## 2023-06-21 NOTE — PRE-PROCEDURE INSTRUCTIONS
Spoke with patient regarding procedure scheduled on 7.5     Arrival time 0615     Has patient been sick with fever or on antibiotics within the last 7 days? No     Does the patient have any open wounds, sores or rashes? No     Does the patient have any recent fractures? no     Has patient received a vaccination within the last 7 days? No     Received the COVID vaccination? yes     Has the patient stopped all medications as directed? hold dm meds am of procedure     Does patient have a pacemaker and or defibrillator? no     Does the patient have a ride to and from procedure and someone reliable to remain with patient? friend yoshi     Is the patient diabetic? yes     Does the patient have sleep apnea? Or use O2 at home? kobi on cpap     Is the patient receiving sedation? yes     Is the patient instructed to remain NPO beginning at midnight the night before their procedure? yes     Procedure location confirmed with patient? Yes     Covid- Denies signs/symptoms. Instructed to notify PAT/MD if any changes.

## 2023-06-22 ENCOUNTER — PATIENT MESSAGE (OUTPATIENT)
Dept: OTOLARYNGOLOGY | Facility: CLINIC | Age: 58
End: 2023-06-22
Payer: COMMERCIAL

## 2023-06-23 NOTE — TELEPHONE ENCOUNTER
It may be worth trying Nozin. This a nasal rinse that can clear the nose of pathogens. It is not a prescription, but can be found online. I would recommend he try that for 2 weeks if it is not improving, then he I would like to see him back. Thanks !

## 2023-06-29 ENCOUNTER — OFFICE VISIT (OUTPATIENT)
Dept: INTERNAL MEDICINE | Facility: CLINIC | Age: 58
End: 2023-06-29
Payer: COMMERCIAL

## 2023-06-29 VITALS
SYSTOLIC BLOOD PRESSURE: 114 MMHG | OXYGEN SATURATION: 97 % | TEMPERATURE: 98 F | HEART RATE: 84 BPM | DIASTOLIC BLOOD PRESSURE: 62 MMHG | RESPIRATION RATE: 18 BRPM

## 2023-06-29 DIAGNOSIS — G47.26 SHIFT WORK SLEEP DISORDER: ICD-10-CM

## 2023-06-29 PROCEDURE — 3061F NEG MICROALBUMINURIA REV: CPT | Mod: CPTII,S$GLB,, | Performed by: FAMILY MEDICINE

## 2023-06-29 PROCEDURE — 3078F DIAST BP <80 MM HG: CPT | Mod: CPTII,S$GLB,, | Performed by: FAMILY MEDICINE

## 2023-06-29 PROCEDURE — 3074F SYST BP LT 130 MM HG: CPT | Mod: CPTII,S$GLB,, | Performed by: FAMILY MEDICINE

## 2023-06-29 PROCEDURE — 4010F ACE/ARB THERAPY RXD/TAKEN: CPT | Mod: CPTII,S$GLB,, | Performed by: FAMILY MEDICINE

## 2023-06-29 PROCEDURE — 3061F PR NEG MICROALBUMINURIA RESULT DOCUMENTED/REVIEW: ICD-10-PCS | Mod: CPTII,S$GLB,, | Performed by: FAMILY MEDICINE

## 2023-06-29 PROCEDURE — 3066F NEPHROPATHY DOC TX: CPT | Mod: CPTII,S$GLB,, | Performed by: FAMILY MEDICINE

## 2023-06-29 PROCEDURE — 99213 OFFICE O/P EST LOW 20 MIN: CPT | Mod: S$GLB,,, | Performed by: FAMILY MEDICINE

## 2023-06-29 PROCEDURE — 99999 PR PBB SHADOW E&M-EST. PATIENT-LVL V: CPT | Mod: PBBFAC,,, | Performed by: FAMILY MEDICINE

## 2023-06-29 PROCEDURE — 3044F HG A1C LEVEL LT 7.0%: CPT | Mod: CPTII,S$GLB,, | Performed by: FAMILY MEDICINE

## 2023-06-29 PROCEDURE — 3044F PR MOST RECENT HEMOGLOBIN A1C LEVEL <7.0%: ICD-10-PCS | Mod: CPTII,S$GLB,, | Performed by: FAMILY MEDICINE

## 2023-06-29 PROCEDURE — 99999 PR PBB SHADOW E&M-EST. PATIENT-LVL V: ICD-10-PCS | Mod: PBBFAC,,, | Performed by: FAMILY MEDICINE

## 2023-06-29 PROCEDURE — 3066F PR DOCUMENTATION OF TREATMENT FOR NEPHROPATHY: ICD-10-PCS | Mod: CPTII,S$GLB,, | Performed by: FAMILY MEDICINE

## 2023-06-29 PROCEDURE — 3078F PR MOST RECENT DIASTOLIC BLOOD PRESSURE < 80 MM HG: ICD-10-PCS | Mod: CPTII,S$GLB,, | Performed by: FAMILY MEDICINE

## 2023-06-29 PROCEDURE — 3074F PR MOST RECENT SYSTOLIC BLOOD PRESSURE < 130 MM HG: ICD-10-PCS | Mod: CPTII,S$GLB,, | Performed by: FAMILY MEDICINE

## 2023-06-29 PROCEDURE — 4010F PR ACE/ARB THEARPY RXD/TAKEN: ICD-10-PCS | Mod: CPTII,S$GLB,, | Performed by: FAMILY MEDICINE

## 2023-06-29 PROCEDURE — 99213 PR OFFICE/OUTPT VISIT, EST, LEVL III, 20-29 MIN: ICD-10-PCS | Mod: S$GLB,,, | Performed by: FAMILY MEDICINE

## 2023-06-29 NOTE — PROGRESS NOTES
Subjective:       Patient ID: Narendra English Sr. is a 58 y.o. male.    Chief Complaint: Insomnia (One month follow-up. No improvement. )    Patient presents to clinic today for follow up of insomnia. He reports trazodone has not been effective. He reports trouble sleeping during the day due to shift worker syndrome.     Review of Systems   Constitutional:  Negative for chills, fatigue, fever and unexpected weight change.   Eyes:  Negative for visual disturbance.   Respiratory:  Negative for shortness of breath.    Cardiovascular:  Negative for chest pain.   Musculoskeletal:  Negative for myalgias.   Neurological:  Negative for headaches.   Psychiatric/Behavioral:  Positive for sleep disturbance.        Objective:      Physical Exam  Vitals reviewed.   Constitutional:       General: He is not in acute distress.     Appearance: He is well-developed.   HENT:      Head: Normocephalic and atraumatic.   Eyes:      General: Lids are normal. No scleral icterus.     Extraocular Movements: Extraocular movements intact.      Conjunctiva/sclera: Conjunctivae normal.      Pupils: Pupils are equal, round, and reactive to light.   Pulmonary:      Effort: Pulmonary effort is normal.   Neurological:      Mental Status: He is alert and oriented to person, place, and time.      Cranial Nerves: No cranial nerve deficit.      Gait: Gait normal.   Psychiatric:         Mood and Affect: Mood and affect normal.       Assessment:       1. Shift work sleep disorder        Plan:   1. Shift work sleep disorder  -     Ambulatory referral/consult to Sleep Disorders; Future; Expected date: 07/14/2023  -     clonazePAM (KLONOPIN) 1 MG tablet; Take 1 tablet (1 mg total) by mouth every evening.  Dispense: 30 tablet; Refill: 1    As per previous discussions, referred to sleep specialist for ongoing treatment, 2 month supply of klonopin refilled until he can see sleep specialist. Patient expressed understanding and agreement with plan.

## 2023-07-05 ENCOUNTER — HOSPITAL ENCOUNTER (OUTPATIENT)
Facility: HOSPITAL | Age: 58
Discharge: HOME | End: 2023-07-05
Attending: ANESTHESIOLOGY | Admitting: ANESTHESIOLOGY
Payer: COMMERCIAL

## 2023-07-05 VITALS
RESPIRATION RATE: 17 BRPM | OXYGEN SATURATION: 95 % | HEIGHT: 73 IN | TEMPERATURE: 97 F | HEART RATE: 84 BPM | BODY MASS INDEX: 35.2 KG/M2 | WEIGHT: 265.56 LBS | DIASTOLIC BLOOD PRESSURE: 88 MMHG | SYSTOLIC BLOOD PRESSURE: 161 MMHG

## 2023-07-05 DIAGNOSIS — M54.16 LUMBAR RADICULOPATHY: ICD-10-CM

## 2023-07-05 LAB — POCT GLUCOSE: 89 MG/DL (ref 70–110)

## 2023-07-05 PROCEDURE — 64483 PR EPIDURAL INJ, ANES/STEROID, TRANSFORAMINAL, LUMB/SACR, SNGL LEVL: ICD-10-PCS | Mod: 50,,, | Performed by: ANESTHESIOLOGY

## 2023-07-05 PROCEDURE — 25000003 PHARM REV CODE 250: Performed by: ANESTHESIOLOGY

## 2023-07-05 PROCEDURE — 25500020 PHARM REV CODE 255: Performed by: ANESTHESIOLOGY

## 2023-07-05 PROCEDURE — 64483 NJX AA&/STRD TFRM EPI L/S 1: CPT | Mod: 50 | Performed by: ANESTHESIOLOGY

## 2023-07-05 PROCEDURE — 64483 NJX AA&/STRD TFRM EPI L/S 1: CPT | Mod: 50,,, | Performed by: ANESTHESIOLOGY

## 2023-07-05 PROCEDURE — 63600175 PHARM REV CODE 636 W HCPCS: Performed by: ANESTHESIOLOGY

## 2023-07-05 RX ORDER — BETAMETHASONE SODIUM PHOSPHATE AND BETAMETHASONE ACETATE 3; 3 MG/ML; MG/ML
INJECTION, SUSPENSION INTRA-ARTICULAR; INTRALESIONAL; INTRAMUSCULAR; SOFT TISSUE
Status: DISCONTINUED | OUTPATIENT
Start: 2023-07-05 | End: 2023-07-05 | Stop reason: HOSPADM

## 2023-07-05 RX ORDER — LIDOCAINE HYDROCHLORIDE 10 MG/ML
INJECTION, SOLUTION EPIDURAL; INFILTRATION; INTRACAUDAL; PERINEURAL
Status: DISCONTINUED | OUTPATIENT
Start: 2023-07-05 | End: 2023-07-05 | Stop reason: HOSPADM

## 2023-07-05 RX ORDER — MIDAZOLAM HYDROCHLORIDE 1 MG/ML
INJECTION, SOLUTION INTRAMUSCULAR; INTRAVENOUS
Status: DISCONTINUED | OUTPATIENT
Start: 2023-07-05 | End: 2023-07-05 | Stop reason: HOSPADM

## 2023-07-05 RX ORDER — ONDANSETRON 2 MG/ML
4 INJECTION INTRAMUSCULAR; INTRAVENOUS ONCE AS NEEDED
Status: DISCONTINUED | OUTPATIENT
Start: 2023-07-05 | End: 2023-07-05 | Stop reason: HOSPADM

## 2023-07-05 RX ORDER — FENTANYL CITRATE 50 UG/ML
INJECTION, SOLUTION INTRAMUSCULAR; INTRAVENOUS
Status: DISCONTINUED | OUTPATIENT
Start: 2023-07-05 | End: 2023-07-05 | Stop reason: HOSPADM

## 2023-07-05 NOTE — OP NOTE
Transforaminal Lumbar Epidural Steroid Injection    INFORMED CONSENT: The procedure, risks, benefits and options were discussed with patient. There are no contraindications to the procedure. The patient expressed understanding and agreed to proceed. The personnel performing the procedure was discussed.    Date of procedure 07/05/2023    Time-out taken to identify patient and procedure side prior to starting the procedure.                     PROCEDURE:    1)  Bilateral  L4/L5 TRANSFORAMINAL EPIDURAL STEROID INJECTION      Pre Procedure diagnosis:    Lumbar radiculopathy, chronic [M54.16]  1. Lumbar radiculopathy        Post-Procedure diagnosis:   same    Surgeon: Leonard Mart MD    Assistants: None    Medication: 2ml Betamethasone PF 6mg/ml and 2ml Lidocaine PF 1%    Local: 3ml Lidocaine PF 1%    Sedation: Conscious sedation provided by M.D    SEDATION MEDICATIONS: local/IV sedation: Versed 2 mg and fentanyl 50 mcg IV.  Conscious sedation ordered by MD.  Patient reevaluated and sedation administered by MD and monitored by RN.  Total sedation time was less than 15 min.    Total sedation time was >10 but <20 min    Complications: None    Specimens: None        TECHNIQUE: The patient was brought to the procedure room. IV access was obtained prior to the procedure. The patient was positioned prone on the fluoroscopy table. Continuous hemodynamic monitoring was initiated including blood pressure, EKG, and pulse oximetry. . The skin was prepped with chlorhexidine and draped in a sterile fashion.   Local Xylocaine was injected by raising a wheel and going down to the periosteum using a 27-gauge hypodermic needle.      The bilateral L4/L5 transforaminal spaces were identified with fluoroscopy in the  AP, oblique, and lateral views.  A 22 gauge spinal quinke needle was then advanced into the area of the trans foraminal spaces at the above levels with confirmation of proper needle position using AP, oblique, and lateral  fluoroscopic views. Once the needle tip was in the area of the transforaminal space, and there was no blood, CSF or paraesthesias,  2 mL of Omnipaque 300mg/ml was injected at each level for a total of 4 mL.  Fluoroscopic imaging in the AP and lateral views revealed a clear outline of the spinal nerve with proximal spread of agent through the neural foramen into the epidural space. A total combination of 1 mL of Lidocaine PF 1% and 1ml of Betamethasone PF 6mg/ml was injected into each level for a total of 4mL of injected medications with displacement of the contrast dye confirming that the medication went into the area of the transforaminal spaces at each level. A sterile dressing was applied. Patient tolerated procedure well.        The patient was monitored for approximately 30 minutes after the procedure.  Patient was given post procedure and discharge instructions to follow at home.  The patient was discharged in a stable condition

## 2023-07-05 NOTE — DISCHARGE SUMMARY
Discharge Note  Short Stay      SUMMARY     Admit Date: 7/5/2023    Attending Physician: Leonard Mart MD        Discharge Physician: Leonard Mart MD        Discharge Date: 7/5/2023 7:41 AM    Procedure(s) (LRB):  Bilateral L4/5 TF SALBADOR RN IV Sedation (Bilateral)    Final Diagnosis: Lumbar radiculopathy, chronic [M54.16]    Disposition: Home or self care    Patient Instructions:   Current Discharge Medication List        CONTINUE these medications which have NOT CHANGED    Details   amLODIPine (NORVASC) 10 MG tablet Take 1 tablet (10 mg total) by mouth once daily.  Qty: 90 tablet, Refills: 1      metFORMIN (GLUCOPHAGE) 1000 MG tablet Take 1 tablet (1,000 mg total) by mouth 2 (two) times daily with meals.  Qty: 180 tablet, Refills: 3    Associated Diagnoses: Type 2 diabetes mellitus without complication, with long-term current use of insulin      atorvastatin (LIPITOR) 20 MG tablet Take 1 tablet (20 mg total) by mouth every evening.  Qty: 90 tablet, Refills: 3      azelastine (ASTELIN) 137 mcg (0.1 %) nasal spray 1 spray (137 mcg total) by Nasal route 2 (two) times daily.  Qty: 30 mL, Refills: 3      benazepriL (LOTENSIN) 40 MG tablet Take 1 tablet (40 mg total) by mouth once daily.  Qty: 90 tablet, Refills: 3    Comments: .  Associated Diagnoses: Hypertension associated with diabetes      clobetasol 0.05% (TEMOVATE) 0.05 % Oint AAA bid  Qty: 60 g, Refills: 3    Associated Diagnoses: Psoriasis      clonazePAM (KLONOPIN) 1 MG tablet Take 1 tablet (1 mg total) by mouth every evening.  Qty: 30 tablet, Refills: 1    Associated Diagnoses: Shift work sleep disorder      colchicine (COLCRYS) 0.6 mg tablet Take 1 tablet (0.6 mg total) by mouth 2 (two) times daily.  Qty: 180 tablet, Refills: 3    Associated Diagnoses: Gout, unspecified cause, unspecified chronicity, unspecified site      diclofenac sodium (VOLTAREN) 1 % Gel Apply 2 g topically 4 (four) times daily as needed (Pain).  Qty: 100 g, Refills: 1     Associated Diagnoses: Cervical radiculopathy      flash glucose scanning reader Misc 1 application.      flash glucose sensor Kit 1 application.      fluticasone propionate (FLONASE) 50 mcg/actuation nasal spray 1 spray (50 mcg total) by Each Nostril route once daily.  Qty: 16 g, Refills: 0      hydrALAZINE (APRESOLINE) 100 MG tablet Take 1 tablet (100 mg total) by mouth every 8 (eight) hours.  Qty: 270 tablet, Refills: 1    Comments: .  Associated Diagnoses: Hyperlipidemia, unspecified hyperlipidemia type; Chest pain, unspecified type; Nonspecific abnormal electrocardiogram (ECG) (EKG); Type 2 diabetes mellitus without complication, without long-term current use of insulin; Hypertension, unspecified type; Pre-op evaluation      hydrocortisone 2.5 % ointment Apply topically 2 (two) times daily. Use on lips  Qty: 30 g, Refills: 1    Associated Diagnoses: Cheilitis      indomethacin (INDOCIN SR) 75 mg CpSR CR capsule Take 75 mg by mouth 2 (two) times daily as needed.      levothyroxine (SYNTHROID) 112 MCG tablet Take 1 tablet (112 mcg total) by mouth before breakfast.  Qty: 90 tablet, Refills: 1      metoprolol succinate (TOPROL-XL) 50 MG 24 hr tablet Take 1 tablet (50 mg total) by mouth once daily.  Qty: 90 tablet, Refills: 3    Comments: .  Associated Diagnoses: Hypertension associated with diabetes      omeprazole (PRILOSEC) 40 MG capsule Take 1 capsule (40 mg total) by mouth once daily.  Qty: 90 capsule, Refills: 0    Associated Diagnoses: Gastroesophageal reflux disease without esophagitis      pregabalin (LYRICA) 150 MG capsule Take 1 capsule (150 mg total) by mouth 3 (three) times daily.  Qty: 90 capsule, Refills: 3      tadalafiL (CIALIS) 20 MG Tab Take 1 tablet (20 mg total) by mouth once daily.  Qty: 30 tablet, Refills: 11    Associated Diagnoses: Erectile dysfunction, unspecified erectile dysfunction type      tirzepatide (MOUNJARO) 12.5 mg/0.5 mL PnIj Inject 12.5 mg into the skin every 7 days.  Qty: 4 pen,  Refills: 1      TOUJEO SOLOSTAR U-300 INSULIN 300 unit/mL (1.5 mL) InPn pen INJECT 85 UNITS SUBCUTANEOUSLY ONCE DAILY  Qty: 17 pen, Refills: 0      traZODone (DESYREL) 100 MG tablet Take 1 tablet (100 mg total) by mouth every evening.  Qty: 30 tablet, Refills: 5    Associated Diagnoses: Shift work sleep disorder      VASCEPA 1 gram Cap Take 2 capsules (2,000 mg total) by mouth 2 (two) times daily.  Qty: 360 capsule, Refills: 1                 Discharge Diagnosis: Lumbar radiculopathy, chronic [M54.16]  Condition on Discharge: Stable with no complications to procedure   Diet on Discharge: Same as before.  Activity: as per instruction sheet.  Discharge to: Home with a responsible adult.  Follow up: 2-4 weeks       Please call the office at [phone] if you experience any weakness or loss of sensation, fever > 101.5, pain uncontrolled with oral medications, persistent nausea/vomiting/or diarrhea, redness or drainage from the incisions, or any other worrisome concerns. If physician on call was not reached or could not communicate with our office for any reason please go to the nearest emergency department

## 2023-07-05 NOTE — DISCHARGE INSTRUCTIONS

## 2023-07-05 NOTE — H&P
HPI  Patient presenting for Procedure(s) (LRB):  Bilateral L4/5 TF SALBADOR RN IV Sedation (Bilateral)     Patient on Anti-coagulation No    No health changes since previous encounter    Past Medical History:   Diagnosis Date    Cancer     Colon    Diabetes mellitus 8 years    BS 84 in the room 08/11/2021    Gastroesophageal reflux disease without esophagitis 6/20/2023    Hypertension     Sleep apnea     Thyroid disease      Past Surgical History:   Procedure Laterality Date    BACK SURGERY      COLON SURGERY  02/06/21    COLONOSCOPY N/A 12/29/2020    Procedure: COLONOSCOPY;  Surgeon: William Cotter MD;  Location: [facility] ENDO;  Service: Endoscopy;  Laterality: N/A;  Will need Rapid doesn't live here    COLONOSCOPY N/A 02/10/2022    Procedure: COLONOSCOPY;  Surgeon: Wili Espinosa MD;  Location: [facility];  Service: Endoscopy;  Laterality: N/A;    EPIDURAL STEROID INJECTION INTO CERVICAL SPINE N/A 12/1/2022    Procedure: C7/T1 IL SALBADOR;  Surgeon: Leonard Mart MD;  Location: [facility] PAIN MGT;  Service: Pain Management;  Laterality: N/A;    FESS, WITH NASAL SEPTOPLASTY, WITH IMAGING GUIDANCE Bilateral 08/17/2022    Procedure: FESS, WITH NASAL SEPTOPLASTY, WITH IMAGING GUIDANCE;  Surgeon: Viktor Ferrell MD;  Location: [facility] OR;  Service: ENT;  Laterality: Bilateral;    LAPAROSCOPIC RIGHT COLON RESECTION N/A 02/02/2021    Procedure: COLECTOMY, RIGHT, LAPAROSCOPIC, ERAS low;  Surgeon: Melonie Santoyo MD;  [facility];  Service: Colon and Rectal;  Laterality: N/A;  needs rapid covid test    LYSIS OF ADHESIONS Bilateral 4/19/2023    Procedure: LYSIS, ADHESIONS;  Surgeon: Viktor Ferrell MD;  Location: [facility] OR;  Service: ENT;  Laterality: Bilateral;    NASAL ENDOSCOPY Bilateral 4/19/2023    Procedure: ENDOSCOPY, NOSE;  Surgeon: Viktor Ferrell MD;  Location: [facility] OR;  Service: ENT;  Laterality: Bilateral;    NASAL TURBINATE REDUCTION Bilateral 08/17/2022    Procedure: REDUCTION, NASAL TURBINATE;   "Surgeon: Viktor Ferrell MD;  Location: [facility] OR;  Service: ENT;  Laterality: Bilateral;    RHINOPLASTY Bilateral 4/19/2023    Procedure: RHINOPLASTY;  Surgeon: Viktor Ferrell MD;  Location: [facility] OR;  Service: ENT;  Laterality: Bilateral;    TONSILLECTOMY       Review of patient's allergies indicates:   Allergen Reactions    Demerol (pf) [meperidine (pf)] Other (See Comments)     Pt reports,"They lost my blood pressure."    Percocet [oxycodone-acetaminophen] Itching     itching    Demerol [meperidine]         No current facility-administered medications on file prior to encounter.     Current Outpatient Medications on File Prior to Encounter   Medication Sig Dispense Refill    amLODIPine (NORVASC) 10 MG tablet Take 1 tablet (10 mg total) by mouth once daily. 90 tablet 1    metFORMIN (GLUCOPHAGE) 1000 MG tablet Take 1 tablet (1,000 mg total) by mouth 2 (two) times daily with meals. 180 tablet 3    atorvastatin (LIPITOR) 20 MG tablet Take 1 tablet (20 mg total) by mouth every evening. 90 tablet 3    azelastine (ASTELIN) 137 mcg (0.1 %) nasal spray 1 spray (137 mcg total) by Nasal route 2 (two) times daily. 30 mL 3    benazepriL (LOTENSIN) 40 MG tablet Take 1 tablet (40 mg total) by mouth once daily. 90 tablet 3    clobetasol 0.05% (TEMOVATE) 0.05 % Oint AAA bid 60 g 3    clonazePAM (KLONOPIN) 1 MG tablet Take 1 tablet (1 mg total) by mouth every evening. 30 tablet 1    colchicine (COLCRYS) 0.6 mg tablet Take 1 tablet (0.6 mg total) by mouth 2 (two) times daily. 180 tablet 3    diclofenac sodium (VOLTAREN) 1 % Gel Apply 2 g topically 4 (four) times daily as needed (Pain). 100 g 1    flash glucose scanning reader Misc 1 application.      flash glucose sensor Kit 1 application.      fluticasone propionate (FLONASE) 50 mcg/actuation nasal spray 1 spray (50 mcg total) by Each Nostril route once daily. 16 g 0    hydrALAZINE (APRESOLINE) 100 MG tablet Take 1 tablet (100 mg total) by mouth every 8 (eight) hours. 270 tablet " "1    hydrocortisone 2.5 % ointment Apply topically 2 (two) times daily. Use on lips 30 g 1    indomethacin (INDOCIN SR) 75 mg CpSR CR capsule Take 75 mg by mouth 2 (two) times daily as needed.      levothyroxine (SYNTHROID) 112 MCG tablet Take 1 tablet (112 mcg total) by mouth before breakfast. 90 tablet 1    metoprolol succinate (TOPROL-XL) 50 MG 24 hr tablet Take 1 tablet (50 mg total) by mouth once daily. (Patient taking differently: Take 50 mg by mouth every evening.) 90 tablet 3    omeprazole (PRILOSEC) 40 MG capsule Take 1 capsule (40 mg total) by mouth once daily. 90 capsule 0    pregabalin (LYRICA) 150 MG capsule Take 1 capsule (150 mg total) by mouth 3 (three) times daily. 90 capsule 3    tadalafiL (CIALIS) 20 MG Tab Take 1 tablet (20 mg total) by mouth once daily. (Patient taking differently: Take 20 mg by mouth daily as needed.) 30 tablet 11    tirzepatide (MOUNJARO) 12.5 mg/0.5 mL PnIj Inject 12.5 mg into the skin every 7 days. 4 pen 1    TOUJEO SOLOSTAR U-300 INSULIN 300 unit/mL (1.5 mL) InPn pen INJECT 85 UNITS SUBCUTANEOUSLY ONCE DAILY 17 pen 0    traZODone (DESYREL) 100 MG tablet Take 1 tablet (100 mg total) by mouth every evening. 30 tablet 5    VASCEPA 1 gram Cap Take 2 capsules (2,000 mg total) by mouth 2 (two) times daily. 360 capsule 1        PMHx, PSHx, Allergies, Medications reviewed in epic    ROS negative except pain complaints in HPI    OBJECTIVE:    BP (!) 165/84 (BP Location: Right arm, Patient Position: Sitting)   Pulse 83   Temp 97.2 °F (36.2 °C) (Temporal)   Resp 18   Ht 6' 1" (1.854 m)   Wt 120.5 kg (265 lb 8.7 oz)   SpO2 95%   BMI 35.03 kg/m²     PHYSICAL EXAMINATION:    GENERAL: Well appearing, in no acute distress, alert and oriented x3.  PSYCH:  Mood and affect appropriate.  SKIN: Skin color, texture, turgor normal, no rashes or lesions which will impact the procedure.  CV: RRR with palpation of the radial artery.  PULM: No evidence of respiratory difficulty, symmetric " chest rise. Clear to auscultation.  NEURO: Cranial nerves grossly intact.    Plan:    Proceed with procedure as planned Procedure(s) (LRB):  Bilateral L4/5 TF SALBADOR RN IV Sedation (Bilateral)    Leonard Mart MD  07/05/2023

## 2023-07-06 ENCOUNTER — PATIENT MESSAGE (OUTPATIENT)
Dept: PAIN MEDICINE | Facility: CLINIC | Age: 58
End: 2023-07-06
Payer: COMMERCIAL

## 2023-07-06 ENCOUNTER — OFFICE VISIT (OUTPATIENT)
Dept: NEUROSURGERY | Facility: CLINIC | Age: 58
End: 2023-07-06
Payer: COMMERCIAL

## 2023-07-06 ENCOUNTER — PATIENT MESSAGE (OUTPATIENT)
Dept: INTERNAL MEDICINE | Facility: CLINIC | Age: 58
End: 2023-07-06
Payer: COMMERCIAL

## 2023-07-06 ENCOUNTER — PATIENT MESSAGE (OUTPATIENT)
Dept: NEUROSURGERY | Facility: CLINIC | Age: 58
End: 2023-07-06

## 2023-07-06 VITALS
WEIGHT: 263.69 LBS | HEIGHT: 73 IN | DIASTOLIC BLOOD PRESSURE: 78 MMHG | HEART RATE: 82 BPM | SYSTOLIC BLOOD PRESSURE: 128 MMHG | BODY MASS INDEX: 34.95 KG/M2

## 2023-07-06 DIAGNOSIS — M54.12 CERVICAL RADICULOPATHY: ICD-10-CM

## 2023-07-06 DIAGNOSIS — M50.30 DDD (DEGENERATIVE DISC DISEASE), CERVICAL: Primary | ICD-10-CM

## 2023-07-06 DIAGNOSIS — M48.02 SPINAL STENOSIS, CERVICAL REGION: ICD-10-CM

## 2023-07-06 DIAGNOSIS — G47.26 SHIFT WORK SLEEP DISORDER: Primary | ICD-10-CM

## 2023-07-06 DIAGNOSIS — M54.2 CERVICALGIA: ICD-10-CM

## 2023-07-06 PROCEDURE — 99499 NO LOS: ICD-10-PCS | Mod: S$GLB,,, | Performed by: PHYSICIAN ASSISTANT

## 2023-07-06 PROCEDURE — 99999 PR PBB SHADOW E&M-EST. PATIENT-LVL V: ICD-10-PCS | Mod: PBBFAC,,, | Performed by: PHYSICIAN ASSISTANT

## 2023-07-06 PROCEDURE — 99999 PR PBB SHADOW E&M-EST. PATIENT-LVL V: CPT | Mod: PBBFAC,,, | Performed by: PHYSICIAN ASSISTANT

## 2023-07-06 PROCEDURE — 99499 UNLISTED E&M SERVICE: CPT | Mod: S$GLB,,, | Performed by: PHYSICIAN ASSISTANT
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
## 2023-07-07 RX ORDER — CLONAZEPAM 1 MG/1
1 TABLET ORAL NIGHTLY
Qty: 30 TABLET | Refills: 1 | Status: SHIPPED | OUTPATIENT
Start: 2023-07-07 | End: 2024-03-11

## 2023-07-10 ENCOUNTER — PATIENT MESSAGE (OUTPATIENT)
Dept: NEUROLOGY | Facility: CLINIC | Age: 58
End: 2023-07-10
Payer: COMMERCIAL

## 2023-07-13 ENCOUNTER — PATIENT MESSAGE (OUTPATIENT)
Dept: NEUROSURGERY | Facility: CLINIC | Age: 58
End: 2023-07-13
Payer: COMMERCIAL

## 2023-07-22 RX ORDER — INSULIN GLARGINE 300 U/ML
INJECTION, SOLUTION SUBCUTANEOUS
Qty: 17 PEN | Refills: 0 | Status: CANCELLED | OUTPATIENT
Start: 2023-07-22

## 2023-07-24 ENCOUNTER — PATIENT MESSAGE (OUTPATIENT)
Dept: NEUROSURGERY | Facility: CLINIC | Age: 58
End: 2023-07-24
Payer: COMMERCIAL

## 2023-07-24 RX ORDER — INSULIN GLARGINE 300 U/ML
INJECTION, SOLUTION SUBCUTANEOUS
Qty: 17 PEN | Refills: 0 | OUTPATIENT
Start: 2023-07-24

## 2023-07-24 RX ORDER — INSULIN GLARGINE 300 U/ML
INJECTION, SOLUTION SUBCUTANEOUS
Qty: 17 PEN | Refills: 0 | Status: SHIPPED | OUTPATIENT
Start: 2023-07-24 | End: 2023-10-12 | Stop reason: SDUPTHER

## 2023-07-24 RX ORDER — TIRZEPATIDE 12.5 MG/.5ML
12.5 INJECTION, SOLUTION SUBCUTANEOUS
Qty: 4 PEN | Refills: 1 | Status: SHIPPED | OUTPATIENT
Start: 2023-07-24 | End: 2023-09-18 | Stop reason: SDUPTHER

## 2023-07-24 NOTE — TELEPHONE ENCOUNTER
Care Due:                  Date            Visit Type   Department     Provider  --------------------------------------------------------------------------------                                EP -                              PRIMARY      [facility] INTERNAL  Last Visit: 06-      CARE ([facility])   CHRISTELLE Melgoza                              EP -                              PRIMARY      [facility] INTERNAL  Next Visit: 09-      CARE ([facility])   CHRISTELLE Melgoza                                                            Last  Test          Frequency    Reason                     Performed    Due Date  --------------------------------------------------------------------------------    HBA1C.......  6 months...  insulin, metFORMIN.......  03-   09-    Uric Acid...  12 months..  colchicine...............  Not Found    Overdue    [employer] Embedded Care Due Messages. Reference number: [id number].   7/24/2023 10:44:08 AM NAZARIO

## 2023-07-24 NOTE — TELEPHONE ENCOUNTER
Refill Decision Note   Narendra English  is requesting a refill authorization.  Brief Assessment and Rationale for Refill:  Approve     Medication Therapy Plan:       Medication Reconciliation Completed: No   Comments:     No Care Gaps recommended.     Note composed:8:46 AM 07/24/2023

## 2023-07-31 ENCOUNTER — OFFICE VISIT (OUTPATIENT)
Dept: NEUROSURGERY | Facility: CLINIC | Age: 58
End: 2023-07-31
Payer: COMMERCIAL

## 2023-07-31 VITALS
HEIGHT: 73 IN | WEIGHT: 257.25 LBS | BODY MASS INDEX: 34.09 KG/M2 | HEART RATE: 72 BPM | SYSTOLIC BLOOD PRESSURE: 110 MMHG | DIASTOLIC BLOOD PRESSURE: 71 MMHG

## 2023-07-31 DIAGNOSIS — M48.02 SPINAL STENOSIS, CERVICAL REGION: ICD-10-CM

## 2023-07-31 DIAGNOSIS — M50.30 DEGENERATIVE DISC DISEASE, CERVICAL: Primary | ICD-10-CM

## 2023-07-31 DIAGNOSIS — M54.12 CERVICAL RADICULOPATHY: ICD-10-CM

## 2023-07-31 DIAGNOSIS — M50.30 DDD (DEGENERATIVE DISC DISEASE), CERVICAL: ICD-10-CM

## 2023-07-31 DIAGNOSIS — M54.2 CERVICALGIA: ICD-10-CM

## 2023-07-31 PROCEDURE — 3008F BODY MASS INDEX DOCD: CPT | Mod: CPTII,S$GLB,, | Performed by: NEUROLOGICAL SURGERY

## 2023-07-31 PROCEDURE — 99999 PR PBB SHADOW E&M-EST. PATIENT-LVL IV: ICD-10-PCS | Mod: PBBFAC,,, | Performed by: NEUROLOGICAL SURGERY

## 2023-07-31 PROCEDURE — 3061F PR NEG MICROALBUMINURIA RESULT DOCUMENTED/REVIEW: ICD-10-PCS | Mod: CPTII,S$GLB,, | Performed by: NEUROLOGICAL SURGERY

## 2023-07-31 PROCEDURE — 3066F PR DOCUMENTATION OF TREATMENT FOR NEPHROPATHY: ICD-10-PCS | Mod: CPTII,S$GLB,, | Performed by: NEUROLOGICAL SURGERY

## 2023-07-31 PROCEDURE — 4010F ACE/ARB THERAPY RXD/TAKEN: CPT | Mod: CPTII,S$GLB,, | Performed by: NEUROLOGICAL SURGERY

## 2023-07-31 PROCEDURE — 3074F PR MOST RECENT SYSTOLIC BLOOD PRESSURE < 130 MM HG: ICD-10-PCS | Mod: CPTII,S$GLB,, | Performed by: NEUROLOGICAL SURGERY

## 2023-07-31 PROCEDURE — 3074F SYST BP LT 130 MM HG: CPT | Mod: CPTII,S$GLB,, | Performed by: NEUROLOGICAL SURGERY

## 2023-07-31 PROCEDURE — 3078F DIAST BP <80 MM HG: CPT | Mod: CPTII,S$GLB,, | Performed by: NEUROLOGICAL SURGERY

## 2023-07-31 PROCEDURE — 4010F PR ACE/ARB THEARPY RXD/TAKEN: ICD-10-PCS | Mod: CPTII,S$GLB,, | Performed by: NEUROLOGICAL SURGERY

## 2023-07-31 PROCEDURE — 3061F NEG MICROALBUMINURIA REV: CPT | Mod: CPTII,S$GLB,, | Performed by: NEUROLOGICAL SURGERY

## 2023-07-31 PROCEDURE — 3008F PR BODY MASS INDEX (BMI) DOCUMENTED: ICD-10-PCS | Mod: CPTII,S$GLB,, | Performed by: NEUROLOGICAL SURGERY

## 2023-07-31 PROCEDURE — 3066F NEPHROPATHY DOC TX: CPT | Mod: CPTII,S$GLB,, | Performed by: NEUROLOGICAL SURGERY

## 2023-07-31 PROCEDURE — 99214 PR OFFICE/OUTPT VISIT, EST, LEVL IV, 30-39 MIN: ICD-10-PCS | Mod: S$GLB,,, | Performed by: NEUROLOGICAL SURGERY

## 2023-07-31 PROCEDURE — 1159F PR MEDICATION LIST DOCUMENTED IN MEDICAL RECORD: ICD-10-PCS | Mod: CPTII,S$GLB,, | Performed by: NEUROLOGICAL SURGERY

## 2023-07-31 PROCEDURE — 3044F HG A1C LEVEL LT 7.0%: CPT | Mod: CPTII,S$GLB,, | Performed by: NEUROLOGICAL SURGERY

## 2023-07-31 PROCEDURE — 3044F PR MOST RECENT HEMOGLOBIN A1C LEVEL <7.0%: ICD-10-PCS | Mod: CPTII,S$GLB,, | Performed by: NEUROLOGICAL SURGERY

## 2023-07-31 PROCEDURE — 99999 PR PBB SHADOW E&M-EST. PATIENT-LVL IV: CPT | Mod: PBBFAC,,, | Performed by: NEUROLOGICAL SURGERY

## 2023-07-31 PROCEDURE — 1159F MED LIST DOCD IN RCRD: CPT | Mod: CPTII,S$GLB,, | Performed by: NEUROLOGICAL SURGERY

## 2023-07-31 PROCEDURE — 3078F PR MOST RECENT DIASTOLIC BLOOD PRESSURE < 80 MM HG: ICD-10-PCS | Mod: CPTII,S$GLB,, | Performed by: NEUROLOGICAL SURGERY

## 2023-07-31 PROCEDURE — 99214 OFFICE O/P EST MOD 30 MIN: CPT | Mod: S$GLB,,, | Performed by: NEUROLOGICAL SURGERY
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
## 2023-08-07 ENCOUNTER — OFFICE VISIT (OUTPATIENT)
Dept: OTOLARYNGOLOGY | Facility: CLINIC | Age: 58
End: 2023-08-07
Payer: COMMERCIAL

## 2023-08-07 VITALS — TEMPERATURE: 97 F | WEIGHT: 256.19 LBS | BODY MASS INDEX: 33.8 KG/M2

## 2023-08-07 DIAGNOSIS — J30.9 ALLERGIC RHINITIS, UNSPECIFIED SEASONALITY, UNSPECIFIED TRIGGER: ICD-10-CM

## 2023-08-07 DIAGNOSIS — J32.9 CHRONIC SINUSITIS, UNSPECIFIED LOCATION: Primary | ICD-10-CM

## 2023-08-07 DIAGNOSIS — Z98.890 HISTORY OF RHINOPLASTY: ICD-10-CM

## 2023-08-07 DIAGNOSIS — J34.89 ADHESIONS OF NASAL SEPTUM AND TURBINATES: ICD-10-CM

## 2023-08-07 PROCEDURE — 3008F BODY MASS INDEX DOCD: CPT | Mod: CPTII,S$GLB,, | Performed by: STUDENT IN AN ORGANIZED HEALTH CARE EDUCATION/TRAINING PROGRAM

## 2023-08-07 PROCEDURE — 4010F ACE/ARB THERAPY RXD/TAKEN: CPT | Mod: CPTII,S$GLB,, | Performed by: STUDENT IN AN ORGANIZED HEALTH CARE EDUCATION/TRAINING PROGRAM

## 2023-08-07 PROCEDURE — 99213 OFFICE O/P EST LOW 20 MIN: CPT | Mod: 25,S$GLB,, | Performed by: STUDENT IN AN ORGANIZED HEALTH CARE EDUCATION/TRAINING PROGRAM

## 2023-08-07 PROCEDURE — 3061F PR NEG MICROALBUMINURIA RESULT DOCUMENTED/REVIEW: ICD-10-PCS | Mod: CPTII,S$GLB,, | Performed by: STUDENT IN AN ORGANIZED HEALTH CARE EDUCATION/TRAINING PROGRAM

## 2023-08-07 PROCEDURE — 3044F HG A1C LEVEL LT 7.0%: CPT | Mod: CPTII,S$GLB,, | Performed by: STUDENT IN AN ORGANIZED HEALTH CARE EDUCATION/TRAINING PROGRAM

## 2023-08-07 PROCEDURE — 1159F PR MEDICATION LIST DOCUMENTED IN MEDICAL RECORD: ICD-10-PCS | Mod: CPTII,S$GLB,, | Performed by: STUDENT IN AN ORGANIZED HEALTH CARE EDUCATION/TRAINING PROGRAM

## 2023-08-07 PROCEDURE — 3066F NEPHROPATHY DOC TX: CPT | Mod: CPTII,S$GLB,, | Performed by: STUDENT IN AN ORGANIZED HEALTH CARE EDUCATION/TRAINING PROGRAM

## 2023-08-07 PROCEDURE — 99213 PR OFFICE/OUTPT VISIT, EST, LEVL III, 20-29 MIN: ICD-10-PCS | Mod: 25,S$GLB,, | Performed by: STUDENT IN AN ORGANIZED HEALTH CARE EDUCATION/TRAINING PROGRAM

## 2023-08-07 PROCEDURE — 31231 PR NASAL ENDOSCOPY, DX: ICD-10-PCS | Mod: S$GLB,,, | Performed by: STUDENT IN AN ORGANIZED HEALTH CARE EDUCATION/TRAINING PROGRAM

## 2023-08-07 PROCEDURE — 4010F PR ACE/ARB THEARPY RXD/TAKEN: ICD-10-PCS | Mod: CPTII,S$GLB,, | Performed by: STUDENT IN AN ORGANIZED HEALTH CARE EDUCATION/TRAINING PROGRAM

## 2023-08-07 PROCEDURE — 31231 NASAL ENDOSCOPY DX: CPT | Mod: S$GLB,,, | Performed by: STUDENT IN AN ORGANIZED HEALTH CARE EDUCATION/TRAINING PROGRAM

## 2023-08-07 PROCEDURE — 3008F PR BODY MASS INDEX (BMI) DOCUMENTED: ICD-10-PCS | Mod: CPTII,S$GLB,, | Performed by: STUDENT IN AN ORGANIZED HEALTH CARE EDUCATION/TRAINING PROGRAM

## 2023-08-07 PROCEDURE — 99999 PR PBB SHADOW E&M-EST. PATIENT-LVL III: ICD-10-PCS | Mod: PBBFAC,,, | Performed by: STUDENT IN AN ORGANIZED HEALTH CARE EDUCATION/TRAINING PROGRAM

## 2023-08-07 PROCEDURE — 3044F PR MOST RECENT HEMOGLOBIN A1C LEVEL <7.0%: ICD-10-PCS | Mod: CPTII,S$GLB,, | Performed by: STUDENT IN AN ORGANIZED HEALTH CARE EDUCATION/TRAINING PROGRAM

## 2023-08-07 PROCEDURE — 3061F NEG MICROALBUMINURIA REV: CPT | Mod: CPTII,S$GLB,, | Performed by: STUDENT IN AN ORGANIZED HEALTH CARE EDUCATION/TRAINING PROGRAM

## 2023-08-07 PROCEDURE — 99999 PR PBB SHADOW E&M-EST. PATIENT-LVL III: CPT | Mod: PBBFAC,,, | Performed by: STUDENT IN AN ORGANIZED HEALTH CARE EDUCATION/TRAINING PROGRAM

## 2023-08-07 PROCEDURE — 1159F MED LIST DOCD IN RCRD: CPT | Mod: CPTII,S$GLB,, | Performed by: STUDENT IN AN ORGANIZED HEALTH CARE EDUCATION/TRAINING PROGRAM

## 2023-08-07 PROCEDURE — 3066F PR DOCUMENTATION OF TREATMENT FOR NEPHROPATHY: ICD-10-PCS | Mod: CPTII,S$GLB,, | Performed by: STUDENT IN AN ORGANIZED HEALTH CARE EDUCATION/TRAINING PROGRAM

## 2023-08-07 NOTE — PROGRESS NOTES
Chief complaint:    Chief Complaint   Patient presents with    Follow-up     3 month f/u         Referring Provider:  No referring provider defined for this encounter.      History of present illness:     Mr. English is a 58 y.o. presenting for evaluation of sinus and nasal symptoms.     Sore in right nostril since surgery. Scabbing over. Bled some.     Some nasal congestion recently as well and some facial pressure.     Overall the nasal obstruction is markedly improved. No purulent rhinorrhea.     Saline spray. Not using other sprays at this time.     Return clinic visit, 8/7/23    Overall doing pretty well, but does have some facial pressure on the left. Intermittent obstruction, left > right. Improved since surgery.  Denies purulent rhinorrhea.     Currently using the astelin and saline irrigations.     History      Past Medical History:   Past Medical History:   Diagnosis Date    Cancer     Colon    Diabetes mellitus 8 years    BS 84 in the room 08/11/2021    Gastroesophageal reflux disease without esophagitis 6/20/2023    Hypertension     Sleep apnea     Thyroid disease          Past Surgical History:  Past Surgical History:   Procedure Laterality Date    BACK SURGERY      COLON SURGERY  02/06/21    COLONOSCOPY N/A 12/29/2020    Procedure: COLONOSCOPY;  Surgeon: William Cotter MD;  Location: [facility] ENDO;  Service: Endoscopy;  Laterality: N/A;  Will need Rapid doesn't live here    COLONOSCOPY N/A 02/10/2022    Procedure: COLONOSCOPY;  Surgeon: Wili Espinosa MD;  Location: [facility];  Service: Endoscopy;  Laterality: N/A;    EPIDURAL STEROID INJECTION INTO CERVICAL SPINE N/A 12/1/2022    Procedure: C7/T1 IL SALBADOR;  Surgeon: Leonard Mart MD;  Location: [facility];  Service: Pain Management;  Laterality: N/A;    FESS, WITH NASAL SEPTOPLASTY, WITH IMAGING GUIDANCE Bilateral 08/17/2022    Procedure: FESS, WITH NASAL SEPTOPLASTY, WITH IMAGING GUIDANCE;  Surgeon: Viktor Ferrell MD;  Location: [facility]  "OR;  Service: ENT;  Laterality: Bilateral;    LAPAROSCOPIC RIGHT COLON RESECTION N/A 02/02/2021    Procedure: COLECTOMY, RIGHT, LAPAROSCOPIC, ERAS low;  Surgeon: Melonie Santoyo MD;  Location: [address];  Service: Colon and Rectal;  Laterality: N/A;  needs rapid covid test    LYSIS OF ADHESIONS Bilateral 4/19/2023    Procedure: LYSIS, ADHESIONS;  Surgeon: Viktor Ferrell MD;  Location: [facility] OR;  Service: ENT;  Laterality: Bilateral;    NASAL ENDOSCOPY Bilateral 4/19/2023    Procedure: ENDOSCOPY, NOSE;  Surgeon: Viktor Ferrell MD;  Location: [facility] OR;  Service: ENT;  Laterality: Bilateral;    NASAL TURBINATE REDUCTION Bilateral 08/17/2022    Procedure: REDUCTION, NASAL TURBINATE;  Surgeon: Viktor Ferrell MD;  Location: [facility] OR;  Service: ENT;  Laterality: Bilateral;    RHINOPLASTY Bilateral 4/19/2023    Procedure: RHINOPLASTY;  Surgeon: Viktor Ferrell MD;  Location: [facility] OR;  Service: ENT;  Laterality: Bilateral;    SELECTIVE INJECTION OF ANESTHETIC AGENT AROUND LUMBAR SPINAL NERVE ROOT BY TRANSFORAMINAL APPROACH Bilateral 7/5/2023    Procedure: Bilateral L4/5 TF SALBADOR RN IV Sedation;  Surgeon: Leonard Mart MD;  Location: [facility] PAIN MGT;  Service: Pain Management;  Laterality: Bilateral;    TONSILLECTOMY           Medications: Medication list reviewed. He  has a current medication list which includes the following prescription(s): amlodipine, atorvastatin, azelastine, benazepril, clobetasol 0.05%, clonazepam, colchicine, flash glucose scanning reader, flash glucose sensor, fluticasone propionate, hydralazine, hydrocortisone, indomethacin, toujeo solostar u-300 insulin, levothyroxine, metformin, metoprolol succinate, omeprazole, pregabalin, tadalafil, mounjaro, trazodone, vascepa, and diclofenac sodium.     Allergies:   Review of patient's allergies indicates:   Allergen Reactions    Demerol (pf) [meperidine (pf)] Other (See Comments)     Pt reports,"They lost my blood pressure."    Percocet " [oxycodone-acetaminophen] Itching     itching    Demerol [meperidine]          Family history: family history includes Breast cancer in his mother; Cancer in his mother; Cataracts in his maternal grandmother; Diabetes in his maternal grandmother and mother; Hypertension in his brother, brother, and mother; Lung cancer in his maternal grandfather; No Known Problems in his paternal grandfather, paternal grandmother, sister, and sister; Stroke in his father.         Social History          Alcohol use:  reports no history of alcohol use.            Tobacco:  reports that he has never smoked. He has never been exposed to tobacco smoke. He has never used smokeless tobacco.         Physical Examination      Vitals: Temperature 96.9 °F (36.1 °C), temperature source Tympanic, weight 116.2 kg (256 lb 2.8 oz).      General: Well developed, well nourished, well hydrated.     Voice: no dysphonia, no dysarthria      Head/Face: Normocephalic, atraumatic. No scars or lesions. Facial musculature equal.     Eyes: No scleral icterus or conjunctival hemorrhage. EOMI. PERRLA.     Ears:     Right ear: No gross deformity. EAC is clear of debris and erythema. TM are intact with a pneumatized middle ear. No signs of retraction, fluid or infection.      Left ear: No gross deformity. EAC is clear of debris and erythema. TM are intact with a pneumatized middle ear. No signs of retraction, fluid or infection.      Nose: septum midline. Well healed transcolumellar scar. Small scab in right anterior nasal vestibule under soft tissue triangle        Mouth/Oropharynx: Lips without any lesions. No mucosal lesions within the oropharynx. No tonsillar exudate or lesions. Pharyngeal walls symmetrical. Uvula midline. Tongue midline without lesions.    Neurologic: Moving all extremities without gross abnormality.CN II-XII grossly intact. House-Brackmann 1/6. No signs of nystagmus.        Data reviewed      Review of records:      I reviewed records from  "the referring provider's office visits describing the history, workup, and/or treatment of this problem thus far.    Imaging  I have independently reviewed the following imaging with the findings noted below:      CT sinus,  Bilateral toya bullosa  S-shaped septal deviation, left mid-septal spur  Inferior turbinate reduction   Left frontal thickening and obstruction of outflow  Bilateral anterior ethmoid disease     Op note, 8/20/22     PROCEDURE:   Endoscopic septoplasty   Bilateral inferior turbinate submucosal resection  Nasal endoscopy with resection of bilateral toya bullosa   Bilateral nasal endoscopy with maxillary antrostomy   Bilateral nasal endoscopy with anterior ethmoidectomy    Left nasal endoscopy with frontal sinusotomy and frontal sinus exploration   Functional endoscopic sinus surgery with CT image guided electromagnetic system     OPERATIVE FINDINGS:   Polypoid mucosal thickening at the left frontal outflow tract  Bilateral polypoid thickening in anterior ethmoids  Left septal deviation with large left septal spur      Pathology reviewed  1. "left sinus contents", excision:       -  Sinonasal mucosa with chronic inflammation       - No eosinophils seen   2. "septal cartilage", excision:       - Portion of cartilage     Procedure Note - Rigid Nasal Endoscopy    Surgeon: Viktor Ferrell MD  Anesthesia: topical oxymetazoline and 4% lidocaine.    Technique: The nose was sprayed with oxymetazoline and 4% lidocaine. With the patient in the upright position, a 2.7mm 30-degree endscope was inserted into the patient's right and left nare.  Where visible, nasal secretions and mucosal crusting were removed with a suction. The overall appearance of the nasal cavity and paranasal sinuses were noted and the findings are described below.     Findings:  septum midline and intact. Widely patent sinus and nasal cavities. No further mucopurulence and no polyps. There is a  small adhesions of the middle turbinates " to the lateral nasal side wall on the right      Assessment/Plan:    1. Chronic sinusitis, unspecified location    2. Allergic rhinitis, unspecified seasonality, unspecified trigger    3. Adhesions of nasal septum and turbinates    4. History of rhinoplasty            S/p FESS and septoplasty 8/17/22 with post op epistaxis complicated by hypertensive urgency (resolved, pack removed, HTN better controlled)  S/p septorhinoplaty for persistent caudal septal deviation 4/19/23  Overall doing well, with improved symptoms.       continue astelin  Continue saline  Try nsaids for headache/nasal bridge pressure  Return to clinic 6 months, sooner with concerns            Viktor Ferrell MD  [facility]   [facility]  [address] [address]  [place] [address]  P: [phone]  F: [phone]

## 2023-08-15 ENCOUNTER — LAB VISIT (OUTPATIENT)
Dept: LAB | Facility: HOSPITAL | Age: 58
End: 2023-08-15
Attending: UROLOGY
Payer: COMMERCIAL

## 2023-08-15 DIAGNOSIS — E29.1 HYPOGONADISM MALE: ICD-10-CM

## 2023-08-15 LAB
ALBUMIN SERPL BCP-MCNC: 3.9 G/DL (ref 3.5–5.2)
ALP SERPL-CCNC: 43 U/L (ref 55–135)
ALT SERPL W/O P-5'-P-CCNC: 69 U/L (ref 10–44)
AST SERPL-CCNC: 43 U/L (ref 10–40)
BASOPHILS # BLD AUTO: 0.09 K/UL (ref 0–0.2)
BASOPHILS NFR BLD: 1.4 % (ref 0–1.9)
BILIRUB DIRECT SERPL-MCNC: 0.2 MG/DL (ref 0.1–0.3)
BILIRUB SERPL-MCNC: 0.5 MG/DL (ref 0.1–1)
COMPLEXED PSA SERPL-MCNC: 0.6 NG/ML (ref 0–4)
DIFFERENTIAL METHOD: ABNORMAL
EOSINOPHIL # BLD AUTO: 0.2 K/UL (ref 0–0.5)
EOSINOPHIL NFR BLD: 2.9 % (ref 0–8)
ERYTHROCYTE [DISTWIDTH] IN BLOOD BY AUTOMATED COUNT: 13.5 % (ref 11.5–14.5)
HCT VFR BLD AUTO: 43.8 % (ref 40–54)
HGB BLD-MCNC: 14 G/DL (ref 14–18)
IMM GRANULOCYTES # BLD AUTO: 0.03 K/UL (ref 0–0.04)
IMM GRANULOCYTES NFR BLD AUTO: 0.5 % (ref 0–0.5)
LYMPHOCYTES # BLD AUTO: 2.3 K/UL (ref 1–4.8)
LYMPHOCYTES NFR BLD: 34.7 % (ref 18–48)
MCH RBC QN AUTO: 31.3 PG (ref 27–31)
MCHC RBC AUTO-ENTMCNC: 32 G/DL (ref 32–36)
MCV RBC AUTO: 98 FL (ref 82–98)
MONOCYTES # BLD AUTO: 0.7 K/UL (ref 0.3–1)
MONOCYTES NFR BLD: 11.3 % (ref 4–15)
NEUTROPHILS # BLD AUTO: 3.2 K/UL (ref 1.8–7.7)
NEUTROPHILS NFR BLD: 49.2 % (ref 38–73)
NRBC BLD-RTO: 0 /100 WBC
PLATELET # BLD AUTO: 257 K/UL (ref 150–450)
PMV BLD AUTO: 11.6 FL (ref 9.2–12.9)
PROT SERPL-MCNC: 6.5 G/DL (ref 6–8.4)
RBC # BLD AUTO: 4.47 M/UL (ref 4.6–6.2)
TESTOST SERPL-MCNC: 684 NG/DL (ref 304–1227)
WBC # BLD AUTO: 6.55 K/UL (ref 3.9–12.7)

## 2023-08-15 PROCEDURE — 84153 ASSAY OF PSA TOTAL: CPT | Performed by: UROLOGY

## 2023-08-15 PROCEDURE — 84403 ASSAY OF TOTAL TESTOSTERONE: CPT | Performed by: UROLOGY

## 2023-08-15 PROCEDURE — 85025 COMPLETE CBC W/AUTO DIFF WBC: CPT | Performed by: UROLOGY

## 2023-08-15 PROCEDURE — 36415 COLL VENOUS BLD VENIPUNCTURE: CPT | Performed by: UROLOGY

## 2023-08-15 PROCEDURE — 80076 HEPATIC FUNCTION PANEL: CPT | Performed by: UROLOGY

## 2023-08-17 RX ORDER — LEVOTHYROXINE SODIUM 112 UG/1
TABLET ORAL
Qty: 90 TABLET | Refills: 1 | Status: SHIPPED | OUTPATIENT
Start: 2023-08-17

## 2023-08-17 NOTE — TELEPHONE ENCOUNTER
Refill Decision Note   Narendra English  is requesting a refill authorization.  Brief Assessment and Rationale for Refill:  Approve     Medication Therapy Plan:  TSH-WNL      Comments:     Note composed:9:27 AM 08/17/2023             Appointments     Last Visit   6/29/2023 Tabitha Melgoza MD   Next Visit   9/11/2023 Tabitha Melgoza MD

## 2023-08-17 NOTE — TELEPHONE ENCOUNTER
No care due was identified.  [facility] Embedded Care Due Messages. Reference number: [id number].   8/17/2023 8:44:52 AM CDT

## 2023-08-23 ENCOUNTER — OFFICE VISIT (OUTPATIENT)
Dept: DERMATOLOGY | Facility: CLINIC | Age: 58
End: 2023-08-23
Payer: COMMERCIAL

## 2023-08-23 DIAGNOSIS — L40.9 PSORIASIS: ICD-10-CM

## 2023-08-23 DIAGNOSIS — M79.2 NEUROPATHIC PAIN: ICD-10-CM

## 2023-08-23 DIAGNOSIS — L73.9 FOLLICULITIS: Primary | ICD-10-CM

## 2023-08-23 PROCEDURE — 1160F PR REVIEW ALL MEDS BY PRESCRIBER/CLIN PHARMACIST DOCUMENTED: ICD-10-PCS | Mod: CPTII,S$GLB,, | Performed by: STUDENT IN AN ORGANIZED HEALTH CARE EDUCATION/TRAINING PROGRAM

## 2023-08-23 PROCEDURE — 4010F PR ACE/ARB THEARPY RXD/TAKEN: ICD-10-PCS | Mod: CPTII,S$GLB,, | Performed by: STUDENT IN AN ORGANIZED HEALTH CARE EDUCATION/TRAINING PROGRAM

## 2023-08-23 PROCEDURE — 99214 OFFICE O/P EST MOD 30 MIN: CPT | Mod: S$GLB,,, | Performed by: STUDENT IN AN ORGANIZED HEALTH CARE EDUCATION/TRAINING PROGRAM

## 2023-08-23 PROCEDURE — 3066F NEPHROPATHY DOC TX: CPT | Mod: CPTII,S$GLB,, | Performed by: STUDENT IN AN ORGANIZED HEALTH CARE EDUCATION/TRAINING PROGRAM

## 2023-08-23 PROCEDURE — 1160F RVW MEDS BY RX/DR IN RCRD: CPT | Mod: CPTII,S$GLB,, | Performed by: STUDENT IN AN ORGANIZED HEALTH CARE EDUCATION/TRAINING PROGRAM

## 2023-08-23 PROCEDURE — 3044F PR MOST RECENT HEMOGLOBIN A1C LEVEL <7.0%: ICD-10-PCS | Mod: CPTII,S$GLB,, | Performed by: STUDENT IN AN ORGANIZED HEALTH CARE EDUCATION/TRAINING PROGRAM

## 2023-08-23 PROCEDURE — 1159F PR MEDICATION LIST DOCUMENTED IN MEDICAL RECORD: ICD-10-PCS | Mod: CPTII,S$GLB,, | Performed by: STUDENT IN AN ORGANIZED HEALTH CARE EDUCATION/TRAINING PROGRAM

## 2023-08-23 PROCEDURE — 4010F ACE/ARB THERAPY RXD/TAKEN: CPT | Mod: CPTII,S$GLB,, | Performed by: STUDENT IN AN ORGANIZED HEALTH CARE EDUCATION/TRAINING PROGRAM

## 2023-08-23 PROCEDURE — 99999 PR PBB SHADOW E&M-EST. PATIENT-LVL III: ICD-10-PCS | Mod: PBBFAC,,, | Performed by: STUDENT IN AN ORGANIZED HEALTH CARE EDUCATION/TRAINING PROGRAM

## 2023-08-23 PROCEDURE — 3066F PR DOCUMENTATION OF TREATMENT FOR NEPHROPATHY: ICD-10-PCS | Mod: CPTII,S$GLB,, | Performed by: STUDENT IN AN ORGANIZED HEALTH CARE EDUCATION/TRAINING PROGRAM

## 2023-08-23 PROCEDURE — 99999 PR PBB SHADOW E&M-EST. PATIENT-LVL III: CPT | Mod: PBBFAC,,, | Performed by: STUDENT IN AN ORGANIZED HEALTH CARE EDUCATION/TRAINING PROGRAM

## 2023-08-23 PROCEDURE — 3044F HG A1C LEVEL LT 7.0%: CPT | Mod: CPTII,S$GLB,, | Performed by: STUDENT IN AN ORGANIZED HEALTH CARE EDUCATION/TRAINING PROGRAM

## 2023-08-23 PROCEDURE — 3061F NEG MICROALBUMINURIA REV: CPT | Mod: CPTII,S$GLB,, | Performed by: STUDENT IN AN ORGANIZED HEALTH CARE EDUCATION/TRAINING PROGRAM

## 2023-08-23 PROCEDURE — 3061F PR NEG MICROALBUMINURIA RESULT DOCUMENTED/REVIEW: ICD-10-PCS | Mod: CPTII,S$GLB,, | Performed by: STUDENT IN AN ORGANIZED HEALTH CARE EDUCATION/TRAINING PROGRAM

## 2023-08-23 PROCEDURE — 99214 PR OFFICE/OUTPT VISIT, EST, LEVL IV, 30-39 MIN: ICD-10-PCS | Mod: S$GLB,,, | Performed by: STUDENT IN AN ORGANIZED HEALTH CARE EDUCATION/TRAINING PROGRAM

## 2023-08-23 PROCEDURE — 1159F MED LIST DOCD IN RCRD: CPT | Mod: CPTII,S$GLB,, | Performed by: STUDENT IN AN ORGANIZED HEALTH CARE EDUCATION/TRAINING PROGRAM

## 2023-08-23 RX ORDER — KETOCONAZOLE 20 MG/ML
SHAMPOO, SUSPENSION TOPICAL
Qty: 120 ML | Refills: 5 | Status: SHIPPED | OUTPATIENT
Start: 2023-08-23 | End: 2024-03-11

## 2023-08-23 RX ORDER — DOXYCYCLINE HYCLATE 100 MG
100 TABLET ORAL EVERY 12 HOURS
Qty: 14 TABLET | Refills: 0 | Status: SHIPPED | OUTPATIENT
Start: 2023-08-23 | End: 2023-08-30

## 2023-08-23 NOTE — PROGRESS NOTES
Patient Information  Name: Narendra English Sr.  : 1965  MRN: [id number]     Referring Physician:  Dr. Duke ref. provider found   Primary Care Physician:  Tabitha Jackson MD   Date of Visit: 2023      Subjective:       Narendra English Sr. is a 58 y.o. male who presents for   Chief Complaint   Patient presents with    Follow-up     Psoriasis. Sx none improvement.      Follow-up      Patient with hx of psoriasis, here for follow up. Currently using topical clobetasol and topical TAC. He reports burning sensation of left arm.  Patient was last seen:Visit date not found     Prior notes by myself reviewed.   Clinical documentation obtained by nursing staff reviewed.    Review of Systems   Skin:  Positive for itching and rash.        Objective:    Physical Exam   Constitutional: He appears well-developed and well-nourished. No distress.   Neurological: He is alert and oriented to person, place, and time. He is not disoriented.   Psychiatric: He has a normal mood and affect.   Skin:   Areas Examined (abnormalities noted in diagram):   Head / Face Inspection Performed  Neck Inspection Performed  Chest / Axilla Inspection Performed  Abdomen Inspection Performed  RUE Inspected  LUE Inspection Performed              Diagram Legend     Erythematous scaling macule/papule c/w actinic keratosis       Vascular papule c/w angioma      Pigmented verrucoid papule/plaque c/w seborrheic keratosis      Yellow umbilicated papule c/w sebaceous hyperplasia      Irregularly shaped tan macule c/w lentigo     1-2 mm smooth white papules consistent with Milia      Movable subcutaneous cyst with punctum c/w epidermal inclusion cyst      Subcutaneous movable cyst c/w pilar cyst      Firm pink to brown papule c/w dermatofibroma      Pedunculated fleshy papule(s) c/w skin tag(s)      Evenly pigmented macule c/w junctional nevus     Mildly variegated pigmented, slightly irregular-bordered macule c/w mildly atypical nevus       Flesh colored to evenly pigmented papule c/w intradermal nevus       Pink pearly papule/plaque c/w basal cell carcinoma      Erythematous hyperkeratotic cursted plaque c/w SCC      Surgical scar with no sign of skin cancer recurrence      Open and closed comedones      Inflammatory papules and pustules      Verrucoid papule consistent consistent with wart     Erythematous eczematous patches and plaques     Dystrophic onycholytic nail with subungual debris c/w onychomycosis     Umbilicated papule    Erythematous-base heme-crusted tan verrucoid plaque consistent with inflamed seborrheic keratosis     Erythematous Silvery Scaling Plaque c/w Psoriasis     See annotation      No images are attached to the encounter or orders placed in the encounter.    [] Data reviewed  [] Independent review of test  [] Management discussed with another provider    Assessment / Plan:        Folliculitis  -     doxycycline (VIBRA-TABS) 100 MG tablet; Take 1 tablet (100 mg total) by mouth every 12 (twelve) hours. Take 1 po qday with food and not within 1 hour prior to lying down for 7 days  Dispense: 14 tablet; Refill: 0  -     ketoconazole (NIZORAL) 2 % shampoo; Wash body with medicated shampoo at least 2x/week - let sit on body at least 5 minutes prior to rinsing  Dispense: 120 mL; Refill: 5  Discussed benefits and risks of doxycyline therapy including but not limited to GI discomfort, esophageal irritation/ulceration, and increased sun sensitivity. Patient was counseled to take medicine with meals and at least 1 hour before lying down.   - Consider diflucan if no improvement in 2 weeks    Neuropathic pain  - Likely related to pinched nerve    Psoriasis  - Stable at this time           LOS NUMBER AND COMPLEXITY OF PROBLEMS    COMPLEXITY OF DATA RISK TOTAL TIME (m)   85350  27969 [] 1 self-limited or minor problem [x] Minimal to none [] No treatment recommended or patient to monitor 15-29  10-19   86879  93956 Low  [] 2 or > self limited  or minor problems  [] 1 stable chronic illness  [x] 1 acute, uncomplicated illness or injury Limited (2)  [] Prior external notes from each unique source  [] Review result of each unique test  [] Order each unique test []  Low  OTC medications, minor skin biopsy 30-44  20-29   92341  89245 Moderate  []  1 or > chronic illness with progression, exacerbation or SE of treatment  [x]  2 or more stable chronic illnesses  []  1 acute illness with systemic symptoms  []  1 acute complicated injury  []  1 undiagnosed new problem with uncertain prognosis Moderate (1/3 below)  []  3 or more data items        *Now includes assessment requiring independent historian  []  Independent interpretation of a test  []  Discuss management/test with another provider Moderate  [x]  Prescription drug mgmt  []  Minor surgery with risk discussed  []  Mgmt limited by social determinates 45-59  30-39   10303  09693 High  []  1 or more chronic illness with severe exacerbation, progression or SE of treatment  []  1 acute or chronic illness/injury that poses a threat to life or bodily function Extensive (2/3 below)  []  3 or more data items        *Now includes assessment requiring independent historian.  []  Independent interpretation of a test  []  Discuss management/test with another provider High  []  Major surgery with risk discussed  []  Drug therapy requiring intensive monitoring for toxicity  []  Hospitalization  []  Decision for DNR 60-74  40-54      No follow-ups on file.    Wendi Jin MD, FAAD  [facility]

## 2023-08-25 ENCOUNTER — PROCEDURE VISIT (OUTPATIENT)
Dept: UROLOGY | Facility: CLINIC | Age: 58
End: 2023-08-25
Payer: COMMERCIAL

## 2023-08-25 VITALS — WEIGHT: 256 LBS | HEIGHT: 73 IN | RESPIRATION RATE: 16 BRPM | BODY MASS INDEX: 33.93 KG/M2

## 2023-08-25 DIAGNOSIS — E29.1 HYPOGONADISM MALE: Primary | ICD-10-CM

## 2023-08-25 DIAGNOSIS — N52.9 ERECTILE DYSFUNCTION, UNSPECIFIED ERECTILE DYSFUNCTION TYPE: ICD-10-CM

## 2023-08-25 PROCEDURE — S0189 PR TESTOSTERONE PELLET 75 MG: ICD-10-PCS | Mod: S$GLB,,, | Performed by: UROLOGY

## 2023-08-25 PROCEDURE — 11980 PR IMPLANT,HORMONE,SUBCUTANEOUS: ICD-10-PCS | Mod: S$GLB,,, | Performed by: UROLOGY

## 2023-08-25 PROCEDURE — S0189 TESTOSTERONE PELLET 75 MG: HCPCS | Mod: S$GLB,,, | Performed by: UROLOGY

## 2023-08-25 PROCEDURE — 11980 IMPLANT HORMONE PELLET(S): CPT | Mod: S$GLB,,, | Performed by: UROLOGY

## 2023-08-25 RX ORDER — LEVOFLOXACIN 500 MG/1
500 TABLET, FILM COATED ORAL DAILY
Qty: 3 TABLET | Refills: 0 | Status: SHIPPED | OUTPATIENT
Start: 2023-08-25 | End: 2023-08-28

## 2023-08-25 NOTE — PROCEDURES
Procedures  Chief Complaint:   Encounter Diagnoses   Name Primary?    Hypogonadism male Yes    Erectile dysfunction, unspecified erectile dysfunction type        HPI:   8/25/23- here today for his testopel insertion.    06/16/2022 - 57 yo male that presents today for evaluation of ED.  Patient notes issues for the last 3-4 years but notes that over the last six months it has gotten worse.  He notes difficulty both achieving and maintaining an erection.  He has previously tried both Cialis and Viagra and both of these cause in to have a very bad headache that make it on tolerable to take these medications, though they do work.  Otherwise he voids with a good stream and feels like he empties well subjectively.  He denies any gross hematuria or family history of  cancers.  Denies prior stones or urologic procedures.    Allergies:  Demerol (pf) [meperidine (pf)], Percocet [oxycodone-acetaminophen], and Demerol [meperidine]    Medications:  has a current medication list which includes the following prescription(s): amlodipine, atorvastatin, azelastine, benazepril, clobetasol 0.05%, clonazepam, colchicine, diclofenac sodium, flash glucose scanning reader, flash glucose sensor, fluticasone propionate, hydralazine, hydrocortisone, indomethacin, toujeo solostar u-300 insulin, levothyroxine, metformin, metoprolol succinate, pregabalin, sildenafil, mounjaro, trazodone, triamcinolone acetonide 0.1%, and vascepa, and the following Facility-Administered Medications: gabapentin and lactated ringers.    Review of Systems:  General: No fever, chills, fatigability, or weight loss.  Skin: No rashes, itching, or changes in color or texture of skin.  Chest: Denies MEJÍA, cyanosis, wheezing, cough, and sputum production.  Abdomen: Appetite fine. No weight loss. Denies diarrhea, abdominal pain, hematemesis, or blood in stool.  Musculoskeletal: No joint stiffness or swelling. Denies back pain.  : As above.  All other review of systems  negative.    PMH:   has a past medical history of Cancer, Diabetes mellitus (8 years), Hypertension, Sleep apnea, and Thyroid disease.    PSH:   has a past surgical history that includes Colonoscopy (N/A, 12/29/2020); Laparoscopic right colon resection (N/A, 02/02/2021); Back surgery; Colonoscopy (N/A, 02/10/2022); Tonsillectomy; fess, with nasal septoplasty, with imaging guidance (Bilateral, 08/17/2022); Nasal turbinate reduction (Bilateral, 08/17/2022); Colon surgery (02/06/21); and Epidural steroid injection into cervical spine (N/A, 12/1/2022).    FamHx: family history includes Breast cancer in his mother; Cancer in his mother; Cataracts in his maternal grandmother; Diabetes in his maternal grandmother and mother; Hypertension in his brother, brother, and mother; Stroke in his father.    SocHx:  reports that he has never smoked. He has never used smokeless tobacco. He reports that he does not drink alcohol and does not use drugs.      Physical Exam:  There were no vitals filed for this visit.    General: A&Ox3, no apparent distress, no deformities  Neck: No masses, normal ROM  Lungs: normal inspiration, no use of accessory muscles  Heart: normal pulse, no arrhythmias  Abdomen: Soft, NT, ND, no masses, no hernias, no hepatosplenomegaly  Skin: The skin is warm and dry. No jaundice.  Ext: No c/c/e.  JAIMIE: 6/22: Normal rectal tone, no hemorrhoids. Prostate smooth and normal, no nodules 30 gm SV not palpable. Perineum and anus normal.    Labs/Studies:   Testopel  5/26/26, 1/20/23  Testosterone 328 8/22   PSA 0.60 8/23    Patient was sterilely prepped and draped, then positioned in the left side up, lateral decubitus position.  Lidocaine was used for local anesthesia.  Eleven blade was used to incise the skin, included trocars with the testopel kit were then used.  They were inserted within the incision site and we placed the pellets in a V location.  10 pellets total were inserted without difficulty.  Trocars were  removed and pressure was applied for 2 min.  Steri-Strips applied for local coverage, he will return for lab assessment in 3 months.      Impression/Plan:       1. Hypogonadism- AndroGel and shots have failed.  Patient tolerated testopel insertion today, call with any issues.  Otherwise will see him in 3 months with labs and repeat insertion.    2. Erectile dysfunction- sildenafil 100 mg works well for the patient, of note TriMix caused priapism.

## 2023-09-11 ENCOUNTER — OFFICE VISIT (OUTPATIENT)
Dept: INTERNAL MEDICINE | Facility: CLINIC | Age: 58
End: 2023-09-11
Payer: COMMERCIAL

## 2023-09-11 VITALS
OXYGEN SATURATION: 97 % | WEIGHT: 252.88 LBS | DIASTOLIC BLOOD PRESSURE: 72 MMHG | HEART RATE: 77 BPM | BODY MASS INDEX: 33.51 KG/M2 | TEMPERATURE: 97 F | RESPIRATION RATE: 18 BRPM | SYSTOLIC BLOOD PRESSURE: 124 MMHG | HEIGHT: 73 IN

## 2023-09-11 DIAGNOSIS — E11.59 TYPE 2 DIABETES MELLITUS WITH OTHER CIRCULATORY COMPLICATION, WITH LONG-TERM CURRENT USE OF INSULIN: ICD-10-CM

## 2023-09-11 DIAGNOSIS — Z85.038 HISTORY OF MALIGNANT NEOPLASM OF COLON: ICD-10-CM

## 2023-09-11 DIAGNOSIS — I15.2 HYPERTENSION ASSOCIATED WITH DIABETES: Primary | ICD-10-CM

## 2023-09-11 DIAGNOSIS — E03.9 HYPOTHYROIDISM (ACQUIRED): ICD-10-CM

## 2023-09-11 DIAGNOSIS — Z79.4 TYPE 2 DIABETES MELLITUS WITH OTHER CIRCULATORY COMPLICATION, WITH LONG-TERM CURRENT USE OF INSULIN: ICD-10-CM

## 2023-09-11 DIAGNOSIS — G47.26 SHIFT WORK SLEEP DISORDER: ICD-10-CM

## 2023-09-11 DIAGNOSIS — E11.69 HYPERLIPIDEMIA ASSOCIATED WITH TYPE 2 DIABETES MELLITUS: ICD-10-CM

## 2023-09-11 DIAGNOSIS — M10.9 GOUT, UNSPECIFIED CAUSE, UNSPECIFIED CHRONICITY, UNSPECIFIED SITE: ICD-10-CM

## 2023-09-11 DIAGNOSIS — E78.5 HYPERLIPIDEMIA ASSOCIATED WITH TYPE 2 DIABETES MELLITUS: ICD-10-CM

## 2023-09-11 DIAGNOSIS — E11.59 HYPERTENSION ASSOCIATED WITH DIABETES: Primary | ICD-10-CM

## 2023-09-11 PROCEDURE — 3061F NEG MICROALBUMINURIA REV: CPT | Mod: CPTII,S$GLB,, | Performed by: FAMILY MEDICINE

## 2023-09-11 PROCEDURE — 99214 PR OFFICE/OUTPT VISIT, EST, LEVL IV, 30-39 MIN: ICD-10-PCS | Mod: S$GLB,,, | Performed by: FAMILY MEDICINE

## 2023-09-11 PROCEDURE — 3061F PR NEG MICROALBUMINURIA RESULT DOCUMENTED/REVIEW: ICD-10-PCS | Mod: CPTII,S$GLB,, | Performed by: FAMILY MEDICINE

## 2023-09-11 PROCEDURE — 1159F MED LIST DOCD IN RCRD: CPT | Mod: CPTII,S$GLB,, | Performed by: FAMILY MEDICINE

## 2023-09-11 PROCEDURE — 3044F HG A1C LEVEL LT 7.0%: CPT | Mod: CPTII,S$GLB,, | Performed by: FAMILY MEDICINE

## 2023-09-11 PROCEDURE — 3074F SYST BP LT 130 MM HG: CPT | Mod: CPTII,S$GLB,, | Performed by: FAMILY MEDICINE

## 2023-09-11 PROCEDURE — 99214 OFFICE O/P EST MOD 30 MIN: CPT | Mod: S$GLB,,, | Performed by: FAMILY MEDICINE

## 2023-09-11 PROCEDURE — 4010F ACE/ARB THERAPY RXD/TAKEN: CPT | Mod: CPTII,S$GLB,, | Performed by: FAMILY MEDICINE

## 2023-09-11 PROCEDURE — 1159F PR MEDICATION LIST DOCUMENTED IN MEDICAL RECORD: ICD-10-PCS | Mod: CPTII,S$GLB,, | Performed by: FAMILY MEDICINE

## 2023-09-11 PROCEDURE — 3008F BODY MASS INDEX DOCD: CPT | Mod: CPTII,S$GLB,, | Performed by: FAMILY MEDICINE

## 2023-09-11 PROCEDURE — 3078F DIAST BP <80 MM HG: CPT | Mod: CPTII,S$GLB,, | Performed by: FAMILY MEDICINE

## 2023-09-11 PROCEDURE — 3074F PR MOST RECENT SYSTOLIC BLOOD PRESSURE < 130 MM HG: ICD-10-PCS | Mod: CPTII,S$GLB,, | Performed by: FAMILY MEDICINE

## 2023-09-11 PROCEDURE — 3066F NEPHROPATHY DOC TX: CPT | Mod: CPTII,S$GLB,, | Performed by: FAMILY MEDICINE

## 2023-09-11 PROCEDURE — 3008F PR BODY MASS INDEX (BMI) DOCUMENTED: ICD-10-PCS | Mod: CPTII,S$GLB,, | Performed by: FAMILY MEDICINE

## 2023-09-11 PROCEDURE — 4010F PR ACE/ARB THEARPY RXD/TAKEN: ICD-10-PCS | Mod: CPTII,S$GLB,, | Performed by: FAMILY MEDICINE

## 2023-09-11 PROCEDURE — 3066F PR DOCUMENTATION OF TREATMENT FOR NEPHROPATHY: ICD-10-PCS | Mod: CPTII,S$GLB,, | Performed by: FAMILY MEDICINE

## 2023-09-11 PROCEDURE — 99999 PR PBB SHADOW E&M-EST. PATIENT-LVL V: CPT | Mod: PBBFAC,,, | Performed by: FAMILY MEDICINE

## 2023-09-11 PROCEDURE — 3044F PR MOST RECENT HEMOGLOBIN A1C LEVEL <7.0%: ICD-10-PCS | Mod: CPTII,S$GLB,, | Performed by: FAMILY MEDICINE

## 2023-09-11 PROCEDURE — 99999 PR PBB SHADOW E&M-EST. PATIENT-LVL V: ICD-10-PCS | Mod: PBBFAC,,, | Performed by: FAMILY MEDICINE

## 2023-09-11 PROCEDURE — 3078F PR MOST RECENT DIASTOLIC BLOOD PRESSURE < 80 MM HG: ICD-10-PCS | Mod: CPTII,S$GLB,, | Performed by: FAMILY MEDICINE

## 2023-09-12 NOTE — PROGRESS NOTES
Subjective:       Patient ID: Narendra English Sr. is a 58 y.o. male.    Chief Complaint: Follow-up    Patient presents to clinic today for followup of chronic conditions.      Review of Systems   Constitutional:  Negative for chills, fatigue, fever and unexpected weight change.   Eyes:  Negative for visual disturbance.   Respiratory:  Negative for shortness of breath.    Cardiovascular:  Negative for chest pain.   Musculoskeletal:  Negative for myalgias.   Neurological:  Negative for headaches.         Objective:      Physical Exam  Vitals reviewed.   Constitutional:       General: He is not in acute distress.     Appearance: He is well-developed.   HENT:      Head: Normocephalic and atraumatic.   Eyes:      General: Lids are normal. No scleral icterus.     Extraocular Movements: Extraocular movements intact.      Conjunctiva/sclera: Conjunctivae normal.      Pupils: Pupils are equal, round, and reactive to light.   Pulmonary:      Effort: Pulmonary effort is normal.   Neurological:      Mental Status: He is alert and oriented to person, place, and time.      Cranial Nerves: No cranial nerve deficit.      Gait: Gait normal.   Psychiatric:         Mood and Affect: Mood and affect normal.         Assessment:       1. Hypertension associated with diabetes    2. Hyperlipidemia associated with type 2 diabetes mellitus    3. Type 2 diabetes mellitus with other circulatory complication, with long-term current use of insulin    4. Hypothyroidism (acquired)    5. Gout, unspecified cause, unspecified chronicity, unspecified site    6. Shift work sleep disorder    7. History of malignant neoplasm of colon        Plan:   1. Hypertension associated with diabetes  Assessment & Plan:  Controlled, continue current medications       2. Hyperlipidemia associated with type 2 diabetes mellitus  Assessment & Plan:  Status pending lab, continue atorvastatin and vascepa    Orders:  -     Comprehensive Metabolic Panel; Future; Expected  date: 09/11/2023  -     Lipid Panel; Future; Expected date: 09/11/2023    3. Type 2 diabetes mellitus with other circulatory complication, with long-term current use of insulin  Assessment & Plan:  Status pending lab, continue current medications     Orders:  -     Hemoglobin A1C; Future; Expected date: 09/11/2023    4. Hypothyroidism (acquired)  Assessment & Plan:  Status pending lab, continue levothyroxine     Orders:  -     TSH; Future; Expected date: 09/11/2023  -     T4, Free; Future; Expected date: 09/11/2023    5. Gout, unspecified cause, unspecified chronicity, unspecified site  Assessment & Plan:  Patient has been on colchicine bid chronically for several years to treat gout; reports unable to tolerate allopurinol due to GI side effects; reports had some GI side effects with uloric, but not as severe and tolerable; he reports still having 15 gout flares per year despite taking colchicine bid; after discussion, he agrees to e-consult for treatment recommendations    Orders:  -     URIC ACID; Future; Expected date: 09/11/2023  -     E-Consult to Rheumatology    6. Shift work sleep disorder  Overview:  Followed by Dr. Schwab, Sleep specialist, continue current treatment plan.      7. History of malignant neoplasm of colon  Overview:  Followed by Dr. Santoyo          Health Maintenance reviewed/updated.

## 2023-09-12 NOTE — ASSESSMENT & PLAN NOTE
Patient has been on colchicine bid chronically for several years to treat gout; reports unable to tolerate allopurinol due to GI side effects; reports had some GI side effects with uloric, but not as severe and tolerable; he reports still having 15 gout flares per year despite taking colchicine bid; after discussion, he agrees to e-consult for treatment recommendations

## 2023-09-13 ENCOUNTER — E-CONSULT (OUTPATIENT)
Dept: RHEUMATOLOGY | Facility: CLINIC | Age: 58
End: 2023-09-13
Payer: COMMERCIAL

## 2023-09-13 DIAGNOSIS — M10.9 GOUT, UNSPECIFIED CAUSE, UNSPECIFIED CHRONICITY, UNSPECIFIED SITE: Primary | ICD-10-CM

## 2023-09-13 PROCEDURE — 99451 PR INTERPROF, PHONE/INTERNET/EHR, CONSULT, >= 5MINS: ICD-10-PCS | Mod: S$GLB,,, | Performed by: INTERNAL MEDICINE

## 2023-09-13 PROCEDURE — 99451 NTRPROF PH1/NTRNET/EHR 5/>: CPT | Mod: S$GLB,,, | Performed by: INTERNAL MEDICINE

## 2023-09-13 NOTE — CONSULTS
[facility] [place] - Rheumatology [facility]  Response for E-Consult     Patient Name: Narendra English Sr.  MRN: [id number]  Primary Care Provider: Tabitha Melgoza MD   Requesting Provider: Tabitha Melgoza MD  Consults    Recommendation:   It would be beneficial to confirm the diagnosis of gout.  If he had podagra that would qualify for clinical gout as long as his uric acid level shows abnormality.  He could take colchicine twice daily he has normal renal functions and could tolerate the medication without significant diarrhea or myopathy.  Colchicine prophylaxis once daily would be an ideal option to prevent gout.  If his uric acid shows elevated levels, since he has intolerance to both allopurinol and Uloric, it would be beneficial to try probenecid as long as his kidney functions are normal and he does not have history of kidney stones.  Colchicine with probenecid (Cobenemid) 1 tablet twice daily would be good option than  I would also recommend radiographs of his hands and feet.    Please let me know once his uric acid levels are available for review.  Please feel free to reach out to me with any questions or concerns.    Total time of Consultation: 10 minute    I did not speak to the requesting provider verbally about this.     *This eConsult is based on the clinical data available to me and is furnished without benefit of a physical examination. The eConsult will need to be interpreted in light of any clinical issues or changes in patient status not available to me at the time of filing this eConsults. Significant changes in patient condition or level of acuity should result in immediate formal consultation and reevaluation. Please alert me if you have further questions.    Thank you for this eConsult referral.     Jim Ryder MD  [facility] [place] - [facility]umat[facility]

## 2023-09-15 ENCOUNTER — OFFICE VISIT (OUTPATIENT)
Dept: OPHTHALMOLOGY | Facility: CLINIC | Age: 58
End: 2023-09-15
Payer: COMMERCIAL

## 2023-09-15 ENCOUNTER — LAB VISIT (OUTPATIENT)
Dept: LAB | Facility: HOSPITAL | Age: 58
End: 2023-09-15
Attending: FAMILY MEDICINE

## 2023-09-15 ENCOUNTER — PATIENT MESSAGE (OUTPATIENT)
Dept: OPHTHALMOLOGY | Facility: CLINIC | Age: 58
End: 2023-09-15

## 2023-09-15 DIAGNOSIS — E11.9 DIABETES MELLITUS TYPE 2 WITHOUT RETINOPATHY: Primary | ICD-10-CM

## 2023-09-15 DIAGNOSIS — E78.5 HYPERLIPIDEMIA ASSOCIATED WITH TYPE 2 DIABETES MELLITUS: ICD-10-CM

## 2023-09-15 DIAGNOSIS — E11.59 TYPE 2 DIABETES MELLITUS WITH OTHER CIRCULATORY COMPLICATION, WITH LONG-TERM CURRENT USE OF INSULIN: ICD-10-CM

## 2023-09-15 DIAGNOSIS — E03.9 HYPOTHYROIDISM (ACQUIRED): ICD-10-CM

## 2023-09-15 DIAGNOSIS — H52.7 REFRACTIVE ERRORS: ICD-10-CM

## 2023-09-15 DIAGNOSIS — E11.69 HYPERLIPIDEMIA ASSOCIATED WITH TYPE 2 DIABETES MELLITUS: ICD-10-CM

## 2023-09-15 DIAGNOSIS — M10.9 GOUT, UNSPECIFIED CAUSE, UNSPECIFIED CHRONICITY, UNSPECIFIED SITE: ICD-10-CM

## 2023-09-15 DIAGNOSIS — Z79.4 TYPE 2 DIABETES MELLITUS WITH OTHER CIRCULATORY COMPLICATION, WITH LONG-TERM CURRENT USE OF INSULIN: ICD-10-CM

## 2023-09-15 LAB
ALBUMIN SERPL BCP-MCNC: 3.8 G/DL (ref 3.5–5.2)
ALP SERPL-CCNC: 40 U/L (ref 55–135)
ALT SERPL W/O P-5'-P-CCNC: 56 U/L (ref 10–44)
ANION GAP SERPL CALC-SCNC: 8 MMOL/L (ref 8–16)
AST SERPL-CCNC: 29 U/L (ref 10–40)
BILIRUB SERPL-MCNC: 0.3 MG/DL (ref 0.1–1)
BUN SERPL-MCNC: 18 MG/DL (ref 6–20)
CALCIUM SERPL-MCNC: 9.2 MG/DL (ref 8.7–10.5)
CHLORIDE SERPL-SCNC: 106 MMOL/L (ref 95–110)
CHOLEST SERPL-MCNC: 86 MG/DL (ref 120–199)
CHOLEST/HDLC SERPL: 3.4 {RATIO} (ref 2–5)
CO2 SERPL-SCNC: 27 MMOL/L (ref 23–29)
CREAT SERPL-MCNC: 1.3 MG/DL (ref 0.5–1.4)
EST. GFR  (NO RACE VARIABLE): >60 ML/MIN/1.73 M^2
ESTIMATED AVG GLUCOSE: 105 MG/DL (ref 68–131)
GLUCOSE SERPL-MCNC: 122 MG/DL (ref 70–110)
HBA1C MFR BLD: 5.3 % (ref 4–5.6)
HDLC SERPL-MCNC: 25 MG/DL (ref 40–75)
HDLC SERPL: 29.1 % (ref 20–50)
LDLC SERPL CALC-MCNC: 31.8 MG/DL (ref 63–159)
NONHDLC SERPL-MCNC: 61 MG/DL
POTASSIUM SERPL-SCNC: 4.4 MMOL/L (ref 3.5–5.1)
PROT SERPL-MCNC: 6.6 G/DL (ref 6–8.4)
SODIUM SERPL-SCNC: 141 MMOL/L (ref 136–145)
T4 FREE SERPL-MCNC: 0.98 NG/DL (ref 0.71–1.51)
TRIGL SERPL-MCNC: 146 MG/DL (ref 30–150)
TSH SERPL DL<=0.005 MIU/L-ACNC: 1.95 UIU/ML (ref 0.4–4)
URATE SERPL-MCNC: 9.1 MG/DL (ref 3.4–7)

## 2023-09-15 PROCEDURE — 80053 COMPREHEN METABOLIC PANEL: CPT | Performed by: FAMILY MEDICINE

## 2023-09-15 PROCEDURE — 3044F HG A1C LEVEL LT 7.0%: CPT | Mod: CPTII,S$GLB,, | Performed by: OPTOMETRIST

## 2023-09-15 PROCEDURE — 80061 LIPID PANEL: CPT | Performed by: FAMILY MEDICINE

## 2023-09-15 PROCEDURE — 1159F MED LIST DOCD IN RCRD: CPT | Mod: CPTII,S$GLB,, | Performed by: OPTOMETRIST

## 2023-09-15 PROCEDURE — 84443 ASSAY THYROID STIM HORMONE: CPT | Performed by: FAMILY MEDICINE

## 2023-09-15 PROCEDURE — 92014 COMPRE OPH EXAM EST PT 1/>: CPT | Mod: S$GLB,,, | Performed by: OPTOMETRIST

## 2023-09-15 PROCEDURE — 99999 PR PBB SHADOW E&M-EST. PATIENT-LVL III: ICD-10-PCS | Mod: PBBFAC,,, | Performed by: OPTOMETRIST

## 2023-09-15 PROCEDURE — 3066F NEPHROPATHY DOC TX: CPT | Mod: CPTII,S$GLB,, | Performed by: OPTOMETRIST

## 2023-09-15 PROCEDURE — 3061F NEG MICROALBUMINURIA REV: CPT | Mod: CPTII,S$GLB,, | Performed by: OPTOMETRIST

## 2023-09-15 PROCEDURE — 99999 PR PBB SHADOW E&M-EST. PATIENT-LVL III: CPT | Mod: PBBFAC,,, | Performed by: OPTOMETRIST

## 2023-09-15 PROCEDURE — 4010F PR ACE/ARB THEARPY RXD/TAKEN: ICD-10-PCS | Mod: CPTII,S$GLB,, | Performed by: OPTOMETRIST

## 2023-09-15 PROCEDURE — 36415 COLL VENOUS BLD VENIPUNCTURE: CPT | Mod: PO | Performed by: FAMILY MEDICINE

## 2023-09-15 PROCEDURE — 2023F DILAT RTA XM W/O RTNOPTHY: CPT | Mod: CPTII,S$GLB,, | Performed by: OPTOMETRIST

## 2023-09-15 PROCEDURE — 3066F PR DOCUMENTATION OF TREATMENT FOR NEPHROPATHY: ICD-10-PCS | Mod: CPTII,S$GLB,, | Performed by: OPTOMETRIST

## 2023-09-15 PROCEDURE — 83036 HEMOGLOBIN GLYCOSYLATED A1C: CPT | Performed by: FAMILY MEDICINE

## 2023-09-15 PROCEDURE — 2023F PR DILATED RETINAL EXAM W/O EVID OF RETINOPATHY: ICD-10-PCS | Mod: CPTII,S$GLB,, | Performed by: OPTOMETRIST

## 2023-09-15 PROCEDURE — 1159F PR MEDICATION LIST DOCUMENTED IN MEDICAL RECORD: ICD-10-PCS | Mod: CPTII,S$GLB,, | Performed by: OPTOMETRIST

## 2023-09-15 PROCEDURE — 84550 ASSAY OF BLOOD/URIC ACID: CPT | Performed by: FAMILY MEDICINE

## 2023-09-15 PROCEDURE — 92015 PR REFRACTION: ICD-10-PCS | Mod: S$GLB,,, | Performed by: OPTOMETRIST

## 2023-09-15 PROCEDURE — 3044F PR MOST RECENT HEMOGLOBIN A1C LEVEL <7.0%: ICD-10-PCS | Mod: CPTII,S$GLB,, | Performed by: OPTOMETRIST

## 2023-09-15 PROCEDURE — 84439 ASSAY OF FREE THYROXINE: CPT | Performed by: FAMILY MEDICINE

## 2023-09-15 PROCEDURE — 4010F ACE/ARB THERAPY RXD/TAKEN: CPT | Mod: CPTII,S$GLB,, | Performed by: OPTOMETRIST

## 2023-09-15 PROCEDURE — 3061F PR NEG MICROALBUMINURIA RESULT DOCUMENTED/REVIEW: ICD-10-PCS | Mod: CPTII,S$GLB,, | Performed by: OPTOMETRIST

## 2023-09-15 PROCEDURE — 92014 PR EYE EXAM, EST PATIENT,COMPREHESV: ICD-10-PCS | Mod: S$GLB,,, | Performed by: OPTOMETRIST

## 2023-09-15 PROCEDURE — 92015 DETERMINE REFRACTIVE STATE: CPT | Mod: S$GLB,,, | Performed by: OPTOMETRIST

## 2023-09-15 NOTE — PROGRESS NOTES
HPI    NIDDM exam.  Decrease near visual acuity.  Decrease night visual acuity  Last eye exam 09/29/2022 TRF.  Update glasses RX.  Lab Results       Component                Value               Date                       HGBA1C                   5.6                 03/09/2023             Last edited by Armida Viera MA on 9/15/2023 11:00 AM.            Assessment /Plan     For exam results, see Encounter Report.    Diabetes mellitus type 2 without retinopathy    Refractive errors      No Background Diabetic Retinopathy  Strict BG control, f/u w/ PCP, and annual DFE  Stressed importance of DM control to preserve vision    Dispense Final Rx for glasses or may use OTC glasses.  RTC 1 year  Discussed above and answered questions.

## 2023-09-18 RX ORDER — TIRZEPATIDE 12.5 MG/.5ML
12.5 INJECTION, SOLUTION SUBCUTANEOUS
Qty: 4 PEN | Refills: 1 | Status: SHIPPED | OUTPATIENT
Start: 2023-09-18 | End: 2023-09-18 | Stop reason: SDUPTHER

## 2023-09-19 DIAGNOSIS — M10.9 GOUT, UNSPECIFIED CAUSE, UNSPECIFIED CHRONICITY, UNSPECIFIED SITE: ICD-10-CM

## 2023-09-19 RX ORDER — TIRZEPATIDE 12.5 MG/.5ML
12.5 INJECTION, SOLUTION SUBCUTANEOUS
Qty: 4 PEN | Refills: 1 | Status: SHIPPED | OUTPATIENT
Start: 2023-09-19 | End: 2023-10-05 | Stop reason: SDUPTHER

## 2023-09-19 RX ORDER — COLCHICINE 0.6 MG/1
0.6 TABLET ORAL 2 TIMES DAILY
Qty: 180 TABLET | Refills: 3 | Status: SHIPPED | OUTPATIENT
Start: 2023-09-19 | End: 2023-10-05 | Stop reason: SDUPTHER

## 2023-09-19 NOTE — TELEPHONE ENCOUNTER
No care due was identified.  Health [facility] Embedded Care Due Messages. Reference number: [id number].   9/19/2023 12:54:27 PM CDT

## 2023-10-05 ENCOUNTER — PATIENT MESSAGE (OUTPATIENT)
Dept: INTERNAL MEDICINE | Facility: CLINIC | Age: 58
End: 2023-10-05
Payer: COMMERCIAL

## 2023-10-05 DIAGNOSIS — M10.9 GOUT, UNSPECIFIED CAUSE, UNSPECIFIED CHRONICITY, UNSPECIFIED SITE: ICD-10-CM

## 2023-10-06 RX ORDER — COLCHICINE 0.6 MG/1
0.6 TABLET ORAL 2 TIMES DAILY
Qty: 180 TABLET | Refills: 3 | Status: SHIPPED | OUTPATIENT
Start: 2023-10-06 | End: 2023-10-20 | Stop reason: SDUPTHER

## 2023-10-06 RX ORDER — TIRZEPATIDE 12.5 MG/.5ML
12.5 INJECTION, SOLUTION SUBCUTANEOUS
Qty: 4 PEN | Refills: 1 | Status: SHIPPED | OUTPATIENT
Start: 2023-10-06 | End: 2023-12-17 | Stop reason: SDUPTHER

## 2023-10-06 NOTE — TELEPHONE ENCOUNTER
No care due was identified.  [facility] Embedded Care Due Messages. Reference number: [id number].   10/05/2023 9:46:38 PM CDT

## 2023-10-06 NOTE — TELEPHONE ENCOUNTER
Refill Decision Note   Narendra Amador  is requesting a refill authorization.  Brief Assessment and Rationale for Refill:  Approve     Medication Therapy Plan:         Comments:     Note composed:8:45 AM 10/06/2023

## 2023-10-11 ENCOUNTER — TELEPHONE (OUTPATIENT)
Dept: PULMONOLOGY | Facility: CLINIC | Age: 58
End: 2023-10-11

## 2023-10-11 ENCOUNTER — HOSPITAL ENCOUNTER (OUTPATIENT)
Dept: RADIOLOGY | Facility: HOSPITAL | Age: 58
Discharge: HOME | End: 2023-10-11
Attending: INTERNAL MEDICINE
Payer: COMMERCIAL

## 2023-10-11 ENCOUNTER — OFFICE VISIT (OUTPATIENT)
Dept: PULMONOLOGY | Facility: CLINIC | Age: 58
End: 2023-10-11
Payer: COMMERCIAL

## 2023-10-11 VITALS
RESPIRATION RATE: 18 BRPM | HEIGHT: 73 IN | DIASTOLIC BLOOD PRESSURE: 68 MMHG | WEIGHT: 253.31 LBS | OXYGEN SATURATION: 97 % | BODY MASS INDEX: 33.57 KG/M2 | SYSTOLIC BLOOD PRESSURE: 128 MMHG | HEART RATE: 80 BPM

## 2023-10-11 DIAGNOSIS — R91.8 MULTIPLE LUNG NODULES: Primary | ICD-10-CM

## 2023-10-11 DIAGNOSIS — R91.1 SOLITARY PULMONARY NODULE: ICD-10-CM

## 2023-10-11 PROCEDURE — 71250 CT THORAX DX C-: CPT | Mod: TC

## 2023-10-11 PROCEDURE — 99214 PR OFFICE/OUTPT VISIT, EST, LEVL IV, 30-39 MIN: ICD-10-PCS | Mod: S$GLB,,, | Performed by: INTERNAL MEDICINE

## 2023-10-11 PROCEDURE — 1160F RVW MEDS BY RX/DR IN RCRD: CPT | Mod: CPTII,S$GLB,, | Performed by: INTERNAL MEDICINE

## 2023-10-11 PROCEDURE — 3008F BODY MASS INDEX DOCD: CPT | Mod: CPTII,S$GLB,, | Performed by: INTERNAL MEDICINE

## 2023-10-11 PROCEDURE — 99214 OFFICE O/P EST MOD 30 MIN: CPT | Mod: S$GLB,,, | Performed by: INTERNAL MEDICINE

## 2023-10-11 PROCEDURE — 3061F NEG MICROALBUMINURIA REV: CPT | Mod: CPTII,S$GLB,, | Performed by: INTERNAL MEDICINE

## 2023-10-11 PROCEDURE — 3008F PR BODY MASS INDEX (BMI) DOCUMENTED: ICD-10-PCS | Mod: CPTII,S$GLB,, | Performed by: INTERNAL MEDICINE

## 2023-10-11 PROCEDURE — 3074F PR MOST RECENT SYSTOLIC BLOOD PRESSURE < 130 MM HG: ICD-10-PCS | Mod: CPTII,S$GLB,, | Performed by: INTERNAL MEDICINE

## 2023-10-11 PROCEDURE — 3074F SYST BP LT 130 MM HG: CPT | Mod: CPTII,S$GLB,, | Performed by: INTERNAL MEDICINE

## 2023-10-11 PROCEDURE — 3078F DIAST BP <80 MM HG: CPT | Mod: CPTII,S$GLB,, | Performed by: INTERNAL MEDICINE

## 2023-10-11 PROCEDURE — 3066F PR DOCUMENTATION OF TREATMENT FOR NEPHROPATHY: ICD-10-PCS | Mod: CPTII,S$GLB,, | Performed by: INTERNAL MEDICINE

## 2023-10-11 PROCEDURE — 3078F PR MOST RECENT DIASTOLIC BLOOD PRESSURE < 80 MM HG: ICD-10-PCS | Mod: CPTII,S$GLB,, | Performed by: INTERNAL MEDICINE

## 2023-10-11 PROCEDURE — 4010F PR ACE/ARB THEARPY RXD/TAKEN: ICD-10-PCS | Mod: CPTII,S$GLB,, | Performed by: INTERNAL MEDICINE

## 2023-10-11 PROCEDURE — 1159F MED LIST DOCD IN RCRD: CPT | Mod: CPTII,S$GLB,, | Performed by: INTERNAL MEDICINE

## 2023-10-11 PROCEDURE — 3061F PR NEG MICROALBUMINURIA RESULT DOCUMENTED/REVIEW: ICD-10-PCS | Mod: CPTII,S$GLB,, | Performed by: INTERNAL MEDICINE

## 2023-10-11 PROCEDURE — 71250 CT THORAX DX C-: CPT | Mod: 26,,, | Performed by: STUDENT IN AN ORGANIZED HEALTH CARE EDUCATION/TRAINING PROGRAM

## 2023-10-11 PROCEDURE — 1159F PR MEDICATION LIST DOCUMENTED IN MEDICAL RECORD: ICD-10-PCS | Mod: CPTII,S$GLB,, | Performed by: INTERNAL MEDICINE

## 2023-10-11 PROCEDURE — 1160F PR REVIEW ALL MEDS BY PRESCRIBER/CLIN PHARMACIST DOCUMENTED: ICD-10-PCS | Mod: CPTII,S$GLB,, | Performed by: INTERNAL MEDICINE

## 2023-10-11 PROCEDURE — 99999 PR PBB SHADOW E&M-EST. PATIENT-LVL V: ICD-10-PCS | Mod: PBBFAC,,, | Performed by: INTERNAL MEDICINE

## 2023-10-11 PROCEDURE — 99999 PR PBB SHADOW E&M-EST. PATIENT-LVL V: CPT | Mod: PBBFAC,,, | Performed by: INTERNAL MEDICINE

## 2023-10-11 PROCEDURE — 3066F NEPHROPATHY DOC TX: CPT | Mod: CPTII,S$GLB,, | Performed by: INTERNAL MEDICINE

## 2023-10-11 PROCEDURE — 4010F ACE/ARB THERAPY RXD/TAKEN: CPT | Mod: CPTII,S$GLB,, | Performed by: INTERNAL MEDICINE

## 2023-10-11 PROCEDURE — 3044F HG A1C LEVEL LT 7.0%: CPT | Mod: CPTII,S$GLB,, | Performed by: INTERNAL MEDICINE

## 2023-10-11 PROCEDURE — 71250 CT CHEST WITHOUT CONTRAST: ICD-10-PCS | Mod: 26,,, | Performed by: STUDENT IN AN ORGANIZED HEALTH CARE EDUCATION/TRAINING PROGRAM

## 2023-10-11 PROCEDURE — 3044F PR MOST RECENT HEMOGLOBIN A1C LEVEL <7.0%: ICD-10-PCS | Mod: CPTII,S$GLB,, | Performed by: INTERNAL MEDICINE

## 2023-10-11 NOTE — PROGRESS NOTES
"Subjective:      Patient ID: Narendra English Sr. is a 58 y.o. male.    Chief Complaint: Abnormal Ct Scan    Abnormal Ct Scan        Seen previously for the followin-year-old male with a history of hypertension, hyperlipidemia, obesity who was a never smoker who has had lung nodules noted on CT imaging for several years that have been followed.  Most recent imaging showed possible increase 1 of the nodules and he is referred here for that reason.  He has no pulmonary symptoms and no pulmonary complaints.    2023:  Here for follow-up CT imaging.  Remains at baseline no new complaints.  No new pulmonary issues    Review of Systems as per history of present illness otherwise negative  Objective:     Physical Exam   Constitutional: He is oriented to person, place, and time. He appears well-developed. No distress.   Cardiovascular: Normal rate and regular rhythm.   Pulmonary/Chest: Normal expansion, hyperinflation, effort normal and breath sounds normal.   Musculoskeletal:         General: No edema.      Cervical back: Neck supple.   Neurological: He is alert and oriented to person, place, and time.   Nursing note and vitals reviewed.          10/11/2023     9:41 AM 2023     9:09 AM 2023    10:32 AM 2023     9:42 AM 2023     1:36 PM 2023     8:56 AM 2023     8:02 AM   Pulmonary Function Tests   SpO2 97 % 97 %     95 %   Height 6' 1" (1.854 m) 6' 1" (1.854 m) 6' 1" (1.854 m)  6' 1" (1.854 m) 6' 1" (1.854 m)    Weight 114.9 kg (253 lb 4.9 oz) 114.7 kg (252 lb 13.9 oz) 116.1 kg (256 lb) 116.2 kg (256 lb 2.8 oz) 116.7 kg (257 lb 4.4 oz) 119.6 kg (263 lb 10.7 oz)    BMI (Calculated) 33.4 33.4 33.8  34 34.8         Assessment:     1. Multiple lung nodules         Orders Placed This Encounter   Procedures    CT Chest Without Contrast     Standing Status:   Future     Standing Expiration Date:   10/11/2025     Order Specific Question:   May the Radiologist modify the order per protocol " to meet the clinical needs of the patient?     Answer:   Yes     I personally reviewed CT chest images obtained today.  I agree with the radiologist's interpretation as below    FINDINGS:  The airway is widely patent.  There is no mediastinal or hilar lymphadenopathy.  The heart is not enlarged.     The multiple bilateral pulmonary nodules are all unchanged.  There is no new nodule.  A representative pleural base nodule in the right lower lobe best visualized on series 4, image 316 measures 8 mm.  There is no pleural effusion.  There is no ground-glass or reticulonodular airspace opacity.     The upper abdomen demonstrates no acute abnormality.  There is no lytic or blastic osseous lesion.     Impression:     Unchanged bilateral subcentimeter pulmonary nodules with no new nodule.      Plan:     Unchanged bilateral pulmonary nodules in 6 months interval  Likely benign  We will get repeat CT in 1 year with a visit  If stable in 1 year we will continue surveillance for 1 additional year and then discontinue surveillance if nodules remained stable at that time  Discussed in detail with the patient who voiced understanding and agreement with this plan

## 2023-10-12 ENCOUNTER — PATIENT MESSAGE (OUTPATIENT)
Dept: INTERNAL MEDICINE | Facility: CLINIC | Age: 58
End: 2023-10-12
Payer: COMMERCIAL

## 2023-10-12 DIAGNOSIS — L40.9 PSORIASIS: ICD-10-CM

## 2023-10-12 NOTE — TELEPHONE ENCOUNTER
No care due was identified.  Health [facility] Embedded Care Due Messages. Reference number: [id number].   10/12/2023 9:37:31 AM CDT

## 2023-10-13 RX ORDER — INDOMETHACIN 75 MG/1
75 CAPSULE, EXTENDED RELEASE ORAL 2 TIMES DAILY PRN
Qty: 30 CAPSULE | Refills: 0 | Status: SHIPPED | OUTPATIENT
Start: 2023-10-13 | End: 2023-11-01 | Stop reason: SDUPTHER

## 2023-10-17 RX ORDER — CLOBETASOL PROPIONATE 0.5 MG/G
OINTMENT TOPICAL
Qty: 60 G | Refills: 3 | Status: SHIPPED | OUTPATIENT
Start: 2023-10-17 | End: 2023-10-20

## 2023-10-19 NOTE — PROGRESS NOTES
The patient has had an E-Consult completed. Please refer to that note as needed. Please communicate the information below to the patient. Based on the recommendations of the specialist, I recommend that the patient do the following:    I recommend office visit with specialist to determine most appropriate treatment for patient. If he is in agreement, I'll place referral.

## 2023-10-20 ENCOUNTER — OFFICE VISIT (OUTPATIENT)
Dept: RHEUMATOLOGY | Facility: CLINIC | Age: 58
End: 2023-10-20
Payer: COMMERCIAL

## 2023-10-20 VITALS
HEART RATE: 68 BPM | HEIGHT: 73 IN | BODY MASS INDEX: 33.06 KG/M2 | SYSTOLIC BLOOD PRESSURE: 110 MMHG | WEIGHT: 249.44 LBS | DIASTOLIC BLOOD PRESSURE: 70 MMHG

## 2023-10-20 DIAGNOSIS — M10.9 GOUT, UNSPECIFIED CAUSE, UNSPECIFIED CHRONICITY, UNSPECIFIED SITE: Primary | ICD-10-CM

## 2023-10-20 DIAGNOSIS — M11.269 CHONDROCALCINOSIS OF KNEE, UNSPECIFIED LATERALITY: ICD-10-CM

## 2023-10-20 PROCEDURE — 3074F PR MOST RECENT SYSTOLIC BLOOD PRESSURE < 130 MM HG: ICD-10-PCS | Mod: CPTII,S$GLB,, | Performed by: STUDENT IN AN ORGANIZED HEALTH CARE EDUCATION/TRAINING PROGRAM

## 2023-10-20 PROCEDURE — 3066F NEPHROPATHY DOC TX: CPT | Mod: CPTII,S$GLB,, | Performed by: STUDENT IN AN ORGANIZED HEALTH CARE EDUCATION/TRAINING PROGRAM

## 2023-10-20 PROCEDURE — 3061F NEG MICROALBUMINURIA REV: CPT | Mod: CPTII,S$GLB,, | Performed by: STUDENT IN AN ORGANIZED HEALTH CARE EDUCATION/TRAINING PROGRAM

## 2023-10-20 PROCEDURE — 4010F PR ACE/ARB THEARPY RXD/TAKEN: ICD-10-PCS | Mod: CPTII,S$GLB,, | Performed by: STUDENT IN AN ORGANIZED HEALTH CARE EDUCATION/TRAINING PROGRAM

## 2023-10-20 PROCEDURE — 4010F ACE/ARB THERAPY RXD/TAKEN: CPT | Mod: CPTII,S$GLB,, | Performed by: STUDENT IN AN ORGANIZED HEALTH CARE EDUCATION/TRAINING PROGRAM

## 2023-10-20 PROCEDURE — 1159F PR MEDICATION LIST DOCUMENTED IN MEDICAL RECORD: ICD-10-PCS | Mod: CPTII,S$GLB,, | Performed by: STUDENT IN AN ORGANIZED HEALTH CARE EDUCATION/TRAINING PROGRAM

## 2023-10-20 PROCEDURE — 3008F PR BODY MASS INDEX (BMI) DOCUMENTED: ICD-10-PCS | Mod: CPTII,S$GLB,, | Performed by: STUDENT IN AN ORGANIZED HEALTH CARE EDUCATION/TRAINING PROGRAM

## 2023-10-20 PROCEDURE — 3044F HG A1C LEVEL LT 7.0%: CPT | Mod: CPTII,S$GLB,, | Performed by: STUDENT IN AN ORGANIZED HEALTH CARE EDUCATION/TRAINING PROGRAM

## 2023-10-20 PROCEDURE — 3078F PR MOST RECENT DIASTOLIC BLOOD PRESSURE < 80 MM HG: ICD-10-PCS | Mod: CPTII,S$GLB,, | Performed by: STUDENT IN AN ORGANIZED HEALTH CARE EDUCATION/TRAINING PROGRAM

## 2023-10-20 PROCEDURE — 99999 PR PBB SHADOW E&M-EST. PATIENT-LVL V: ICD-10-PCS | Mod: PBBFAC,,, | Performed by: STUDENT IN AN ORGANIZED HEALTH CARE EDUCATION/TRAINING PROGRAM

## 2023-10-20 PROCEDURE — 3061F PR NEG MICROALBUMINURIA RESULT DOCUMENTED/REVIEW: ICD-10-PCS | Mod: CPTII,S$GLB,, | Performed by: STUDENT IN AN ORGANIZED HEALTH CARE EDUCATION/TRAINING PROGRAM

## 2023-10-20 PROCEDURE — 1159F MED LIST DOCD IN RCRD: CPT | Mod: CPTII,S$GLB,, | Performed by: STUDENT IN AN ORGANIZED HEALTH CARE EDUCATION/TRAINING PROGRAM

## 2023-10-20 PROCEDURE — 3078F DIAST BP <80 MM HG: CPT | Mod: CPTII,S$GLB,, | Performed by: STUDENT IN AN ORGANIZED HEALTH CARE EDUCATION/TRAINING PROGRAM

## 2023-10-20 PROCEDURE — 99205 PR OFFICE/OUTPT VISIT, NEW, LEVL V, 60-74 MIN: ICD-10-PCS | Mod: S$GLB,,, | Performed by: STUDENT IN AN ORGANIZED HEALTH CARE EDUCATION/TRAINING PROGRAM

## 2023-10-20 PROCEDURE — 3066F PR DOCUMENTATION OF TREATMENT FOR NEPHROPATHY: ICD-10-PCS | Mod: CPTII,S$GLB,, | Performed by: STUDENT IN AN ORGANIZED HEALTH CARE EDUCATION/TRAINING PROGRAM

## 2023-10-20 PROCEDURE — 3008F BODY MASS INDEX DOCD: CPT | Mod: CPTII,S$GLB,, | Performed by: STUDENT IN AN ORGANIZED HEALTH CARE EDUCATION/TRAINING PROGRAM

## 2023-10-20 PROCEDURE — 99205 OFFICE O/P NEW HI 60 MIN: CPT | Mod: S$GLB,,, | Performed by: STUDENT IN AN ORGANIZED HEALTH CARE EDUCATION/TRAINING PROGRAM

## 2023-10-20 PROCEDURE — 3044F PR MOST RECENT HEMOGLOBIN A1C LEVEL <7.0%: ICD-10-PCS | Mod: CPTII,S$GLB,, | Performed by: STUDENT IN AN ORGANIZED HEALTH CARE EDUCATION/TRAINING PROGRAM

## 2023-10-20 PROCEDURE — 99999 PR PBB SHADOW E&M-EST. PATIENT-LVL V: CPT | Mod: PBBFAC,,, | Performed by: STUDENT IN AN ORGANIZED HEALTH CARE EDUCATION/TRAINING PROGRAM

## 2023-10-20 PROCEDURE — 3074F SYST BP LT 130 MM HG: CPT | Mod: CPTII,S$GLB,, | Performed by: STUDENT IN AN ORGANIZED HEALTH CARE EDUCATION/TRAINING PROGRAM

## 2023-10-20 RX ORDER — COLCHICINE 0.6 MG/1
0.6 TABLET ORAL DAILY
Qty: 180 TABLET | Refills: 3 | Status: SHIPPED | OUTPATIENT
Start: 2023-10-20

## 2023-10-20 RX ORDER — FEBUXOSTAT 40 MG/1
40 TABLET, FILM COATED ORAL DAILY
Qty: 30 TABLET | Refills: 3 | Status: SHIPPED | OUTPATIENT
Start: 2023-10-20 | End: 2023-12-10 | Stop reason: SDUPTHER

## 2023-10-20 NOTE — PATIENT INSTRUCTIONS
Do XR of ankles, feet and knees today  Decrease colchicine to 1 tablet a day  Start febuxostat 40 mg daily.   Do labs in a month and then every 4 weeks. You can do those in [place].   Follow up in 6 weeks

## 2023-10-20 NOTE — PROGRESS NOTES
RHEUMATOLOGY OUTPATIENT CLINIC NOTE    10/20/2023    Attending Rheumatologist: Mayela Venegas  Primary Care Provider: Tabitha Melgoza MD   Physician Requesting Consultation: Tabitha Melgoza MD  [id number] [place] [address] [place] [id number]  Chief Complaint/Reason For Consultation:  Gout (Elevated uric acid)      Subjective:       HPI  Narendra English Sr. is a 58 y.o. White male with hx of colon cancer s/p colon resection, no chemotherapy, Hx of DM and HTN, hx of hypothyroidism who comes for evaluation of gout.     He reports hx of gout for 40 years, since he was 16 years old.   Joints involved:  Bilateral knees, ankles and in lesser degree bilateral 1st MTP.  He reports he had arthrocentesis about 10 years ago in O[facility] and was told there were uric acid crystals.  He reports a history of nodules/tophi in bilateral Achilles tendon that have improved.  He has never seen a rheumatologist.    Urate lowering agents:  -allopurinol, gave him significant GI upset with diarrhea.  Does not remember the dose.  Last dose was 5 years ago.  -febuxostat 40 mg cause mild diarrhea.  Last dose 3 years ago    He is currently on colchicine 0.6 mg twice daily.  He also takes indomethacin 75 mg as needed for flare.  Most recent uric acid in September 2023 was 9.1    He reports that his last flare was about 2 weeks ago, involving ankles.  Indomethacin helped the pain.    He denies history of kidney stones.  Has a strong family history of gout in grandmother, uncle, nephew and daughter  He denies any alcohol use.  He does not eat red meat, seafood, shellfish.    He works as a .    For his diabetes, he has been on Mounjaro for the past year and a half.  He has lost 50 lb.    Answers submitted by the patient for this visit:  Rheumatology Questionnaire (Submitted on 10/19/2023)  fever: No  eye redness: No  mouth sores: No  headaches: No  shortness of breath: No  chest pain: No  trouble  swallowing: No  diarrhea: No  constipation: No  unexpected weight change: No  genital sore: No  During the last 3 days, have you had a skin rash?: No  Bruises or bleeds easily: No  cough: No    Chronic comorbid conditions affecting medical decision making today:  Past Medical History:   Diagnosis Date    Allergy     Cancer     Colon    Diabetes mellitus      09/14/2023 Insulin x 10 years    Gastroesophageal reflux disease without esophagitis 06/20/2023    Hypertension     Sleep apnea     Thyroid disease      Past Surgical History:   Procedure Laterality Date    BACK SURGERY      COLON SURGERY  02/06/21    COLONOSCOPY N/A 12/29/2020    Procedure: COLONOSCOPY;  Surgeon: William Cotter MD;  Location: [facility] ENDO;  Service: Endoscopy;  Laterality: N/A;  Will need Rapid doesn't live here    COLONOSCOPY N/A 02/10/2022    Procedure: COLONOSCOPY;  Surgeon: Wili Espinosa MD;  Location: [facility];  Service: Endoscopy;  Laterality: N/A;    EPIDURAL STEROID INJECTION INTO CERVICAL SPINE N/A 12/1/2022    Procedure: C7/T1 IL SALBADOR;  Surgeon: Leonard Mart MD;  Location: [facility] PAIN MGT;  Service: Pain Management;  Laterality: N/A;    FESS, WITH NASAL SEPTOPLASTY, WITH IMAGING GUIDANCE Bilateral 08/17/2022    Procedure: FESS, WITH NASAL SEPTOPLASTY, WITH IMAGING GUIDANCE;  Surgeon: Viktor Ferrell MD;  Location: [facility];  Service: ENT;  Laterality: Bilateral;    LAPAROSCOPIC RIGHT COLON RESECTION N/A 02/02/2021    Procedure: COLECTOMY, RIGHT, LAPAROSCOPIC, ERAS low;  Surgeon: Melonie Santoyo MD;  Location: [address];  Service: Colon and Rectal;  Laterality: N/A;  needs rapid covid test    LYSIS OF ADHESIONS Bilateral 4/19/2023    Procedure: LYSIS, ADHESIONS;  Surgeon: Viktor Ferrell MD;  Location: [facility] OR;  Service: ENT;  Laterality: Bilateral;    NASAL ENDOSCOPY Bilateral 4/19/2023    Procedure: ENDOSCOPY, NOSE;  Surgeon: Viktor Ferrell MD;  Location: [facility] OR;  Service: ENT;  Laterality: Bilateral;     NASAL TURBINATE REDUCTION Bilateral 08/17/2022    Procedure: REDUCTION, NASAL TURBINATE;  Surgeon: Viktor Ferrell MD;  Location: [facility] OR;  Service: ENT;  Laterality: Bilateral;    RHINOPLASTY Bilateral 4/19/2023    Procedure: RHINOPLASTY;  Surgeon: Viktor Ferrell MD;  Location: [facility] OR;  Service: ENT;  Laterality: Bilateral;    SELECTIVE INJECTION OF ANESTHETIC AGENT AROUND LUMBAR SPINAL NERVE ROOT BY TRANSFORAMINAL APPROACH Bilateral 7/5/2023    Procedure: Bilateral L4/5 TF SALBADOR RN IV Sedation;  Surgeon: Leonard Mart MD;  Location: [facility] PAIN MGT;  Service: Pain Management;  Laterality: Bilateral;    TONSILLECTOMY       Family History   Problem Relation Age of Onset    Hypertension Mother     Diabetes Mother     Breast cancer Mother     Cancer Mother     Hypertension Father     Stroke Father     No Known Problems Sister     No Known Problems Sister     Hypertension Brother     Hypertension Brother     Cataracts Maternal Grandmother     Diabetes Maternal Grandmother     Lung cancer Maternal Grandfather     No Known Problems Paternal Grandmother     No Known Problems Paternal Grandfather      Social History     Substance and Sexual Activity   Alcohol Use Never     Social History     Tobacco Use   Smoking Status Never    Passive exposure: Never   Smokeless Tobacco Never     Social History     Substance and Sexual Activity   Drug Use Never       Current Outpatient Medications:     amLODIPine (NORVASC) 10 MG tablet, Take 1 tablet (10 mg total) by mouth once daily., Disp: 90 tablet, Rfl: 1    atorvastatin (LIPITOR) 20 MG tablet, Take 1 tablet (20 mg total) by mouth every evening., Disp: 90 tablet, Rfl: 3    benazepriL (LOTENSIN) 40 MG tablet, Take 1 tablet (40 mg total) by mouth once daily., Disp: 90 tablet, Rfl: 3    clonazePAM (KLONOPIN) 1 MG tablet, Take 1 tablet (1 mg total) by mouth every evening., Disp: 30 tablet, Rfl: 1    fluticasone propionate (FLONASE) 50 mcg/actuation nasal spray, 1 spray (50 mcg  total) by Each Nostril route once daily., Disp: 16 g, Rfl: 0    hydrALAZINE (APRESOLINE) 100 MG tablet, Take 1 tablet (100 mg total) by mouth every 8 (eight) hours., Disp: 270 tablet, Rfl: 1    indomethacin (INDOCIN SR) 75 mg CpSR CR capsule, Take 1 capsule (75 mg total) by mouth 2 (two) times daily as needed., Disp: 30 capsule, Rfl: 0    insulin glargine, TOUJEO, (TOUJEO SOLOSTAR U-300 INSULIN) 300 unit/mL (1.5 mL) InPn pen, INJECT 85 UNITS SUBCUTANEOUSLY ONCE DAILY, Disp: 17 pen , Rfl: 0    ketoconazole (NIZORAL) 2 % shampoo, Wash body with medicated shampoo at least 2x/week - let sit on body at least 5 minutes prior to rinsing, Disp: 120 mL, Rfl: 5    levothyroxine (SYNTHROID) 112 MCG tablet, Take 1 tablet (112 mcg total) by mouth before breakfast., Disp: 90 tablet, Rfl: 1    metFORMIN (GLUCOPHAGE) 1000 MG tablet, Take 1 tablet (1,000 mg total) by mouth 2 (two) times daily with meals., Disp: 180 tablet, Rfl: 3    metoprolol succinate (TOPROL-XL) 50 MG 24 hr tablet, Take 1 tablet (50 mg total) by mouth once daily. (Patient taking differently: Take 50 mg by mouth every evening.), Disp: 90 tablet, Rfl: 3    pregabalin (LYRICA) 150 MG capsule, Take 1 capsule (150 mg total) by mouth 3 (three) times daily., Disp: 90 capsule, Rfl: 3    tirzepatide (MOUNJARO) 12.5 mg/0.5 mL PnIj, Inject 12.5 mg into the skin every 7 days., Disp: 4 pen , Rfl: 1    traZODone (DESYREL) 100 MG tablet, Take 1 tablet (100 mg total) by mouth every evening., Disp: 30 tablet, Rfl: 5    VASCEPA 1 gram Cap, Take 2 capsules (2,000 mg total) by mouth 2 (two) times daily., Disp: 360 capsule, Rfl: 1    colchicine, gout, (COLCRYS) 0.6 mg tablet, Take 1 tablet (0.6 mg total) by mouth once daily., Disp: 180 tablet, Rfl: 3    febuxostat (ULORIC) 40 mg Tab, Take 1 tablet (40 mg total) by mouth once daily., Disp: 30 tablet, Rfl: 3     Objective:         Vitals:    10/20/23 1055   BP: 110/70   Pulse: 68     Physical Exam   Constitutional: He is oriented to  "person, place, and time. He appears well-developed and obese.   HENT:   Head: Normocephalic.   Salivary pool adequate  Oral aperture is normal   Pulmonary/Chest: Effort normal.   Musculoskeletal:      Cervical back: Neck supple.      Comments: No synovitis on examination.  Normal range of motion of ankles knees and feet.  No tophi observed   Neurological: He is alert and oriented to person, place, and time.   Skin: No rash noted.   No rashes noted   Vitals reviewed.      Reviewed old and all outside pertinent medical records available.    All lab results personally reviewed and interpreted by me.  Lab Results   Component Value Date    WBC 6.55 08/15/2023    HGB 14.0 08/15/2023    HCT 43.8 08/15/2023    MCV 98 08/15/2023    MCH 31.3 (H) 08/15/2023    MCHC 32.0 08/15/2023    RDW 13.5 08/15/2023     08/15/2023    MPV 11.6 08/15/2023       Lab Results   Component Value Date     09/15/2023    K 4.4 09/15/2023     09/15/2023    CO2 27 09/15/2023     (H) 09/15/2023    BUN 18 09/15/2023    CALCIUM 9.2 09/15/2023    PROT 6.6 09/15/2023    ALBUMIN 3.8 09/15/2023    BILITOT 0.3 09/15/2023    AST 29 09/15/2023    ALKPHOS 40 (L) 09/15/2023    ALT 56 (H) 09/15/2023       Lab Results   Component Value Date    COLORU Yellow 11/15/2021    APPEARANCEUA Clear 11/15/2021    SPECGRAV 1.015 11/15/2021    PHUR 7.0 11/15/2021    PROTEINUA Negative 11/15/2021    KETONESU Negative 11/15/2021    LEUKOCYTESUR Negative 11/15/2021    NITRITE Negative 11/15/2021    UROBILINOGEN Negative 11/15/2021       No results found for: "CRP"    No results found for: "KASHIF", "RF", "SEDRATE", "CCPANTIBODIE"    No components found for: "25OHVITDTOT", "34HTMQQG8", "96GVSWFP9", "METHODNOTE"    Lab Results   Component Value Date    URICACID 9.1 (H) 09/15/2023       Lab Results   Component Value Date    HEPCAB Negative 10/15/2021       Imaging:  All imaging reviewed and independently interpreted by me.  No x-rays available for review       " ASSESSMENT / PLAN:     Narendra English Sr. is a 58 y.o. White male with hx of colon cancer s/p colon resection, no chemotherapy, Hx of DM and HTN, hx of hypothyroidism who comes for evaluation of gout.     # ?Tophaceous, crystal proven, gout  -longstanding history of gout for 40 years.  Likely genetic component given strong family history.  -SUA in 9/2023 was 9.1  -currently not on any urate lowering agent.  Developed GI upset with both allopurinol and Uloric in the past, lesser degree with Uloric.  -unknown if erosions as no available x-rays  -discussed with patient to try Uloric 40 mg again.  Discussed black box warning of cardiovascular events.  He does not have a history of CAD..  Patient voiced understanding and agreed to start medication.   -decrease colchicine to 0.6 mg daily instead of twice daily  -can use indomethacin PRN for now.   -discussed with patient that if he is not able to tolerate Uloric, we could consider using epiglottic case infusion.  Handout on medication given to patient.  We will obtain G6 PD in next set of labs  -Labs every month for the foreseable future.   -XR of ankles, knees, feet now     Follow up in about 6 weeks (around 12/1/2023).    Method of contact with patient concerns: Grey attn Rheumatology    Disclaimer:  This note is prepared using voice recognition software and as such is likely to have errors and has not been proof read. Please contact me for questions.     Time spent: 60 minutes in face to face discussion concerning diagnosis, prognosis, review of lab and test results, benefits of treatment as well as management of disease, counseling of patient and coordination of care between various health care providers.  Greater than half the time spent was used for coordination of care and counseling of patient.    Mayela Venegas M.D.  Rheumatology  [facility]

## 2023-10-23 ENCOUNTER — PATIENT MESSAGE (OUTPATIENT)
Dept: RHEUMATOLOGY | Facility: CLINIC | Age: 58
End: 2023-10-23
Payer: COMMERCIAL

## 2023-10-24 ENCOUNTER — PATIENT MESSAGE (OUTPATIENT)
Dept: RHEUMATOLOGY | Facility: CLINIC | Age: 58
End: 2023-10-24
Payer: COMMERCIAL

## 2023-10-31 ENCOUNTER — PATIENT MESSAGE (OUTPATIENT)
Dept: RHEUMATOLOGY | Facility: CLINIC | Age: 58
End: 2023-10-31
Payer: COMMERCIAL

## 2023-10-31 DIAGNOSIS — M10.9 GOUT, UNSPECIFIED CAUSE, UNSPECIFIED CHRONICITY, UNSPECIFIED SITE: Primary | ICD-10-CM

## 2023-11-01 RX ORDER — INDOMETHACIN 75 MG/1
75 CAPSULE, EXTENDED RELEASE ORAL 2 TIMES DAILY PRN
Qty: 60 CAPSULE | Refills: 0 | Status: SHIPPED | OUTPATIENT
Start: 2023-11-01 | End: 2024-02-16 | Stop reason: SDUPTHER

## 2023-11-05 ENCOUNTER — HOSPITAL ENCOUNTER (EMERGENCY)
Facility: HOSPITAL | Age: 58
Discharge: HOME | End: 2023-11-05
Attending: EMERGENCY MEDICINE
Payer: COMMERCIAL

## 2023-11-05 VITALS
SYSTOLIC BLOOD PRESSURE: 157 MMHG | HEIGHT: 73 IN | RESPIRATION RATE: 16 BRPM | OXYGEN SATURATION: 98 % | HEART RATE: 64 BPM | WEIGHT: 253.44 LBS | BODY MASS INDEX: 33.59 KG/M2 | DIASTOLIC BLOOD PRESSURE: 64 MMHG | TEMPERATURE: 98 F

## 2023-11-05 DIAGNOSIS — M79.606 LEG PAIN: ICD-10-CM

## 2023-11-05 DIAGNOSIS — L03.115 CELLULITIS OF RIGHT LEG: Primary | ICD-10-CM

## 2023-11-05 PROCEDURE — 99284 EMERGENCY DEPT VISIT MOD MDM: CPT | Mod: 25

## 2023-11-05 RX ORDER — SULFAMETHOXAZOLE AND TRIMETHOPRIM 800; 160 MG/1; MG/1
1 TABLET ORAL 2 TIMES DAILY
Qty: 14 TABLET | Refills: 0 | Status: SHIPPED | OUTPATIENT
Start: 2023-11-05 | End: 2023-11-12
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
## 2023-11-08 ENCOUNTER — PATIENT MESSAGE (OUTPATIENT)
Dept: RHEUMATOLOGY | Facility: CLINIC | Age: 58
End: 2023-11-08
Payer: COMMERCIAL

## 2023-11-08 DIAGNOSIS — M10.9 GOUT, UNSPECIFIED CAUSE, UNSPECIFIED CHRONICITY, UNSPECIFIED SITE: Primary | ICD-10-CM

## 2023-11-08 RX ORDER — METHYLPREDNISOLONE 4 MG/1
TABLET ORAL
Qty: 21 EACH | Refills: 0 | Status: SHIPPED | OUTPATIENT
Start: 2023-11-08 | End: 2023-11-29

## 2023-11-16 RX ORDER — CLOBETASOL PROPIONATE 0.5 MG/G
OINTMENT TOPICAL
Qty: 240 G | Refills: 0 | Status: SHIPPED | OUTPATIENT
Start: 2023-11-16 | End: 2024-01-09

## 2023-11-29 ENCOUNTER — PROCEDURE VISIT (OUTPATIENT)
Dept: UROLOGY | Facility: CLINIC | Age: 58
End: 2023-11-29
Payer: COMMERCIAL

## 2023-11-29 VITALS — HEIGHT: 73 IN | WEIGHT: 253 LBS | BODY MASS INDEX: 33.53 KG/M2

## 2023-11-29 DIAGNOSIS — N52.9 ERECTILE DYSFUNCTION, UNSPECIFIED ERECTILE DYSFUNCTION TYPE: ICD-10-CM

## 2023-11-29 DIAGNOSIS — E29.1 HYPOGONADISM MALE: Primary | ICD-10-CM

## 2023-11-29 PROCEDURE — S0189 PR TESTOSTERONE PELLET 75 MG: ICD-10-PCS | Mod: S$GLB,,, | Performed by: UROLOGY

## 2023-11-29 PROCEDURE — 11980 IMPLANT HORMONE PELLET(S): CPT | Mod: S$GLB,,, | Performed by: UROLOGY

## 2023-11-29 PROCEDURE — 11980 PR IMPLANT,HORMONE,SUBCUTANEOUS: ICD-10-PCS | Mod: S$GLB,,, | Performed by: UROLOGY

## 2023-11-29 PROCEDURE — S0189 TESTOSTERONE PELLET 75 MG: HCPCS | Mod: S$GLB,,, | Performed by: UROLOGY

## 2023-11-29 RX ORDER — LEVOFLOXACIN 500 MG/1
500 TABLET, FILM COATED ORAL DAILY
Qty: 3 TABLET | Refills: 0 | Status: SHIPPED | OUTPATIENT
Start: 2023-11-29 | End: 2023-12-13

## 2023-11-29 NOTE — PROCEDURES
Procedures  Chief Complaint:   Encounter Diagnoses   Name Primary?    Hypogonadism male Yes    Erectile dysfunction, unspecified erectile dysfunction type        HPI:   11/29/23- here today for testopel insertion.    06/16/2022 - 55 yo male that presents today for evaluation of ED.  Patient notes issues for the last 3-4 years but notes that over the last six months it has gotten worse.  He notes difficulty both achieving and maintaining an erection.  He has previously tried both Cialis and Viagra and both of these cause in to have a very bad headache that make it on tolerable to take these medications, though they do work.  Otherwise he voids with a good stream and feels like he empties well subjectively.  He denies any gross hematuria or family history of  cancers.  Denies prior stones or urologic procedures.    Allergies:  Demerol (pf) [meperidine (pf)], Percocet [oxycodone-acetaminophen], and Demerol [meperidine]    Medications:  has a current medication list which includes the following prescription(s): amlodipine, atorvastatin, azelastine, benazepril, clobetasol 0.05%, clonazepam, colchicine, diclofenac sodium, flash glucose scanning reader, flash glucose sensor, fluticasone propionate, hydralazine, hydrocortisone, indomethacin, toujeo solostar u-300 insulin, levothyroxine, metformin, metoprolol succinate, pregabalin, sildenafil, mounjaro, trazodone, triamcinolone acetonide 0.1%, and vascepa, and the following Facility-Administered Medications: gabapentin and lactated ringers.    Review of Systems:  General: No fever, chills, fatigability, or weight loss.  Skin: No rashes, itching, or changes in color or texture of skin.  Chest: Denies MEJÍA, cyanosis, wheezing, cough, and sputum production.  Abdomen: Appetite fine. No weight loss. Denies diarrhea, abdominal pain, hematemesis, or blood in stool.  Musculoskeletal: No joint stiffness or swelling. Denies back pain.  : As above.  All other review of systems  negative.    PMH:   has a past medical history of Cancer, Diabetes mellitus (8 years), Hypertension, Sleep apnea, and Thyroid disease.    PSH:   has a past surgical history that includes Colonoscopy (N/A, 12/29/2020); Laparoscopic right colon resection (N/A, 02/02/2021); Back surgery; Colonoscopy (N/A, 02/10/2022); Tonsillectomy; fess, with nasal septoplasty, with imaging guidance (Bilateral, 08/17/2022); Nasal turbinate reduction (Bilateral, 08/17/2022); Colon surgery (02/06/21); and Epidural steroid injection into cervical spine (N/A, 12/1/2022).    FamHx: family history includes Breast cancer in his mother; Cancer in his mother; Cataracts in his maternal grandmother; Diabetes in his maternal grandmother and mother; Hypertension in his brother, brother, and mother; Stroke in his father.    SocHx:  reports that he has never smoked. He has never used smokeless tobacco. He reports that he does not drink alcohol and does not use drugs.      Physical Exam:  There were no vitals filed for this visit.    General: A&Ox3, no apparent distress, no deformities  Neck: No masses, normal ROM  Lungs: normal inspiration, no use of accessory muscles  Heart: normal pulse, no arrhythmias  Abdomen: Soft, NT, ND, no masses, no hernias, no hepatosplenomegaly  Skin: The skin is warm and dry. No jaundice.  Ext: No c/c/e.  JAIMIE: 6/22: Normal rectal tone, no hemorrhoids. Prostate smooth and normal, no nodules 30 gm SV not palpable. Perineum and anus normal.    Labs/Studies:   Testopel  11/29/23, 8/25/23, 5/26/26, 1/20/23  Testosterone 328 8/22   PSA 0.60 8/23    Patient was sterilely prepped and draped, then positioned in the right side up, lateral decubitus position.  Lidocaine was used for local anesthesia.  Eleven blade was used to incise the skin, included trocars with the testopel kit were then used.  They were inserted within the incision site and we placed the pellets in a V location.  10 pellets total were inserted without  difficulty.  Trocars were removed and pressure was applied for 2 min.  Steri-Strips applied for local coverage, he will return for lab assessment in 3 months.      Impression/Plan:       1. Hypogonadism- AndroGel and shots have failed.  Patient tolerated testopel insertion today, call with any issues.  Otherwise will see him in 3 months with labs and repeat insertion.    2. Erectile dysfunction- sildenafil 100 mg works well for the patient, of note TriMix caused priapism.

## 2023-12-01 DIAGNOSIS — E11.9 TYPE 2 DIABETES MELLITUS WITHOUT COMPLICATION, WITH LONG-TERM CURRENT USE OF INSULIN: ICD-10-CM

## 2023-12-01 DIAGNOSIS — Z79.4 TYPE 2 DIABETES MELLITUS WITHOUT COMPLICATION, WITH LONG-TERM CURRENT USE OF INSULIN: ICD-10-CM

## 2023-12-01 NOTE — TELEPHONE ENCOUNTER
No care due was identified.  Health [facility] Embedded Care Due Messages. Reference number: [id number].   12/01/2023 3:19:50 PM CST

## 2023-12-02 NOTE — TELEPHONE ENCOUNTER
Refill Routing Note   Medication(s) are not appropriate for processing by [facility] for the following reason(s):        ED/Hospital Visit since last OV with provider:     [facility] action(s):  Defer   Requires appointment : Yes      Medication Therapy Plan:         Appointments  past 12m or future 3m with PCP    Date Provider   Last Visit   9/11/2023 Tabitha Melgoaz MD   Next Visit   Visit date not found Tabitha Melgoza MD   ED visits in past 90 days: 1        Note composed:3:02 PM 12/02/2023

## 2023-12-05 RX ORDER — METFORMIN HYDROCHLORIDE 1000 MG/1
1000 TABLET ORAL 2 TIMES DAILY WITH MEALS
Qty: 180 TABLET | Refills: 1 | Status: SHIPPED | OUTPATIENT
Start: 2023-12-05 | End: 2024-03-07

## 2023-12-06 ENCOUNTER — LAB VISIT (OUTPATIENT)
Dept: LAB | Facility: HOSPITAL | Age: 58
End: 2023-12-06
Attending: STUDENT IN AN ORGANIZED HEALTH CARE EDUCATION/TRAINING PROGRAM
Payer: COMMERCIAL

## 2023-12-06 ENCOUNTER — OFFICE VISIT (OUTPATIENT)
Dept: RHEUMATOLOGY | Facility: CLINIC | Age: 58
End: 2023-12-06
Payer: COMMERCIAL

## 2023-12-06 VITALS
SYSTOLIC BLOOD PRESSURE: 140 MMHG | DIASTOLIC BLOOD PRESSURE: 81 MMHG | BODY MASS INDEX: 32.42 KG/M2 | HEIGHT: 73 IN | WEIGHT: 244.63 LBS | HEART RATE: 53 BPM

## 2023-12-06 DIAGNOSIS — M11.269 CHONDROCALCINOSIS OF KNEE, UNSPECIFIED LATERALITY: ICD-10-CM

## 2023-12-06 DIAGNOSIS — M10.9 GOUT, UNSPECIFIED CAUSE, UNSPECIFIED CHRONICITY, UNSPECIFIED SITE: Primary | ICD-10-CM

## 2023-12-06 DIAGNOSIS — M10.9 GOUT, UNSPECIFIED CAUSE, UNSPECIFIED CHRONICITY, UNSPECIFIED SITE: ICD-10-CM

## 2023-12-06 DIAGNOSIS — Z79.1 NSAID LONG-TERM USE: ICD-10-CM

## 2023-12-06 LAB
ALBUMIN SERPL BCP-MCNC: 3.9 G/DL (ref 3.5–5.2)
ALP SERPL-CCNC: 38 U/L (ref 55–135)
ALT SERPL W/O P-5'-P-CCNC: 32 U/L (ref 10–44)
ANION GAP SERPL CALC-SCNC: 10 MMOL/L (ref 8–16)
AST SERPL-CCNC: 25 U/L (ref 10–40)
BILIRUB DIRECT SERPL-MCNC: 0.1 MG/DL (ref 0.1–0.3)
BILIRUB SERPL-MCNC: 0.3 MG/DL (ref 0.1–1)
BUN SERPL-MCNC: 27 MG/DL (ref 6–20)
CALCIUM SERPL-MCNC: 9.4 MG/DL (ref 8.7–10.5)
CHLORIDE SERPL-SCNC: 108 MMOL/L (ref 95–110)
CO2 SERPL-SCNC: 25 MMOL/L (ref 23–29)
CREAT SERPL-MCNC: 2 MG/DL (ref 0.5–1.4)
EST. GFR  (NO RACE VARIABLE): 38 ML/MIN/1.73 M^2
FERRITIN SERPL-MCNC: 154 NG/ML (ref 20–300)
GLUCOSE SERPL-MCNC: 97 MG/DL (ref 70–110)
IRON SERPL-MCNC: 54 UG/DL (ref 45–160)
MAGNESIUM SERPL-MCNC: 1.7 MG/DL (ref 1.6–2.6)
PHOSPHATE SERPL-MCNC: 3.6 MG/DL (ref 2.7–4.5)
POTASSIUM SERPL-SCNC: 5.3 MMOL/L (ref 3.5–5.1)
PROT SERPL-MCNC: 6.8 G/DL (ref 6–8.4)
PTH-INTACT SERPL-MCNC: 40 PG/ML (ref 9–77)
SATURATED IRON: 17 % (ref 20–50)
SODIUM SERPL-SCNC: 143 MMOL/L (ref 136–145)
TOTAL IRON BINDING CAPACITY: 326 UG/DL (ref 250–450)
TRANSFERRIN SERPL-MCNC: 220 MG/DL (ref 200–375)
URATE SERPL-MCNC: 6.5 MG/DL (ref 3.4–7)

## 2023-12-06 PROCEDURE — 84100 ASSAY OF PHOSPHORUS: CPT | Performed by: STUDENT IN AN ORGANIZED HEALTH CARE EDUCATION/TRAINING PROGRAM

## 2023-12-06 PROCEDURE — 3061F NEG MICROALBUMINURIA REV: CPT | Mod: CPTII,S$GLB,, | Performed by: STUDENT IN AN ORGANIZED HEALTH CARE EDUCATION/TRAINING PROGRAM

## 2023-12-06 PROCEDURE — 99999 PR PBB SHADOW E&M-EST. PATIENT-LVL IV: ICD-10-PCS | Mod: PBBFAC,,, | Performed by: STUDENT IN AN ORGANIZED HEALTH CARE EDUCATION/TRAINING PROGRAM

## 2023-12-06 PROCEDURE — 83970 ASSAY OF PARATHORMONE: CPT | Performed by: STUDENT IN AN ORGANIZED HEALTH CARE EDUCATION/TRAINING PROGRAM

## 2023-12-06 PROCEDURE — 83540 ASSAY OF IRON: CPT | Performed by: STUDENT IN AN ORGANIZED HEALTH CARE EDUCATION/TRAINING PROGRAM

## 2023-12-06 PROCEDURE — 3077F PR MOST RECENT SYSTOLIC BLOOD PRESSURE >= 140 MM HG: ICD-10-PCS | Mod: CPTII,S$GLB,, | Performed by: STUDENT IN AN ORGANIZED HEALTH CARE EDUCATION/TRAINING PROGRAM

## 2023-12-06 PROCEDURE — 82955 ASSAY OF G6PD ENZYME: CPT | Performed by: STUDENT IN AN ORGANIZED HEALTH CARE EDUCATION/TRAINING PROGRAM

## 2023-12-06 PROCEDURE — 3066F NEPHROPATHY DOC TX: CPT | Mod: CPTII,S$GLB,, | Performed by: STUDENT IN AN ORGANIZED HEALTH CARE EDUCATION/TRAINING PROGRAM

## 2023-12-06 PROCEDURE — 80076 HEPATIC FUNCTION PANEL: CPT | Performed by: STUDENT IN AN ORGANIZED HEALTH CARE EDUCATION/TRAINING PROGRAM

## 2023-12-06 PROCEDURE — 3008F PR BODY MASS INDEX (BMI) DOCUMENTED: ICD-10-PCS | Mod: CPTII,S$GLB,, | Performed by: STUDENT IN AN ORGANIZED HEALTH CARE EDUCATION/TRAINING PROGRAM

## 2023-12-06 PROCEDURE — 3077F SYST BP >= 140 MM HG: CPT | Mod: CPTII,S$GLB,, | Performed by: STUDENT IN AN ORGANIZED HEALTH CARE EDUCATION/TRAINING PROGRAM

## 2023-12-06 PROCEDURE — 82728 ASSAY OF FERRITIN: CPT | Performed by: STUDENT IN AN ORGANIZED HEALTH CARE EDUCATION/TRAINING PROGRAM

## 2023-12-06 PROCEDURE — 3066F PR DOCUMENTATION OF TREATMENT FOR NEPHROPATHY: ICD-10-PCS | Mod: CPTII,S$GLB,, | Performed by: STUDENT IN AN ORGANIZED HEALTH CARE EDUCATION/TRAINING PROGRAM

## 2023-12-06 PROCEDURE — 84550 ASSAY OF BLOOD/URIC ACID: CPT | Performed by: STUDENT IN AN ORGANIZED HEALTH CARE EDUCATION/TRAINING PROGRAM

## 2023-12-06 PROCEDURE — 3044F HG A1C LEVEL LT 7.0%: CPT | Mod: CPTII,S$GLB,, | Performed by: STUDENT IN AN ORGANIZED HEALTH CARE EDUCATION/TRAINING PROGRAM

## 2023-12-06 PROCEDURE — 99214 OFFICE O/P EST MOD 30 MIN: CPT | Mod: S$GLB,,, | Performed by: STUDENT IN AN ORGANIZED HEALTH CARE EDUCATION/TRAINING PROGRAM

## 2023-12-06 PROCEDURE — 36415 COLL VENOUS BLD VENIPUNCTURE: CPT | Mod: PO | Performed by: STUDENT IN AN ORGANIZED HEALTH CARE EDUCATION/TRAINING PROGRAM

## 2023-12-06 PROCEDURE — 83735 ASSAY OF MAGNESIUM: CPT | Performed by: STUDENT IN AN ORGANIZED HEALTH CARE EDUCATION/TRAINING PROGRAM

## 2023-12-06 PROCEDURE — 1159F MED LIST DOCD IN RCRD: CPT | Mod: CPTII,S$GLB,, | Performed by: STUDENT IN AN ORGANIZED HEALTH CARE EDUCATION/TRAINING PROGRAM

## 2023-12-06 PROCEDURE — 80048 BASIC METABOLIC PNL TOTAL CA: CPT | Performed by: STUDENT IN AN ORGANIZED HEALTH CARE EDUCATION/TRAINING PROGRAM

## 2023-12-06 PROCEDURE — 3044F PR MOST RECENT HEMOGLOBIN A1C LEVEL <7.0%: ICD-10-PCS | Mod: CPTII,S$GLB,, | Performed by: STUDENT IN AN ORGANIZED HEALTH CARE EDUCATION/TRAINING PROGRAM

## 2023-12-06 PROCEDURE — 3008F BODY MASS INDEX DOCD: CPT | Mod: CPTII,S$GLB,, | Performed by: STUDENT IN AN ORGANIZED HEALTH CARE EDUCATION/TRAINING PROGRAM

## 2023-12-06 PROCEDURE — 84466 ASSAY OF TRANSFERRIN: CPT | Performed by: STUDENT IN AN ORGANIZED HEALTH CARE EDUCATION/TRAINING PROGRAM

## 2023-12-06 PROCEDURE — 1159F PR MEDICATION LIST DOCUMENTED IN MEDICAL RECORD: ICD-10-PCS | Mod: CPTII,S$GLB,, | Performed by: STUDENT IN AN ORGANIZED HEALTH CARE EDUCATION/TRAINING PROGRAM

## 2023-12-06 PROCEDURE — 99999 PR PBB SHADOW E&M-EST. PATIENT-LVL IV: CPT | Mod: PBBFAC,,, | Performed by: STUDENT IN AN ORGANIZED HEALTH CARE EDUCATION/TRAINING PROGRAM

## 2023-12-06 PROCEDURE — 3061F PR NEG MICROALBUMINURIA RESULT DOCUMENTED/REVIEW: ICD-10-PCS | Mod: CPTII,S$GLB,, | Performed by: STUDENT IN AN ORGANIZED HEALTH CARE EDUCATION/TRAINING PROGRAM

## 2023-12-06 PROCEDURE — 4010F PR ACE/ARB THEARPY RXD/TAKEN: ICD-10-PCS | Mod: CPTII,S$GLB,, | Performed by: STUDENT IN AN ORGANIZED HEALTH CARE EDUCATION/TRAINING PROGRAM

## 2023-12-06 PROCEDURE — 99214 PR OFFICE/OUTPT VISIT, EST, LEVL IV, 30-39 MIN: ICD-10-PCS | Mod: S$GLB,,, | Performed by: STUDENT IN AN ORGANIZED HEALTH CARE EDUCATION/TRAINING PROGRAM

## 2023-12-06 PROCEDURE — 3079F DIAST BP 80-89 MM HG: CPT | Mod: CPTII,S$GLB,, | Performed by: STUDENT IN AN ORGANIZED HEALTH CARE EDUCATION/TRAINING PROGRAM

## 2023-12-06 PROCEDURE — 3079F PR MOST RECENT DIASTOLIC BLOOD PRESSURE 80-89 MM HG: ICD-10-PCS | Mod: CPTII,S$GLB,, | Performed by: STUDENT IN AN ORGANIZED HEALTH CARE EDUCATION/TRAINING PROGRAM

## 2023-12-06 PROCEDURE — 4010F ACE/ARB THERAPY RXD/TAKEN: CPT | Mod: CPTII,S$GLB,, | Performed by: STUDENT IN AN ORGANIZED HEALTH CARE EDUCATION/TRAINING PROGRAM

## 2023-12-06 NOTE — PROGRESS NOTES
RHEUMATOLOGY OUTPATIENT CLINIC NOTE    12/6/2023    Attending Rheumatologist: Mayela Venegas  Primary Care Provider: Tabitha Melgoza MD   Physician Requesting Consultation: No referring provider defined for this encounter.  Chief Complaint/Reason For Consultation:  No chief complaint on file.      Subjective:       HPI  Narendra English Sr. is a 58 y.o. White male with DM and HTN, hypothyroidism, psoriasis, hx of colon cancer s/p colon resection, no chemotherapy, who comes for f/u of gout.     Interim history    -Initial visit on 10/20/2023  -Current medications febuxostat 40 mg daily, colchicine 0.6 mg daily, tolerating well w/o diarrhea  -XR of the knees showed moderate OA, chondrocalcinosis. XR of ankles and feet showed calcaneal spurs but no erosions.   -Went to the ED in mid November with cellulitis in both legs, competed 7 days of oral antibiotics. Then after that he developed a gout flare in bilateral knees and ankles that improved with medrol dose pack. He reports that swelling in legs is still present but better.   -He reports history of psoriasis and has been following with dermatology. He uses topical clobetasol and tacrolimus. Today reports that he feels the psoriasis is worsening, he has redness in left arm, chest and neck but no plaques.   -He has not have any labs since we started uloric     Disease history     He reports hx of gout since he was 16 years old.   Joints involved:  Bilateral knees, ankles and in lesser degree bilateral 1st MTP.  He reports he had arthrocentesis about 10 years ago in [facility] and was told there were uric acid crystals.  He reports a history of nodules/tophi in bilateral Achilles tendon that have improved.  He has never seen a rheumatologist.    Urate lowering agents:  -allopurinol, gave him significant GI upset with diarrhea.  Does not remember the dose.  Last dose was 5 years ago.  -febuxostat 40 mg cause mild diarrhea.  Last dose 3 years ago    He  is currently on colchicine 0.6 mg twice daily.  He also takes indomethacin 75 mg as needed for flare.  Most recent uric acid in September 2023 was 9.1    He reports that his last flare was about 2 weeks ago, involving ankles.  Indomethacin helped the pain.    He denies history of kidney stones.  Has a strong family history of gout in grandmother, uncle, nephew and daughter  He denies any alcohol use.  He does not eat red meat, seafood, shellfish.    He works as a .    For his diabetes, he has been on Mounjaro for the past year and a half.  He has lost 50 lb.    Review of Systems   Constitutional:  Negative for fever.   Eyes:  Negative for redness.   Respiratory:  Negative for cough and shortness of breath.    Cardiovascular:  Negative for chest pain.   Gastrointestinal:  Negative for constipation and diarrhea.   Skin:  Negative for rash.   Neurological:  Negative for headaches.   Endo/Heme/Allergies:  Does not bruise/bleed easily.        Chronic comorbid conditions affecting medical decision making today:  Past Medical History:   Diagnosis Date    Allergy     Cancer     Colon    Diabetes mellitus      09/14/2023 Insulin x 10 years    Gastroesophageal reflux disease without esophagitis 06/20/2023    Hypertension     Sleep apnea     Thyroid disease      Past Surgical History:   Procedure Laterality Date    BACK SURGERY      COLON SURGERY  02/06/21    COLONOSCOPY N/A 12/29/2020    Procedure: COLONOSCOPY;  Surgeon: William Cotter MD;  Location: [facility];  Service: Endoscopy;  Laterality: N/A;  Will need Rapid doesn't live here    COLONOSCOPY N/A 02/10/2022    Procedure: COLONOSCOPY;  Surgeon: Wili Espinosa MD;  Location: [facility];  Service: Endoscopy;  Laterality: N/A;    EPIDURAL STEROID INJECTION INTO CERVICAL SPINE N/A 12/1/2022    Procedure: C7/T1 IL SALBADOR;  Surgeon: Leonard Mart MD;  Location: [facility];  Service: Pain Management;  Laterality: N/A;    FESS, WITH NASAL  SEPTOPLASTY, WITH IMAGING GUIDANCE Bilateral 08/17/2022    Procedure: FESS, WITH NASAL SEPTOPLASTY, WITH IMAGING GUIDANCE;  Surgeon: Viktor Ferrell MD;  Location: [facility] OR;  Service: ENT;  Laterality: Bilateral;    LAPAROSCOPIC RIGHT COLON RESECTION N/A 02/02/2021    Procedure: COLECTOMY, RIGHT, LAPAROSCOPIC, ERAS low;  Surgeon: Melonie Santoyo MD;  Location: [facility] OR 2ND FLR;  Service: Colon and Rectal;  Laterality: N/A;  needs rapid covid test    LYSIS OF ADHESIONS Bilateral 4/19/2023    Procedure: LYSIS, ADHESIONS;  Surgeon: Viktor Ferrell MD;  Location: [facility] OR;  Service: ENT;  Laterality: Bilateral;    NASAL ENDOSCOPY Bilateral 4/19/2023    Procedure: ENDOSCOPY, NOSE;  Surgeon: Viktor Ferrell MD;  Location: [facility] OR;  Service: ENT;  Laterality: Bilateral;    NASAL TURBINATE REDUCTION Bilateral 08/17/2022    Procedure: REDUCTION, NASAL TURBINATE;  Surgeon: Viktor Ferrell MD;  Location: [facility] OR;  Service: ENT;  Laterality: Bilateral;    RHINOPLASTY Bilateral 4/19/2023    Procedure: RHINOPLASTY;  Surgeon: Viktor Ferrell MD;  Location: [facility] OR;  Service: ENT;  Laterality: Bilateral;    SELECTIVE INJECTION OF ANESTHETIC AGENT AROUND LUMBAR SPINAL NERVE ROOT BY TRANSFORAMINAL APPROACH Bilateral 7/5/2023    Procedure: Bilateral L4/5 TF SALBADOR RN IV Sedation;  Surgeon: Leonard Mart MD;  Location: [facility];  Service: Pain Management;  Laterality: Bilateral;    TONSILLECTOMY       Family History   Problem Relation Age of Onset    Hypertension Mother     Diabetes Mother     Breast cancer Mother     Cancer Mother     Hypertension Father     Stroke Father     No Known Problems Sister     No Known Problems Sister     Hypertension Brother     Hypertension Brother     Cataracts Maternal Grandmother     Diabetes Maternal Grandmother     Lung cancer Maternal Grandfather     No Known Problems Paternal Grandmother     No Known Problems Paternal Grandfather      Social History     Substance and Sexual Activity    Alcohol Use Never     Social History     Tobacco Use   Smoking Status Never    Passive exposure: Never   Smokeless Tobacco Never     Social History     Substance and Sexual Activity   Drug Use Never       Current Outpatient Medications:     amLODIPine (NORVASC) 10 MG tablet, Take 1 tablet (10 mg total) by mouth once daily., Disp: 90 tablet, Rfl: 1    atorvastatin (LIPITOR) 20 MG tablet, Take 1 tablet (20 mg total) by mouth every evening., Disp: 90 tablet, Rfl: 3    benazepriL (LOTENSIN) 40 MG tablet, Take 1 tablet (40 mg total) by mouth once daily., Disp: 90 tablet, Rfl: 3    clobetasol 0.05% (TEMOVATE) 0.05 % Oint, APPLY  OINTMENT TOPICALLY TO AFFECTED AREA TWICE DAILY, Disp: 240 g, Rfl: 0    clonazePAM (KLONOPIN) 1 MG tablet, Take 1 tablet (1 mg total) by mouth every evening., Disp: 30 tablet, Rfl: 1    colchicine, gout, (COLCRYS) 0.6 mg tablet, Take 1 tablet (0.6 mg total) by mouth once daily., Disp: 180 tablet, Rfl: 3    febuxostat (ULORIC) 40 mg Tab, Take 1 tablet (40 mg total) by mouth once daily., Disp: 30 tablet, Rfl: 3    fluticasone propionate (FLONASE) 50 mcg/actuation nasal spray, 1 spray (50 mcg total) by Each Nostril route once daily., Disp: 16 g, Rfl: 0    hydrALAZINE (APRESOLINE) 100 MG tablet, Take 1 tablet (100 mg total) by mouth every 8 (eight) hours., Disp: 270 tablet, Rfl: 1    indomethacin (INDOCIN SR) 75 mg CpSR CR capsule, Take 1 capsule (75 mg total) by mouth 2 (two) times daily as needed (for gout flare)., Disp: 60 capsule, Rfl: 0    insulin glargine, TOUJEO, (TOUJEO SOLOSTAR U-300 INSULIN) 300 unit/mL (1.5 mL) InPn pen, INJECT 85 UNITS SUBCUTANEOUSLY ONCE DAILY, Disp: 17 pen , Rfl: 0    ketoconazole (NIZORAL) 2 % shampoo, Wash body with medicated shampoo at least 2x/week - let sit on body at least 5 minutes prior to rinsing, Disp: 120 mL, Rfl: 5    levothyroxine (SYNTHROID) 112 MCG tablet, Take 1 tablet (112 mcg total) by mouth before breakfast., Disp: 90 tablet, Rfl: 1    metFORMIN  (GLUCOPHAGE) 1000 MG tablet, Take 1 tablet by mouth twice daily with food, Disp: 180 tablet, Rfl: 1    metoprolol succinate (TOPROL-XL) 50 MG 24 hr tablet, Take 1 tablet (50 mg total) by mouth once daily. (Patient taking differently: Take 50 mg by mouth every evening.), Disp: 90 tablet, Rfl: 3    pregabalin (LYRICA) 150 MG capsule, Take 1 capsule (150 mg total) by mouth 3 (three) times daily., Disp: 90 capsule, Rfl: 3    tirzepatide (MOUNJARO) 12.5 mg/0.5 mL PnIj, Inject 12.5 mg into the skin every 7 days., Disp: 4 pen , Rfl: 1    traZODone (DESYREL) 100 MG tablet, Take 1 tablet (100 mg total) by mouth every evening., Disp: 30 tablet, Rfl: 5    VASCEPA 1 gram Cap, Take 2 capsules (2,000 mg total) by mouth 2 (two) times daily., Disp: 360 capsule, Rfl: 1     Objective:         Vitals:    12/06/23 1000   BP: (!) 140/81   Pulse: (!) 53     Physical Exam   Constitutional: He is oriented to person, place, and time. He appears well-developed and obese.   HENT:   Head: Normocephalic.   Salivary pool adequate  Oral aperture is normal   Pulmonary/Chest: Effort normal.   Musculoskeletal:      Cervical back: Neck supple.      Comments: No synovitis on examination.  Normal range of motion of ankles knees and feet.  No tophi observed   Neurological: He is alert and oriented to person, place, and time.   Skin: No rash noted.   No rashes noted   Vitals reviewed.      Reviewed old and all outside pertinent medical records available.    All lab results personally reviewed and interpreted by me.  Lab Results   Component Value Date    WBC 6.55 08/15/2023    HGB 14.0 08/15/2023    HCT 43.8 08/15/2023    MCV 98 08/15/2023    MCH 31.3 (H) 08/15/2023    MCHC 32.0 08/15/2023    RDW 13.5 08/15/2023     08/15/2023    MPV 11.6 08/15/2023       Lab Results   Component Value Date     09/15/2023    K 4.4 09/15/2023     09/15/2023    CO2 27 09/15/2023     (H) 09/15/2023    BUN 18 09/15/2023    CALCIUM 9.2 09/15/2023     "PROT 6.6 09/15/2023    ALBUMIN 3.8 09/15/2023    BILITOT 0.3 09/15/2023    AST 29 09/15/2023    ALKPHOS 40 (L) 09/15/2023    ALT 56 (H) 09/15/2023       Lab Results   Component Value Date    COLORU Yellow 11/15/2021    APPEARANCEUA Clear 11/15/2021    SPECGRAV 1.015 11/15/2021    PHUR 7.0 11/15/2021    PROTEINUA Negative 11/15/2021    KETONESU Negative 11/15/2021    LEUKOCYTESUR Negative 11/15/2021    NITRITE Negative 11/15/2021    UROBILINOGEN Negative 11/15/2021       No results found for: "CRP"    No results found for: "KASHIF", "RF", "SEDRATE", "CCPANTIBODIE"    No components found for: "25OHVITDTOT", "18FTRXEM0", "98TKAOXY9", "METHODNOTE"    Lab Results   Component Value Date    URICACID 9.1 (H) 09/15/2023       Lab Results   Component Value Date    HEPCAB Negative 10/15/2021       Imaging:  All imaging reviewed and independently interpreted by me.  No x-rays available for review       ASSESSMENT / PLAN:     Narendra Hopkins Amador Brooke. is a 58 y.o. White male with hx of colon cancer s/p colon resection, no chemotherapy, Hx of DM and HTN, hx of hypothyroidism who comes for evaluation of gout.     # ?Tophaceous, non erosive crystal proven, gout  -longstanding history of gout for 40 years.  Likely genetic component given strong family history.  -SUA in 9/2023 was 9.1  -last gout flare about in 11/2023  -continue febuxostat 40 mg daily started on October 2023  -continue colchicine 0.6 mg daily   -need labs now. Depending on labs, will decide if need to increase Uloric dose.     #chondrocalcinosis of the knees  -Noted on most recent XR   -Due to family history of gout, will obtain secondary workup fot [facility].      Follow up in about 3 months (around 3/6/2024) for Virtual Visit.    Method of contact with patient concerns: [facility]    Disclaimer:  This note is prepared using voice recognition software and as such is likely to have errors and has not been proof read. Please contact me for questions.     Time " spent: 20 minutes in face to face discussion concerning diagnosis, prognosis, review of lab and test results, benefits of treatment as well as management of disease, counseling of patient and coordination of care between various health care providers.  Greater than half the time spent was used for coordination of care and counseling of patient.    Mayela Venegas M.D.  Rheumatology  [facility]

## 2023-12-06 NOTE — PATIENT INSTRUCTIONS
Do labs today   Depending on result, may increase uloric  Continue colchicine 1 tablet daily   Follow up in 3 months virtual, unless something changes

## 2023-12-07 ENCOUNTER — PATIENT MESSAGE (OUTPATIENT)
Dept: INTERNAL MEDICINE | Facility: CLINIC | Age: 58
End: 2023-12-07
Payer: COMMERCIAL

## 2023-12-07 ENCOUNTER — PATIENT MESSAGE (OUTPATIENT)
Dept: RHEUMATOLOGY | Facility: CLINIC | Age: 58
End: 2023-12-07
Payer: COMMERCIAL

## 2023-12-07 ENCOUNTER — HOSPITAL ENCOUNTER (EMERGENCY)
Facility: HOSPITAL | Age: 58
Discharge: HOME | End: 2023-12-07
Attending: EMERGENCY MEDICINE
Payer: COMMERCIAL

## 2023-12-07 ENCOUNTER — OFFICE VISIT (OUTPATIENT)
Dept: OTOLARYNGOLOGY | Facility: CLINIC | Age: 58
End: 2023-12-07
Payer: COMMERCIAL

## 2023-12-07 ENCOUNTER — TELEPHONE (OUTPATIENT)
Dept: RHEUMATOLOGY | Facility: CLINIC | Age: 58
End: 2023-12-07
Payer: COMMERCIAL

## 2023-12-07 VITALS
BODY MASS INDEX: 32.3 KG/M2 | RESPIRATION RATE: 18 BRPM | HEART RATE: 61 BPM | WEIGHT: 244.81 LBS | TEMPERATURE: 99 F | DIASTOLIC BLOOD PRESSURE: 84 MMHG | OXYGEN SATURATION: 100 % | SYSTOLIC BLOOD PRESSURE: 134 MMHG

## 2023-12-07 VITALS — HEIGHT: 72 IN | WEIGHT: 244.69 LBS | BODY MASS INDEX: 33.14 KG/M2

## 2023-12-07 DIAGNOSIS — J34.89 NASAL OBSTRUCTION: ICD-10-CM

## 2023-12-07 DIAGNOSIS — Z98.890 HISTORY OF RHINOPLASTY: ICD-10-CM

## 2023-12-07 DIAGNOSIS — J30.9 ALLERGIC RHINITIS, UNSPECIFIED SEASONALITY, UNSPECIFIED TRIGGER: Primary | ICD-10-CM

## 2023-12-07 DIAGNOSIS — N17.9 AKI (ACUTE KIDNEY INJURY): Primary | ICD-10-CM

## 2023-12-07 DIAGNOSIS — E87.5 HYPERKALEMIA: ICD-10-CM

## 2023-12-07 DIAGNOSIS — J32.9 CHRONIC SINUSITIS, UNSPECIFIED LOCATION: ICD-10-CM

## 2023-12-07 LAB
ALBUMIN SERPL BCP-MCNC: 3.8 G/DL (ref 3.5–5.2)
ALP SERPL-CCNC: 37 U/L (ref 55–135)
ALT SERPL W/O P-5'-P-CCNC: 29 U/L (ref 10–44)
ANION GAP SERPL CALC-SCNC: 12 MMOL/L (ref 8–16)
AST SERPL-CCNC: 24 U/L (ref 10–40)
BASOPHILS # BLD AUTO: 0.05 K/UL (ref 0–0.2)
BASOPHILS NFR BLD: 0.8 % (ref 0–1.9)
BILIRUB SERPL-MCNC: 0.5 MG/DL (ref 0.1–1)
BILIRUB UR QL STRIP: NEGATIVE
BUN SERPL-MCNC: 28 MG/DL (ref 6–20)
CALCIUM SERPL-MCNC: 9 MG/DL (ref 8.7–10.5)
CHLORIDE SERPL-SCNC: 107 MMOL/L (ref 95–110)
CK SERPL-CCNC: 146 U/L (ref 20–200)
CLARITY UR: CLEAR
CO2 SERPL-SCNC: 21 MMOL/L (ref 23–29)
COLOR UR: YELLOW
CREAT SERPL-MCNC: 1.3 MG/DL (ref 0.5–1.4)
DIFFERENTIAL METHOD: ABNORMAL
EOSINOPHIL # BLD AUTO: 0.2 K/UL (ref 0–0.5)
EOSINOPHIL NFR BLD: 2.3 % (ref 0–8)
ERYTHROCYTE [DISTWIDTH] IN BLOOD BY AUTOMATED COUNT: 14.5 % (ref 11.5–14.5)
EST. GFR  (NO RACE VARIABLE): >60 ML/MIN/1.73 M^2
GLUCOSE SERPL-MCNC: 93 MG/DL (ref 70–110)
GLUCOSE UR QL STRIP: NEGATIVE
HCT VFR BLD AUTO: 42.6 % (ref 40–54)
HCV AB SERPL QL IA: NEGATIVE
HEP C VIRUS HOLD SPECIMEN: NORMAL
HGB BLD-MCNC: 13.9 G/DL (ref 14–18)
HGB UR QL STRIP: NEGATIVE
HIV 1+2 AB+HIV1 P24 AG SERPL QL IA: NEGATIVE
IMM GRANULOCYTES # BLD AUTO: 0.03 K/UL (ref 0–0.04)
IMM GRANULOCYTES NFR BLD AUTO: 0.5 % (ref 0–0.5)
INFLUENZA A, MOLECULAR: NEGATIVE
INFLUENZA B, MOLECULAR: NEGATIVE
KETONES UR QL STRIP: NEGATIVE
LEUKOCYTE ESTERASE UR QL STRIP: NEGATIVE
LYMPHOCYTES # BLD AUTO: 1.8 K/UL (ref 1–4.8)
LYMPHOCYTES NFR BLD: 28 % (ref 18–48)
MAGNESIUM SERPL-MCNC: 1.6 MG/DL (ref 1.6–2.6)
MCH RBC QN AUTO: 31 PG (ref 27–31)
MCHC RBC AUTO-ENTMCNC: 32.6 G/DL (ref 32–36)
MCV RBC AUTO: 95 FL (ref 82–98)
MONOCYTES # BLD AUTO: 0.7 K/UL (ref 0.3–1)
MONOCYTES NFR BLD: 11.1 % (ref 4–15)
NEUTROPHILS # BLD AUTO: 3.7 K/UL (ref 1.8–7.7)
NEUTROPHILS NFR BLD: 57.3 % (ref 38–73)
NITRITE UR QL STRIP: NEGATIVE
NRBC BLD-RTO: 0 /100 WBC
PH UR STRIP: 6 [PH] (ref 5–8)
PHOSPHATE SERPL-MCNC: 3.3 MG/DL (ref 2.7–4.5)
PLATELET # BLD AUTO: 275 K/UL (ref 150–450)
PMV BLD AUTO: 10.4 FL (ref 9.2–12.9)
POTASSIUM SERPL-SCNC: 4.8 MMOL/L (ref 3.5–5.1)
PROT SERPL-MCNC: 6.7 G/DL (ref 6–8.4)
PROT UR QL STRIP: ABNORMAL
RBC # BLD AUTO: 4.49 M/UL (ref 4.6–6.2)
SARS-COV-2 RDRP RESP QL NAA+PROBE: NEGATIVE
SODIUM SERPL-SCNC: 140 MMOL/L (ref 136–145)
SP GR UR STRIP: 1.03 (ref 1–1.03)
SPECIMEN SOURCE: NORMAL
URN SPEC COLLECT METH UR: ABNORMAL
UROBILINOGEN UR STRIP-ACNC: NEGATIVE EU/DL
WBC # BLD AUTO: 6.39 K/UL (ref 3.9–12.7)

## 2023-12-07 PROCEDURE — 86803 HEPATITIS C AB TEST: CPT | Performed by: EMERGENCY MEDICINE

## 2023-12-07 PROCEDURE — 87389 HIV-1 AG W/HIV-1&-2 AB AG IA: CPT | Performed by: EMERGENCY MEDICINE

## 2023-12-07 PROCEDURE — 4010F ACE/ARB THERAPY RXD/TAKEN: CPT | Mod: CPTII,S$GLB,, | Performed by: STUDENT IN AN ORGANIZED HEALTH CARE EDUCATION/TRAINING PROGRAM

## 2023-12-07 PROCEDURE — 87502 INFLUENZA DNA AMP PROBE: CPT | Performed by: EMERGENCY MEDICINE

## 2023-12-07 PROCEDURE — 99213 PR OFFICE/OUTPT VISIT, EST, LEVL III, 20-29 MIN: ICD-10-PCS | Mod: 25,S$GLB,, | Performed by: STUDENT IN AN ORGANIZED HEALTH CARE EDUCATION/TRAINING PROGRAM

## 2023-12-07 PROCEDURE — 85025 COMPLETE CBC W/AUTO DIFF WBC: CPT | Performed by: NURSE PRACTITIONER

## 2023-12-07 PROCEDURE — 3066F NEPHROPATHY DOC TX: CPT | Mod: CPTII,S$GLB,, | Performed by: STUDENT IN AN ORGANIZED HEALTH CARE EDUCATION/TRAINING PROGRAM

## 2023-12-07 PROCEDURE — 81003 URINALYSIS AUTO W/O SCOPE: CPT | Performed by: NURSE PRACTITIONER

## 2023-12-07 PROCEDURE — 99213 OFFICE O/P EST LOW 20 MIN: CPT | Mod: 25,S$GLB,, | Performed by: STUDENT IN AN ORGANIZED HEALTH CARE EDUCATION/TRAINING PROGRAM

## 2023-12-07 PROCEDURE — 82550 ASSAY OF CK (CPK): CPT | Performed by: EMERGENCY MEDICINE

## 2023-12-07 PROCEDURE — 83735 ASSAY OF MAGNESIUM: CPT | Performed by: EMERGENCY MEDICINE

## 2023-12-07 PROCEDURE — 3044F PR MOST RECENT HEMOGLOBIN A1C LEVEL <7.0%: ICD-10-PCS | Mod: CPTII,S$GLB,, | Performed by: STUDENT IN AN ORGANIZED HEALTH CARE EDUCATION/TRAINING PROGRAM

## 2023-12-07 PROCEDURE — 80053 COMPREHEN METABOLIC PANEL: CPT | Performed by: EMERGENCY MEDICINE

## 2023-12-07 PROCEDURE — 3008F PR BODY MASS INDEX (BMI) DOCUMENTED: ICD-10-PCS | Mod: CPTII,S$GLB,, | Performed by: STUDENT IN AN ORGANIZED HEALTH CARE EDUCATION/TRAINING PROGRAM

## 2023-12-07 PROCEDURE — 3061F NEG MICROALBUMINURIA REV: CPT | Mod: CPTII,S$GLB,, | Performed by: STUDENT IN AN ORGANIZED HEALTH CARE EDUCATION/TRAINING PROGRAM

## 2023-12-07 PROCEDURE — 99285 EMERGENCY DEPT VISIT HI MDM: CPT | Mod: 25

## 2023-12-07 PROCEDURE — 31231 NASAL ENDOSCOPY DX: CPT | Mod: S$GLB,,, | Performed by: STUDENT IN AN ORGANIZED HEALTH CARE EDUCATION/TRAINING PROGRAM

## 2023-12-07 PROCEDURE — 3061F PR NEG MICROALBUMINURIA RESULT DOCUMENTED/REVIEW: ICD-10-PCS | Mod: CPTII,S$GLB,, | Performed by: STUDENT IN AN ORGANIZED HEALTH CARE EDUCATION/TRAINING PROGRAM

## 2023-12-07 PROCEDURE — 25000003 PHARM REV CODE 250: Performed by: NURSE PRACTITIONER

## 2023-12-07 PROCEDURE — 3066F PR DOCUMENTATION OF TREATMENT FOR NEPHROPATHY: ICD-10-PCS | Mod: CPTII,S$GLB,, | Performed by: STUDENT IN AN ORGANIZED HEALTH CARE EDUCATION/TRAINING PROGRAM

## 2023-12-07 PROCEDURE — 3008F BODY MASS INDEX DOCD: CPT | Mod: CPTII,S$GLB,, | Performed by: STUDENT IN AN ORGANIZED HEALTH CARE EDUCATION/TRAINING PROGRAM

## 2023-12-07 PROCEDURE — 99999 PR PBB SHADOW E&M-EST. PATIENT-LVL III: CPT | Mod: PBBFAC,,, | Performed by: STUDENT IN AN ORGANIZED HEALTH CARE EDUCATION/TRAINING PROGRAM

## 2023-12-07 PROCEDURE — 84100 ASSAY OF PHOSPHORUS: CPT | Performed by: EMERGENCY MEDICINE

## 2023-12-07 PROCEDURE — U0002 COVID-19 LAB TEST NON-CDC: HCPCS | Performed by: EMERGENCY MEDICINE

## 2023-12-07 PROCEDURE — 93010 ELECTROCARDIOGRAM REPORT: CPT | Mod: ,,, | Performed by: STUDENT IN AN ORGANIZED HEALTH CARE EDUCATION/TRAINING PROGRAM

## 2023-12-07 PROCEDURE — 1159F PR MEDICATION LIST DOCUMENTED IN MEDICAL RECORD: ICD-10-PCS | Mod: CPTII,S$GLB,, | Performed by: STUDENT IN AN ORGANIZED HEALTH CARE EDUCATION/TRAINING PROGRAM

## 2023-12-07 PROCEDURE — 93010 EKG 12-LEAD: ICD-10-PCS | Mod: ,,, | Performed by: STUDENT IN AN ORGANIZED HEALTH CARE EDUCATION/TRAINING PROGRAM

## 2023-12-07 PROCEDURE — 99999 PR PBB SHADOW E&M-EST. PATIENT-LVL III: ICD-10-PCS | Mod: PBBFAC,,, | Performed by: STUDENT IN AN ORGANIZED HEALTH CARE EDUCATION/TRAINING PROGRAM

## 2023-12-07 PROCEDURE — 1159F MED LIST DOCD IN RCRD: CPT | Mod: CPTII,S$GLB,, | Performed by: STUDENT IN AN ORGANIZED HEALTH CARE EDUCATION/TRAINING PROGRAM

## 2023-12-07 PROCEDURE — 31231 PR NASAL ENDOSCOPY, DX: ICD-10-PCS | Mod: S$GLB,,, | Performed by: STUDENT IN AN ORGANIZED HEALTH CARE EDUCATION/TRAINING PROGRAM

## 2023-12-07 PROCEDURE — 4010F PR ACE/ARB THEARPY RXD/TAKEN: ICD-10-PCS | Mod: CPTII,S$GLB,, | Performed by: STUDENT IN AN ORGANIZED HEALTH CARE EDUCATION/TRAINING PROGRAM

## 2023-12-07 PROCEDURE — 3044F HG A1C LEVEL LT 7.0%: CPT | Mod: CPTII,S$GLB,, | Performed by: STUDENT IN AN ORGANIZED HEALTH CARE EDUCATION/TRAINING PROGRAM

## 2023-12-07 PROCEDURE — 93005 ELECTROCARDIOGRAM TRACING: CPT

## 2023-12-07 RX ORDER — PREDNISONE 10 MG/1
10 TABLET ORAL DAILY
Qty: 18 TABLET | Refills: 0 | Status: SHIPPED | OUTPATIENT
Start: 2023-12-07 | End: 2024-03-11 | Stop reason: ALTCHOICE

## 2023-12-07 RX ORDER — AZELASTINE 1 MG/ML
1 SPRAY, METERED NASAL 2 TIMES DAILY
Qty: 30 ML | Refills: 3 | Status: SHIPPED | OUTPATIENT
Start: 2023-12-07 | End: 2024-12-06

## 2023-12-07 RX ADMIN — SODIUM CHLORIDE 1000 ML: 9 INJECTION, SOLUTION INTRAVENOUS at 12:12

## 2023-12-07 NOTE — ED PROVIDER NOTES
"SCRIBE #1 NOTE: I, Leigh Waters, am scribing for, and in the presence of, Karina Hernandez DO. I have scribed the entire note.       History     Chief Complaint   Patient presents with    abnormal labs     Pt states he was sent to Ed by his PCP due to having low potassium, and kidney failure. Pt c/o feeling weak, with chills and cramps      Review of patient's allergies indicates:   Allergen Reactions    Demerol (pf) [meperidine (pf)] Other (See Comments)     Pt reports,"They lost my blood pressure."    Percocet [oxycodone-acetaminophen] Itching     itching    Allopurinol analogues Diarrhea    Demerol [meperidine]          History of Present Illness     HPI    12/7/2023, 12:10 PM  History obtained from the patient      History of Present Illness: Narendra English Sr. is a 58 y.o. male patient with a PMHx of DM, HTN who presents to the Emergency Department for evaluation of abnormal lab results which the pt states were taken by his rheumatologist (Dr. Brian) during a visit yesterday. He states that he was told to come into the ED because his labs yielded a high potassium and BUN/Cr result. Pt also complains that he has been feeling weak and experiencing chills, light-headedness, dizziness, headaches, diaphoresis, and chills for the past 4 days. Symptoms are constant and moderate in severity. No mitigating or exacerbating factors reported. Patient denies any CP, SOB, n/v/d, difficulty urinating, abdominal pain, and all other sxs at this time. No prior Tx mentioned. Pt denies any history EtOH use or smoking. Pt states that he has been working a lot more than usual lately and has not had time off from work, which has been stressful for him. No further complaints or concerns at this time.       Arrival mode: Personal vehicle    PCP: Tabitha Melgoza MD        Past Medical History:  Past Medical History:   Diagnosis Date    Allergy     Cancer     Colon    Diabetes mellitus      09/14/2023 " Insulin x 10 years    Gastroesophageal reflux disease without esophagitis 06/20/2023    Hypertension     Sleep apnea     Thyroid disease        Past Surgical History:  Past Surgical History:   Procedure Laterality Date    BACK SURGERY      COLON SURGERY  02/06/21    COLONOSCOPY N/A 12/29/2020    Procedure: COLONOSCOPY;  Surgeon: William Cotter MD;  Location: [facility] ENDO;  Service: Endoscopy;  Laterality: N/A;  Will need Rapid doesn't live here    COLONOSCOPY N/A 02/10/2022    Procedure: COLONOSCOPY;  Surgeon: Wili Espinosa MD;  Location: [facility] ENDO;  Service: Endoscopy;  Laterality: N/A;    EPIDURAL STEROID INJECTION INTO CERVICAL SPINE N/A 12/1/2022    Procedure: C7/T1 IL SALBADOR;  Surgeon: Leonard Mart MD;  Location: [facility] PAIN MGT;  Service: Pain Management;  Laterality: N/A;    FESS, WITH NASAL SEPTOPLASTY, WITH IMAGING GUIDANCE Bilateral 08/17/2022    Procedure: FESS, WITH NASAL SEPTOPLASTY, WITH IMAGING GUIDANCE;  Surgeon: Viktor Ferrell MD;  Location: [facility] OR;  Service: ENT;  Laterality: Bilateral;    LAPAROSCOPIC RIGHT COLON RESECTION N/A 02/02/2021    Procedure: COLECTOMY, RIGHT, LAPAROSCOPIC, ERAS low;  Surgeon: Melonie Santoyo MD;  Location: [address];  Service: Colon and Rectal;  Laterality: N/A;  needs rapid covid test    LYSIS OF ADHESIONS Bilateral 4/19/2023    Procedure: LYSIS, ADHESIONS;  Surgeon: Viktor Ferrell MD;  Location: [facility] OR;  Service: ENT;  Laterality: Bilateral;    NASAL ENDOSCOPY Bilateral 4/19/2023    Procedure: ENDOSCOPY, NOSE;  Surgeon: Viktor Ferrell MD;  Location: [facility] OR;  Service: ENT;  Laterality: Bilateral;    NASAL TURBINATE REDUCTION Bilateral 08/17/2022    Procedure: REDUCTION, NASAL TURBINATE;  Surgeon: Viktor Ferrell MD;  Location: [facility] OR;  Service: ENT;  Laterality: Bilateral;    RHINOPLASTY Bilateral 4/19/2023    Procedure: RHINOPLASTY;  Surgeon: Viktor Ferrell MD;  Location: [facility] OR;  Service: ENT;  Laterality: Bilateral;     SELECTIVE INJECTION OF ANESTHETIC AGENT AROUND LUMBAR SPINAL NERVE ROOT BY TRANSFORAMINAL APPROACH Bilateral 7/5/2023    Procedure: Bilateral L4/5 TF SALBADOR RN IV Sedation;  Surgeon: Leonard Mart MD;  [facility];  Service: Pain Management;  Laterality: Bilateral;    TONSILLECTOMY           Family History:  Family History   Problem Relation Age of Onset    Hypertension Mother     Diabetes Mother     Breast cancer Mother     Cancer Mother     Hypertension Father     Stroke Father     No Known Problems Sister     No Known Problems Sister     Hypertension Brother     Hypertension Brother     Cataracts Maternal Grandmother     Diabetes Maternal Grandmother     Lung cancer Maternal Grandfather     No Known Problems Paternal Grandmother     No Known Problems Paternal Grandfather        Social History:  Social History     Tobacco Use    Smoking status: Never     Passive exposure: Never    Smokeless tobacco: Never   Substance and Sexual Activity    Alcohol use: Never    Drug use: Never    Sexual activity: Yes     Partners: Female     Birth control/protection: None        Review of Systems     Review of Systems   Constitutional:  Positive for chills and diaphoresis.   Respiratory:  Negative for shortness of breath.    Cardiovascular:  Negative for chest pain.   Gastrointestinal:  Negative for abdominal pain, diarrhea, nausea and vomiting.   Genitourinary:  Negative for difficulty urinating.   Neurological:  Positive for dizziness, weakness, light-headedness and headaches.      Physical Exam     Initial Vitals [12/07/23 1108]   BP Pulse Resp Temp SpO2   (!) 144/86 68 18 99 °F (37.2 °C) 98 %      MAP       --          Physical Exam  Nursing Notes and Vital Signs Reviewed.  Constitutional: Patient is in no acute distress. Well-developed and well-nourished.  Head: Atraumatic. Normocephalic.  Eyes: PERRL. EOM intact. Conjunctivae are not pale. No scleral icterus.  ENT: Mucous membranes are  moist. Oropharynx is clear and symmetric.    Neck: Supple. Full ROM. No lymphadenopathy.  Cardiovascular: Regular rate. Regular rhythm. No murmurs, rubs, or gallops. Distal pulses are 2+ and symmetric.  Pulmonary/Chest: No respiratory distress. Clear to auscultation bilaterally. No wheezing or rales.  Abdominal: Soft and non-distended.  There is no tenderness.  No rebound, guarding, or rigidity. Good bowel sounds.  Genitourinary: No CVA tenderness  Musculoskeletal: Moves all extremities. No obvious deformities. No edema. No calf tenderness.  Skin: Warm and dry.  Neurological:  Alert, awake, and appropriate.  Normal speech.  No acute focal neurological deficits are appreciated.  Psychiatric: Normal affect. Good eye contact. Appropriate in content.     ED Course   Procedures  ED Vital Signs:  Vitals:    12/07/23 1108   BP: (!) 144/86   Pulse: 68   Resp: 18   Temp: 99 °F (37.2 °C)   TempSrc: Oral   SpO2: 98%   Weight: 111 kg (244 lb 13.1 oz)       Abnormal Lab Results:  Labs Reviewed   CBC W/ AUTO DIFFERENTIAL - Abnormal; Notable for the following components:       Result Value    RBC 4.49 (*)     Hemoglobin 13.9 (*)     All other components within normal limits    Narrative:     Release to patient->Immediate   URINALYSIS, REFLEX TO URINE CULTURE - Abnormal; Notable for the following components:    Protein, UA Trace (*)     All other components within normal limits    Narrative:     Specimen Source->Urine   COMPREHENSIVE METABOLIC PANEL - Abnormal; Notable for the following components:    CO2 21 (*)     BUN 28 (*)     Alkaline Phosphatase 37 (*)     All other components within normal limits    Narrative:     Release to patient->Immediate   INFLUENZA A & B BY MOLECULAR   MAGNESIUM   PHOSPHORUS   HIV 1 / 2 ANTIBODY    Narrative:     Release to patient->Immediate   HEPATITIS C ANTIBODY    Narrative:     Release to patient->Immediate   HEP C VIRUS HOLD SPECIMEN    Narrative:     Release to patient->Immediate   MAGNESIUM     Narrative:     Release to patient->Immediate   PHOSPHORUS    Narrative:     Release to patient->Immediate   SARS-COV-2 RNA AMPLIFICATION, QUAL   CK   CK    Narrative:     Release to patient->Immediate        All Lab Results:  Results for orders placed or performed during the hospital encounter of 12/07/23   Influenza A & B by Molecular    Specimen: Nasopharyngeal Swab   Result Value Ref Range    Influenza A, Molecular Negative Negative    Influenza B, Molecular Negative Negative    Flu A & B Source Nasal swab    CBC auto differential   Result Value Ref Range    WBC 6.39 3.90 - 12.70 K/uL    RBC 4.49 (L) 4.60 - 6.20 M/uL    Hemoglobin 13.9 (L) 14.0 - 18.0 g/dL    Hematocrit 42.6 40.0 - 54.0 %    MCV 95 82 - 98 fL    MCH 31.0 27.0 - 31.0 pg    MCHC 32.6 32.0 - 36.0 g/dL    RDW 14.5 11.5 - 14.5 %    Platelets 275 150 - 450 K/uL    MPV 10.4 9.2 - 12.9 fL    Immature Granulocytes 0.5 0.0 - 0.5 %    Gran # (ANC) 3.7 1.8 - 7.7 K/uL    Immature Grans (Abs) 0.03 0.00 - 0.04 K/uL    Lymph # 1.8 1.0 - 4.8 K/uL    Mono # 0.7 0.3 - 1.0 K/uL    Eos # 0.2 0.0 - 0.5 K/uL    Baso # 0.05 0.00 - 0.20 K/uL    nRBC 0 0 /100 WBC    Gran % 57.3 38.0 - 73.0 %    Lymph % 28.0 18.0 - 48.0 %    Mono % 11.1 4.0 - 15.0 %    Eosinophil % 2.3 0.0 - 8.0 %    Basophil % 0.8 0.0 - 1.9 %    Differential Method Automated    Urinalysis, Reflex to Urine Culture Urine, Clean Catch    Specimen: Urine   Result Value Ref Range    Specimen UA Urine, Clean Catch     Color, UA Yellow Yellow, Straw, Mariola    Appearance, UA Clear Clear    pH, UA 6.0 5.0 - 8.0    Specific Gravity, UA 1.030 1.005 - 1.030    Protein, UA Trace (A) Negative    Glucose, UA Negative Negative    Ketones, UA Negative Negative    Bilirubin (UA) Negative Negative    Occult Blood UA Negative Negative    Nitrite, UA Negative Negative    Urobilinogen, UA Negative <2.0 EU/dL    Leukocytes, UA Negative Negative   Comprehensive metabolic panel   Result Value Ref Range    Sodium 140 136 - 145  mmol/L    Potassium 4.8 3.5 - 5.1 mmol/L    Chloride 107 95 - 110 mmol/L    CO2 21 (L) 23 - 29 mmol/L    Glucose 93 70 - 110 mg/dL    BUN 28 (H) 6 - 20 mg/dL    Creatinine 1.3 0.5 - 1.4 mg/dL    Calcium 9.0 8.7 - 10.5 mg/dL    Total Protein 6.7 6.0 - 8.4 g/dL    Albumin 3.8 3.5 - 5.2 g/dL    Total Bilirubin 0.5 0.1 - 1.0 mg/dL    Alkaline Phosphatase 37 (L) 55 - 135 U/L    AST 24 10 - 40 U/L    ALT 29 10 - 44 U/L    eGFR >60 >60 mL/min/1.73 m^2    Anion Gap 12 8 - 16 mmol/L   HIV 1/2 Ag/Ab (4th Gen)   Result Value Ref Range    HIV 1/2 Ag/Ab Negative Negative   Hepatitis C Antibody   Result Value Ref Range    Hepatitis C Ab Negative Negative   HCV Virus Hold Specimen   Result Value Ref Range    HEP C Virus Hold Specimen Hold for HCV sendout    Magnesium   Result Value Ref Range    Magnesium 1.6 1.6 - 2.6 mg/dL   Phosphorus   Result Value Ref Range    Phosphorus 3.3 2.7 - 4.5 mg/dL   COVID-19 Rapid Screening   Result Value Ref Range    SARS-CoV-2 RNA, Amplification, Qual Negative Negative   CK   Result Value Ref Range     20 - 200 U/L        Imaging Results:  Imaging Results              X-Ray Chest AP Portable (Final result)  Result time 12/07/23 12:55:24      Final result by Vazquez Hayward MD (12/07/23 12:55:24)                   Impression:      No acute process seen.      Electronically signed by: Vazquez Hayward MD  Date:    12/07/2023  Time:    12:55               Narrative:    EXAMINATION:  XR CHEST AP PORTABLE    CLINICAL HISTORY:  diaphoresis;    FINDINGS:  Single view of the chest.  Comparison 11/15/2029    Cardiac silhouette is normal.  The lungs demonstrate no evidence of active disease.  No evidence of pleural effusion or pneumothorax.  Bones appear intact.                                       The EKG was ordered, reviewed, and independently interpreted by the ED provider.  Interpretation time: 12:14  Rate: 60 BPM  Rhythm: normal sinus rhythm with 1st degree AV block  Interpretation: Left axis  deviation. RBBB. No STEMI.             The Emergency Provider reviewed the vital signs and test results, which are outlined above.     ED Discussion     1:33 PM: Reassessed pt at this time. Discussed with pt all pertinent ED information and results. Discussed pt dx and plan of tx. Gave pt all f/u and return to the ED instructions. All questions and concerns were addressed at this time. Pt expresses understanding of information and instructions, and is comfortable with plan to discharge. Pt is stable for discharge.    I discussed with patient and/or family/caretaker that evaluation in the ED does not suggest any emergent or life threatening medical conditions requiring immediate intervention beyond what was provided in the ED, and I believe patient is safe for discharge.  Regardless, an unremarkable evaluation in the ED does not preclude the development or presence of a serious of life threatening condition. As such, patient was instructed to return immediately for any worsening or change in current symptoms.     ED Course as of 12/07/23 1336   Thu Dec 07, 2023   1226 Sinus rhythm with first-degree AV block.  CO interval 218.  Right bundle-branch block .  No changes when compared to previous. [NF]   1305 Potassium: 4.8 [NF]   1305 Phosphorus Level: 3.3 [NF]   1305 Magnesium : 1.6  All electrolytes are normal [NF]   1305 eGFR: >60 [NF]   1305 BUN(!): 28 [NF]   1305 Creatinine: 1.3  No signs of WHIT or CKD [NF]   1305 Protein, UA(!): Trace [NF]   1332 CPK: 146 [NF]   1335 58-year-old male presents with a reported elevated BUN/creatinine and potassium on routine labs performed yesterday by his rheumatologist.  He denies any treatment in between yesterday and today's lab draws.  It appears his abnormalities have normalized perhaps secondary to hemolysis.  Potassium, magnesium, phosphorus, and renal function are all normal.  He has nothing to indicate rhabdomyolysis or infection including UTI, influenza, COVID, or  pneumonia on his chest x-ray.  He has no significant leukocytosis or anemia and is currently afebrile. [NF]      ED Course User Index  [NF] Karina Hernandez DO     Medical Decision Making  Amount and/or Complexity of Data Reviewed  Labs: ordered. Decision-making details documented in ED Course.  Radiology: ordered. Decision-making details documented in ED Course.  ECG/medicine tests: ordered and independent interpretation performed. Decision-making details documented in ED Course.                ED Medication(s):  Medications   sodium chloride 0.9% bolus 1,000 mL 1,000 mL (1,000 mLs Intravenous New Bag 12/7/23 1225)       New Prescriptions    No medications on file        Follow-up Information       [facility] - Emergency Dept..    Specialty: Emergency Medicine  Why: As needed, If symptoms worsen  Contact information:  [id number]  [facility] [id number] [phone]             Tabitha Melgoza MD On 12/11/2023.    Specialty: Family Medicine  Contact information:  [address] [place] [address]  [phone]                                 Scribe Attestation:   Scribe #1: I performed the above scribed service and the documentation accurately describes the services I performed. I attest to the accuracy of the note.     Attending:   Physician Attestation Statement for Scribe #1: I, Karina Hernandez DO, personally performed the services described in this documentation, as scribed by Leigh Waters, in my presence, and it is both accurate and complete.           Clinical Impression       ICD-10-CM ICD-9-CM   1. WHIT (acute kidney injury)  N17.9 584.9   2. Hyperkalemia  E87.5 276.7       Disposition:   Disposition: Discharged  Condition: Stable        Karina Hernandez DO  12/07/23 7420

## 2023-12-07 NOTE — TELEPHONE ENCOUNTER
----- Message from Mayela Venegas MD sent at 12/7/2023  8:10 AM CST -----  Please call patient and let him know that his uric acid has improved with uloric, still slightly elevated but not too much.   His kidney function worsened and potassium was mildly elevated. I do not think this is related to the uloric or colchicine however I would recommend for him to go to the urgent care for further evaluation.   He should repeat the uric acid labs in 4 weeks.

## 2023-12-07 NOTE — FIRST PROVIDER EVALUATION
Medical screening examination initiated.  I have conducted a focused provider triage encounter, findings are as follows:    Brief history of present illness:  pt sent to the er for hyperkalemia and decreased renal function     There were no vitals filed for this visit.    Pertinent physical exam:  nad    Brief workup plan:  workup for padmaja    Preliminary workup initiated; this workup will be continued and followed by the physician or advanced practice provider that is assigned to the patient when roomed.

## 2023-12-07 NOTE — TELEPHONE ENCOUNTER
Called patient to discuss lab results and recommendation per Dr. Og. Pt voiced his understandings.

## 2023-12-07 NOTE — PROGRESS NOTES
Chief complaint:    Chief Complaint   Patient presents with    Sinusitis     Pt has had a reflare up of the stuffiness of the left nose          Referring Provider:  No referring provider defined for this encounter.      History of present illness:     Mr. English is a 58 y.o. presenting for evaluation of sinus and nasal symptoms.     Sore in right nostril since surgery. Scabbing over. Bled some.     Some nasal congestion recently as well and some facial pressure.     Overall the nasal obstruction is markedly improved. No purulent rhinorrhea.     Saline spray. Not using other sprays at this time.     Return clinic visit, 8/7/23    Overall doing pretty well, but does have some facial pressure on the left. Intermittent obstruction, left > right. Improved since surgery.  Denies purulent rhinorrhea.     Currently using the astelin and saline irrigations.     Return clinic visit, 12/7/23  Over last month has had worsening left sided nasal obstruction  Trouble with his CPAP mask.  Denies purulent rhinorrhea or facial pressure.   Using astelin and flonase at times  Has started afrin again. At night.       History      Past Medical History:   Past Medical History:   Diagnosis Date    Allergy     Cancer     Colon    Diabetes mellitus      09/14/2023 Insulin x 10 years    Gastroesophageal reflux disease without esophagitis 06/20/2023    Hypertension     Sleep apnea     Thyroid disease          Past Surgical History:  Past Surgical History:   Procedure Laterality Date    BACK SURGERY      COLON SURGERY  02/06/21    COLONOSCOPY N/A 12/29/2020    Procedure: COLONOSCOPY;  Surgeon: William Cotter MD;  Location: [facility];  Service: Endoscopy;  Laterality: N/A;  Will need Rapid doesn't live here    COLONOSCOPY N/A 02/10/2022    Procedure: COLONOSCOPY;  Surgeon: Wili Espinosa MD;  Location: [facility];  Service: Endoscopy;  Laterality: N/A;    EPIDURAL STEROID INJECTION INTO CERVICAL SPINE N/A 12/1/2022     Procedure: C7/T1 IL SALBADOR;  Surgeon: Leonard Mart MD;  Location: [facility] PAIN MGT;  Service: Pain Management;  Laterality: N/A;    FESS, WITH NASAL SEPTOPLASTY, WITH IMAGING GUIDANCE Bilateral 08/17/2022    Procedure: FESS, WITH NASAL SEPTOPLASTY, WITH IMAGING GUIDANCE;  Surgeon: Viktor Ferrell MD;  Location: [facility] OR;  Service: ENT;  Laterality: Bilateral;    LAPAROSCOPIC RIGHT COLON RESECTION N/A 02/02/2021    Procedure: COLECTOMY, RIGHT, LAPAROSCOPIC, ERAS low;  Surgeon: Melonie Santoyo MD;  Location: [facility] OR 2ND FLR;  Service: Colon and Rectal;  Laterality: N/A;  needs rapid covid test    LYSIS OF ADHESIONS Bilateral 4/19/2023    Procedure: LYSIS, ADHESIONS;  Surgeon: Viktor Ferrell MD;  Location: [facility] OR;  Service: ENT;  Laterality: Bilateral;    NASAL ENDOSCOPY Bilateral 4/19/2023    Procedure: ENDOSCOPY, NOSE;  Surgeon: Viktor Ferrell MD;  Location: [facility] OR;  Service: ENT;  Laterality: Bilateral;    NASAL TURBINATE REDUCTION Bilateral 08/17/2022    Procedure: REDUCTION, NASAL TURBINATE;  Surgeon: Viktor Ferrell MD;  Location: [facility] OR;  Service: ENT;  Laterality: Bilateral;    RHINOPLASTY Bilateral 4/19/2023    Procedure: RHINOPLASTY;  Surgeon: Viktor Ferrell MD;  Location: [facility] OR;  Service: ENT;  Laterality: Bilateral;    SELECTIVE INJECTION OF ANESTHETIC AGENT AROUND LUMBAR SPINAL NERVE ROOT BY TRANSFORAMINAL APPROACH Bilateral 7/5/2023    Procedure: Bilateral L4/5 TF SALBADOR RN IV Sedation;  Surgeon: Leonard Mart MD;  Location: [facility] PAIN [facility]GT;  Service: Pain Management;  Laterality: Bilateral;    TONSILLECTOMY           Medications: Medication list reviewed. He  has a current medication list which includes the following prescription(s): amlodipine, atorvastatin, benazepril, clobetasol 0.05%, clonazepam, colchicine, febuxostat, fluticasone propionate, hydralazine, indomethacin, toujeo solostar u-300 insulin, ketoconazole, levothyroxine, metformin, metoprolol succinate, pregabalin, mounjaro,  "trazodone, and vascepa.     Allergies:   Review of patient's allergies indicates:   Allergen Reactions    Demerol (pf) [meperidine (pf)] Other (See Comments)     Pt reports,"They lost my blood pressure."    Percocet [oxycodone-acetaminophen] Itching     itching    Allopurinol analogues Diarrhea    Demerol [meperidine]          Family history: family history includes Breast cancer in his mother; Cancer in his mother; Cataracts in his maternal grandmother; Diabetes in his maternal grandmother and mother; Hypertension in his brother, brother, father, and mother; Lung cancer in his maternal grandfather; No Known Problems in his paternal grandfather, paternal grandmother, sister, and sister; Stroke in his father.         Social History          Alcohol use:  reports no history of alcohol use.            Tobacco:  reports that he has never smoked. He has never been exposed to tobacco smoke. He has never used smokeless tobacco.         Physical Examination      Vitals: Height 6' (1.829 m), weight 111 kg (244 lb 11.4 oz).      General: Well developed, well nourished, well hydrated.     Voice: no dysphonia, no dysarthria      Head/Face: Normocephalic, atraumatic. No scars or lesions. Facial musculature equal.     Eyes: No scleral icterus or conjunctival hemorrhage. EOMI. PERRLA.     Ears:     Right ear: No gross deformity. EAC is clear of debris and erythema. TM are intact with a pneumatized middle ear. No signs of retraction, fluid or infection.      Left ear: No gross deformity. EAC is clear of debris and erythema. TM are intact with a pneumatized middle ear. No signs of retraction, fluid or infection.      Nose: septum midline. Well healed transcolumellar scar. Negative modified wood       Mouth/Oropharynx: Lips without any lesions. No mucosal lesions within the oropharynx. No tonsillar exudate or lesions. Pharyngeal walls symmetrical. Uvula midline. Tongue midline without lesions.    Neurologic: Moving all extremities " "without gross abnormality.CN II-XII grossly intact. House-Brackmann 1/6. No signs of nystagmus.        Data reviewed      Review of records:      I reviewed records from the referring provider's office visits describing the history, workup, and/or treatment of this problem thus far.    Imaging  I have independently reviewed the following imaging with the findings noted below:      CT sinus,  Bilateral toya bullosa  S-shaped septal deviation, left mid-septal spur  Inferior turbinate reduction   Left frontal thickening and obstruction of outflow  Bilateral anterior ethmoid disease     Op note, 8/20/22     PROCEDURE:   Endoscopic septoplasty   Bilateral inferior turbinate submucosal resection  Nasal endoscopy with resection of bilateral toya bullosa   Bilateral nasal endoscopy with maxillary antrostomy   Bilateral nasal endoscopy with anterior ethmoidectomy    Left nasal endoscopy with frontal sinusotomy and frontal sinus exploration   Functional endoscopic sinus surgery with CT image guided electromagnetic system     OPERATIVE FINDINGS:   Polypoid mucosal thickening at the left frontal outflow tract  Bilateral polypoid thickening in anterior ethmoids  Left septal deviation with large left septal spur      Pathology reviewed  1. "left sinus contents", excision:       -  Sinonasal mucosa with chronic inflammation       - No eosinophils seen   2. "septal cartilage", excision:       - Portion of cartilage         NASAL ENDOSCOPY       Indication: Narendra English . is a 58 y.o. male  with sinonasal symptoms that were not able to be explained by anterior rhinoscopy alone, thus necessitating nasal endoscopy.     Procedure: Risks, benefits, and alternatives of the procedure were discussed with the patient, and the patient consented to the nasal endoscopy.  The nasal cavity was sprayed with a topical decongestant and anesthetic (if needed). The endoscope was passed into each nostril and each nasal cavity was " visualized.  On each side the nasal cavity, sinuses (if open), turbinates, middle and superior meatus, sphenoethmoidal recess and septum were examined with the findings described below. At the end of the examination, the scope was removed. The patient tolerated the procedure well with no complications.       Endoscopic Sinonasal Exam Findings:  -     The right side has normal mucosa, patent max, ethmoids, and frontals  -     The left side has normal mucosa, patent max, ethmoids, and frontals  -     Nasal secretions: No discolored secretions noted bilaterally  -     Nasal septum: no significant deviation or perforation appreciated   -     Inferior turbinate: Normal mucosa without significant hypertrophy bilaterally  -     Middle turbinate: Normal mucosa without significant hypertrophy bilaterally  -     Other findings: No further mucopurulence and no polyps. There is a  small adhesions of the middle turbinates to the lateral nasal side wall on the right      Assessment/Plan:    No diagnosis found.          S/p FESS and septoplasty 8/17/22 with post op epistaxis complicated by hypertensive urgency (resolved, pack removed, HTN better controlled)  S/p septorhinoplaty for persistent caudal septal deviation 4/19/23      Still with some night time obstruction. Structurally the nose and sinuses look widely patent. Suspect nasal cycle as predominant cause. Back to use afrin.     We discussed restarting astelin BID, Flonase daily, and steroid taper now    Return to clinic prn           Viktor Ferrell MD  [facility]   [facility]  [id number] [place] [address].  [place] [id number]  P: [phone]  F: [phone]

## 2023-12-10 DIAGNOSIS — M10.9 GOUT, UNSPECIFIED CAUSE, UNSPECIFIED CHRONICITY, UNSPECIFIED SITE: ICD-10-CM

## 2023-12-11 LAB — G6PD RBC-CCNT: 10.4 U/G HB (ref 8–11.9)

## 2023-12-13 ENCOUNTER — OFFICE VISIT (OUTPATIENT)
Dept: UROLOGY | Facility: CLINIC | Age: 58
End: 2023-12-13
Payer: COMMERCIAL

## 2023-12-13 VITALS
HEIGHT: 72 IN | BODY MASS INDEX: 33.14 KG/M2 | WEIGHT: 244.69 LBS | SYSTOLIC BLOOD PRESSURE: 146 MMHG | DIASTOLIC BLOOD PRESSURE: 83 MMHG | HEART RATE: 65 BPM

## 2023-12-13 DIAGNOSIS — E29.1 HYPOGONADISM MALE: Primary | ICD-10-CM

## 2023-12-13 DIAGNOSIS — N52.9 ERECTILE DYSFUNCTION, UNSPECIFIED ERECTILE DYSFUNCTION TYPE: ICD-10-CM

## 2023-12-13 PROCEDURE — 3061F PR NEG MICROALBUMINURIA RESULT DOCUMENTED/REVIEW: ICD-10-PCS | Mod: CPTII,S$GLB,, | Performed by: UROLOGY

## 2023-12-13 PROCEDURE — 3066F NEPHROPATHY DOC TX: CPT | Mod: CPTII,S$GLB,, | Performed by: UROLOGY

## 2023-12-13 PROCEDURE — 3077F SYST BP >= 140 MM HG: CPT | Mod: CPTII,S$GLB,, | Performed by: UROLOGY

## 2023-12-13 PROCEDURE — 3061F NEG MICROALBUMINURIA REV: CPT | Mod: CPTII,S$GLB,, | Performed by: UROLOGY

## 2023-12-13 PROCEDURE — 99999 PR PBB SHADOW E&M-EST. PATIENT-LVL IV: CPT | Mod: PBBFAC,,, | Performed by: UROLOGY

## 2023-12-13 PROCEDURE — 99999 PR PBB SHADOW E&M-EST. PATIENT-LVL IV: ICD-10-PCS | Mod: PBBFAC,,, | Performed by: UROLOGY

## 2023-12-13 PROCEDURE — 3044F HG A1C LEVEL LT 7.0%: CPT | Mod: CPTII,S$GLB,, | Performed by: UROLOGY

## 2023-12-13 PROCEDURE — 3008F BODY MASS INDEX DOCD: CPT | Mod: CPTII,S$GLB,, | Performed by: UROLOGY

## 2023-12-13 PROCEDURE — 3077F PR MOST RECENT SYSTOLIC BLOOD PRESSURE >= 140 MM HG: ICD-10-PCS | Mod: CPTII,S$GLB,, | Performed by: UROLOGY

## 2023-12-13 PROCEDURE — 1160F RVW MEDS BY RX/DR IN RCRD: CPT | Mod: CPTII,S$GLB,, | Performed by: UROLOGY

## 2023-12-13 PROCEDURE — 3079F DIAST BP 80-89 MM HG: CPT | Mod: CPTII,S$GLB,, | Performed by: UROLOGY

## 2023-12-13 PROCEDURE — 3066F PR DOCUMENTATION OF TREATMENT FOR NEPHROPATHY: ICD-10-PCS | Mod: CPTII,S$GLB,, | Performed by: UROLOGY

## 2023-12-13 PROCEDURE — 99213 PR OFFICE/OUTPT VISIT, EST, LEVL III, 20-29 MIN: ICD-10-PCS | Mod: S$GLB,,, | Performed by: UROLOGY

## 2023-12-13 PROCEDURE — 1160F PR REVIEW ALL MEDS BY PRESCRIBER/CLIN PHARMACIST DOCUMENTED: ICD-10-PCS | Mod: CPTII,S$GLB,, | Performed by: UROLOGY

## 2023-12-13 PROCEDURE — 3008F PR BODY MASS INDEX (BMI) DOCUMENTED: ICD-10-PCS | Mod: CPTII,S$GLB,, | Performed by: UROLOGY

## 2023-12-13 PROCEDURE — 3044F PR MOST RECENT HEMOGLOBIN A1C LEVEL <7.0%: ICD-10-PCS | Mod: CPTII,S$GLB,, | Performed by: UROLOGY

## 2023-12-13 PROCEDURE — 4010F PR ACE/ARB THEARPY RXD/TAKEN: ICD-10-PCS | Mod: CPTII,S$GLB,, | Performed by: UROLOGY

## 2023-12-13 PROCEDURE — 1159F PR MEDICATION LIST DOCUMENTED IN MEDICAL RECORD: ICD-10-PCS | Mod: CPTII,S$GLB,, | Performed by: UROLOGY

## 2023-12-13 PROCEDURE — 1159F MED LIST DOCD IN RCRD: CPT | Mod: CPTII,S$GLB,, | Performed by: UROLOGY

## 2023-12-13 PROCEDURE — 99213 OFFICE O/P EST LOW 20 MIN: CPT | Mod: S$GLB,,, | Performed by: UROLOGY

## 2023-12-13 PROCEDURE — 3079F PR MOST RECENT DIASTOLIC BLOOD PRESSURE 80-89 MM HG: ICD-10-PCS | Mod: CPTII,S$GLB,, | Performed by: UROLOGY

## 2023-12-13 PROCEDURE — 4010F ACE/ARB THERAPY RXD/TAKEN: CPT | Mod: CPTII,S$GLB,, | Performed by: UROLOGY

## 2023-12-13 RX ORDER — TADALAFIL 20 MG/1
20 TABLET ORAL
Qty: 20 TABLET | Refills: 11 | Status: SHIPPED | OUTPATIENT
Start: 2023-12-13

## 2023-12-13 RX ORDER — FEBUXOSTAT 40 MG/1
40 TABLET, FILM COATED ORAL DAILY
Qty: 30 TABLET | Refills: 3 | Status: SHIPPED | OUTPATIENT
Start: 2023-12-13

## 2023-12-13 NOTE — PROGRESS NOTES
Chief Complaint:   Encounter Diagnoses   Name Primary?    Hypogonadism male Yes    Erectile dysfunction, unspecified erectile dysfunction type        HPI:   12/13/23-     06/16/2022 - 57 yo male that presents today for evaluation of ED.  Patient notes issues for the last 3-4 years but notes that over the last six months it has gotten worse.  He notes difficulty both achieving and maintaining an erection.  He has previously tried both Cialis and Viagra and both of these cause in to have a very bad headache that make it on tolerable to take these medications, though they do work.  Otherwise he voids with a good stream and feels like he empties well subjectively.  He denies any gross hematuria or family history of  cancers.  Denies prior stones or urologic procedures.    Allergies:  Demerol (pf) [meperidine (pf)], Percocet [oxycodone-acetaminophen], and Demerol [meperidine]    Medications:  has a current medication list which includes the following prescription(s): amlodipine, atorvastatin, azelastine, benazepril, clobetasol 0.05%, clonazepam, colchicine, diclofenac sodium, flash glucose scanning reader, flash glucose sensor, fluticasone propionate, hydralazine, hydrocortisone, indomethacin, toujeo solostar u-300 insulin, levothyroxine, metformin, metoprolol succinate, pregabalin, sildenafil, mounjaro, trazodone, triamcinolone acetonide 0.1%, and vascepa, and the following Facility-Administered Medications: gabapentin and lactated ringers.    Review of Systems:  General: No fever, chills, fatigability, or weight loss.  Skin: No rashes, itching, or changes in color or texture of skin.  Chest: Denies MEJÍA, cyanosis, wheezing, cough, and sputum production.  Abdomen: Appetite fine. No weight loss. Denies diarrhea, abdominal pain, hematemesis, or blood in stool.  Musculoskeletal: No joint stiffness or swelling. Denies back pain.  : As above.  All other review of systems negative.    PMH:   has a past medical history of  Cancer, Diabetes mellitus (8 years), Hypertension, Sleep apnea, and Thyroid disease.    PSH:   has a past surgical history that includes Colonoscopy (N/A, 12/29/2020); Laparoscopic right colon resection (N/A, 02/02/2021); Back surgery; Colonoscopy (N/A, 02/10/2022); Tonsillectomy; fess, with nasal septoplasty, with imaging guidance (Bilateral, 08/17/2022); Nasal turbinate reduction (Bilateral, 08/17/2022); Colon surgery (02/06/21); and Epidural steroid injection into cervical spine (N/A, 12/1/2022).    FamHx: family history includes Breast cancer in his mother; Cancer in his mother; Cataracts in his maternal grandmother; Diabetes in his maternal grandmother and mother; Hypertension in his brother, brother, and mother; Stroke in his father.    SocHx:  reports that he has never smoked. He has never used smokeless tobacco. He reports that he does not drink alcohol and does not use drugs.      Physical Exam:  There were no vitals filed for this visit.    General: A&Ox3, no apparent distress, no deformities  Neck: No masses, normal ROM  Lungs: normal inspiration, no use of accessory muscles  Heart: normal pulse, no arrhythmias  Abdomen: Soft, NT, ND, no masses, no hernias, no hepatosplenomegaly  Skin: The skin is warm and dry. No jaundice.  Ext: No c/c/e.  JAIMIE: 6/22: Normal rectal tone, no hemorrhoids. Prostate smooth and normal, no nodules 30 gm SV not palpable. Perineum and anus normal.    Labs/Studies:   Testopel  11/29/23, 8/25/23, 5/26/26, 1/20/23  Testosterone 328 8/22   PSA 0.60 8/23    Impression/Plan:      1. Hypogonadism- AndroGel and shots have failed.  Testopel has really assisted and he is scheduled for his next implantation in a few months.  Will obtain labs prior to and should call with any complaints in the meantime.    2. Erectile dysfunction- sildenafil 100 mg never really assisted and the TriMix caused priapism.  Patient has done well with Cialis 20 mg, a refill has been sent in to the pharmacy.

## 2023-12-14 ENCOUNTER — OFFICE VISIT (OUTPATIENT)
Dept: NEPHROLOGY | Facility: CLINIC | Age: 58
End: 2023-12-14
Payer: COMMERCIAL

## 2023-12-14 VITALS
HEIGHT: 72 IN | DIASTOLIC BLOOD PRESSURE: 62 MMHG | HEART RATE: 78 BPM | SYSTOLIC BLOOD PRESSURE: 102 MMHG | BODY MASS INDEX: 33.8 KG/M2 | RESPIRATION RATE: 18 BRPM | WEIGHT: 249.56 LBS

## 2023-12-14 DIAGNOSIS — N18.2 CKD (CHRONIC KIDNEY DISEASE) STAGE 2, GFR 60-89 ML/MIN: ICD-10-CM

## 2023-12-14 DIAGNOSIS — I10 PRIMARY HYPERTENSION: ICD-10-CM

## 2023-12-14 DIAGNOSIS — E87.5 HYPERKALEMIA: ICD-10-CM

## 2023-12-14 DIAGNOSIS — N17.9 AKI (ACUTE KIDNEY INJURY): Primary | ICD-10-CM

## 2023-12-14 PROCEDURE — 1160F RVW MEDS BY RX/DR IN RCRD: CPT | Mod: CPTII,S$GLB,, | Performed by: INTERNAL MEDICINE

## 2023-12-14 PROCEDURE — 3066F PR DOCUMENTATION OF TREATMENT FOR NEPHROPATHY: ICD-10-PCS | Mod: CPTII,S$GLB,, | Performed by: INTERNAL MEDICINE

## 2023-12-14 PROCEDURE — 3078F DIAST BP <80 MM HG: CPT | Mod: CPTII,S$GLB,, | Performed by: INTERNAL MEDICINE

## 2023-12-14 PROCEDURE — 3061F NEG MICROALBUMINURIA REV: CPT | Mod: CPTII,S$GLB,, | Performed by: INTERNAL MEDICINE

## 2023-12-14 PROCEDURE — 99204 OFFICE O/P NEW MOD 45 MIN: CPT | Mod: S$GLB,,, | Performed by: INTERNAL MEDICINE

## 2023-12-14 PROCEDURE — 99204 PR OFFICE/OUTPT VISIT, NEW, LEVL IV, 45-59 MIN: ICD-10-PCS | Mod: S$GLB,,, | Performed by: INTERNAL MEDICINE

## 2023-12-14 PROCEDURE — 3066F NEPHROPATHY DOC TX: CPT | Mod: CPTII,S$GLB,, | Performed by: INTERNAL MEDICINE

## 2023-12-14 PROCEDURE — 4010F PR ACE/ARB THEARPY RXD/TAKEN: ICD-10-PCS | Mod: CPTII,S$GLB,, | Performed by: INTERNAL MEDICINE

## 2023-12-14 PROCEDURE — 99999 PR PBB SHADOW E&M-EST. PATIENT-LVL V: CPT | Mod: PBBFAC,,, | Performed by: INTERNAL MEDICINE

## 2023-12-14 PROCEDURE — 3074F PR MOST RECENT SYSTOLIC BLOOD PRESSURE < 130 MM HG: ICD-10-PCS | Mod: CPTII,S$GLB,, | Performed by: INTERNAL MEDICINE

## 2023-12-14 PROCEDURE — 3061F PR NEG MICROALBUMINURIA RESULT DOCUMENTED/REVIEW: ICD-10-PCS | Mod: CPTII,S$GLB,, | Performed by: INTERNAL MEDICINE

## 2023-12-14 PROCEDURE — 3008F BODY MASS INDEX DOCD: CPT | Mod: CPTII,S$GLB,, | Performed by: INTERNAL MEDICINE

## 2023-12-14 PROCEDURE — 1159F PR MEDICATION LIST DOCUMENTED IN MEDICAL RECORD: ICD-10-PCS | Mod: CPTII,S$GLB,, | Performed by: INTERNAL MEDICINE

## 2023-12-14 PROCEDURE — 1159F MED LIST DOCD IN RCRD: CPT | Mod: CPTII,S$GLB,, | Performed by: INTERNAL MEDICINE

## 2023-12-14 PROCEDURE — 1160F PR REVIEW ALL MEDS BY PRESCRIBER/CLIN PHARMACIST DOCUMENTED: ICD-10-PCS | Mod: CPTII,S$GLB,, | Performed by: INTERNAL MEDICINE

## 2023-12-14 PROCEDURE — 3078F PR MOST RECENT DIASTOLIC BLOOD PRESSURE < 80 MM HG: ICD-10-PCS | Mod: CPTII,S$GLB,, | Performed by: INTERNAL MEDICINE

## 2023-12-14 PROCEDURE — 3074F SYST BP LT 130 MM HG: CPT | Mod: CPTII,S$GLB,, | Performed by: INTERNAL MEDICINE

## 2023-12-14 PROCEDURE — 3008F PR BODY MASS INDEX (BMI) DOCUMENTED: ICD-10-PCS | Mod: CPTII,S$GLB,, | Performed by: INTERNAL MEDICINE

## 2023-12-14 PROCEDURE — 3044F PR MOST RECENT HEMOGLOBIN A1C LEVEL <7.0%: ICD-10-PCS | Mod: CPTII,S$GLB,, | Performed by: INTERNAL MEDICINE

## 2023-12-14 PROCEDURE — 3044F HG A1C LEVEL LT 7.0%: CPT | Mod: CPTII,S$GLB,, | Performed by: INTERNAL MEDICINE

## 2023-12-14 PROCEDURE — 4010F ACE/ARB THERAPY RXD/TAKEN: CPT | Mod: CPTII,S$GLB,, | Performed by: INTERNAL MEDICINE

## 2023-12-14 PROCEDURE — 99999 PR PBB SHADOW E&M-EST. PATIENT-LVL V: ICD-10-PCS | Mod: PBBFAC,,, | Performed by: INTERNAL MEDICINE

## 2023-12-14 RX ORDER — LEVOCETIRIZINE DIHYDROCHLORIDE 5 MG/1
5 TABLET, FILM COATED ORAL
COMMUNITY
Start: 2023-12-14

## 2023-12-14 RX ORDER — HYDROXYZINE HYDROCHLORIDE 25 MG/1
25 TABLET, FILM COATED ORAL 2 TIMES DAILY
COMMUNITY
Start: 2023-12-14

## 2023-12-14 RX ORDER — ESZOPICLONE 3 MG/1
3 TABLET, FILM COATED ORAL NIGHTLY
COMMUNITY
Start: 2023-12-08

## 2023-12-14 NOTE — PROGRESS NOTES
Narendra English  is a 58 y.o. male     HPI:    He was noted to have a mild bump in creatinine.  Nephrology has been consulted for evaluation.  In the clinic today he is doing well and has no specific complaints.  His laboratory studies medications were reviewed.  All Nephrology related questions were answered to his satisfaction.  On review of his chart he was noted to have a mild increase in creatinine on labs from 12/06/2023 which showed a creatinine of 2.0 and a BUN of 27.  Urinalysis also showed specific gravity of 1.0 3-0.  He was sent to the emergency department for evaluation.  In the ER he was noted to have a creatinine of 1.3.  He was advised to follow-up with Nephrology.  He relates that around the time of his lab draw he had what sounds to be a viral illness where he had flu-like symptoms and was feverish.  He also was having a flare-up of his gout and was taking indomethacin.  We discussed the importance of limiting nonsteroidal anti-inflammatory agent exposure.      PAST MEDICAL HISTORY:  He  has a past medical history of Allergy, Cancer, Diabetes mellitus, Gastroesophageal reflux disease without esophagitis (06/20/2023), Hypertension, Sleep apnea, and Thyroid disease.    PAST SURGICAL HISTORY:  He  has a past surgical history that includes Colonoscopy (N/A, 12/29/2020); Laparoscopic right colon resection (N/A, 02/02/2021); Back surgery; Colonoscopy (N/A, 02/10/2022); Tonsillectomy; fess, with nasal septoplasty, with imaging guidance (Bilateral, 08/17/2022); Nasal turbinate reduction (Bilateral, 08/17/2022); Colon surgery (02/06/21); Epidural steroid injection into cervical spine (N/A, 12/1/2022); Rhinoplasty (Bilateral, 4/19/2023); Nasal endoscopy (Bilateral, 4/19/2023); Lysis of adhesions (Bilateral, 4/19/2023); and Selective injection of anesthetic agent around lumbar spinal nerve root by transforaminal approach (Bilateral, 7/5/2023).    SOCIAL HISTORY:  He  reports that he has never smoked. He  "has never been exposed to tobacco smoke. He has never used smokeless tobacco. He reports that he does not drink alcohol and does not use drugs.      FAMILY MEDICAL HISTORY:  His family history includes Breast cancer in his mother; Cancer in his mother; Cataracts in his maternal grandmother; Diabetes in his maternal grandmother and mother; Hypertension in his brother, brother, father, and mother; Lung cancer in his maternal grandfather; No Known Problems in his paternal grandfather, paternal grandmother, sister, and sister; Stroke in his father.    Review of patient's allergies indicates:   Allergen Reactions    Demerol (pf) [meperidine (pf)] Other (See Comments)     Pt reports,"They lost my blood pressure."    Percocet [oxycodone-acetaminophen] Itching     itching    Allopurinol analogues Diarrhea    Demerol [meperidine]            Prior to Admission medications    Medication Sig Start Date End Date Taking? Authorizing Provider   amLODIPine (NORVASC) 10 MG tablet Take 1 tablet (10 mg total) by mouth once daily. 2/22/23  Yes Kwame Guidry MD   atorvastatin (LIPITOR) 20 MG tablet Take 1 tablet (20 mg total) by mouth every evening. 2/22/23  Yes Kwame Guidry MD   azelastine (ASTELIN) 137 mcg (0.1 %) nasal spray 1 spray (137 mcg total) by Nasal route 2 (two) times daily. 12/7/23 12/6/24 Yes Viktor Ferrell MD   benazepriL (LOTENSIN) 40 MG tablet Take 1 tablet (40 mg total) by mouth once daily. 2/22/23 2/22/24 Yes Kwame Guidry MD   clobetasol 0.05% (TEMOVATE) 0.05 % Oint APPLY  OINTMENT TOPICALLY TO AFFECTED AREA TWICE DAILY 11/16/23  Yes Wendi Jin MD   clonazePAM (KLONOPIN) 1 MG tablet Take 1 tablet (1 mg total) by mouth every evening. 7/7/23  Yes Tabitha Melgoza MD   colchicine, gout, (COLCRYS) 0.6 mg tablet Take 1 tablet (0.6 mg total) by mouth once daily. 10/20/23  Yes Mayela Venegas MD   eszopiclone (LUNESTA) 3 mg Tab Take 3 mg by mouth every evening. 12/8/23  Yes Provider, Historical "   febuxostat (ULORIC) 40 mg Tab Take 1 tablet (40 mg total) by mouth once daily. 12/13/23  Yes Alon Ardon MD   fluticasone propionate (FLONASE) 50 mcg/actuation nasal spray 1 spray (50 mcg total) by Each Nostril route once daily. 4/11/22  Yes Viktor Ferrell MD   hydrALAZINE (APRESOLINE) 100 MG tablet Take 1 tablet (100 mg total) by mouth every 8 (eight) hours. 2/22/23 2/22/24 Yes Kwame Guidry MD   hydrOXYzine HCL (ATARAX) 25 MG tablet Take 25 mg by mouth 2 (two) times daily. 12/14/23  Yes Provider, Historical   indomethacin (INDOCIN SR) 75 mg CpSR CR capsule Take 1 capsule (75 mg total) by mouth 2 (two) times daily as needed (for gout flare). 11/1/23  Yes Mayela Venegas MD   insulin glargine, TOUJEO, (TOUJEO SOLOSTAR U-300 INSULIN) 300 unit/mL (1.5 mL) InPn pen INJECT 85 UNITS SUBCUTANEOUSLY ONCE DAILY 10/13/23  Yes Tabitha Melgoza MD   ketoconazole (NIZORAL) 2 % shampoo Wash body with medicated shampoo at least 2x/week - let sit on body at least 5 minutes prior to rinsing 8/23/23  Yes Wendi Jin MD   levocetirizine (XYZAL) 5 MG tablet Take 5 mg by mouth. 12/14/23  Yes Provider, Historical   levothyroxine (SYNTHROID) 112 MCG tablet Take 1 tablet (112 mcg total) by mouth before breakfast. 8/17/23  Yes Tabitha Melgoza MD   metFORMIN (GLUCOPHAGE) 1000 MG tablet Take 1 tablet by mouth twice daily with food 12/5/23  Yes Tabitha Melgoza MD   metoprolol succinate (TOPROL-XL) 50 MG 24 hr tablet Take 1 tablet (50 mg total) by mouth once daily.  Patient taking differently: Take 50 mg by mouth every evening. 2/22/23  Yes Kwame Guidry MD   predniSONE (DELTASONE) 10 MG tablet Take 1 tablet (10 mg total) by mouth once daily. Take 3 tablets a day for 3 days (1before breakfast, 1 after lunch, 1 before bed) Then take 2 tablets a day for 3 days (1 before breakfast, 1 before bed) Then take 1 tablet a day for 3 days (1 before breakfast) 12/7/23  Yes Viktor Ferrell MD   pregabalin (LYRICA)  150 MG capsule Take 1 capsule (150 mg total) by mouth 3 (three) times daily. 6/20/23  Yes Nettie Velasquez, CAROL   tadalafiL (CIALIS) 20 MG Tab Take 1 tablet (20 mg total) by mouth every 24 hours as needed (erectile dysfunction). 12/13/23  Yes Shad Acosta MD   tirzepatide (MOUNJARO) 12.5 mg/0.5 mL PnIj Inject 12.5 mg into the skin every 7 days. 10/6/23  Yes Kwame Guidry MD   traZODone (DESYREL) 100 MG tablet Take 1 tablet (100 mg total) by mouth every evening. 5/29/23 5/28/24 Yes Tabitha Melgoza MD   VASCEPA 1 gram Cap Take 2 capsules (2,000 mg total) by mouth 2 (two) times daily. 2/22/23  Yes Kwame Guidry MD      Sodium   Date Value Ref Range Status   12/07/2023 140 136 - 145 mmol/L Final   12/06/2023 143 136 - 145 mmol/L Final   09/15/2023 141 136 - 145 mmol/L Final     Potassium   Date Value Ref Range Status   12/07/2023 4.8 3.5 - 5.1 mmol/L Final   12/06/2023 5.3 (H) 3.5 - 5.1 mmol/L Final     Comment:     *No Visible Hemolysis   09/15/2023 4.4 3.5 - 5.1 mmol/L Final     Chloride   Date Value Ref Range Status   12/07/2023 107 95 - 110 mmol/L Final   12/06/2023 108 95 - 110 mmol/L Final   09/15/2023 106 95 - 110 mmol/L Final     CO2   Date Value Ref Range Status   12/07/2023 21 (L) 23 - 29 mmol/L Final   12/06/2023 25 23 - 29 mmol/L Final   09/15/2023 27 23 - 29 mmol/L Final     Glucose   Date Value Ref Range Status   12/07/2023 93 70 - 110 mg/dL Final   12/06/2023 97 70 - 110 mg/dL Final   09/15/2023 122 (H) 70 - 110 mg/dL Final     BUN   Date Value Ref Range Status   12/07/2023 28 (H) 6 - 20 mg/dL Final   12/06/2023 27 (H) 6 - 20 mg/dL Final   09/15/2023 18 6 - 20 mg/dL Final     Creatinine   Date Value Ref Range Status   12/07/2023 1.3 0.5 - 1.4 mg/dL Final   12/06/2023 2.0 (H) 0.5 - 1.4 mg/dL Final   09/15/2023 1.3 0.5 - 1.4 mg/dL Final     Calcium   Date Value Ref Range Status   12/07/2023 9.0 8.7 - 10.5 mg/dL Final   12/06/2023 9.4 8.7 - 10.5 mg/dL Final   09/15/2023 9.2 8.7 - 10.5  mg/dL Final     Total Protein   Date Value Ref Range Status   12/07/2023 6.7 6.0 - 8.4 g/dL Final   12/06/2023 6.8 6.0 - 8.4 g/dL Final   09/15/2023 6.6 6.0 - 8.4 g/dL Final     Albumin   Date Value Ref Range Status   12/07/2023 3.8 3.5 - 5.2 g/dL Final   12/06/2023 3.9 3.5 - 5.2 g/dL Final   09/15/2023 3.8 3.5 - 5.2 g/dL Final   08/12/2022 4.1 3.6 - 5.1 g/dL Final     Total Bilirubin   Date Value Ref Range Status   12/07/2023 0.5 0.1 - 1.0 mg/dL Final     Comment:     For infants and newborns, interpretation of results should be based  on gestational age, weight and in agreement with clinical  observations.    Premature Infant recommended reference ranges:  Up to 24 hours.............<8.0 mg/dL  Up to 48 hours............<12.0 mg/dL  3-5 days..................<15.0 mg/dL  6-29 days.................<15.0 mg/dL     12/06/2023 0.3 0.1 - 1.0 mg/dL Final     Comment:     For infants and newborns, interpretation of results should be based  on gestational age, weight and in agreement with clinical  observations.    Premature Infant recommended reference ranges:  Up to 24 hours.............<8.0 mg/dL  Up to 48 hours............<12.0 mg/dL  3-5 days..................<15.0 mg/dL  6-29 days.................<15.0 mg/dL     09/15/2023 0.3 0.1 - 1.0 mg/dL Final     Comment:     For infants and newborns, interpretation of results should be based  on gestational age, weight and in agreement with clinical  observations.    Premature Infant recommended reference ranges:  Up to 24 hours.............<8.0 mg/dL  Up to 48 hours............<12.0 mg/dL  3-5 days..................<15.0 mg/dL  6-29 days.................<15.0 mg/dL       Alkaline Phosphatase   Date Value Ref Range Status   12/07/2023 37 (L) 55 - 135 U/L Final   12/06/2023 38 (L) 55 - 135 U/L Final   09/15/2023 40 (L) 55 - 135 U/L Final     AST   Date Value Ref Range Status   12/07/2023 24 10 - 40 U/L Final   12/06/2023 25 10 - 40 U/L Final   09/15/2023 29 10 - 40 U/L Final      ALT   Date Value Ref Range Status   12/07/2023 29 10 - 44 U/L Final   12/06/2023 32 10 - 44 U/L Final   09/15/2023 56 (H) 10 - 44 U/L Final     Anion Gap   Date Value Ref Range Status   12/07/2023 12 8 - 16 mmol/L Final   12/06/2023 10 8 - 16 mmol/L Final   09/15/2023 8 8 - 16 mmol/L Final     eGFR if    Date Value Ref Range Status   03/14/2022 >60.0 >60 mL/min/1.73 m^2 Final   11/15/2021 >60 >60 mL/min/1.73 m^2 Final   10/15/2021 >60.0 >60 mL/min/1.73 m^2 Final     eGFR if non    Date Value Ref Range Status   03/14/2022 >60.0 >60 mL/min/1.73 m^2 Final     Comment:     Calculation used to obtain the estimated glomerular filtration  rate (eGFR) is the CKD-EPI equation.      11/15/2021 >60 >60 mL/min/1.73 m^2 Final     Comment:     Calculation used to obtain the estimated glomerular filtration  rate (eGFR) is the CKD-EPI equation.      10/15/2021 >60.0 >60 mL/min/1.73 m^2 Final     Comment:     Calculation used to obtain the estimated glomerular filtration  rate (eGFR) is the CKD-EPI equation.        Creatinine, Urine   Date Value Ref Range Status   03/09/2023 135.0 23.0 - 375.0 mg/dL Final   09/14/2022 129.0 23.0 - 375.0 mg/dL Final   10/15/2021 176.0 23.0 - 375.0 mg/dL Final         REVIEW OF SYSTEMS:  Patient has no fever, fatigue, visual changes, chest pain, edema, cough, dyspnea, nausea, vomiting, constipation, diarrhea, arthralgias, pruritis, dizziness, weakness, depression, confusion.        PHYSICAL EXAM:   height is 6' (1.829 m) and weight is 113.2 kg (249 lb 9 oz). His blood pressure is 102/62 and his pulse is 78. His respiration is 18.   Gen: WDWN male in no apparent distress  Psych: Normal mood and affect  Skin: No rashes or ulcers  Eyes: Normal conjunctiva and lids, PERRLA  ENT: Normal hearing with no oropharyngeal lesions  Neck: No JVD  Ext: No cyanosis, clubbing or edema          IMPRESSION AND RECOMMENDATIONS:    1. WHIT:  Laboratory studies on 12/06/2023 showed  creatinine of 2.0.  Subsequent laboratory studies the next day show creatinine of 1.3.  His acute kidney injury is hemodynamic in nature secondary to an acute viral syndrome with decreased p.o. intake in the face of taking indomethacin for gout flare.  Of note he is also on a RAAS inhibitor which will tend to exaggerate changes in creatinine.    His baseline creatinine has run between about 1.0 and 1.3 for the past several years.    2. Hypertension:  Blood pressure is well controlled on current regimen.  He is on a RAAS inhibitor.    3. CKD 2:  Baseline creatinine is normal.  EGFR is normal for his age.  Mild decrease in glomerular filtration rate is normal reflective of age related decline.  There is no evidence of an underlying renal disease at this time.    4. Hyperkalemia:  He was noted to have mild hyperkalemia on laboratory studies that were associated with acute kidney injury.  This is a combination RAAS inhibition with ongoing use of nonsteroidal anti-inflammatory agents and an acute drop in GFR secondary to prerenal azotemia.  Potassium on most recent laboratory studies has returned to normal at 4.8.    Approximately 50 minutes was spent in face-to-face conversation, chart review and review of ER notes.

## 2023-12-15 NOTE — TELEPHONE ENCOUNTER
ER note advised to follow up with PCP. It appears he was treated for WHIT and hyperkalemia. He has since seen Nephrology for this. Follow up as needed.

## 2023-12-18 RX ORDER — TIRZEPATIDE 12.5 MG/.5ML
12.5 INJECTION, SOLUTION SUBCUTANEOUS
Qty: 4 PEN | Refills: 1 | Status: SHIPPED | OUTPATIENT
Start: 2023-12-18 | End: 2024-01-29

## 2023-12-19 ENCOUNTER — PATIENT MESSAGE (OUTPATIENT)
Dept: RHEUMATOLOGY | Facility: CLINIC | Age: 58
End: 2023-12-19
Payer: COMMERCIAL

## 2023-12-27 ENCOUNTER — TELEPHONE (OUTPATIENT)
Dept: CARDIOLOGY | Facility: CLINIC | Age: 58
End: 2023-12-27
Payer: COMMERCIAL

## 2023-12-28 RX ORDER — ICOSAPENT ETHYL 1000 MG/1
2 CAPSULE ORAL 2 TIMES DAILY
Qty: 360 CAPSULE | Refills: 1 | Status: SHIPPED | OUTPATIENT
Start: 2023-12-28 | End: 2024-01-29 | Stop reason: SDUPTHER

## 2024-01-01 NOTE — TELEPHONE ENCOUNTER
No care due was identified.  Health [facility] Embedded Care Due Messages. Reference number: [id number].   1/01/2024 9:04:19 AM CST

## 2024-01-02 ENCOUNTER — PATIENT MESSAGE (OUTPATIENT)
Dept: CARDIOLOGY | Facility: CLINIC | Age: 59
End: 2024-01-02
Payer: COMMERCIAL

## 2024-01-02 RX ORDER — INSULIN GLARGINE 300 U/ML
INJECTION, SOLUTION SUBCUTANEOUS
Qty: 12 ML | Refills: 0 | OUTPATIENT
Start: 2024-01-02

## 2024-01-02 NOTE — TELEPHONE ENCOUNTER
Refill Decision Note   Narendra English  is requesting a refill authorization.  Brief Assessment and Rationale for Refill:  Quick Discontinue     Medication Therapy Plan:  Refill request addressed in previous encounter;awaiting assessment      Comments:     Note composed:4:41 PM 01/02/2024

## 2024-01-09 ENCOUNTER — PATIENT MESSAGE (OUTPATIENT)
Dept: DERMATOLOGY | Facility: CLINIC | Age: 59
End: 2024-01-09
Payer: COMMERCIAL

## 2024-01-09 RX ORDER — CLOBETASOL PROPIONATE 0.5 MG/G
OINTMENT TOPICAL
Qty: 240 G | Refills: 0 | Status: SHIPPED | OUTPATIENT
Start: 2024-01-09

## 2024-01-23 ENCOUNTER — OFFICE VISIT (OUTPATIENT)
Dept: INTERNAL MEDICINE | Facility: CLINIC | Age: 59
End: 2024-01-23
Payer: COMMERCIAL

## 2024-01-23 VITALS
WEIGHT: 243.81 LBS | OXYGEN SATURATION: 96 % | SYSTOLIC BLOOD PRESSURE: 118 MMHG | BODY MASS INDEX: 33.02 KG/M2 | DIASTOLIC BLOOD PRESSURE: 70 MMHG | HEART RATE: 74 BPM | HEIGHT: 72 IN | RESPIRATION RATE: 18 BRPM | TEMPERATURE: 97 F

## 2024-01-23 DIAGNOSIS — R60.0 BILATERAL LOWER EXTREMITY EDEMA: Primary | ICD-10-CM

## 2024-01-23 PROCEDURE — 3072F LOW RISK FOR RETINOPATHY: CPT | Mod: CPTII,S$GLB,, | Performed by: FAMILY MEDICINE

## 2024-01-23 PROCEDURE — 3074F SYST BP LT 130 MM HG: CPT | Mod: CPTII,S$GLB,, | Performed by: FAMILY MEDICINE

## 2024-01-23 PROCEDURE — 3078F DIAST BP <80 MM HG: CPT | Mod: CPTII,S$GLB,, | Performed by: FAMILY MEDICINE

## 2024-01-23 PROCEDURE — 99213 OFFICE O/P EST LOW 20 MIN: CPT | Mod: S$GLB,,, | Performed by: FAMILY MEDICINE

## 2024-01-23 PROCEDURE — 99999 PR PBB SHADOW E&M-EST. PATIENT-LVL V: CPT | Mod: PBBFAC,,, | Performed by: FAMILY MEDICINE

## 2024-01-23 PROCEDURE — 3008F BODY MASS INDEX DOCD: CPT | Mod: CPTII,S$GLB,, | Performed by: FAMILY MEDICINE

## 2024-01-23 PROCEDURE — 1159F MED LIST DOCD IN RCRD: CPT | Mod: CPTII,S$GLB,, | Performed by: FAMILY MEDICINE

## 2024-01-23 PROCEDURE — 1160F RVW MEDS BY RX/DR IN RCRD: CPT | Mod: CPTII,S$GLB,, | Performed by: FAMILY MEDICINE

## 2024-01-23 RX ORDER — TRIAMCINOLONE ACETONIDE 1 MG/G
30 CREAM TOPICAL 2 TIMES DAILY
COMMUNITY
Start: 2024-01-21 | End: 2024-03-11

## 2024-01-23 RX ORDER — FAMOTIDINE 40 MG/1
40 TABLET, FILM COATED ORAL 2 TIMES DAILY
COMMUNITY
Start: 2023-12-18

## 2024-01-23 RX ORDER — FEXOFENADINE HYDROCHLORIDE 180 MG/1
180 TABLET, FILM COATED ORAL DAILY
COMMUNITY
Start: 2024-01-02

## 2024-01-23 NOTE — ASSESSMENT & PLAN NOTE
Mild edema; reassured; advised to track fluid intake to ensure adequate (at least 64 oz daily); advised to read food labels to ensure low sodium diet; advised regular walking and elevated as needed; if worse/persistent follow up; patient expressed understanding and agreement with plan.

## 2024-01-26 DIAGNOSIS — Z76.89 ENCOUNTER TO ESTABLISH CARE: ICD-10-CM

## 2024-01-26 DIAGNOSIS — Z00.00 ROUTINE ADULT HEALTH MAINTENANCE: Primary | ICD-10-CM

## 2024-01-29 ENCOUNTER — HOSPITAL ENCOUNTER (OUTPATIENT)
Dept: CARDIOLOGY | Facility: HOSPITAL | Age: 59
Discharge: HOME | End: 2024-01-29
Attending: INTERNAL MEDICINE
Payer: COMMERCIAL

## 2024-01-29 ENCOUNTER — OFFICE VISIT (OUTPATIENT)
Dept: CARDIOLOGY | Facility: CLINIC | Age: 59
End: 2024-01-29
Payer: COMMERCIAL

## 2024-01-29 VITALS
WEIGHT: 249.56 LBS | DIASTOLIC BLOOD PRESSURE: 62 MMHG | HEIGHT: 72 IN | SYSTOLIC BLOOD PRESSURE: 112 MMHG | HEART RATE: 81 BPM | OXYGEN SATURATION: 99 % | BODY MASS INDEX: 33.8 KG/M2

## 2024-01-29 DIAGNOSIS — E11.59 HYPERTENSION ASSOCIATED WITH DIABETES: Primary | ICD-10-CM

## 2024-01-29 DIAGNOSIS — Z76.89 ENCOUNTER TO ESTABLISH CARE: ICD-10-CM

## 2024-01-29 DIAGNOSIS — I15.2 HYPERTENSION ASSOCIATED WITH DIABETES: Primary | ICD-10-CM

## 2024-01-29 DIAGNOSIS — E03.9 HYPOTHYROIDISM (ACQUIRED): ICD-10-CM

## 2024-01-29 DIAGNOSIS — E11.69 TYPE 2 DIABETES MELLITUS WITH OTHER SPECIFIED COMPLICATION, UNSPECIFIED WHETHER LONG TERM INSULIN USE: ICD-10-CM

## 2024-01-29 DIAGNOSIS — R07.9 CHEST PAIN, UNSPECIFIED TYPE: ICD-10-CM

## 2024-01-29 DIAGNOSIS — R94.31 NONSPECIFIC ABNORMAL ELECTROCARDIOGRAM (ECG) (EKG): ICD-10-CM

## 2024-01-29 DIAGNOSIS — I10 BENIGN ESSENTIAL HTN: ICD-10-CM

## 2024-01-29 DIAGNOSIS — Z79.4 TYPE 2 DIABETES MELLITUS WITHOUT COMPLICATION, WITH LONG-TERM CURRENT USE OF INSULIN: ICD-10-CM

## 2024-01-29 DIAGNOSIS — E78.5 HYPERLIPIDEMIA, UNSPECIFIED HYPERLIPIDEMIA TYPE: ICD-10-CM

## 2024-01-29 DIAGNOSIS — E11.9 TYPE 2 DIABETES MELLITUS WITHOUT COMPLICATION, WITH LONG-TERM CURRENT USE OF INSULIN: ICD-10-CM

## 2024-01-29 DIAGNOSIS — I10 HYPERTENSION, UNSPECIFIED TYPE: ICD-10-CM

## 2024-01-29 DIAGNOSIS — E11.9 TYPE 2 DIABETES MELLITUS WITHOUT COMPLICATION, WITHOUT LONG-TERM CURRENT USE OF INSULIN: ICD-10-CM

## 2024-01-29 DIAGNOSIS — E78.1 HYPERTRIGLYCERIDEMIA: ICD-10-CM

## 2024-01-29 DIAGNOSIS — Z00.00 ROUTINE ADULT HEALTH MAINTENANCE: ICD-10-CM

## 2024-01-29 DIAGNOSIS — R79.89 LOW TESTOSTERONE: ICD-10-CM

## 2024-01-29 DIAGNOSIS — Z01.818 PRE-OP EVALUATION: ICD-10-CM

## 2024-01-29 DIAGNOSIS — G47.33 OSA (OBSTRUCTIVE SLEEP APNEA): ICD-10-CM

## 2024-01-29 PROCEDURE — 93005 ELECTROCARDIOGRAM TRACING: CPT

## 2024-01-29 PROCEDURE — 3074F SYST BP LT 130 MM HG: CPT | Mod: CPTII,S$GLB,, | Performed by: INTERNAL MEDICINE

## 2024-01-29 PROCEDURE — 3008F BODY MASS INDEX DOCD: CPT | Mod: CPTII,S$GLB,, | Performed by: INTERNAL MEDICINE

## 2024-01-29 PROCEDURE — 93010 ELECTROCARDIOGRAM REPORT: CPT | Mod: ,,, | Performed by: INTERNAL MEDICINE

## 2024-01-29 PROCEDURE — 3072F LOW RISK FOR RETINOPATHY: CPT | Mod: CPTII,S$GLB,, | Performed by: INTERNAL MEDICINE

## 2024-01-29 PROCEDURE — 1160F RVW MEDS BY RX/DR IN RCRD: CPT | Mod: CPTII,S$GLB,, | Performed by: INTERNAL MEDICINE

## 2024-01-29 PROCEDURE — 3078F DIAST BP <80 MM HG: CPT | Mod: CPTII,S$GLB,, | Performed by: INTERNAL MEDICINE

## 2024-01-29 PROCEDURE — 1159F MED LIST DOCD IN RCRD: CPT | Mod: CPTII,S$GLB,, | Performed by: INTERNAL MEDICINE

## 2024-01-29 PROCEDURE — 99214 OFFICE O/P EST MOD 30 MIN: CPT | Mod: S$GLB,,, | Performed by: INTERNAL MEDICINE

## 2024-01-29 PROCEDURE — 99999 PR PBB SHADOW E&M-EST. PATIENT-LVL V: CPT | Mod: PBBFAC,,, | Performed by: INTERNAL MEDICINE

## 2024-01-29 RX ORDER — TIRZEPATIDE 15 MG/.5ML
15 INJECTION, SOLUTION SUBCUTANEOUS
Qty: 4 PEN | Refills: 3 | Status: SHIPPED | OUTPATIENT
Start: 2024-01-29 | End: 2024-03-24 | Stop reason: SDUPTHER

## 2024-01-29 RX ORDER — BENAZEPRIL HYDROCHLORIDE 40 MG/1
40 TABLET ORAL DAILY
Qty: 90 TABLET | Refills: 3 | Status: SHIPPED | OUTPATIENT
Start: 2024-01-29 | End: 2025-01-28

## 2024-01-29 RX ORDER — METOPROLOL SUCCINATE 50 MG/1
50 TABLET, EXTENDED RELEASE ORAL DAILY
Qty: 90 TABLET | Refills: 3 | Status: SHIPPED | OUTPATIENT
Start: 2024-01-29

## 2024-01-29 RX ORDER — ICOSAPENT ETHYL 1000 MG/1
2 CAPSULE ORAL 2 TIMES DAILY
Qty: 360 CAPSULE | Refills: 1 | Status: SHIPPED | OUTPATIENT
Start: 2024-01-29

## 2024-01-29 RX ORDER — AMLODIPINE BESYLATE 5 MG/1
5 TABLET ORAL DAILY
Qty: 90 TABLET | Refills: 1 | Status: SHIPPED | OUTPATIENT
Start: 2024-01-29

## 2024-01-29 RX ORDER — HYDRALAZINE HYDROCHLORIDE 100 MG/1
100 TABLET, FILM COATED ORAL EVERY 8 HOURS
Qty: 270 TABLET | Refills: 1 | Status: SHIPPED | OUTPATIENT
Start: 2024-01-29 | End: 2024-05-15 | Stop reason: SDUPTHER

## 2024-01-29 NOTE — PROGRESS NOTES
Subjective:   Patient ID:  Narendra English Sr. is a 58 y.o. male who presents for cardiac consult of No chief complaint on file.      Referring Physician:    Tabitha Melgoza MD [[id number]] [id number] [facility] [place] [address]      Reason for consult: HTN    HPI  The patient came in today for cardiac consult of No chief complaint on file.      Narendra English Sr. is a 58 y.o. male pt with HTN, HLD, elevated TGs, DM2, FARA, obesity, gout, hypothyroidism, low T presents for CV eval of HTN     3/15/22 - Dr. Gonzalez Visit  This pleasant patient 56 years of age who has history hypertension hyperlipidemia and evidence of type 2 diabetes without complications.  The patient has had exertional chest pain present in the emergency room with nonspecific EKG changes negative troponin levels.  Follow-up in the office today.  EKG shows evidence of right bundle branch block no acute changes.  Otherwise patient is stable.  He has had no exertional symptoms.  There is no significant family history of coronary disease.  Patient nonsmoker no history of drug or alcohol abuse otherwise stable.  States that the pain is very intermittent and is not typical.  This visit finds patient feeling well.  No exertional symptoms chest pain shortness of breath.  Stress test showed showed no evidence cardiac ischemia normal LV function blood pressure still elevated I will add hydralazine 10 mg b.i.d. to his current regimen of metoprolol 50 mg daily, amlodipine 10 mg daily and benazepril 40 mg daily.  Of note hydrochlorothiazide in the past has given the patient gout and is not to be used.  Overall stable and he is to have endoscopy this week for follow-up.  He should do well with procedure and has no issues with cardiac events for this and should be at low cardiac risk for procedure.  Is is advised patient doing well.  Stable heart and blood pressure on medication renewed cluster profiles are excellent he continues on statin  medications.    9/29/22  Pt of Dr. Gonzalez seen in March 2022 here for eval of HTN. Stress ECHO in 2/22 neg with normal bi V function.   Recent BP elevated 150s/80s. Pt is on Norvasc 10, benazepril 40 , hydral 50 q 12, toprol 50  BP today 140/80, HR 70. BMI 36 - 275 lbs. BP at . He used to weight > 300 lbs, had colon ca; is on ozempic 1mg.     2/22/23  BP and Hr well controlled. BMI 35 - 268 lbs. Ozempic changed to Mounjaro.     1/19/2024  Follow up from 2/2023. Lipids very well controlled 9/2023.   Was on Mounjaro 12.5mg - may increase to 15mg  Will increase to 15mg.   NO CP/SOB.   ECG - NSR, RBBB       Patient has fairly good exercise tolerance.    Patient is compliant with medications.      Stress ECHO 2/2022  Summary    Concentric remodeling and normal systolic function.  There were no arrhythmias during stress.  The estimated ejection fraction is 60%.  Normal left ventricular diastolic function.  With normal right ventricular systolic function.  The stress echo portion of this study is negative for myocardial ischemia.  The ECG portion of this study is negative for myocardial ischemia.    Past Medical History:   Diagnosis Date    Allergy     Cancer     Colon    Diabetes mellitus      09/14/2023 Insulin x 10 years    Gastroesophageal reflux disease without esophagitis 06/20/2023    Hypertension     Sleep apnea     Thyroid disease        Past Surgical History:   Procedure Laterality Date    BACK SURGERY      COLON SURGERY  02/06/21    COLONOSCOPY N/A 12/29/2020    Procedure: COLONOSCOPY;  Surgeon: William Cotter MD;  Location: [facility] ENDO;  Service: Endoscopy;  Laterality: N/A;  Will need Rapid doesn't live here    COLONOSCOPY N/A 02/10/2022    Procedure: COLONOSCOPY;  Surgeon: Wili Espinosa MD;  [facility];  Service: Endoscopy;  Laterality: N/A;    EPIDURAL STEROID INJECTION INTO CERVICAL SPINE N/A 12/01/2022    Procedure: C7/T1 IL SALBADOR;  Surgeon: Leonard Mart MD;  Location:  [facility] PAIN MGT;  Service: Pain Management;  Laterality: N/A;    FESS, WITH NASAL SEPTOPLASTY, WITH IMAGING GUIDANCE Bilateral 08/17/2022    Procedure: FESS, WITH NASAL SEPTOPLASTY, WITH IMAGING GUIDANCE;  Surgeon: Viktor Ferrell MD;  Location: [facility] OR;  Service: ENT;  Laterality: Bilateral;    LAPAROSCOPIC RIGHT COLON RESECTION N/A 02/02/2021    Procedure: COLECTOMY, RIGHT, LAPAROSCOPIC, ERAS low;  Surgeon: Melonie Santoyo MD;  [facility];  Service: Colon and Rectal;  Laterality: N/A;  needs rapid covid test    LYSIS OF ADHESIONS Bilateral 04/19/2023    Procedure: LYSIS, ADHESIONS;  Surgeon: Viktor Ferrell MD;  Location: [facility] OR;  Service: ENT;  Laterality: Bilateral;    NASAL ENDOSCOPY Bilateral 04/19/2023    Procedure: ENDOSCOPY, NOSE;  Surgeon: Viktor Ferrell MD;  Location: [facility] OR;  Service: ENT;  Laterality: Bilateral;    NASAL TURBINATE REDUCTION Bilateral 08/17/2022    Procedure: REDUCTION, NASAL TURBINATE;  Surgeon: Viktor Ferrell MD;  Location: [facility] OR;  Service: ENT;  Laterality: Bilateral;    RHINOPLASTY Bilateral 04/19/2023    Procedure: RHINOPLASTY;  Surgeon: Viktor Ferrell MD;  Location: [facility] OR;  Service: ENT;  Laterality: Bilateral;    SELECTIVE INJECTION OF ANESTHETIC AGENT AROUND LUMBAR SPINAL NERVE ROOT BY TRANSFORAMINAL APPROACH Bilateral 07/05/2023    Procedure: Bilateral L4/5 TF SALBADOR RN IV Sedation;  Surgeon: Leonard Mart MD;  Location: [facility] PAIN MGT;  Service: Pain Management;  Laterality: Bilateral;    TONSILLECTOMY         Social History     Tobacco Use    Smoking status: Never     Passive exposure: Never    Smokeless tobacco: Never   Substance Use Topics    Alcohol use: Never    Drug use: Never       Family History   Problem Relation Age of Onset    Hypertension Mother     Diabetes Mother     Breast cancer Mother     Cancer Mother     Hypertension Father     Stroke Father     No Known Problems Sister     No Known Problems Sister     Hypertension Brother      Hypertension Brother     Cataracts Maternal Grandmother     Diabetes Maternal Grandmother     Lung cancer Maternal Grandfather     No Known Problems Paternal Grandmother     No Known Problems Paternal Grandfather        Patient's Medications   New Prescriptions    TIRZEPATIDE (MOUNJARO) 15 MG/0.5 ML PNIJ    Inject 15 mg into the skin every 7 days.   Previous Medications    ALLERGY RELIEF, FEXOFENADINE, 180 MG TABLET    Take 180 mg by mouth once daily.    ATORVASTATIN (LIPITOR) 20 MG TABLET    Take 1 tablet (20 mg total) by mouth every evening.    AZELASTINE (ASTELIN) 137 MCG (0.1 %) NASAL SPRAY    1 spray (137 mcg total) by Nasal route 2 (two) times daily.    CLOBETASOL 0.05% (TEMOVATE) 0.05 % OINT    APPLY TOPICALLY TO THE AFFECTED AREA(S) TWICE DAILY    CLONAZEPAM (KLONOPIN) 1 MG TABLET    Take 1 tablet (1 mg total) by mouth every evening.    COLCHICINE, GOUT, (COLCRYS) 0.6 MG TABLET    Take 1 tablet (0.6 mg total) by mouth once daily.    ESZOPICLONE (LUNESTA) 3 MG TAB    Take 3 mg by mouth every evening.    FAMOTIDINE (PEPCID) 40 MG TABLET    Take 40 mg by mouth 2 (two) times daily.    FEBUXOSTAT (ULORIC) 40 MG TAB    Take 1 tablet (40 mg total) by mouth once daily.    FLUTICASONE PROPIONATE (FLONASE) 50 MCG/ACTUATION NASAL SPRAY    1 spray (50 mcg total) by Each Nostril route once daily.    HYDROXYZINE HCL (ATARAX) 25 MG TABLET    Take 25 mg by mouth 2 (two) times daily.    INDOMETHACIN (INDOCIN SR) 75 MG CPSR CR CAPSULE    Take 1 capsule (75 mg total) by mouth 2 (two) times daily as needed (for gout flare).    INSULIN GLARGINE, TOUJEO, (TOUJEO SOLOSTAR U-300 INSULIN) 300 UNIT/ML (1.5 ML) INPN PEN    INJECT 85 UNITS SUBCUTANEOUSLY ONCE DAILY    KETOCONAZOLE (NIZORAL) 2 % SHAMPOO    Wash body with medicated shampoo at least 2x/week - let sit on body at least 5 minutes prior to rinsing    LEVOCETIRIZINE (XYZAL) 5 MG TABLET    Take 5 mg by mouth.    LEVOTHYROXINE (SYNTHROID) 112 MCG TABLET    Take 1 tablet (112 mcg  total) by mouth before breakfast.    METFORMIN (GLUCOPHAGE) 1000 MG TABLET    Take 1 tablet by mouth twice daily with food    PREDNISONE (DELTASONE) 10 MG TABLET    Take 1 tablet (10 mg total) by mouth once daily. Take 3 tablets a day for 3 days (1before breakfast, 1 after lunch, 1 before bed) Then take 2 tablets a day for 3 days (1 before breakfast, 1 before bed) Then take 1 tablet a day for 3 days (1 before breakfast)    PREGABALIN (LYRICA) 150 MG CAPSULE    Take 1 capsule (150 mg total) by mouth 3 (three) times daily.    TADALAFIL (CIALIS) 20 MG TAB    Take 1 tablet (20 mg total) by mouth every 24 hours as needed (erectile dysfunction).    TRAZODONE (DESYREL) 100 MG TABLET    Take 1 tablet (100 mg total) by mouth every evening.    TRIAMCINOLONE ACETONIDE 0.1% (KENALOG) 0.1 % CREAM    Apply 30 g topically 2 (two) times daily.   Modified Medications    Modified Medication Previous Medication    AMLODIPINE (NORVASC) 5 MG TABLET amLODIPine (NORVASC) 10 MG tablet       Take 1 tablet (5 mg total) by mouth once daily.    Take 1 tablet (10 mg total) by mouth once daily.    BENAZEPRIL (LOTENSIN) 40 MG TABLET benazepriL (LOTENSIN) 40 MG tablet       Take 1 tablet (40 mg total) by mouth once daily.    Take 1 tablet (40 mg total) by mouth once daily.    HYDRALAZINE (APRESOLINE) 100 MG TABLET hydrALAZINE (APRESOLINE) 100 MG tablet       Take 1 tablet (100 mg total) by mouth every 8 (eight) hours.    Take 1 tablet (100 mg total) by mouth every 8 (eight) hours.    METOPROLOL SUCCINATE (TOPROL-XL) 50 MG 24 HR TABLET metoprolol succinate (TOPROL-XL) 50 MG 24 hr tablet       Take 1 tablet (50 mg total) by mouth once daily.    Take 1 tablet (50 mg total) by mouth once daily.    VASCEPA 1 GRAM CAP VASCEPA 1 gram Cap       Take 2 capsules (2,000 mg total) by mouth 2 (two) times daily.    Take 2 capsules (2,000 mg total) by mouth 2 (two) times daily.   Discontinued Medications    TIRZEPATIDE (MOUNJARO) 12.5 MG/0.5 ML PNIJ    Inject  12.5 mg into the skin every 7 days.       Review of Systems   Constitutional: Negative.    HENT: Negative.     Eyes: Negative.    Respiratory: Negative.     Cardiovascular: Negative.    Gastrointestinal: Negative.    Genitourinary: Negative.    Musculoskeletal: Negative.    Skin: Negative.    Neurological: Negative.    Endo/Heme/Allergies: Negative.    Psychiatric/Behavioral: Negative.     All 12 systems otherwise negative.      Wt Readings from Last 3 Encounters:   01/29/24 113.2 kg (249 lb 9 oz)   01/23/24 110.6 kg (243 lb 13.3 oz)   12/14/23 113.2 kg (249 lb 9 oz)     Temp Readings from Last 3 Encounters:   01/23/24 97.2 °F (36.2 °C) (Tympanic)   12/07/23 98.7 °F (37.1 °C) (Oral)   11/05/23 98.1 °F (36.7 °C) (Oral)     BP Readings from Last 3 Encounters:   01/29/24 112/62   01/23/24 118/70   12/14/23 102/62     Pulse Readings from Last 3 Encounters:   01/29/24 81   01/23/24 74   12/14/23 78       /62 (BP Location: Right arm, Patient Position: Sitting, BP Method: Large (Manual))   Pulse 81   Ht 6' (1.829 m)   Wt 113.2 kg (249 lb 9 oz)   SpO2 99%   BMI 33.85 kg/m²     Objective:   Physical Exam  Vitals and nursing note reviewed.   Constitutional:       General: He is not in acute distress.     Appearance: He is well-developed. He is obese. He is not diaphoretic.   HENT:      Head: Normocephalic and atraumatic.      Nose: Nose normal.   Eyes:      General: No scleral icterus.     Conjunctiva/sclera: Conjunctivae normal.   Neck:      Thyroid: No thyromegaly.      Vascular: No JVD.   Cardiovascular:      Rate and Rhythm: Normal rate and regular rhythm.      Heart sounds: S1 normal and S2 normal. No murmur heard.     No friction rub. No gallop. No S3 or S4 sounds.   Pulmonary:      Effort: Pulmonary effort is normal. No respiratory distress.      Breath sounds: Normal breath sounds. No stridor. No wheezing or rales.   Chest:      Chest wall: No tenderness.   Abdominal:      General: Bowel sounds are normal.  There is no distension.      Palpations: Abdomen is soft. There is no mass.      Tenderness: There is no abdominal tenderness. There is no rebound.   Genitourinary:     Comments: Deferred  Musculoskeletal:         General: No tenderness or deformity. Normal range of motion.      Cervical back: Normal range of motion and neck supple.   Lymphadenopathy:      Cervical: No cervical adenopathy.   Skin:     General: Skin is warm and dry.      Coloration: Skin is not pale.      Findings: No erythema or rash.   Neurological:      Mental Status: He is alert and oriented to person, place, and time.      Motor: No abnormal muscle tone.      Coordination: Coordination normal.   Psychiatric:         Behavior: Behavior normal.         Thought Content: Thought content normal.         Judgment: Judgment normal.         Lab Results   Component Value Date     12/07/2023    K 4.8 12/07/2023     12/07/2023    CO2 21 (L) 12/07/2023    BUN 28 (H) 12/07/2023    CREATININE 1.3 12/07/2023    GLU 93 12/07/2023    HGBA1C 5.3 09/15/2023    MG 1.6 12/07/2023    AST 24 12/07/2023    ALT 29 12/07/2023    ALBUMIN 3.8 12/07/2023    ALBUMIN 4.1 08/12/2022    PROT 6.7 12/07/2023    BILITOT 0.5 12/07/2023    WBC 6.39 12/07/2023    HGB 13.9 (L) 12/07/2023    HCT 42.6 12/07/2023    MCV 95 12/07/2023     12/07/2023    TSH 1.952 09/15/2023    CHOL 86 (L) 09/15/2023    HDL 25 (L) 09/15/2023    LDLCALC 31.8 (L) 09/15/2023    TRIG 146 09/15/2023    BNP <10 11/15/2021     Assessment:      1. Hypertension associated with diabetes    2. Benign essential HTN    3. Type 2 diabetes mellitus with other specified complication, unspecified whether long term insulin use    4. Hyperlipidemia, unspecified hyperlipidemia type    5. Hypertriglyceridemia    6. FARA (obstructive sleep apnea)    7. Hypothyroidism (acquired)    8. Type 2 diabetes mellitus without complication, with long-term current use of insulin    9. BMI 36.0-36.9,adult    10. Low  testosterone    11. Chest pain, unspecified type    12. Nonspecific abnormal electrocardiogram (ECG) (EKG)    13. Type 2 diabetes mellitus without complication, without long-term current use of insulin    14. Hypertension, unspecified type    15. Pre-op evaluation            Plan:     HTN   - titrate meds   - dec Norvasc to 5mg     2. HLD,   - cont meds and titrate  - Lipids very well controlled 9/2023.     3. DM2 A1c 6.6 --> 5.3  - cont tx - on Ozempic - increase to 2mg - changed to Mounjaro - increase to 7.5 mg --> increased to 12.5 --> 15     4. Hypothyroidsm TSH1.1  - cont Synthroid    5. FARA  - cont CPAP    6. Obesity, BMI 36 --> BMI 35 - 268 lbs.   - cont weight loss with diet/exercise     7. Low T  - cont T supplements as needed  - monitor BP     8. Preop CV eval -neck surgery pending with Dr. Nam   - low CV risk, proceed as needed  - cont BB and statin periop  - stable ECG without cardiac symptoms    Thank you for allowing me to participate in this patient's care. Please do not hesitate to contact me with any questions or concerns. Consult note has been forwarded to the referral physician.     Kwame Guidry MD, [facility]  Cardiovascular Disease  [facility], [place]  [phone] (P)

## 2024-02-01 ENCOUNTER — OFFICE VISIT (OUTPATIENT)
Dept: DERMATOLOGY | Facility: CLINIC | Age: 59
End: 2024-02-01
Payer: COMMERCIAL

## 2024-02-01 DIAGNOSIS — L73.9 FOLLICULITIS: Primary | ICD-10-CM

## 2024-02-01 DIAGNOSIS — L29.9 PRURITUS: ICD-10-CM

## 2024-02-01 PROCEDURE — 99999 PR PBB SHADOW E&M-EST. PATIENT-LVL IV: CPT | Mod: PBBFAC,,, | Performed by: STUDENT IN AN ORGANIZED HEALTH CARE EDUCATION/TRAINING PROGRAM

## 2024-02-01 PROCEDURE — 3072F LOW RISK FOR RETINOPATHY: CPT | Mod: CPTII,S$GLB,, | Performed by: STUDENT IN AN ORGANIZED HEALTH CARE EDUCATION/TRAINING PROGRAM

## 2024-02-01 PROCEDURE — 4010F ACE/ARB THERAPY RXD/TAKEN: CPT | Mod: CPTII,S$GLB,, | Performed by: STUDENT IN AN ORGANIZED HEALTH CARE EDUCATION/TRAINING PROGRAM

## 2024-02-01 PROCEDURE — 1159F MED LIST DOCD IN RCRD: CPT | Mod: CPTII,S$GLB,, | Performed by: STUDENT IN AN ORGANIZED HEALTH CARE EDUCATION/TRAINING PROGRAM

## 2024-02-01 PROCEDURE — 1160F RVW MEDS BY RX/DR IN RCRD: CPT | Mod: CPTII,S$GLB,, | Performed by: STUDENT IN AN ORGANIZED HEALTH CARE EDUCATION/TRAINING PROGRAM

## 2024-02-01 PROCEDURE — 99214 OFFICE O/P EST MOD 30 MIN: CPT | Mod: S$GLB,,, | Performed by: STUDENT IN AN ORGANIZED HEALTH CARE EDUCATION/TRAINING PROGRAM

## 2024-02-01 RX ORDER — FLUCONAZOLE 200 MG/1
200 TABLET ORAL DAILY
Qty: 14 TABLET | Refills: 0 | Status: SHIPPED | OUTPATIENT
Start: 2024-02-01 | End: 2024-03-11

## 2024-02-01 RX ORDER — TRIAMCINOLONE ACETONIDE 1 MG/G
OINTMENT TOPICAL
Qty: 454 G | Refills: 1 | Status: SHIPPED | OUTPATIENT
Start: 2024-02-01 | End: 2024-04-05 | Stop reason: SDUPTHER

## 2024-02-01 NOTE — PROGRESS NOTES
Patient Information  Name: Narendra English Sr.  : 1965  MRN: [id number]     Referring Physician:  Dr. Duke ref. provider found   Primary Care Physician:  Tabitha Jackson MD   Date of Visit: 2024      Subjective:       Narendra English Sr. is a 58 y.o. male who presents for   Chief Complaint   Patient presents with    Rash     C/o rash on neck and shoulders, itching      Rash      Pt with hx of folliculitis, here for follow up. Has used oral doxycycline and ketoconazole shampoo without any improvement. Continue to experiencing itching and burning on chest and back.  He went to urgent care and receive IM steroids and 3 days of prednisone with improvement of itching but once completed medication, itching came back.  Patient was last seen:2023     Prior notes by myself reviewed.   Clinical documentation obtained by nursing staff reviewed.    Review of Systems   Skin:  Positive for itching and rash.        Objective:    Physical Exam   Constitutional: He appears well-developed and well-nourished. No distress.   Neurological: He is alert and oriented to person, place, and time. He is not disoriented.   Psychiatric: He has a normal mood and affect.   Skin:   Areas Examined (abnormalities noted in diagram):   Head / Face Inspection Performed  Neck Inspection Performed  Chest / Axilla Inspection Performed  Back Inspection Performed              Diagram Legend     Erythematous scaling macule/papule c/w actinic keratosis       Vascular papule c/w angioma      Pigmented verrucoid papule/plaque c/w seborrheic keratosis      Yellow umbilicated papule c/w sebaceous hyperplasia      Irregularly shaped tan macule c/w lentigo     1-2 mm smooth white papules consistent with Milia      Movable subcutaneous cyst with punctum c/w epidermal inclusion cyst      Subcutaneous movable cyst c/w pilar cyst      Firm pink to brown papule c/w dermatofibroma      Pedunculated fleshy papule(s) c/w skin tag(s)       Evenly pigmented macule c/w junctional nevus     Mildly variegated pigmented, slightly irregular-bordered macule c/w mildly atypical nevus      Flesh colored to evenly pigmented papule c/w intradermal nevus       Pink pearly papule/plaque c/w basal cell carcinoma      Erythematous hyperkeratotic cursted plaque c/w SCC      Surgical scar with no sign of skin cancer recurrence      Open and closed comedones      Inflammatory papules and pustules      Verrucoid papule consistent consistent with wart     Erythematous eczematous patches and plaques     Dystrophic onycholytic nail with subungual debris c/w onychomycosis     Umbilicated papule    Erythematous-base heme-crusted tan verrucoid plaque consistent with inflamed seborrheic keratosis     Erythematous Silvery Scaling Plaque c/w Psoriasis     See annotation      No images are attached to the encounter or orders placed in the encounter.    [] Data reviewed  [] Independent review of test  [] Management discussed with another provider    Assessment / Plan:        Folliculitis  -     fluconazole (DIFLUCAN) 200 MG Tab; Take 1 tablet (200 mg total) by mouth once daily. for 14 days  Dispense: 14 tablet; Refill: 0  Counseled Diflucan can have side affects such as change in taste, diarrhea, headache, nausea, these are not urgent, please report if bothersome. If a rash or feeling faint or having palpations please seek urgent care.     Pruritus  -     triamcinolone acetonide 0.1% (KENALOG) 0.1 % ointment; AAA bid  Dispense: 454 g; Refill: 1             LOS NUMBER AND COMPLEXITY OF PROBLEMS    COMPLEXITY OF DATA RISK TOTAL TIME (m)   70405  22441 [] 1 self-limited or minor problem [x] Minimal to none [] No treatment recommended or patient to monitor 15-29  10-19   40035  05709 Low  [] 2 or > self limited or minor problems  [] 1 stable chronic illness  [] 1 acute, uncomplicated illness or injury Limited (2)  [] Prior external notes from each unique source  [] Review result of  each unique test  [] Order each unique test []  Low  OTC medications, minor skin biopsy 30-44  20-29   97472  26640 Moderate  [x]  1 or > chronic illness with progression, exacerbation or SE of treatment  []  2 or more stable chronic illnesses  []  1 acute illness with systemic symptoms  []  1 acute complicated injury  []  1 undiagnosed new problem with uncertain prognosis Moderate (1/3 below)  []  3 or more data items        *Now includes assessment requiring independent historian  []  Independent interpretation of a test  []  Discuss management/test with another provider Moderate  [x]  Prescription drug mgmt  []  Minor surgery with risk discussed  []  Mgmt limited by social determinates 45-59  30-39   66978  52157 High  []  1 or more chronic illness with severe exacerbation, progression or SE of treatment  []  1 acute or chronic illness/injury that poses a threat to life or bodily function Extensive (2/3 below)  []  3 or more data items        *Now includes assessment requiring independent historian.  []  Independent interpretation of a test  []  Discuss management/test with another provider High  []  Major surgery with risk discussed  []  Drug therapy requiring intensive monitoring for toxicity  []  Hospitalization  []  Decision for DNR 60-74  40-54      No follow-ups on file.    Wendi Jin MD, FAAD  [facility]

## 2024-02-16 DIAGNOSIS — M10.9 GOUT, UNSPECIFIED CAUSE, UNSPECIFIED CHRONICITY, UNSPECIFIED SITE: ICD-10-CM

## 2024-02-16 RX ORDER — INDOMETHACIN 75 MG/1
75 CAPSULE, EXTENDED RELEASE ORAL 2 TIMES DAILY PRN
Qty: 60 CAPSULE | Refills: 0 | Status: SHIPPED | OUTPATIENT
Start: 2024-02-16

## 2024-02-16 NOTE — TELEPHONE ENCOUNTER
Patient clarified that he is taking 1 uloric and 1 colcochine daily and only takes indomethacin as needed for flare. He states that he just saw the kidney specialist and they did testing and everything came back good. He got the ok to take indomethacin PRN for flare.

## 2024-02-16 NOTE — TELEPHONE ENCOUNTER
Called to clarify if patient is taking indomethacin per Dr. Og. She states that he should be on colchicine instead. She would advise him to not take this since it may have cause kidney issue in December. LVM for patient to call back.

## 2024-02-16 NOTE — TELEPHONE ENCOUNTER
----- Message from Mary Cannon sent at 2/16/2024  1:25 PM CST -----  Regarding: Call back  Contact: [phone]  Type:  Patient Returning Call    Who Called: PT   Who Left Message for Patient: Nurse   Does the patient know what this is regarding?: Yes   Would the patient rather a call back or a response via [employer]? Call Back   Best Call Back Number: [phone]   Additional Information:

## 2024-02-23 ENCOUNTER — LAB VISIT (OUTPATIENT)
Dept: LAB | Facility: HOSPITAL | Age: 59
End: 2024-02-23
Attending: UROLOGY
Payer: COMMERCIAL

## 2024-02-23 ENCOUNTER — PROCEDURE VISIT (OUTPATIENT)
Dept: UROLOGY | Facility: CLINIC | Age: 59
End: 2024-02-23
Payer: COMMERCIAL

## 2024-02-23 VITALS
SYSTOLIC BLOOD PRESSURE: 101 MMHG | TEMPERATURE: 98 F | BODY MASS INDEX: 33.85 KG/M2 | HEART RATE: 71 BPM | HEIGHT: 72 IN | RESPIRATION RATE: 18 BRPM | DIASTOLIC BLOOD PRESSURE: 65 MMHG

## 2024-02-23 DIAGNOSIS — N52.9 ERECTILE DYSFUNCTION, UNSPECIFIED ERECTILE DYSFUNCTION TYPE: ICD-10-CM

## 2024-02-23 DIAGNOSIS — E29.1 HYPOGONADISM MALE: ICD-10-CM

## 2024-02-23 DIAGNOSIS — E29.1 HYPOGONADISM MALE: Primary | ICD-10-CM

## 2024-02-23 LAB
ALBUMIN SERPL BCP-MCNC: 3.7 G/DL (ref 3.5–5.2)
ALP SERPL-CCNC: 41 U/L (ref 55–135)
ALT SERPL W/O P-5'-P-CCNC: 24 U/L (ref 10–44)
AST SERPL-CCNC: 23 U/L (ref 10–40)
BASOPHILS # BLD AUTO: 0.09 K/UL (ref 0–0.2)
BASOPHILS NFR BLD: 1.2 % (ref 0–1.9)
BILIRUB DIRECT SERPL-MCNC: 0.3 MG/DL (ref 0.1–0.3)
BILIRUB SERPL-MCNC: 0.6 MG/DL (ref 0.1–1)
DIFFERENTIAL METHOD BLD: NORMAL
EOSINOPHIL # BLD AUTO: 0.3 K/UL (ref 0–0.5)
EOSINOPHIL NFR BLD: 4.4 % (ref 0–8)
ERYTHROCYTE [DISTWIDTH] IN BLOOD BY AUTOMATED COUNT: 13.8 % (ref 11.5–14.5)
HCT VFR BLD AUTO: 44.1 % (ref 40–54)
HGB BLD-MCNC: 14.2 G/DL (ref 14–18)
IMM GRANULOCYTES # BLD AUTO: 0.03 K/UL (ref 0–0.04)
IMM GRANULOCYTES NFR BLD AUTO: 0.4 % (ref 0–0.5)
LYMPHOCYTES # BLD AUTO: 2.2 K/UL (ref 1–4.8)
LYMPHOCYTES NFR BLD: 28.5 % (ref 18–48)
MCH RBC QN AUTO: 30.7 PG (ref 27–31)
MCHC RBC AUTO-ENTMCNC: 32.2 G/DL (ref 32–36)
MCV RBC AUTO: 95 FL (ref 82–98)
MONOCYTES # BLD AUTO: 1 K/UL (ref 0.3–1)
MONOCYTES NFR BLD: 13 % (ref 4–15)
NEUTROPHILS # BLD AUTO: 4.1 K/UL (ref 1.8–7.7)
NEUTROPHILS NFR BLD: 52.5 % (ref 38–73)
NRBC BLD-RTO: 0 /100 WBC
PLATELET # BLD AUTO: 331 K/UL (ref 150–450)
PMV BLD AUTO: 11.2 FL (ref 9.2–12.9)
PROT SERPL-MCNC: 6.5 G/DL (ref 6–8.4)
RBC # BLD AUTO: 4.63 M/UL (ref 4.6–6.2)
TESTOST SERPL-MCNC: 587 NG/DL (ref 304–1227)
WBC # BLD AUTO: 7.76 K/UL (ref 3.9–12.7)

## 2024-02-23 PROCEDURE — 36415 COLL VENOUS BLD VENIPUNCTURE: CPT | Mod: PO | Performed by: UROLOGY

## 2024-02-23 PROCEDURE — 84403 ASSAY OF TOTAL TESTOSTERONE: CPT | Performed by: UROLOGY

## 2024-02-23 PROCEDURE — 80076 HEPATIC FUNCTION PANEL: CPT | Performed by: UROLOGY

## 2024-02-23 PROCEDURE — 85025 COMPLETE CBC W/AUTO DIFF WBC: CPT | Performed by: UROLOGY

## 2024-02-23 PROCEDURE — S0189 TESTOSTERONE PELLET 75 MG: HCPCS | Mod: S$GLB,,, | Performed by: UROLOGY

## 2024-02-23 PROCEDURE — 11980 IMPLANT HORMONE PELLET(S): CPT | Mod: S$GLB,,, | Performed by: UROLOGY

## 2024-02-23 RX ORDER — LEVOFLOXACIN 500 MG/1
500 TABLET, FILM COATED ORAL DAILY
Qty: 3 TABLET | Refills: 0 | Status: SHIPPED | OUTPATIENT
Start: 2024-02-23 | End: 2024-03-11

## 2024-02-23 NOTE — PROCEDURES
Procedures  Chief Complaint:   Encounter Diagnoses   Name Primary?    Hypogonadism male Yes    Erectile dysfunction, unspecified erectile dysfunction type        HPI:   2/23/24- here today for testopel insertion.    06/16/2022 - 57 yo male that presents today for evaluation of ED.  Patient notes issues for the last 3-4 years but notes that over the last six months it has gotten worse.  He notes difficulty both achieving and maintaining an erection.  He has previously tried both Cialis and Viagra and both of these cause in to have a very bad headache that make it on tolerable to take these medications, though they do work.  Otherwise he voids with a good stream and feels like he empties well subjectively.  He denies any gross hematuria or family history of  cancers.  Denies prior stones or urologic procedures.    Allergies:  Demerol (pf) [meperidine (pf)], Percocet [oxycodone-acetaminophen], and Demerol [meperidine]    Medications:  has a current medication list which includes the following prescription(s): amlodipine, atorvastatin, azelastine, benazepril, clobetasol 0.05%, clonazepam, colchicine, diclofenac sodium, flash glucose scanning reader, flash glucose sensor, fluticasone propionate, hydralazine, hydrocortisone, indomethacin, toujeo solostar u-300 insulin, levothyroxine, metformin, metoprolol succinate, pregabalin, sildenafil, mounjaro, trazodone, triamcinolone acetonide 0.1%, and vascepa, and the following Facility-Administered Medications: gabapentin and lactated ringers.    Review of Systems:  General: No fever, chills, fatigability, or weight loss.  Skin: No rashes, itching, or changes in color or texture of skin.  Chest: Denies MEJÍA, cyanosis, wheezing, cough, and sputum production.  Abdomen: Appetite fine. No weight loss. Denies diarrhea, abdominal pain, hematemesis, or blood in stool.  Musculoskeletal: No joint stiffness or swelling. Denies back pain.  : As above.  All other review of systems  negative.    PMH:   has a past medical history of Cancer, Diabetes mellitus (8 years), Hypertension, Sleep apnea, and Thyroid disease.    PSH:   has a past surgical history that includes Colonoscopy (N/A, 12/29/2020); Laparoscopic right colon resection (N/A, 02/02/2021); Back surgery; Colonoscopy (N/A, 02/10/2022); Tonsillectomy; fess, with nasal septoplasty, with imaging guidance (Bilateral, 08/17/2022); Nasal turbinate reduction (Bilateral, 08/17/2022); Colon surgery (02/06/21); and Epidural steroid injection into cervical spine (N/A, 12/1/2022).    FamHx: family history includes Breast cancer in his mother; Cancer in his mother; Cataracts in his maternal grandmother; Diabetes in his maternal grandmother and mother; Hypertension in his brother, brother, and mother; Stroke in his father.    SocHx:  reports that he has never smoked. He has never used smokeless tobacco. He reports that he does not drink alcohol and does not use drugs.      Physical Exam:  There were no vitals filed for this visit.    General: A&Ox3, no apparent distress, no deformities  Neck: No masses, normal ROM  Lungs: normal inspiration, no use of accessory muscles  Heart: normal pulse, no arrhythmias  Abdomen: Soft, NT, ND, no masses, no hernias, no hepatosplenomegaly  Skin: The skin is warm and dry. No jaundice.  Ext: No c/c/e.  JAIMIE: 6/22: Normal rectal tone, no hemorrhoids. Prostate smooth and normal, no nodules 30 gm SV not palpable. Perineum and anus normal.    Labs/Studies:   Testopel  2/23/24, 11/29/23, 8/25/23, 5/26/26, 1/20/23  Testosterone 328 8/22   PSA 0.60 8/23    Patient was sterilely prepped and draped, then positioned in the left side up, lateral decubitus position.  Lidocaine was used for local anesthesia.  Eleven blade was used to incise the skin, included trocars with the testopel kit were then used.  They were inserted within the incision site and we placed the pellets in a V location.  10 pellets total were inserted without  difficulty.  Trocars were removed and pressure was applied for 2 min.  Steri-Strips applied for local coverage, he will return for lab assessment in 3 months.      Impression/Plan:      1. Hypogonadism- Patient tolerated testopel insertion today and will follow back up in 3 months for repeat insertion.  Will obtain labs prior to the procedure and will call with any complaints in the meantime.  Of note previous AndroGel and shots have failed.    2. Erectile dysfunction- sildenafil 100 mg never really assisted and the TriMix caused priapism.  Patient has done well with Cialis 20 mg, a refill has been sent in to the pharmacy.

## 2024-02-24 ENCOUNTER — PATIENT MESSAGE (OUTPATIENT)
Dept: ADMINISTRATIVE | Facility: HOSPITAL | Age: 59
End: 2024-02-24
Payer: COMMERCIAL

## 2024-02-26 ENCOUNTER — PATIENT OUTREACH (OUTPATIENT)
Dept: ADMINISTRATIVE | Facility: HOSPITAL | Age: 59
End: 2024-02-26
Payer: COMMERCIAL

## 2024-02-26 DIAGNOSIS — Z79.4 TYPE 2 DIABETES MELLITUS WITH OTHER CIRCULATORY COMPLICATION, WITH LONG-TERM CURRENT USE OF INSULIN: Primary | ICD-10-CM

## 2024-02-26 DIAGNOSIS — E11.59 TYPE 2 DIABETES MELLITUS WITH OTHER CIRCULATORY COMPLICATION, WITH LONG-TERM CURRENT USE OF INSULIN: Primary | ICD-10-CM

## 2024-02-26 DIAGNOSIS — E11.69 HYPERLIPIDEMIA ASSOCIATED WITH TYPE 2 DIABETES MELLITUS: ICD-10-CM

## 2024-02-26 DIAGNOSIS — E78.5 HYPERLIPIDEMIA ASSOCIATED WITH TYPE 2 DIABETES MELLITUS: ICD-10-CM

## 2024-02-26 NOTE — PROGRESS NOTES
Pt responded to portal message.  Pt has appt 3/05/24. Mess sent asking pt to come fasting, so labs may be done while at the clinic.  Mess sent to pcp, to see if any other labs need for annual.

## 2024-02-26 NOTE — Clinical Note
I am trying to get pts labs scheduled for 3/05/24. Pt has annual with [facility] 3/11/24. Would you please place any orders he needs. Let me know and I will link them to appt

## 2024-02-27 NOTE — PROGRESS NOTES
Pt agreed to come in fasting for labs, 3/05/24.   A1C and micro albumin urine ordered and appt scheduled.

## 2024-03-01 ENCOUNTER — LAB VISIT (OUTPATIENT)
Dept: LAB | Facility: HOSPITAL | Age: 59
End: 2024-03-01
Attending: FAMILY MEDICINE
Payer: COMMERCIAL

## 2024-03-01 DIAGNOSIS — E11.69 HYPERLIPIDEMIA ASSOCIATED WITH TYPE 2 DIABETES MELLITUS: ICD-10-CM

## 2024-03-01 DIAGNOSIS — E11.59 TYPE 2 DIABETES MELLITUS WITH OTHER CIRCULATORY COMPLICATION, WITH LONG-TERM CURRENT USE OF INSULIN: ICD-10-CM

## 2024-03-01 DIAGNOSIS — Z00.00 ROUTINE CHECK-UP: Primary | ICD-10-CM

## 2024-03-01 DIAGNOSIS — Z79.4 TYPE 2 DIABETES MELLITUS WITH OTHER CIRCULATORY COMPLICATION, WITH LONG-TERM CURRENT USE OF INSULIN: ICD-10-CM

## 2024-03-01 DIAGNOSIS — Z00.00 ROUTINE CHECK-UP: ICD-10-CM

## 2024-03-01 DIAGNOSIS — E78.5 HYPERLIPIDEMIA ASSOCIATED WITH TYPE 2 DIABETES MELLITUS: ICD-10-CM

## 2024-03-01 LAB
ALBUMIN SERPL BCP-MCNC: 3.8 G/DL (ref 3.5–5.2)
ALBUMIN/CREAT UR: 36.8 UG/MG (ref 0–30)
ALP SERPL-CCNC: 36 U/L (ref 55–135)
ALT SERPL W/O P-5'-P-CCNC: 26 U/L (ref 10–44)
ANION GAP SERPL CALC-SCNC: 8 MMOL/L (ref 8–16)
AST SERPL-CCNC: 25 U/L (ref 10–40)
BASOPHILS # BLD AUTO: 0.09 K/UL (ref 0–0.2)
BASOPHILS NFR BLD: 1.2 % (ref 0–1.9)
BILIRUB SERPL-MCNC: 0.5 MG/DL (ref 0.1–1)
BUN SERPL-MCNC: 13 MG/DL (ref 6–20)
CALCIUM SERPL-MCNC: 9.4 MG/DL (ref 8.7–10.5)
CHLORIDE SERPL-SCNC: 107 MMOL/L (ref 95–110)
CHOLEST SERPL-MCNC: 92 MG/DL (ref 120–199)
CHOLEST/HDLC SERPL: 3.3 {RATIO} (ref 2–5)
CO2 SERPL-SCNC: 25 MMOL/L (ref 23–29)
CREAT SERPL-MCNC: 1.1 MG/DL (ref 0.5–1.4)
CREAT UR-MCNC: 277 MG/DL (ref 23–375)
DIFFERENTIAL METHOD BLD: ABNORMAL
EOSINOPHIL # BLD AUTO: 0.2 K/UL (ref 0–0.5)
EOSINOPHIL NFR BLD: 2.9 % (ref 0–8)
ERYTHROCYTE [DISTWIDTH] IN BLOOD BY AUTOMATED COUNT: 13.6 % (ref 11.5–14.5)
EST. GFR  (NO RACE VARIABLE): >60 ML/MIN/1.73 M^2
ESTIMATED AVG GLUCOSE: 114 MG/DL (ref 68–131)
GLUCOSE SERPL-MCNC: 75 MG/DL (ref 70–110)
HBA1C MFR BLD: 5.6 % (ref 4–5.6)
HCT VFR BLD AUTO: 44.8 % (ref 40–54)
HDLC SERPL-MCNC: 28 MG/DL (ref 40–75)
HDLC SERPL: 30.4 % (ref 20–50)
HGB BLD-MCNC: 14.2 G/DL (ref 14–18)
IMM GRANULOCYTES # BLD AUTO: 0.03 K/UL (ref 0–0.04)
IMM GRANULOCYTES NFR BLD AUTO: 0.4 % (ref 0–0.5)
LDLC SERPL CALC-MCNC: 37 MG/DL (ref 63–159)
LYMPHOCYTES # BLD AUTO: 2.1 K/UL (ref 1–4.8)
LYMPHOCYTES NFR BLD: 29.2 % (ref 18–48)
MCH RBC QN AUTO: 30.9 PG (ref 27–31)
MCHC RBC AUTO-ENTMCNC: 31.7 G/DL (ref 32–36)
MCV RBC AUTO: 98 FL (ref 82–98)
MICROALBUMIN UR DL<=1MG/L-MCNC: 102 UG/ML
MONOCYTES # BLD AUTO: 0.9 K/UL (ref 0.3–1)
MONOCYTES NFR BLD: 12.2 % (ref 4–15)
NEUTROPHILS # BLD AUTO: 3.9 K/UL (ref 1.8–7.7)
NEUTROPHILS NFR BLD: 54.1 % (ref 38–73)
NONHDLC SERPL-MCNC: 64 MG/DL
NRBC BLD-RTO: 0 /100 WBC
PLATELET # BLD AUTO: 297 K/UL (ref 150–450)
PMV BLD AUTO: 12.4 FL (ref 9.2–12.9)
POTASSIUM SERPL-SCNC: 4.1 MMOL/L (ref 3.5–5.1)
PROT SERPL-MCNC: 6.7 G/DL (ref 6–8.4)
RBC # BLD AUTO: 4.59 M/UL (ref 4.6–6.2)
SODIUM SERPL-SCNC: 140 MMOL/L (ref 136–145)
TRIGL SERPL-MCNC: 135 MG/DL (ref 30–150)
TSH SERPL DL<=0.005 MIU/L-ACNC: 1.48 UIU/ML (ref 0.4–4)
WBC # BLD AUTO: 7.27 K/UL (ref 3.9–12.7)

## 2024-03-01 PROCEDURE — 82043 UR ALBUMIN QUANTITATIVE: CPT | Performed by: FAMILY MEDICINE

## 2024-03-01 PROCEDURE — 80061 LIPID PANEL: CPT | Performed by: PHYSICIAN ASSISTANT

## 2024-03-01 PROCEDURE — 80053 COMPREHEN METABOLIC PANEL: CPT | Performed by: PHYSICIAN ASSISTANT

## 2024-03-01 PROCEDURE — 36415 COLL VENOUS BLD VENIPUNCTURE: CPT | Mod: PO | Performed by: FAMILY MEDICINE

## 2024-03-01 PROCEDURE — 83036 HEMOGLOBIN GLYCOSYLATED A1C: CPT | Performed by: FAMILY MEDICINE

## 2024-03-01 PROCEDURE — 85025 COMPLETE CBC W/AUTO DIFF WBC: CPT | Performed by: PHYSICIAN ASSISTANT

## 2024-03-01 PROCEDURE — 84443 ASSAY THYROID STIM HORMONE: CPT | Performed by: PHYSICIAN ASSISTANT

## 2024-03-07 DIAGNOSIS — E11.9 TYPE 2 DIABETES MELLITUS WITHOUT COMPLICATION, WITH LONG-TERM CURRENT USE OF INSULIN: ICD-10-CM

## 2024-03-07 DIAGNOSIS — Z79.4 TYPE 2 DIABETES MELLITUS WITHOUT COMPLICATION, WITH LONG-TERM CURRENT USE OF INSULIN: ICD-10-CM

## 2024-03-07 RX ORDER — METFORMIN HYDROCHLORIDE 1000 MG/1
1000 TABLET ORAL 2 TIMES DAILY WITH MEALS
Qty: 180 TABLET | Refills: 1 | Status: SHIPPED | OUTPATIENT
Start: 2024-03-07

## 2024-03-07 NOTE — TELEPHONE ENCOUNTER
No care due was identified.  Health [facility] Embedded Care Due Messages. Reference number: [id number].   3/07/2024 1:09:03 AM CST

## 2024-03-07 NOTE — TELEPHONE ENCOUNTER
Refill Decision Note   Narendra Amador  is requesting a refill authorization.  Brief Assessment and Rationale for Refill:  Approve     Medication Therapy Plan:        Comments:     Note composed:11:28 AM 03/07/2024

## 2024-03-08 NOTE — PROGRESS NOTES
Subjective:   Patient ID:  Narendra English Sr. is a 58 y.o. male who presents for cardiac consult of No chief complaint on file.      Referring Physician:    Tabitha Melgoza MD [[id number]] [id number] [facility] [place] [address]      Reason for consult: HTN    HPI  The patient came in today for cardiac consult of No chief complaint on file.      Narendra English Sr. is a 58 y.o. male pt with HTN, HLD, elevated TGs, DM2, FARA, obesity, gout, hypothyroidism, low T presents for CV eval of HTN         1/19/2024  Follow up from 2/2023. Lipids very well controlled 9/2023.   Was on Mounjaro 12.5mg - may increase to 15mg  Will increase to 15mg.   NO CP/SOB.   ECG - NSR, RBBB     3/11/24  BP and HR stable. Lipids well controlled - low LDL  BMI 33 - 248 lbs.       Patient has fairly good exercise tolerance.    Patient is compliant with medications.      Stress ECHO 2/2022  Summary    Concentric remodeling and normal systolic function.  There were no arrhythmias during stress.  The estimated ejection fraction is 60%.  Normal left ventricular diastolic function.  With normal right ventricular systolic function.  The stress echo portion of this study is negative for myocardial ischemia.  The ECG portion of this study is negative for myocardial ischemia.    Past Medical History:   Diagnosis Date    Allergy     Cancer     Colon    Diabetes mellitus      09/14/2023 Insulin x 10 years    Gastroesophageal reflux disease without esophagitis 06/20/2023    Hypertension     Sleep apnea     Thyroid disease        Past Surgical History:   Procedure Laterality Date    BACK SURGERY      COLON SURGERY  02/06/21    COLONOSCOPY N/A 12/29/2020    Procedure: COLONOSCOPY;  Surgeon: William Cotter MD;  Location: [facility];  Service: Endoscopy;  Laterality: N/A;  Will need Rapid doesn't live here    COLONOSCOPY N/A 02/10/2022    Procedure: COLONOSCOPY;  Surgeon: Wili Espinosa MD;  Location: [facility];  Service:  Endoscopy;  Laterality: N/A;    EPIDURAL STEROID INJECTION INTO CERVICAL SPINE N/A 12/01/2022    Procedure: C7/T1 IL SALBADOR;  Surgeon: Leonard Mart MD;  Location: [facility] PAIN MGT;  Service: Pain Management;  Laterality: N/A;    FESS, WITH NASAL SEPTOPLASTY, WITH IMAGING GUIDANCE Bilateral 08/17/2022    Procedure: FESS, WITH NASAL SEPTOPLASTY, WITH IMAGING GUIDANCE;  Surgeon: Viktor Ferrell MD;  Location: [facility] OR;  Service: ENT;  Laterality: Bilateral;    LAPAROSCOPIC RIGHT COLON RESECTION N/A 02/02/2021    Procedure: COLECTOMY, RIGHT, LAPAROSCOPIC, ERAS low;  Surgeon: Melonie Santoyo MD;  Location: [facility] OR 2ND FLR;  Service: Colon and Rectal;  Laterality: N/A;  needs rapid covid test    LYSIS OF ADHESIONS Bilateral 04/19/2023    Procedure: LYSIS, ADHESIONS;  Surgeon: Viktor Ferrell MD;  Location: [facility] OR;  Service: ENT;  Laterality: Bilateral;    NASAL ENDOSCOPY Bilateral 04/19/2023    Procedure: ENDOSCOPY, NOSE;  Surgeon: Viktor Ferrell MD;  Location: [facility] OR;  Service: ENT;  Laterality: Bilateral;    NASAL TURBINATE REDUCTION Bilateral 08/17/2022    Procedure: REDUCTION, NASAL TURBINATE;  Surgeon: Viktor Ferrell MD;  Location: [facility] OR;  Service: ENT;  Laterality: Bilateral;    RHINOPLASTY Bilateral 04/19/2023    Procedure: RHINOPLASTY;  Surgeon: Viktor Ferrell MD;  Location: [facility] OR;  Service: ENT;  Laterality: Bilateral;    SELECTIVE INJECTION OF ANESTHETIC AGENT AROUND LUMBAR SPINAL NERVE ROOT BY TRANSFORAMINAL APPROACH Bilateral 07/05/2023    Procedure: Bilateral L4/5 TF SALBADOR RN IV Sedation;  Surgeon: Leonard Mart MD;  Location: [facility] PAIN MGT;  Service: Pain Management;  Laterality: Bilateral;    TONSILLECTOMY         Social History     Tobacco Use    Smoking status: Never     Passive exposure: Never    Smokeless tobacco: Never   Substance Use Topics    Alcohol use: Never    Drug use: Never       Family History   Problem Relation Age of Onset    Hypertension Mother     Diabetes Mother      Breast cancer Mother     Cancer Mother     Hypertension Father     Stroke Father     No Known Problems Sister     No Known Problems Sister     Hypertension Brother     Hypertension Brother     Cataracts Maternal Grandmother     Diabetes Maternal Grandmother     Lung cancer Maternal Grandfather     No Known Problems Paternal Grandmother     No Known Problems Paternal Grandfather        Patient's Medications   New Prescriptions    No medications on file   Previous Medications    ALLERGY RELIEF, FEXOFENADINE, 180 MG TABLET    Take 180 mg by mouth once daily.    AMLODIPINE (NORVASC) 5 MG TABLET    Take 1 tablet (5 mg total) by mouth once daily.    AZELASTINE (ASTELIN) 137 MCG (0.1 %) NASAL SPRAY    1 spray (137 mcg total) by Nasal route 2 (two) times daily.    BENAZEPRIL (LOTENSIN) 40 MG TABLET    Take 1 tablet (40 mg total) by mouth once daily.    CLOBETASOL 0.05% (TEMOVATE) 0.05 % OINT    APPLY TOPICALLY TO THE AFFECTED AREA(S) TWICE DAILY    CLONAZEPAM (KLONOPIN) 1 MG TABLET    Take 1 tablet (1 mg total) by mouth every evening.    COLCHICINE, GOUT, (COLCRYS) 0.6 MG TABLET    Take 1 tablet (0.6 mg total) by mouth once daily.    ESZOPICLONE (LUNESTA) 3 MG TAB    Take 3 mg by mouth every evening.    FAMOTIDINE (PEPCID) 40 MG TABLET    Take 40 mg by mouth 2 (two) times daily.    FEBUXOSTAT (ULORIC) 40 MG TAB    Take 1 tablet (40 mg total) by mouth once daily.    FLUTICASONE PROPIONATE (FLONASE) 50 MCG/ACTUATION NASAL SPRAY    1 spray (50 mcg total) by Each Nostril route once daily.    HYDRALAZINE (APRESOLINE) 100 MG TABLET    Take 1 tablet (100 mg total) by mouth every 8 (eight) hours.    HYDROXYZINE HCL (ATARAX) 25 MG TABLET    Take 25 mg by mouth 2 (two) times daily.    INDOMETHACIN (INDOCIN SR) 75 MG CPSR CR CAPSULE    Take 1 capsule (75 mg total) by mouth 2 (two) times daily as needed (for gout flare).    INSULIN GLARGINE, TOUJEO, (TOUJEO SOLOSTAR U-300 INSULIN) 300 UNIT/ML (1.5 ML) INPN PEN    INJECT 85 UNITS  SUBCUTANEOUSLY ONCE DAILY    KETOCONAZOLE (NIZORAL) 2 % SHAMPOO    Wash body with medicated shampoo at least 2x/week - let sit on body at least 5 minutes prior to rinsing    LEVOCETIRIZINE (XYZAL) 5 MG TABLET    Take 5 mg by mouth.    LEVOTHYROXINE (SYNTHROID) 112 MCG TABLET    Take 1 tablet (112 mcg total) by mouth before breakfast.    METFORMIN (GLUCOPHAGE) 1000 MG TABLET    Take 1 tablet by mouth twice daily with food    METOPROLOL SUCCINATE (TOPROL-XL) 50 MG 24 HR TABLET    Take 1 tablet (50 mg total) by mouth once daily.    PERMETHRIN (ELIMITE) 5 % CREAM    Apply topically.    PREDNISONE (DELTASONE) 10 MG TABLET    Take 1 tablet (10 mg total) by mouth once daily. Take 3 tablets a day for 3 days (1before breakfast, 1 after lunch, 1 before bed) Then take 2 tablets a day for 3 days (1 before breakfast, 1 before bed) Then take 1 tablet a day for 3 days (1 before breakfast)    PREGABALIN (LYRICA) 150 MG CAPSULE    Take 1 capsule (150 mg total) by mouth 3 (three) times daily.    TADALAFIL (CIALIS) 20 MG TAB    Take 1 tablet (20 mg total) by mouth every 24 hours as needed (erectile dysfunction).    TIRZEPATIDE (MOUNJARO) 15 MG/0.5 ML PNIJ    Inject 15 mg into the skin every 7 days.    TRIAMCINOLONE ACETONIDE 0.1% (KENALOG) 0.1 % CREAM    Apply 30 g topically 2 (two) times daily.    TRIAMCINOLONE ACETONIDE 0.1% (KENALOG) 0.1 % OINTMENT    AAA bid    VASCEPA 1 GRAM CAP    Take 2 capsules (2,000 mg total) by mouth 2 (two) times daily.   Modified Medications    Modified Medication Previous Medication    ATORVASTATIN (LIPITOR) 10 MG TABLET atorvastatin (LIPITOR) 20 MG tablet       Take 1 tablet (10 mg total) by mouth every evening.    Take 1 tablet (20 mg total) by mouth every evening.   Discontinued Medications    No medications on file       Review of Systems   Constitutional: Negative.    HENT: Negative.     Eyes: Negative.    Respiratory: Negative.     Cardiovascular: Negative.    Gastrointestinal: Negative.     Genitourinary: Negative.    Musculoskeletal: Negative.    Skin: Negative.    Neurological: Negative.    Endo/Heme/Allergies: Negative.    Psychiatric/Behavioral: Negative.     All 12 systems otherwise negative.      Wt Readings from Last 3 Encounters:   03/11/24 112.8 kg (248 lb 10.9 oz)   01/29/24 113.2 kg (249 lb 9 oz)   01/23/24 110.6 kg (243 lb 13.3 oz)     Temp Readings from Last 3 Encounters:   02/23/24 97.7 °F (36.5 °C) (Oral)   01/23/24 97.2 °F (36.2 °C) (Tympanic)   12/07/23 98.7 °F (37.1 °C) (Oral)     BP Readings from Last 3 Encounters:   03/11/24 136/82   02/23/24 101/65   01/29/24 112/62     Pulse Readings from Last 3 Encounters:   03/11/24 85   02/23/24 71   01/29/24 81       /82 (BP Location: Left arm, Patient Position: Sitting, BP Method: Medium (Manual))   Pulse 85   Ht 6' (1.829 m)   Wt 112.8 kg (248 lb 10.9 oz)   SpO2 97%   BMI 33.73 kg/m²     Objective:   Physical Exam  Vitals and nursing note reviewed.   Constitutional:       General: He is not in acute distress.     Appearance: He is well-developed. He is obese. He is not diaphoretic.   HENT:      Head: Normocephalic and atraumatic.      Nose: Nose normal.   Eyes:      General: No scleral icterus.     Conjunctiva/sclera: Conjunctivae normal.   Neck:      Thyroid: No thyromegaly.      Vascular: No JVD.   Cardiovascular:      Rate and Rhythm: Normal rate and regular rhythm.      Heart sounds: S1 normal and S2 normal. No murmur heard.     No friction rub. No gallop. No S3 or S4 sounds.   Pulmonary:      Effort: Pulmonary effort is normal. No respiratory distress.      Breath sounds: Normal breath sounds. No stridor. No wheezing or rales.   Chest:      Chest wall: No tenderness.   Abdominal:      General: Bowel sounds are normal. There is no distension.      Palpations: Abdomen is soft. There is no mass.      Tenderness: There is no abdominal tenderness. There is no rebound.   Genitourinary:     Comments: Deferred  Musculoskeletal:          General: No tenderness or deformity. Normal range of motion.      Cervical back: Normal range of motion and neck supple.   Lymphadenopathy:      Cervical: No cervical adenopathy.   Skin:     General: Skin is warm and dry.      Coloration: Skin is not pale.      Findings: No erythema or rash.   Neurological:      Mental Status: He is alert and oriented to person, place, and time.      Motor: No abnormal muscle tone.      Coordination: Coordination normal.   Psychiatric:         Behavior: Behavior normal.         Thought Content: Thought content normal.         Judgment: Judgment normal.         Lab Results   Component Value Date     03/01/2024    K 4.1 03/01/2024     03/01/2024    CO2 25 03/01/2024    BUN 13 03/01/2024    CREATININE 1.1 03/01/2024    GLU 75 03/01/2024    HGBA1C 5.6 03/01/2024    MG 1.6 12/07/2023    AST 25 03/01/2024    ALT 26 03/01/2024    ALBUMIN 3.8 03/01/2024    ALBUMIN 4.1 08/12/2022    PROT 6.7 03/01/2024    BILITOT 0.5 03/01/2024    WBC 7.27 03/01/2024    HGB 14.2 03/01/2024    HCT 44.8 03/01/2024    MCV 98 03/01/2024     03/01/2024    TSH 1.479 03/01/2024    CHOL 92 (L) 03/01/2024    HDL 28 (L) 03/01/2024    LDLCALC 37.0 (L) 03/01/2024    TRIG 135 03/01/2024    BNP <10 11/15/2021     Assessment:      1. Benign essential HTN    2. Type 2 diabetes mellitus with other specified complication, unspecified whether long term insulin use    3. Hypertension associated with diabetes    4. Hyperlipidemia, unspecified hyperlipidemia type    5. FARA (obstructive sleep apnea)    6. Hypertriglyceridemia    7. Hypothyroidism (acquired)    8. Type 2 diabetes mellitus without complication, with long-term current use of insulin    9. BMI 36.0-36.9,adult    10. Low testosterone    11. Type 2 diabetes mellitus without complication, without long-term current use of insulin    12. Other hyperlipidemia              Plan:     HTN   - titrate meds   - dec Norvasc to 5mg     2. HLD,   - cont meds  and titrate  - Lipids very well controlled   - lower statin dose to Lipitor 10 mg     3. DM2 A1c 6.6 --> 5.3  - cont tx - on Ozempic - increase to 2mg - changed to Mounjaro - increase to 7.5 mg --> increased to 12.5 --> 15     4. Hypothyroidsm TSH1.1  - cont Synthroid    5. FARA  - cont CPAP    6. Obesity, BMI 36 --> BMI 35 - 268 lbs -->  BMI 33 - 248 lbs.   - cont weight loss with diet/exercise     7. Low T  - cont T supplements as needed  - monitor BP     8. Preop CV eval -neck surgery pending with Dr. Nam   - low CV risk, proceed as needed  - cont BB and statin periop  - stable ECG without cardiac symptoms    Visit today included increased complexity associated with the care of the episodic problem HTN addressed and managing the longitudinal care of the patient due to the serious and/or complex managed problem(s) .      Thank you for allowing me to participate in this patient's care. Please do not hesitate to contact me with any questions or concerns. Consult note has been forwarded to the referral physician.     Kwame Guidry MD, [facility]  Cardiovascular Disease  [facility], [place]  [phone] (P)

## 2024-03-11 ENCOUNTER — OFFICE VISIT (OUTPATIENT)
Dept: INTERNAL MEDICINE | Facility: CLINIC | Age: 59
End: 2024-03-11
Payer: COMMERCIAL

## 2024-03-11 ENCOUNTER — OFFICE VISIT (OUTPATIENT)
Dept: CARDIOLOGY | Facility: CLINIC | Age: 59
End: 2024-03-11
Payer: COMMERCIAL

## 2024-03-11 VITALS
SYSTOLIC BLOOD PRESSURE: 136 MMHG | BODY MASS INDEX: 33.68 KG/M2 | HEIGHT: 72 IN | HEART RATE: 85 BPM | WEIGHT: 248.69 LBS | DIASTOLIC BLOOD PRESSURE: 82 MMHG | OXYGEN SATURATION: 97 %

## 2024-03-11 VITALS
DIASTOLIC BLOOD PRESSURE: 82 MMHG | BODY MASS INDEX: 33.31 KG/M2 | TEMPERATURE: 97 F | HEART RATE: 89 BPM | WEIGHT: 245.56 LBS | OXYGEN SATURATION: 96 % | SYSTOLIC BLOOD PRESSURE: 138 MMHG

## 2024-03-11 DIAGNOSIS — E11.59 HYPERTENSION ASSOCIATED WITH DIABETES: ICD-10-CM

## 2024-03-11 DIAGNOSIS — R79.89 LOW TESTOSTERONE: ICD-10-CM

## 2024-03-11 DIAGNOSIS — E78.5 HYPERLIPIDEMIA, UNSPECIFIED HYPERLIPIDEMIA TYPE: ICD-10-CM

## 2024-03-11 DIAGNOSIS — G47.33 OSA (OBSTRUCTIVE SLEEP APNEA): ICD-10-CM

## 2024-03-11 DIAGNOSIS — E11.59 TYPE 2 DIABETES MELLITUS WITH OTHER CIRCULATORY COMPLICATION, WITH LONG-TERM CURRENT USE OF INSULIN: ICD-10-CM

## 2024-03-11 DIAGNOSIS — Z79.4 TYPE 2 DIABETES MELLITUS WITH OTHER CIRCULATORY COMPLICATION, WITH LONG-TERM CURRENT USE OF INSULIN: ICD-10-CM

## 2024-03-11 DIAGNOSIS — Z85.038 HISTORY OF MALIGNANT NEOPLASM OF COLON: ICD-10-CM

## 2024-03-11 DIAGNOSIS — E03.9 HYPOTHYROIDISM (ACQUIRED): ICD-10-CM

## 2024-03-11 DIAGNOSIS — Z79.4 TYPE 2 DIABETES MELLITUS WITHOUT COMPLICATION, WITH LONG-TERM CURRENT USE OF INSULIN: ICD-10-CM

## 2024-03-11 DIAGNOSIS — E11.69 HYPERLIPIDEMIA ASSOCIATED WITH TYPE 2 DIABETES MELLITUS: ICD-10-CM

## 2024-03-11 DIAGNOSIS — I15.2 HYPERTENSION ASSOCIATED WITH DIABETES: ICD-10-CM

## 2024-03-11 DIAGNOSIS — L28.2 PRURITIC RASH: ICD-10-CM

## 2024-03-11 DIAGNOSIS — E11.9 TYPE 2 DIABETES MELLITUS WITHOUT COMPLICATION, WITH LONG-TERM CURRENT USE OF INSULIN: ICD-10-CM

## 2024-03-11 DIAGNOSIS — E11.9 TYPE 2 DIABETES MELLITUS WITHOUT COMPLICATION, WITHOUT LONG-TERM CURRENT USE OF INSULIN: ICD-10-CM

## 2024-03-11 DIAGNOSIS — Z00.00 ROUTINE GENERAL MEDICAL EXAMINATION AT A HEALTH CARE FACILITY: Primary | ICD-10-CM

## 2024-03-11 DIAGNOSIS — E78.49 OTHER HYPERLIPIDEMIA: ICD-10-CM

## 2024-03-11 DIAGNOSIS — M10.9 GOUT, UNSPECIFIED CAUSE, UNSPECIFIED CHRONICITY, UNSPECIFIED SITE: ICD-10-CM

## 2024-03-11 DIAGNOSIS — E78.1 HYPERTRIGLYCERIDEMIA: ICD-10-CM

## 2024-03-11 DIAGNOSIS — E78.5 HYPERLIPIDEMIA ASSOCIATED WITH TYPE 2 DIABETES MELLITUS: ICD-10-CM

## 2024-03-11 DIAGNOSIS — K21.9 GASTROESOPHAGEAL REFLUX DISEASE WITHOUT ESOPHAGITIS: ICD-10-CM

## 2024-03-11 DIAGNOSIS — E11.69 TYPE 2 DIABETES MELLITUS WITH OTHER SPECIFIED COMPLICATION, UNSPECIFIED WHETHER LONG TERM INSULIN USE: ICD-10-CM

## 2024-03-11 DIAGNOSIS — I10 BENIGN ESSENTIAL HTN: Primary | ICD-10-CM

## 2024-03-11 PROCEDURE — 3072F LOW RISK FOR RETINOPATHY: CPT | Mod: CPTII,S$GLB,, | Performed by: INTERNAL MEDICINE

## 2024-03-11 PROCEDURE — 3075F SYST BP GE 130 - 139MM HG: CPT | Mod: CPTII,S$GLB,, | Performed by: INTERNAL MEDICINE

## 2024-03-11 PROCEDURE — 1159F MED LIST DOCD IN RCRD: CPT | Mod: CPTII,S$GLB,, | Performed by: INTERNAL MEDICINE

## 2024-03-11 PROCEDURE — 3066F NEPHROPATHY DOC TX: CPT | Mod: CPTII,S$GLB,, | Performed by: INTERNAL MEDICINE

## 2024-03-11 PROCEDURE — 3008F BODY MASS INDEX DOCD: CPT | Mod: CPTII,S$GLB,, | Performed by: PHYSICIAN ASSISTANT

## 2024-03-11 PROCEDURE — 3066F NEPHROPATHY DOC TX: CPT | Mod: CPTII,S$GLB,, | Performed by: PHYSICIAN ASSISTANT

## 2024-03-11 PROCEDURE — 1159F MED LIST DOCD IN RCRD: CPT | Mod: CPTII,S$GLB,, | Performed by: PHYSICIAN ASSISTANT

## 2024-03-11 PROCEDURE — 3044F HG A1C LEVEL LT 7.0%: CPT | Mod: CPTII,S$GLB,, | Performed by: PHYSICIAN ASSISTANT

## 2024-03-11 PROCEDURE — 4010F ACE/ARB THERAPY RXD/TAKEN: CPT | Mod: CPTII,S$GLB,, | Performed by: INTERNAL MEDICINE

## 2024-03-11 PROCEDURE — 1160F RVW MEDS BY RX/DR IN RCRD: CPT | Mod: CPTII,S$GLB,, | Performed by: PHYSICIAN ASSISTANT

## 2024-03-11 PROCEDURE — 99214 OFFICE O/P EST MOD 30 MIN: CPT | Mod: S$GLB,,, | Performed by: INTERNAL MEDICINE

## 2024-03-11 PROCEDURE — 3079F DIAST BP 80-89 MM HG: CPT | Mod: CPTII,S$GLB,, | Performed by: INTERNAL MEDICINE

## 2024-03-11 PROCEDURE — 3079F DIAST BP 80-89 MM HG: CPT | Mod: CPTII,S$GLB,, | Performed by: PHYSICIAN ASSISTANT

## 2024-03-11 PROCEDURE — 3072F LOW RISK FOR RETINOPATHY: CPT | Mod: CPTII,S$GLB,, | Performed by: PHYSICIAN ASSISTANT

## 2024-03-11 PROCEDURE — 4010F ACE/ARB THERAPY RXD/TAKEN: CPT | Mod: CPTII,S$GLB,, | Performed by: PHYSICIAN ASSISTANT

## 2024-03-11 PROCEDURE — G2211 COMPLEX E/M VISIT ADD ON: HCPCS | Mod: S$GLB,,, | Performed by: INTERNAL MEDICINE

## 2024-03-11 PROCEDURE — 99999 PR PBB SHADOW E&M-EST. PATIENT-LVL III: CPT | Mod: PBBFAC,,, | Performed by: INTERNAL MEDICINE

## 2024-03-11 PROCEDURE — 1160F RVW MEDS BY RX/DR IN RCRD: CPT | Mod: CPTII,S$GLB,, | Performed by: INTERNAL MEDICINE

## 2024-03-11 PROCEDURE — 3060F POS MICROALBUMINURIA REV: CPT | Mod: CPTII,S$GLB,, | Performed by: PHYSICIAN ASSISTANT

## 2024-03-11 PROCEDURE — 3060F POS MICROALBUMINURIA REV: CPT | Mod: CPTII,S$GLB,, | Performed by: INTERNAL MEDICINE

## 2024-03-11 PROCEDURE — 99999 PR PBB SHADOW E&M-EST. PATIENT-LVL V: CPT | Mod: PBBFAC,,, | Performed by: PHYSICIAN ASSISTANT

## 2024-03-11 PROCEDURE — 3008F BODY MASS INDEX DOCD: CPT | Mod: CPTII,S$GLB,, | Performed by: INTERNAL MEDICINE

## 2024-03-11 PROCEDURE — 3075F SYST BP GE 130 - 139MM HG: CPT | Mod: CPTII,S$GLB,, | Performed by: PHYSICIAN ASSISTANT

## 2024-03-11 PROCEDURE — 3044F HG A1C LEVEL LT 7.0%: CPT | Mod: CPTII,S$GLB,, | Performed by: INTERNAL MEDICINE

## 2024-03-11 PROCEDURE — 99396 PREV VISIT EST AGE 40-64: CPT | Mod: S$GLB,,, | Performed by: PHYSICIAN ASSISTANT

## 2024-03-11 RX ORDER — PERMETHRIN 50 MG/G
CREAM TOPICAL
COMMUNITY
Start: 2024-03-08

## 2024-03-11 RX ORDER — ATORVASTATIN CALCIUM 10 MG/1
10 TABLET, FILM COATED ORAL NIGHTLY
Qty: 90 TABLET | Refills: 1 | Status: SHIPPED | OUTPATIENT
Start: 2024-03-11

## 2024-03-11 NOTE — PROGRESS NOTES
Subjective:       Patient ID: Narendra English Sr. is a 58 y.o. male.    Chief Complaint: Annual Exam      Patient presents to clinic today for annual physical exam.        Review of Systems   Constitutional:  Positive for fatigue. Negative for chills, fever and unexpected weight change.   HENT:  Positive for congestion and hearing loss. Negative for dental problem, ear pain, rhinorrhea and trouble swallowing.    Eyes:  Positive for visual disturbance. Negative for pain.   Respiratory:  Negative for cough and shortness of breath.    Cardiovascular:  Negative for chest pain, palpitations and leg swelling.   Gastrointestinal:  Negative for abdominal distention, abdominal pain, blood in stool, constipation, diarrhea, nausea and vomiting.   Genitourinary:  Negative for difficulty urinating, scrotal swelling and testicular pain.   Musculoskeletal:  Positive for arthralgias and myalgias.   Skin:  Positive for rash.   Neurological:  Positive for weakness and numbness. Negative for dizziness and headaches.   Hematological:  Negative for adenopathy. Bruises/bleeds easily.   Psychiatric/Behavioral:  Positive for sleep disturbance. Negative for dysphoric mood. The patient is not nervous/anxious.        Objective:      Physical Exam  Vitals and nursing note reviewed.   Constitutional:       General: He is not in acute distress.     Appearance: He is well-developed.   HENT:      Head: Normocephalic and atraumatic.      Right Ear: Tympanic membrane, ear canal and external ear normal.      Left Ear: Tympanic membrane, ear canal and external ear normal.      Nose: Nose normal.      Mouth/Throat:      Lips: Pink.      Mouth: Mucous membranes are moist.      Pharynx: Oropharynx is clear. Uvula midline.   Eyes:      General: Lids are normal. No scleral icterus.     Conjunctiva/sclera: Conjunctivae normal.      Pupils: Pupils are equal, round, and reactive to light.   Neck:      Thyroid: No thyromegaly.   Cardiovascular:      Rate  and Rhythm: Normal rate and regular rhythm.      Pulses: Normal pulses.   Pulmonary:      Effort: Pulmonary effort is normal.      Breath sounds: Normal breath sounds. No wheezing or rales.   Abdominal:      General: Bowel sounds are normal. There is no distension.      Palpations: Abdomen is soft. There is no mass.      Tenderness: There is no abdominal tenderness.   Musculoskeletal:         General: No tenderness. Normal range of motion.      Cervical back: Normal range of motion and neck supple.      Right lower leg: No edema.      Left lower leg: No edema.   Lymphadenopathy:      Cervical: No cervical adenopathy.   Skin:     General: Skin is warm and dry.      Findings: No rash.   Neurological:      Mental Status: He is alert.      Cranial Nerves: No cranial nerve deficit.   Psychiatric:         Mood and Affect: Mood and affect normal.       Protective Sensation (w/ 10 gram monofilament):  Right: Intact  Left: Intact    Visual Inspection:  Dry Skin -  Bilateral and Onychomycosis -  Bilateral    Pedal Pulses:   Right: Present  Left: Present    Posterior Tibialis Pulses:   Right:Present  Left: Present   Assessment:       1. Routine general medical examination at a health care facility    2. Hypertension associated with diabetes    3. Hyperlipidemia associated with type 2 diabetes mellitus    4. Type 2 diabetes mellitus with other circulatory complication, with long-term current use of insulin    5. Hypothyroidism (acquired)    6. Gastroesophageal reflux disease without esophagitis    7. Gout, unspecified cause, unspecified chronicity, unspecified site    8. FARA (obstructive sleep apnea)    9. History of malignant neoplasm of colon    10. Pruritic rash        Plan:   1. Routine general medical examination at a health care facility    2. Hypertension associated with diabetes  Assessment & Plan:  /82, controlled, continue amlodipine, benazepril, hydralazine, metoprolol  Lab Results   Component Value Date    NA  140 03/01/2024    K 4.1 03/01/2024    BUN 13 03/01/2024    CREATININE 1.1 03/01/2024    ESTGFRAFRICA >60.0 03/14/2022    EGFRNONAA >60.0 03/14/2022    EGFRNORACEVR >60.0 03/01/2024          3. Hyperlipidemia associated with type 2 diabetes mellitus  Assessment & Plan:  Controlled, continue atorvastatin  Lab Results   Component Value Date    CHOL 92 (L) 03/01/2024    LDLCALC 37.0 (L) 03/01/2024    TRIG 135 03/01/2024    HDL 28 (L) 03/01/2024    ALT 26 03/01/2024    AST 25 03/01/2024    ALKPHOS 36 (L) 03/01/2024        Orders:  -     Comprehensive Metabolic Panel; Future; Expected date: 09/11/2024  -     Lipid Panel; Future; Expected date: 09/11/2024    4. Type 2 diabetes mellitus with other circulatory complication, with long-term current use of insulin  Assessment & Plan:  Controlled, continue insulin, mounjaro  Lab Results   Component Value Date    HGBA1C 5.6 03/01/2024    HGBA1C 5.3 09/15/2023    LDLCALC 37.0 (L) 03/01/2024    LABMICR 102.0 03/01/2024    CREATRANDUR 277.0 03/01/2024    MICALBCREAT 36.8 (H) 03/01/2024        Orders:  -     Hemoglobin A1C; Future; Expected date: 09/11/2024    5. Hypothyroidism (acquired)  Assessment & Plan:  Controlled, continue levothyroxine  Lab Results   Component Value Date    TSH 1.479 03/01/2024    FREET4 0.98 09/15/2023        Orders:  -     TSH; Future; Expected date: 09/11/2024  -     T4, Free; Future; Expected date: 09/11/2024    6. Gastroesophageal reflux disease without esophagitis    7. Gout, unspecified cause, unspecified chronicity, unspecified site  Assessment & Plan:  Stable on colchicine      8. FARA (obstructive sleep apnea)  Overview:  Followed by Pulmonology, continue current treatment plan       9. History of malignant neoplasm of colon  Overview:  Followed by Dr. Santoyo, continue with current treatment plan       10. Pruritic rash  -     Ambulatory referral/consult to Allergy; Future; Expected date: 03/18/2024        Recent labs reviewed with patient.    6  month f/u with Dr. Melgoza scheduled with fasting labs [facility]   Health Maintenance reviewed/updated.

## 2024-03-11 NOTE — PATIENT INSTRUCTIONS
Please bring your medication or a written list to your next office visit.    If you check your blood pressure at home, please bring written your blood pressure log and your blood pressure machine to your next office visit.

## 2024-03-12 PROBLEM — M95.0 NASAL DEFORMITY, ACQUIRED: Status: RESOLVED | Noted: 2023-04-19 | Resolved: 2024-03-12

## 2024-03-12 PROBLEM — J34.89 NASAL OBSTRUCTION: Status: RESOLVED | Noted: 2022-08-17 | Resolved: 2024-03-12

## 2024-03-12 NOTE — ASSESSMENT & PLAN NOTE
Controlled, continue levothyroxine  Lab Results   Component Value Date    TSH 1.479 03/01/2024    FREET4 0.98 09/15/2023

## 2024-03-12 NOTE — ASSESSMENT & PLAN NOTE
/82, controlled, continue amlodipine, benazepril, hydralazine, metoprolol  Lab Results   Component Value Date     03/01/2024    K 4.1 03/01/2024    BUN 13 03/01/2024    CREATININE 1.1 03/01/2024    ESTGFRAFRICA >60.0 03/14/2022    EGFRNONAA >60.0 03/14/2022    EGFRNORACEVR >60.0 03/01/2024

## 2024-03-12 NOTE — ASSESSMENT & PLAN NOTE
Controlled, continue atorvastatin  Lab Results   Component Value Date    CHOL 92 (L) 03/01/2024    LDLCALC 37.0 (L) 03/01/2024    TRIG 135 03/01/2024    HDL 28 (L) 03/01/2024    ALT 26 03/01/2024    AST 25 03/01/2024    ALKPHOS 36 (L) 03/01/2024

## 2024-03-12 NOTE — ASSESSMENT & PLAN NOTE
Controlled, continue insulin, mounjaro  Lab Results   Component Value Date    HGBA1C 5.6 03/01/2024    HGBA1C 5.3 09/15/2023    LDLCALC 37.0 (L) 03/01/2024    LABMICR 102.0 03/01/2024    CREATRANDUR 277.0 03/01/2024    MICALBCREAT 36.8 (H) 03/01/2024

## 2024-03-20 ENCOUNTER — OFFICE VISIT (OUTPATIENT)
Dept: ALLERGY | Facility: CLINIC | Age: 59
End: 2024-03-20
Payer: COMMERCIAL

## 2024-03-20 ENCOUNTER — OFFICE VISIT (OUTPATIENT)
Dept: SLEEP MEDICINE | Facility: CLINIC | Age: 59
End: 2024-03-20
Payer: COMMERCIAL

## 2024-03-20 ENCOUNTER — PATIENT MESSAGE (OUTPATIENT)
Dept: SLEEP MEDICINE | Facility: CLINIC | Age: 59
End: 2024-03-20

## 2024-03-20 VITALS
BODY MASS INDEX: 33.86 KG/M2 | SYSTOLIC BLOOD PRESSURE: 152 MMHG | OXYGEN SATURATION: 98 % | WEIGHT: 250 LBS | HEART RATE: 73 BPM | HEIGHT: 72 IN | TEMPERATURE: 98 F | DIASTOLIC BLOOD PRESSURE: 82 MMHG

## 2024-03-20 VITALS
DIASTOLIC BLOOD PRESSURE: 82 MMHG | RESPIRATION RATE: 18 BRPM | SYSTOLIC BLOOD PRESSURE: 124 MMHG | BODY MASS INDEX: 33.65 KG/M2 | HEIGHT: 72 IN | OXYGEN SATURATION: 98 % | WEIGHT: 248.44 LBS | HEART RATE: 70 BPM

## 2024-03-20 DIAGNOSIS — G47.00 INSOMNIA, UNSPECIFIED TYPE: Primary | ICD-10-CM

## 2024-03-20 DIAGNOSIS — L50.1 CHRONIC IDIOPATHIC URTICARIA: ICD-10-CM

## 2024-03-20 DIAGNOSIS — G47.26 SHIFT WORK SLEEP DISORDER: ICD-10-CM

## 2024-03-20 DIAGNOSIS — J31.0 CHRONIC NONALLERGIC RHINITIS: Primary | ICD-10-CM

## 2024-03-20 DIAGNOSIS — G47.33 OSA ON CPAP: ICD-10-CM

## 2024-03-20 DIAGNOSIS — L28.2 PRURITIC RASH: ICD-10-CM

## 2024-03-20 DIAGNOSIS — L53.9 ERYTHEMA: ICD-10-CM

## 2024-03-20 PROBLEM — J32.4 CHRONIC PANSINUSITIS: Status: RESOLVED | Noted: 2022-08-17 | Resolved: 2024-03-20

## 2024-03-20 PROCEDURE — 3044F HG A1C LEVEL LT 7.0%: CPT | Mod: CPTII,S$GLB,, | Performed by: NURSE PRACTITIONER

## 2024-03-20 PROCEDURE — 99215 OFFICE O/P EST HI 40 MIN: CPT | Mod: S$GLB,,, | Performed by: NURSE PRACTITIONER

## 2024-03-20 PROCEDURE — 3066F NEPHROPATHY DOC TX: CPT | Mod: CPTII,S$GLB,, | Performed by: NURSE PRACTITIONER

## 2024-03-20 PROCEDURE — 99999 PR PBB SHADOW E&M-EST. PATIENT-LVL V: CPT | Mod: PBBFAC,,, | Performed by: STUDENT IN AN ORGANIZED HEALTH CARE EDUCATION/TRAINING PROGRAM

## 2024-03-20 PROCEDURE — 3008F BODY MASS INDEX DOCD: CPT | Mod: CPTII,S$GLB,, | Performed by: STUDENT IN AN ORGANIZED HEALTH CARE EDUCATION/TRAINING PROGRAM

## 2024-03-20 PROCEDURE — 1160F RVW MEDS BY RX/DR IN RCRD: CPT | Mod: CPTII,S$GLB,, | Performed by: STUDENT IN AN ORGANIZED HEALTH CARE EDUCATION/TRAINING PROGRAM

## 2024-03-20 PROCEDURE — 3060F POS MICROALBUMINURIA REV: CPT | Mod: CPTII,S$GLB,, | Performed by: NURSE PRACTITIONER

## 2024-03-20 PROCEDURE — 1159F MED LIST DOCD IN RCRD: CPT | Mod: CPTII,S$GLB,, | Performed by: NURSE PRACTITIONER

## 2024-03-20 PROCEDURE — 3060F POS MICROALBUMINURIA REV: CPT | Mod: CPTII,S$GLB,, | Performed by: STUDENT IN AN ORGANIZED HEALTH CARE EDUCATION/TRAINING PROGRAM

## 2024-03-20 PROCEDURE — 99999 PR PBB SHADOW E&M-EST. PATIENT-LVL V: CPT | Mod: PBBFAC,,, | Performed by: NURSE PRACTITIONER

## 2024-03-20 PROCEDURE — 3079F DIAST BP 80-89 MM HG: CPT | Mod: CPTII,S$GLB,, | Performed by: STUDENT IN AN ORGANIZED HEALTH CARE EDUCATION/TRAINING PROGRAM

## 2024-03-20 PROCEDURE — 3008F BODY MASS INDEX DOCD: CPT | Mod: CPTII,S$GLB,, | Performed by: NURSE PRACTITIONER

## 2024-03-20 PROCEDURE — 3066F NEPHROPATHY DOC TX: CPT | Mod: CPTII,S$GLB,, | Performed by: STUDENT IN AN ORGANIZED HEALTH CARE EDUCATION/TRAINING PROGRAM

## 2024-03-20 PROCEDURE — 4010F ACE/ARB THERAPY RXD/TAKEN: CPT | Mod: CPTII,S$GLB,, | Performed by: STUDENT IN AN ORGANIZED HEALTH CARE EDUCATION/TRAINING PROGRAM

## 2024-03-20 PROCEDURE — 3072F LOW RISK FOR RETINOPATHY: CPT | Mod: CPTII,S$GLB,, | Performed by: STUDENT IN AN ORGANIZED HEALTH CARE EDUCATION/TRAINING PROGRAM

## 2024-03-20 PROCEDURE — 1159F MED LIST DOCD IN RCRD: CPT | Mod: CPTII,S$GLB,, | Performed by: STUDENT IN AN ORGANIZED HEALTH CARE EDUCATION/TRAINING PROGRAM

## 2024-03-20 PROCEDURE — 99204 OFFICE O/P NEW MOD 45 MIN: CPT | Mod: 25,S$GLB,, | Performed by: STUDENT IN AN ORGANIZED HEALTH CARE EDUCATION/TRAINING PROGRAM

## 2024-03-20 PROCEDURE — 3072F LOW RISK FOR RETINOPATHY: CPT | Mod: CPTII,S$GLB,, | Performed by: NURSE PRACTITIONER

## 2024-03-20 PROCEDURE — 3077F SYST BP >= 140 MM HG: CPT | Mod: CPTII,S$GLB,, | Performed by: STUDENT IN AN ORGANIZED HEALTH CARE EDUCATION/TRAINING PROGRAM

## 2024-03-20 PROCEDURE — 1160F RVW MEDS BY RX/DR IN RCRD: CPT | Mod: CPTII,S$GLB,, | Performed by: NURSE PRACTITIONER

## 2024-03-20 PROCEDURE — 3044F HG A1C LEVEL LT 7.0%: CPT | Mod: CPTII,S$GLB,, | Performed by: STUDENT IN AN ORGANIZED HEALTH CARE EDUCATION/TRAINING PROGRAM

## 2024-03-20 PROCEDURE — 3074F SYST BP LT 130 MM HG: CPT | Mod: CPTII,S$GLB,, | Performed by: NURSE PRACTITIONER

## 2024-03-20 PROCEDURE — 95004 PERQ TESTS W/ALRGNC XTRCS: CPT | Mod: S$GLB,,, | Performed by: STUDENT IN AN ORGANIZED HEALTH CARE EDUCATION/TRAINING PROGRAM

## 2024-03-20 PROCEDURE — 4010F ACE/ARB THERAPY RXD/TAKEN: CPT | Mod: CPTII,S$GLB,, | Performed by: NURSE PRACTITIONER

## 2024-03-20 PROCEDURE — 3079F DIAST BP 80-89 MM HG: CPT | Mod: CPTII,S$GLB,, | Performed by: NURSE PRACTITIONER

## 2024-03-20 RX ORDER — CLONAZEPAM 1 MG/1
1 TABLET ORAL NIGHTLY
Qty: 30 TABLET | Refills: 5 | Status: SHIPPED | OUTPATIENT
Start: 2024-03-20 | End: 2025-03-20

## 2024-03-20 NOTE — ASSESSMENT & PLAN NOTE
- Trial of high dose antihistamines at this time   - Recommended Allegra 360 mg BID   - Ordered labs at this time   - Will continue to monitor and reassess    No

## 2024-03-20 NOTE — PROGRESS NOTES
"Allergy and Immunology  New Patient Clinic Note    Date: 3/20/2024  Chief Complaint   Patient presents with    Rash     Referred by: Lia Álvarez, PAMELECIO  [id number] [facility] [place] [id number]    History  Narendra English  is a 58 y.o. male being seen as a New Patient today.    Rhinitis   - Onset: Adulthood   - Symptoms: Rhinorrhea, congestion   - Suspected triggers include: Environmental v overproduction   - Pattern: Perennial  - Medications: Intranasal sprays without consistent use     Chronic or Inducible Urticaria  Erythema/Cholinergic Urticaria?   - Onset: 2-3 years   - Symptoms: Erythema, pruritus, hives   - Initially denied hives but showed images and consistent with cholinergic urticaria   - Suspected triggers include: Immune mediated - maybe COVID/vaccinations   - Duration: < 24 hours   - Skin changes: No post-urticarial skin changes   - Medications: No consistent use of antihistamines and never high dose   - Hx of Autoimmune Disease/Malignancy: Hx of colon cancer 2021 s/p resection     Asthma   - No hx of asthma     CRSwNP  - No hx of CRSwNP     Eczema   - No hx of eczema     Eosinophilic Esophagitis  - No hx of eosinophilic esophagitis     Food Allergy  - No hx of food allergy    Drug Allergy  - Opioid medications: pruritus  - Demerol: low BP - strict avoidance     Recurrent Infections  - No hx of recurrent infections    Venom Allergy  - No hx of venom allergy    Allergies, PMH, PSH, Social, and Family History were reviewed.    Review of patient's allergies indicates:   Allergen Reactions    Demerol (pf) [meperidine (pf)] Other (See Comments)     Pt reports,"They lost my blood pressure."    Percocet [oxycodone-acetaminophen] Itching     itching    Allopurinol analogues Diarrhea      Past Medical History:   Diagnosis Date    Allergy     Cancer     Colon    Diabetes mellitus      09/14/2023 Insulin x 10 years    Gastroesophageal reflux disease without esophagitis 06/20/2023    " Hypertension     Sleep apnea     Thyroid disease      Past Surgical History:   Procedure Laterality Date    BACK SURGERY      COLON SURGERY  02/06/21    COLONOSCOPY N/A 12/29/2020    Procedure: COLONOSCOPY;  Surgeon: William Cotter MD;  Location: [facility] ENDO;  Service: Endoscopy;  Laterality: N/A;  Will need Rapid doesn't live here    COLONOSCOPY N/A 02/10/2022    Procedure: COLONOSCOPY;  Surgeon: Wili Espinosa MD;  Location: [facility] ENDO;  Service: Endoscopy;  Laterality: N/A;    EPIDURAL STEROID INJECTION INTO CERVICAL SPINE N/A 12/01/2022    Procedure: C7/T1 IL SALBADOR;  Surgeon: Leonard Mart MD;  Location: [facility] PAIN MGT;  Service: Pain Management;  Laterality: N/A;    FESS, WITH NASAL SEPTOPLASTY, WITH IMAGING GUIDANCE Bilateral 08/17/2022    Procedure: FESS, WITH NASAL SEPTOPLASTY, WITH IMAGING GUIDANCE;  Surgeon: Viktor Ferrell MD;  Location: [facility] OR;  Service: ENT;  Laterality: Bilateral;    LAPAROSCOPIC RIGHT COLON RESECTION N/A 02/02/2021    Procedure: COLECTOMY, RIGHT, LAPAROSCOPIC, ERAS low;  Surgeon: Melonie Santoyo MD;  Location: [address];  Service: Colon and Rectal;  Laterality: N/A;  needs rapid covid test    LYSIS OF ADHESIONS Bilateral 04/19/2023    Procedure: LYSIS, ADHESIONS;  Surgeon: Viktor Ferrell MD;  Location: [facility] OR;  Service: ENT;  Laterality: Bilateral;    NASAL ENDOSCOPY Bilateral 04/19/2023    Procedure: ENDOSCOPY, NOSE;  Surgeon: Viktor Ferrell MD;  Location: [facility] OR;  Service: ENT;  Laterality: Bilateral;    NASAL TURBINATE REDUCTION Bilateral 08/17/2022    Procedure: REDUCTION, NASAL TURBINATE;  Surgeon: Viktor Ferrell MD;  Location: [facility] OR;  Service: ENT;  Laterality: Bilateral;    RHINOPLASTY Bilateral 04/19/2023    Procedure: RHINOPLASTY;  Surgeon: Viktor Ferrell MD;  Location: [facility] OR;  Service: ENT;  Laterality: Bilateral;    SELECTIVE INJECTION OF ANESTHETIC AGENT AROUND LUMBAR SPINAL NERVE ROOT BY TRANSFORAMINAL APPROACH Bilateral 07/05/2023     Procedure: Bilateral L4/5 TF SALBADOR RN IV Sedation;  Surgeon: Leonard Mart MD;  Location: [facility];  Service: Pain Management;  Laterality: Bilateral;    TONSILLECTOMY       Social History     Social History Narrative    Not on file     S/he reports that he has never smoked. He has never been exposed to tobacco smoke. He has never used smokeless tobacco. He reports that he does not drink alcohol and does not use drugs.    Current Outpatient Medications on File Prior to Visit   Medication Sig Dispense Refill    ALLERGY RELIEF, FEXOFENADINE, 180 mg tablet Take 180 mg by mouth once daily.      amLODIPine (NORVASC) 5 MG tablet Take 1 tablet (5 mg total) by mouth once daily. 90 tablet 1    atorvastatin (LIPITOR) 10 MG tablet Take 1 tablet (10 mg total) by mouth every evening. 90 tablet 1    azelastine (ASTELIN) 137 mcg (0.1 %) nasal spray 1 spray (137 mcg total) by Nasal route 2 (two) times daily. 30 mL 3    benazepriL (LOTENSIN) 40 MG tablet Take 1 tablet (40 mg total) by mouth once daily. 90 tablet 3    clobetasol 0.05% (TEMOVATE) 0.05 % Oint APPLY TOPICALLY TO THE AFFECTED AREA(S) TWICE DAILY 240 g 0    colchicine, gout, (COLCRYS) 0.6 mg tablet Take 1 tablet (0.6 mg total) by mouth once daily. 180 tablet 3    eszopiclone (LUNESTA) 3 mg Tab Take 3 mg by mouth every evening.      famotidine (PEPCID) 40 MG tablet Take 40 mg by mouth 2 (two) times daily.      febuxostat (ULORIC) 40 mg Tab Take 1 tablet (40 mg total) by mouth once daily. 30 tablet 3    fluticasone propionate (FLONASE) 50 mcg/actuation nasal spray 1 spray (50 mcg total) by Each Nostril route once daily. 16 g 0    hydrALAZINE (APRESOLINE) 100 MG tablet Take 1 tablet (100 mg total) by mouth every 8 (eight) hours. 270 tablet 1    hydrOXYzine HCL (ATARAX) 25 MG tablet Take 25 mg by mouth 2 (two) times daily.      indomethacin (INDOCIN SR) 75 mg CpSR CR capsule Take 1 capsule (75 mg total) by mouth 2 (two) times daily as needed (for gout flare). 60  capsule 0    insulin glargine, TOUJEO, (TOUJEO SOLOSTAR U-300 INSULIN) 300 unit/mL (1.5 mL) InPn pen INJECT 85 UNITS SUBCUTANEOUSLY ONCE DAILY 17 pen 0    levocetirizine (XYZAL) 5 MG tablet Take 5 mg by mouth.      levothyroxine (SYNTHROID) 112 MCG tablet Take 1 tablet (112 mcg total) by mouth before breakfast. 90 tablet 1    metFORMIN (GLUCOPHAGE) 1000 MG tablet Take 1 tablet by mouth twice daily with food 180 tablet 1    metoprolol succinate (TOPROL-XL) 50 MG 24 hr tablet Take 1 tablet (50 mg total) by mouth once daily. 90 tablet 3    permethrin (ELIMITE) 5 % cream Apply topically.      pregabalin (LYRICA) 150 MG capsule Take 1 capsule (150 mg total) by mouth 3 (three) times daily. 90 capsule 3    tadalafiL (CIALIS) 20 MG Tab Take 1 tablet (20 mg total) by mouth every 24 hours as needed (erectile dysfunction). 20 tablet 11    tirzepatide (MOUNJARO) 15 mg/0.5 mL PnIj Inject 15 mg into the skin every 7 days. 4 Pen 3    triamcinolone acetonide 0.1% (KENALOG) 0.1 % ointment AAA bid 454 g 1    VASCEPA 1 gram Cap Take 2 capsules (2,000 mg total) by mouth 2 (two) times daily. 360 capsule 1     No current facility-administered medications on file prior to visit.     Physical Examination  Vitals:    03/20/24 1454   BP: (!) 152/82   Pulse: 73   Temp: 98.1 °F (36.7 °C)     GENERAL:  male in no apparent distress and well developed and well nourished  HEAD:  Normocephalic, without obvious abnormality, atraumatic  EYES: sclera anicteric, conjunctiva normochromic  EARS: normal TM's and external ear canals both ears  NOSE: without erythema or discharge, clear discharge, turbinates pink    OROPHARYNX: moist mucous membranes without erythema, exudates or petechiae  LYMPH NODES: normal, supple, no lymphadenopathy  LUNGS: clear to auscultation, no wheezes, rales or rhonchi, symmetric air entry.  HEART: normal rate, regular rhythm, normal S1, S2, no murmurs, rubs, clicks or gallops.  ABDOMEN: soft, nontender, nondistended, no masses  or organomegaly.  MUSCULOSKELETAL: no gross joint deformity or swelling.  NEURO: alert, oriented, normal speech, no focal findings or movement disorder noted.  SKIN: normal coloration and turgor, no rashes, no suspicious skin lesions noted.     Allergy Skin Tests  Allergy skin prick tests to inhalants were and negative to house dust mites, mold spores, cat dander, dog dander, horse dander, cockroach, tree pollen, grass pollen, and weed pollen with adequate histamine response and negative control. Test is valid.   - See media for results    Assessment/Plan:   Problem List Items Addressed This Visit          ENT    Chronic nonallergic rhinitis - Primary    Overview     - 03/20/2024: SPT to inhalants negative with adequate histamine response         Current Assessment & Plan     - No major complaints   - Continue intranasal sprays   - Educated to proper use of intranasal sprays   - Will continue to monitor and reassess             Derm    Erythema    Relevant Orders    Tryptase    CBC Auto Differential    KASHIF Screen w/Reflex    CU (Chronic Urticaria) Index Panel    Allergen Profile, Zone 6    Pruritic rash    Relevant Orders    Tryptase    CBC Auto Differential    KASHIF Screen w/Reflex    CU (Chronic Urticaria) Index Panel    Allergen Profile, Zone 6    Chronic idiopathic urticaria    Current Assessment & Plan     - Trial of high dose antihistamines at this time   - Recommended Allegra 360 mg BID   - Ordered labs at this time   - Will continue to monitor and reassess           Follow up:  Follow up in about 3 months (around 6/20/2024).    Kesler Bourgoyne, MD [facility]  Allergy and Immunology

## 2024-03-20 NOTE — ASSESSMENT & PLAN NOTE
- No major complaints   - Continue intranasal sprays   - Educated to proper use of intranasal sprays   - Will continue to monitor and reassess

## 2024-03-20 NOTE — PROGRESS NOTES
Subjective:      Patient ID: Narendra English Sr. is a 58 y.o. male.    Chief Complaint: Insomnia    HPI  Patient presents for insomnia.  He has obstructive sleep apnea on CPAP.  He states no one is following his CPAP therapy at this time.  He is getting supplies off the Internet.  His sleep study was years ago in [place].  He works shift work 4/4 and has a difficult time falling asleep and staying asleep.  He feels like he only sleeps 2-3 hours at a time.  He has a diagnosis of mood disorder.  He states he cannot shut his mind off to fall asleep.  He is taking Lunesta 3 mg which he states is not effective and he has a strong metallic taste in his mouth.  He denies caffeine close to bedtime.  He has blackout curtains.  No electronics in the bedroom.  He has good sleep hygiene.      Patient Active Problem List   Diagnosis    History of malignant neoplasm of colon    Type 2 diabetes mellitus with circulatory disorder, with long-term current use of insulin    Hypothyroidism (acquired)    Gout    Low testosterone    Mood disorder    Hypertension associated with diabetes    FARA (obstructive sleep apnea)    Nasal septal deviation    Adhesions of nasal septum and turbinates    Chronic pansinusitis    Hypertrophy of inferior nasal turbinate    Hyperlipidemia associated with type 2 diabetes mellitus    Shift work sleep disorder    Erectile dysfunction    Hypogonadism male    Gastroesophageal reflux disease without esophagitis    Bilateral lower extremity edema     /82   Pulse 70   Resp 18   Ht 6' (1.829 m)   Wt 112.7 kg (248 lb 7.3 oz)   SpO2 98%   BMI 33.70 kg/m²   Body mass index is 33.7 kg/m².    Review of Systems   Constitutional: Negative.    HENT: Negative.     Respiratory: Negative.     Cardiovascular: Negative.    Musculoskeletal: Negative.    Gastrointestinal: Negative.    Neurological: Negative.    Psychiatric/Behavioral:  Positive for sleep disturbance. The patient is nervous/anxious.      Objective:       Physical Exam  Constitutional:       Appearance: He is obese.   HENT:      Head: Normocephalic and atraumatic.      Nose: Nose normal.   Cardiovascular:      Rate and Rhythm: Normal rate and regular rhythm.   Pulmonary:      Effort: Pulmonary effort is normal.      Breath sounds: Normal breath sounds.   Abdominal:      Palpations: Abdomen is soft.      Tenderness: There is no abdominal tenderness.   Musculoskeletal:         General: Normal range of motion.      Cervical back: Normal range of motion and neck supple.   Skin:     General: Skin is warm and dry.   Neurological:      General: No focal deficit present.      Mental Status: He is alert and oriented to person, place, and time.   Psychiatric:         Mood and Affect: Mood normal.         Behavior: Behavior normal.       Personal Diagnostic Review      3/20/2024    10:00 AM   EPWORTH SLEEPINESS SCALE   Sitting and reading 1   Watching TV 1   Sitting, inactive in a public place (e.g. a theatre or a meeting) 1   As a passenger in a car for an hour without a break 0   Lying down to rest in the afternoon when circumstances permit 1   Sitting and talking to someone 0   Sitting quietly after a lunch without alcohol 2   In a car, while stopped for a few minutes in traffic 0   Total score 6        Assessment:       1. Insomnia, unspecified type    2. Shift work sleep disorder    3. FARA on CPAP        Outpatient Encounter Medications as of 3/20/2024   Medication Sig Dispense Refill    ALLERGY RELIEF, FEXOFENADINE, 180 mg tablet Take 180 mg by mouth once daily.      amLODIPine (NORVASC) 5 MG tablet Take 1 tablet (5 mg total) by mouth once daily. 90 tablet 1    atorvastatin (LIPITOR) 10 MG tablet Take 1 tablet (10 mg total) by mouth every evening. 90 tablet 1    azelastine (ASTELIN) 137 mcg (0.1 %) nasal spray 1 spray (137 mcg total) by Nasal route 2 (two) times daily. 30 mL 3    benazepriL (LOTENSIN) 40 MG tablet Take 1 tablet (40 mg total) by mouth once daily. 90  tablet 3    clobetasol 0.05% (TEMOVATE) 0.05 % Oint APPLY TOPICALLY TO THE AFFECTED AREA(S) TWICE DAILY 240 g 0    colchicine, gout, (COLCRYS) 0.6 mg tablet Take 1 tablet (0.6 mg total) by mouth once daily. 180 tablet 3    eszopiclone (LUNESTA) 3 mg Tab Take 3 mg by mouth every evening.      famotidine (PEPCID) 40 MG tablet Take 40 mg by mouth 2 (two) times daily.      febuxostat (ULORIC) 40 mg Tab Take 1 tablet (40 mg total) by mouth once daily. 30 tablet 3    fluticasone propionate (FLONASE) 50 mcg/actuation nasal spray 1 spray (50 mcg total) by Each Nostril route once daily. 16 g 0    hydrALAZINE (APRESOLINE) 100 MG tablet Take 1 tablet (100 mg total) by mouth every 8 (eight) hours. 270 tablet 1    hydrOXYzine HCL (ATARAX) 25 MG tablet Take 25 mg by mouth 2 (two) times daily.      indomethacin (INDOCIN SR) 75 mg CpSR CR capsule Take 1 capsule (75 mg total) by mouth 2 (two) times daily as needed (for gout flare). 60 capsule 0    insulin glargine, TOUJEO, (TOUJEO SOLOSTAR U-300 INSULIN) 300 unit/mL (1.5 mL) InPn pen INJECT 85 UNITS SUBCUTANEOUSLY ONCE DAILY 17 pen 0    levocetirizine (XYZAL) 5 MG tablet Take 5 mg by mouth.      levothyroxine (SYNTHROID) 112 MCG tablet Take 1 tablet (112 mcg total) by mouth before breakfast. 90 tablet 1    metFORMIN (GLUCOPHAGE) 1000 MG tablet Take 1 tablet by mouth twice daily with food 180 tablet 1    metoprolol succinate (TOPROL-XL) 50 MG 24 hr tablet Take 1 tablet (50 mg total) by mouth once daily. 90 tablet 3    permethrin (ELIMITE) 5 % cream Apply topically.      pregabalin (LYRICA) 150 MG capsule Take 1 capsule (150 mg total) by mouth 3 (three) times daily. 90 capsule 3    tadalafiL (CIALIS) 20 MG Tab Take 1 tablet (20 mg total) by mouth every 24 hours as needed (erectile dysfunction). 20 tablet 11    tirzepatide (MOUNJARO) 15 mg/0.5 mL PnIj Inject 15 mg into the skin every 7 days. 4 Pen 3    triamcinolone acetonide 0.1% (KENALOG) 0.1 % ointment AAA bid 454 g 1    VASCEPA 1  gram Cap Take 2 capsules (2,000 mg total) by mouth 2 (two) times daily. 360 capsule 1     No facility-administered encounter medications on file as of 3/20/2024.     No orders of the defined types were placed in this encounter.    Plan:   He will send me the information on his CPAP so I can review with settings and therapy.  Insomnia not controlled.  He may need psychotherapy.  Shift work is definitely a factor.  Will start back on Klonopin at the lower dose and will add doxepin if needed  Follow-up in 3 months.  1. Insomnia, unspecified type    2. Shift work sleep disorder  Overview:  Followed by Dr. Schwab, Sleep specialist, continue current treatment plan.      3. FARA on CPAP                 I spent a total of 40 minutes on the day of the visit.  This includes face to face time and non-face to face time preparing to see the patient (eg, review of tests), obtaining and/or reviewing separately obtained history, documenting clinical information in the electronic or other health record, independently interpreting results and communicating results to the patient/family/caregiver, or care coordinator.        Elizabeth LeJeune, ACNP, ANP

## 2024-03-24 DIAGNOSIS — E11.69 TYPE 2 DIABETES MELLITUS WITH OTHER SPECIFIED COMPLICATION, UNSPECIFIED WHETHER LONG TERM INSULIN USE: ICD-10-CM

## 2024-03-25 RX ORDER — TIRZEPATIDE 15 MG/.5ML
15 INJECTION, SOLUTION SUBCUTANEOUS
Qty: 4 PEN | Refills: 3 | Status: SHIPPED | OUTPATIENT
Start: 2024-03-25 | End: 2024-05-21

## 2024-03-28 ENCOUNTER — PATIENT MESSAGE (OUTPATIENT)
Dept: PAIN MEDICINE | Facility: CLINIC | Age: 59
End: 2024-03-28
Payer: COMMERCIAL

## 2024-03-31 NOTE — TELEPHONE ENCOUNTER
No care due was identified.  [facility] Embedded Care Due Messages. Reference number: [id number].   3/31/2024 12:02:18 PM CDT

## 2024-04-01 DIAGNOSIS — M54.16 LUMBAR RADICULOPATHY: Primary | ICD-10-CM

## 2024-04-01 DIAGNOSIS — M54.12 CERVICAL RADICULOPATHY: ICD-10-CM

## 2024-04-01 RX ORDER — PREGABALIN 150 MG/1
150 CAPSULE ORAL 3 TIMES DAILY
Qty: 90 CAPSULE | Refills: 3 | Status: SHIPPED | OUTPATIENT
Start: 2024-04-01

## 2024-04-01 RX ORDER — INSULIN GLARGINE 300 [IU]/ML
85 INJECTION, SOLUTION SUBCUTANEOUS DAILY
Qty: 27 ML | Refills: 1 | Status: SHIPPED | OUTPATIENT
Start: 2024-04-01

## 2024-04-01 NOTE — TELEPHONE ENCOUNTER
Refill Decision Note   Narendra Amador  is requesting a refill authorization.  Brief Assessment and Rationale for Refill:  Approve     Medication Therapy Plan:        Comments:     Note composed:5:25 PM 04/01/2024

## 2024-04-01 NOTE — TELEPHONE ENCOUNTER
Good Morning/Afternoon,     Pt is requesting for a refill of: Pregabalin 150 mg  Last filed: 6/20/23  Last encounter: 6/20/23  Up coming apt: 4/12/24  Pharmacy: [phone]  [place]  [id number]  Is this something you can do?      Edis Og  Medical Assistant

## 2024-04-02 ENCOUNTER — OFFICE VISIT (OUTPATIENT)
Dept: DERMATOLOGY | Facility: CLINIC | Age: 59
End: 2024-04-02
Payer: COMMERCIAL

## 2024-04-02 DIAGNOSIS — L73.9 FOLLICULITIS: Primary | ICD-10-CM

## 2024-04-02 PROCEDURE — 3044F HG A1C LEVEL LT 7.0%: CPT | Mod: CPTII,S$GLB,, | Performed by: STUDENT IN AN ORGANIZED HEALTH CARE EDUCATION/TRAINING PROGRAM

## 2024-04-02 PROCEDURE — 99999 PR PBB SHADOW E&M-EST. PATIENT-LVL IV: CPT | Mod: PBBFAC,,, | Performed by: STUDENT IN AN ORGANIZED HEALTH CARE EDUCATION/TRAINING PROGRAM

## 2024-04-02 PROCEDURE — 1160F RVW MEDS BY RX/DR IN RCRD: CPT | Mod: CPTII,S$GLB,, | Performed by: STUDENT IN AN ORGANIZED HEALTH CARE EDUCATION/TRAINING PROGRAM

## 2024-04-02 PROCEDURE — 1159F MED LIST DOCD IN RCRD: CPT | Mod: CPTII,S$GLB,, | Performed by: STUDENT IN AN ORGANIZED HEALTH CARE EDUCATION/TRAINING PROGRAM

## 2024-04-02 PROCEDURE — 3066F NEPHROPATHY DOC TX: CPT | Mod: CPTII,S$GLB,, | Performed by: STUDENT IN AN ORGANIZED HEALTH CARE EDUCATION/TRAINING PROGRAM

## 2024-04-02 PROCEDURE — 3060F POS MICROALBUMINURIA REV: CPT | Mod: CPTII,S$GLB,, | Performed by: STUDENT IN AN ORGANIZED HEALTH CARE EDUCATION/TRAINING PROGRAM

## 2024-04-02 PROCEDURE — 4010F ACE/ARB THERAPY RXD/TAKEN: CPT | Mod: CPTII,S$GLB,, | Performed by: STUDENT IN AN ORGANIZED HEALTH CARE EDUCATION/TRAINING PROGRAM

## 2024-04-02 PROCEDURE — 3072F LOW RISK FOR RETINOPATHY: CPT | Mod: CPTII,S$GLB,, | Performed by: STUDENT IN AN ORGANIZED HEALTH CARE EDUCATION/TRAINING PROGRAM

## 2024-04-02 PROCEDURE — 99214 OFFICE O/P EST MOD 30 MIN: CPT | Mod: S$GLB,,, | Performed by: STUDENT IN AN ORGANIZED HEALTH CARE EDUCATION/TRAINING PROGRAM

## 2024-04-02 PROCEDURE — G2211 COMPLEX E/M VISIT ADD ON: HCPCS | Mod: S$GLB,,, | Performed by: STUDENT IN AN ORGANIZED HEALTH CARE EDUCATION/TRAINING PROGRAM

## 2024-04-02 RX ORDER — KETOCONAZOLE 20 MG/ML
SHAMPOO, SUSPENSION TOPICAL WEEKLY
Qty: 120 ML | Refills: 5 | Status: SHIPPED | OUTPATIENT
Start: 2024-04-02

## 2024-04-02 NOTE — PROGRESS NOTES
Patient Information  Name: Narendra English Sr.  : 1965  MRN: [id number]     Referring Physician:  Dr. Duke ref. provider found   Primary Care Physician:  Tabitha Jackson MD   Date of Visit: 2024      Subjective:       Narendra English Sr. is a 58 y.o. male who presents for   Chief Complaint   Patient presents with    Folliculitis     Follow up, slight improvement      HPI  Hx of folliculitis, here for follow up. At thel ast visit, he was given oral dlifucan and topical TAC for which he reports improvement but minimal.  Patient was last seen:2024     Prior notes by myself reviewed.   Clinical documentation obtained by nursing staff reviewed.    Review of Systems   Skin:  Positive for itching. Negative for rash.        Objective:    Physical Exam   Constitutional: He appears well-developed and well-nourished. No distress.   Neurological: He is alert and oriented to person, place, and time. He is not disoriented.   Psychiatric: He has a normal mood and affect.   Skin:   Areas Examined (abnormalities noted in diagram):   Head / Face Inspection Performed  Neck Inspection Performed  Chest / Axilla Inspection Performed              Diagram Legend     Erythematous scaling macule/papule c/w actinic keratosis       Vascular papule c/w angioma      Pigmented verrucoid papule/plaque c/w seborrheic keratosis      Yellow umbilicated papule c/w sebaceous hyperplasia      Irregularly shaped tan macule c/w lentigo     1-2 mm smooth white papules consistent with Milia      Movable subcutaneous cyst with punctum c/w epidermal inclusion cyst      Subcutaneous movable cyst c/w pilar cyst      Firm pink to brown papule c/w dermatofibroma      Pedunculated fleshy papule(s) c/w skin tag(s)      Evenly pigmented macule c/w junctional nevus     Mildly variegated pigmented, slightly irregular-bordered macule c/w mildly atypical nevus      Flesh colored to evenly pigmented papule c/w intradermal nevus       Pink  pearly papule/plaque c/w basal cell carcinoma      Erythematous hyperkeratotic cursted plaque c/w SCC      Surgical scar with no sign of skin cancer recurrence      Open and closed comedones      Inflammatory papules and pustules      Verrucoid papule consistent consistent with wart     Erythematous eczematous patches and plaques     Dystrophic onycholytic nail with subungual debris c/w onychomycosis     Umbilicated papule    Erythematous-base heme-crusted tan verrucoid plaque consistent with inflamed seborrheic keratosis     Erythematous Silvery Scaling Plaque c/w Psoriasis     See annotation      No images are attached to the encounter or orders placed in the encounter.    [] Data reviewed  [] Independent review of test  [] Management discussed with another provider    Assessment / Plan:        Folliculitis  -     ketoconazole (NIZORAL) 2 % shampoo; Apply topically once a week. Lather in for 5-10 min before rinsing  Dispense: 120 mL; Refill: 5             LOS NUMBER AND COMPLEXITY OF PROBLEMS    COMPLEXITY OF DATA RISK TOTAL TIME (m)   54904  71857 [] 1 self-limited or minor problem [x] Minimal to none [] No treatment recommended or patient to monitor 15-29  10-19   19893  96203 Low  [] 2 or > self limited or minor problems  [] 1 stable chronic illness  [] 1 acute, uncomplicated illness or injury Limited (2)  [] Prior external notes from each unique source  [] Review result of each unique test  [] Order each unique test []  Low  OTC medications, minor skin biopsy 30-44 20-29   43505  69770 Moderate  [x]  1 or > chronic illness with progression, exacerbation or SE of treatment  []  2 or more stable chronic illnesses  []  1 acute illness with systemic symptoms  []  1 acute complicated injury  []  1 undiagnosed new problem with uncertain prognosis Moderate (1/3 below)  []  3 or more data items        *Now includes assessment requiring independent historian  []  Independent interpretation of a test  []  Discuss  management/test with another provider Moderate  [x]  Prescription drug mgmt  []  Minor surgery with risk discussed  []  Mgmt limited by social determinates 45-59  30-39   56747  05141 High  []  1 or more chronic illness with severe exacerbation, progression or SE of treatment  []  1 acute or chronic illness/injury that poses a threat to life or bodily function Extensive (2/3 below)  []  3 or more data items        *Now includes assessment requiring independent historian.  []  Independent interpretation of a test  []  Discuss management/test with another provider High  []  Major surgery with risk discussed  []  Drug therapy requiring intensive monitoring for toxicity  []  Hospitalization  []  Decision for DNR 60-74  40-54      No follow-ups on file.    Wendi Jin MD, FAAD  [facility]

## 2024-04-04 ENCOUNTER — PATIENT MESSAGE (OUTPATIENT)
Dept: CARDIOLOGY | Facility: CLINIC | Age: 59
End: 2024-04-04
Payer: COMMERCIAL

## 2024-04-04 DIAGNOSIS — E11.59 TYPE 2 DIABETES MELLITUS WITH OTHER CIRCULATORY COMPLICATION, WITH LONG-TERM CURRENT USE OF INSULIN: Primary | ICD-10-CM

## 2024-04-04 DIAGNOSIS — Z79.4 TYPE 2 DIABETES MELLITUS WITH OTHER CIRCULATORY COMPLICATION, WITH LONG-TERM CURRENT USE OF INSULIN: Primary | ICD-10-CM

## 2024-04-04 RX ORDER — SEMAGLUTIDE 2.68 MG/ML
2 INJECTION, SOLUTION SUBCUTANEOUS
Qty: 3 ML | Refills: 3 | Status: SHIPPED | OUTPATIENT
Start: 2024-04-04 | End: 2024-05-21 | Stop reason: SDUPTHER

## 2024-04-05 DIAGNOSIS — L29.9 PRURITUS: ICD-10-CM

## 2024-04-05 RX ORDER — TRIAMCINOLONE ACETONIDE 1 MG/G
OINTMENT TOPICAL
Qty: 454 G | Refills: 1 | Status: SHIPPED | OUTPATIENT
Start: 2024-04-05 | End: 2024-05-14 | Stop reason: SDUPTHER

## 2024-04-10 NOTE — PROGRESS NOTES
Chief complaint:    Chief Complaint   Patient presents with    Other     F/u sinus reports things have gotten worse         Referring Provider:  No referring provider defined for this encounter.      History of present illness:     Mr. English is a 57 y.o. presenting for evaluation of sinus congestion.     The patient reports the following allergy/sinus symptoms:     Headaches - 3-4 times/day, forehead    Nasal congestion, purulent anterior drainage, and PND - present all the time  Coughing - at night    No fever, no hyposmia.     Started after COVID in January. Symptoms have been present for almost continuously since then.    Treatment has included: Zytrec or Xyzal, mucinex, abx given 1 month ago for tooth issue. Had no effect on nasal issues.     Prior to the last few months patient has had about 0 sinus infections in the past 12 months.   Prior sinus surgery: no.    Current medications: ibuprofen 800, mucinex, and xyzal. No help with any of it.    Allergy history: yes, when he was young, not bad as an adult       Return clinic visit, 4/11/22  Completed abx and steroids. Since then, symptoms have minimally improved. Major complaints are nasal drip (clear) and nasal congestion (both sides), and some forehead pressure.     Return clinic visit, 5/31/22  Using saline, flonase, and Atrovent.   Post nasal drip has improved, but nasal congestion has not. Bilateral, sometimes worse on either side. Worse at night, but present all the time.   Intermittent facial pressure/pain, anterior rhinorrhea.     Return clinic visit, 7/18/22  No major change since last visit.   Continued L>R nasal obstruction, all the time, worse at night. Also with facial pressure/pain and purulent rhinorrhea.   Using astelin, flonase, atrovent without much change. Also completed a course of steroids and antibiotics. Gets improvement with abx/steroids then returns.    Return clinic visit, 8/22/22  S/p FESS and septoplasty. Epistaxis requiring  packing on POD2. Since then, bleeding has greatly slowed. Still spitting up some dark blood from time to time. No bright red blood.     Return clinic visit, 8/24/22  Some slow oozing after pack placement, much improved. HTN to 180-190, but now 150s since doubling hydralazine.     Return clinic visit, 9/8/22  Overall doing well. However, still having times when he coughs up some phlegm which can be blood-tinged. Not bright red. Also with congestion on the left side and some pressure as well.     Return clinic visit, 9/29/22  No further bleeding.   Since last visit, continued and worsened left sided nasal obstruction. Using afrin several times a day.    Wakes him up at night with CPAP.     Return clinic visit, 12/2/22  Continues to have daily L sided nasal obstruction  Using saline irrigations daily.  Using flonase  Used astelin for 1 month, not much benefit  No use of Afrin    History      Past Medical History:   Past Medical History:   Diagnosis Date    Cancer     Colon    Diabetes mellitus 8 years    BS 84 in the room 08/11/2021    Hypertension     Sleep apnea     Thyroid disease          Past Surgical History:  Past Surgical History:   Procedure Laterality Date    BACK SURGERY      COLON SURGERY  02/06/21    COLONOSCOPY N/A 12/29/2020    Procedure: COLONOSCOPY;  Surgeon: William Cotter MD;  Location: [facility];  Service: Endoscopy;  Laterality: N/A;  Will need Rapid doesn't live here    COLONOSCOPY N/A 02/10/2022    Procedure: COLONOSCOPY;  Surgeon: Wili Espinosa MD;  Location: [facility];  Service: Endoscopy;  Laterality: N/A;    FESS, WITH NASAL SEPTOPLASTY, WITH IMAGING GUIDANCE Bilateral 08/17/2022    Procedure: FESS, WITH NASAL SEPTOPLASTY, WITH IMAGING GUIDANCE;  Surgeon: Viktor Ferrell MD;  Location: [facility];  Service: ENT;  Laterality: Bilateral;    LAPAROSCOPIC RIGHT COLON RESECTION N/A 02/02/2021    Procedure: COLECTOMY, RIGHT, LAPAROSCOPIC, ERAS low;  Surgeon: Melonie Santoyo MD;   "Location: [address];  Service: Colon and Rectal;  Laterality: N/A;  needs rapid covid test    NASAL TURBINATE REDUCTION Bilateral 08/17/2022    Procedure: REDUCTION, NASAL TURBINATE;  Surgeon: Viktor Ferrell MD;  Location: [facility];  Service: ENT;  Laterality: Bilateral;    TONSILLECTOMY           Medications: Medication list reviewed. He  has a current medication list which includes the following prescription(s): amlodipine, atorvastatin, azelastine, benazepril, clobetasol 0.05%, clonazepam, colchicine, flash glucose scanning reader, flash glucose sensor, fluticasone propionate, hydralazine, hydrocortisone, indomethacin, toujeo solostar u-300 insulin, levothyroxine, metformin, metoprolol succinate, pregabalin, sildenafil, mounjaro, trazodone, triamcinolone acetonide 0.1%, vascepa, clotrimazole, diclofenac sodium, alprostadil, prednisone, prednisone, and testosterone, and the following Facility-Administered Medications: gabapentin and lactated ringers.     Allergies:   Review of patient's allergies indicates:   Allergen Reactions    Demerol (pf) [meperidine (pf)] Other (See Comments)     Pt reports,"They lost my blood pressure."    Percocet [oxycodone-acetaminophen] Itching     itching    Demerol [meperidine]          Family history: family history includes Breast cancer in his mother; Cancer in his mother; Cataracts in his maternal grandmother; Diabetes in his maternal grandmother and mother; Hypertension in his brother, brother, and mother; Stroke in his father.         Social History          Alcohol use:  reports no history of alcohol use.            Tobacco:  reports that he has never smoked. He has never used smokeless tobacco.         Physical Examination      Vitals: There were no vitals taken for this visit.      General: Well developed, well nourished, well hydrated.     Voice: no dysphonia, no dysarthria      Head/Face: Normocephalic, atraumatic. No scars or lesions. Facial musculature equal. "     Eyes: No scleral icterus or conjunctival hemorrhage. EOMI. PERRLA.     Ears:     Right ear: No gross deformity. EAC is clear of debris and erythema. TM are intact with a pneumatized middle ear. No signs of retraction, fluid or infection.      Left ear: No gross deformity. EAC is clear of debris and erythema. TM are intact with a pneumatized middle ear. No signs of retraction, fluid or infection.      Nose: No gross deformity or lesions. left caudal septa deviation, ptotic tip, mid and psterior septum midline. Positive wood on the left    Mouth/Oropharynx: Lips without any lesions. No mucosal lesions within the oropharynx. No tonsillar exudate or lesions. Pharyngeal walls symmetrical. Uvula midline. Tongue midline without lesions.    Neurologic: Moving all extremities without gross abnormality.CN II-XII grossly intact. House-Brackmann 1/6. No signs of nystagmus.        Data reviewed      Review of records:      I reviewed records from the referring provider's office visits describing the history, workup, and/or treatment of this problem thus far.    Imaging  I have independently reviewed the following imaging with the findings noted below:      CT sinus, 12/02/2022  Bilateral toya bullosa  S-shaped septal deviation, left mid-septal spur  Inferior turbinate reduction   Left frontal thickening and obstruction of outflow  Bilateral anterior ethmoid disease     Op note, 8/20/22     PROCEDURE:   Endoscopic septoplasty   Bilateral inferior turbinate submucosal resection  Nasal endoscopy with resection of bilateral toya bullosa   Bilateral nasal endoscopy with maxillary antrostomy   Bilateral nasal endoscopy with anterior ethmoidectomy    Left nasal endoscopy with frontal sinusotomy and frontal sinus exploration   Functional endoscopic sinus surgery with CT image guided electromagnetic system     OPERATIVE FINDINGS:   Polypoid mucosal thickening at the left frontal outflow tract  Bilateral polypoid thickening in  "anterior ethmoids  Left septal deviation with large left septal spur      Pathology reviewed  1. "left sinus contents", excision:       -  Sinonasal mucosa with chronic inflammation       - No eosinophils seen   2. "septal cartilage", excision:       - Portion of cartilage     Procedure Note - Rigid Nasal Endoscopy    Surgeon: Viktor Ferrell MD  Anesthesia: topical oxymetazoline and 4% lidocaine.    Technique: The nose was sprayed with oxymetazoline and 4% lidocaine. With the patient in the upright position, a 2.7mm 30-degree endscope was inserted into the patient's right and left nare.  Where visible, nasal secretions and mucosal crusting were removed with a suction. The overall appearance of the nasal cavity and paranasal sinuses were noted and the findings are described below.     Findings: no residual crusting  or packing. Mid septum midline. Patent sinus and nasal cavities. No further mucopurulence and no polyps      Assessment/Plan:    1. Chronic sinusitis, unspecified location    2. Nasal septal deviation    3. Hypertrophy of both inferior nasal turbinates    4. Rhinitis medicamentosa    5. Nasal septal deformity          S/p FESS and septoplasty 8/17/22 with post op epistaxis complicated by hypertensive urgency (resolved, pack removed, HTN better controlled); developed post op sinus infection (resolved)    Now with consistent congestion secondary to RM (new problem today). Will wean and treat with rounds of steroids during weaning.     Will add astelin.   Continue flonase.       Update 12/2/22    Sinuses clear today  Residual left nasal obstruction likely related to ptotic tip and left caudal deviation  Discussed continuing flonase and astelin  Discussed trying nasal pillows for CPAP delivery to bypass caudal devaition  Return to clinic 2-3 months (all for 6+ month healing from FESS) then consider functional rhinoplasty              Viktor Ferrell MD  [facility]   [facility]  [address] [place] [address]  P: [phone]  F: [phone]                Plan: Pt was advised to use moisturizers. CeraVe or Cetaphil moisturizers was recommended . Detail Level: Detailed Plan: Treated with Ln2 today x1.

## 2024-04-12 ENCOUNTER — OFFICE VISIT (OUTPATIENT)
Dept: PAIN MEDICINE | Facility: CLINIC | Age: 59
End: 2024-04-12
Payer: COMMERCIAL

## 2024-04-12 ENCOUNTER — TELEPHONE (OUTPATIENT)
Dept: NEUROSURGERY | Facility: CLINIC | Age: 59
End: 2024-04-12
Payer: COMMERCIAL

## 2024-04-12 VITALS
WEIGHT: 250 LBS | DIASTOLIC BLOOD PRESSURE: 77 MMHG | HEART RATE: 62 BPM | HEIGHT: 72 IN | BODY MASS INDEX: 33.86 KG/M2 | RESPIRATION RATE: 16 BRPM | SYSTOLIC BLOOD PRESSURE: 132 MMHG

## 2024-04-12 DIAGNOSIS — M48.02 CERVICAL STENOSIS OF SPINAL CANAL: ICD-10-CM

## 2024-04-12 DIAGNOSIS — M54.12 CERVICAL RADICULOPATHY: Primary | ICD-10-CM

## 2024-04-12 DIAGNOSIS — M47.812 CERVICAL SPONDYLOSIS: ICD-10-CM

## 2024-04-12 DIAGNOSIS — M50.30 DDD (DEGENERATIVE DISC DISEASE), CERVICAL: ICD-10-CM

## 2024-04-12 PROCEDURE — 99214 OFFICE O/P EST MOD 30 MIN: CPT | Mod: S$GLB,,, | Performed by: ANESTHESIOLOGY

## 2024-04-12 PROCEDURE — 1159F MED LIST DOCD IN RCRD: CPT | Mod: CPTII,S$GLB,, | Performed by: ANESTHESIOLOGY

## 2024-04-12 PROCEDURE — 3075F SYST BP GE 130 - 139MM HG: CPT | Mod: CPTII,S$GLB,, | Performed by: ANESTHESIOLOGY

## 2024-04-12 PROCEDURE — 3072F LOW RISK FOR RETINOPATHY: CPT | Mod: CPTII,S$GLB,, | Performed by: ANESTHESIOLOGY

## 2024-04-12 PROCEDURE — 3060F POS MICROALBUMINURIA REV: CPT | Mod: CPTII,S$GLB,, | Performed by: ANESTHESIOLOGY

## 2024-04-12 PROCEDURE — 3044F HG A1C LEVEL LT 7.0%: CPT | Mod: CPTII,S$GLB,, | Performed by: ANESTHESIOLOGY

## 2024-04-12 PROCEDURE — 3078F DIAST BP <80 MM HG: CPT | Mod: CPTII,S$GLB,, | Performed by: ANESTHESIOLOGY

## 2024-04-12 PROCEDURE — 3066F NEPHROPATHY DOC TX: CPT | Mod: CPTII,S$GLB,, | Performed by: ANESTHESIOLOGY

## 2024-04-12 PROCEDURE — 99999 PR PBB SHADOW E&M-EST. PATIENT-LVL III: CPT | Mod: PBBFAC,,, | Performed by: ANESTHESIOLOGY

## 2024-04-12 PROCEDURE — 3008F BODY MASS INDEX DOCD: CPT | Mod: CPTII,S$GLB,, | Performed by: ANESTHESIOLOGY

## 2024-04-12 PROCEDURE — 4010F ACE/ARB THERAPY RXD/TAKEN: CPT | Mod: CPTII,S$GLB,, | Performed by: ANESTHESIOLOGY

## 2024-04-12 RX ORDER — TIZANIDINE 4 MG/1
4 TABLET ORAL 2 TIMES DAILY PRN
Qty: 60 TABLET | Refills: 2 | Status: SHIPPED | OUTPATIENT
Start: 2024-04-12

## 2024-04-12 NOTE — TELEPHONE ENCOUNTER
Contacted patient regarding referral with Neurosurgery. Patient states no surgeries within last 2 years, nor was pain/injury caused by any accident that would be involved in any type of future litigation.    Danielle Herbert RN

## 2024-04-12 NOTE — PROGRESS NOTES
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     Anion Gap   Date Value Ref Range Status   03/01/2024 8 8 - 16 mmol/L Final     eGFR if    Date Value Ref Range Status   03/14/2022 >60.0 >60 mL/min/1.73 m^2 Final     eGFR if non    Date Value Ref Range Status   03/14/2022 >60.0 >60 mL/min/1.73 m^2 Final     Comment:     Calculation used to obtain the estimated glomerular filtration  rate (eGFR) is the CKD-EPI equation.          Lab Results   Component Value Date    HGBA1C 5.6 03/01/2024             ASSESSMENT:       58 y.o. year old male with neck pain, consistent with     1. Cervical radiculopathy  tiZANidine (ZANAFLEX) 4 MG tablet    Ambulatory referral/consult to Neurosurgery    IR SALBADOR Cervical/THoracic w/ Img    Case Request-RAD/Other Procedure Area: C6/7 IL SALBADOR, Left Paramedian Approach      2. DDD (degenerative disc disease), cervical  tiZANidine (ZANAFLEX) 4 MG tablet      3. Cervical spondylosis  tiZANidine (ZANAFLEX) 4 MG tablet      4. Cervical stenosis of spinal canal  tiZANidine (ZANAFLEX) 4 MG tablet    Ambulatory referral/consult to Neurosurgery            Cervical radiculopathy  -     tiZANidine (ZANAFLEX) 4 MG tablet; Take 1 tablet (4 mg total) by mouth 2 (two) times daily as needed (Neck Pain).  Dispense: 60 tablet; Refill: 2  -     Ambulatory referral/consult to Neurosurgery; Future; Expected date: 04/19/2024  -     IR SALBADOR Cervical/THoracic w/ Img; Future;  Expected date: 04/12/2024  -     Case Request-RAD/Other Procedure Area: C6/7 IL SALBADOR, Left Paramedian Approach    DDD (degenerative disc disease), cervical  -     tiZANidine (ZANAFLEX) 4 MG tablet; Take 1 tablet (4 mg total) by mouth 2 (two) times daily as needed (Neck Pain).  Dispense: 60 tablet; Refill: 2    Cervical spondylosis  -     tiZANidine (ZANAFLEX) 4 MG tablet; Take 1 tablet (4 mg total) by mouth 2 (two) times daily as needed (Neck Pain).  Dispense: 60 tablet; Refill: 2    Cervical stenosis of spinal canal  -     tiZANidine (ZANAFLEX) 4 MG tablet; Take 1 tablet (4 mg total) by mouth 2 (two) times daily as needed (Neck Pain).  Dispense: 60 tablet; Refill: 2  -     Ambulatory referral/consult to Neurosurgery; Future; Expected date: 04/19/2024                 PLAN:   - Interventions:    Schedule patient for C6-C7 interlaminar epidural steroid injection to see if patient receives more relief from this approach.    -12/01/2022:  C7-T1 interlaminar epidural steroid injections, minimal relief  - Anticoagulation use:   no no anticoagulation    - Medications:   Continue Lyrica 150 mg 3 times a day   Start tizanidine 4 mg twice a day p.r.n.   Continue indomethacin for gout       - Therapy:    Refer to physical therapy for neck pain with left cervical radicular pain    - Imaging:   CT and cervical spine reviewed.  Significant for diffuse foraminal stenosis and uncinate hypertrophy was noted at lateral recess stenosis, left greater than right, at C6-C7 and C7-T1   MRI of lumbar spine reviewed with patient, answered any questions regarding study    - Consults:   New referral sent to Neurosurgery for continued neck pain with noted cervical stenosis on MRI        - Patient Questions: Answered all of the patient's questions regarding diagnosis, therapy, and treatment    - Follow up visit: return to clinic 4 weeks after procedure        The above plan and management options were discussed at length with patient. Patient is  in agreement with the above and verbalized understanding.    I discussed the goals of interventional chronic pain management with the patient on today's visit.  I explained the utility of injections for diagnostic and therapeutic purposes.  We discussed a multimodal approach to pain including treating the patient's given worst pain at any given time.  We will use a systematic approach to addressing pain.  We will also adopt a multimodal approach that includes injections, adjuvant medications, physical therapy, at times psychiatry.  There may be a limited role for opioid use intermittently in the treatment of pain, more particularly for acute pain although no one approach can be used as a sole treatment modality.    I emphasized the importance of regular exercise, core strengthening and stretching, diet and weight loss as a cornerstone of long-term pain management.      Leonard Mart MD  Interventional Pain Management  [facility]    Disclaimer:  This note was prepared using voice recognition system and is likely to have sound alike errors that may have been overlooked even after proof reading.  Please call me with any questions

## 2024-04-16 ENCOUNTER — TELEPHONE (OUTPATIENT)
Dept: PAIN MEDICINE | Facility: CLINIC | Age: 59
End: 2024-04-16
Payer: COMMERCIAL

## 2024-04-16 NOTE — TELEPHONE ENCOUNTER
----- Message from Kathya Carlos sent at 4/16/2024 12:27 PM CDT -----  Contact: self  Patient is requesting his procedure on 4/29 be cancelled. He will call back to r/s.

## 2024-04-16 NOTE — PRE-PROCEDURE INSTRUCTIONS
Spoke with patient regarding procedure scheduled on 4.29    Arrival time 0645     Has patient been sick with fever or on antibiotics within the last 7 days? No     Does the patient have any open wounds, sores or rashes? No     Does the patient have any recent fractures? no     Has patient received a vaccination within the last 7 days? No     Received the COVID vaccination? yes     Has the patient stopped all medications as directed? na     Does patient have a pacemaker, defibrillator, or implantable stimulator? No     Does the patient have a ride to and from procedure and someone reliable to remain with patient?  CHANCE      Is the patient diabetic? YES      Does the patient have sleep apnea? Or use O2 at home? FARA ON CPAP      Is the patient receiving sedation? yes     Is the patient instructed to remain NPO beginning at midnight the night before their procedure? yes     Procedure location confirmed with patient? Yes     Covid- Denies signs/symptoms. Instructed to notify PAT/MD if any changes.

## 2024-04-16 NOTE — TELEPHONE ENCOUNTER
Called patient in regards to his message to cancel 4/29/24 procedure.  Patient states that he will call at a later date to reschedule.

## 2024-05-08 RX ORDER — LEVOTHYROXINE SODIUM 112 UG/1
TABLET ORAL
Qty: 90 TABLET | Refills: 2 | Status: SHIPPED | OUTPATIENT
Start: 2024-05-08

## 2024-05-08 NOTE — TELEPHONE ENCOUNTER
No care due was identified.  [facility] Embedded Care Due Messages. Reference number: [id number].   5/08/2024 12:23:29 PM CDT

## 2024-05-09 NOTE — TELEPHONE ENCOUNTER
Refill Decision Note   Narendra Amadro  is requesting a refill authorization.  Brief Assessment and Rationale for Refill:  Approve     Medication Therapy Plan:        Comments:     Note composed:11:00 PM 05/08/2024

## 2024-05-11 ENCOUNTER — PATIENT MESSAGE (OUTPATIENT)
Dept: INTERNAL MEDICINE | Facility: CLINIC | Age: 59
End: 2024-05-11
Payer: COMMERCIAL

## 2024-05-14 DIAGNOSIS — L29.9 PRURITUS: ICD-10-CM

## 2024-05-15 DIAGNOSIS — E78.5 HYPERLIPIDEMIA, UNSPECIFIED HYPERLIPIDEMIA TYPE: ICD-10-CM

## 2024-05-15 DIAGNOSIS — Z01.818 PRE-OP EVALUATION: ICD-10-CM

## 2024-05-15 DIAGNOSIS — R94.31 NONSPECIFIC ABNORMAL ELECTROCARDIOGRAM (ECG) (EKG): ICD-10-CM

## 2024-05-15 DIAGNOSIS — R07.9 CHEST PAIN, UNSPECIFIED TYPE: ICD-10-CM

## 2024-05-15 DIAGNOSIS — E11.9 TYPE 2 DIABETES MELLITUS WITHOUT COMPLICATION, WITHOUT LONG-TERM CURRENT USE OF INSULIN: ICD-10-CM

## 2024-05-15 DIAGNOSIS — I10 HYPERTENSION, UNSPECIFIED TYPE: ICD-10-CM

## 2024-05-15 RX ORDER — TRIAMCINOLONE ACETONIDE 1 MG/G
OINTMENT TOPICAL
Qty: 454 G | Refills: 1 | Status: SHIPPED | OUTPATIENT
Start: 2024-05-15

## 2024-05-15 RX ORDER — HYDRALAZINE HYDROCHLORIDE 100 MG/1
100 TABLET, FILM COATED ORAL EVERY 8 HOURS
Qty: 270 TABLET | Refills: 1 | Status: SHIPPED | OUTPATIENT
Start: 2024-05-15 | End: 2025-05-15

## 2024-05-21 ENCOUNTER — OFFICE VISIT (OUTPATIENT)
Dept: ALLERGY | Facility: CLINIC | Age: 59
End: 2024-05-21
Payer: COMMERCIAL

## 2024-05-21 ENCOUNTER — LAB VISIT (OUTPATIENT)
Dept: LAB | Facility: HOSPITAL | Age: 59
End: 2024-05-21
Attending: UROLOGY
Payer: COMMERCIAL

## 2024-05-21 VITALS
WEIGHT: 248.69 LBS | BODY MASS INDEX: 33.73 KG/M2 | DIASTOLIC BLOOD PRESSURE: 71 MMHG | HEART RATE: 69 BPM | SYSTOLIC BLOOD PRESSURE: 124 MMHG | TEMPERATURE: 99 F

## 2024-05-21 DIAGNOSIS — Z79.4 TYPE 2 DIABETES MELLITUS WITH OTHER CIRCULATORY COMPLICATION, WITH LONG-TERM CURRENT USE OF INSULIN: ICD-10-CM

## 2024-05-21 DIAGNOSIS — E29.1 HYPOGONADISM MALE: ICD-10-CM

## 2024-05-21 DIAGNOSIS — J31.0 CHRONIC NONALLERGIC RHINITIS: ICD-10-CM

## 2024-05-21 DIAGNOSIS — L50.1 CHRONIC IDIOPATHIC URTICARIA: Primary | ICD-10-CM

## 2024-05-21 DIAGNOSIS — E11.59 TYPE 2 DIABETES MELLITUS WITH OTHER CIRCULATORY COMPLICATION, WITH LONG-TERM CURRENT USE OF INSULIN: ICD-10-CM

## 2024-05-21 DIAGNOSIS — L53.9 ERYTHEMA: ICD-10-CM

## 2024-05-21 DIAGNOSIS — L28.2 PRURITIC RASH: ICD-10-CM

## 2024-05-21 LAB
BASOPHILS # BLD AUTO: 0.06 K/UL (ref 0–0.2)
BASOPHILS NFR BLD: 1 % (ref 0–1.9)
DIFFERENTIAL METHOD BLD: ABNORMAL
EOSINOPHIL # BLD AUTO: 0.3 K/UL (ref 0–0.5)
EOSINOPHIL NFR BLD: 5.2 % (ref 0–8)
ERYTHROCYTE [DISTWIDTH] IN BLOOD BY AUTOMATED COUNT: 14.3 % (ref 11.5–14.5)
HCT VFR BLD AUTO: 44.5 % (ref 40–54)
HGB BLD-MCNC: 14 G/DL (ref 14–18)
IMM GRANULOCYTES # BLD AUTO: 0.01 K/UL (ref 0–0.04)
IMM GRANULOCYTES NFR BLD AUTO: 0.2 % (ref 0–0.5)
LYMPHOCYTES # BLD AUTO: 1.5 K/UL (ref 1–4.8)
LYMPHOCYTES NFR BLD: 26.7 % (ref 18–48)
MCH RBC QN AUTO: 30.9 PG (ref 27–31)
MCHC RBC AUTO-ENTMCNC: 31.5 G/DL (ref 32–36)
MCV RBC AUTO: 98 FL (ref 82–98)
MONOCYTES # BLD AUTO: 1 K/UL (ref 0.3–1)
MONOCYTES NFR BLD: 17.7 % (ref 4–15)
NEUTROPHILS # BLD AUTO: 2.8 K/UL (ref 1.8–7.7)
NEUTROPHILS NFR BLD: 49.2 % (ref 38–73)
NRBC BLD-RTO: 0 /100 WBC
PLATELET # BLD AUTO: 238 K/UL (ref 150–450)
PMV BLD AUTO: 12 FL (ref 9.2–12.9)
RBC # BLD AUTO: 4.53 M/UL (ref 4.6–6.2)
WBC # BLD AUTO: 5.76 K/UL (ref 3.9–12.7)

## 2024-05-21 PROCEDURE — 84403 ASSAY OF TOTAL TESTOSTERONE: CPT | Performed by: UROLOGY

## 2024-05-21 PROCEDURE — 3072F LOW RISK FOR RETINOPATHY: CPT | Mod: CPTII,S$GLB,, | Performed by: STUDENT IN AN ORGANIZED HEALTH CARE EDUCATION/TRAINING PROGRAM

## 2024-05-21 PROCEDURE — 4010F ACE/ARB THERAPY RXD/TAKEN: CPT | Mod: CPTII,S$GLB,, | Performed by: STUDENT IN AN ORGANIZED HEALTH CARE EDUCATION/TRAINING PROGRAM

## 2024-05-21 PROCEDURE — 3074F SYST BP LT 130 MM HG: CPT | Mod: CPTII,S$GLB,, | Performed by: STUDENT IN AN ORGANIZED HEALTH CARE EDUCATION/TRAINING PROGRAM

## 2024-05-21 PROCEDURE — 3008F BODY MASS INDEX DOCD: CPT | Mod: CPTII,S$GLB,, | Performed by: STUDENT IN AN ORGANIZED HEALTH CARE EDUCATION/TRAINING PROGRAM

## 2024-05-21 PROCEDURE — 1159F MED LIST DOCD IN RCRD: CPT | Mod: CPTII,S$GLB,, | Performed by: STUDENT IN AN ORGANIZED HEALTH CARE EDUCATION/TRAINING PROGRAM

## 2024-05-21 PROCEDURE — 99214 OFFICE O/P EST MOD 30 MIN: CPT | Mod: S$GLB,,, | Performed by: STUDENT IN AN ORGANIZED HEALTH CARE EDUCATION/TRAINING PROGRAM

## 2024-05-21 PROCEDURE — 3078F DIAST BP <80 MM HG: CPT | Mod: CPTII,S$GLB,, | Performed by: STUDENT IN AN ORGANIZED HEALTH CARE EDUCATION/TRAINING PROGRAM

## 2024-05-21 PROCEDURE — 36415 COLL VENOUS BLD VENIPUNCTURE: CPT | Mod: PO | Performed by: UROLOGY

## 2024-05-21 PROCEDURE — 80076 HEPATIC FUNCTION PANEL: CPT | Performed by: UROLOGY

## 2024-05-21 PROCEDURE — 3066F NEPHROPATHY DOC TX: CPT | Mod: CPTII,S$GLB,, | Performed by: STUDENT IN AN ORGANIZED HEALTH CARE EDUCATION/TRAINING PROGRAM

## 2024-05-21 PROCEDURE — 3060F POS MICROALBUMINURIA REV: CPT | Mod: CPTII,S$GLB,, | Performed by: STUDENT IN AN ORGANIZED HEALTH CARE EDUCATION/TRAINING PROGRAM

## 2024-05-21 PROCEDURE — 3044F HG A1C LEVEL LT 7.0%: CPT | Mod: CPTII,S$GLB,, | Performed by: STUDENT IN AN ORGANIZED HEALTH CARE EDUCATION/TRAINING PROGRAM

## 2024-05-21 PROCEDURE — 85025 COMPLETE CBC W/AUTO DIFF WBC: CPT | Performed by: UROLOGY

## 2024-05-21 PROCEDURE — 99999 PR PBB SHADOW E&M-EST. PATIENT-LVL III: CPT | Mod: PBBFAC,,, | Performed by: STUDENT IN AN ORGANIZED HEALTH CARE EDUCATION/TRAINING PROGRAM

## 2024-05-21 RX ORDER — SEMAGLUTIDE 2.68 MG/ML
2 INJECTION, SOLUTION SUBCUTANEOUS
Qty: 3 ML | Refills: 3 | Status: SHIPPED | OUTPATIENT
Start: 2024-05-21 | End: 2024-05-31 | Stop reason: SDUPTHER

## 2024-05-21 RX ORDER — OMALIZUMAB 150 MG/ML
300 INJECTION, SOLUTION SUBCUTANEOUS
Qty: 2 ML | Refills: 11 | Status: ACTIVE | OUTPATIENT
Start: 2024-05-21 | End: 2025-05-21

## 2024-05-21 RX ORDER — EPINEPHRINE 0.3 MG/.3ML
1 INJECTION SUBCUTANEOUS ONCE
Qty: 0.6 ML | Refills: 0 | Status: ACTIVE | OUTPATIENT
Start: 2024-05-21 | End: 2024-05-31

## 2024-05-21 NOTE — ASSESSMENT & PLAN NOTE
- Not controlled at this time   - Continue Allegra 360 mg BID   - Escalation to Xolair at this time   - Consent obtained and EpiPen demonstrated   - Ordered EpiPen PRN   - Will continue to monitor and reassess

## 2024-05-21 NOTE — PROGRESS NOTES
DM2   Ozempic prescribed, will stop Ashley Guidry MD, [facility]  Cardiovascular Disease  [facility], [place]  [phone] (P)

## 2024-05-21 NOTE — ASSESSMENT & PLAN NOTE
- Improved at this time   - Continue current medications   - Will continue to monitor and reassess

## 2024-05-21 NOTE — PROGRESS NOTES
Allergy and Immunology  Established Patient Clinic Note    Date: 5/21/2024  Chief Complaint   Patient presents with    Follow-up     History  Narendra English Sr. is a 58 y.o. male being seen for follow-up today.    Chronic Nonallergic Rhinitis  - Improved since prior appointment      Chronic Idiopathic Urticaria  Erythema/Cholinergic Urticaria  - Continued urticaria and pruritus despite high dose antihistamines   - Escalation to Xolair at this time      Allergies, PMH, PSH, Social, and Family History were reviewed.    Current Outpatient Medications on File Prior to Visit   Medication Sig Dispense Refill    ALLERGY RELIEF, FEXOFENADINE, 180 mg tablet Take 180 mg by mouth once daily.      amLODIPine (NORVASC) 5 MG tablet Take 1 tablet (5 mg total) by mouth once daily. 90 tablet 1    atorvastatin (LIPITOR) 10 MG tablet Take 1 tablet (10 mg total) by mouth every evening. 90 tablet 1    azelastine (ASTELIN) 137 mcg (0.1 %) nasal spray 1 spray (137 mcg total) by Nasal route 2 (two) times daily. 30 mL 3    benazepriL (LOTENSIN) 40 MG tablet Take 1 tablet (40 mg total) by mouth once daily. 90 tablet 3    clobetasol 0.05% (TEMOVATE) 0.05 % Oint APPLY TOPICALLY TO THE AFFECTED AREA(S) TWICE DAILY 240 g 0    clonazePAM (KLONOPIN) 1 MG tablet Take 1 tablet (1 mg total) by mouth every evening. 30 tablet 5    colchicine, gout, (COLCRYS) 0.6 mg tablet Take 1 tablet (0.6 mg total) by mouth once daily. 180 tablet 3    eszopiclone (LUNESTA) 3 mg Tab Take 3 mg by mouth every evening.      famotidine (PEPCID) 40 MG tablet Take 40 mg by mouth 2 (two) times daily.      febuxostat (ULORIC) 40 mg Tab Take 1 tablet (40 mg total) by mouth once daily. 30 tablet 3    fluticasone propionate (FLONASE) 50 mcg/actuation nasal spray 1 spray (50 mcg total) by Each Nostril route once daily. 16 g 0    hydrALAZINE (APRESOLINE) 100 MG tablet Take 1 tablet (100 mg total) by mouth every 8 (eight) hours. 270 tablet 1    hydrOXYzine HCL (ATARAX) 25 MG  tablet Take 25 mg by mouth 2 (two) times daily.      indomethacin (INDOCIN SR) 75 mg CpSR CR capsule Take 1 capsule (75 mg total) by mouth 2 (two) times daily as needed (for gout flare). 60 capsule 0    insulin glargine, TOUJEO, (TOUJEO SOLOSTAR U-300 INSULIN) 300 unit/mL (1.5 mL) InPn pen Inject 85 Units into the skin once daily. 27 mL 1    ketoconazole (NIZORAL) 2 % shampoo Apply topically once a week. Lather in for 5-10 min before rinsing 120 mL 5    levocetirizine (XYZAL) 5 MG tablet Take 5 mg by mouth.      levothyroxine (SYNTHROID) 112 MCG tablet TAKE 1 TABLET BY MOUTH ONCE DAILY BEFORE BREAKFAST 90 tablet 2    metFORMIN (GLUCOPHAGE) 1000 MG tablet Take 1 tablet by mouth twice daily with food 180 tablet 1    metoprolol succinate (TOPROL-XL) 50 MG 24 hr tablet Take 1 tablet (50 mg total) by mouth once daily. 90 tablet 3    permethrin (ELIMITE) 5 % cream Apply topically.      pregabalin (LYRICA) 150 MG capsule Take 1 capsule (150 mg total) by mouth 3 (three) times daily. 90 capsule 3    semaglutide (OZEMPIC) 2 mg/dose (8 mg/3 mL) PnIj Inject 2 mg into the skin every 7 days. Take this instead of Mounjaro if it remains on backorder - delay one week before starting this 3 mL 3    tadalafiL (CIALIS) 20 MG Tab Take 1 tablet (20 mg total) by mouth every 24 hours as needed (erectile dysfunction). 20 tablet 11    tirzepatide (MOUNJARO) 15 mg/0.5 mL PnIj Inject 15 mg into the skin every 7 days. 4 Pen 3    tiZANidine (ZANAFLEX) 4 MG tablet Take 1 tablet (4 mg total) by mouth 2 (two) times daily as needed (Neck Pain). 60 tablet 2    triamcinolone acetonide 0.1% (KENALOG) 0.1 % ointment AAA bid 454 g 1    VASCEPA 1 gram Cap Take 2 capsules (2,000 mg total) by mouth 2 (two) times daily. 360 capsule 1     No current facility-administered medications on file prior to visit.     Physical Examination  Vitals:    05/21/24 1414   BP: 124/71   Pulse: 69   Temp: 98.8 °F (37.1 °C)     GENERAL:  male in no apparent distress and well  developed and well nourished  HEAD:  Normocephalic, without obvious abnormality, atraumatic  EYES: sclera anicteric, conjunctiva normochromic  EARS: normal TM's and external ear canals both ears  NOSE: without erythema or discharge, clear discharge, turbinates normal    OROPHARYNX: moist mucous membranes without erythema, exudates or petechiae  LYMPH NODES: normal, supple, no lymphadenopathy  LUNGS: clear to auscultation, no wheezes, rales or rhonchi, symmetric air entry.  HEART: normal rate, regular rhythm, normal S1, S2, no murmurs, rubs, clicks or gallops.  ABDOMEN: soft, nontender, nondistended, no masses or organomegaly.  MUSCULOSKELETAL: no gross joint deformity or swelling.  NEURO: alert, oriented, normal speech, no focal findings or movement disorder noted.  SKIN: normal coloration and turgor, no rashes, no suspicious skin lesions noted.     Assessment/Plan:   Problem List Items Addressed This Visit          ENT    Chronic nonallergic rhinitis    Overview     - 03/20/2024: SPT to inhalants negative with adequate histamine response         Current Assessment & Plan     - Improved at this time   - Continue current medications   - Will continue to monitor and reassess             Derm    Erythema    Pruritic rash    Chronic idiopathic urticaria - Primary    Current Assessment & Plan     - Not controlled at this time   - Continue Allegra 360 mg BID   - Escalation to Xolair at this time   - Consent obtained and EpiPen demonstrated   - Ordered EpiPen PRN   - Will continue to monitor and reassess          Relevant Medications    omalizumab (XOLAIR) 150 mg/mL injection    EPINEPHrine (EPIPEN 2-BUDDY) 0.3 mg/0.3 mL AtIn     Follow up:  Follow up in about 3 months (around 8/21/2024).    Teressa Riggins MD   [facility]  Allergy and Immunology

## 2024-05-22 ENCOUNTER — PROCEDURE VISIT (OUTPATIENT)
Dept: UROLOGY | Facility: CLINIC | Age: 59
End: 2024-05-22
Payer: COMMERCIAL

## 2024-05-22 DIAGNOSIS — N52.9 ERECTILE DYSFUNCTION, UNSPECIFIED ERECTILE DYSFUNCTION TYPE: ICD-10-CM

## 2024-05-22 DIAGNOSIS — E29.1 HYPOGONADISM MALE: Primary | ICD-10-CM

## 2024-05-22 LAB
ALBUMIN SERPL BCP-MCNC: 3.7 G/DL (ref 3.5–5.2)
ALP SERPL-CCNC: 40 U/L (ref 55–135)
ALT SERPL W/O P-5'-P-CCNC: 40 U/L (ref 10–44)
AST SERPL-CCNC: 36 U/L (ref 10–40)
BILIRUB DIRECT SERPL-MCNC: 0.2 MG/DL (ref 0.1–0.3)
BILIRUB SERPL-MCNC: 0.5 MG/DL (ref 0.1–1)
PROT SERPL-MCNC: 6.4 G/DL (ref 6–8.4)
TESTOST SERPL-MCNC: 419 NG/DL (ref 304–1227)

## 2024-05-22 PROCEDURE — 11980 IMPLANT HORMONE PELLET(S): CPT | Mod: S$GLB,,, | Performed by: UROLOGY

## 2024-05-22 PROCEDURE — S0189 TESTOSTERONE PELLET 75 MG: HCPCS | Mod: S$GLB,,, | Performed by: UROLOGY

## 2024-05-22 RX ORDER — LEVOFLOXACIN 500 MG/1
500 TABLET, FILM COATED ORAL DAILY
Qty: 3 TABLET | Refills: 0 | Status: SHIPPED | OUTPATIENT
Start: 2024-05-22 | End: 2024-05-25

## 2024-05-22 NOTE — PROCEDURES
Procedures  Chief Complaint:   Encounter Diagnoses   Name Primary?    Hypogonadism male Yes    Erectile dysfunction, unspecified erectile dysfunction type        HPI:   5/22/24- here today for testopel insertion.    06/16/2022 - 55 yo male that presents today for evaluation of ED.  Patient notes issues for the last 3-4 years but notes that over the last six months it has gotten worse.  He notes difficulty both achieving and maintaining an erection.  He has previously tried both Cialis and Viagra and both of these cause in to have a very bad headache that make it on tolerable to take these medications, though they do work.  Otherwise he voids with a good stream and feels like he empties well subjectively.  He denies any gross hematuria or family history of  cancers.  Denies prior stones or urologic procedures.    Allergies:  Demerol (pf) [meperidine (pf)], Percocet [oxycodone-acetaminophen], and Demerol [meperidine]    Medications:  has a current medication list which includes the following prescription(s): amlodipine, atorvastatin, azelastine, benazepril, clobetasol 0.05%, clonazepam, colchicine, diclofenac sodium, flash glucose scanning reader, flash glucose sensor, fluticasone propionate, hydralazine, hydrocortisone, indomethacin, toujeo solostar u-300 insulin, levothyroxine, metformin, metoprolol succinate, pregabalin, sildenafil, mounjaro, trazodone, triamcinolone acetonide 0.1%, and vascepa, and the following Facility-Administered Medications: gabapentin and lactated ringers.    Review of Systems:  General: No fever, chills, fatigability, or weight loss.  Skin: No rashes, itching, or changes in color or texture of skin.  Chest: Denies MEJÍA, cyanosis, wheezing, cough, and sputum production.  Abdomen: Appetite fine. No weight loss. Denies diarrhea, abdominal pain, hematemesis, or blood in stool.  Musculoskeletal: No joint stiffness or swelling. Denies back pain.  : As above.  All other review of systems  negative.    PMH:   has a past medical history of Cancer, Diabetes mellitus (8 years), Hypertension, Sleep apnea, and Thyroid disease.    PSH:   has a past surgical history that includes Colonoscopy (N/A, 12/29/2020); Laparoscopic right colon resection (N/A, 02/02/2021); Back surgery; Colonoscopy (N/A, 02/10/2022); Tonsillectomy; fess, with nasal septoplasty, with imaging guidance (Bilateral, 08/17/2022); Nasal turbinate reduction (Bilateral, 08/17/2022); Colon surgery (02/06/21); and Epidural steroid injection into cervical spine (N/A, 12/1/2022).    FamHx: family history includes Breast cancer in his mother; Cancer in his mother; Cataracts in his maternal grandmother; Diabetes in his maternal grandmother and mother; Hypertension in his brother, brother, and mother; Stroke in his father.    SocHx:  reports that he has never smoked. He has never used smokeless tobacco. He reports that he does not drink alcohol and does not use drugs.      Physical Exam:  There were no vitals filed for this visit.    General: A&Ox3, no apparent distress, no deformities  Neck: No masses, normal ROM  Lungs: normal inspiration, no use of accessory muscles  Heart: normal pulse, no arrhythmias  Abdomen: Soft, NT, ND, no masses, no hernias, no hepatosplenomegaly  Skin: The skin is warm and dry. No jaundice.  Ext: No c/c/e.  JAIMIE: 6/22: Normal rectal tone, no hemorrhoids. Prostate smooth and normal, no nodules 30 gm SV not palpable. Perineum and anus normal.    Labs/Studies:   Testopel  5/22/24, 2/23/24, 11/29/23, 8/25/23, 5/26/26, 1/20/23  Testosterone 328 8/22   PSA 0.60 8/23    Patient was sterilely prepped and draped, then positioned in the right side up, lateral decubitus position.  Lidocaine was used for local anesthesia.  Eleven blade was used to incise the skin, included trocars with the testopel kit were then used.  They were inserted within the incision site and we placed the pellets in a V location.  10 pellets total were inserted  without difficulty.  Trocars were removed and pressure was applied for 2 min.  Steri-Strips applied for local coverage, he will return for lab assessment in 3 months.      Impression/Plan:      1. Hypogonadism- Patient tolerated testopel insertion today and will follow back up in 3 months for repeat insertion.  Will obtain labs prior to the procedure and will call with any complaints in the meantime.  Of note previous AndroGel and shots have failed.    2. Erectile dysfunction- sildenafil 100 mg never really assisted and the TriMix caused priapism.  Patient has done well with Cialis 20 mg.

## 2024-05-24 ENCOUNTER — TELEPHONE (OUTPATIENT)
Dept: CARDIOLOGY | Facility: CLINIC | Age: 59
End: 2024-05-24
Payer: COMMERCIAL

## 2024-05-24 ENCOUNTER — PATIENT MESSAGE (OUTPATIENT)
Dept: ALLERGY | Facility: CLINIC | Age: 59
End: 2024-05-24
Payer: COMMERCIAL

## 2024-05-24 NOTE — TELEPHONE ENCOUNTER
Attempted to reach patient and inform Ozempic PA has been approved. Left voice message advising patient to call his pharmacy to fill prescription or call our office back at [phone].

## 2024-05-24 NOTE — TELEPHONE ENCOUNTER
(Ozempic)This medication or product was previously approved on [id number] from 2024-05-24 to 2025-05-24 . You will be able to fill a prescription for this medication at your pharmacy. If your pharmacy has questions regarding the processing of your prescription, please have them call the [facility] pharmacy help desk at [phone].

## 2024-05-29 ENCOUNTER — TELEPHONE (OUTPATIENT)
Dept: PAIN MEDICINE | Facility: CLINIC | Age: 59
End: 2024-05-29
Payer: COMMERCIAL

## 2024-05-30 ENCOUNTER — DOCUMENTATION ONLY (OUTPATIENT)
Dept: PAIN MEDICINE | Facility: CLINIC | Age: 59
End: 2024-05-30
Payer: COMMERCIAL

## 2024-05-30 ENCOUNTER — OFFICE VISIT (OUTPATIENT)
Dept: NEUROSURGERY | Facility: CLINIC | Age: 59
End: 2024-05-30
Payer: COMMERCIAL

## 2024-05-30 ENCOUNTER — PATIENT MESSAGE (OUTPATIENT)
Dept: INTERNAL MEDICINE | Facility: CLINIC | Age: 59
End: 2024-05-30
Payer: COMMERCIAL

## 2024-05-30 VITALS
BODY MASS INDEX: 33.91 KG/M2 | HEART RATE: 64 BPM | WEIGHT: 250 LBS | DIASTOLIC BLOOD PRESSURE: 82 MMHG | SYSTOLIC BLOOD PRESSURE: 137 MMHG

## 2024-05-30 DIAGNOSIS — M54.12 CERVICAL RADICULOPATHY: ICD-10-CM

## 2024-05-30 DIAGNOSIS — M48.02 CERVICAL STENOSIS OF SPINAL CANAL: ICD-10-CM

## 2024-05-30 PROCEDURE — 99213 OFFICE O/P EST LOW 20 MIN: CPT | Mod: S$GLB,,, | Performed by: NEUROLOGICAL SURGERY

## 2024-05-30 PROCEDURE — 3072F LOW RISK FOR RETINOPATHY: CPT | Mod: CPTII,S$GLB,, | Performed by: NEUROLOGICAL SURGERY

## 2024-05-30 PROCEDURE — 3075F SYST BP GE 130 - 139MM HG: CPT | Mod: CPTII,S$GLB,, | Performed by: NEUROLOGICAL SURGERY

## 2024-05-30 PROCEDURE — 4010F ACE/ARB THERAPY RXD/TAKEN: CPT | Mod: CPTII,S$GLB,, | Performed by: NEUROLOGICAL SURGERY

## 2024-05-30 PROCEDURE — 3008F BODY MASS INDEX DOCD: CPT | Mod: CPTII,S$GLB,, | Performed by: NEUROLOGICAL SURGERY

## 2024-05-30 PROCEDURE — 3079F DIAST BP 80-89 MM HG: CPT | Mod: CPTII,S$GLB,, | Performed by: NEUROLOGICAL SURGERY

## 2024-05-30 PROCEDURE — 3060F POS MICROALBUMINURIA REV: CPT | Mod: CPTII,S$GLB,, | Performed by: NEUROLOGICAL SURGERY

## 2024-05-30 PROCEDURE — 3066F NEPHROPATHY DOC TX: CPT | Mod: CPTII,S$GLB,, | Performed by: NEUROLOGICAL SURGERY

## 2024-05-30 PROCEDURE — 99999 PR PBB SHADOW E&M-EST. PATIENT-LVL V: CPT | Mod: PBBFAC,,, | Performed by: NEUROLOGICAL SURGERY

## 2024-05-30 PROCEDURE — 3044F HG A1C LEVEL LT 7.0%: CPT | Mod: CPTII,S$GLB,, | Performed by: NEUROLOGICAL SURGERY
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
## 2024-05-31 ENCOUNTER — OFFICE VISIT (OUTPATIENT)
Dept: CARDIOLOGY | Facility: CLINIC | Age: 59
End: 2024-05-31
Payer: COMMERCIAL

## 2024-05-31 ENCOUNTER — CLINICAL SUPPORT (OUTPATIENT)
Dept: ALLERGY | Facility: CLINIC | Age: 59
End: 2024-05-31
Payer: COMMERCIAL

## 2024-05-31 VITALS
SYSTOLIC BLOOD PRESSURE: 140 MMHG | DIASTOLIC BLOOD PRESSURE: 70 MMHG | OXYGEN SATURATION: 97 % | HEART RATE: 71 BPM | BODY MASS INDEX: 33.94 KG/M2 | WEIGHT: 250.25 LBS

## 2024-05-31 DIAGNOSIS — Z79.4 TYPE 2 DIABETES MELLITUS WITH OTHER CIRCULATORY COMPLICATION, WITH LONG-TERM CURRENT USE OF INSULIN: ICD-10-CM

## 2024-05-31 DIAGNOSIS — E11.69 TYPE 2 DIABETES MELLITUS WITH OTHER SPECIFIED COMPLICATION, UNSPECIFIED WHETHER LONG TERM INSULIN USE: ICD-10-CM

## 2024-05-31 DIAGNOSIS — E11.9 TYPE 2 DIABETES MELLITUS WITHOUT COMPLICATION, WITHOUT LONG-TERM CURRENT USE OF INSULIN: ICD-10-CM

## 2024-05-31 DIAGNOSIS — G47.33 OSA (OBSTRUCTIVE SLEEP APNEA): ICD-10-CM

## 2024-05-31 DIAGNOSIS — E03.9 HYPOTHYROIDISM (ACQUIRED): ICD-10-CM

## 2024-05-31 DIAGNOSIS — E11.59 TYPE 2 DIABETES MELLITUS WITH OTHER CIRCULATORY COMPLICATION, WITH LONG-TERM CURRENT USE OF INSULIN: ICD-10-CM

## 2024-05-31 DIAGNOSIS — E78.5 HYPERLIPIDEMIA, UNSPECIFIED HYPERLIPIDEMIA TYPE: ICD-10-CM

## 2024-05-31 DIAGNOSIS — E11.59 HYPERTENSION ASSOCIATED WITH DIABETES: Primary | ICD-10-CM

## 2024-05-31 DIAGNOSIS — I15.2 HYPERTENSION ASSOCIATED WITH DIABETES: Primary | ICD-10-CM

## 2024-05-31 DIAGNOSIS — I10 BENIGN ESSENTIAL HTN: ICD-10-CM

## 2024-05-31 DIAGNOSIS — L50.1 CHRONIC IDIOPATHIC URTICARIA: Primary | ICD-10-CM

## 2024-05-31 DIAGNOSIS — E78.1 HYPERTRIGLYCERIDEMIA: ICD-10-CM

## 2024-05-31 PROCEDURE — 3044F HG A1C LEVEL LT 7.0%: CPT | Mod: CPTII,S$GLB,, | Performed by: INTERNAL MEDICINE

## 2024-05-31 PROCEDURE — 1159F MED LIST DOCD IN RCRD: CPT | Mod: CPTII,S$GLB,, | Performed by: INTERNAL MEDICINE

## 2024-05-31 PROCEDURE — 4010F ACE/ARB THERAPY RXD/TAKEN: CPT | Mod: CPTII,S$GLB,, | Performed by: INTERNAL MEDICINE

## 2024-05-31 PROCEDURE — 3077F SYST BP >= 140 MM HG: CPT | Mod: CPTII,S$GLB,, | Performed by: INTERNAL MEDICINE

## 2024-05-31 PROCEDURE — 1160F RVW MEDS BY RX/DR IN RCRD: CPT | Mod: CPTII,S$GLB,, | Performed by: INTERNAL MEDICINE

## 2024-05-31 PROCEDURE — G2211 COMPLEX E/M VISIT ADD ON: HCPCS | Mod: S$GLB,,, | Performed by: INTERNAL MEDICINE

## 2024-05-31 PROCEDURE — 99999 PR PBB SHADOW E&M-EST. PATIENT-LVL III: CPT | Mod: PBBFAC,,,

## 2024-05-31 PROCEDURE — 99214 OFFICE O/P EST MOD 30 MIN: CPT | Mod: S$GLB,,, | Performed by: INTERNAL MEDICINE

## 2024-05-31 PROCEDURE — 96372 THER/PROPH/DIAG INJ SC/IM: CPT | Mod: S$GLB,,, | Performed by: STUDENT IN AN ORGANIZED HEALTH CARE EDUCATION/TRAINING PROGRAM

## 2024-05-31 PROCEDURE — 3060F POS MICROALBUMINURIA REV: CPT | Mod: CPTII,S$GLB,, | Performed by: INTERNAL MEDICINE

## 2024-05-31 PROCEDURE — 3078F DIAST BP <80 MM HG: CPT | Mod: CPTII,S$GLB,, | Performed by: INTERNAL MEDICINE

## 2024-05-31 PROCEDURE — 3072F LOW RISK FOR RETINOPATHY: CPT | Mod: CPTII,S$GLB,, | Performed by: INTERNAL MEDICINE

## 2024-05-31 PROCEDURE — 99999 PR PBB SHADOW E&M-EST. PATIENT-LVL V: CPT | Mod: PBBFAC,,, | Performed by: INTERNAL MEDICINE

## 2024-05-31 PROCEDURE — 3066F NEPHROPATHY DOC TX: CPT | Mod: CPTII,S$GLB,, | Performed by: INTERNAL MEDICINE

## 2024-05-31 PROCEDURE — 3008F BODY MASS INDEX DOCD: CPT | Mod: CPTII,S$GLB,, | Performed by: INTERNAL MEDICINE

## 2024-05-31 RX ORDER — SEMAGLUTIDE 2.68 MG/ML
2 INJECTION, SOLUTION SUBCUTANEOUS
Qty: 6 ML | Refills: 1 | Status: SHIPPED | OUTPATIENT
Start: 2024-05-31 | End: 2025-05-31

## 2024-05-31 NOTE — PROGRESS NOTES
Subjective:   Patient ID:  Narendra English Sr. is a 58 y.o. male who presents for cardiac consult of No chief complaint on file.      Referring Physician:    Tabitha Melgoza MD [[id number]] [id number] [facility] [place] [address]      Reason for consult: HTN    HPI  The patient came in today for cardiac consult of No chief complaint on file.      Narendra English Sr. is a 58 y.o. male pt with HTN, HLD, elevated TGs, DM2, FARA, obesity, gout, hypothyroidism, low T presents for CV eval of HTN     1/19/2024  Follow up from 2/2023. Lipids very well controlled 9/2023.   Was on Mounjaro 12.5mg - may increase to 15mg  Will increase to 15mg.   NO CP/SOB.   ECG - NSR, RBBB     3/11/24  BP and HR stable. Lipids well controlled - low LDL  BMI 33 - 248 lbs.     5/31/24  Mounjaro changed to Ozempic after last visit.   BP 140s/70. HR 70s. BMI 33 - 250 lbs - gained weight.   He needs to have neck surgery once his fiance is here from [facility].       Stress ECHO 2/2022  Summary    Concentric remodeling and normal systolic function.  There were no arrhythmias during stress.  The estimated ejection fraction is 60%.  Normal left ventricular diastolic function.  With normal right ventricular systolic function.  The stress echo portion of this study is negative for myocardial ischemia.  The ECG portion of this study is negative for myocardial ischemia.    Past Medical History:   Diagnosis Date    Allergy     Cancer     Colon    Diabetes mellitus      09/14/2023 Insulin x 10 years    Gastroesophageal reflux disease without esophagitis 06/20/2023    Hypertension     Sleep apnea     Thyroid disease        Past Surgical History:   Procedure Laterality Date    BACK SURGERY      COLON SURGERY  02/06/21    COLONOSCOPY N/A 12/29/2020    Procedure: COLONOSCOPY;  Surgeon: William Cotter MD;  Location: [facility];  Service: Endoscopy;  Laterality: N/A;  Will need Rapid doesn't live here    COLONOSCOPY N/A 02/10/2022     Procedure: COLONOSCOPY;  Surgeon: Wili Espinosa MD;  Location: [facility] ENDO;  Service: Endoscopy;  Laterality: N/A;    EPIDURAL STEROID INJECTION INTO CERVICAL SPINE N/A 12/01/2022    Procedure: C7/T1 IL SALBADOR;  Surgeon: Leonard Mart MD;  Location: [facility] PAIN MGT;  Service: Pain Management;  Laterality: N/A;    FESS, WITH NASAL SEPTOPLASTY, WITH IMAGING GUIDANCE Bilateral 08/17/2022    Procedure: FESS, WITH NASAL SEPTOPLASTY, WITH IMAGING GUIDANCE;  Surgeon: Viktor Ferrell MD;  Location: [facility] OR;  Service: ENT;  Laterality: Bilateral;    LAPAROSCOPIC RIGHT COLON RESECTION N/A 02/02/2021    Procedure: COLECTOMY, RIGHT, LAPAROSCOPIC, ERAS low;  Surgeon: Melonie Santoyo MD;  Location: [address];  Service: Colon and Rectal;  Laterality: N/A;  needs rapid covid test    LYSIS OF ADHESIONS Bilateral 04/19/2023    Procedure: LYSIS, ADHESIONS;  Surgeon: Viktor Ferrell MD;  Location: [facility] OR;  Service: ENT;  Laterality: Bilateral;    NASAL ENDOSCOPY Bilateral 04/19/2023    Procedure: ENDOSCOPY, NOSE;  Surgeon: Viktor Ferrell MD;  Location: [facility] OR;  Service: ENT;  Laterality: Bilateral;    NASAL TURBINATE REDUCTION Bilateral 08/17/2022    Procedure: REDUCTION, NASAL TURBINATE;  Surgeon: Viktor Ferrell MD;  Location: [facility] OR;  Service: ENT;  Laterality: Bilateral;    RHINOPLASTY Bilateral 04/19/2023    Procedure: RHINOPLASTY;  Surgeon: Viktor Ferrell MD;  Location: [facility] OR;  Service: ENT;  Laterality: Bilateral;    SELECTIVE INJECTION OF ANESTHETIC AGENT AROUND LUMBAR SPINAL NERVE ROOT BY TRANSFORAMINAL APPROACH Bilateral 07/05/2023    Procedure: Bilateral L4/5 TF SALBADOR RN IV Sedation;  Surgeon: Leonard Mart MD;  Location: [facility] PAIN MGT;  Service: Pain Management;  Laterality: Bilateral;    TONSILLECTOMY         Social History     Tobacco Use    Smoking status: Never     Passive exposure: Never    Smokeless tobacco: Never   Substance Use Topics    Alcohol use: Never    Drug use: Never       Family  History   Problem Relation Name Age of Onset    Hypertension Mother Kesha English     Diabetes Mother Kesha English     Breast cancer Mother Kesha English     Cancer Mother Kesha English     Hypertension Father Sandeep English     Stroke Father Sandeep English     No Known Problems Sister      No Known Problems Sister      Hypertension Brother Brother     Hypertension Brother Mauro English     Cataracts Maternal Grandmother Manda Quinn     Diabetes Maternal Grandmother Manda Quinn     Lung cancer Maternal Grandfather      No Known Problems Paternal Grandmother      No Known Problems Paternal Grandfather         Patient's Medications   New Prescriptions    No medications on file   Previous Medications    ALLERGY RELIEF, FEXOFENADINE, 180 MG TABLET    Take 180 mg by mouth once daily.    AMLODIPINE (NORVASC) 5 MG TABLET    Take 1 tablet (5 mg total) by mouth once daily.    ATORVASTATIN (LIPITOR) 10 MG TABLET    Take 1 tablet (10 mg total) by mouth every evening.    AZELASTINE (ASTELIN) 137 MCG (0.1 %) NASAL SPRAY    1 spray (137 mcg total) by Nasal route 2 (two) times daily.    BENAZEPRIL (LOTENSIN) 40 MG TABLET    Take 1 tablet (40 mg total) by mouth once daily.    CLOBETASOL 0.05% (TEMOVATE) 0.05 % OINT    APPLY TOPICALLY TO THE AFFECTED AREA(S) TWICE DAILY    CLONAZEPAM (KLONOPIN) 1 MG TABLET    Take 1 tablet (1 mg total) by mouth every evening.    COLCHICINE, GOUT, (COLCRYS) 0.6 MG TABLET    Take 1 tablet (0.6 mg total) by mouth once daily.    EPINEPHRINE (EPIPEN 2-BUDDY) 0.3 MG/0.3 ML ATIN    Inject 0.3 mLs (0.3 mg total) into the muscle once. for 1 dose    ESZOPICLONE (LUNESTA) 3 MG TAB    Take 3 mg by mouth every evening.    FAMOTIDINE (PEPCID) 40 MG TABLET    Take 40 mg by mouth 2 (two) times daily.    FEBUXOSTAT (ULORIC) 40 MG TAB    Take 1 tablet (40 mg total) by mouth once daily.    FLUTICASONE PROPIONATE (FLONASE) 50 MCG/ACTUATION NASAL SPRAY    1 spray (50 mcg total) by Each Nostril route once daily.     HYDRALAZINE (APRESOLINE) 100 MG TABLET    Take 1 tablet (100 mg total) by mouth every 8 (eight) hours.    HYDROXYZINE HCL (ATARAX) 25 MG TABLET    Take 25 mg by mouth 2 (two) times daily.    INDOMETHACIN (INDOCIN SR) 75 MG CPSR CR CAPSULE    Take 1 capsule (75 mg total) by mouth 2 (two) times daily as needed (for gout flare).    INSULIN GLARGINE, TOUJEO, (TOUJEO SOLOSTAR U-300 INSULIN) 300 UNIT/ML (1.5 ML) INPN PEN    Inject 85 Units into the skin once daily.    KETOCONAZOLE (NIZORAL) 2 % SHAMPOO    Apply topically once a week. Lather in for 5-10 min before rinsing    LEVOCETIRIZINE (XYZAL) 5 MG TABLET    Take 5 mg by mouth.    LEVOTHYROXINE (SYNTHROID) 112 MCG TABLET    TAKE 1 TABLET BY MOUTH ONCE DAILY BEFORE BREAKFAST    METFORMIN (GLUCOPHAGE) 1000 MG TABLET    Take 1 tablet by mouth twice daily with food    METOPROLOL SUCCINATE (TOPROL-XL) 50 MG 24 HR TABLET    Take 1 tablet (50 mg total) by mouth once daily.    OMALIZUMAB (XOLAIR) 150 MG/ML INJECTION    Inject 2 mLs (300 mg total) into the skin every 28 days.    OMALIZUMAB 150 MG/ML ATIN    Inject 150 mg into the skin every 28 days. Once every 28 days    PERMETHRIN (ELIMITE) 5 % CREAM    Apply topically.    PREGABALIN (LYRICA) 150 MG CAPSULE    Take 1 capsule (150 mg total) by mouth 3 (three) times daily.    SEMAGLUTIDE (OZEMPIC) 2 MG/DOSE (8 MG/3 ML) PNIJ    Inject 2 mg into the skin every 7 days. Take this instead of Mounjaro if it remains on backorder - delay one week before starting this    TADALAFIL (CIALIS) 20 MG TAB    Take 1 tablet (20 mg total) by mouth every 24 hours as needed (erectile dysfunction).    TIZANIDINE (ZANAFLEX) 4 MG TABLET    Take 1 tablet (4 mg total) by mouth 2 (two) times daily as needed (Neck Pain).    TRIAMCINOLONE ACETONIDE 0.1% (KENALOG) 0.1 % OINTMENT    AAA bid    VASCEPA 1 GRAM CAP    Take 2 capsules (2,000 mg total) by mouth 2 (two) times daily.   Modified Medications    No medications on file   Discontinued Medications    No  medications on file       Review of Systems   Constitutional: Negative.    HENT: Negative.     Eyes: Negative.    Respiratory: Negative.     Cardiovascular: Negative.    Gastrointestinal: Negative.    Genitourinary: Negative.    Musculoskeletal: Negative.    Skin: Negative.    Neurological: Negative.    Endo/Heme/Allergies: Negative.    Psychiatric/Behavioral: Negative.     All 12 systems otherwise negative.      Wt Readings from Last 3 Encounters:   05/31/24 113.5 kg (250 lb 3.6 oz)   05/30/24 113.4 kg (250 lb)   05/21/24 112.8 kg (248 lb 10.9 oz)     Temp Readings from Last 3 Encounters:   05/21/24 98.8 °F (37.1 °C)   03/20/24 98.1 °F (36.7 °C)   03/11/24 96.7 °F (35.9 °C) (Tympanic)     BP Readings from Last 3 Encounters:   05/31/24 (!) 140/70   05/30/24 137/82   05/21/24 124/71     Pulse Readings from Last 3 Encounters:   05/31/24 71   05/30/24 64   05/21/24 69       BP (!) 140/70 (BP Location: Right arm, Patient Position: Sitting)   Pulse 71   Wt 113.5 kg (250 lb 3.6 oz)   SpO2 97%   BMI 33.94 kg/m²     Objective:   Physical Exam  Vitals and nursing note reviewed.   Constitutional:       General: He is not in acute distress.     Appearance: He is well-developed. He is obese. He is not diaphoretic.   HENT:      Head: Normocephalic and atraumatic.      Nose: Nose normal.   Eyes:      General: No scleral icterus.     Conjunctiva/sclera: Conjunctivae normal.   Neck:      Thyroid: No thyromegaly.      Vascular: No JVD.   Cardiovascular:      Rate and Rhythm: Normal rate and regular rhythm.      Heart sounds: S1 normal and S2 normal. No murmur heard.     No friction rub. No gallop. No S3 or S4 sounds.   Pulmonary:      Effort: Pulmonary effort is normal. No respiratory distress.      Breath sounds: Normal breath sounds. No stridor. No wheezing or rales.   Chest:      Chest wall: No tenderness.   Abdominal:      General: Bowel sounds are normal. There is no distension.      Palpations: Abdomen is soft. There is no  mass.      Tenderness: There is no abdominal tenderness. There is no rebound.   Genitourinary:     Comments: Deferred  Musculoskeletal:         General: No tenderness or deformity. Normal range of motion.      Cervical back: Normal range of motion and neck supple.   Lymphadenopathy:      Cervical: No cervical adenopathy.   Skin:     General: Skin is warm and dry.      Coloration: Skin is not pale.      Findings: No erythema or rash.   Neurological:      Mental Status: He is alert and oriented to person, place, and time.      Motor: No abnormal muscle tone.      Coordination: Coordination normal.   Psychiatric:         Behavior: Behavior normal.         Thought Content: Thought content normal.         Judgment: Judgment normal.         Lab Results   Component Value Date     03/01/2024    K 4.1 03/01/2024     03/01/2024    CO2 25 03/01/2024    BUN 13 03/01/2024    CREATININE 1.1 03/01/2024    GLU 75 03/01/2024    HGBA1C 5.6 03/01/2024    MG 1.6 12/07/2023    AST 36 05/21/2024    ALT 40 05/21/2024    ALBUMIN 3.7 05/21/2024    ALBUMIN 4.1 08/12/2022    PROT 6.4 05/21/2024    BILITOT 0.5 05/21/2024    WBC 5.76 05/21/2024    HGB 14.0 05/21/2024    HCT 44.5 05/21/2024    MCV 98 05/21/2024     05/21/2024    TSH 1.479 03/01/2024    CHOL 92 (L) 03/01/2024    HDL 28 (L) 03/01/2024    LDLCALC 37.0 (L) 03/01/2024    TRIG 135 03/01/2024    BNP <10 11/15/2021     Assessment:      1. Hypertension associated with diabetes    2. Type 2 diabetes mellitus with other circulatory complication, with long-term current use of insulin    3. Benign essential HTN    4. Type 2 diabetes mellitus with other specified complication, unspecified whether long term insulin use    5. Hyperlipidemia, unspecified hyperlipidemia type    6. FARA (obstructive sleep apnea)    7. Hypertriglyceridemia    8. Hypothyroidism (acquired)    9. Type 2 diabetes mellitus without complication, without long-term current use of insulin          Plan:      HTN   - titrate meds   - dec Norvasc to 5mg     2. HLD,   - cont meds and titrate  - Lipids very well controlled   - lower statin dose to Lipitor 10 mg     3. DM2 A1c 6.6 --> 5.3 --> 5.6  - cont tx - on Ozempic - increase to 2mg - changed to Mounjaro - increase to 7.5 mg --> increased to 12.5 --> 15   - on Ozempic 2 mg now    4. Hypothyroidsm TSH1.1  - cont Synthroid    5. FARA  - cont CPAP    6. Obesity, BMI 36 --> BMI 35 - 268 lbs -->  BMI 33 - 248 lbs.  BMI 33 - 250 lbs   - cont weight loss with diet/exercise     7. Low T  - cont T supplements as needed  - monitor BP     8. Preop CV eval -neck surgery pending with Dr. Nam   - low CV risk, proceed as needed  - cont BB and statin periop  - stable ECG without cardiac symptoms    Visit today included increased complexity associated with the care of the episodic problem HTN addressed and managing the longitudinal care of the patient due to the serious and/or complex managed problem(s) .      Thank you for allowing me to participate in this patient's care. Please do not hesitate to contact me with any questions or concerns. Consult note has been forwarded to the referral physician.     Kwame Guidry MD, [facility]  Cardiovascular Disease  [facility], [place]  [phone] (P)

## 2024-06-05 ENCOUNTER — PATIENT MESSAGE (OUTPATIENT)
Dept: ALLERGY | Facility: CLINIC | Age: 59
End: 2024-06-05
Payer: COMMERCIAL

## 2024-06-05 RX ORDER — HYDROXYZINE HYDROCHLORIDE 25 MG/1
25 TABLET, FILM COATED ORAL 2 TIMES DAILY PRN
Qty: 60 TABLET | Refills: 0 | Status: SHIPPED | OUTPATIENT
Start: 2024-06-05 | End: 2024-07-05

## 2024-06-13 ENCOUNTER — OFFICE VISIT (OUTPATIENT)
Dept: PHYSICAL MEDICINE AND REHAB | Facility: CLINIC | Age: 59
End: 2024-06-13
Payer: COMMERCIAL

## 2024-06-13 VITALS — BODY MASS INDEX: 33.89 KG/M2 | HEIGHT: 72 IN | WEIGHT: 250.25 LBS | RESPIRATION RATE: 13 BRPM

## 2024-06-13 DIAGNOSIS — M54.12 CERVICAL RADICULOPATHY: ICD-10-CM

## 2024-06-13 DIAGNOSIS — M48.02 CERVICAL STENOSIS OF SPINAL CANAL: ICD-10-CM

## 2024-06-13 DIAGNOSIS — G56.03 BILATERAL CARPAL TUNNEL SYNDROME: Primary | ICD-10-CM

## 2024-06-13 PROCEDURE — 95885 MUSC TST DONE W/NERV TST LIM: CPT | Mod: 59,S$GLB,, | Performed by: PHYSICAL MEDICINE & REHABILITATION

## 2024-06-13 PROCEDURE — 99499 UNLISTED E&M SERVICE: CPT | Mod: S$GLB,,, | Performed by: PHYSICAL MEDICINE & REHABILITATION

## 2024-06-13 PROCEDURE — 95911 NRV CNDJ TEST 9-10 STUDIES: CPT | Mod: S$GLB,,, | Performed by: PHYSICAL MEDICINE & REHABILITATION

## 2024-06-13 PROCEDURE — 99999 PR PBB SHADOW E&M-EST. PATIENT-LVL III: CPT | Mod: PBBFAC,,, | Performed by: PHYSICAL MEDICINE & REHABILITATION

## 2024-06-13 PROCEDURE — 95886 MUSC TEST DONE W/N TEST COMP: CPT | Mod: S$GLB,,, | Performed by: PHYSICAL MEDICINE & REHABILITATION

## 2024-06-13 NOTE — PROGRESS NOTES
[facility]   [id number] [facility]  [place] [address]  Phone: [phone]        Full Name: Narendra English Sr YOB: 1965  Patient ID: [id number]      Visit Date: 6/13/2024 13:31  Age: 58 Years 11 Months Old  Examining Physician: Alma Layne M.D.  Referring Physician:   Reason for Referral: upper extr      Chief Complaint   Patient presents with    Neck Pain     Into arms       HPI: This is a 58 y.o.  male being seen in clinic today for evaluation of chronic neck achy pain with radiation into his arms-left worse than right.  He has known cervical DJD/DDD, stenosis. His symptoms have become constant and nothing really provides significant relief.    History obtained from patient    Past family, medical, social, and surgical history reviewed in chart    Review of Systems:     General- denies lethargy, weight change, fever, chills  Head/neck- denies swallowing difficulties  ENT- denies hearing changes  Cardiovascular-denies chest pain  Pulmonary- denies shortness of breath  GI- denies constipation or bowel incontinence  - denies bladder incontinence  Skin- denies wounds or rashes  Musculoskeletal- + weakness, + pain  Neurologic- + numbness and tingling  Psychiatric- denies depressive or psychotic features, denies anxiety  Lymphatic-denies swelling  Endocrine- denies hypoglycemic symptoms/+DM history  All other pertinent systems negative     Physical Examination:  General: Well developed, well nourished male, NAD  HEENT:NCAT EOMI bilaterally   Pulmonary:Normal respirations    Spinal Examination: CERVICAL  Active ROM is within normal limits.  Inspection: No deformity of spinal alignment.  Palpation: No vertebral tenderness to percussion.      Musculoskeletal Tests:  Phalen: neg  Elbow compression (ulnar): neg  Tinels at wrist: neg    Bilateral Upper and Lower Extremities:  Pulses are 2+ at radial bilaterally.  Shoulder/Elbow/Wrist/Hand ROM wnl   Hip/Knee/Ankle ROM   Bilateral  Extremities show normal capillary refill.  No signs of cyanosis, rubor, edema, skin changes, or dysvascular changes of appendages.  Nails appear intact.    Neurological Exam:  Cranial Nerves:  II-XII grossly intact    Manual Muscle Testing: (Motor 5=normal)  5/5 strength bilateral upper extremities    No focal atrophy is noted of either upper extremity.    Bilateral Reflexes:  Cowan's response is absent bilaterally.      Sensation: tested to light touch  - intact in arms     Gait: Narrow base and good arm swing.      Entire procedure explained to patient prior to proceeding.  Verbal consent obtained        SNC      Nerve / Sites Rec. Site Onset Lat Peak Lat Amp Segments Distance Velocity     ms ms µV  mm m/s   L Median - Digit II (Antidromic)      Wrist Dig II 4.3 5.5 5.2 Wrist - Dig  33   R Median - Digit II (Antidromic)      Wrist Dig II 3.5 5.5 6.9 Wrist - Dig  40   L Ulnar - Digit V (Antidromic)      Wrist Dig V 2.1 3.2 10.5 Wrist - Dig V 140 67   R Ulnar - Digit V (Antidromic)      Wrist Dig V 2.3 3.4 9.1 Wrist - Dig V 140 61   L Radial - Anatomical snuff box (Forearm)      Forearm Wrist 1.8 2.5 12.0 Forearm - Wrist 100 55   R Radial - Anatomical snuff box (Forearm)      Forearm Wrist 1.8 2.3 12.2 Forearm - Wrist 100 55       MNC      Nerve / Sites Muscle Latency Amplitude Duration Rel Amp Segments Distance Lat Diff Velocity     ms mV ms %  mm ms m/s   L Median - APB      Wrist APB 5.4 2.2 8.4 100 Wrist - APB 80        Elbow APB 10.5 1.7 8.6 76.3 Elbow - Wrist 260 5.1 51   R Median - APB      Wrist APB 5.3 4.2 8.1 100 Wrist - APB 80        Elbow APB 10.7 4.1 8.2 96.4 Elbow - Wrist 270 5.4 50   L Ulnar - ADM      Wrist ADM 3.1 7.1 6.8 100 Wrist -         B. Elbow ADM 7.3 5.4 8.5 77.2 B. Elbow - Wrist 260 4.2 62      A. Elbow ADM 9.0 6.0 8.2 109 A. Elbow - B. Elbow 110 1.6 68   R Ulnar - ADM      Wrist ADM 2.7 5.6 6.6 100 Wrist -         B. Elbow ADM 7.6 5.3 6.7 94.9 B. Elbow - Wrist  270 4.9 55      A. Elbow ADM 9.5 5.5 6.5 104 A. Elbow - B. Elbow 100 1.9 52       EMG         EMG Summary Table     Spontaneous MUAP Recruitment   Muscle IA Fib PSW Fasc Other Dur. Dur Amp Dur Polys Pattern Effort   L. First dorsal interosseous N None None None . _NFT_ _NFT_ N N N N Max   L. Biceps brachii N None None None . _NFT_ _NFT_ N N 1+ sl red Max   L. Triceps brachii N None None None . _NFT_ _NFT_ N Sl Incr 1+ Reduced Max   L. Deltoid N None None None . _NFT_ _NFT_ N N N N Max   L. Pronator teres N None None None . _NFT_ _NFT_ N N N sl red Max   L. Trapezius (upper) Incr None None None . _NFT_ _NFT_ N N N sl red Max   R. First dorsal interosseous N None None None . _NFT_ _NFT_ N N N N Max   R. Pronator teres N None None None . _NFT_ _NFT_ N N 1+ sl red Max   R. Deltoid N None None None . _NFT_ _NFT_ N N 1+ sl red Max   R. Trapezius (upper) N None None None . _NFT_ _NFT_ N N N N Max           INTERPRETATION  -Bilateral median motor nerve conduction study showed prolonged latency, dec amplitude on the left and normal conduction velocity  -Bilateral median sensory nerve conduction study showed prolonged peak latency and dec amplitude  -Bilateral ulnar motor nerve conduction study showed normal latency, amplitude, and conduction velocity  -Bilateral ulnar sensory nerve conduction study showed normal peak latency and amplitude  -Bilateral radial sensory nerve conduction study showed normal peak latency and amplitude  -Needle EMG examination performed to above mentioned muscles       IMPRESSION  ABNORMAL Study  2. 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 There is a subacute on chronic radiculopathy of the left C4 nerve root and a chronic radiculopathy of the bilateral C5-C7 nerve roots    PLAN  Discussed in detail for greater than 30 minutes about diagnosis and  treatment plan    1. Follow up with referring provider: Dr. Rigoberto Nam  2. Handouts on CTS and cervical radic provided.  3. This study is good for one year. If symptoms worsen or do not improve, please re-consult.    Alma Layne M.D.  Physical Medicine and Rehab

## 2024-06-25 ENCOUNTER — PATIENT MESSAGE (OUTPATIENT)
Dept: INTERNAL MEDICINE | Facility: CLINIC | Age: 59
End: 2024-06-25
Payer: COMMERCIAL

## 2024-06-25 ENCOUNTER — OFFICE VISIT (OUTPATIENT)
Dept: SLEEP MEDICINE | Facility: CLINIC | Age: 59
End: 2024-06-25
Payer: COMMERCIAL

## 2024-06-25 VITALS
BODY MASS INDEX: 33.53 KG/M2 | DIASTOLIC BLOOD PRESSURE: 84 MMHG | RESPIRATION RATE: 17 BRPM | OXYGEN SATURATION: 96 % | HEIGHT: 72 IN | WEIGHT: 247.56 LBS | SYSTOLIC BLOOD PRESSURE: 144 MMHG | HEART RATE: 78 BPM

## 2024-06-25 DIAGNOSIS — G47.26 SHIFT WORK SLEEP DISORDER: Primary | ICD-10-CM

## 2024-06-25 DIAGNOSIS — J32.9 CHRONIC SINUSITIS, UNSPECIFIED LOCATION: ICD-10-CM

## 2024-06-25 DIAGNOSIS — G47.33 OSA (OBSTRUCTIVE SLEEP APNEA): ICD-10-CM

## 2024-06-25 DIAGNOSIS — G47.00 INSOMNIA, UNSPECIFIED TYPE: ICD-10-CM

## 2024-06-25 PROCEDURE — 99214 OFFICE O/P EST MOD 30 MIN: CPT | Mod: S$GLB,,, | Performed by: NURSE PRACTITIONER

## 2024-06-25 PROCEDURE — 99999 PR PBB SHADOW E&M-EST. PATIENT-LVL V: CPT | Mod: PBBFAC,,, | Performed by: NURSE PRACTITIONER

## 2024-06-25 PROCEDURE — 3072F LOW RISK FOR RETINOPATHY: CPT | Mod: CPTII,S$GLB,, | Performed by: NURSE PRACTITIONER

## 2024-06-25 PROCEDURE — 3008F BODY MASS INDEX DOCD: CPT | Mod: CPTII,S$GLB,, | Performed by: NURSE PRACTITIONER

## 2024-06-25 PROCEDURE — 4010F ACE/ARB THERAPY RXD/TAKEN: CPT | Mod: CPTII,S$GLB,, | Performed by: NURSE PRACTITIONER

## 2024-06-25 PROCEDURE — 3077F SYST BP >= 140 MM HG: CPT | Mod: CPTII,S$GLB,, | Performed by: NURSE PRACTITIONER

## 2024-06-25 PROCEDURE — 3079F DIAST BP 80-89 MM HG: CPT | Mod: CPTII,S$GLB,, | Performed by: NURSE PRACTITIONER

## 2024-06-25 PROCEDURE — 3060F POS MICROALBUMINURIA REV: CPT | Mod: CPTII,S$GLB,, | Performed by: NURSE PRACTITIONER

## 2024-06-25 PROCEDURE — 1159F MED LIST DOCD IN RCRD: CPT | Mod: CPTII,S$GLB,, | Performed by: NURSE PRACTITIONER

## 2024-06-25 PROCEDURE — 3066F NEPHROPATHY DOC TX: CPT | Mod: CPTII,S$GLB,, | Performed by: NURSE PRACTITIONER

## 2024-06-25 PROCEDURE — 3044F HG A1C LEVEL LT 7.0%: CPT | Mod: CPTII,S$GLB,, | Performed by: NURSE PRACTITIONER

## 2024-06-25 PROCEDURE — 1160F RVW MEDS BY RX/DR IN RCRD: CPT | Mod: CPTII,S$GLB,, | Performed by: NURSE PRACTITIONER

## 2024-06-25 RX ORDER — DOXEPIN 3 MG/1
3 TABLET, FILM COATED ORAL NIGHTLY PRN
Qty: 30 TABLET | Refills: 3 | Status: SHIPPED | OUTPATIENT
Start: 2024-06-25

## 2024-06-25 RX ORDER — SERTRALINE HYDROCHLORIDE 100 MG/1
100 TABLET, FILM COATED ORAL DAILY
COMMUNITY

## 2024-06-25 RX ORDER — AMOXICILLIN AND CLAVULANATE POTASSIUM 875; 125 MG/1; MG/1
1 TABLET, FILM COATED ORAL 2 TIMES DAILY
Qty: 20 TABLET | Refills: 0 | Status: SHIPPED | OUTPATIENT
Start: 2024-06-25 | End: 2024-07-05

## 2024-06-25 NOTE — PROGRESS NOTES
Subjective:      Patient ID: Narendra English Sr. is a 58 y.o. male.    Chief Complaint: Sleep Apnea and Insomnia    HPI  Presents to clinic today for obstructive sleep apnea with insomnia.  She did work disorder.  No difficulty initially falling asleep Klonopin but wakes up after few hours and has a difficult time falling back asleep.  Shift scheduled 4/4/4  Bedtime 830 -330 (Am or PM depending on shift)  Days off bedtime 10:00 p.m.-6:00 a.m.    States he has had a sinus infection for 2 weeks which is not clearing. Taking flonase, astelin, sinus irrigation. Brown mucous. No fever.     Patient Active Problem List   Diagnosis    History of malignant neoplasm of colon    Type 2 diabetes mellitus with circulatory disorder, with long-term current use of insulin    Hypothyroidism (acquired)    Gout    Low testosterone    Mood disorder    Hypertension associated with diabetes    FARA (obstructive sleep apnea)    Nasal septal deviation    Adhesions of nasal septum and turbinates    Hypertrophy of inferior nasal turbinate    Hyperlipidemia associated with type 2 diabetes mellitus    Shift work sleep disorder    Erectile dysfunction    Hypogonadism male    Gastroesophageal reflux disease without esophagitis    Bilateral lower extremity edema    Chronic nonallergic rhinitis    Erythema    Pruritic rash    Chronic idiopathic urticaria     BP (!) 144/84   Pulse 78   Resp 17   Ht 6' (1.829 m)   Wt 112.3 kg (247 lb 9.2 oz)   SpO2 96%   BMI 33.58 kg/m²   Body mass index is 33.58 kg/m².    Review of Systems   Constitutional: Negative.    HENT: Negative.     Respiratory: Negative.     Cardiovascular: Negative.    Musculoskeletal: Negative.    Gastrointestinal: Negative.    Neurological: Negative.    Psychiatric/Behavioral:  Positive for sleep disturbance.      Objective:      Physical Exam  Constitutional:       Appearance: Normal appearance. He is obese.   HENT:      Head: Normocephalic and atraumatic.      Nose: Nose  normal.   Cardiovascular:      Rate and Rhythm: Normal rate and regular rhythm.   Pulmonary:      Effort: Pulmonary effort is normal.      Breath sounds: Normal breath sounds.   Abdominal:      Palpations: Abdomen is soft.      Tenderness: There is no abdominal tenderness.   Musculoskeletal:         General: Normal range of motion.      Cervical back: Normal range of motion and neck supple.   Skin:     General: Skin is warm and dry.   Neurological:      General: No focal deficit present.      Mental Status: He is alert and oriented to person, place, and time.   Psychiatric:         Mood and Affect: Mood normal.         Behavior: Behavior normal.       Personal Diagnostic Review      6/25/2024     8:38 AM   EPWORTH SLEEPINESS SCALE   Sitting and reading 2   Watching TV 2   Sitting, inactive in a public place (e.g. a theatre or a meeting) 1   As a passenger in a car for an hour without a break 1   Lying down to rest in the afternoon when circumstances permit 2   Sitting and talking to someone 0   Sitting quietly after a lunch without alcohol 2   In a car, while stopped for a few minutes in traffic 0   Total score 10      [employer] manual review on shantal:  96.7% compliant over 90 days   Normal AHI  Wearing 6hr 26 min on average    Assessment:       1. Shift work sleep disorder    2. Insomnia, unspecified type    3. Chronic sinusitis, unspecified location    4. FARA (obstructive sleep apnea)        Outpatient Encounter Medications as of 6/25/2024   Medication Sig Dispense Refill    amLODIPine (NORVASC) 5 MG tablet Take 1 tablet (5 mg total) by mouth once daily. 90 tablet 1    atorvastatin (LIPITOR) 10 MG tablet Take 1 tablet (10 mg total) by mouth every evening. 90 tablet 1    azelastine (ASTELIN) 137 mcg (0.1 %) nasal spray 1 spray (137 mcg total) by Nasal route 2 (two) times daily. 30 mL 3    benazepriL (LOTENSIN) 40 MG tablet Take 1 tablet (40 mg total) by mouth once daily. 90 tablet 3    clonazePAM (KLONOPIN) 1 MG  tablet Take 1 tablet (1 mg total) by mouth every evening. 30 tablet 5    colchicine, gout, (COLCRYS) 0.6 mg tablet Take 1 tablet (0.6 mg total) by mouth once daily. 180 tablet 3    febuxostat (ULORIC) 40 mg Tab Take 1 tablet (40 mg total) by mouth once daily. 30 tablet 3    fluticasone propionate (FLONASE) 50 mcg/actuation nasal spray 1 spray (50 mcg total) by Each Nostril route once daily. 16 g 0    hydrALAZINE (APRESOLINE) 100 MG tablet Take 1 tablet (100 mg total) by mouth every 8 (eight) hours. 270 tablet 1    hydrOXYzine HCL (ATARAX) 25 MG tablet Take 25 mg by mouth 2 (two) times daily.      hydrOXYzine HCL (ATARAX) 25 MG tablet Take 1 tablet (25 mg total) by mouth 2 (two) times daily as needed for Itching. 60 tablet 0    indomethacin (INDOCIN SR) 75 mg CpSR CR capsule Take 1 capsule (75 mg total) by mouth 2 (two) times daily as needed (for gout flare). 60 capsule 0    insulin glargine, TOUJEO, (TOUJEO SOLOSTAR U-300 INSULIN) 300 unit/mL (1.5 mL) InPn pen Inject 85 Units into the skin once daily. 27 mL 1    ketoconazole (NIZORAL) 2 % shampoo Apply topically once a week. Lather in for 5-10 min before rinsing 120 mL 5    levocetirizine (XYZAL) 5 MG tablet Take 5 mg by mouth.      levothyroxine (SYNTHROID) 112 MCG tablet TAKE 1 TABLET BY MOUTH ONCE DAILY BEFORE BREAKFAST 90 tablet 2    metFORMIN (GLUCOPHAGE) 1000 MG tablet Take 1 tablet by mouth twice daily with food 180 tablet 1    metoprolol succinate (TOPROL-XL) 50 MG 24 hr tablet Take 1 tablet (50 mg total) by mouth once daily. 90 tablet 3    omalizumab (XOLAIR) 150 mg/mL injection Inject 2 mLs (300 mg total) into the skin every 28 days. 2 mL 11    omalizumab 150 mg/mL AtIn Inject 150 mg into the skin every 28 days. Once every 28 days      pregabalin (LYRICA) 150 MG capsule Take 1 capsule (150 mg total) by mouth 3 (three) times daily. 90 capsule 3    semaglutide (OZEMPIC) 2 mg/dose (8 mg/3 mL) PnNaman Inject 2 mg into the skin every 7 days. 6 mL 1    tadalafiL  (CIALIS) 20 MG Tab Take 1 tablet (20 mg total) by mouth every 24 hours as needed (erectile dysfunction). 20 tablet 11    tiZANidine (ZANAFLEX) 4 MG tablet Take 1 tablet (4 mg total) by mouth 2 (two) times daily as needed (Neck Pain). 60 tablet 2    triamcinolone acetonide 0.1% (KENALOG) 0.1 % ointment AAA bid 454 g 1    VASCEPA 1 gram Cap Take 2 capsules (2,000 mg total) by mouth 2 (two) times daily. 360 capsule 1    [DISCONTINUED] eszopiclone (LUNESTA) 3 mg Tab Take 3 mg by mouth every evening.      amoxicillin-clavulanate 875-125mg (AUGMENTIN) 875-125 mg per tablet Take 1 tablet by mouth 2 (two) times daily. for 10 days 20 tablet 0    doxepin (SILENOR) 3 mg Tab Take 3 mg by mouth nightly as needed (insomnia). 30 tablet 3    EPINEPHrine (EPIPEN 2-BUDDY) 0.3 mg/0.3 mL AtIn Inject 0.3 mLs (0.3 mg total) into the muscle once. for 1 dose 0.6 mL 0    sertraline (ZOLOFT) 100 MG tablet Take 100 mg by mouth once daily.      [DISCONTINUED] ALLERGY RELIEF, FEXOFENADINE, 180 mg tablet Take 180 mg by mouth once daily.      [DISCONTINUED] clobetasol 0.05% (TEMOVATE) 0.05 % Oint APPLY TOPICALLY TO THE AFFECTED AREA(S) TWICE DAILY 240 g 0    [DISCONTINUED] famotidine (PEPCID) 40 MG tablet Take 40 mg by mouth 2 (two) times daily.      [DISCONTINUED] permethrin (ELIMITE) 5 % cream Apply topically.      [DISCONTINUED] semaglutide (OZEMPIC) 2 mg/dose (8 mg/3 mL) PnIj Inject 2 mg into the skin every 7 days. Take this instead of Mounjaro if it remains on backorder - delay one week before starting this 3 mL 3     Facility-Administered Encounter Medications as of 6/25/2024   Medication Dose Route Frequency Provider Last Rate Last Admin    omalizumab injection 300 mg  300 mg Subcutaneous Q28 Days    300 mg at 05/31/24 1536     Orders Placed This Encounter   Procedures    CPAP/BIPAP SUPPLIES     Order Specific Question:   Length of need (1-99 months):     Answer:   99     Order Specific Question:   Choose ONE mask type and its corresponding  cushions and/or pillows:     Answer:    Nasal Mask, 1 per 90 days:  Nasal Cushions, (6 per 90 days):  Nasal Pillows, (6 per 90 days)     Order Specific Question:   Choose EITHER Heated or Non-Heated Tubjing     Answer:    Heated Tubing, 1 per 6 months     Order Specific Question:   All other supplies as needed as listed below:     Answer:    Headgear, 1 per 180 days     Order Specific Question:   All other supplies as needed as listed below:     Answer:    Disposable Filter, 6 per 90 days     Order Specific Question:   All other supplies as needed as listed below:     Answer:    Non-Disposable Filter, 1 per 180 days     Order Specific Question:   All other supplies as needed as listed below:     Answer:    Humidifier Chamber, 1 per 180 days     Plan:       1. Shift work sleep disorder  Overview:  Followed by Dr. Schwab, Sleep specialist, continue current treatment plan.      2. Insomnia, unspecified type  -     doxepin (SILENOR) 3 mg Tab; Take 3 mg by mouth nightly as needed (insomnia).  Dispense: 30 tablet; Refill: 3    3. Chronic sinusitis, unspecified location  -     amoxicillin-clavulanate 875-125mg (AUGMENTIN) 875-125 mg per tablet; Take 1 tablet by mouth 2 (two) times daily. for 10 days  Dispense: 20 tablet; Refill: 0    4. FARA (obstructive sleep apnea)  Overview:  Followed by Pulmonology, continue current treatment plan     Orders:  -     CPAP/BIPAP SUPPLIES                        Elizabeth LeJeune, ACNP, ANP

## 2024-07-02 ENCOUNTER — OFFICE VISIT (OUTPATIENT)
Dept: DERMATOLOGY | Facility: CLINIC | Age: 59
End: 2024-07-02
Payer: COMMERCIAL

## 2024-07-02 ENCOUNTER — OFFICE VISIT (OUTPATIENT)
Dept: ALLERGY | Facility: CLINIC | Age: 59
End: 2024-07-02
Payer: COMMERCIAL

## 2024-07-02 ENCOUNTER — CLINICAL SUPPORT (OUTPATIENT)
Dept: ALLERGY | Facility: CLINIC | Age: 59
End: 2024-07-02
Payer: COMMERCIAL

## 2024-07-02 VITALS
SYSTOLIC BLOOD PRESSURE: 109 MMHG | HEART RATE: 72 BPM | BODY MASS INDEX: 73.55 KG/M2 | DIASTOLIC BLOOD PRESSURE: 67 MMHG | TEMPERATURE: 98 F | WEIGHT: 315 LBS

## 2024-07-02 DIAGNOSIS — T48.5X5A RHINITIS MEDICAMENTOSA: ICD-10-CM

## 2024-07-02 DIAGNOSIS — L28.2 PRURITIC RASH: ICD-10-CM

## 2024-07-02 DIAGNOSIS — L50.1 CHRONIC IDIOPATHIC URTICARIA: Primary | ICD-10-CM

## 2024-07-02 DIAGNOSIS — L29.9 PRURITUS: ICD-10-CM

## 2024-07-02 DIAGNOSIS — J31.0 RHINITIS MEDICAMENTOSA: ICD-10-CM

## 2024-07-02 DIAGNOSIS — L73.9 FOLLICULITIS: ICD-10-CM

## 2024-07-02 PROCEDURE — 3060F POS MICROALBUMINURIA REV: CPT | Mod: CPTII,S$GLB,, | Performed by: STUDENT IN AN ORGANIZED HEALTH CARE EDUCATION/TRAINING PROGRAM

## 2024-07-02 PROCEDURE — 1160F RVW MEDS BY RX/DR IN RCRD: CPT | Mod: CPTII,S$GLB,, | Performed by: STUDENT IN AN ORGANIZED HEALTH CARE EDUCATION/TRAINING PROGRAM

## 2024-07-02 PROCEDURE — 3072F LOW RISK FOR RETINOPATHY: CPT | Mod: CPTII,S$GLB,, | Performed by: STUDENT IN AN ORGANIZED HEALTH CARE EDUCATION/TRAINING PROGRAM

## 2024-07-02 PROCEDURE — 99999 PR PBB SHADOW E&M-EST. PATIENT-LVL II: CPT | Mod: PBBFAC,,, | Performed by: STUDENT IN AN ORGANIZED HEALTH CARE EDUCATION/TRAINING PROGRAM

## 2024-07-02 PROCEDURE — 3044F HG A1C LEVEL LT 7.0%: CPT | Mod: CPTII,S$GLB,, | Performed by: STUDENT IN AN ORGANIZED HEALTH CARE EDUCATION/TRAINING PROGRAM

## 2024-07-02 PROCEDURE — 3066F NEPHROPATHY DOC TX: CPT | Mod: CPTII,S$GLB,, | Performed by: STUDENT IN AN ORGANIZED HEALTH CARE EDUCATION/TRAINING PROGRAM

## 2024-07-02 PROCEDURE — 4010F ACE/ARB THERAPY RXD/TAKEN: CPT | Mod: CPTII,S$GLB,, | Performed by: STUDENT IN AN ORGANIZED HEALTH CARE EDUCATION/TRAINING PROGRAM

## 2024-07-02 PROCEDURE — 3078F DIAST BP <80 MM HG: CPT | Mod: CPTII,S$GLB,, | Performed by: STUDENT IN AN ORGANIZED HEALTH CARE EDUCATION/TRAINING PROGRAM

## 2024-07-02 PROCEDURE — 96372 THER/PROPH/DIAG INJ SC/IM: CPT | Mod: S$GLB,,, | Performed by: STUDENT IN AN ORGANIZED HEALTH CARE EDUCATION/TRAINING PROGRAM

## 2024-07-02 PROCEDURE — 99999 PR PBB SHADOW E&M-EST. PATIENT-LVL III: CPT | Mod: PBBFAC,,,

## 2024-07-02 PROCEDURE — 3008F BODY MASS INDEX DOCD: CPT | Mod: CPTII,S$GLB,, | Performed by: STUDENT IN AN ORGANIZED HEALTH CARE EDUCATION/TRAINING PROGRAM

## 2024-07-02 PROCEDURE — 3074F SYST BP LT 130 MM HG: CPT | Mod: CPTII,S$GLB,, | Performed by: STUDENT IN AN ORGANIZED HEALTH CARE EDUCATION/TRAINING PROGRAM

## 2024-07-02 PROCEDURE — G2211 COMPLEX E/M VISIT ADD ON: HCPCS | Mod: S$GLB,,, | Performed by: STUDENT IN AN ORGANIZED HEALTH CARE EDUCATION/TRAINING PROGRAM

## 2024-07-02 PROCEDURE — 99999 PR PBB SHADOW E&M-EST. PATIENT-LVL III: CPT | Mod: PBBFAC,,, | Performed by: STUDENT IN AN ORGANIZED HEALTH CARE EDUCATION/TRAINING PROGRAM

## 2024-07-02 PROCEDURE — 99214 OFFICE O/P EST MOD 30 MIN: CPT | Mod: 25,S$GLB,, | Performed by: STUDENT IN AN ORGANIZED HEALTH CARE EDUCATION/TRAINING PROGRAM

## 2024-07-02 PROCEDURE — 1159F MED LIST DOCD IN RCRD: CPT | Mod: CPTII,S$GLB,, | Performed by: STUDENT IN AN ORGANIZED HEALTH CARE EDUCATION/TRAINING PROGRAM

## 2024-07-02 PROCEDURE — 99214 OFFICE O/P EST MOD 30 MIN: CPT | Mod: S$GLB,,, | Performed by: STUDENT IN AN ORGANIZED HEALTH CARE EDUCATION/TRAINING PROGRAM

## 2024-07-02 RX ORDER — AZELASTINE 1 MG/ML
1 SPRAY, METERED NASAL 2 TIMES DAILY
Qty: 30 ML | Refills: 11 | Status: SHIPPED | OUTPATIENT
Start: 2024-07-02 | End: 2025-07-02

## 2024-07-02 RX ORDER — TRIAMCINOLONE ACETONIDE 1 MG/G
OINTMENT TOPICAL
Qty: 454 G | Refills: 1 | Status: SHIPPED | OUTPATIENT
Start: 2024-07-02

## 2024-07-02 RX ORDER — KETOCONAZOLE 20 MG/ML
SHAMPOO, SUSPENSION TOPICAL WEEKLY
Qty: 120 ML | Refills: 5 | Status: SHIPPED | OUTPATIENT
Start: 2024-07-02

## 2024-07-02 RX ORDER — PREDNISONE 20 MG/1
TABLET ORAL
Qty: 12 TABLET | Refills: 0 | Status: SHIPPED | OUTPATIENT
Start: 2024-07-02 | End: 2024-07-09

## 2024-07-02 NOTE — ASSESSMENT & PLAN NOTE
- Improved since prior appointment  - Patient is on 2nd dose of Xolair at this time   - Continue oral antihistamines at this time   - Will continue to monitor and reassess

## 2024-07-02 NOTE — ASSESSMENT & PLAN NOTE
- Discontinue Afrin at this time   - Ordered Steroid burst for symptomatic relief  - Educated on Afrin and rebound congestion

## 2024-07-02 NOTE — PROGRESS NOTES
Patient Information  Name: Narendra English Sr.  : 1965  MRN: [id number]     Referring Physician:  Dr. Duke ref. provider found   Primary Care Physician:  Tabitha Jackson MD   Date of Visit: 2024      Subjective:       Narendra English Sr. is a 59 y.o. male who presents for   Chief Complaint   Patient presents with    Follow-up     Folliculitis not improving      HPI  Hx of folliculitis, here for follow up. Currently on Xolair for itching however he states no improvement with xolair. He states sun worsens the itching.    Patient was last seen:2024     Prior notes by myself reviewed.   Clinical documentation obtained by nursing staff reviewed.    Review of Systems   Skin:  Negative for itching and rash.        Objective:    Physical Exam   Constitutional: He appears well-developed and well-nourished. No distress.   Neurological: He is alert and oriented to person, place, and time. He is not disoriented.   Psychiatric: He has a normal mood and affect.   Skin:   Areas Examined (abnormalities noted in diagram):   Chest / Axilla Inspection Performed  LUE Inspection Performed              Diagram Legend     Erythematous scaling macule/papule c/w actinic keratosis       Vascular papule c/w angioma      Pigmented verrucoid papule/plaque c/w seborrheic keratosis      Yellow umbilicated papule c/w sebaceous hyperplasia      Irregularly shaped tan macule c/w lentigo     1-2 mm smooth white papules consistent with Milia      Movable subcutaneous cyst with punctum c/w epidermal inclusion cyst      Subcutaneous movable cyst c/w pilar cyst      Firm pink to brown papule c/w dermatofibroma      Pedunculated fleshy papule(s) c/w skin tag(s)      Evenly pigmented macule c/w junctional nevus     Mildly variegated pigmented, slightly irregular-bordered macule c/w mildly atypical nevus      Flesh colored to evenly pigmented papule c/w intradermal nevus       Pink pearly papule/plaque c/w basal cell carcinoma       Erythematous hyperkeratotic cursted plaque c/w SCC      Surgical scar with no sign of skin cancer recurrence      Open and closed comedones      Inflammatory papules and pustules      Verrucoid papule consistent consistent with wart     Erythematous eczematous patches and plaques     Dystrophic onycholytic nail with subungual debris c/w onychomycosis     Umbilicated papule    Erythematous-base heme-crusted tan verrucoid plaque consistent with inflamed seborrheic keratosis     Erythematous Silvery Scaling Plaque c/w Psoriasis     See annotation      No images are attached to the encounter or orders placed in the encounter.    [] Data reviewed  [] Independent review of test  [] Management discussed with another provider    Assessment / Plan:        Folliculitis  -     ketoconazole (NIZORAL) 2 % shampoo; Apply topically once a week. Lather in for 5-10 min before rinsing  Dispense: 120 mL; Refill: 5    Pruritus  -     triamcinolone acetonide 0.1% (KENALOG) 0.1 % ointment; AAA bid  Dispense: 454 g; Refill: 1  - Recommend Dermeleve lotion and sarna lotion  Counseling on topical steroids:  Patient counseled that the prolonged use of topical steroids can result in the increased appearance superficial blood vessels (telangiectasias) lightening (hypopigmentation), and   thinning of the skin ( atrophy).  Patient understands to avoid using high potency steroids in skin folds, the groin or the face.  The patient verbalized understanding of proper use and possible adverse effects of topical steroids.  All patient's questions and concerns were addressed.               LOS NUMBER AND COMPLEXITY OF PROBLEMS    COMPLEXITY OF DATA RISK TOTAL TIME (m)   02553  97499 [] 1 self-limited or minor problem [x] Minimal to none [] No treatment recommended or patient to monitor 15-29  10-19   54787  60062 Low  [] 2 or > self limited or minor problems  [] 1 stable chronic illness  [] 1 acute, uncomplicated illness or injury Limited (2)  []  Prior external notes from each unique source  [] Review result of each unique test  [] Order each unique test []  Low  OTC medications, minor skin biopsy 30-44  20-29   80822  85344 Moderate  [x]  1 or > chronic illness with progression, exacerbation or SE of treatment  []  2 or more stable chronic illnesses  []  1 acute illness with systemic symptoms  []  1 acute complicated injury  []  1 undiagnosed new problem with uncertain prognosis Moderate (1/3 below)  []  3 or more data items        *Now includes assessment requiring independent historian  []  Independent interpretation of a test  []  Discuss management/test with another provider Moderate  [x]  Prescription drug mgmt  []  Minor surgery with risk discussed  []  Mgmt limited by social determinates 45-59  30-39   98531  49392 High  []  1 or more chronic illness with severe exacerbation, progression or SE of treatment  []  1 acute or chronic illness/injury that poses a threat to life or bodily function Extensive (2/3 below)  []  3 or more data items        *Now includes assessment requiring independent historian.  []  Independent interpretation of a test  []  Discuss management/test with another provider High  []  Major surgery with risk discussed  []  Drug therapy requiring intensive monitoring for toxicity  []  Hospitalization  []  Decision for DNR 60-74  40-54      No follow-ups on file.    Wendi Jin MD, FAAD  [facility]

## 2024-07-02 NOTE — PROGRESS NOTES
Allergy and Immunology  Established Patient Clinic Note    Date: 7/2/2024  Chief Complaint   Patient presents with    Severe Nasal congestion     History  Narendra English Sr. is a 59 y.o. male being seen for follow-up today.    Rhinitis Medicamentosa   - Patient with severe rebound congestion  - Using Afrin multiple times per day for weeks   - Patient to stop at this time and have steroid burst for holiday weekend      Chronic Idiopathic Urticaria  Erythema/Cholinergic Urticaria  - Patient to receive 2nd Xolair at this time   - Reported improvement on Xolair but to continue  - Continue oral antihistamines at this time      Allergies, PMH, PSH, Social, and Family History were reviewed.    Current Outpatient Medications on File Prior to Visit   Medication Sig Dispense Refill    amLODIPine (NORVASC) 5 MG tablet Take 1 tablet (5 mg total) by mouth once daily. 90 tablet 1    amoxicillin-clavulanate 875-125mg (AUGMENTIN) 875-125 mg per tablet Take 1 tablet by mouth 2 (two) times daily. for 10 days 20 tablet 0    atorvastatin (LIPITOR) 10 MG tablet Take 1 tablet (10 mg total) by mouth every evening. 90 tablet 1    azelastine (ASTELIN) 137 mcg (0.1 %) nasal spray 1 spray (137 mcg total) by Nasal route 2 (two) times daily. 30 mL 3    benazepriL (LOTENSIN) 40 MG tablet Take 1 tablet (40 mg total) by mouth once daily. 90 tablet 3    clonazePAM (KLONOPIN) 1 MG tablet Take 1 tablet (1 mg total) by mouth every evening. 30 tablet 5    colchicine, gout, (COLCRYS) 0.6 mg tablet Take 1 tablet (0.6 mg total) by mouth once daily. 180 tablet 3    doxepin (SILENOR) 3 mg Tab Take 3 mg by mouth nightly as needed (insomnia). 30 tablet 3    EPINEPHrine (EPIPEN 2-BUDDY) 0.3 mg/0.3 mL AtIn Inject 0.3 mLs (0.3 mg total) into the muscle once. for 1 dose 0.6 mL 0    febuxostat (ULORIC) 40 mg Tab Take 1 tablet (40 mg total) by mouth once daily. 30 tablet 3    fluticasone propionate (FLONASE) 50 mcg/actuation nasal spray 1 spray (50 mcg total) by  Each Nostril route once daily. 16 g 0    hydrALAZINE (APRESOLINE) 100 MG tablet Take 1 tablet (100 mg total) by mouth every 8 (eight) hours. 270 tablet 1    hydrOXYzine HCL (ATARAX) 25 MG tablet Take 25 mg by mouth 2 (two) times daily.      hydrOXYzine HCL (ATARAX) 25 MG tablet Take 1 tablet (25 mg total) by mouth 2 (two) times daily as needed for Itching. 60 tablet 0    indomethacin (INDOCIN SR) 75 mg CpSR CR capsule Take 1 capsule (75 mg total) by mouth 2 (two) times daily as needed (for gout flare). 60 capsule 0    insulin glargine, TOUJEO, (TOUJEO SOLOSTAR U-300 INSULIN) 300 unit/mL (1.5 mL) InPn pen Inject 85 Units into the skin once daily. 27 mL 1    ketoconazole (NIZORAL) 2 % shampoo Apply topically once a week. Lather in for 5-10 min before rinsing 120 mL 5    levocetirizine (XYZAL) 5 MG tablet Take 5 mg by mouth.      levothyroxine (SYNTHROID) 112 MCG tablet TAKE 1 TABLET BY MOUTH ONCE DAILY BEFORE BREAKFAST 90 tablet 2    metFORMIN (GLUCOPHAGE) 1000 MG tablet Take 1 tablet by mouth twice daily with food 180 tablet 1    metoprolol succinate (TOPROL-XL) 50 MG 24 hr tablet Take 1 tablet (50 mg total) by mouth once daily. 90 tablet 3    omalizumab (XOLAIR) 150 mg/mL injection Inject 2 mLs (300 mg total) into the skin every 28 days. 2 mL 11    omalizumab 150 mg/mL AtIn Inject 150 mg into the skin every 28 days. Once every 28 days      pregabalin (LYRICA) 150 MG capsule Take 1 capsule (150 mg total) by mouth 3 (three) times daily. 90 capsule 3    semaglutide (OZEMPIC) 2 mg/dose (8 mg/3 mL) PnIj Inject 2 mg into the skin every 7 days. 6 mL 1    sertraline (ZOLOFT) 100 MG tablet Take 100 mg by mouth once daily.      tadalafiL (CIALIS) 20 MG Tab Take 1 tablet (20 mg total) by mouth every 24 hours as needed (erectile dysfunction). 20 tablet 11    tiZANidine (ZANAFLEX) 4 MG tablet Take 1 tablet (4 mg total) by mouth 2 (two) times daily as needed (Neck Pain). 60 tablet 2    triamcinolone acetonide 0.1% (KENALOG) 0.1 %  ointment AAA bid 454 g 1    VASCEPA 1 gram Cap Take 2 capsules (2,000 mg total) by mouth 2 (two) times daily. 360 capsule 1    [DISCONTINUED] ketoconazole (NIZORAL) 2 % shampoo Apply topically once a week. Lather in for 5-10 min before rinsing 120 mL 5    [DISCONTINUED] triamcinolone acetonide 0.1% (KENALOG) 0.1 % ointment AAA bid 454 g 1     Current Facility-Administered Medications on File Prior to Visit   Medication Dose Route Frequency Provider Last Rate Last Admin    omalizumab injection 300 mg  300 mg Subcutaneous Q28 Days    300 mg at 07/02/24 1027     Physical Examination  Vitals:    07/02/24 0953   BP: 109/67   Pulse: 72   Temp: 98.1 °F (36.7 °C)     GENERAL:  male in no apparent distress and well developed and well nourished  HEAD:  Normocephalic, without obvious abnormality, atraumatic  EYES: sclera anicteric, conjunctiva normochromic  EARS: normal TM's and external ear canals both ears  NOSE: without erythema or discharge, clear discharge, turbinates swollen, grade 4 out of 4    OROPHARYNX: moist mucous membranes without erythema, exudates or petechiae  LYMPH NODES: normal, supple, no lymphadenopathy  LUNGS: clear to auscultation, no wheezes, rales or rhonchi, symmetric air entry.  HEART: normal rate, regular rhythm, normal S1, S2, no murmurs, rubs, clicks or gallops.  ABDOMEN: soft, nontender, nondistended, no masses or organomegaly.  MUSCULOSKELETAL: no gross joint deformity or swelling.  NEURO: alert, oriented, normal speech, no focal findings or movement disorder noted.  SKIN: normal coloration and turgor, no rashes, no suspicious skin lesions noted.     Assessment/Plan:   Problem List Items Addressed This Visit          ENT    Rhinitis medicamentosa    Current Assessment & Plan     - Discontinue Afrin at this time   - Ordered Steroid burst for symptomatic relief  - Educated on Afrin and rebound congestion             Derm    Pruritic rash    Chronic idiopathic urticaria - Primary    Current  Assessment & Plan     - Improved since prior appointment  - Patient is on 2nd dose of Xolair at this time   - Continue oral antihistamines at this time   - Will continue to monitor and reassess           Follow up:  Per prior plan     Visit today included increased complexity associated with the care of the episodic problem rhinitis medicamentosa addressed and managing the longitudinal care of the patient due to the serious and/or complex managed problem(s) chronic idiopathic urticaria and biologic therapy.    Teressa Riggins MD   [facility]  Allergy and Immunology

## 2024-07-05 NOTE — PRE-PROCEDURE INSTRUCTIONS
Spoke with patient regarding procedure scheduled on 7.17     Arrival time 0730     Has patient been sick with fever or on antibiotics within the last 7 days? No     Does the patient have any open wounds, sores or rashes? No     Does the patient have any recent fractures? no     Has patient received a vaccination within the last 7 days? No     Received the COVID vaccination? yes     Has the patient stopped all medications as directed? na     Does patient have a pacemaker, defibrillator, or implantable stimulator? No     Does the patient have a ride to and from procedure and someone reliable to remain with patient?  fiance      Is the patient diabetic? yes     Does the patient have sleep apnea? Or use O2 at home? kobi on cpap     Is the patient receiving sedation? yes     Is the patient instructed to remain NPO beginning at midnight the night before their procedure? yes     Procedure location confirmed with patient? Yes     Covid- Denies signs/symptoms. Instructed to notify PAT/MD if any changes.

## 2024-07-17 ENCOUNTER — HOSPITAL ENCOUNTER (OUTPATIENT)
Facility: HOSPITAL | Age: 59
Discharge: HOME | End: 2024-07-17
Attending: ANESTHESIOLOGY | Admitting: ANESTHESIOLOGY
Payer: COMMERCIAL

## 2024-07-17 VITALS
SYSTOLIC BLOOD PRESSURE: 136 MMHG | DIASTOLIC BLOOD PRESSURE: 82 MMHG | RESPIRATION RATE: 16 BRPM | TEMPERATURE: 97 F | WEIGHT: 244.19 LBS | OXYGEN SATURATION: 96 % | HEIGHT: 72 IN | HEART RATE: 66 BPM | BODY MASS INDEX: 33.07 KG/M2

## 2024-07-17 DIAGNOSIS — M54.12 CERVICAL RADICULOPATHY: Primary | ICD-10-CM

## 2024-07-17 LAB — POCT GLUCOSE: 90 MG/DL (ref 70–110)

## 2024-07-17 PROCEDURE — 25500020 PHARM REV CODE 255: Performed by: ANESTHESIOLOGY

## 2024-07-17 PROCEDURE — 62321 NJX INTERLAMINAR CRV/THRC: CPT | Mod: ,,, | Performed by: ANESTHESIOLOGY

## 2024-07-17 PROCEDURE — 82962 GLUCOSE BLOOD TEST: CPT | Performed by: ANESTHESIOLOGY

## 2024-07-17 PROCEDURE — 63600175 PHARM REV CODE 636 W HCPCS: Performed by: ANESTHESIOLOGY

## 2024-07-17 PROCEDURE — 25000003 PHARM REV CODE 250: Performed by: ANESTHESIOLOGY

## 2024-07-17 PROCEDURE — 62321 NJX INTERLAMINAR CRV/THRC: CPT | Performed by: ANESTHESIOLOGY

## 2024-07-17 RX ORDER — DEXAMETHASONE SODIUM PHOSPHATE 10 MG/ML
INJECTION INTRAMUSCULAR; INTRAVENOUS
Status: DISCONTINUED | OUTPATIENT
Start: 2024-07-17 | End: 2024-07-17 | Stop reason: HOSPADM

## 2024-07-17 RX ORDER — ONDANSETRON HYDROCHLORIDE 2 MG/ML
4 INJECTION, SOLUTION INTRAVENOUS ONCE AS NEEDED
Status: DISCONTINUED | OUTPATIENT
Start: 2024-07-17 | End: 2024-07-17 | Stop reason: HOSPADM

## 2024-07-17 RX ORDER — FENTANYL CITRATE 50 UG/ML
INJECTION, SOLUTION INTRAMUSCULAR; INTRAVENOUS
Status: DISCONTINUED | OUTPATIENT
Start: 2024-07-17 | End: 2024-07-17 | Stop reason: HOSPADM

## 2024-07-17 RX ORDER — MIDAZOLAM HYDROCHLORIDE 1 MG/ML
INJECTION INTRAMUSCULAR; INTRAVENOUS
Status: DISCONTINUED | OUTPATIENT
Start: 2024-07-17 | End: 2024-07-17 | Stop reason: HOSPADM

## 2024-07-17 RX ORDER — LIDOCAINE HYDROCHLORIDE 10 MG/ML
INJECTION, SOLUTION EPIDURAL; INFILTRATION; INTRACAUDAL; PERINEURAL
Status: DISCONTINUED | OUTPATIENT
Start: 2024-07-17 | End: 2024-07-17 | Stop reason: HOSPADM

## 2024-07-17 NOTE — OP NOTE
Cervical Interlaminar Epidural Steroid Injection under Fluoroscopic Guidance.     INFORMED CONSENT: The procedure, risks, benefits and options were discussed with patient. There are no contraindications to the procedure. The patient expressed understanding and agreed to proceed. The personnel performing the procedure was discussed.    Date of procedure 07/17/2024    Time-out taken to identify patient and procedure side prior to starting the procedure.                     PROCEDURE: Cervical Interlaminar epidural steroid injection C6/C7 under fluoroscopic guidance.     Pre Procedure diagnosis:    Cervical radiculopathy [M54.12]  1. Cervical radiculopathy        Post-Procedure diagnosis:   same      PHYSICIAN: Leonard Mart MD    ASSISTANTS: None     MEDICATIONS INJECTED: 1ml Decadron PF 10mg/ml and 2ml  Lidocaine 1%    LOCAL ANESTHETIC INJECTED: 3ml  Lidocaine 1%     ESTIMATED BLOOD LOSS: none.     COMPLICATIONS: none.     Sedation: Conscious sedation provided by M.D    SEDATION MEDICATIONS: local/IV sedation: Versed 2 mg and fentanyl 50 mcg IV.  Conscious sedation ordered by MD.  Patient reevaluated and sedation administered by MD and monitored by RN.  Total sedation time was less than 10 min.      Total sedation time was <10 min      TECHNIQUE: With the patient laying in a prone position, the area was prepped and draped in the usual sterile fashion using ChloraPrep and a fenestrated drape. Local anesthetic was given using a 27-gauge needle by raising a wheal and going down to the hub of the needle over the C6/C7 interlaminar space.  The interlaminar space was then approached with a 3.5 inch 18-gauge Touhy needle was introduced under fluoroscopic guidance in the AP and Lateral view. Once the Ligamentum flavum was encountered loss of resistance to saline was used to enter the epidural space. With positive loss of resistance and negative CSF or Blood, 2mL contrast dye Omnipaque (300mg/ml) was injected to confirm  placement and there was no vascular runoff. The medication was then injected slowly. The patient tolerated the procedure well.         The patient was monitored for approximately 30 minutes after the procedure.  Patient was given post procedure and discharge instructions to follow at home.  The patient was discharged in a stable condition

## 2024-07-17 NOTE — DISCHARGE SUMMARY
Discharge Note  Short Stay      SUMMARY     Admit Date: 7/17/2024    Attending Physician: Leonard Mart MD        Discharge Physician: Leonard Mart MD        Discharge Date: 7/17/2024 7:19 AM    Procedure(s) (LRB):  C6/7 IL SALBADOR, Left Paramedian Approach (N/A)    Final Diagnosis: Cervical radiculopathy [M54.12]    Disposition: Home or self care    Patient Instructions:   Current Discharge Medication List        CONTINUE these medications which have NOT CHANGED    Details   amLODIPine (NORVASC) 5 MG tablet Take 1 tablet (5 mg total) by mouth once daily.  Qty: 90 tablet, Refills: 1    Comments: .      benazepriL (LOTENSIN) 40 MG tablet Take 1 tablet (40 mg total) by mouth once daily.  Qty: 90 tablet, Refills: 3    Comments: .  Associated Diagnoses: Hypertension associated with diabetes      febuxostat (ULORIC) 40 mg Tab Take 1 tablet (40 mg total) by mouth once daily.  Qty: 30 tablet, Refills: 3    Associated Diagnoses: Gout, unspecified cause, unspecified chronicity, unspecified site      hydrALAZINE (APRESOLINE) 100 MG tablet Take 1 tablet (100 mg total) by mouth every 8 (eight) hours.  Qty: 270 tablet, Refills: 1    Comments: .  Associated Diagnoses: Hyperlipidemia, unspecified hyperlipidemia type; Chest pain, unspecified type; Nonspecific abnormal electrocardiogram (ECG) (EKG); Type 2 diabetes mellitus without complication, without long-term current use of insulin; Hypertension, unspecified type; Pre-op evaluation      metFORMIN (GLUCOPHAGE) 1000 MG tablet Take 1 tablet by mouth twice daily with food  Qty: 180 tablet, Refills: 1    Associated Diagnoses: Type 2 diabetes mellitus without complication, with long-term current use of insulin      metoprolol succinate (TOPROL-XL) 50 MG 24 hr tablet Take 1 tablet (50 mg total) by mouth once daily.  Qty: 90 tablet, Refills: 3    Comments: .  Associated Diagnoses: Hypertension associated with diabetes      semaglutide (OZEMPIC) 2 mg/dose (8 mg/3 mL) PnIj Inject 2  mg into the skin every 7 days.  Qty: 6 mL, Refills: 1    Associated Diagnoses: Type 2 diabetes mellitus with other circulatory complication, with long-term current use of insulin      atorvastatin (LIPITOR) 10 MG tablet Take 1 tablet (10 mg total) by mouth every evening.  Qty: 90 tablet, Refills: 1      !! azelastine (ASTELIN) 137 mcg (0.1 %) nasal spray 1 spray (137 mcg total) by Nasal route 2 (two) times daily.  Qty: 30 mL, Refills: 3      !! azelastine (ASTELIN) 137 mcg (0.1 %) nasal spray 1 spray (137 mcg total) by Nasal route 2 (two) times daily.  Qty: 30 mL, Refills: 11      clonazePAM (KLONOPIN) 1 MG tablet Take 1 tablet (1 mg total) by mouth every evening.  Qty: 30 tablet, Refills: 5    Associated Diagnoses: Insomnia, unspecified type      colchicine, gout, (COLCRYS) 0.6 mg tablet Take 1 tablet (0.6 mg total) by mouth once daily.  Qty: 180 tablet, Refills: 3    Associated Diagnoses: Gout, unspecified cause, unspecified chronicity, unspecified site      doxepin (SILENOR) 3 mg Tab Take 3 mg by mouth nightly as needed (insomnia).  Qty: 30 tablet, Refills: 3    Associated Diagnoses: Insomnia, unspecified type      EPINEPHrine (EPIPEN 2-BUDDY) 0.3 mg/0.3 mL AtIn Inject 0.3 mLs (0.3 mg total) into the muscle once. for 1 dose  Qty: 0.6 mL, Refills: 0    Comments: Twin pack - ok for generic.  Associated Diagnoses: Chronic idiopathic urticaria      fluticasone propionate (FLONASE) 50 mcg/actuation nasal spray 1 spray (50 mcg total) by Each Nostril route once daily.  Qty: 16 g, Refills: 0      hydrOXYzine HCL (ATARAX) 25 MG tablet Take 25 mg by mouth 2 (two) times daily.      indomethacin (INDOCIN SR) 75 mg CpSR CR capsule Take 1 capsule (75 mg total) by mouth 2 (two) times daily as needed (for gout flare).  Qty: 60 capsule, Refills: 0    Associated Diagnoses: Gout, unspecified cause, unspecified chronicity, unspecified site      insulin glargine, TOUJEO, (TOUJEO SOLOSTAR U-300 INSULIN) 300 unit/mL (1.5 mL) InPn pen  Inject 85 Units into the skin once daily.  Qty: 27 mL, Refills: 1    Comments: Please note insurance may require use of branded product with KATE code 9.      ketoconazole (NIZORAL) 2 % shampoo Apply topically once a week. Lather in for 5-10 min before rinsing  Qty: 120 mL, Refills: 5    Associated Diagnoses: Folliculitis      levocetirizine (XYZAL) 5 MG tablet Take 5 mg by mouth.      levothyroxine (SYNTHROID) 112 MCG tablet TAKE 1 TABLET BY MOUTH ONCE DAILY BEFORE BREAKFAST  Qty: 90 tablet, Refills: 2      omalizumab (XOLAIR) 150 mg/mL injection Inject 2 mLs (300 mg total) into the skin every 28 days.  Qty: 2 mL, Refills: 11    Associated Diagnoses: Chronic idiopathic urticaria      omalizumab 150 mg/mL AtIn Inject 150 mg into the skin every 28 days. Once every 28 days      pregabalin (LYRICA) 150 MG capsule Take 1 capsule (150 mg total) by mouth 3 (three) times daily.  Qty: 90 capsule, Refills: 3    Associated Diagnoses: Lumbar radiculopathy; Cervical radiculopathy      sertraline (ZOLOFT) 100 MG tablet Take 100 mg by mouth once daily.      tadalafiL (CIALIS) 20 MG Tab Take 1 tablet (20 mg total) by mouth every 24 hours as needed (erectile dysfunction).  Qty: 20 tablet, Refills: 11      tiZANidine (ZANAFLEX) 4 MG tablet Take 1 tablet (4 mg total) by mouth 2 (two) times daily as needed (Neck Pain).  Qty: 60 tablet, Refills: 2    Associated Diagnoses: Cervical radiculopathy; DDD (degenerative disc disease), cervical; Cervical spondylosis; Cervical stenosis of spinal canal      triamcinolone acetonide 0.1% (KENALOG) 0.1 % ointment AAA bid  Qty: 454 g, Refills: 1    Associated Diagnoses: Pruritus      VASCEPA 1 gram Cap Take 2 capsules (2,000 mg total) by mouth 2 (two) times daily.  Qty: 360 capsule, Refills: 1       !! - Potential duplicate medications found. Please discuss with provider.              Discharge Diagnosis: Cervical radiculopathy [M54.12]  Condition on Discharge: Stable with no complications to  procedure   Diet on Discharge: Same as before.  Activity: as per instruction sheet.  Discharge to: Home with a responsible adult.  Follow up: 2-4 weeks       Please call the office at [phone] if you experience any weakness or loss of sensation, fever > 101.5, pain uncontrolled with oral medications, persistent nausea/vomiting/or diarrhea, redness or drainage from the incisions, or any other worrisome concerns. If physician on call was not reached or could not communicate with our office for any reason please go to the nearest emergency department

## 2024-07-17 NOTE — DISCHARGE INSTRUCTIONS

## 2024-07-17 NOTE — H&P
HPI  Patient presenting for Procedure(s) (LRB):  C6/7 IL SALBADOR, Left Paramedian Approach (N/A)     Patient on Anti-coagulation No    No health changes since previous encounter    Past Medical History:   Diagnosis Date    Allergy     Cancer     Colon    Diabetes mellitus      09/14/2023 Insulin x 10 years    Gastroesophageal reflux disease without esophagitis 06/20/2023    Hypertension     Sleep apnea     Thyroid disease      Past Surgical History:   Procedure Laterality Date    BACK SURGERY      COLON SURGERY  02/06/21    COLONOSCOPY N/A 12/29/2020    Procedure: COLONOSCOPY;  Surgeon: William Cotter MD;  Location: [facility] ENDO;  Service: Endoscopy;  Laterality: N/A;  Will need Rapid doesn't live here    COLONOSCOPY N/A 02/10/2022    Procedure: COLONOSCOPY;  Surgeon: Wili Espinosa MD;  Location: [facility] ENDO;  Service: Endoscopy;  Laterality: N/A;    EPIDURAL STEROID INJECTION INTO CERVICAL SPINE N/A 12/01/2022    Procedure: C7/T1 IL SALBADOR;  Surgeon: Leonard Mart MD;  Location: [facility] PAIN MGT;  Service: Pain Management;  Laterality: N/A;    FESS, WITH NASAL SEPTOPLASTY, WITH IMAGING GUIDANCE Bilateral 08/17/2022    Procedure: FESS, WITH NASAL SEPTOPLASTY, WITH IMAGING GUIDANCE;  Surgeon: Viktor Ferrell MD;  Location: [facility] OR;  Service: ENT;  Laterality: Bilateral;    LAPAROSCOPIC RIGHT COLON RESECTION N/A 02/02/2021    Procedure: COLECTOMY, RIGHT, LAPAROSCOPIC, ERAS low;  Surgeon: Melonie Santoyo MD;  [facility];  Service: Colon and Rectal;  Laterality: N/A;  needs rapid covid test    LYSIS OF ADHESIONS Bilateral 04/19/2023    Procedure: LYSIS, ADHESIONS;  Surgeon: Viktor Ferrell MD;  Location: [facility] OR;  Service: ENT;  Laterality: Bilateral;    NASAL ENDOSCOPY Bilateral 04/19/2023    Procedure: ENDOSCOPY, NOSE;  Surgeon: Viktor Ferrell MD;  Location: [facility] OR;  Service: ENT;  Laterality: Bilateral;    NASAL TURBINATE REDUCTION Bilateral 08/17/2022    Procedure: REDUCTION, NASAL  "TURBINATE;  Surgeon: Viktor Ferrell MD;  Location: [facility] OR;  Service: ENT;  Laterality: Bilateral;    RHINOPLASTY Bilateral 04/19/2023    Procedure: RHINOPLASTY;  Surgeon: Viktor Ferrell MD;  Location: [facility] OR;  Service: ENT;  Laterality: Bilateral;    SELECTIVE INJECTION OF ANESTHETIC AGENT AROUND LUMBAR SPINAL NERVE ROOT BY TRANSFORAMINAL APPROACH Bilateral 07/05/2023    Procedure: Bilateral L4/5 TF SALBADOR RN IV Sedation;  Surgeon: Leonard Mart MD;  Location: [facility] PAIN MGT;  Service: Pain Management;  Laterality: Bilateral;    TONSILLECTOMY       Review of patient's allergies indicates:   Allergen Reactions    Demerol (pf) [meperidine (pf)] Other (See Comments)     Pt reports,"They lost my blood pressure."    Percocet [oxycodone-acetaminophen] Itching     itching    Allopurinol analogues Diarrhea        No current facility-administered medications on file prior to encounter.     Current Outpatient Medications on File Prior to Encounter   Medication Sig Dispense Refill    amLODIPine (NORVASC) 5 MG tablet Take 1 tablet (5 mg total) by mouth once daily. 90 tablet 1    benazepriL (LOTENSIN) 40 MG tablet Take 1 tablet (40 mg total) by mouth once daily. 90 tablet 3    febuxostat (ULORIC) 40 mg Tab Take 1 tablet (40 mg total) by mouth once daily. 30 tablet 3    metFORMIN (GLUCOPHAGE) 1000 MG tablet Take 1 tablet by mouth twice daily with food 180 tablet 1    metoprolol succinate (TOPROL-XL) 50 MG 24 hr tablet Take 1 tablet (50 mg total) by mouth once daily. 90 tablet 3    atorvastatin (LIPITOR) 10 MG tablet Take 1 tablet (10 mg total) by mouth every evening. 90 tablet 1    azelastine (ASTELIN) 137 mcg (0.1 %) nasal spray 1 spray (137 mcg total) by Nasal route 2 (two) times daily. 30 mL 3    clonazePAM (KLONOPIN) 1 MG tablet Take 1 tablet (1 mg total) by mouth every evening. 30 tablet 5    colchicine, gout, (COLCRYS) 0.6 mg tablet Take 1 tablet (0.6 mg total) by mouth once daily. 180 tablet 3    fluticasone " propionate (FLONASE) 50 mcg/actuation nasal spray 1 spray (50 mcg total) by Each Nostril route once daily. 16 g 0    hydrOXYzine HCL (ATARAX) 25 MG tablet Take 25 mg by mouth 2 (two) times daily.      indomethacin (INDOCIN SR) 75 mg CpSR CR capsule Take 1 capsule (75 mg total) by mouth 2 (two) times daily as needed (for gout flare). 60 capsule 0    insulin glargine, TOUJEO, (TOUJEO SOLOSTAR U-300 INSULIN) 300 unit/mL (1.5 mL) InPn pen Inject 85 Units into the skin once daily. 27 mL 1    levocetirizine (XYZAL) 5 MG tablet Take 5 mg by mouth.      pregabalin (LYRICA) 150 MG capsule Take 1 capsule (150 mg total) by mouth 3 (three) times daily. 90 capsule 3    tadalafiL (CIALIS) 20 MG Tab Take 1 tablet (20 mg total) by mouth every 24 hours as needed (erectile dysfunction). 20 tablet 11    tiZANidine (ZANAFLEX) 4 MG tablet Take 1 tablet (4 mg total) by mouth 2 (two) times daily as needed (Neck Pain). 60 tablet 2    VASCEPA 1 gram Cap Take 2 capsules (2,000 mg total) by mouth 2 (two) times daily. 360 capsule 1        PMHx, PSHx, Allergies, Medications reviewed in epic    ROS negative except pain complaints in HPI    OBJECTIVE:    /71 (BP Location: Right arm, Patient Position: Sitting)   Pulse 63   Temp 97.3 °F (36.3 °C)   Resp 16   Ht 6' (1.829 m)   Wt 110.7 kg (244 lb 2.6 oz)   SpO2 98%   BMI 33.11 kg/m²     PHYSICAL EXAMINATION:    GENERAL: Well appearing, in no acute distress, alert and oriented x3.  PSYCH:  Mood and affect appropriate.  SKIN: Skin color, texture, turgor normal, no rashes or lesions which will impact the procedure.  CV: RRR with palpation of the radial artery.  PULM: No evidence of respiratory difficulty, symmetric chest rise. Clear to auscultation.  NEURO: Cranial nerves grossly intact.    Plan:    Proceed with procedure as planned Procedure(s) (LRB):  C6/7 IL SALBADOR, Left Paramedian Approach (N/A)    Leonard Mart MD  07/17/2024

## 2024-07-18 ENCOUNTER — PATIENT MESSAGE (OUTPATIENT)
Dept: NEUROSURGERY | Facility: CLINIC | Age: 59
End: 2024-07-18
Payer: COMMERCIAL

## 2024-07-18 ENCOUNTER — PATIENT MESSAGE (OUTPATIENT)
Dept: ALLERGY | Facility: CLINIC | Age: 59
End: 2024-07-18
Payer: COMMERCIAL

## 2024-07-23 ENCOUNTER — OFFICE VISIT (OUTPATIENT)
Dept: ALLERGY | Facility: CLINIC | Age: 59
End: 2024-07-23
Payer: COMMERCIAL

## 2024-07-23 ENCOUNTER — LAB VISIT (OUTPATIENT)
Dept: LAB | Facility: HOSPITAL | Age: 59
End: 2024-07-23
Attending: STUDENT IN AN ORGANIZED HEALTH CARE EDUCATION/TRAINING PROGRAM
Payer: COMMERCIAL

## 2024-07-23 VITALS
SYSTOLIC BLOOD PRESSURE: 130 MMHG | BODY MASS INDEX: 33.62 KG/M2 | TEMPERATURE: 99 F | HEIGHT: 72 IN | OXYGEN SATURATION: 96 % | DIASTOLIC BLOOD PRESSURE: 79 MMHG | WEIGHT: 248.25 LBS | HEART RATE: 74 BPM

## 2024-07-23 DIAGNOSIS — L28.2 PRURITIC RASH: ICD-10-CM

## 2024-07-23 DIAGNOSIS — J31.0 CHRONIC NONALLERGIC RHINITIS: ICD-10-CM

## 2024-07-23 DIAGNOSIS — R21 RASH AND NONSPECIFIC SKIN ERUPTION: ICD-10-CM

## 2024-07-23 DIAGNOSIS — T48.5X5A RHINITIS MEDICAMENTOSA: ICD-10-CM

## 2024-07-23 DIAGNOSIS — J31.0 RHINITIS MEDICAMENTOSA: ICD-10-CM

## 2024-07-23 DIAGNOSIS — L50.1 CHRONIC IDIOPATHIC URTICARIA: ICD-10-CM

## 2024-07-23 DIAGNOSIS — L50.1 CHRONIC IDIOPATHIC URTICARIA: Primary | ICD-10-CM

## 2024-07-23 DIAGNOSIS — L53.9 ERYTHEMA: ICD-10-CM

## 2024-07-23 LAB
BASOPHILS # BLD AUTO: 0.07 K/UL (ref 0–0.2)
BASOPHILS # BLD AUTO: 0.07 K/UL (ref 0–0.2)
BASOPHILS NFR BLD: 1 % (ref 0–1.9)
BASOPHILS NFR BLD: 1 % (ref 0–1.9)
DIFFERENTIAL METHOD BLD: ABNORMAL
DIFFERENTIAL METHOD BLD: ABNORMAL
EOSINOPHIL # BLD AUTO: 0.6 K/UL (ref 0–0.5)
EOSINOPHIL # BLD AUTO: 0.6 K/UL (ref 0–0.5)
EOSINOPHIL NFR BLD: 9.1 % (ref 0–8)
EOSINOPHIL NFR BLD: 9.1 % (ref 0–8)
ERYTHROCYTE [DISTWIDTH] IN BLOOD BY AUTOMATED COUNT: 14.9 % (ref 11.5–14.5)
ERYTHROCYTE [DISTWIDTH] IN BLOOD BY AUTOMATED COUNT: 14.9 % (ref 11.5–14.5)
HCT VFR BLD AUTO: 50.7 % (ref 40–54)
HCT VFR BLD AUTO: 50.7 % (ref 40–54)
HGB BLD-MCNC: 15.8 G/DL (ref 14–18)
HGB BLD-MCNC: 15.8 G/DL (ref 14–18)
IMM GRANULOCYTES # BLD AUTO: 0.01 K/UL (ref 0–0.04)
IMM GRANULOCYTES # BLD AUTO: 0.01 K/UL (ref 0–0.04)
IMM GRANULOCYTES NFR BLD AUTO: 0.1 % (ref 0–0.5)
IMM GRANULOCYTES NFR BLD AUTO: 0.1 % (ref 0–0.5)
LYMPHOCYTES # BLD AUTO: 1.8 K/UL (ref 1–4.8)
LYMPHOCYTES # BLD AUTO: 1.8 K/UL (ref 1–4.8)
LYMPHOCYTES NFR BLD: 27 % (ref 18–48)
LYMPHOCYTES NFR BLD: 27 % (ref 18–48)
MCH RBC QN AUTO: 30.2 PG (ref 27–31)
MCH RBC QN AUTO: 30.2 PG (ref 27–31)
MCHC RBC AUTO-ENTMCNC: 31.2 G/DL (ref 32–36)
MCHC RBC AUTO-ENTMCNC: 31.2 G/DL (ref 32–36)
MCV RBC AUTO: 97 FL (ref 82–98)
MCV RBC AUTO: 97 FL (ref 82–98)
MONOCYTES # BLD AUTO: 0.7 K/UL (ref 0.3–1)
MONOCYTES # BLD AUTO: 0.7 K/UL (ref 0.3–1)
MONOCYTES NFR BLD: 9.5 % (ref 4–15)
MONOCYTES NFR BLD: 9.5 % (ref 4–15)
NEUTROPHILS # BLD AUTO: 3.6 K/UL (ref 1.8–7.7)
NEUTROPHILS # BLD AUTO: 3.6 K/UL (ref 1.8–7.7)
NEUTROPHILS NFR BLD: 53.3 % (ref 38–73)
NEUTROPHILS NFR BLD: 53.3 % (ref 38–73)
NRBC BLD-RTO: 0 /100 WBC
NRBC BLD-RTO: 0 /100 WBC
PLATELET # BLD AUTO: 280 K/UL (ref 150–450)
PLATELET # BLD AUTO: 280 K/UL (ref 150–450)
PMV BLD AUTO: 12.7 FL (ref 9.2–12.9)
PMV BLD AUTO: 12.7 FL (ref 9.2–12.9)
RBC # BLD AUTO: 5.23 M/UL (ref 4.6–6.2)
RBC # BLD AUTO: 5.23 M/UL (ref 4.6–6.2)
WBC # BLD AUTO: 6.82 K/UL (ref 3.9–12.7)
WBC # BLD AUTO: 6.82 K/UL (ref 3.9–12.7)

## 2024-07-23 PROCEDURE — 3066F NEPHROPATHY DOC TX: CPT | Mod: CPTII,S$GLB,, | Performed by: STUDENT IN AN ORGANIZED HEALTH CARE EDUCATION/TRAINING PROGRAM

## 2024-07-23 PROCEDURE — 3072F LOW RISK FOR RETINOPATHY: CPT | Mod: CPTII,S$GLB,, | Performed by: STUDENT IN AN ORGANIZED HEALTH CARE EDUCATION/TRAINING PROGRAM

## 2024-07-23 PROCEDURE — 3060F POS MICROALBUMINURIA REV: CPT | Mod: CPTII,S$GLB,, | Performed by: STUDENT IN AN ORGANIZED HEALTH CARE EDUCATION/TRAINING PROGRAM

## 2024-07-23 PROCEDURE — 1159F MED LIST DOCD IN RCRD: CPT | Mod: CPTII,S$GLB,, | Performed by: STUDENT IN AN ORGANIZED HEALTH CARE EDUCATION/TRAINING PROGRAM

## 2024-07-23 PROCEDURE — 3078F DIAST BP <80 MM HG: CPT | Mod: CPTII,S$GLB,, | Performed by: STUDENT IN AN ORGANIZED HEALTH CARE EDUCATION/TRAINING PROGRAM

## 2024-07-23 PROCEDURE — G2211 COMPLEX E/M VISIT ADD ON: HCPCS | Mod: S$GLB,,, | Performed by: STUDENT IN AN ORGANIZED HEALTH CARE EDUCATION/TRAINING PROGRAM

## 2024-07-23 PROCEDURE — 83520 IMMUNOASSAY QUANT NOS NONAB: CPT | Performed by: STUDENT IN AN ORGANIZED HEALTH CARE EDUCATION/TRAINING PROGRAM

## 2024-07-23 PROCEDURE — 1160F RVW MEDS BY RX/DR IN RCRD: CPT | Mod: CPTII,S$GLB,, | Performed by: STUDENT IN AN ORGANIZED HEALTH CARE EDUCATION/TRAINING PROGRAM

## 2024-07-23 PROCEDURE — 4010F ACE/ARB THERAPY RXD/TAKEN: CPT | Mod: CPTII,S$GLB,, | Performed by: STUDENT IN AN ORGANIZED HEALTH CARE EDUCATION/TRAINING PROGRAM

## 2024-07-23 PROCEDURE — 86800 THYROGLOBULIN ANTIBODY: CPT | Performed by: STUDENT IN AN ORGANIZED HEALTH CARE EDUCATION/TRAINING PROGRAM

## 2024-07-23 PROCEDURE — 36415 COLL VENOUS BLD VENIPUNCTURE: CPT | Performed by: STUDENT IN AN ORGANIZED HEALTH CARE EDUCATION/TRAINING PROGRAM

## 2024-07-23 PROCEDURE — 3075F SYST BP GE 130 - 139MM HG: CPT | Mod: CPTII,S$GLB,, | Performed by: STUDENT IN AN ORGANIZED HEALTH CARE EDUCATION/TRAINING PROGRAM

## 2024-07-23 PROCEDURE — 82785 ASSAY OF IGE: CPT | Performed by: STUDENT IN AN ORGANIZED HEALTH CARE EDUCATION/TRAINING PROGRAM

## 2024-07-23 PROCEDURE — 3008F BODY MASS INDEX DOCD: CPT | Mod: CPTII,S$GLB,, | Performed by: STUDENT IN AN ORGANIZED HEALTH CARE EDUCATION/TRAINING PROGRAM

## 2024-07-23 PROCEDURE — 85025 COMPLETE CBC W/AUTO DIFF WBC: CPT | Performed by: STUDENT IN AN ORGANIZED HEALTH CARE EDUCATION/TRAINING PROGRAM

## 2024-07-23 PROCEDURE — 86003 ALLG SPEC IGE CRUDE XTRC EA: CPT | Performed by: STUDENT IN AN ORGANIZED HEALTH CARE EDUCATION/TRAINING PROGRAM

## 2024-07-23 PROCEDURE — 3044F HG A1C LEVEL LT 7.0%: CPT | Mod: CPTII,S$GLB,, | Performed by: STUDENT IN AN ORGANIZED HEALTH CARE EDUCATION/TRAINING PROGRAM

## 2024-07-23 PROCEDURE — 86038 ANTINUCLEAR ANTIBODIES: CPT | Performed by: STUDENT IN AN ORGANIZED HEALTH CARE EDUCATION/TRAINING PROGRAM

## 2024-07-23 PROCEDURE — 99214 OFFICE O/P EST MOD 30 MIN: CPT | Mod: S$GLB,,, | Performed by: STUDENT IN AN ORGANIZED HEALTH CARE EDUCATION/TRAINING PROGRAM

## 2024-07-23 PROCEDURE — 99999 PR PBB SHADOW E&M-EST. PATIENT-LVL V: CPT | Mod: PBBFAC,,, | Performed by: STUDENT IN AN ORGANIZED HEALTH CARE EDUCATION/TRAINING PROGRAM

## 2024-07-23 RX ORDER — FLUOCINOLONE ACETONIDE 0.11 MG/ML
OIL TOPICAL 3 TIMES DAILY
Qty: 118.28 ML | Refills: 11 | Status: SHIPPED | OUTPATIENT
Start: 2024-07-23 | End: 2025-07-23

## 2024-07-23 RX ORDER — CALCIPOTRIENE 50 UG/G
CREAM TOPICAL 2 TIMES DAILY
Qty: 120 G | Refills: 11 | Status: SHIPPED | OUTPATIENT
Start: 2024-07-23 | End: 2025-07-23

## 2024-07-23 RX ORDER — IPRATROPIUM BROMIDE 21 UG/1
2 SPRAY, METERED NASAL 4 TIMES DAILY PRN
Qty: 30 ML | Refills: 11 | Status: SHIPPED | OUTPATIENT
Start: 2024-07-23 | End: 2025-07-23

## 2024-07-23 RX ORDER — FLUTICASONE PROPIONATE 50 MCG
1 SPRAY, SUSPENSION (ML) NASAL DAILY
Qty: 16 G | Refills: 11 | Status: SHIPPED | OUTPATIENT
Start: 2024-07-23

## 2024-07-23 NOTE — ASSESSMENT & PLAN NOTE
- Not controlled at this time   - Ordered Flonase 1 SEN BID   - Ordered Astelin 1 SEN BID  - Adding Ipratropium PRN at this time   - Educated on proper use of intranasal sprays   - Will continue to monitor and reassess

## 2024-07-23 NOTE — ASSESSMENT & PLAN NOTE
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

## 2024-07-23 NOTE — PROGRESS NOTES
Allergy and Immunology  Established Patient Clinic Note    Date: 7/23/2024  Chief Complaint   Patient presents with    Follow-up     Pt states that he has been congested and also has been breaking out with hives.        History  Narendra English Sr. is a 59 y.o. male being seen for follow-up today.    Chronic Rhinitis   Hx of Rhinitis Medicamentosa (Afrin)   - Improvement but some congestion reported   - Continue Flonase 1 SEN BID and Astelin 1 SEN BID   - Adding Ipratropium PRN   - Reported congestion worse at night time      Pruritic Condition and Nonspecific skin eruption   - Erythematous rash with pruritus per patient   - Initially believed to be hives but changing diagnosis at this time on re-evaluation   - Malik's disease? Derm 2nd opinion      Allergies, PMH, PSH, Social, and Family History were reviewed.    Current Outpatient Medications on File Prior to Visit   Medication Sig Dispense Refill    amLODIPine (NORVASC) 5 MG tablet Take 1 tablet (5 mg total) by mouth once daily. 90 tablet 1    atorvastatin (LIPITOR) 10 MG tablet Take 1 tablet (10 mg total) by mouth every evening. 90 tablet 1    azelastine (ASTELIN) 137 mcg (0.1 %) nasal spray 1 spray (137 mcg total) by Nasal route 2 (two) times daily. 30 mL 3    benazepriL (LOTENSIN) 40 MG tablet Take 1 tablet (40 mg total) by mouth once daily. 90 tablet 3    clonazePAM (KLONOPIN) 1 MG tablet Take 1 tablet (1 mg total) by mouth every evening. 30 tablet 5    colchicine, gout, (COLCRYS) 0.6 mg tablet Take 1 tablet (0.6 mg total) by mouth once daily. 180 tablet 3    doxepin (SILENOR) 3 mg Tab Take 3 mg by mouth nightly as needed (insomnia). 30 tablet 3    febuxostat (ULORIC) 40 mg Tab Take 1 tablet (40 mg total) by mouth once daily. 30 tablet 3    hydrALAZINE (APRESOLINE) 100 MG tablet Take 1 tablet (100 mg total) by mouth every 8 (eight) hours. 270 tablet 1    hydrOXYzine HCL (ATARAX) 25 MG tablet Take 25 mg by mouth 2 (two) times daily.      indomethacin  (INDOCIN SR) 75 mg CpSR CR capsule Take 1 capsule (75 mg total) by mouth 2 (two) times daily as needed (for gout flare). 60 capsule 0    insulin glargine, TOUJEO, (TOUJEO SOLOSTAR U-300 INSULIN) 300 unit/mL (1.5 mL) InPn pen Inject 85 Units into the skin once daily. 27 mL 1    ketoconazole (NIZORAL) 2 % shampoo Apply topically once a week. Lather in for 5-10 min before rinsing 120 mL 5    levocetirizine (XYZAL) 5 MG tablet Take 5 mg by mouth.      levothyroxine (SYNTHROID) 112 MCG tablet TAKE 1 TABLET BY MOUTH ONCE DAILY BEFORE BREAKFAST 90 tablet 2    metFORMIN (GLUCOPHAGE) 1000 MG tablet Take 1 tablet by mouth twice daily with food 180 tablet 1    metoprolol succinate (TOPROL-XL) 50 MG 24 hr tablet Take 1 tablet (50 mg total) by mouth once daily. 90 tablet 3    omalizumab (XOLAIR) 150 mg/mL injection Inject 2 mLs (300 mg total) into the skin every 28 days. 2 mL 11    pregabalin (LYRICA) 150 MG capsule Take 1 capsule (150 mg total) by mouth 3 (three) times daily. 90 capsule 3    semaglutide (OZEMPIC) 2 mg/dose (8 mg/3 mL) PnIj Inject 2 mg into the skin every 7 days. 6 mL 1    sertraline (ZOLOFT) 100 MG tablet Take 100 mg by mouth once daily.      tadalafiL (CIALIS) 20 MG Tab Take 1 tablet (20 mg total) by mouth every 24 hours as needed (erectile dysfunction). 20 tablet 11    tiZANidine (ZANAFLEX) 4 MG tablet Take 1 tablet (4 mg total) by mouth 2 (two) times daily as needed (Neck Pain). 60 tablet 2    triamcinolone acetonide 0.1% (KENALOG) 0.1 % ointment AAA bid 454 g 1    VASCEPA 1 gram Cap Take 2 capsules (2,000 mg total) by mouth 2 (two) times daily. 360 capsule 1    [DISCONTINUED] azelastine (ASTELIN) 137 mcg (0.1 %) nasal spray 1 spray (137 mcg total) by Nasal route 2 (two) times daily. 30 mL 11    [DISCONTINUED] fluticasone propionate (FLONASE) 50 mcg/actuation nasal spray 1 spray (50 mcg total) by Each Nostril route once daily. 16 g 0    [DISCONTINUED] omalizumab 150 mg/mL AtIn Inject 150 mg into the skin  every 28 days. Once every 28 days      EPINEPHrine (EPIPEN 2-BUDDY) 0.3 mg/0.3 mL AtIn Inject 0.3 mLs (0.3 mg total) into the muscle once. for 1 dose 0.6 mL 0     Current Facility-Administered Medications on File Prior to Visit   Medication Dose Route Frequency Provider Last Rate Last Admin    [DISCONTINUED] omalizumab injection 300 mg  300 mg Subcutaneous Q28 Days    300 mg at 07/02/24 1027       Physical Examination  Vitals:    07/23/24 1056   BP: 130/79   Pulse: 74   Temp: 98.6 °F (37 °C)     GENERAL:  male in no apparent distress and well developed and well nourished  HEAD:  Normocephalic, without obvious abnormality, atraumatic  EYES: sclera anicteric, conjunctiva normochromic  EARS: normal TM's and external ear canals both ears  NOSE: without erythema or discharge, clear discharge, turbinates normal    OROPHARYNX: moist mucous membranes without erythema, exudates or petechiae  LYMPH NODES: normal, supple, no lymphadenopathy  LUNGS: clear to auscultation, no wheezes, rales or rhonchi, symmetric air entry.  HEART: normal rate, regular rhythm, normal S1, S2, no murmurs, rubs, clicks or gallops.  ABDOMEN: soft, nontender, nondistended, no masses or organomegaly.  MUSCULOSKELETAL: no gross joint deformity or swelling.  NEURO: alert, oriented, normal speech, no focal findings or movement disorder noted.  SKIN: Erythematous rash of chest    Assessment/Plan:   Problem List Items Addressed This Visit          ENT    Chronic nonallergic rhinitis    Overview     - 03/20/2024: SPT to inhalants negative with adequate histamine response         Current Assessment & Plan     - Not controlled at this time   - Ordered Flonase 1 SEN BID   - Ordered Astelin 1 SEN BID  - Adding Ipratropium PRN at this time   - Educated on proper use of intranasal sprays   - Will continue to monitor and reassess          Relevant Medications    ipratropium (ATROVENT) 21 mcg (0.03 %) nasal spray    Other Relevant Orders    Tryptase    CBC Auto  Differential    KASHIF Screen w/Reflex    CU (Chronic Urticaria) Index Panel    Allergen Profile, Zone 6    Rhinitis medicamentosa    Relevant Medications    ipratropium (ATROVENT) 21 mcg (0.03 %) nasal spray       Derm    Pruritic rash    Current Assessment & Plan     - Patient wanting a second opinion from Dermatology   - 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  - Ordering topical steroid (Dermasmoothe) and topical vitamin D derivative          Relevant Orders    Tryptase    CBC Auto Differential    KASHIF Screen w/Reflex    CU (Chronic Urticaria) Index Panel    Allergen Profile, Zone 6    RESOLVED: Chronic idiopathic urticaria - Primary    Relevant Orders    Tryptase    CBC Auto Differential    KASHFI Screen w/Reflex    CU (Chronic Urticaria) Index Panel    Allergen Profile, Zone 6     Other Visit Diagnoses       Rash and nonspecific skin eruption        Relevant Medications    fluocinolone (DERMA-SMOOTHE) 0.01 % external oil    calcipotriene (DOVONOX) 0.005 % cream    Other Relevant Orders    Ambulatory referral/consult to Dermatology          Follow up:  Follow up in about 6 months (around 1/23/2025).    Visit today included increased complexity associated with the care of the episodic problem rash addressed and managing the longitudinal care of the patient due to the serious and/or complex managed problem(s) chronic rhinitis and pruritic condition/skin lesion.     Teressa Riggins MD   [facility]  Allergy and Immunology

## 2024-07-24 LAB — ANA SER QL IF: NORMAL

## 2024-07-26 LAB
A ALTERNATA IGE QN: <0.1 KU/L
A ALTERNATA IGE QN: <0.1 KU/L
A FUMIGATUS IGE QN: <0.1 KU/L
A FUMIGATUS IGE QN: <0.1 KU/L
ALLERGEN BOXELDER MAPLE TREE IGE: <0.1 KU/L
ALLERGEN BOXELDER MAPLE TREE IGE: <0.1 KU/L
ALLERGEN MAPLE (BOX ELDER) CLASS: NORMAL
ALLERGEN MAPLE (BOX ELDER) CLASS: NORMAL
ALLERGEN MULBERRY CLASS: NORMAL
ALLERGEN MULBERRY CLASS: NORMAL
ALLERGEN MULBERRY TREE IGE: <0.1 KU/L
ALLERGEN MULBERRY TREE IGE: <0.1 KU/L
ALLERGEN PIGWEED IGE: <0.1 KU/L
ALLERGEN PIGWEED IGE: <0.1 KU/L
ALLERGEN WALNUT TREE IGE: <0.1 KU/L
ALLERGEN WALNUT TREE IGE: <0.1 KU/L
BERMUDA GRASS IGE QN: <0.1 KU/L
BERMUDA GRASS IGE QN: <0.1 KU/L
C HERBARUM IGE QN: <0.1 KU/L
C HERBARUM IGE QN: <0.1 KU/L
CAT DANDER IGE QN: <0.1 KU/L
CAT DANDER IGE QN: <0.1 KU/L
COMMON PIGWEED CLASS: NORMAL
COMMON PIGWEED CLASS: NORMAL
COMMON RAGWEED IGE QN: <0.1 KU/L
COMMON RAGWEED IGE QN: <0.1 KU/L
D FARINAE IGE QN: <0.1 KU/L
D FARINAE IGE QN: <0.1 KU/L
D PTERONYSS IGE QN: <0.1 KU/L
D PTERONYSS IGE QN: <0.1 KU/L
DEPRECATED A ALTERNATA IGE RAST QL: NORMAL
DEPRECATED A ALTERNATA IGE RAST QL: NORMAL
DEPRECATED A FUMIGATUS IGE RAST QL: NORMAL
DEPRECATED A FUMIGATUS IGE RAST QL: NORMAL
DEPRECATED BERMUDA GRASS IGE RAST QL: NORMAL
DEPRECATED BERMUDA GRASS IGE RAST QL: NORMAL
DEPRECATED C HERBARUM IGE RAST QL: NORMAL
DEPRECATED C HERBARUM IGE RAST QL: NORMAL
DEPRECATED CAT DANDER IGE RAST QL: NORMAL
DEPRECATED CAT DANDER IGE RAST QL: NORMAL
DEPRECATED COMMON RAGWEED IGE RAST QL: NORMAL
DEPRECATED COMMON RAGWEED IGE RAST QL: NORMAL
DEPRECATED D FARINAE IGE RAST QL: NORMAL
DEPRECATED D FARINAE IGE RAST QL: NORMAL
DEPRECATED D PTERONYSS IGE RAST QL: NORMAL
DEPRECATED D PTERONYSS IGE RAST QL: NORMAL
DEPRECATED DOG DANDER IGE RAST QL: NORMAL
DEPRECATED DOG DANDER IGE RAST QL: NORMAL
DEPRECATED ELDER IGE RAST QL: NORMAL
DEPRECATED ELDER IGE RAST QL: NORMAL
DEPRECATED MOUSE URINE PROT IGE RAST QL: NORMAL
DEPRECATED MOUSE URINE PROT IGE RAST QL: NORMAL
DEPRECATED MT JUNIPER IGE RAST QL: NORMAL
DEPRECATED MT JUNIPER IGE RAST QL: NORMAL
DEPRECATED P NOTATUM IGE RAST QL: NORMAL
DEPRECATED P NOTATUM IGE RAST QL: NORMAL
DEPRECATED PECAN/HICK TREE IGE RAST QL: NORMAL
DEPRECATED PECAN/HICK TREE IGE RAST QL: NORMAL
DEPRECATED ROACH IGE RAST QL: NORMAL
DEPRECATED ROACH IGE RAST QL: NORMAL
DEPRECATED SILVER BIRCH IGE RAST QL: NORMAL
DEPRECATED SILVER BIRCH IGE RAST QL: NORMAL
DEPRECATED TIMOTHY IGE RAST QL: NORMAL
DEPRECATED TIMOTHY IGE RAST QL: NORMAL
DEPRECATED WHITE ELM IGE RAST QL: NORMAL
DEPRECATED WHITE ELM IGE RAST QL: NORMAL
DEPRECATED WHITE OAK IGE RAST QL: NORMAL
DEPRECATED WHITE OAK IGE RAST QL: NORMAL
DOG DANDER IGE QN: <0.1 KU/L
DOG DANDER IGE QN: <0.1 KU/L
ELDER IGE QN: <0.1 KU/L
ELDER IGE QN: <0.1 KU/L
IGE: 40.1 IU/ML
IGE: 40.1 IU/ML
MOUSE URINE PROT IGE QN: <0.1 KU/L
MOUSE URINE PROT IGE QN: <0.1 KU/L
MT JUNIPER IGE QN: <0.1 KU/L
MT JUNIPER IGE QN: <0.1 KU/L
P NOTATUM IGE QN: <0.1 KU/L
P NOTATUM IGE QN: <0.1 KU/L
PECAN/HICK TREE IGE QN: <0.1 KU/L
PECAN/HICK TREE IGE QN: <0.1 KU/L
RAST ALLERGEN INTERPRETATION: NORMAL
RAST ALLERGEN INTERPRETATION: NORMAL
ROACH IGE QN: <0.1 KU/L
ROACH IGE QN: <0.1 KU/L
SILVER BIRCH IGE QN: <0.1 KU/L
SILVER BIRCH IGE QN: <0.1 KU/L
TIMOTHY IGE QN: <0.1 KU/L
TIMOTHY IGE QN: <0.1 KU/L
TRYPTASE LEVEL: 8.5 NG/ML
TRYPTASE LEVEL: 8.5 NG/ML
WALNUT TREE CLASS: NORMAL
WALNUT TREE CLASS: NORMAL
WHITE ELM IGE QN: <0.1 KU/L
WHITE ELM IGE QN: <0.1 KU/L
WHITE OAK IGE QN: <0.1 KU/L
WHITE OAK IGE QN: <0.1 KU/L

## 2024-07-31 ENCOUNTER — PATIENT MESSAGE (OUTPATIENT)
Dept: ALLERGY | Facility: CLINIC | Age: 59
End: 2024-07-31
Payer: COMMERCIAL

## 2024-07-31 LAB
CU INDEX: 6.6
CU INDEX: 6.6
CU, ANTI-THYROGLOBULIN IGG: 44 IU/ML
CU, ANTI-THYROGLOBULIN IGG: 44 IU/ML
CU, ANTI-THYROID PEROXIDASE IGG: 39 IU/ML
CU, ANTI-THYROID PEROXIDASE IGG: 39 IU/ML
CU, TSH (THYROTROPIN): 1.73 UIU/ML (ref 0.4–4)
CU, TSH (THYROTROPIN): 1.73 UIU/ML (ref 0.4–4)

## 2024-08-01 ENCOUNTER — OFFICE VISIT (OUTPATIENT)
Dept: ALLERGY | Facility: CLINIC | Age: 59
End: 2024-08-01
Payer: COMMERCIAL

## 2024-08-01 VITALS
HEIGHT: 73 IN | HEART RATE: 71 BPM | DIASTOLIC BLOOD PRESSURE: 76 MMHG | WEIGHT: 248.25 LBS | BODY MASS INDEX: 32.9 KG/M2 | SYSTOLIC BLOOD PRESSURE: 128 MMHG

## 2024-08-01 DIAGNOSIS — L53.9 ERYTHEMA: ICD-10-CM

## 2024-08-01 DIAGNOSIS — R21 RASH AND NONSPECIFIC SKIN ERUPTION: ICD-10-CM

## 2024-08-01 DIAGNOSIS — L28.2 PRURITIC RASH: Primary | ICD-10-CM

## 2024-08-01 PROCEDURE — 99999 PR PBB SHADOW E&M-EST. PATIENT-LVL V: CPT | Mod: PBBFAC,,, | Performed by: STUDENT IN AN ORGANIZED HEALTH CARE EDUCATION/TRAINING PROGRAM

## 2024-08-01 PROCEDURE — 3072F LOW RISK FOR RETINOPATHY: CPT | Mod: CPTII,S$GLB,, | Performed by: STUDENT IN AN ORGANIZED HEALTH CARE EDUCATION/TRAINING PROGRAM

## 2024-08-01 PROCEDURE — 3078F DIAST BP <80 MM HG: CPT | Mod: CPTII,S$GLB,, | Performed by: STUDENT IN AN ORGANIZED HEALTH CARE EDUCATION/TRAINING PROGRAM

## 2024-08-01 PROCEDURE — 3044F HG A1C LEVEL LT 7.0%: CPT | Mod: CPTII,S$GLB,, | Performed by: STUDENT IN AN ORGANIZED HEALTH CARE EDUCATION/TRAINING PROGRAM

## 2024-08-01 PROCEDURE — 3060F POS MICROALBUMINURIA REV: CPT | Mod: CPTII,S$GLB,, | Performed by: STUDENT IN AN ORGANIZED HEALTH CARE EDUCATION/TRAINING PROGRAM

## 2024-08-01 PROCEDURE — 4010F ACE/ARB THERAPY RXD/TAKEN: CPT | Mod: CPTII,S$GLB,, | Performed by: STUDENT IN AN ORGANIZED HEALTH CARE EDUCATION/TRAINING PROGRAM

## 2024-08-01 PROCEDURE — 3066F NEPHROPATHY DOC TX: CPT | Mod: CPTII,S$GLB,, | Performed by: STUDENT IN AN ORGANIZED HEALTH CARE EDUCATION/TRAINING PROGRAM

## 2024-08-01 PROCEDURE — 1160F RVW MEDS BY RX/DR IN RCRD: CPT | Mod: CPTII,S$GLB,, | Performed by: STUDENT IN AN ORGANIZED HEALTH CARE EDUCATION/TRAINING PROGRAM

## 2024-08-01 PROCEDURE — 3008F BODY MASS INDEX DOCD: CPT | Mod: CPTII,S$GLB,, | Performed by: STUDENT IN AN ORGANIZED HEALTH CARE EDUCATION/TRAINING PROGRAM

## 2024-08-01 PROCEDURE — 3074F SYST BP LT 130 MM HG: CPT | Mod: CPTII,S$GLB,, | Performed by: STUDENT IN AN ORGANIZED HEALTH CARE EDUCATION/TRAINING PROGRAM

## 2024-08-01 PROCEDURE — 1159F MED LIST DOCD IN RCRD: CPT | Mod: CPTII,S$GLB,, | Performed by: STUDENT IN AN ORGANIZED HEALTH CARE EDUCATION/TRAINING PROGRAM

## 2024-08-01 PROCEDURE — 99214 OFFICE O/P EST MOD 30 MIN: CPT | Mod: S$GLB,,, | Performed by: STUDENT IN AN ORGANIZED HEALTH CARE EDUCATION/TRAINING PROGRAM

## 2024-08-01 RX ORDER — PREDNISONE 20 MG/1
TABLET ORAL
Qty: 18 TABLET | Refills: 0 | Status: SHIPPED | OUTPATIENT
Start: 2024-08-01 | End: 2024-08-16

## 2024-08-01 RX ORDER — SULFAMETHOXAZOLE AND TRIMETHOPRIM 800; 160 MG/1; MG/1
1 TABLET ORAL 2 TIMES DAILY
Qty: 20 TABLET | Refills: 0 | Status: SHIPPED | OUTPATIENT
Start: 2024-08-01 | End: 2024-08-11

## 2024-08-01 RX ORDER — AMLODIPINE BESYLATE 5 MG/1
5 TABLET ORAL DAILY
Qty: 90 TABLET | Refills: 1 | Status: SHIPPED | OUTPATIENT
Start: 2024-08-01

## 2024-08-01 RX ORDER — FLUCONAZOLE 150 MG/1
150 TABLET ORAL EVERY OTHER DAY
Qty: 7 TABLET | Refills: 0 | Status: SHIPPED | OUTPATIENT
Start: 2024-08-01 | End: 2024-08-14

## 2024-08-01 NOTE — PROGRESS NOTES
Allergy and Immunology  Established Patient Clinic Note    Date: 8/1/2024  Chief Complaint   Patient presents with    Follow-up     History  Narendra English Sr. is a 59 y.o. male being seen for follow-up today.    Rash/Pruritic condition  - Etiology unclear at this time with pending Dermatology referral   - Ordered oral antifungal, antibacterial, and steroid   - Explained reason to patient as he reports suffering     Allergies, PMH, PSH, Social, and Family History were reviewed.    Current Outpatient Medications on File Prior to Visit   Medication Sig Dispense Refill    amLODIPine (NORVASC) 5 MG tablet Take 1 tablet (5 mg total) by mouth once daily. 90 tablet 1    atorvastatin (LIPITOR) 10 MG tablet Take 1 tablet (10 mg total) by mouth every evening. 90 tablet 1    azelastine (ASTELIN) 137 mcg (0.1 %) nasal spray 1 spray (137 mcg total) by Nasal route 2 (two) times daily. 30 mL 3    benazepriL (LOTENSIN) 40 MG tablet Take 1 tablet (40 mg total) by mouth once daily. 90 tablet 3    calcipotriene (DOVONOX) 0.005 % cream Apply topically 2 (two) times daily. 120 g 11    clonazePAM (KLONOPIN) 1 MG tablet Take 1 tablet (1 mg total) by mouth every evening. 30 tablet 5    colchicine, gout, (COLCRYS) 0.6 mg tablet Take 1 tablet (0.6 mg total) by mouth once daily. 180 tablet 3    doxepin (SILENOR) 3 mg Tab Take 3 mg by mouth nightly as needed (insomnia). 30 tablet 3    EPINEPHrine (EPIPEN 2-BUDDY) 0.3 mg/0.3 mL AtIn Inject 0.3 mLs (0.3 mg total) into the muscle once. for 1 dose 0.6 mL 0    febuxostat (ULORIC) 40 mg Tab Take 1 tablet (40 mg total) by mouth once daily. 30 tablet 3    fluocinolone (DERMA-SMOOTHE) 0.01 % external oil Apply topically 3 (three) times daily. 118.28 mL 11    fluticasone propionate (FLONASE) 50 mcg/actuation nasal spray 1 spray (50 mcg total) by Each Nostril route once daily. 16 g 11    hydrALAZINE (APRESOLINE) 100 MG tablet Take 1 tablet (100 mg total) by mouth every 8 (eight) hours. 270 tablet 1     hydrOXYzine HCL (ATARAX) 25 MG tablet Take 25 mg by mouth 2 (two) times daily.      indomethacin (INDOCIN SR) 75 mg CpSR CR capsule Take 1 capsule (75 mg total) by mouth 2 (two) times daily as needed (for gout flare). 60 capsule 0    insulin glargine, TOUJEO, (TOUJEO SOLOSTAR U-300 INSULIN) 300 unit/mL (1.5 mL) InPn pen Inject 85 Units into the skin once daily. 27 mL 1    ipratropium (ATROVENT) 21 mcg (0.03 %) nasal spray 2 sprays by Each Nostril route 4 (four) times daily as needed for Rhinitis. 30 mL 11    ketoconazole (NIZORAL) 2 % shampoo Apply topically once a week. Lather in for 5-10 min before rinsing 120 mL 5    levocetirizine (XYZAL) 5 MG tablet Take 5 mg by mouth.      levothyroxine (SYNTHROID) 112 MCG tablet TAKE 1 TABLET BY MOUTH ONCE DAILY BEFORE BREAKFAST 90 tablet 2    metFORMIN (GLUCOPHAGE) 1000 MG tablet Take 1 tablet by mouth twice daily with food 180 tablet 1    metoprolol succinate (TOPROL-XL) 50 MG 24 hr tablet Take 1 tablet (50 mg total) by mouth once daily. 90 tablet 3    pregabalin (LYRICA) 150 MG capsule Take 1 capsule (150 mg total) by mouth 3 (three) times daily. 90 capsule 3    semaglutide (OZEMPIC) 2 mg/dose (8 mg/3 mL) PnIj Inject 2 mg into the skin every 7 days. 6 mL 1    sertraline (ZOLOFT) 100 MG tablet Take 100 mg by mouth once daily.      tadalafiL (CIALIS) 20 MG Tab Take 1 tablet (20 mg total) by mouth every 24 hours as needed (erectile dysfunction). 20 tablet 11    tiZANidine (ZANAFLEX) 4 MG tablet Take 1 tablet (4 mg total) by mouth 2 (two) times daily as needed (Neck Pain). 60 tablet 2    triamcinolone acetonide 0.1% (KENALOG) 0.1 % ointment AAA bid 454 g 1    VASCEPA 1 gram Cap Take 2 capsules (2,000 mg total) by mouth 2 (two) times daily. 360 capsule 1    omalizumab (XOLAIR) 150 mg/mL injection Inject 2 mLs (300 mg total) into the skin every 28 days. 2 mL 11     No current facility-administered medications on file prior to visit.     Physical Examination  Vitals:    08/01/24  1310   BP: 128/76   Pulse: 71     GENERAL:  male in no apparent distress and well developed and well nourished  HEAD:  Normocephalic, without obvious abnormality, atraumatic  EYES: sclera anicteric, conjunctiva normochromic  EARS: normal TM's and external ear canals both ears  NOSE: without erythema or discharge, clear discharge, turbinates normal    OROPHARYNX: moist mucous membranes without erythema, exudates or petechiae  LYMPH NODES: normal, supple, no lymphadenopathy  LUNGS: clear to auscultation, no wheezes, rales or rhonchi, symmetric air entry.  HEART: normal rate, regular rhythm, normal S1, S2, no murmurs, rubs, clicks or gallops.  ABDOMEN: soft, nontender, nondistended, no masses or organomegaly.  MUSCULOSKELETAL: no gross joint deformity or swelling.  NEURO: alert, oriented, normal speech, no focal findings or movement disorder noted.  SKIN: Diffuse rash of upper chest and back.     Assessment/Plan:   Problem List Items Addressed This Visit          Derm    Erythema    Pruritic rash - Primary    Rash and nonspecific skin eruption    Current Assessment & Plan     - Not controlled at this time   - Ordered Prednisone taper   - Ordered Fluconazole and Bactrim   - Broad coverage at this time due to distress  - Will continue to monitor and reassess           Follow up:  Follow up in about 3 weeks (around 8/22/2024).    Teressa Riggins MD   [facility]  Allergy and Immunology

## 2024-08-01 NOTE — ASSESSMENT & PLAN NOTE
- Not controlled at this time   - Ordered Prednisone taper   - Ordered Fluconazole and Bactrim   - Broad coverage at this time due to distress  - Will continue to monitor and reassess

## 2024-08-02 DIAGNOSIS — M10.9 GOUT, UNSPECIFIED CAUSE, UNSPECIFIED CHRONICITY, UNSPECIFIED SITE: ICD-10-CM

## 2024-08-02 RX ORDER — INDOMETHACIN 75 MG/1
75 CAPSULE, EXTENDED RELEASE ORAL 2 TIMES DAILY PRN
Qty: 60 CAPSULE | Refills: 0 | Status: SHIPPED | OUTPATIENT
Start: 2024-08-02

## 2024-08-10 ENCOUNTER — PATIENT MESSAGE (OUTPATIENT)
Dept: UROLOGY | Facility: CLINIC | Age: 59
End: 2024-08-10
Payer: COMMERCIAL

## 2024-08-19 ENCOUNTER — LAB VISIT (OUTPATIENT)
Dept: LAB | Facility: HOSPITAL | Age: 59
End: 2024-08-19
Attending: UROLOGY
Payer: COMMERCIAL

## 2024-08-19 DIAGNOSIS — E29.1 HYPOGONADISM MALE: ICD-10-CM

## 2024-08-19 LAB
ALBUMIN SERPL BCP-MCNC: 3.6 G/DL (ref 3.5–5.2)
ALP SERPL-CCNC: 30 U/L (ref 55–135)
ALT SERPL W/O P-5'-P-CCNC: 62 U/L (ref 10–44)
AST SERPL-CCNC: 38 U/L (ref 10–40)
BASOPHILS # BLD AUTO: 0.05 K/UL (ref 0–0.2)
BASOPHILS NFR BLD: 0.6 % (ref 0–1.9)
BILIRUB DIRECT SERPL-MCNC: 0.1 MG/DL (ref 0.1–0.3)
BILIRUB SERPL-MCNC: 0.3 MG/DL (ref 0.1–1)
COMPLEXED PSA SERPL-MCNC: 0.62 NG/ML (ref 0–4)
DIFFERENTIAL METHOD BLD: ABNORMAL
EOSINOPHIL # BLD AUTO: 0.3 K/UL (ref 0–0.5)
EOSINOPHIL NFR BLD: 3.2 % (ref 0–8)
ERYTHROCYTE [DISTWIDTH] IN BLOOD BY AUTOMATED COUNT: 14.6 % (ref 11.5–14.5)
HCT VFR BLD AUTO: 43.7 % (ref 40–54)
HGB BLD-MCNC: 14 G/DL (ref 14–18)
IMM GRANULOCYTES # BLD AUTO: 0.02 K/UL (ref 0–0.04)
IMM GRANULOCYTES NFR BLD AUTO: 0.3 % (ref 0–0.5)
LYMPHOCYTES # BLD AUTO: 2.1 K/UL (ref 1–4.8)
LYMPHOCYTES NFR BLD: 26.2 % (ref 18–48)
MCH RBC QN AUTO: 30.5 PG (ref 27–31)
MCHC RBC AUTO-ENTMCNC: 32 G/DL (ref 32–36)
MCV RBC AUTO: 95 FL (ref 82–98)
MONOCYTES # BLD AUTO: 0.9 K/UL (ref 0.3–1)
MONOCYTES NFR BLD: 11.7 % (ref 4–15)
NEUTROPHILS # BLD AUTO: 4.5 K/UL (ref 1.8–7.7)
NEUTROPHILS NFR BLD: 58 % (ref 38–73)
NRBC BLD-RTO: 0 /100 WBC
PLATELET # BLD AUTO: 249 K/UL (ref 150–450)
PMV BLD AUTO: 12.8 FL (ref 9.2–12.9)
PROT SERPL-MCNC: 6.2 G/DL (ref 6–8.4)
RBC # BLD AUTO: 4.59 M/UL (ref 4.6–6.2)
TESTOST SERPL-MCNC: 375 NG/DL (ref 304–1227)
WBC # BLD AUTO: 7.81 K/UL (ref 3.9–12.7)

## 2024-08-19 PROCEDURE — 84153 ASSAY OF PSA TOTAL: CPT | Performed by: UROLOGY

## 2024-08-19 PROCEDURE — 80076 HEPATIC FUNCTION PANEL: CPT | Performed by: UROLOGY

## 2024-08-19 PROCEDURE — 85025 COMPLETE CBC W/AUTO DIFF WBC: CPT | Performed by: UROLOGY

## 2024-08-19 PROCEDURE — 84403 ASSAY OF TOTAL TESTOSTERONE: CPT | Performed by: UROLOGY

## 2024-08-19 PROCEDURE — 36415 COLL VENOUS BLD VENIPUNCTURE: CPT | Mod: PO | Performed by: UROLOGY

## 2024-08-20 ENCOUNTER — PATIENT MESSAGE (OUTPATIENT)
Dept: INTERNAL MEDICINE | Facility: CLINIC | Age: 59
End: 2024-08-20
Payer: COMMERCIAL

## 2024-08-20 ENCOUNTER — OFFICE VISIT (OUTPATIENT)
Dept: ALLERGY | Facility: CLINIC | Age: 59
End: 2024-08-20
Payer: COMMERCIAL

## 2024-08-20 DIAGNOSIS — L28.2 PRURITIC RASH: ICD-10-CM

## 2024-08-20 DIAGNOSIS — R21 RASH AND NONSPECIFIC SKIN ERUPTION: Primary | ICD-10-CM

## 2024-08-20 PROCEDURE — 1159F MED LIST DOCD IN RCRD: CPT | Mod: CPTII,95,, | Performed by: STUDENT IN AN ORGANIZED HEALTH CARE EDUCATION/TRAINING PROGRAM

## 2024-08-20 PROCEDURE — 3066F NEPHROPATHY DOC TX: CPT | Mod: CPTII,95,, | Performed by: STUDENT IN AN ORGANIZED HEALTH CARE EDUCATION/TRAINING PROGRAM

## 2024-08-20 PROCEDURE — 3060F POS MICROALBUMINURIA REV: CPT | Mod: CPTII,95,, | Performed by: STUDENT IN AN ORGANIZED HEALTH CARE EDUCATION/TRAINING PROGRAM

## 2024-08-20 PROCEDURE — 99214 OFFICE O/P EST MOD 30 MIN: CPT | Mod: 95,,, | Performed by: STUDENT IN AN ORGANIZED HEALTH CARE EDUCATION/TRAINING PROGRAM

## 2024-08-20 PROCEDURE — 3072F LOW RISK FOR RETINOPATHY: CPT | Mod: CPTII,95,, | Performed by: STUDENT IN AN ORGANIZED HEALTH CARE EDUCATION/TRAINING PROGRAM

## 2024-08-20 PROCEDURE — 1160F RVW MEDS BY RX/DR IN RCRD: CPT | Mod: CPTII,95,, | Performed by: STUDENT IN AN ORGANIZED HEALTH CARE EDUCATION/TRAINING PROGRAM

## 2024-08-20 PROCEDURE — 3044F HG A1C LEVEL LT 7.0%: CPT | Mod: CPTII,95,, | Performed by: STUDENT IN AN ORGANIZED HEALTH CARE EDUCATION/TRAINING PROGRAM

## 2024-08-20 PROCEDURE — 4010F ACE/ARB THERAPY RXD/TAKEN: CPT | Mod: CPTII,95,, | Performed by: STUDENT IN AN ORGANIZED HEALTH CARE EDUCATION/TRAINING PROGRAM

## 2024-08-20 NOTE — ASSESSMENT & PLAN NOTE
- Etiology unclear at this time with pending Dermatology referral   - Ordered oral antifungal, antibacterial, and steroid in the past   - Patient may benefit from bx - deferring to Dermatology   - Continued rash at this time   - Awaiting assistance from Dermatology   - Will continue to monitor and reassess

## 2024-08-20 NOTE — PROGRESS NOTES
The patient location is: [place] - at work   The chief complaint leading to consultation is: F/U Appt    Visit type: audiovisual    Face to Face time with patient: 8  30 minutes of total time spent on the encounter, which includes face to face time and non-face to face time preparing to see the patient (eg, review of tests), Obtaining and/or reviewing separately obtained history, Documenting clinical information in the electronic or other health record, Independently interpreting results (not separately reported) and communicating results to the patient/family/caregiver, or Care coordination (not separately reported).     Each patient to whom he or she provides medical services by telemedicine is:  (1) informed of the relationship between the physician and patient and the respective role of any other health care provider with respect to management of the patient; and (2) notified that he or she may decline to receive medical services by telemedicine and may withdraw from such care at any time.    Allergy and Immunology  Established Patient Clinic Note    Date: 8/20/2024  Chief Complaint   Patient presents with    Follow-up     History  Narendra Josephy Amador Oliva is a 59 y.o. male being seen for follow-up today.    Rash/Pruritic condition  - Etiology unclear at this time with pending Dermatology referral   - Ordered oral antifungal, antibacterial, and steroid in the past   - Patient may benefit from bx - deferring to Dermatology   - Continued rash at this time     Allergies, PMH, PSH, Social, and Family History were reviewed.    Current Outpatient Medications on File Prior to Visit   Medication Sig Dispense Refill    amLODIPine (NORVASC) 5 MG tablet Take 1 tablet (5 mg total) by mouth once daily. 90 tablet 1    atorvastatin (LIPITOR) 10 MG tablet Take 1 tablet (10 mg total) by mouth every evening. 90 tablet 1    azelastine (ASTELIN) 137 mcg (0.1 %) nasal spray 1 spray (137 mcg total) by Nasal route 2 (two) times daily.  30 mL 3    benazepriL (LOTENSIN) 40 MG tablet Take 1 tablet (40 mg total) by mouth once daily. 90 tablet 3    calcipotriene (DOVONOX) 0.005 % cream Apply topically 2 (two) times daily. 120 g 11    clonazePAM (KLONOPIN) 1 MG tablet Take 1 tablet (1 mg total) by mouth every evening. 30 tablet 5    colchicine, gout, (COLCRYS) 0.6 mg tablet Take 1 tablet (0.6 mg total) by mouth once daily. 180 tablet 3    doxepin (SILENOR) 3 mg Tab Take 3 mg by mouth nightly as needed (insomnia). 30 tablet 3    febuxostat (ULORIC) 40 mg Tab Take 1 tablet (40 mg total) by mouth once daily. 30 tablet 3    fluocinolone (DERMA-SMOOTHE) 0.01 % external oil Apply topically 3 (three) times daily. 118.28 mL 11    fluticasone propionate (FLONASE) 50 mcg/actuation nasal spray 1 spray (50 mcg total) by Each Nostril route once daily. 16 g 11    hydrALAZINE (APRESOLINE) 100 MG tablet Take 1 tablet (100 mg total) by mouth every 8 (eight) hours. 270 tablet 1    hydrOXYzine HCL (ATARAX) 25 MG tablet Take 25 mg by mouth 2 (two) times daily.      indomethacin (INDOCIN SR) 75 mg CpSR CR capsule Take 1 capsule (75 mg total) by mouth 2 (two) times daily as needed (for gout flare). 60 capsule 0    insulin glargine, TOUJEO, (TOUJEO SOLOSTAR U-300 INSULIN) 300 unit/mL (1.5 mL) InPn pen Inject 85 Units into the skin once daily. 27 mL 1    ipratropium (ATROVENT) 21 mcg (0.03 %) nasal spray 2 sprays by Each Nostril route 4 (four) times daily as needed for Rhinitis. 30 mL 11    ketoconazole (NIZORAL) 2 % shampoo Apply topically once a week. Lather in for 5-10 min before rinsing 120 mL 5    levocetirizine (XYZAL) 5 MG tablet Take 5 mg by mouth.      levothyroxine (SYNTHROID) 112 MCG tablet TAKE 1 TABLET BY MOUTH ONCE DAILY BEFORE BREAKFAST 90 tablet 2    metFORMIN (GLUCOPHAGE) 1000 MG tablet Take 1 tablet by mouth twice daily with food 180 tablet 1    metoprolol succinate (TOPROL-XL) 50 MG 24 hr tablet Take 1 tablet (50 mg total) by mouth once daily. 90 tablet 3     pregabalin (LYRICA) 150 MG capsule Take 1 capsule (150 mg total) by mouth 3 (three) times daily. 90 capsule 3    semaglutide (OZEMPIC) 2 mg/dose (8 mg/3 mL) PnIj Inject 2 mg into the skin every 7 days. 6 mL 1    sertraline (ZOLOFT) 100 MG tablet Take 100 mg by mouth once daily.      tadalafiL (CIALIS) 20 MG Tab Take 1 tablet (20 mg total) by mouth every 24 hours as needed (erectile dysfunction). 20 tablet 11    tiZANidine (ZANAFLEX) 4 MG tablet Take 1 tablet (4 mg total) by mouth 2 (two) times daily as needed (Neck Pain). 60 tablet 2    triamcinolone acetonide 0.1% (KENALOG) 0.1 % ointment AAA bid 454 g 1    VASCEPA 1 gram Cap Take 2 capsules (2,000 mg total) by mouth 2 (two) times daily. 360 capsule 1    [DISCONTINUED] EPINEPHrine (EPIPEN 2-BUDDY) 0.3 mg/0.3 mL AtIn Inject 0.3 mLs (0.3 mg total) into the muscle once. for 1 dose 0.6 mL 0    [DISCONTINUED] omalizumab (XOLAIR) 150 mg/mL injection Inject 2 mLs (300 mg total) into the skin every 28 days. 2 mL 11    [DISCONTINUED] predniSONE (DELTASONE) 20 MG tablet Take 2 tablets (40 mg total) by mouth once daily for 5 days, THEN 1 tablet (20 mg total) once daily for 5 days, THEN 0.5 tablets (10 mg total) once daily for 5 days. 18 tablet 0     No current facility-administered medications on file prior to visit.     Physical Examination  There were no vitals filed for this visit.  GENERAL:  male in no apparent distress and well developed and well nourished  HEAD:  Normocephalic, without obvious abnormality, atraumatic  EYES: sclera anicteric, conjunctiva normochromic  LUNGS: no tachypnea, retractions or cyanosis.  HEART: not examined.  ABDOMEN: not examined.  MUSCULOSKELETAL: no gross joint deformity or swelling.  NEURO: alert, oriented, normal speech, no focal findings or movement disorder noted.  SKIN: not examined.     Assessment/Plan:   Problem List Items Addressed This Visit          Derm    Pruritic rash    Rash and nonspecific skin eruption - Primary    Current  Assessment & Plan     - Etiology unclear at this time with pending Dermatology referral   - Ordered oral antifungal, antibacterial, and steroid in the past   - Patient may benefit from bx - deferring to Dermatology   - Continued rash at this time   - Awaiting assistance from Dermatology   - Will continue to monitor and reassess           Follow up:  Follow up in about 2 months (around 10/20/2024).    Teressa Riggins MD   [facility]  Allergy and Immunology

## 2024-08-27 ENCOUNTER — PATIENT MESSAGE (OUTPATIENT)
Dept: UROLOGY | Facility: CLINIC | Age: 59
End: 2024-08-27
Payer: COMMERCIAL

## 2024-08-27 DIAGNOSIS — E29.1 HYPOGONADISM MALE: Primary | ICD-10-CM

## 2024-08-27 RX ORDER — TESTOSTERONE CYPIONATE 200 MG/ML
200 INJECTION, SOLUTION INTRAMUSCULAR ONCE
Status: SHIPPED | OUTPATIENT
Start: 2024-08-27 | End: 2025-02-23

## 2024-08-27 RX ORDER — TESTOSTERONE CYPIONATE 200 MG/ML
200 INJECTION, SOLUTION INTRAMUSCULAR
Qty: 10 ML | Refills: 5 | Status: SHIPPED | OUTPATIENT
Start: 2024-08-27 | End: 2026-12-15

## 2024-08-27 NOTE — TELEPHONE ENCOUNTER
Convert from pellets to 200 mg every 14 days, will bring in for a nurse visit to teach, follow up in 3 months with labs.

## 2024-08-29 ENCOUNTER — PATIENT MESSAGE (OUTPATIENT)
Dept: RHEUMATOLOGY | Facility: CLINIC | Age: 59
End: 2024-08-29
Payer: COMMERCIAL

## 2024-09-02 ENCOUNTER — PATIENT MESSAGE (OUTPATIENT)
Dept: UROLOGY | Facility: CLINIC | Age: 59
End: 2024-09-02
Payer: COMMERCIAL

## 2024-09-03 ENCOUNTER — PATIENT MESSAGE (OUTPATIENT)
Dept: UROLOGY | Facility: CLINIC | Age: 59
End: 2024-09-03
Payer: COMMERCIAL

## 2024-09-04 ENCOUNTER — CLINICAL SUPPORT (OUTPATIENT)
Dept: UROLOGY | Facility: CLINIC | Age: 59
End: 2024-09-04
Payer: COMMERCIAL

## 2024-09-04 VITALS
SYSTOLIC BLOOD PRESSURE: 138 MMHG | BODY MASS INDEX: 32.66 KG/M2 | DIASTOLIC BLOOD PRESSURE: 78 MMHG | HEART RATE: 76 BPM | WEIGHT: 247.56 LBS

## 2024-09-04 DIAGNOSIS — E29.1 TESTICULAR HYPOGONADISM: Primary | ICD-10-CM

## 2024-09-04 PROCEDURE — 99999 PR PBB SHADOW E&M-EST. PATIENT-LVL III: CPT | Mod: PBBFAC,,, | Performed by: NURSE PRACTITIONER

## 2024-09-04 PROCEDURE — 96372 THER/PROPH/DIAG INJ SC/IM: CPT | Mod: S$GLB,,, | Performed by: NURSE PRACTITIONER

## 2024-09-04 RX ORDER — TESTOSTERONE CYPIONATE 200 MG/ML
200 INJECTION, SOLUTION INTRAMUSCULAR
Status: SHIPPED | OUTPATIENT
Start: 2024-09-04 | End: 2024-11-13

## 2024-09-04 RX ADMIN — TESTOSTERONE CYPIONATE 200 MG: 200 INJECTION, SOLUTION INTRAMUSCULAR at 11:09

## 2024-09-04 NOTE — PROGRESS NOTES
.Pt came in for depo injection teaching. Explained to pt the importance of using aseptic technique during this injection process. Demonstrated to the pt how to draw medication up, choose a location, and self administer medication. Pt was able to show back how to draw up medication and self administer medication. Pt administered 200 mg of depo testosterone to R vastus lateralis site. Pt denies any questions or concerns at the moment. Advised pt to remain in clinic for 15 minutes and report any adverse reaction to staff.     Loraine Milanes RN

## 2024-09-05 RX ORDER — ATORVASTATIN CALCIUM 10 MG/1
10 TABLET, FILM COATED ORAL NIGHTLY
Qty: 90 TABLET | Refills: 1 | Status: SHIPPED | OUTPATIENT
Start: 2024-09-05

## 2024-09-08 ENCOUNTER — PATIENT MESSAGE (OUTPATIENT)
Dept: INTERNAL MEDICINE | Facility: CLINIC | Age: 59
End: 2024-09-08
Payer: COMMERCIAL

## 2024-09-09 ENCOUNTER — LAB VISIT (OUTPATIENT)
Dept: LAB | Facility: HOSPITAL | Age: 59
End: 2024-09-09
Attending: FAMILY MEDICINE
Payer: COMMERCIAL

## 2024-09-09 ENCOUNTER — PATIENT OUTREACH (OUTPATIENT)
Dept: ADMINISTRATIVE | Facility: HOSPITAL | Age: 59
End: 2024-09-09
Payer: COMMERCIAL

## 2024-09-09 DIAGNOSIS — E11.69 HYPERLIPIDEMIA ASSOCIATED WITH TYPE 2 DIABETES MELLITUS: ICD-10-CM

## 2024-09-09 DIAGNOSIS — Z79.4 TYPE 2 DIABETES MELLITUS WITH OTHER CIRCULATORY COMPLICATION, WITH LONG-TERM CURRENT USE OF INSULIN: ICD-10-CM

## 2024-09-09 DIAGNOSIS — E03.9 HYPOTHYROIDISM (ACQUIRED): ICD-10-CM

## 2024-09-09 DIAGNOSIS — E11.59 TYPE 2 DIABETES MELLITUS WITH OTHER CIRCULATORY COMPLICATION, WITH LONG-TERM CURRENT USE OF INSULIN: ICD-10-CM

## 2024-09-09 DIAGNOSIS — E78.5 HYPERLIPIDEMIA ASSOCIATED WITH TYPE 2 DIABETES MELLITUS: ICD-10-CM

## 2024-09-09 LAB
ALBUMIN SERPL BCP-MCNC: 3.8 G/DL (ref 3.5–5.2)
ALP SERPL-CCNC: 31 U/L (ref 55–135)
ALT SERPL W/O P-5'-P-CCNC: 34 U/L (ref 10–44)
ANION GAP SERPL CALC-SCNC: 9 MMOL/L (ref 8–16)
AST SERPL-CCNC: 27 U/L (ref 10–40)
BILIRUB SERPL-MCNC: 0.4 MG/DL (ref 0.1–1)
BUN SERPL-MCNC: 22 MG/DL (ref 6–20)
CALCIUM SERPL-MCNC: 9.7 MG/DL (ref 8.7–10.5)
CHLORIDE SERPL-SCNC: 107 MMOL/L (ref 95–110)
CHOLEST SERPL-MCNC: 115 MG/DL (ref 120–199)
CHOLEST/HDLC SERPL: 3.4 {RATIO} (ref 2–5)
CO2 SERPL-SCNC: 23 MMOL/L (ref 23–29)
CREAT SERPL-MCNC: 1.1 MG/DL (ref 0.5–1.4)
EST. GFR  (NO RACE VARIABLE): >60 ML/MIN/1.73 M^2
ESTIMATED AVG GLUCOSE: 114 MG/DL (ref 68–131)
GLUCOSE SERPL-MCNC: 76 MG/DL (ref 70–110)
HBA1C MFR BLD: 5.6 % (ref 4–5.6)
HDLC SERPL-MCNC: 34 MG/DL (ref 40–75)
HDLC SERPL: 29.6 % (ref 20–50)
LDLC SERPL CALC-MCNC: 51.8 MG/DL (ref 63–159)
NONHDLC SERPL-MCNC: 81 MG/DL
POTASSIUM SERPL-SCNC: 4.3 MMOL/L (ref 3.5–5.1)
PROT SERPL-MCNC: 6.8 G/DL (ref 6–8.4)
SODIUM SERPL-SCNC: 139 MMOL/L (ref 136–145)
T4 FREE SERPL-MCNC: 0.91 NG/DL (ref 0.71–1.51)
TRIGL SERPL-MCNC: 146 MG/DL (ref 30–150)
TSH SERPL DL<=0.005 MIU/L-ACNC: 2.5 UIU/ML (ref 0.4–4)

## 2024-09-09 PROCEDURE — 80061 LIPID PANEL: CPT | Performed by: PHYSICIAN ASSISTANT

## 2024-09-09 PROCEDURE — 84443 ASSAY THYROID STIM HORMONE: CPT | Performed by: PHYSICIAN ASSISTANT

## 2024-09-09 PROCEDURE — 83036 HEMOGLOBIN GLYCOSYLATED A1C: CPT | Performed by: PHYSICIAN ASSISTANT

## 2024-09-09 PROCEDURE — 84439 ASSAY OF FREE THYROXINE: CPT | Performed by: PHYSICIAN ASSISTANT

## 2024-09-09 PROCEDURE — 80053 COMPREHEN METABOLIC PANEL: CPT | Performed by: PHYSICIAN ASSISTANT

## 2024-09-11 RX ORDER — VARDENAFIL HYDROCHLORIDE 20 MG/1
20 TABLET ORAL DAILY PRN
Qty: 15 TABLET | Refills: 1 | Status: SHIPPED | OUTPATIENT
Start: 2024-09-11 | End: 2025-09-11

## 2024-09-17 ENCOUNTER — OFFICE VISIT (OUTPATIENT)
Dept: OPHTHALMOLOGY | Facility: CLINIC | Age: 59
End: 2024-09-17
Payer: COMMERCIAL

## 2024-09-17 DIAGNOSIS — E11.9 DIABETES MELLITUS WITHOUT COMPLICATION: Primary | ICD-10-CM

## 2024-09-17 DIAGNOSIS — H25.011 CORTICAL SENILE CATARACT, RIGHT: ICD-10-CM

## 2024-09-17 DIAGNOSIS — H52.4 PRESBYOPIA OF BOTH EYES: ICD-10-CM

## 2024-09-17 PROCEDURE — 3066F NEPHROPATHY DOC TX: CPT | Mod: CPTII,S$GLB,, | Performed by: OPTOMETRIST

## 2024-09-17 PROCEDURE — 3044F HG A1C LEVEL LT 7.0%: CPT | Mod: CPTII,S$GLB,, | Performed by: OPTOMETRIST

## 2024-09-17 PROCEDURE — 99999 PR PBB SHADOW E&M-EST. PATIENT-LVL III: CPT | Mod: PBBFAC,,, | Performed by: OPTOMETRIST

## 2024-09-17 PROCEDURE — 1159F MED LIST DOCD IN RCRD: CPT | Mod: CPTII,S$GLB,, | Performed by: OPTOMETRIST

## 2024-09-17 PROCEDURE — 2023F DILAT RTA XM W/O RTNOPTHY: CPT | Mod: CPTII,S$GLB,, | Performed by: OPTOMETRIST

## 2024-09-17 PROCEDURE — 4010F ACE/ARB THERAPY RXD/TAKEN: CPT | Mod: CPTII,S$GLB,, | Performed by: OPTOMETRIST

## 2024-09-17 PROCEDURE — 92015 DETERMINE REFRACTIVE STATE: CPT | Mod: S$GLB,,, | Performed by: OPTOMETRIST

## 2024-09-17 PROCEDURE — 1160F RVW MEDS BY RX/DR IN RCRD: CPT | Mod: CPTII,S$GLB,, | Performed by: OPTOMETRIST

## 2024-09-17 PROCEDURE — 3060F POS MICROALBUMINURIA REV: CPT | Mod: CPTII,S$GLB,, | Performed by: OPTOMETRIST

## 2024-09-17 PROCEDURE — 92014 COMPRE OPH EXAM EST PT 1/>: CPT | Mod: S$GLB,,, | Performed by: OPTOMETRIST

## 2024-09-17 NOTE — PROGRESS NOTES
HPI    1. DM 2012    Hemoglobin A1C       Date                     Value               Ref Range             Status                09/09/2024               5.6                 4.0 - 5.6 %           Final              Vision changes since last eye exam?: Pt states his near and far vision had   become more blurry since his last eye exam. Pt has noticed a significant   decrease in his night time drivinfg as well per pt. Pt is using OTC +2.00   readers.      Any eye pain today: Pt denies any eye pain.     Other ocular symptoms: Pt has been experiencing floaters infrequently.     Interested in contact lens fitting today? No.     Last edited by Alice Marks on 9/17/2024 10:12 AM.            Assessment /Plan     For exam results, see Encounter Report.      1. Diabetes mellitus without complication  Last A1c 5.6 There was no diabetic retinopathy present on either eye on examination today. Recommend good blood pressure control, strict blood glucose control, and good cholesterol control.  Continue close care with PCP regarding diabetes.      2. Cortical senile cataract, right  Cataracts are not visually significant and not affecting activities of daily living. Annual observation is recommended at this time. Patient to call or return to clinic with any significant change in vision prior to next visit.      3. Presbyopia of both eyes  Eyeglass Final Rx       Eyeglass Final Rx         Sphere Cylinder Axis Add    Right +0.25 +0.75 011 +2.50    Left [place] +0.75 069 +2.50      Type: PAL    Expiration Date: 9/17/2025   PD 64.5                      RTC 1 yr for dilated eye exam or sooner if any changes to vision.   Discussed above and answered questions.

## 2024-09-18 ENCOUNTER — OFFICE VISIT (OUTPATIENT)
Dept: INTERNAL MEDICINE | Facility: CLINIC | Age: 59
End: 2024-09-18
Payer: COMMERCIAL

## 2024-09-18 VITALS
SYSTOLIC BLOOD PRESSURE: 110 MMHG | DIASTOLIC BLOOD PRESSURE: 68 MMHG | HEART RATE: 79 BPM | HEIGHT: 73 IN | BODY MASS INDEX: 32.4 KG/M2 | WEIGHT: 244.5 LBS | OXYGEN SATURATION: 97 %

## 2024-09-18 DIAGNOSIS — E11.69 HYPERLIPIDEMIA ASSOCIATED WITH TYPE 2 DIABETES MELLITUS: ICD-10-CM

## 2024-09-18 DIAGNOSIS — M10.9 GOUT, UNSPECIFIED CAUSE, UNSPECIFIED CHRONICITY, UNSPECIFIED SITE: ICD-10-CM

## 2024-09-18 DIAGNOSIS — E03.9 HYPOTHYROIDISM (ACQUIRED): ICD-10-CM

## 2024-09-18 DIAGNOSIS — E78.5 HYPERLIPIDEMIA ASSOCIATED WITH TYPE 2 DIABETES MELLITUS: ICD-10-CM

## 2024-09-18 DIAGNOSIS — I15.2 HYPERTENSION ASSOCIATED WITH DIABETES: ICD-10-CM

## 2024-09-18 DIAGNOSIS — Z79.4 TYPE 2 DIABETES MELLITUS WITHOUT COMPLICATION, WITH LONG-TERM CURRENT USE OF INSULIN: Primary | ICD-10-CM

## 2024-09-18 DIAGNOSIS — E11.9 TYPE 2 DIABETES MELLITUS WITHOUT COMPLICATION, WITH LONG-TERM CURRENT USE OF INSULIN: Primary | ICD-10-CM

## 2024-09-18 DIAGNOSIS — Z12.5 PROSTATE CANCER SCREENING: ICD-10-CM

## 2024-09-18 DIAGNOSIS — E11.59 HYPERTENSION ASSOCIATED WITH DIABETES: ICD-10-CM

## 2024-09-18 PROCEDURE — 3044F HG A1C LEVEL LT 7.0%: CPT | Mod: CPTII,S$GLB,, | Performed by: FAMILY MEDICINE

## 2024-09-18 PROCEDURE — 1160F RVW MEDS BY RX/DR IN RCRD: CPT | Mod: CPTII,S$GLB,, | Performed by: FAMILY MEDICINE

## 2024-09-18 PROCEDURE — 3060F POS MICROALBUMINURIA REV: CPT | Mod: CPTII,S$GLB,, | Performed by: FAMILY MEDICINE

## 2024-09-18 PROCEDURE — G2211 COMPLEX E/M VISIT ADD ON: HCPCS | Mod: S$GLB,,, | Performed by: FAMILY MEDICINE

## 2024-09-18 PROCEDURE — 4010F ACE/ARB THERAPY RXD/TAKEN: CPT | Mod: CPTII,S$GLB,, | Performed by: FAMILY MEDICINE

## 2024-09-18 PROCEDURE — 99214 OFFICE O/P EST MOD 30 MIN: CPT | Mod: S$GLB,,, | Performed by: FAMILY MEDICINE

## 2024-09-18 PROCEDURE — 3008F BODY MASS INDEX DOCD: CPT | Mod: CPTII,S$GLB,, | Performed by: FAMILY MEDICINE

## 2024-09-18 PROCEDURE — 99999 PR PBB SHADOW E&M-EST. PATIENT-LVL III: CPT | Mod: PBBFAC,,, | Performed by: FAMILY MEDICINE

## 2024-09-18 PROCEDURE — 1159F MED LIST DOCD IN RCRD: CPT | Mod: CPTII,S$GLB,, | Performed by: FAMILY MEDICINE

## 2024-09-18 PROCEDURE — 3066F NEPHROPATHY DOC TX: CPT | Mod: CPTII,S$GLB,, | Performed by: FAMILY MEDICINE

## 2024-09-18 PROCEDURE — 3078F DIAST BP <80 MM HG: CPT | Mod: CPTII,S$GLB,, | Performed by: FAMILY MEDICINE

## 2024-09-18 PROCEDURE — 3074F SYST BP LT 130 MM HG: CPT | Mod: CPTII,S$GLB,, | Performed by: FAMILY MEDICINE

## 2024-09-18 RX ORDER — TIRZEPATIDE 15 MG/.5ML
INJECTION, SOLUTION SUBCUTANEOUS
COMMUNITY
Start: 2024-07-17 | End: 2024-09-20 | Stop reason: SDUPTHER

## 2024-09-18 NOTE — PROGRESS NOTES
Subjective:       Patient ID: Narendra English Sr. is a 59 y.o. male.    Chief Complaint: Follow-up (6 month f/u)    History of Present Illness    Patient presents to clinic today for followup of chronic conditions. His wife is present with him today.  - Routine six-month follow-up visit  - Blood pressure, diabetes, cholesterol, and thyroid function are all well-controlled and at goal  - History of colon cancer and low HDL cholesterol  - No current health concerns or questions      Review of Systems   Constitutional:  Negative for chills, fatigue, fever and unexpected weight change.   Eyes:  Negative for visual disturbance.   Respiratory:  Negative for shortness of breath.    Cardiovascular:  Negative for chest pain.   Musculoskeletal:  Negative for myalgias.   Neurological:  Negative for headaches.         Objective:      Physical Exam  Vitals reviewed.   Constitutional:       General: He is not in acute distress.     Appearance: He is well-developed.   HENT:      Head: Normocephalic and atraumatic.   Eyes:      General: Lids are normal. No scleral icterus.     Extraocular Movements: Extraocular movements intact.      Conjunctiva/sclera: Conjunctivae normal.      Pupils: Pupils are equal, round, and reactive to light.   Pulmonary:      Effort: Pulmonary effort is normal.   Neurological:      Mental Status: He is alert and oriented to person, place, and time.      Cranial Nerves: No cranial nerve deficit.      Gait: Gait normal.   Psychiatric:         Mood and Affect: Mood and affect normal.         Assessment:       1. Type 2 diabetes mellitus without complication, with long-term current use of insulin    2. Hypertension associated with diabetes    3. Hyperlipidemia associated with type 2 diabetes mellitus    4. Hypothyroidism (acquired)    5. Gout, unspecified cause, unspecified chronicity, unspecified site    6. Prostate cancer screening        Plan:   1. Type 2 diabetes mellitus without complication, with  long-term current use of insulin  Assessment & Plan:  Controlled, continue Toujeo, metformin and mounjaro    Lab Results   Component Value Date    HGBA1C 5.6 09/09/2024    HGBA1C 5.6 03/01/2024    LDLCALC 51.8 (L) 09/09/2024    LABMICR 102.0 03/01/2024    CREATRANDUR 277.0 03/01/2024    MICALBCREAT 36.8 (H) 03/01/2024         Orders:  -     Hemoglobin A1C; Future; Expected date: 03/18/2025    2. Hypertension associated with diabetes  Assessment & Plan:  Controlled, continue amlodipine, benazepril, hydralazine and metoprolol    BP Readings from Last 1 Encounters:   09/18/24 110/68         Orders:  -     CBC Auto Differential; Future; Expected date: 03/18/2025    3. Hyperlipidemia associated with type 2 diabetes mellitus  Assessment & Plan:  Controlled, continue atorvastatin and Vascepa    Lab Results   Component Value Date    CHOL 115 (L) 09/09/2024    LDLCALC 51.8 (L) 09/09/2024    TRIG 146 09/09/2024    HDL 34 (L) 09/09/2024    ALT 34 09/09/2024    AST 27 09/09/2024    ALKPHOS 31 (L) 09/09/2024         Orders:  -     Comprehensive Metabolic Panel; Future; Expected date: 03/18/2025  -     Lipid Panel; Future; Expected date: 03/18/2025    4. Hypothyroidism (acquired)  Assessment & Plan:  Controlled, continue levothyroxine    Lab Results   Component Value Date    TSH 2.499 09/09/2024    FREET4 0.91 09/09/2024         Orders:  -     TSH; Future; Expected date: 03/18/2025  -     T4, Free; Future; Expected date: 03/18/2025    5. Gout, unspecified cause, unspecified chronicity, unspecified site  Assessment & Plan:  Stable on current medications      Orders:  -     Uric Acid; Future; Expected date: 03/18/2025    6. Prostate cancer screening  -     PSA, Screening; Future; Expected date: 03/18/2025    Patient expressed understanding and agreement with plan.    Visit today included increased complexity associated with the care of the episodic problem hypertension, which was addressed while instituting co-management of the  longitudinal care of the patient due to the serious and/or complex managed problem(s) .    I have evaluated and discussed management associated with medical care services that serve as the continuing focal point for all needed health care services and/or with medical care services that are part of ongoing care related to my patient's single, serious condition or a complex condition(s).    I am providing ongoing care and I am the primary care provider for this patient, and they are being managed, monitored, and/or observed for their chronic conditions over time.     I have addressed their ongoing health maintenance requirements and needs for all health care services and reviewed co-management plans provided by specialty providers when available.    There are no preventive care reminders to display for this patient.    Health Maintenance reviewed/updated.  Patient viewed [facility] prostate cancer screening video, desires screening at this time.    Follow up in about 6 months (around 3/18/2025), or if symptoms worsen or fail to improve, for EPP/annual with obed Joyner PTA.    This note was generated with the assistance of ambient listening technology. Verbal consent was obtained by the patient and accompanying visitor(s) for the recording of patient appointment to facilitate this note. I attest to having reviewed and edited the generated note for accuracy, though some syntax or spelling errors may persist. Please contact the author of this note for any clarification.

## 2024-09-18 NOTE — ASSESSMENT & PLAN NOTE
Controlled, continue levothyroxine    Lab Results   Component Value Date    TSH 2.499 09/09/2024    FREET4 0.91 09/09/2024

## 2024-09-18 NOTE — ASSESSMENT & PLAN NOTE
Controlled, continue amlodipine, benazepril, hydralazine and metoprolol    BP Readings from Last 1 Encounters:   09/18/24 110/68

## 2024-09-18 NOTE — ASSESSMENT & PLAN NOTE
Controlled, continue atorvastatin and Vascepa    Lab Results   Component Value Date    CHOL 115 (L) 09/09/2024    LDLCALC 51.8 (L) 09/09/2024    TRIG 146 09/09/2024    HDL 34 (L) 09/09/2024    ALT 34 09/09/2024    AST 27 09/09/2024    ALKPHOS 31 (L) 09/09/2024

## 2024-09-18 NOTE — ASSESSMENT & PLAN NOTE
Controlled, continue Toujeo, metformin and mounjaro    Lab Results   Component Value Date    HGBA1C 5.6 09/09/2024    HGBA1C 5.6 03/01/2024    LDLCALC 51.8 (L) 09/09/2024    LABMICR 102.0 03/01/2024    CREATRANDUR 277.0 03/01/2024    MICALBCREAT 36.8 (H) 03/01/2024

## 2024-09-19 ENCOUNTER — PATIENT MESSAGE (OUTPATIENT)
Dept: CARDIOLOGY | Facility: CLINIC | Age: 59
End: 2024-09-19
Payer: COMMERCIAL

## 2024-09-19 DIAGNOSIS — M54.12 CERVICAL RADICULOPATHY: ICD-10-CM

## 2024-09-19 DIAGNOSIS — R21 RASH AND NONSPECIFIC SKIN ERUPTION: ICD-10-CM

## 2024-09-19 DIAGNOSIS — M10.9 GOUT, UNSPECIFIED CAUSE, UNSPECIFIED CHRONICITY, UNSPECIFIED SITE: ICD-10-CM

## 2024-09-19 DIAGNOSIS — M54.16 LUMBAR RADICULOPATHY: ICD-10-CM

## 2024-09-20 RX ORDER — COLCHICINE 0.6 MG/1
0.6 TABLET ORAL DAILY
Qty: 180 TABLET | Refills: 3 | Status: SHIPPED | OUTPATIENT
Start: 2024-09-20

## 2024-09-20 RX ORDER — PREGABALIN 150 MG/1
150 CAPSULE ORAL 3 TIMES DAILY
Qty: 90 CAPSULE | Refills: 0 | Status: SHIPPED | OUTPATIENT
Start: 2024-09-20

## 2024-09-20 RX ORDER — ICOSAPENT ETHYL 1000 MG/1
2 CAPSULE ORAL 2 TIMES DAILY
Qty: 360 CAPSULE | Refills: 1 | Status: SHIPPED | OUTPATIENT
Start: 2024-09-20

## 2024-09-20 RX ORDER — CALCIPOTRIENE 50 UG/G
CREAM TOPICAL 2 TIMES DAILY
Qty: 120 G | Refills: 11 | Status: SHIPPED | OUTPATIENT
Start: 2024-09-20 | End: 2025-09-20

## 2024-09-20 RX ORDER — TIRZEPATIDE 15 MG/.5ML
15 INJECTION, SOLUTION SUBCUTANEOUS WEEKLY
Qty: 4 PEN | Refills: 2 | Status: SHIPPED | OUTPATIENT
Start: 2024-09-20

## 2024-09-25 ENCOUNTER — OFFICE VISIT (OUTPATIENT)
Dept: SLEEP MEDICINE | Facility: CLINIC | Age: 59
End: 2024-09-25
Payer: COMMERCIAL

## 2024-09-25 VITALS
RESPIRATION RATE: 19 BRPM | BODY MASS INDEX: 33.42 KG/M2 | HEART RATE: 84 BPM | OXYGEN SATURATION: 96 % | DIASTOLIC BLOOD PRESSURE: 70 MMHG | WEIGHT: 252.19 LBS | HEIGHT: 73 IN | SYSTOLIC BLOOD PRESSURE: 112 MMHG

## 2024-09-25 DIAGNOSIS — E66.9 OBESITY, CLASS I, BMI 30-34.9: ICD-10-CM

## 2024-09-25 DIAGNOSIS — G47.26 SHIFT WORK SLEEP DISORDER: Primary | ICD-10-CM

## 2024-09-25 DIAGNOSIS — G47.33 OSA (OBSTRUCTIVE SLEEP APNEA): ICD-10-CM

## 2024-09-25 DIAGNOSIS — G47.00 INSOMNIA, UNSPECIFIED TYPE: ICD-10-CM

## 2024-09-25 DIAGNOSIS — J31.0 CHRONIC NONALLERGIC RHINITIS: ICD-10-CM

## 2024-09-25 PROCEDURE — 1160F RVW MEDS BY RX/DR IN RCRD: CPT | Mod: CPTII,S$GLB,, | Performed by: NURSE PRACTITIONER

## 2024-09-25 PROCEDURE — 4010F ACE/ARB THERAPY RXD/TAKEN: CPT | Mod: CPTII,S$GLB,, | Performed by: NURSE PRACTITIONER

## 2024-09-25 PROCEDURE — 3060F POS MICROALBUMINURIA REV: CPT | Mod: CPTII,S$GLB,, | Performed by: NURSE PRACTITIONER

## 2024-09-25 PROCEDURE — 99214 OFFICE O/P EST MOD 30 MIN: CPT | Mod: S$GLB,,, | Performed by: NURSE PRACTITIONER

## 2024-09-25 PROCEDURE — 3074F SYST BP LT 130 MM HG: CPT | Mod: CPTII,S$GLB,, | Performed by: NURSE PRACTITIONER

## 2024-09-25 PROCEDURE — 3078F DIAST BP <80 MM HG: CPT | Mod: CPTII,S$GLB,, | Performed by: NURSE PRACTITIONER

## 2024-09-25 PROCEDURE — 1159F MED LIST DOCD IN RCRD: CPT | Mod: CPTII,S$GLB,, | Performed by: NURSE PRACTITIONER

## 2024-09-25 PROCEDURE — 3044F HG A1C LEVEL LT 7.0%: CPT | Mod: CPTII,S$GLB,, | Performed by: NURSE PRACTITIONER

## 2024-09-25 PROCEDURE — 99999 PR PBB SHADOW E&M-EST. PATIENT-LVL V: CPT | Mod: PBBFAC,,, | Performed by: NURSE PRACTITIONER

## 2024-09-25 PROCEDURE — 3008F BODY MASS INDEX DOCD: CPT | Mod: CPTII,S$GLB,, | Performed by: NURSE PRACTITIONER

## 2024-09-25 PROCEDURE — 3066F NEPHROPATHY DOC TX: CPT | Mod: CPTII,S$GLB,, | Performed by: NURSE PRACTITIONER

## 2024-09-25 RX ORDER — CLONAZEPAM 1 MG/1
1 TABLET ORAL NIGHTLY
Qty: 30 TABLET | Refills: 5 | Status: SHIPPED | OUTPATIENT
Start: 2024-09-25 | End: 2025-09-25

## 2024-09-25 RX ORDER — DOXEPIN 3 MG/1
3 TABLET, FILM COATED ORAL NIGHTLY PRN
Qty: 30 TABLET | Refills: 5 | Status: SHIPPED | OUTPATIENT
Start: 2024-09-25

## 2024-09-25 NOTE — PROGRESS NOTES
"Subjective:      Patient ID: Narendra English Sr. is a 59 y.o. male.    Chief Complaint: Sleep Apnea    HPI  Presents to clinic today for obstructive sleep apnea with insomnia, shift work disorder.  No difficulty initially falling asleep Klonopin but wakes up after few hours and has a difficult time falling back asleep. Doxepin 3mg added. He is sleeping fairly well for shift work.  Shift scheduled 4/4/4  Bedtime 830 -330 (Am or PM depending on shift)  Days off bedtime 10:00 p.m.-6:00 a.m.    Continues to have sinus congestion. Followed by ENT, allergy. Hx of sinus surgery. Taking flonase, astelin, sinus irrigation.       Patient Active Problem List   Diagnosis    History of malignant neoplasm of colon    Type 2 diabetes mellitus with circulatory disorder, with long-term current use of insulin    Hypothyroidism (acquired)    Gout    Low testosterone    Mood disorder    Hypertension associated with diabetes    FARA (obstructive sleep apnea)    Nasal septal deviation    Adhesions of nasal septum and turbinates    Hypertrophy of inferior nasal turbinate    Hyperlipidemia associated with type 2 diabetes mellitus    Shift work sleep disorder    Erectile dysfunction    Hypogonadism male    Gastroesophageal reflux disease without esophagitis    Bilateral lower extremity edema    Chronic nonallergic rhinitis    Erythema    Pruritic rash    Rhinitis medicamentosa    Rash and nonspecific skin eruption    Type 2 diabetes mellitus without complication, with long-term current use of insulin     /70   Pulse 84   Resp 19   Ht 6' 1" (1.854 m)   Wt 114.4 kg (252 lb 3.3 oz)   SpO2 96%   BMI 33.27 kg/m²   Body mass index is 33.27 kg/m².    Review of Systems   Constitutional: Negative.    HENT: Negative.     Respiratory: Negative.     Cardiovascular: Negative.    Musculoskeletal: Negative.    Gastrointestinal: Negative.    Neurological: Negative.    Psychiatric/Behavioral:  Positive for sleep disturbance.      Objective:    "   Physical Exam  Constitutional:       Appearance: Normal appearance. He is obese.   HENT:      Head: Normocephalic and atraumatic.      Nose: Nose normal.   Cardiovascular:      Rate and Rhythm: Normal rate and regular rhythm.   Pulmonary:      Effort: Pulmonary effort is normal.      Breath sounds: Normal breath sounds.   Abdominal:      Palpations: Abdomen is soft.      Tenderness: There is no abdominal tenderness.   Musculoskeletal:         General: Normal range of motion.      Cervical back: Normal range of motion and neck supple.   Skin:     General: Skin is warm and dry.   Neurological:      General: No focal deficit present.      Mental Status: He is alert and oriented to person, place, and time.   Psychiatric:         Mood and Affect: Mood normal.         Behavior: Behavior normal.       Personal Diagnostic Review      6/25/2024     8:38 AM   EPWORTH SLEEPINESS SCALE   Sitting and reading 2   Watching TV 2   Sitting, inactive in a public place (e.g. a theatre or a meeting) 1   As a passenger in a car for an hour without a break 1   Lying down to rest in the afternoon when circumstances permit 2   Sitting and talking to someone 0   Sitting quietly after a lunch without alcohol 2   In a car, while stopped for a few minutes in traffic 0   Total score 10      [employer] manual review on shantal:  30/30 compliant   Normal AHI- less 2.5  Wearing 6hr 54 min on average    Assessment:       1. Shift work sleep disorder    2. FARA (obstructive sleep apnea)    3. Obesity, Class I, BMI 30-34.9    4. Insomnia, unspecified type    5. Chronic nonallergic rhinitis        Outpatient Encounter Medications as of 9/25/2024   Medication Sig Dispense Refill    amLODIPine (NORVASC) 5 MG tablet Take 1 tablet (5 mg total) by mouth once daily. 90 tablet 1    atorvastatin (LIPITOR) 10 MG tablet Take 1 tablet (10 mg total) by mouth every evening. 90 tablet 1    azelastine (ASTELIN) 137 mcg (0.1 %) nasal spray 1 spray (137 mcg total) by  Nasal route 2 (two) times daily. 30 mL 3    benazepriL (LOTENSIN) 40 MG tablet Take 1 tablet (40 mg total) by mouth once daily. 90 tablet 3    calcipotriene (DOVONOX) 0.005 % cream Apply topically 2 (two) times daily. 120 g 11    clonazePAM (KLONOPIN) 1 MG tablet Take 1 tablet (1 mg total) by mouth every evening. 30 tablet 5    colchicine (COLCRYS) 0.6 mg tablet Take 1 tablet (0.6 mg total) by mouth once daily. 180 tablet 3    doxepin (SILENOR) 3 mg Tab Take 3 mg by mouth nightly as needed (insomnia). 30 tablet 3    febuxostat (ULORIC) 40 mg Tab Take 1 tablet (40 mg total) by mouth once daily. 30 tablet 3    hydrALAZINE (APRESOLINE) 100 MG tablet Take 1 tablet (100 mg total) by mouth every 8 (eight) hours. 270 tablet 1    hydrOXYzine HCL (ATARAX) 25 MG tablet Take 25 mg by mouth 2 (two) times daily.      indomethacin (INDOCIN SR) 75 mg CpSR CR capsule Take 1 capsule (75 mg total) by mouth 2 (two) times daily as needed (for gout flare). 60 capsule 0    insulin glargine, TOUJEO, (TOUJEO SOLOSTAR U-300 INSULIN) 300 unit/mL (1.5 mL) InPn pen Inject 85 Units into the skin once daily. 27 mL 1    ipratropium (ATROVENT) 21 mcg (0.03 %) nasal spray 2 sprays by Each Nostril route 4 (four) times daily as needed for Rhinitis. 30 mL 11    ketoconazole (NIZORAL) 2 % shampoo Apply topically once a week. Lather in for 5-10 min before rinsing 120 mL 5    levothyroxine (SYNTHROID) 112 MCG tablet TAKE 1 TABLET BY MOUTH ONCE DAILY BEFORE BREAKFAST 90 tablet 2    metFORMIN (GLUCOPHAGE) 1000 MG tablet Take 1 tablet by mouth twice daily with food 180 tablet 1    metoprolol succinate (TOPROL-XL) 50 MG 24 hr tablet Take 1 tablet (50 mg total) by mouth once daily. 90 tablet 3    MOUNJARO 15 mg/0.5 mL PnIj Inject 15 mg into the skin once a week. 4 Pen 2    pregabalin (LYRICA) 150 MG capsule TAKE 1 CAPSULE BY MOUTH THREE TIMES DAILY 90 capsule 0    sertraline (ZOLOFT) 100 MG tablet Take 100 mg by mouth once daily.      tadalafiL (CIALIS) 20 MG  Tab Take 1 tablet (20 mg total) by mouth every 24 hours as needed (erectile dysfunction). 20 tablet 11    testosterone cypionate (DEPOTESTOTERONE CYPIONATE) 200 mg/mL injection Inject 1 mL (200 mg total) into the muscle every 14 (fourteen) days. 10 mL 5    tiZANidine (ZANAFLEX) 4 MG tablet Take 1 tablet (4 mg total) by mouth 2 (two) times daily as needed (Neck Pain). 60 tablet 2    triamcinolone acetonide 0.1% (KENALOG) 0.1 % ointment AAA bid 454 g 1    vardenafiL (LEVITRA) 20 MG tablet Take 1 tablet (20 mg total) by mouth daily as needed for Erectile Dysfunction. 15 tablet 1    VASCEPA 1 gram Cap Take 2 capsules (2,000 mg total) by mouth 2 (two) times daily. 360 capsule 1    [DISCONTINUED] atorvastatin (LIPITOR) 10 MG tablet Take 1 tablet (10 mg total) by mouth every evening. 90 tablet 1    [DISCONTINUED] calcipotriene (DOVONOX) 0.005 % cream Apply topically 2 (two) times daily. 120 g 11    [DISCONTINUED] colchicine, gout, (COLCRYS) 0.6 mg tablet Take 1 tablet (0.6 mg total) by mouth once daily. 180 tablet 3    [DISCONTINUED] fluocinolone (DERMA-SMOOTHE) 0.01 % external oil Apply topically 3 (three) times daily. 118.28 mL 11    [DISCONTINUED] fluticasone propionate (FLONASE) 50 mcg/actuation nasal spray 1 spray (50 mcg total) by Each Nostril route once daily. 16 g 11    [DISCONTINUED] levocetirizine (XYZAL) 5 MG tablet Take 5 mg by mouth.      [DISCONTINUED] MOUNJARO 15 mg/0.5 mL PnIj Inject into the skin.      [DISCONTINUED] pregabalin (LYRICA) 150 MG capsule Take 1 capsule (150 mg total) by mouth 3 (three) times daily. 90 capsule 3    [DISCONTINUED] semaglutide (OZEMPIC) 2 mg/dose (8 mg/3 mL) PnIj Inject 2 mg into the skin every 7 days. 6 mL 1    [DISCONTINUED] VASCEPA 1 gram Cap Take 2 capsules (2,000 mg total) by mouth 2 (two) times daily. 360 capsule 1     Facility-Administered Encounter Medications as of 9/25/2024   Medication Dose Route Frequency Provider Last Rate Last Admin    testosterone cypionate  injection 200 mg  200 mg Intramuscular Once         testosterone cypionate injection 200 mg  200 mg Intramuscular Q14 Days    200 mg at 09/04/24 1122     No orders of the defined types were placed in this encounter.    Plan:       1. Shift work sleep disorder  Overview:  Sleep hygiene and scheduling.   Doxepin, Klonopin      2. FARA (obstructive sleep apnea)  Overview:  Compliant with PAP and benefits from use. Follow up annually in the sleep clinic.        3. Obesity, Class I, BMI 30-34.9  Comments:  weight loss    4. Insomnia, unspecified type  Comments:  Improved. continue current medications, sleep scheduling, sleep hygiene    5. Chronic nonallergic rhinitis  Overview:  - 03/20/2024: SPT to inhalants negative with adequate histamine response                          Elizabeth LeJeune, ACNP, ANP

## 2024-09-27 ENCOUNTER — OFFICE VISIT (OUTPATIENT)
Dept: UROLOGY | Facility: CLINIC | Age: 59
End: 2024-09-27
Payer: COMMERCIAL

## 2024-09-27 VITALS
DIASTOLIC BLOOD PRESSURE: 71 MMHG | SYSTOLIC BLOOD PRESSURE: 114 MMHG | BODY MASS INDEX: 33.13 KG/M2 | WEIGHT: 250 LBS | TEMPERATURE: 98 F | HEART RATE: 68 BPM | HEIGHT: 73 IN

## 2024-09-27 DIAGNOSIS — N52.9 ERECTILE DYSFUNCTION, UNSPECIFIED ERECTILE DYSFUNCTION TYPE: ICD-10-CM

## 2024-09-27 DIAGNOSIS — E29.1 HYPOGONADISM MALE: Primary | ICD-10-CM

## 2024-09-27 PROCEDURE — 99999 PR PBB SHADOW E&M-EST. PATIENT-LVL V: CPT | Mod: PBBFAC,,, | Performed by: UROLOGY

## 2024-09-27 RX ORDER — TESTOSTERONE CYPIONATE 200 MG/ML
150 INJECTION, SOLUTION INTRAMUSCULAR
Qty: 10 ML | Refills: 5 | Status: SHIPPED | OUTPATIENT
Start: 2024-09-27 | End: 2027-01-15

## 2024-09-27 RX ORDER — VARDENAFIL HYDROCHLORIDE 20 MG/1
20 TABLET ORAL DAILY PRN
Qty: 15 TABLET | Refills: 11 | Status: SHIPPED | OUTPATIENT
Start: 2024-09-27 | End: 2025-09-27

## 2024-09-27 NOTE — PROGRESS NOTES
Chief Complaint:   Encounter Diagnoses   Name Primary?    Hypogonadism male Yes    Erectile dysfunction, unspecified erectile dysfunction type        HPI:   9/27/24- testosterone appears to fade quickly, he used to take this weekly.  Erections are relatively stable on medical management.    06/16/2022 - 57 yo male that presents today for evaluation of ED.  Patient notes issues for the last 3-4 years but notes that over the last six months it has gotten worse.  He notes difficulty both achieving and maintaining an erection.  He has previously tried both Cialis and Viagra and both of these cause in to have a very bad headache that make it on tolerable to take these medications, though they do work.  Otherwise he voids with a good stream and feels like he empties well subjectively.  He denies any gross hematuria or family history of  cancers.  Denies prior stones or urologic procedures.    Allergies:  Demerol (pf) [meperidine (pf)], Percocet [oxycodone-acetaminophen], and Demerol [meperidine]    Medications:  has a current medication list which includes the following prescription(s): amlodipine, atorvastatin, azelastine, benazepril, clobetasol 0.05%, clonazepam, colchicine, diclofenac sodium, flash glucose scanning reader, flash glucose sensor, fluticasone propionate, hydralazine, hydrocortisone, indomethacin, toujeo solostar u-300 insulin, levothyroxine, metformin, metoprolol succinate, pregabalin, sildenafil, mounjaro, trazodone, triamcinolone acetonide 0.1%, and vascepa, and the following Facility-Administered Medications: gabapentin and lactated ringers.    Review of Systems:  General: No fever, chills, fatigability, or weight loss.  Skin: No rashes, itching, or changes in color or texture of skin.  Chest: Denies MEJÍA, cyanosis, wheezing, cough, and sputum production.  Abdomen: Appetite fine. No weight loss. Denies diarrhea, abdominal pain, hematemesis, or blood in stool.  Musculoskeletal: No joint stiffness or  swelling. Denies back pain.  : As above.  All other review of systems negative.    PMH:   has a past medical history of Cancer, Diabetes mellitus (8 years), Hypertension, Sleep apnea, and Thyroid disease.    PSH:   has a past surgical history that includes Colonoscopy (N/A, 12/29/2020); Laparoscopic right colon resection (N/A, 02/02/2021); Back surgery; Colonoscopy (N/A, 02/10/2022); Tonsillectomy; fess, with nasal septoplasty, with imaging guidance (Bilateral, 08/17/2022); Nasal turbinate reduction (Bilateral, 08/17/2022); Colon surgery (02/06/21); and Epidural steroid injection into cervical spine (N/A, 12/1/2022).    FamHx: family history includes Breast cancer in his mother; Cancer in his mother; Cataracts in his maternal grandmother; Diabetes in his maternal grandmother and mother; Hypertension in his brother, brother, and mother; Stroke in his father.    SocHx:  reports that he has never smoked. He has never used smokeless tobacco. He reports that he does not drink alcohol and does not use drugs.      Physical Exam:  There were no vitals filed for this visit.    General: A&Ox3, no apparent distress, no deformities  Neck: No masses, normal ROM  Lungs: normal inspiration, no use of accessory muscles  Heart: normal pulse, no arrhythmias  Abdomen: Soft, NT, ND, no masses, no hernias, no hepatosplenomegaly  Skin: The skin is warm and dry. No jaundice.  Ext: No c/c/e.  JAIMIE: 6/22: Normal rectal tone, no hemorrhoids. Prostate smooth and normal, no nodules 30 gm SV not palpable. Perineum and anus normal.    Labs/Studies:   Testopel  5/22/24, 2/23/24, 11/29/23, 8/25/23, 5/26/26, 1/20/23  Testosterone 328 8/22   PSA 0.62 8/24    Impression/Plan:       1. Hypogonadism- testopel was no longer covered, 200 mg every 2 weeks of testosterone cypionate seems to be fading quickly.  Therefore will switch him to 100 mg every 7 days.  See me back in 3 months with labs and extend as appropriate from there.  Call with any other  complaints prior to the next appointment.  Of note previous AndroGel failure.    2. Erectile dysfunction- sildenafil 100 mg never really assisted and the TriMix caused priapism.  Patient has done well with levitra 20 mg.

## 2024-09-30 ENCOUNTER — PATIENT MESSAGE (OUTPATIENT)
Dept: UROLOGY | Facility: CLINIC | Age: 59
End: 2024-09-30
Payer: COMMERCIAL

## 2024-09-30 DIAGNOSIS — E29.1 HYPOGONADISM MALE: ICD-10-CM

## 2024-10-04 RX ORDER — TESTOSTERONE CYPIONATE 200 MG/ML
150 INJECTION, SOLUTION INTRAMUSCULAR
Qty: 10 ML | Refills: 5 | Status: SHIPPED | OUTPATIENT
Start: 2024-10-04 | End: 2027-01-22

## 2024-10-08 DIAGNOSIS — I10 BENIGN ESSENTIAL HTN: Primary | ICD-10-CM

## 2024-10-08 DIAGNOSIS — E11.59 HYPERTENSION ASSOCIATED WITH DIABETES: ICD-10-CM

## 2024-10-08 DIAGNOSIS — I15.2 HYPERTENSION ASSOCIATED WITH DIABETES: ICD-10-CM

## 2024-10-14 ENCOUNTER — OFFICE VISIT (OUTPATIENT)
Dept: CARDIOLOGY | Facility: CLINIC | Age: 59
End: 2024-10-14
Payer: COMMERCIAL

## 2024-10-14 ENCOUNTER — HOSPITAL ENCOUNTER (OUTPATIENT)
Dept: CARDIOLOGY | Facility: HOSPITAL | Age: 59
Discharge: HOME | End: 2024-10-14
Attending: INTERNAL MEDICINE
Payer: COMMERCIAL

## 2024-10-14 VITALS
HEART RATE: 73 BPM | HEIGHT: 73 IN | OXYGEN SATURATION: 99 % | WEIGHT: 256.19 LBS | DIASTOLIC BLOOD PRESSURE: 80 MMHG | SYSTOLIC BLOOD PRESSURE: 138 MMHG | BODY MASS INDEX: 33.95 KG/M2

## 2024-10-14 DIAGNOSIS — E11.9 TYPE 2 DIABETES MELLITUS WITHOUT COMPLICATION, WITH LONG-TERM CURRENT USE OF INSULIN: ICD-10-CM

## 2024-10-14 DIAGNOSIS — I15.2 HYPERTENSION ASSOCIATED WITH DIABETES: ICD-10-CM

## 2024-10-14 DIAGNOSIS — E78.1 HYPERTRIGLYCERIDEMIA: ICD-10-CM

## 2024-10-14 DIAGNOSIS — R07.9 CHEST PAIN, UNSPECIFIED TYPE: ICD-10-CM

## 2024-10-14 DIAGNOSIS — E03.9 HYPOTHYROIDISM (ACQUIRED): ICD-10-CM

## 2024-10-14 DIAGNOSIS — I10 BENIGN ESSENTIAL HTN: ICD-10-CM

## 2024-10-14 DIAGNOSIS — I10 HYPERTENSION, UNSPECIFIED TYPE: ICD-10-CM

## 2024-10-14 DIAGNOSIS — E11.59 HYPERTENSION ASSOCIATED WITH DIABETES: ICD-10-CM

## 2024-10-14 DIAGNOSIS — E11.69 TYPE 2 DIABETES MELLITUS WITH OTHER SPECIFIED COMPLICATION, UNSPECIFIED WHETHER LONG TERM INSULIN USE: ICD-10-CM

## 2024-10-14 DIAGNOSIS — R79.89 LOW TESTOSTERONE: ICD-10-CM

## 2024-10-14 DIAGNOSIS — I10 BENIGN ESSENTIAL HTN: Primary | ICD-10-CM

## 2024-10-14 DIAGNOSIS — Z79.4 TYPE 2 DIABETES MELLITUS WITHOUT COMPLICATION, WITH LONG-TERM CURRENT USE OF INSULIN: ICD-10-CM

## 2024-10-14 DIAGNOSIS — Z79.4 TYPE 2 DIABETES MELLITUS WITH OTHER CIRCULATORY COMPLICATION, WITH LONG-TERM CURRENT USE OF INSULIN: ICD-10-CM

## 2024-10-14 DIAGNOSIS — E11.9 TYPE 2 DIABETES MELLITUS WITHOUT COMPLICATION, WITHOUT LONG-TERM CURRENT USE OF INSULIN: ICD-10-CM

## 2024-10-14 DIAGNOSIS — E78.5 HYPERLIPIDEMIA, UNSPECIFIED HYPERLIPIDEMIA TYPE: ICD-10-CM

## 2024-10-14 DIAGNOSIS — E11.59 TYPE 2 DIABETES MELLITUS WITH OTHER CIRCULATORY COMPLICATION, WITH LONG-TERM CURRENT USE OF INSULIN: ICD-10-CM

## 2024-10-14 DIAGNOSIS — G47.33 OSA (OBSTRUCTIVE SLEEP APNEA): ICD-10-CM

## 2024-10-14 DIAGNOSIS — Z01.818 PRE-OP EVALUATION: ICD-10-CM

## 2024-10-14 DIAGNOSIS — R94.31 NONSPECIFIC ABNORMAL ELECTROCARDIOGRAM (ECG) (EKG): ICD-10-CM

## 2024-10-14 DIAGNOSIS — E78.49 OTHER HYPERLIPIDEMIA: ICD-10-CM

## 2024-10-14 PROCEDURE — 3075F SYST BP GE 130 - 139MM HG: CPT | Mod: CPTII,S$GLB,, | Performed by: INTERNAL MEDICINE

## 2024-10-14 PROCEDURE — 4010F ACE/ARB THERAPY RXD/TAKEN: CPT | Mod: CPTII,S$GLB,, | Performed by: INTERNAL MEDICINE

## 2024-10-14 PROCEDURE — 3079F DIAST BP 80-89 MM HG: CPT | Mod: CPTII,S$GLB,, | Performed by: INTERNAL MEDICINE

## 2024-10-14 PROCEDURE — G2211 COMPLEX E/M VISIT ADD ON: HCPCS | Mod: S$GLB,,, | Performed by: INTERNAL MEDICINE

## 2024-10-14 PROCEDURE — 3044F HG A1C LEVEL LT 7.0%: CPT | Mod: CPTII,S$GLB,, | Performed by: INTERNAL MEDICINE

## 2024-10-14 PROCEDURE — 99214 OFFICE O/P EST MOD 30 MIN: CPT | Mod: S$GLB,,, | Performed by: INTERNAL MEDICINE

## 2024-10-14 PROCEDURE — 3066F NEPHROPATHY DOC TX: CPT | Mod: CPTII,S$GLB,, | Performed by: INTERNAL MEDICINE

## 2024-10-14 PROCEDURE — 3008F BODY MASS INDEX DOCD: CPT | Mod: CPTII,S$GLB,, | Performed by: INTERNAL MEDICINE

## 2024-10-14 PROCEDURE — 1159F MED LIST DOCD IN RCRD: CPT | Mod: CPTII,S$GLB,, | Performed by: INTERNAL MEDICINE

## 2024-10-14 PROCEDURE — 99999 PR PBB SHADOW E&M-EST. PATIENT-LVL V: CPT | Mod: PBBFAC,,, | Performed by: INTERNAL MEDICINE

## 2024-10-14 PROCEDURE — 93005 ELECTROCARDIOGRAM TRACING: CPT | Mod: PO

## 2024-10-14 PROCEDURE — 1160F RVW MEDS BY RX/DR IN RCRD: CPT | Mod: CPTII,S$GLB,, | Performed by: INTERNAL MEDICINE

## 2024-10-14 PROCEDURE — 3060F POS MICROALBUMINURIA REV: CPT | Mod: CPTII,S$GLB,, | Performed by: INTERNAL MEDICINE

## 2024-10-14 PROCEDURE — 93010 ELECTROCARDIOGRAM REPORT: CPT | Mod: ,,, | Performed by: INTERNAL MEDICINE

## 2024-10-14 RX ORDER — METOPROLOL SUCCINATE 50 MG/1
50 TABLET, EXTENDED RELEASE ORAL DAILY
Qty: 90 TABLET | Refills: 3 | Status: SHIPPED | OUTPATIENT
Start: 2024-10-14

## 2024-10-14 RX ORDER — HYDRALAZINE HYDROCHLORIDE 100 MG/1
100 TABLET, FILM COATED ORAL EVERY 8 HOURS
Qty: 270 TABLET | Refills: 1 | Status: SHIPPED | OUTPATIENT
Start: 2024-10-14 | End: 2025-10-14

## 2024-10-14 NOTE — PROGRESS NOTES
Subjective:   Patient ID:  Narendra English Sr. is a 59 y.o. male who presents for cardiac consult of Follow-up      Referring Physician:    Tabitha Melgoza MD [[id number]] [address] [facility] [place] [address]      Reason for consult: HTN    HPI  The patient came in today for cardiac consult of Follow-up      Narendra English Sr. is a 59 y.o. male pt with HTN, HLD - elevated TGs, DM2, FARA, obesity, gout, hypothyroidism, low T presents for follow up CV eval.          3/11/24  BP and HR stable. Lipids well controlled - low LDL  BMI 33 - 248 lbs.     5/31/24  Mounjaro changed to Ozempic after last visit.   BP 140s/70. HR 70s. BMI 33 - 250 lbs - gained weight.   He needs to have neck surgery once his fiance is here from [facility].     10/14/24  Lipids stable/well controlled 9/2024.   He had cervical SALBADOR in July, neck surgery pending?   BP and HR stable. BMI 33 - 256 lbs   He is back on Mounjaro now was off it for 2 months.     ECG - NSR, LAD, RBBB, LVH    Stress ECHO 2/2022  Summary    Concentric remodeling and normal systolic function.  There were no arrhythmias during stress.  The estimated ejection fraction is 60%.  Normal left ventricular diastolic function.  With normal right ventricular systolic function.  The stress echo portion of this study is negative for myocardial ischemia.  The ECG portion of this study is negative for myocardial ischemia.    Past Medical History:   Diagnosis Date    Allergy     Cancer     Colon    Diabetes mellitus      09/14/2023 Insulin x 10 years    Gastroesophageal reflux disease without esophagitis 06/20/2023    Hypertension     Sleep apnea     Thyroid disease        Past Surgical History:   Procedure Laterality Date    BACK SURGERY      COLON SURGERY  02/06/21    COLONOSCOPY N/A 12/29/2020    Procedure: COLONOSCOPY;  Surgeon: William Cotter MD;  Location: [facility];  Service: Endoscopy;  Laterality: N/A;  Will need Rapid doesn't live here    COLONOSCOPY  N/A 02/10/2022    Procedure: COLONOSCOPY;  Surgeon: Wili Espinosa MD;  Location: [facility] ENDO;  Service: Endoscopy;  Laterality: N/A;    EPIDURAL STEROID INJECTION INTO CERVICAL SPINE N/A 12/01/2022    Procedure: C7/T1 IL SALBADOR;  Surgeon: Leonard Mart MD;  Location: [facility] PAIN MGT;  Service: Pain Management;  Laterality: N/A;    EPIDURAL STEROID INJECTION INTO CERVICAL SPINE N/A 7/17/2024    Procedure: C6/7 IL SALBADOR, Left Paramedian Approach;  Surgeon: Leonard Mart MD;  Location: [facility] PAIN MGT;  Service: Pain Management;  Laterality: N/A;    FESS, WITH NASAL SEPTOPLASTY, WITH IMAGING GUIDANCE Bilateral 08/17/2022    Procedure: FESS, WITH NASAL SEPTOPLASTY, WITH IMAGING GUIDANCE;  Surgeon: Viktor Ferrell MD;  Location: [facility] OR;  Service: ENT;  Laterality: Bilateral;    LAPAROSCOPIC RIGHT COLON RESECTION N/A 02/02/2021    Procedure: COLECTOMY, RIGHT, LAPAROSCOPIC, ERAS low;  Surgeon: Melonie Santoyo MD;  [address]  Service: Colon and Rectal;  Laterality: N/A;  needs rapid covid test    LYSIS OF ADHESIONS Bilateral 04/19/2023    Procedure: LYSIS, ADHESIONS;  Surgeon: Viktor Ferrell MD;  Location: [facility] OR;  Service: ENT;  Laterality: Bilateral;    NASAL ENDOSCOPY Bilateral 04/19/2023    Procedure: ENDOSCOPY, NOSE;  Surgeon: Viktor Ferrell MD;  Location: [facility] OR;  Service: ENT;  Laterality: Bilateral;    NASAL TURBINATE REDUCTION Bilateral 08/17/2022    Procedure: REDUCTION, NASAL TURBINATE;  Surgeon: Viktor Ferrell MD;  Location: [facility] OR;  Service: ENT;  Laterality: Bilateral;    RHINOPLASTY Bilateral 04/19/2023    Procedure: RHINOPLASTY;  Surgeon: Viktor Ferrell MD;  Location: [facility] OR;  Service: ENT;  Laterality: Bilateral;    SELECTIVE INJECTION OF ANESTHETIC AGENT AROUND LUMBAR SPINAL NERVE ROOT BY TRANSFORAMINAL APPROACH Bilateral 07/05/2023    Procedure: Bilateral L4/5 TF SALBADOR RN IV Sedation;  Surgeon: Leonard Mart MD;  Location: [facility] PAIN MGT;  Service: Pain Management;   Laterality: Bilateral;    TONSILLECTOMY         Social History     Tobacco Use    Smoking status: Never     Passive exposure: Never    Smokeless tobacco: Never   Substance Use Topics    Alcohol use: Never    Drug use: Never       Family History   Problem Relation Name Age of Onset    Hypertension Mother Kesha English     Diabetes Mother Kesha English     Breast cancer Mother Kesha English     Cancer Mother Kesha English     Hypertension Father Sandeep English     Stroke Father Sandeep English     No Known Problems Sister      No Known Problems Sister      Hypertension Brother Brother     Hypertension Brother Mauro English     Cataracts Maternal Grandmother Manda Quinn     Diabetes Maternal Grandmother Manda Quinn     Lung cancer Maternal Grandfather      No Known Problems Paternal Grandmother      No Known Problems Paternal Grandfather         Patient's Medications   New Prescriptions    No medications on file   Previous Medications    AMLODIPINE (NORVASC) 5 MG TABLET    Take 1 tablet (5 mg total) by mouth once daily.    ATORVASTATIN (LIPITOR) 10 MG TABLET    Take 1 tablet (10 mg total) by mouth every evening.    AZELASTINE (ASTELIN) 137 MCG (0.1 %) NASAL SPRAY    1 spray (137 mcg total) by Nasal route 2 (two) times daily.    BENAZEPRIL (LOTENSIN) 40 MG TABLET    Take 1 tablet (40 mg total) by mouth once daily.    CALCIPOTRIENE (DOVONOX) 0.005 % CREAM    Apply topically 2 (two) times daily.    CLONAZEPAM (KLONOPIN) 1 MG TABLET    Take 1 tablet (1 mg total) by mouth every evening.    COLCHICINE (COLCRYS) 0.6 MG TABLET    Take 1 tablet (0.6 mg total) by mouth once daily.    DOXEPIN (SILENOR) 3 MG TAB    Take 3 mg by mouth nightly as needed (insomnia).    FEBUXOSTAT (ULORIC) 40 MG TAB    Take 1 tablet (40 mg total) by mouth once daily.    HYDRALAZINE (APRESOLINE) 100 MG TABLET    Take 1 tablet (100 mg total) by mouth every 8 (eight) hours.    HYDROXYZINE HCL (ATARAX) 25 MG TABLET    Take 25 mg by mouth 2 (two) times  daily.    INDOMETHACIN (INDOCIN SR) 75 MG CPSR CR CAPSULE    Take 1 capsule (75 mg total) by mouth 2 (two) times daily as needed (for gout flare).    INSULIN GLARGINE, TOUJEO, (TOUJEO SOLOSTAR U-300 INSULIN) 300 UNIT/ML (1.5 ML) INPN PEN    Inject 85 Units into the skin once daily.    IPRATROPIUM (ATROVENT) 21 MCG (0.03 %) NASAL SPRAY    2 sprays by Each Nostril route 4 (four) times daily as needed for Rhinitis.    KETOCONAZOLE (NIZORAL) 2 % SHAMPOO    Apply topically once a week. Lather in for 5-10 min before rinsing    LEVOTHYROXINE (SYNTHROID) 112 MCG TABLET    TAKE 1 TABLET BY MOUTH ONCE DAILY BEFORE BREAKFAST    METFORMIN (GLUCOPHAGE) 1000 MG TABLET    Take 1 tablet by mouth twice daily with food    METOPROLOL SUCCINATE (TOPROL-XL) 50 MG 24 HR TABLET    Take 1 tablet (50 mg total) by mouth once daily.    MOUNJARO 15 MG/0.5 ML PNIJ    Inject 15 mg into the skin once a week.    PREGABALIN (LYRICA) 150 MG CAPSULE    TAKE 1 CAPSULE BY MOUTH THREE TIMES DAILY    SERTRALINE (ZOLOFT) 100 MG TABLET    Take 100 mg by mouth once daily.    TADALAFIL (CIALIS) 20 MG TAB    Take 1 tablet (20 mg total) by mouth every 24 hours as needed (erectile dysfunction).    TESTOSTERONE CYPIONATE (DEPOTESTOTERONE CYPIONATE) 200 MG/ML INJECTION    Inject 0.75 mLs (150 mg total) into the muscle every 7 days.    TIZANIDINE (ZANAFLEX) 4 MG TABLET    Take 1 tablet (4 mg total) by mouth 2 (two) times daily as needed (Neck Pain).    TRIAMCINOLONE ACETONIDE 0.1% (KENALOG) 0.1 % OINTMENT    AAA bid    VARDENAFIL (LEVITRA) 20 MG TABLET    Take 1 tablet (20 mg total) by mouth daily as needed for Erectile Dysfunction.    VASCEPA 1 GRAM CAP    Take 2 capsules (2,000 mg total) by mouth 2 (two) times daily.   Modified Medications    No medications on file   Discontinued Medications    No medications on file       Review of Systems   Constitutional: Negative.    HENT: Negative.     Eyes: Negative.    Respiratory: Negative.     Cardiovascular: Negative.   "  Gastrointestinal: Negative.    Genitourinary: Negative.    Musculoskeletal: Negative.    Skin: Negative.    Neurological: Negative.    Endo/Heme/Allergies: Negative.    Psychiatric/Behavioral: Negative.     All 12 systems otherwise negative.      Wt Readings from Last 3 Encounters:   10/14/24 116.2 kg (256 lb 2.8 oz)   09/27/24 113.4 kg (250 lb)   09/25/24 114.4 kg (252 lb 3.3 oz)     Temp Readings from Last 3 Encounters:   09/27/24 98 °F (36.7 °C) (Oral)   07/23/24 98.6 °F (37 °C)   07/17/24 97.3 °F (36.3 °C)     BP Readings from Last 3 Encounters:   10/14/24 138/80   09/27/24 114/71   09/25/24 112/70     Pulse Readings from Last 3 Encounters:   10/14/24 73   09/27/24 68   09/25/24 84       /80   Pulse 73   Ht 6' 1" (1.854 m)   Wt 116.2 kg (256 lb 2.8 oz)   SpO2 99%   BMI 33.80 kg/m²     Objective:   Physical Exam  Vitals and nursing note reviewed.   Constitutional:       General: He is not in acute distress.     Appearance: He is well-developed. He is obese. He is not diaphoretic.   HENT:      Head: Normocephalic and atraumatic.      Nose: Nose normal.   Eyes:      General: No scleral icterus.     Conjunctiva/sclera: Conjunctivae normal.   Neck:      Thyroid: No thyromegaly.      Vascular: No JVD.   Cardiovascular:      Rate and Rhythm: Normal rate and regular rhythm.      Heart sounds: S1 normal and S2 normal. No murmur heard.     No friction rub. No gallop. No S3 or S4 sounds.   Pulmonary:      Effort: Pulmonary effort is normal. No respiratory distress.      Breath sounds: Normal breath sounds. No stridor. No wheezing or rales.   Chest:      Chest wall: No tenderness.   Abdominal:      General: Bowel sounds are normal. There is no distension.      Palpations: Abdomen is soft. There is no mass.      Tenderness: There is no abdominal tenderness. There is no rebound.   Genitourinary:     Comments: Deferred  Musculoskeletal:         General: No tenderness or deformity. Normal range of motion.      " Cervical back: Normal range of motion and neck supple.   Lymphadenopathy:      Cervical: No cervical adenopathy.   Skin:     General: Skin is warm and dry.      Coloration: Skin is not pale.      Findings: No erythema or rash.   Neurological:      Mental Status: He is alert and oriented to person, place, and time.      Motor: No abnormal muscle tone.      Coordination: Coordination normal.   Psychiatric:         Behavior: Behavior normal.         Thought Content: Thought content normal.         Judgment: Judgment normal.         Lab Results   Component Value Date     09/09/2024    K 4.3 09/09/2024     09/09/2024    CO2 23 09/09/2024    BUN 22 (H) 09/09/2024    CREATININE 1.1 09/09/2024    GLU 76 09/09/2024    HGBA1C 5.6 09/09/2024    MG 1.6 12/07/2023    AST 27 09/09/2024    ALT 34 09/09/2024    ALBUMIN 3.8 09/09/2024    ALBUMIN 4.1 08/12/2022    PROT 6.8 09/09/2024    BILITOT 0.4 09/09/2024    WBC 7.81 08/19/2024    HGB 14.0 08/19/2024    HCT 43.7 08/19/2024    MCV 95 08/19/2024     08/19/2024    TSH 2.499 09/09/2024    CHOL 115 (L) 09/09/2024    HDL 34 (L) 09/09/2024    LDLCALC 51.8 (L) 09/09/2024    TRIG 146 09/09/2024    BNP <10 11/15/2021     Assessment:      1. Benign essential HTN    2. Hypertension associated with diabetes    3. Type 2 diabetes mellitus with other circulatory complication, with long-term current use of insulin    4. Type 2 diabetes mellitus with other specified complication, unspecified whether long term insulin use    5. Hyperlipidemia, unspecified hyperlipidemia type    6. FARA (obstructive sleep apnea)    7. Hypothyroidism (acquired)    8. Hypertriglyceridemia    9. Type 2 diabetes mellitus without complication, without long-term current use of insulin    10. Nonspecific abnormal electrocardiogram (ECG) (EKG)    11. BMI 36.0-36.9,adult    12. Other hyperlipidemia    13. Type 2 diabetes mellitus without complication, with long-term current use of insulin    14. Low  testosterone          Plan:     HTN   - titrate meds   - dec Norvasc to 5mg     2. HLD,   - cont meds and titrate  - lowered statin dose to Lipitor 10 mg   - Lipids stable/well controlled 9/2024.     3. DM2 A1c 6.6 --> 5.3 --> 5.6 --> 5.6  - cont tx - on Ozempic - increase to 2mg - changed to Mounjaro - increase to 7.5 mg --> increased to 12.5 --> 15   - on Mounjaro     4. Hypothyroidsm TSH1.1  - cont Synthroid    5. FARA  - cont CPAP    6. Obesity, BMI 36 --> BMI 35 - 268 lbs -->  BMI 33 - 248 lbs.  BMI 33 - 250 lbs  --> 256 lbs  - cont weight loss with diet/exercise     7. Low T  - cont T supplements as needed  - monitor BP     8. Preop CV eval -neck surgery pending with Dr. Nam   - low CV risk, proceed as needed  - cont BB and statin periop  - stable ECG without cardiac symptoms    Visit today included increased complexity associated with the care of the episodic problem HTN addressed and managing the longitudinal care of the patient due to the serious and/or complex managed problem(s) .      Thank you for allowing me to participate in this patient's care. Please do not hesitate to contact me with any questions or concerns. Consult note has been forwarded to the referral physician.     Kwame Guidry MD, [facility]  Cardiovascular Disease  [facility], [place]  [phone] (P)

## 2024-10-15 LAB
OHS QRS DURATION: 148 MS
OHS QTC CALCULATION: 450 MS

## 2024-10-21 ENCOUNTER — OFFICE VISIT (OUTPATIENT)
Dept: OTOLARYNGOLOGY | Facility: CLINIC | Age: 59
End: 2024-10-21
Payer: COMMERCIAL

## 2024-10-21 VITALS — HEIGHT: 73 IN | BODY MASS INDEX: 33.8 KG/M2

## 2024-10-21 DIAGNOSIS — J34.89 ADHESIONS OF NASAL SEPTUM AND TURBINATES: ICD-10-CM

## 2024-10-21 DIAGNOSIS — J34.89 NASAL OBSTRUCTION: Primary | ICD-10-CM

## 2024-10-21 DIAGNOSIS — J32.9 CHRONIC SINUSITIS, UNSPECIFIED LOCATION: ICD-10-CM

## 2024-10-21 DIAGNOSIS — J34.829 NASAL VALVE COLLAPSE: ICD-10-CM

## 2024-10-21 DIAGNOSIS — Z98.890 HISTORY OF RHINOPLASTY: ICD-10-CM

## 2024-10-21 PROCEDURE — 3060F POS MICROALBUMINURIA REV: CPT | Mod: CPTII,S$GLB,, | Performed by: STUDENT IN AN ORGANIZED HEALTH CARE EDUCATION/TRAINING PROGRAM

## 2024-10-21 PROCEDURE — 1159F MED LIST DOCD IN RCRD: CPT | Mod: CPTII,S$GLB,, | Performed by: STUDENT IN AN ORGANIZED HEALTH CARE EDUCATION/TRAINING PROGRAM

## 2024-10-21 PROCEDURE — 3066F NEPHROPATHY DOC TX: CPT | Mod: CPTII,S$GLB,, | Performed by: STUDENT IN AN ORGANIZED HEALTH CARE EDUCATION/TRAINING PROGRAM

## 2024-10-21 PROCEDURE — 3044F HG A1C LEVEL LT 7.0%: CPT | Mod: CPTII,S$GLB,, | Performed by: STUDENT IN AN ORGANIZED HEALTH CARE EDUCATION/TRAINING PROGRAM

## 2024-10-21 PROCEDURE — 4010F ACE/ARB THERAPY RXD/TAKEN: CPT | Mod: CPTII,S$GLB,, | Performed by: STUDENT IN AN ORGANIZED HEALTH CARE EDUCATION/TRAINING PROGRAM

## 2024-10-21 PROCEDURE — 3008F BODY MASS INDEX DOCD: CPT | Mod: CPTII,S$GLB,, | Performed by: STUDENT IN AN ORGANIZED HEALTH CARE EDUCATION/TRAINING PROGRAM

## 2024-10-21 PROCEDURE — 99999 PR PBB SHADOW E&M-EST. PATIENT-LVL IV: CPT | Mod: PBBFAC,,, | Performed by: STUDENT IN AN ORGANIZED HEALTH CARE EDUCATION/TRAINING PROGRAM

## 2024-10-21 PROCEDURE — 99214 OFFICE O/P EST MOD 30 MIN: CPT | Mod: 25,S$GLB,, | Performed by: STUDENT IN AN ORGANIZED HEALTH CARE EDUCATION/TRAINING PROGRAM

## 2024-10-21 PROCEDURE — 31231 NASAL ENDOSCOPY DX: CPT | Mod: S$GLB,,, | Performed by: STUDENT IN AN ORGANIZED HEALTH CARE EDUCATION/TRAINING PROGRAM

## 2024-10-21 NOTE — PROGRESS NOTES
Chief complaint:    Chief Complaint   Patient presents with    Follow-up     Follow up on stuffy nose         Referring Provider:  No referring provider defined for this encounter.      History of present illness:     Mr. English is a 59 y.o. presenting for evaluation of sinus and nasal symptoms.     Sore in right nostril since surgery. Scabbing over. Bled some.     Some nasal congestion recently as well and some facial pressure.     Overall the nasal obstruction is markedly improved. No purulent rhinorrhea.     Saline spray. Not using other sprays at this time.     Return clinic visit, 8/7/23    Overall doing pretty well, but does have some facial pressure on the left. Intermittent obstruction, left > right. Improved since surgery.  Denies purulent rhinorrhea.     Currently using the astelin and saline irrigations.     Return clinic visit, 12/7/23  Over last month has had worsening left sided nasal obstruction  Trouble with his CPAP mask.  Denies purulent rhinorrhea or facial pressure.   Using astelin and flonase at times  Has started afrin again. At night.     Return clinic visit, 10/21/24  He endorses left > right nasal obstruction, worse at night. Doing well during day  Doing well with nasal breathing during day.   Using astelin night      History      Past Medical History:   Past Medical History:   Diagnosis Date    Allergy     Cancer     Colon    Diabetes mellitus      09/14/2023 Insulin x 10 years    Gastroesophageal reflux disease without esophagitis 06/20/2023    Hypertension     Sleep apnea     Thyroid disease          Past Surgical History:  Past Surgical History:   Procedure Laterality Date    BACK SURGERY      COLON SURGERY  02/06/21    COLONOSCOPY N/A 12/29/2020    Procedure: COLONOSCOPY;  Surgeon: William Cotter MD;  Location: [facility];  Service: Endoscopy;  Laterality: N/A;  Will need Rapid doesn't live here    COLONOSCOPY N/A 02/10/2022    Procedure: COLONOSCOPY;  Surgeon: Wili ELLIS  MD Jesús;  Location: [facility] ENDO;  Service: Endoscopy;  Laterality: N/A;    EPIDURAL STEROID INJECTION INTO CERVICAL SPINE N/A 12/01/2022    Procedure: C7/T1 IL SALBADOR;  Surgeon: Leonard Mart MD;  Location: [facility] PAIN MGT;  Service: Pain Management;  Laterality: N/A;    EPIDURAL STEROID INJECTION INTO CERVICAL SPINE N/A 7/17/2024    Procedure: C6/7 IL SALBADOR, Left Paramedian Approach;  Surgeon: Leonard Mart MD;  Location: [facility] PAIN MGT;  Service: Pain Management;  Laterality: N/A;    FESS, WITH NASAL SEPTOPLASTY, WITH IMAGING GUIDANCE Bilateral 08/17/2022    Procedure: FESS, WITH NASAL SEPTOPLASTY, WITH IMAGING GUIDANCE;  Surgeon: Viktor Ferrell MD;  Location: [facility] OR;  Service: ENT;  Laterality: Bilateral;    LAPAROSCOPIC RIGHT COLON RESECTION N/A 02/02/2021    Procedure: COLECTOMY, RIGHT, LAPAROSCOPIC, ERAS low;  Surgeon: Melonie Santoyo MD;  Location: [address];  Service: Colon and Rectal;  Laterality: N/A;  needs rapid covid test    LYSIS OF ADHESIONS Bilateral 04/19/2023    Procedure: LYSIS, ADHESIONS;  Surgeon: Viktor Ferrell MD;  Location: [facility] OR;  Service: ENT;  Laterality: Bilateral;    NASAL ENDOSCOPY Bilateral 04/19/2023    Procedure: ENDOSCOPY, NOSE;  Surgeon: Viktor Ferrell MD;  Location: [facility] OR;  Service: ENT;  Laterality: Bilateral;    NASAL TURBINATE REDUCTION Bilateral 08/17/2022    Procedure: REDUCTION, NASAL TURBINATE;  Surgeon: Viktor Ferrell MD;  Location: [facility] OR;  Service: ENT;  Laterality: Bilateral;    RHINOPLASTY Bilateral 04/19/2023    Procedure: RHINOPLASTY;  Surgeon: Viktor Ferrell MD;  Location: [facility] OR;  Service: ENT;  Laterality: Bilateral;    SELECTIVE INJECTION OF ANESTHETIC AGENT AROUND LUMBAR SPINAL NERVE ROOT BY TRANSFORAMINAL APPROACH Bilateral 07/05/2023    Procedure: Bilateral L4/5 TF SALBADOR RN IV Sedation;  Surgeon: Leonard Mart MD;  Location: [facility] PAIN MGT;  Service: Pain Management;  Laterality: Bilateral;    TONSILLECTOMY           Medications:  "Medication list reviewed. He  has a current medication list which includes the following prescription(s): amlodipine, atorvastatin, azelastine, benazepril, calcipotriene, clonazepam, colchicine, doxepin, febuxostat, hydralazine, hydroxyzine hcl, indomethacin, insulin glargine (toujeo), ipratropium, ketoconazole, levothyroxine, metformin, metoprolol succinate, mounjaro, pregabalin, sertraline, tadalafil, testosterone cypionate, tizanidine, triamcinolone acetonide 0.1%, vardenafil, and vascepa.     Allergies:   Review of patient's allergies indicates:   Allergen Reactions    Demerol (pf) [meperidine (pf)] Other (See Comments)     Pt reports,"They lost my blood pressure."    Percocet [oxycodone-acetaminophen] Itching     itching    Allopurinol analogues Diarrhea         Family history: family history includes Breast cancer in his mother; Cancer in his mother; Cataracts in his maternal grandmother; Diabetes in his maternal grandmother and mother; Hypertension in his brother, brother, father, and mother; Lung cancer in his maternal grandfather; No Known Problems in his paternal grandfather, paternal grandmother, sister, and sister; Stroke in his father.         Social History          Alcohol use:  reports no history of alcohol use.            Tobacco:  reports that he has never smoked. He has never been exposed to tobacco smoke. He has never used smokeless tobacco.         Physical Examination      Vitals: Height 6' 1" (1.854 m).      General: Well developed, well nourished, well hydrated.     Voice: no dysphonia, no dysarthria      Head/Face: Normocephalic, atraumatic. No scars or lesions. Facial musculature equal.     Eyes: No scleral icterus or conjunctival hemorrhage. EOMI. PERRLA.     Ears:     Right ear: No gross deformity. EAC is clear of debris and erythema. TM are intact with a pneumatized middle ear. No signs of retraction, fluid or infection.      Left ear: No gross deformity. EAC is clear of debris and " "erythema. TM are intact with a pneumatized middle ear. No signs of retraction, fluid or infection.      Nose: septum midline. Well healed transcolumellar scar. Marked external valve collapse with inspiration. Internal valve well supported                    Mouth/Oropharynx: Lips without any lesions. No mucosal lesions within the oropharynx. No tonsillar exudate or lesions. Pharyngeal walls symmetrical. Uvula midline. Tongue midline without lesions.    Neurologic: Moving all extremities without gross abnormality.CN II-XII grossly intact. House-Brackmann 1/6. No signs of nystagmus.        Data reviewed      Review of records:      I reviewed records from the referring provider's office visits describing the history, workup, and/or treatment of this problem thus far.    Imaging  I have independently reviewed the following imaging with the findings noted below:      CT sinus,  Bilateral toya bullosa  S-shaped septal deviation, left mid-septal spur  Inferior turbinate reduction   Left frontal thickening and obstruction of outflow  Bilateral anterior ethmoid disease     Op note, 8/20/22     PROCEDURE:   Endoscopic septoplasty   Bilateral inferior turbinate submucosal resection  Nasal endoscopy with resection of bilateral toya bullosa   Bilateral nasal endoscopy with maxillary antrostomy   Bilateral nasal endoscopy with anterior ethmoidectomy    Left nasal endoscopy with frontal sinusotomy and frontal sinus exploration   Functional endoscopic sinus surgery with CT image guided electromagnetic system     OPERATIVE FINDINGS:   Polypoid mucosal thickening at the left frontal outflow tract  Bilateral polypoid thickening in anterior ethmoids  Left septal deviation with large left septal spur      Pathology reviewed  1. "left sinus contents", excision:       -  Sinonasal mucosa with chronic inflammation       - No eosinophils seen   2. "septal cartilage", excision:       - Portion of cartilage         NASAL ENDOSCOPY "       Indication: Narendra English Sr. is a 59 y.o. male  with sinonasal symptoms that were not able to be explained by anterior rhinoscopy alone, thus necessitating nasal endoscopy.     Procedure: Risks, benefits, and alternatives of the procedure were discussed with the patient, and the patient consented to the nasal endoscopy.  The nasal cavity was sprayed with a topical decongestant and anesthetic (if needed). The endoscope was passed into each nostril and each nasal cavity was visualized.  On each side the nasal cavity, sinuses (if open), turbinates, middle and superior meatus, sphenoethmoidal recess and septum were examined with the findings described below. At the end of the examination, the scope was removed. The patient tolerated the procedure well with no complications.       Endoscopic Sinonasal Exam Findings:  -     The right side has normal mucosa, patent max, ethmoids, and frontals  -     The left side has normal mucosa, patent max, ethmoids, and frontals  -     Nasal secretions: No discolored secretions noted bilaterally  -     Nasal septum: no significant deviation or perforation appreciated   -     Inferior turbinate: Normal mucosa without significant hypertrophy bilaterally  -     Middle turbinate: Normal mucosa without significant hypertrophy bilaterally  -     Other findings: No further mucopurulence and no polyps. There is a  small adhesions of the middle turbinates to the lateral nasal side wall on the right        Assessment/Plan:    1. Nasal obstruction    2. Adhesions of nasal septum and turbinates    3. Chronic sinusitis, unspecified location    4. History of rhinoplasty    5. Nasal valve collapse              S/p FESS and septoplasty 8/17/22 with post op epistaxis complicated by hypertensive urgency (resolved, pack removed, HTN better controlled)  S/p septorhinoplaty for persistent caudal septal deviation 4/19/23      Still with some night time obstruction. Structurally the nose and  sinuses look widely patent.     The primary structural issue that remains is collapse of the external valve and ala on the left. We dicussed nasal valve repair with ear cartilage graft vs trial of nasal cones. He will try nasal cones first. Return to clinic if insufficient improvement and surgical correction is desired.         Viktor Ferrell MD  [facility]   [facility]  [id number] [place] [address].  [place] [id number]  P: [phone]  F: [phone]

## 2024-10-22 ENCOUNTER — OFFICE VISIT (OUTPATIENT)
Dept: PULMONOLOGY | Facility: CLINIC | Age: 59
End: 2024-10-22
Attending: INTERNAL MEDICINE
Payer: COMMERCIAL

## 2024-10-22 ENCOUNTER — HOSPITAL ENCOUNTER (OUTPATIENT)
Dept: RADIOLOGY | Facility: HOSPITAL | Age: 59
Discharge: HOME | End: 2024-10-22
Attending: INTERNAL MEDICINE
Payer: COMMERCIAL

## 2024-10-22 VITALS
SYSTOLIC BLOOD PRESSURE: 120 MMHG | RESPIRATION RATE: 21 BRPM | DIASTOLIC BLOOD PRESSURE: 80 MMHG | OXYGEN SATURATION: 98 % | HEIGHT: 73 IN | BODY MASS INDEX: 34.53 KG/M2 | WEIGHT: 260.56 LBS | HEART RATE: 80 BPM

## 2024-10-22 DIAGNOSIS — R91.8 MULTIPLE LUNG NODULES: ICD-10-CM

## 2024-10-22 DIAGNOSIS — R91.8 MULTIPLE LUNG NODULES ON CT: Primary | Chronic | ICD-10-CM

## 2024-10-22 PROCEDURE — 3060F POS MICROALBUMINURIA REV: CPT | Mod: CPTII,S$GLB,, | Performed by: INTERNAL MEDICINE

## 2024-10-22 PROCEDURE — 3044F HG A1C LEVEL LT 7.0%: CPT | Mod: CPTII,S$GLB,, | Performed by: INTERNAL MEDICINE

## 2024-10-22 PROCEDURE — 3008F BODY MASS INDEX DOCD: CPT | Mod: CPTII,S$GLB,, | Performed by: INTERNAL MEDICINE

## 2024-10-22 PROCEDURE — 3066F NEPHROPATHY DOC TX: CPT | Mod: CPTII,S$GLB,, | Performed by: INTERNAL MEDICINE

## 2024-10-22 PROCEDURE — 71250 CT THORAX DX C-: CPT | Mod: 26,,, | Performed by: STUDENT IN AN ORGANIZED HEALTH CARE EDUCATION/TRAINING PROGRAM

## 2024-10-22 PROCEDURE — 99999 PR PBB SHADOW E&M-EST. PATIENT-LVL III: CPT | Mod: PBBFAC,,, | Performed by: INTERNAL MEDICINE

## 2024-10-22 PROCEDURE — 1160F RVW MEDS BY RX/DR IN RCRD: CPT | Mod: CPTII,S$GLB,, | Performed by: INTERNAL MEDICINE

## 2024-10-22 PROCEDURE — 99214 OFFICE O/P EST MOD 30 MIN: CPT | Mod: S$GLB,,, | Performed by: INTERNAL MEDICINE

## 2024-10-22 PROCEDURE — 3074F SYST BP LT 130 MM HG: CPT | Mod: CPTII,S$GLB,, | Performed by: INTERNAL MEDICINE

## 2024-10-22 PROCEDURE — 71250 CT THORAX DX C-: CPT | Mod: TC

## 2024-10-22 PROCEDURE — 3079F DIAST BP 80-89 MM HG: CPT | Mod: CPTII,S$GLB,, | Performed by: INTERNAL MEDICINE

## 2024-10-22 PROCEDURE — 1159F MED LIST DOCD IN RCRD: CPT | Mod: CPTII,S$GLB,, | Performed by: INTERNAL MEDICINE

## 2024-10-22 PROCEDURE — 4010F ACE/ARB THERAPY RXD/TAKEN: CPT | Mod: CPTII,S$GLB,, | Performed by: INTERNAL MEDICINE

## 2024-10-22 NOTE — PROGRESS NOTES
"Subjective:      Patient ID: Narendra English Sr. is a 59 y.o. male.    Chief Complaint: Abnormal Ct Scan    Abnormal Ct Scan        Seen previously for the followin-year-old male with a history of hypertension, hyperlipidemia, obesity who was a never smoker who has had lung nodules noted on CT imaging for several years that have been followed.  Most recent imaging showed possible increase 1 of the nodules and he is referred here for that reason.  He has no pulmonary symptoms and no pulmonary complaints.     2023:  Here for follow-up CT imaging.  Remains at baseline no new complaints.  No new pulmonary issues    2024  Here for follow up of the above  No complaints  CT done today for review    Review of Systems as per history of present illness otherwise negative  Objective:     Physical Exam   Constitutional: He is oriented to person, place, and time. He appears well-developed. No distress.   HENT:   Head: Normocephalic.   Cardiovascular: Normal rate and regular rhythm.   Pulmonary/Chest: Normal expansion, effort normal and breath sounds normal.   Musculoskeletal:      Cervical back: Neck supple.   Neurological: He is alert and oriented to person, place, and time.   Psychiatric: He has a normal mood and affect.   Nursing note and vitals reviewed.          10/22/2024     8:58 AM 10/21/2024     9:58 AM 10/14/2024     8:09 AM 2024     8:35 AM 2024     8:57 AM 2024     9:38 AM 2024    11:10 AM   Pulmonary Function Tests   SpO2 98 %  99 %  96 % 97 %    Height 6' 1" (1.854 m) 6' 1" (1.854 m) 6' 1" (1.854 m) 6' 1" (1.854 m) 6' 1" (1.854 m) 6' 1" (1.854 m)    Weight 118.2 kg (260 lb 9.3 oz)  116.2 kg (256 lb 2.8 oz) 113.4 kg (250 lb) 114.4 kg (252 lb 3.3 oz) 110.9 kg (244 lb 7.8 oz) 112.3 kg (247 lb 9.2 oz)   BMI (Calculated) 34.4  33.8 33 33.3 32.3         Assessment:     1. Multiple lung nodules on CT      I personally reviewed CT chest images obtained today and compared these " with multiple studies going back to 2021.  My interpretation is:  Bilateral pulmonary nodules stable when compared with prior studies  No new detrimental findings    Plan:     Stable chest imaging x3 years  Low risk of malignancy, never smoker  No indication for ongoing additional imaging  Return to clinic pubrke  Discussed the above in detail with the patient and reassured him of results

## 2024-10-24 DIAGNOSIS — M54.12 CERVICAL RADICULOPATHY: ICD-10-CM

## 2024-10-24 DIAGNOSIS — M54.16 LUMBAR RADICULOPATHY: ICD-10-CM

## 2024-10-24 RX ORDER — PREGABALIN 150 MG/1
150 CAPSULE ORAL 3 TIMES DAILY
Qty: 90 CAPSULE | Refills: 0 | Status: SHIPPED | OUTPATIENT
Start: 2024-10-24

## 2024-11-05 ENCOUNTER — OFFICE VISIT (OUTPATIENT)
Dept: NEUROSURGERY | Facility: CLINIC | Age: 59
End: 2024-11-05
Payer: COMMERCIAL

## 2024-11-05 VITALS
HEIGHT: 73 IN | DIASTOLIC BLOOD PRESSURE: 82 MMHG | HEART RATE: 71 BPM | WEIGHT: 252.88 LBS | BODY MASS INDEX: 33.51 KG/M2 | SYSTOLIC BLOOD PRESSURE: 142 MMHG

## 2024-11-05 DIAGNOSIS — G56.03 BILATERAL CARPAL TUNNEL SYNDROME: Primary | ICD-10-CM

## 2024-11-05 PROCEDURE — 3060F POS MICROALBUMINURIA REV: CPT | Mod: CPTII,S$GLB,, | Performed by: NEUROLOGICAL SURGERY

## 2024-11-05 PROCEDURE — 1159F MED LIST DOCD IN RCRD: CPT | Mod: CPTII,S$GLB,, | Performed by: NEUROLOGICAL SURGERY

## 2024-11-05 PROCEDURE — 4010F ACE/ARB THERAPY RXD/TAKEN: CPT | Mod: CPTII,S$GLB,, | Performed by: NEUROLOGICAL SURGERY

## 2024-11-05 PROCEDURE — 3077F SYST BP >= 140 MM HG: CPT | Mod: CPTII,S$GLB,, | Performed by: NEUROLOGICAL SURGERY

## 2024-11-05 PROCEDURE — 3079F DIAST BP 80-89 MM HG: CPT | Mod: CPTII,S$GLB,, | Performed by: NEUROLOGICAL SURGERY

## 2024-11-05 PROCEDURE — 3044F HG A1C LEVEL LT 7.0%: CPT | Mod: CPTII,S$GLB,, | Performed by: NEUROLOGICAL SURGERY

## 2024-11-05 PROCEDURE — 99212 OFFICE O/P EST SF 10 MIN: CPT | Mod: S$GLB,,, | Performed by: NEUROLOGICAL SURGERY

## 2024-11-05 PROCEDURE — 3066F NEPHROPATHY DOC TX: CPT | Mod: CPTII,S$GLB,, | Performed by: NEUROLOGICAL SURGERY

## 2024-11-05 PROCEDURE — 3008F BODY MASS INDEX DOCD: CPT | Mod: CPTII,S$GLB,, | Performed by: NEUROLOGICAL SURGERY

## 2024-11-05 PROCEDURE — 99999 PR PBB SHADOW E&M-EST. PATIENT-LVL V: CPT | Mod: PBBFAC,,, | Performed by: NEUROLOGICAL SURGERY
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
## 2024-11-12 ENCOUNTER — OFFICE VISIT (OUTPATIENT)
Dept: ORTHOPEDICS | Facility: CLINIC | Age: 59
End: 2024-11-12
Payer: COMMERCIAL

## 2024-11-12 VITALS — WEIGHT: 252.88 LBS | HEIGHT: 73 IN | BODY MASS INDEX: 33.51 KG/M2

## 2024-11-12 DIAGNOSIS — M79.642 BILATERAL HAND PAIN: Primary | ICD-10-CM

## 2024-11-12 DIAGNOSIS — M79.641 BILATERAL HAND PAIN: Primary | ICD-10-CM

## 2024-11-12 DIAGNOSIS — G56.03 BILATERAL CARPAL TUNNEL SYNDROME: ICD-10-CM

## 2024-11-12 PROCEDURE — 3008F BODY MASS INDEX DOCD: CPT | Mod: CPTII,S$GLB,, | Performed by: ORTHOPAEDIC SURGERY

## 2024-11-12 PROCEDURE — 4010F ACE/ARB THERAPY RXD/TAKEN: CPT | Mod: CPTII,S$GLB,, | Performed by: ORTHOPAEDIC SURGERY

## 2024-11-12 PROCEDURE — 99999 PR PBB SHADOW E&M-EST. PATIENT-LVL IV: CPT | Mod: PBBFAC,,, | Performed by: ORTHOPAEDIC SURGERY

## 2024-11-12 PROCEDURE — 1159F MED LIST DOCD IN RCRD: CPT | Mod: CPTII,S$GLB,, | Performed by: ORTHOPAEDIC SURGERY

## 2024-11-12 PROCEDURE — 3044F HG A1C LEVEL LT 7.0%: CPT | Mod: CPTII,S$GLB,, | Performed by: ORTHOPAEDIC SURGERY

## 2024-11-12 PROCEDURE — 1160F RVW MEDS BY RX/DR IN RCRD: CPT | Mod: CPTII,S$GLB,, | Performed by: ORTHOPAEDIC SURGERY

## 2024-11-12 PROCEDURE — 99203 OFFICE O/P NEW LOW 30 MIN: CPT | Mod: S$GLB,,, | Performed by: ORTHOPAEDIC SURGERY

## 2024-11-12 PROCEDURE — 3060F POS MICROALBUMINURIA REV: CPT | Mod: CPTII,S$GLB,, | Performed by: ORTHOPAEDIC SURGERY

## 2024-11-12 PROCEDURE — 3066F NEPHROPATHY DOC TX: CPT | Mod: CPTII,S$GLB,, | Performed by: ORTHOPAEDIC SURGERY

## 2024-11-12 NOTE — PROGRESS NOTES
Subjective:     Patient ID: Narendra English Sr. is a 59 y.o. male.    Chief Complaint: Numbness and Pain of the Right Hand and Numbness and Pain of the Left Hand      HPI:  The patient is a 59-year-old male with bilateral carpal tunnel syndrome documented with nerve conduction studies as well as cervical radiculopathy left C4 and bilateral C5-C7.  He wishes to have a left carpal tunnel release before the end of the year.    Past Medical History:   Diagnosis Date    Allergy     Cancer     Colon    Diabetes mellitus      09/14/2023 Insulin x 10 years    Gastroesophageal reflux disease without esophagitis 06/20/2023    Hypertension     Multiple lung nodules on CT 10/22/2024    Sleep apnea     Thyroid disease      Past Surgical History:   Procedure Laterality Date    BACK SURGERY      COLON SURGERY  02/06/21    COLONOSCOPY N/A 12/29/2020    Procedure: COLONOSCOPY;  Surgeon: William Cotter MD;  Location: [facility];  Service: Endoscopy;  Laterality: N/A;  Will need Rapid doesn't live here    COLONOSCOPY N/A 02/10/2022    Procedure: COLONOSCOPY;  Surgeon: Wili Espinosa MD;  Location: [facility];  Service: Endoscopy;  Laterality: N/A;    EPIDURAL STEROID INJECTION INTO CERVICAL SPINE N/A 12/01/2022    Procedure: C7/T1 IL SALBADOR;  Surgeon: Leonard Mart MD;  Location: [facility]IN [facility]GT;  Service: Pain Management;  Laterality: N/A;    EPIDURAL STEROID INJECTION INTO CERVICAL SPINE N/A 7/17/2024    Procedure: C6/7 IL SALBADOR, Left Paramedian Approach;  Surgeon: Leonard Mart MD;  Location: [facility];  Service: Pain Management;  Laterality: N/A;    FESS, WITH NASAL SEPTOPLASTY, WITH IMAGING GUIDANCE Bilateral 08/17/2022    Procedure: FESS, WITH NASAL SEPTOPLASTY, WITH IMAGING GUIDANCE;  Surgeon: Viktor Ferrell MD;  Location: [facility] OR;  Service: ENT;  Laterality: Bilateral;    LAPAROSCOPIC RIGHT COLON RESECTION N/A 02/02/2021    Procedure: COLECTOMY, RIGHT, LAPAROSCOPIC, ERAS low;  Surgeon: Melonie BERMUDEZ  MD Yarelis;  Location: [facility] OR 2ND FLR;  Service: Colon and Rectal;  Laterality: N/A;  needs rapid covid test    LYSIS OF ADHESIONS Bilateral 04/19/2023    Procedure: LYSIS, ADHESIONS;  Surgeon: Viktor Ferrell MD;  Location: [facility] OR;  Service: ENT;  Laterality: Bilateral;    NASAL ENDOSCOPY Bilateral 04/19/2023    Procedure: ENDOSCOPY, NOSE;  Surgeon: Viktor Ferrell MD;  Location: [facility] OR;  Service: ENT;  Laterality: Bilateral;    NASAL TURBINATE REDUCTION Bilateral 08/17/2022    Procedure: REDUCTION, NASAL TURBINATE;  Surgeon: Viktor Ferrell MD;  Location: [facility] OR;  Service: ENT;  Laterality: Bilateral;    RHINOPLASTY Bilateral 04/19/2023    Procedure: RHINOPLASTY;  Surgeon: Viktor Ferrell MD;  Location: [facility] OR;  Service: ENT;  Laterality: Bilateral;    SELECTIVE INJECTION OF ANESTHETIC AGENT AROUND LUMBAR SPINAL NERVE ROOT BY TRANSFORAMINAL APPROACH Bilateral 07/05/2023    Procedure: Bilateral L4/5 TF SALBADOR RN IV Sedation;  Surgeon: Leonard Mart MD;  Location: [facility] PAIN [facility]GT;  Service: Pain Management;  Laterality: Bilateral;    TONSILLECTOMY       Family History   Problem Relation Name Age of Onset    Hypertension Mother Kesha English     Diabetes Mother Kesha English     Breast cancer Mother Kesha Burnside     Cancer Mother Kesha English     Hypertension Father Sandeepelyssa English     Stroke Father Sandeep English     No Known Problems Sister      No Known Problems Sister      Hypertension Brother Brother     Hypertension Brother Mauro English     Cataracts Maternal Grandmother Manda PalmaKai     Diabetes Maternal Grandmother Manda PalmaKai     Lung cancer Maternal Grandfather      No Known Problems Paternal Grandmother      No Known Problems Paternal Grandfather       Social History     Socioeconomic History    Marital status:    Tobacco Use    Smoking status: Never     Passive exposure: Never    Smokeless tobacco: Never   Substance and Sexual Activity    Alcohol use: Never    Drug use: Never    Sexual  activity: Yes     Partners: Female     Birth control/protection: None     Social Drivers of Health     Financial Resource Strain: Medium Risk (5/30/2024)    Overall Financial Resource Strain (CARDIA)     Difficulty of Paying Living Expenses: Somewhat hard   Food Insecurity: No Food Insecurity (5/30/2024)    Hunger Vital Sign     Worried About Running Out of Food in the Last Year: Never true     Ran Out of Food in the Last Year: Never true   Physical Activity: Insufficiently Active (5/30/2024)    Exercise Vital Sign     Days of Exercise per Week: 4 days     Minutes of Exercise per Session: 10 min   Stress: Stress Concern Present (5/30/2024)    [employer] - Occupational Stress Questionnaire     Feeling of Stress : To some extent   Housing Stability: Unknown (5/30/2024)    Housing Stability Vital Sign     Unable to Pay for Housing in the Last Year: No     Medication List with Changes/Refills   Current Medications    AMLODIPINE (NORVASC) 5 MG TABLET    Take 1 tablet (5 mg total) by mouth once daily.    ATORVASTATIN (LIPITOR) 10 MG TABLET    Take 1 tablet (10 mg total) by mouth every evening.    AZELASTINE (ASTELIN) 137 MCG (0.1 %) NASAL SPRAY    1 spray (137 mcg total) by Nasal route 2 (two) times daily.    BENAZEPRIL (LOTENSIN) 40 MG TABLET    Take 1 tablet (40 mg total) by mouth once daily.    CALCIPOTRIENE (DOVONOX) 0.005 % CREAM    Apply topically 2 (two) times daily.    CLONAZEPAM (KLONOPIN) 1 MG TABLET    Take 1 tablet (1 mg total) by mouth every evening.    COLCHICINE (COLCRYS) 0.6 MG TABLET    Take 1 tablet (0.6 mg total) by mouth once daily.    DOXEPIN (SILENOR) 3 MG TAB    Take 3 mg by mouth nightly as needed (insomnia).    FEBUXOSTAT (ULORIC) 40 MG TAB    Take 1 tablet (40 mg total) by mouth once daily.    HYDRALAZINE (APRESOLINE) 100 MG TABLET    Take 1 tablet (100 mg total) by mouth every 8 (eight) hours.    HYDROXYZINE HCL (ATARAX) 25 MG TABLET    Take 25 mg by mouth 2 (two)  "times daily.    INDOMETHACIN (INDOCIN SR) 75 MG CPSR CR CAPSULE    Take 1 capsule (75 mg total) by mouth 2 (two) times daily as needed (for gout flare).    INSULIN GLARGINE, TOUJEO, (TOUJEO SOLOSTAR U-300 INSULIN) 300 UNIT/ML (1.5 ML) INPN PEN    Inject 85 Units into the skin once daily.    IPRATROPIUM (ATROVENT) 21 MCG (0.03 %) NASAL SPRAY    2 sprays by Each Nostril route 4 (four) times daily as needed for Rhinitis.    KETOCONAZOLE (NIZORAL) 2 % SHAMPOO    Apply topically once a week. Lather in for 5-10 min before rinsing    LEVOTHYROXINE (SYNTHROID) 112 MCG TABLET    TAKE 1 TABLET BY MOUTH ONCE DAILY BEFORE BREAKFAST    METFORMIN (GLUCOPHAGE) 1000 MG TABLET    Take 1 tablet by mouth twice daily with food    METOPROLOL SUCCINATE (TOPROL-XL) 50 MG 24 HR TABLET    Take 1 tablet (50 mg total) by mouth once daily.    MOUNJARO 15 MG/0.5 ML PNIJ    Inject 15 mg into the skin once a week.    PREGABALIN (LYRICA) 150 MG CAPSULE    TAKE 1 CAPSULE BY MOUTH THREE TIMES DAILY    SERTRALINE (ZOLOFT) 100 MG TABLET    Take 100 mg by mouth once daily.    TADALAFIL (CIALIS) 20 MG TAB    Take 1 tablet (20 mg total) by mouth every 24 hours as needed (erectile dysfunction).    TESTOSTERONE CYPIONATE (DEPOTESTOTERONE CYPIONATE) 200 MG/ML INJECTION    Inject 0.75 mLs (150 mg total) into the muscle every 7 days.    TIZANIDINE (ZANAFLEX) 4 MG TABLET    Take 1 tablet (4 mg total) by mouth 2 (two) times daily as needed (Neck Pain).    TRIAMCINOLONE ACETONIDE 0.1% (KENALOG) 0.1 % OINTMENT    AAA bid    VARDENAFIL (LEVITRA) 20 MG TABLET    Take 1 tablet (20 mg total) by mouth daily as needed for Erectile Dysfunction.    VASCEPA 1 GRAM CAP    Take 2 capsules (2,000 mg total) by mouth 2 (two) times daily.     Review of patient's allergies indicates:   Allergen Reactions    Demerol (pf) [meperidine (pf)] Other (See Comments)     Pt reports,"They lost my blood pressure."    Percocet [oxycodone-acetaminophen] Itching     itching    Allopurinol " analogues Diarrhea     Review of Systems   Constitutional: Negative for malaise/fatigue.   HENT:  Negative for hearing loss.    Eyes:  Negative for double vision and visual disturbance.   Cardiovascular:  Negative for chest pain.   Respiratory:  Positive for sleep disturbances due to breathing. Negative for shortness of breath.    Endocrine: Negative for cold intolerance.   Hematologic/Lymphatic: Does not bruise/bleed easily.   Skin:  Negative for poor wound healing and suspicious lesions.   Musculoskeletal:  Positive for gout and neck pain. Negative for joint pain and joint swelling.   Gastrointestinal:  Positive for heartburn. Negative for nausea and vomiting.   Genitourinary:  Negative for dysuria.   Neurological:  Positive for numbness, paresthesias and sensory change.   Psychiatric/Behavioral:  Positive for altered mental status. Negative for depression, memory loss and substance abuse. The patient is not nervous/anxious.    Allergic/Immunologic: Negative for persistent infections.       Objective:   Body mass index is 33.36 kg/m².  There were no vitals filed for this visit.             General    Constitutional: He is oriented to person, place, and time. He appears well-developed and well-nourished. No distress.   HENT:   Head: Normocephalic.   Eyes: EOM are normal.   Pulmonary/Chest: Effort normal.   Neurological: He is oriented to person, place, and time.   Psychiatric: He has a normal mood and affect.             Right Hand/Wrist Exam     Inspection   Scars: Wrist - absent Hand -  absent  Effusion: Wrist - absent Hand -  absent    Pain   Wrist - The patient exhibits pain of the flexor/pronator group.    Tests   Phalens sign: positive  Tinel's sign (median nerve): positive  Carpal Tunnel Compression Test: positive    Atrophy   Thenar:  negative  Intrinsic:  negative    Other     Neuorologic Exam    Median Distribution: abnormal  Ulnar Distribution: normal  Radial Distribution: normal    Comments:  The  patient has a positive Tinel and positive Phalen sign.  There is no thenar atrophy noted.      Left Hand/Wrist Exam     Inspection   Scars: Wrist - absent Hand -  absent  Effusion: Wrist - absent Hand -  absent    Pain   Wrist - The patient exhibits pain of the flexor/pronator group.    Tests   Phalens sign: positive  Tinel's sign (median nerve): positive  Carpal Tunnel Compression Test: positive    Atrophy  Thenar:  Negative  Intrinsic: negative    Other     Sensory Exam  Median Distribution: abnormal  Ulnar Distribution: normal  Radial Distribution: normal    Comments:  The patient has a positive Tinel and positive Phalen sign.  There is no thenar atrophy noted.          Vascular Exam       Capillary Refill  Right Hand: normal capillary refill  Left Hand: normal capillary refill         radiographs were not obtained today  Assessment:     Encounter Diagnosis   Name Primary?    Bilateral carpal tunnel syndrome         Plan:       The patient is referred to  for consideration for left carpal tunnel release.              Disclaimer: This note was prepared using a voice recognition system and is likely to have sound alike errors within the text.

## 2024-11-13 ENCOUNTER — OFFICE VISIT (OUTPATIENT)
Dept: CARDIOLOGY | Facility: CLINIC | Age: 59
End: 2024-11-13
Payer: COMMERCIAL

## 2024-11-13 ENCOUNTER — HOSPITAL ENCOUNTER (OUTPATIENT)
Dept: RADIOLOGY | Facility: HOSPITAL | Age: 59
Discharge: HOME | End: 2024-11-13
Attending: STUDENT IN AN ORGANIZED HEALTH CARE EDUCATION/TRAINING PROGRAM
Payer: COMMERCIAL

## 2024-11-13 ENCOUNTER — OFFICE VISIT (OUTPATIENT)
Dept: ORTHOPEDICS | Facility: CLINIC | Age: 59
End: 2024-11-13
Payer: COMMERCIAL

## 2024-11-13 VITALS — HEIGHT: 73 IN | BODY MASS INDEX: 34.01 KG/M2 | WEIGHT: 256.63 LBS

## 2024-11-13 VITALS
BODY MASS INDEX: 33.86 KG/M2 | WEIGHT: 256.63 LBS | DIASTOLIC BLOOD PRESSURE: 70 MMHG | HEART RATE: 78 BPM | SYSTOLIC BLOOD PRESSURE: 134 MMHG | OXYGEN SATURATION: 97 %

## 2024-11-13 DIAGNOSIS — G56.22 CUBITAL TUNNEL SYNDROME ON LEFT: ICD-10-CM

## 2024-11-13 DIAGNOSIS — G56.03 BILATERAL CARPAL TUNNEL SYNDROME: Primary | ICD-10-CM

## 2024-11-13 DIAGNOSIS — E78.5 HYPERLIPIDEMIA, UNSPECIFIED HYPERLIPIDEMIA TYPE: ICD-10-CM

## 2024-11-13 DIAGNOSIS — I10 BENIGN ESSENTIAL HTN: ICD-10-CM

## 2024-11-13 DIAGNOSIS — R79.89 LOW TESTOSTERONE: ICD-10-CM

## 2024-11-13 DIAGNOSIS — E78.49 OTHER HYPERLIPIDEMIA: ICD-10-CM

## 2024-11-13 DIAGNOSIS — Z79.4 TYPE 2 DIABETES MELLITUS WITH OTHER CIRCULATORY COMPLICATION, WITH LONG-TERM CURRENT USE OF INSULIN: ICD-10-CM

## 2024-11-13 DIAGNOSIS — Z01.810 PREOP CARDIOVASCULAR EXAM: Primary | ICD-10-CM

## 2024-11-13 DIAGNOSIS — G47.33 OSA (OBSTRUCTIVE SLEEP APNEA): ICD-10-CM

## 2024-11-13 DIAGNOSIS — I15.2 HYPERTENSION ASSOCIATED WITH DIABETES: ICD-10-CM

## 2024-11-13 DIAGNOSIS — E11.59 TYPE 2 DIABETES MELLITUS WITH OTHER CIRCULATORY COMPLICATION, WITH LONG-TERM CURRENT USE OF INSULIN: ICD-10-CM

## 2024-11-13 DIAGNOSIS — E03.9 HYPOTHYROIDISM (ACQUIRED): ICD-10-CM

## 2024-11-13 DIAGNOSIS — E11.9 TYPE 2 DIABETES MELLITUS WITHOUT COMPLICATION, WITHOUT LONG-TERM CURRENT USE OF INSULIN: ICD-10-CM

## 2024-11-13 DIAGNOSIS — R94.31 NONSPECIFIC ABNORMAL ELECTROCARDIOGRAM (ECG) (EKG): ICD-10-CM

## 2024-11-13 DIAGNOSIS — M79.641 BILATERAL HAND PAIN: ICD-10-CM

## 2024-11-13 DIAGNOSIS — Z79.4 TYPE 2 DIABETES MELLITUS WITHOUT COMPLICATION, WITH LONG-TERM CURRENT USE OF INSULIN: ICD-10-CM

## 2024-11-13 DIAGNOSIS — E11.69 TYPE 2 DIABETES MELLITUS WITH OTHER SPECIFIED COMPLICATION, UNSPECIFIED WHETHER LONG TERM INSULIN USE: ICD-10-CM

## 2024-11-13 DIAGNOSIS — E11.59 HYPERTENSION ASSOCIATED WITH DIABETES: ICD-10-CM

## 2024-11-13 DIAGNOSIS — E78.1 HYPERTRIGLYCERIDEMIA: ICD-10-CM

## 2024-11-13 DIAGNOSIS — E11.9 TYPE 2 DIABETES MELLITUS WITHOUT COMPLICATION, WITH LONG-TERM CURRENT USE OF INSULIN: ICD-10-CM

## 2024-11-13 DIAGNOSIS — M79.642 BILATERAL HAND PAIN: ICD-10-CM

## 2024-11-13 PROCEDURE — 3008F BODY MASS INDEX DOCD: CPT | Mod: CPTII,S$GLB,, | Performed by: INTERNAL MEDICINE

## 2024-11-13 PROCEDURE — 1159F MED LIST DOCD IN RCRD: CPT | Mod: CPTII,S$GLB,, | Performed by: INTERNAL MEDICINE

## 2024-11-13 PROCEDURE — 3066F NEPHROPATHY DOC TX: CPT | Mod: CPTII,S$GLB,, | Performed by: STUDENT IN AN ORGANIZED HEALTH CARE EDUCATION/TRAINING PROGRAM

## 2024-11-13 PROCEDURE — 1160F RVW MEDS BY RX/DR IN RCRD: CPT | Mod: CPTII,S$GLB,, | Performed by: STUDENT IN AN ORGANIZED HEALTH CARE EDUCATION/TRAINING PROGRAM

## 2024-11-13 PROCEDURE — 99214 OFFICE O/P EST MOD 30 MIN: CPT | Mod: S$GLB,,, | Performed by: INTERNAL MEDICINE

## 2024-11-13 PROCEDURE — 3008F BODY MASS INDEX DOCD: CPT | Mod: CPTII,S$GLB,, | Performed by: STUDENT IN AN ORGANIZED HEALTH CARE EDUCATION/TRAINING PROGRAM

## 2024-11-13 PROCEDURE — 3044F HG A1C LEVEL LT 7.0%: CPT | Mod: CPTII,S$GLB,, | Performed by: INTERNAL MEDICINE

## 2024-11-13 PROCEDURE — 3078F DIAST BP <80 MM HG: CPT | Mod: CPTII,S$GLB,, | Performed by: INTERNAL MEDICINE

## 2024-11-13 PROCEDURE — 3066F NEPHROPATHY DOC TX: CPT | Mod: CPTII,S$GLB,, | Performed by: INTERNAL MEDICINE

## 2024-11-13 PROCEDURE — G2211 COMPLEX E/M VISIT ADD ON: HCPCS | Mod: S$GLB,,, | Performed by: INTERNAL MEDICINE

## 2024-11-13 PROCEDURE — 4010F ACE/ARB THERAPY RXD/TAKEN: CPT | Mod: CPTII,S$GLB,, | Performed by: INTERNAL MEDICINE

## 2024-11-13 PROCEDURE — 99214 OFFICE O/P EST MOD 30 MIN: CPT | Mod: S$GLB,,, | Performed by: STUDENT IN AN ORGANIZED HEALTH CARE EDUCATION/TRAINING PROGRAM

## 2024-11-13 PROCEDURE — 1159F MED LIST DOCD IN RCRD: CPT | Mod: CPTII,S$GLB,, | Performed by: STUDENT IN AN ORGANIZED HEALTH CARE EDUCATION/TRAINING PROGRAM

## 2024-11-13 PROCEDURE — 73130 X-RAY EXAM OF HAND: CPT | Mod: TC,50

## 2024-11-13 PROCEDURE — 1160F RVW MEDS BY RX/DR IN RCRD: CPT | Mod: CPTII,S$GLB,, | Performed by: INTERNAL MEDICINE

## 2024-11-13 PROCEDURE — 99999 PR PBB SHADOW E&M-EST. PATIENT-LVL IV: CPT | Mod: PBBFAC,,, | Performed by: INTERNAL MEDICINE

## 2024-11-13 PROCEDURE — 3060F POS MICROALBUMINURIA REV: CPT | Mod: CPTII,S$GLB,, | Performed by: STUDENT IN AN ORGANIZED HEALTH CARE EDUCATION/TRAINING PROGRAM

## 2024-11-13 PROCEDURE — 3060F POS MICROALBUMINURIA REV: CPT | Mod: CPTII,S$GLB,, | Performed by: INTERNAL MEDICINE

## 2024-11-13 PROCEDURE — 3044F HG A1C LEVEL LT 7.0%: CPT | Mod: CPTII,S$GLB,, | Performed by: STUDENT IN AN ORGANIZED HEALTH CARE EDUCATION/TRAINING PROGRAM

## 2024-11-13 PROCEDURE — 3075F SYST BP GE 130 - 139MM HG: CPT | Mod: CPTII,S$GLB,, | Performed by: INTERNAL MEDICINE

## 2024-11-13 PROCEDURE — 73130 X-RAY EXAM OF HAND: CPT | Mod: 26,50,, | Performed by: RADIOLOGY

## 2024-11-13 PROCEDURE — 4010F ACE/ARB THERAPY RXD/TAKEN: CPT | Mod: CPTII,S$GLB,, | Performed by: STUDENT IN AN ORGANIZED HEALTH CARE EDUCATION/TRAINING PROGRAM

## 2024-11-13 PROCEDURE — 99999 PR PBB SHADOW E&M-EST. PATIENT-LVL IV: CPT | Mod: PBBFAC,,, | Performed by: STUDENT IN AN ORGANIZED HEALTH CARE EDUCATION/TRAINING PROGRAM

## 2024-11-13 NOTE — H&P (VIEW-ONLY)
Hand Surgery Clinic Note    Chief Complaint  Chief Complaint   Patient presents with    Left Hand - Pain, Numbness, Swelling    Right Hand - Pain, Numbness, Swelling       History of Present Illness  59-year-old right-hand dominant male  presents for evaluation of bilateral hand pain and numbness.  Patient has a history of type 2 diabetes with a most recent hemoglobin A1c of 5.6 on 09/09/2024.  Patient was had numbness in both of his hands for a year.  The left side is worse than the right.  All 5 fingers are constantly numb.  He feels that he was decreased   strength in his dropping things.  He does have a known history of cervical spine issues as well for which he was being treated by Neurosurgery.  He was seen by Neurosurgery last week where surgical options were discussed.  Patient is electing to wait until next year to consider scheduling surgery for his neck.  He has tried braces which help at night.  He does have pain which wakes him up from sleep at night.  He has never had injections before for carpal tunnel.  He underwent electrodiagnostic testing in June of 2024, the results of which are reviewed below.  He does intermittently experience pain which radiates from his neck down to his hands bilaterally.    Review of Systems  Review of systems negative for chest pain, shortness of breath, fevers, chills, nausea/vomiting.    Past Medical History  Past Medical History:   Diagnosis Date    Allergy     Cancer     Colon    Diabetes mellitus      09/14/2023 Insulin x 10 years    Gastroesophageal reflux disease without esophagitis 06/20/2023    Hypertension     Multiple lung nodules on CT 10/22/2024    Sleep apnea     Thyroid disease        Past Surgical History  Past Surgical History:   Procedure Laterality Date    BACK SURGERY      COLON SURGERY  02/06/21    COLONOSCOPY N/A 12/29/2020    Procedure: COLONOSCOPY;  Surgeon: William Cotter MD;  Location: [facility];  Service: Endoscopy;   Laterality: N/A;  Will need Rapid doesn't live here    COLONOSCOPY N/A 02/10/2022    Procedure: COLONOSCOPY;  Surgeon: Wili Espinosa MD;  Location: [facility] ENDO;  Service: Endoscopy;  Laterality: N/A;    EPIDURAL STEROID INJECTION INTO CERVICAL SPINE N/A 12/01/2022    Procedure: C7/T1 IL SALBADOR;  Surgeon: Leonard Mart MD;  [facility];  Service: Pain Management;  Laterality: N/A;    EPIDURAL STEROID INJECTION INTO CERVICAL SPINE N/A 7/17/2024    Procedure: C6/7 IL SALBADOR, Left Paramedian Approach;  Surgeon: Leonard Mart MD;  Location: [facility]GT;  Service: Pain Management;  Laterality: N/A;    FESS, WITH NASAL SEPTOPLASTY, WITH IMAGING GUIDANCE Bilateral 08/17/2022    Procedure: FESS, WITH NASAL SEPTOPLASTY, WITH IMAGING GUIDANCE;  Surgeon: Viktor Ferrell MD;  Location: [facility] OR;  Service: ENT;  Laterality: Bilateral;    LAPAROSCOPIC RIGHT COLON RESECTION N/A 02/02/2021    Procedure: COLECTOMY, RIGHT, LAPAROSCOPIC, ERAS low;  Surgeon: Meolnie Santoyo MD;  [address];  Service: Colon and Rectal;  Laterality: N/A;  needs rapid covid test    LYSIS OF ADHESIONS Bilateral 04/19/2023    Procedure: LYSIS, ADHESIONS;  Surgeon: Viktor Ferrell MD;  Location: [facility] OR;  Service: ENT;  Laterality: Bilateral;    NASAL ENDOSCOPY Bilateral 04/19/2023    Procedure: ENDOSCOPY, NOSE;  Surgeon: Viktor Ferrell MD;  Location: [facility] OR;  Service: ENT;  Laterality: Bilateral;    NASAL TURBINATE REDUCTION Bilateral 08/17/2022    Procedure: REDUCTION, NASAL TURBINATE;  Surgeon: Viktor Ferrell MD;  Location: [facility] OR;  Service: ENT;  Laterality: Bilateral;    RHINOPLASTY Bilateral 04/19/2023    Procedure: RHINOPLASTY;  Surgeon: Viktor Ferrell MD;  Location: [facility] OR;  Service: ENT;  Laterality: Bilateral;    SELECTIVE INJECTION OF ANESTHETIC AGENT AROUND LUMBAR SPINAL NERVE ROOT BY TRANSFORAMINAL APPROACH Bilateral 07/05/2023    Procedure: Bilateral L4/5 TF SALBADOR RN IV Sedation;  Surgeon: Leonard  "MD Barron;  Location: [facility];  Service: Pain Management;  Laterality: Bilateral;    TONSILLECTOMY         Allergies  Review of patient's allergies indicates:   Allergen Reactions    Demerol (pf) [meperidine (pf)] Other (See Comments)     Pt reports,"They lost my blood pressure."    Percocet [oxycodone-acetaminophen] Itching     itching    Allopurinol analogues Diarrhea       Family History  Family History   Problem Relation Name Age of Onset    Hypertension Mother Kesha English     Diabetes Mother Kesha English     Breast cancer Mother Kesha English     Cancer Mother Kesha English     Hypertension Father Sandeep English     Stroke Father Sandeep English     No Known Problems Sister      No Known Problems Sister      Hypertension Brother Brother     Hypertension Brother Mauro English     Cataracts Maternal Grandmother Manda Quinn     Diabetes Maternal Grandmother Manda Quinn     Lung cancer Maternal Grandfather      No Known Problems Paternal Grandmother      No Known Problems Paternal Grandfather         Social History  Social History     Socioeconomic History    Marital status:    Tobacco Use    Smoking status: Never     Passive exposure: Never    Smokeless tobacco: Never   Substance and Sexual Activity    Alcohol use: Never    Drug use: Never    Sexual activity: Yes     Partners: Female     Birth control/protection: None     Social Drivers of Health     Financial Resource Strain: Medium Risk (5/30/2024)    Overall Financial Resource Strain (CARDIA)     Difficulty of Paying Living Expenses: Somewhat hard   Food Insecurity: No Food Insecurity (5/30/2024)    Hunger Vital Sign     Worried About Running Out of Food in the Last Year: Never true     Ran Out of Food in the Last Year: Never true   Physical Activity: Insufficiently Active (5/30/2024)    Exercise Vital Sign     Days of Exercise per Week: 4 days     Minutes of Exercise per Session: 10 min   Stress: Stress Concern Present (5/30/2024)    [place] " [place] [employer] - Occupational Stress Questionnaire     Feeling of Stress : To some extent   Housing Stability: Unknown (5/30/2024)    Housing Stability Vital Sign     Unable to Pay for Housing in the Last Year: No       Vital Signs  There were no vitals filed for this visit.    Physical Exam  Constitutional: Appears well-developed and well-nourished. No distress.   HENT:   Head: Normocephalic.   Eyes: EOM are normal.   Pulmonary/Chest: Effort normal.   Neurological: Oriented to person, place, and time.   Psychiatric: Normal mood and affect.     Right Upper Extremity:  No abrasions, lacerations, wounds.  No swelling.  No erythema.  Full active wrist flexion and extension.  Full pronation and supination. Positive Tinel's in the median nerve distribution at the wrist.  Positive Phalen's.  Negative Tinel's in the ulnar nerve distribution at the elbow.  No ulnar nerve subluxation with elbow flexion.  No thenar or FDI atrophy.  5/5 apb strength.  5/5 FDI strength.  Two-point discrimination is 5 mm in all 5 fingers.  Patient is able to make a fist and extend all his fingers.  No tenderness over the A1 pulleys of all 5 fingers.  No triggering with range of motion.  Palpable radial pulse.  Positive Spurling's.  Negative Rosangela's.  Normoreflexic biceps, triceps, brachioradialis.    Left Upper Extremity:  No abrasions, lacerations, wounds.  No swelling.  No erythema.  Full active wrist flexion and extension.  Full pronation and supination. Positive Tinel's in the median nerve distribution at the wrist.  Positive Phalen's.  Positive elbow flexion test. Positive Tinel's in the ulnar nerve distribution at the elbow.  No ulnar nerve subluxation with elbow flexion.  No thenar or FDI atrophy.  5/5 apb strength.  5/5 FDI strength.  Two-point discrimination: 5/5/5/5/9.  Patient is able to make a fist and extend all his fingers.  No tenderness over the A1 pulleys of all 5 fingers.  No triggering with range of  motion.  Palpable radial pulse.  Left ring finger is shortened distal to the DIPJ joint as a result of a previous traumatic injury.  Positive Spurling's.  Negative Rosangela's.  Normoreflexic biceps, triceps, brachioradialis.      Imaging  Bilateral hand x-rays three views were obtained today and independently reviewed by myself.  Minimal to mild arthritic changes are noted at the DIPJ joints of the lesser fingers.  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  No fracture.  No dislocation or subluxation.  No foreign body.    Electrodiagnostic studies were reviewed by me today that were performed on 6/13/24. The Left median DSL was 5.5 ms and the right was 5.5 ms. The Left median DML was 5.4 ms and the right was 5.3 ms. Left Ulnar velocity across elbow was 68 m/s and Right was 52 m/s.  EMG shows a slightly reduced or reduced recruitment pattern for left biceps, left triceps, left pronator teres, left trapezius, right pronator teres, right deltoid.  Performing physician interpretation: There is electrodiagnostic evidence of a severe demyelinating and axonal median neuropathy (Carpal tunnel syndrome) across the left wrist and a moderate demyelinating CTS across the right wrist. There is a subacute on chronic radiculopathy of the left C4 nerve root and a chronic radiculopathy of the bilateral C5-C7 nerve roots.      Assessment and Plan  59-year-old right-hand dominant male presents with bilateral carpal tunnel syndrome as well as clinical findings consistent with left cubital tunnel syndrome.  He has a known history of cervical spinal stenosis and cervical radiculopathy for which he is being treated by Neurosurgery.  As such, he has a double crush type injury.  I discussed operative and nonoperative treatment options.  I discussed the risks of carpal tunnel release surgery.  In particular, I discussed the risks that patient was going to have complete relief of his numbness  because some of this may be coming from his neck.    In particular, it was noted that carpal tunnel release surgery outcomes can be unpredictable.  Specifically, it was noted that numbness in the fingers may never resolve or may take more than a year to achieve gradual (and often incomplete) improvement.  It was noted that sometimes the only benefit of the surgery is to prevent significant worsening of weakness or symptoms of nerve compression.  It was noted that any muscular weakness or atrophy due to long-standing carpal tunnel syndrome is generally expected not to improve after the surgery.  It was noted that sometimes re-operation is needed to address new symptoms after the surgery or complications such as damage to normal structures within the field of surgery.  It was noted that normal structures within the field of surgery that can be damaged include the median nerve and the nine flexor tendons to the fingers.  Discussed risks of cubital tunnel release as well including nerve instability, ecchymosis, hematoma, nerve injury, bleeding, pain, need for further surgery, incomplete relief of symptoms.    Patient was like to go ahead and proceed with surgery.  Will schedule left carpal tunnel release and left cubital tunnel release with possible ulnar nerve transposition on 12/10/2024.  We will schedule right carpal tunnel release on 12/31/2024.  Consent was obtained for both procedures in clinic.    Lita Brown MD  Orthopaedic Hand Surgery

## 2024-11-13 NOTE — PROGRESS NOTES
Subjective:   Patient ID:  Narendra English Sr. is a 59 y.o. male who presents for cardiac consult of No chief complaint on file.      Referring Physician:    Tabitha Melgoza MD [[id number] [facility] [place] [address]      Reason for consult: HTN    HPI  The patient came in today for cardiac consult of No chief complaint on file.      Narendra English Sr. is a 59 y.o. male pt with HTN, HLD - elevated TGs, DM2, FARA, obesity, gout, hypothyroidism, low T presents for follow up CV eval.          3/11/24  BP and HR stable. Lipids well controlled - low LDL  BMI 33 - 248 lbs.     5/31/24  Mounjaro changed to Ozempic after last visit.   BP 140s/70. HR 70s. BMI 33 - 250 lbs - gained weight.   He needs to have neck surgery once his fiance is here from [facility].     10/14/24  Lipids stable/well controlled 9/2024.   He had cervical SALBADOR in July, neck surgery pending?   BP and HR stable. BMI 33 - 256 lbs   He is back on Mounjaro now was off it for 2 months.   ECG - NSR, LAD, RBBB, LVH    11/13/24  Pt had recent with eval with Dr. Nam - no plans for surgery until 2025  - will continue conservative therapy for now.   He saw Dr. Brown for CTS - b/l hands.     BP and HR stable. BMI 33 - 256 lbs     Stress ECHO 2/2022  Summary    Concentric remodeling and normal systolic function.  There were no arrhythmias during stress.  The estimated ejection fraction is 60%.  Normal left ventricular diastolic function.  With normal right ventricular systolic function.  The stress echo portion of this study is negative for myocardial ischemia.  The ECG portion of this study is negative for myocardial ischemia.    Past Medical History:   Diagnosis Date    Allergy     Cancer     Colon    Diabetes mellitus      09/14/2023 Insulin x 10 years    Gastroesophageal reflux disease without esophagitis 06/20/2023    Hypertension     Multiple lung nodules on CT 10/22/2024    Sleep apnea     Thyroid disease        Past  Surgical History:   Procedure Laterality Date    BACK SURGERY      COLON SURGERY  02/06/21    COLONOSCOPY N/A 12/29/2020    Procedure: COLONOSCOPY;  Surgeon: William Cotter MD;  Location: [facility] ENDO;  Service: Endoscopy;  Laterality: N/A;  Will need Rapid doesn't live here    COLONOSCOPY N/A 02/10/2022    Procedure: COLONOSCOPY;  Surgeon: Wili Espinosa MD;  Location: [facility] ENDO;  Service: Endoscopy;  Laterality: N/A;    EPIDURAL STEROID INJECTION INTO CERVICAL SPINE N/A 12/01/2022    Procedure: C7/T1 IL SALBADOR;  Surgeon: Leonard Mart MD;  Location: [facility] PAIN MGT;  Service: Pain Management;  Laterality: N/A;    EPIDURAL STEROID INJECTION INTO CERVICAL SPINE N/A 7/17/2024    Procedure: C6/7 IL SALBADOR, Left Paramedian Approach;  Surgeon: Leonard Mart MD;  Location: [facility] PAIN MGT;  Service: Pain Management;  Laterality: N/A;    FESS, WITH NASAL SEPTOPLASTY, WITH IMAGING GUIDANCE Bilateral 08/17/2022    Procedure: FESS, WITH NASAL SEPTOPLASTY, WITH IMAGING GUIDANCE;  Surgeon: Viktor Ferrell MD;  Location: [facility] OR;  Service: ENT;  Laterality: Bilateral;    LAPAROSCOPIC RIGHT COLON RESECTION N/A 02/02/2021    Procedure: COLECTOMY, RIGHT, LAPAROSCOPIC, ERAS low;  Surgeon: Melonie Santoyo MD;  [facility];  Service: Colon and Rectal;  Laterality: N/A;  needs rapid covid test    LYSIS OF ADHESIONS Bilateral 04/19/2023    Procedure: LYSIS, ADHESIONS;  Surgeon: Viktor Ferrell MD;  Location: [facility] OR;  Service: ENT;  Laterality: Bilateral;    NASAL ENDOSCOPY Bilateral 04/19/2023    Procedure: ENDOSCOPY, NOSE;  Surgeon: Viktor Ferrell MD;  Location: [facility] OR;  Service: ENT;  Laterality: Bilateral;    NASAL TURBINATE REDUCTION Bilateral 08/17/2022    Procedure: REDUCTION, NASAL TURBINATE;  Surgeon: Viktor Ferrell MD;  Location: [facility] OR;  Service: ENT;  Laterality: Bilateral;    RHINOPLASTY Bilateral 04/19/2023    Procedure: RHINOPLASTY;  Surgeon: Viktor Ferrell MD;  Location: [facility] OR;   Service: ENT;  Laterality: Bilateral;    SELECTIVE INJECTION OF ANESTHETIC AGENT AROUND LUMBAR SPINAL NERVE ROOT BY TRANSFORAMINAL APPROACH Bilateral 07/05/2023    Procedure: Bilateral L4/5 TF SALBADOR RN IV Sedation;  Surgeon: Leonard Mart MD;  [facility];  Service: Pain Management;  Laterality: Bilateral;    TONSILLECTOMY         Social History     Tobacco Use    Smoking status: Never     Passive exposure: Never    Smokeless tobacco: Never   Substance Use Topics    Alcohol use: Never    Drug use: Never       Family History   Problem Relation Name Age of Onset    Hypertension Mother Kesha English     Diabetes Mother Kehsa English     Breast cancer Mother Kesha English     Cancer Mother Kesha English     Hypertension Father Sandeep English     Stroke Father Sandeep English     No Known Problems Sister      No Known Problems Sister      Hypertension Brother Brother     Hypertension Brother Mauro English     Cataracts Maternal Grandmother Manda Quinn     Diabetes Maternal Grandmother Manda Quinn     Lung cancer Maternal Grandfather      No Known Problems Paternal Grandmother      No Known Problems Paternal Grandfather         Patient's Medications   New Prescriptions    No medications on file   Previous Medications    AMLODIPINE (NORVASC) 5 MG TABLET    Take 1 tablet (5 mg total) by mouth once daily.    ATORVASTATIN (LIPITOR) 10 MG TABLET    Take 1 tablet (10 mg total) by mouth every evening.    AZELASTINE (ASTELIN) 137 MCG (0.1 %) NASAL SPRAY    1 spray (137 mcg total) by Nasal route 2 (two) times daily.    BENAZEPRIL (LOTENSIN) 40 MG TABLET    Take 1 tablet (40 mg total) by mouth once daily.    CALCIPOTRIENE (DOVONOX) 0.005 % CREAM    Apply topically 2 (two) times daily.    CLONAZEPAM (KLONOPIN) 1 MG TABLET    Take 1 tablet (1 mg total) by mouth every evening.    COLCHICINE (COLCRYS) 0.6 MG TABLET    Take 1 tablet (0.6 mg total) by mouth once daily.    DOXEPIN (SILENOR) 3 MG TAB    Take 3 mg by mouth  nightly as needed (insomnia).    FEBUXOSTAT (ULORIC) 40 MG TAB    Take 1 tablet (40 mg total) by mouth once daily.    HYDRALAZINE (APRESOLINE) 100 MG TABLET    Take 1 tablet (100 mg total) by mouth every 8 (eight) hours.    HYDROXYZINE HCL (ATARAX) 25 MG TABLET    Take 25 mg by mouth 2 (two) times daily.    INDOMETHACIN (INDOCIN SR) 75 MG CPSR CR CAPSULE    Take 1 capsule (75 mg total) by mouth 2 (two) times daily as needed (for gout flare).    INSULIN GLARGINE, TOUJEO, (TOUJEO SOLOSTAR U-300 INSULIN) 300 UNIT/ML (1.5 ML) INPN PEN    Inject 85 Units into the skin once daily.    IPRATROPIUM (ATROVENT) 21 MCG (0.03 %) NASAL SPRAY    2 sprays by Each Nostril route 4 (four) times daily as needed for Rhinitis.    KETOCONAZOLE (NIZORAL) 2 % SHAMPOO    Apply topically once a week. Lather in for 5-10 min before rinsing    LEVOTHYROXINE (SYNTHROID) 112 MCG TABLET    TAKE 1 TABLET BY MOUTH ONCE DAILY BEFORE BREAKFAST    METFORMIN (GLUCOPHAGE) 1000 MG TABLET    Take 1 tablet by mouth twice daily with food    METOPROLOL SUCCINATE (TOPROL-XL) 50 MG 24 HR TABLET    Take 1 tablet (50 mg total) by mouth once daily.    MOUNJARO 15 MG/0.5 ML PNIJ    Inject 15 mg into the skin once a week.    PREGABALIN (LYRICA) 150 MG CAPSULE    TAKE 1 CAPSULE BY MOUTH THREE TIMES DAILY    SERTRALINE (ZOLOFT) 100 MG TABLET    Take 100 mg by mouth once daily.    TADALAFIL (CIALIS) 20 MG TAB    Take 1 tablet (20 mg total) by mouth every 24 hours as needed (erectile dysfunction).    TESTOSTERONE CYPIONATE (DEPOTESTOTERONE CYPIONATE) 200 MG/ML INJECTION    Inject 0.75 mLs (150 mg total) into the muscle every 7 days.    TIZANIDINE (ZANAFLEX) 4 MG TABLET    Take 1 tablet (4 mg total) by mouth 2 (two) times daily as needed (Neck Pain).    TRIAMCINOLONE ACETONIDE 0.1% (KENALOG) 0.1 % OINTMENT    AAA bid    VARDENAFIL (LEVITRA) 20 MG TABLET    Take 1 tablet (20 mg total) by mouth daily as needed for Erectile Dysfunction.    VASCEPA 1 GRAM CAP    Take 2  capsules (2,000 mg total) by mouth 2 (two) times daily.   Modified Medications    No medications on file   Discontinued Medications    No medications on file       Review of Systems   Constitutional:  Positive for malaise/fatigue.   HENT: Negative.     Eyes: Negative.    Respiratory: Negative.     Cardiovascular: Negative.    Gastrointestinal: Negative.    Genitourinary: Negative.    Musculoskeletal:  Positive for back pain and joint pain.   Skin: Negative.    Neurological: Negative.    Endo/Heme/Allergies: Negative.    Psychiatric/Behavioral: Negative.     All 12 systems otherwise negative.      Wt Readings from Last 3 Encounters:   11/13/24 116.4 kg (256 lb 9.9 oz)   11/12/24 114.7 kg (252 lb 13.9 oz)   11/05/24 114.7 kg (252 lb 13.9 oz)     Temp Readings from Last 3 Encounters:   09/27/24 98 °F (36.7 °C) (Oral)   07/23/24 98.6 °F (37 °C)   07/17/24 97.3 °F (36.3 °C)     BP Readings from Last 3 Encounters:   11/13/24 134/70   11/05/24 (!) 142/82   10/22/24 120/80     Pulse Readings from Last 3 Encounters:   11/13/24 78   11/05/24 71   10/22/24 80       /70 (BP Location: Left arm, Patient Position: Sitting)   Pulse 78   Wt 116.4 kg (256 lb 9.9 oz)   SpO2 97%   BMI 33.86 kg/m²     Objective:   Physical Exam  Vitals and nursing note reviewed.   Constitutional:       General: He is not in acute distress.     Appearance: He is well-developed. He is obese. He is not diaphoretic.   HENT:      Head: Normocephalic and atraumatic.      Nose: Nose normal.   Eyes:      General: No scleral icterus.     Conjunctiva/sclera: Conjunctivae normal.   Neck:      Thyroid: No thyromegaly.      Vascular: No JVD.   Cardiovascular:      Rate and Rhythm: Normal rate and regular rhythm.      Heart sounds: S1 normal and S2 normal. No murmur heard.     No friction rub. No gallop. No S3 or S4 sounds.   Pulmonary:      Effort: Pulmonary effort is normal. No respiratory distress.      Breath sounds: Normal breath sounds. No stridor. No  wheezing or rales.   Chest:      Chest wall: No tenderness.   Abdominal:      General: Bowel sounds are normal. There is no distension.      Palpations: Abdomen is soft. There is no mass.      Tenderness: There is no abdominal tenderness. There is no rebound.   Genitourinary:     Comments: Deferred  Musculoskeletal:         General: No tenderness or deformity. Normal range of motion.      Cervical back: Normal range of motion and neck supple.   Lymphadenopathy:      Cervical: No cervical adenopathy.   Skin:     General: Skin is warm and dry.      Coloration: Skin is not pale.      Findings: No erythema or rash.   Neurological:      Mental Status: He is alert and oriented to person, place, and time.      Motor: No abnormal muscle tone.      Coordination: Coordination normal.   Psychiatric:         Behavior: Behavior normal.         Thought Content: Thought content normal.         Judgment: Judgment normal.         Lab Results   Component Value Date     09/09/2024    K 4.3 09/09/2024     09/09/2024    CO2 23 09/09/2024    BUN 22 (H) 09/09/2024    CREATININE 1.1 09/09/2024    GLU 76 09/09/2024    HGBA1C 5.6 09/09/2024    MG 1.6 12/07/2023    AST 27 09/09/2024    ALT 34 09/09/2024    ALBUMIN 3.8 09/09/2024    ALBUMIN 4.1 08/12/2022    PROT 6.8 09/09/2024    BILITOT 0.4 09/09/2024    WBC 7.81 08/19/2024    HGB 14.0 08/19/2024    HCT 43.7 08/19/2024    MCV 95 08/19/2024     08/19/2024    TSH 2.499 09/09/2024    CHOL 115 (L) 09/09/2024    HDL 34 (L) 09/09/2024    LDLCALC 51.8 (L) 09/09/2024    TRIG 146 09/09/2024    BNP <10 11/15/2021     Assessment:      1. Benign essential HTN    2. Type 2 diabetes mellitus with other specified complication, unspecified whether long term insulin use    3. Type 2 diabetes mellitus with other circulatory complication, with long-term current use of insulin    4. Hypertension associated with diabetes    5. Hyperlipidemia, unspecified hyperlipidemia type    6. FARA  (obstructive sleep apnea)    7. Hypertriglyceridemia    8. Hypothyroidism (acquired)    9. Nonspecific abnormal electrocardiogram (ECG) (EKG)    10. Type 2 diabetes mellitus without complication, without long-term current use of insulin    11. Type 2 diabetes mellitus without complication, with long-term current use of insulin    12. Low testosterone    13. Other hyperlipidemia            Plan:     HTN   - titrate meds   - dec Norvasc to 5mg     2. HLD,   - cont meds and titrate  - lowered statin dose to Lipitor 10 mg   - Lipids stable/well controlled 9/2024.     3. DM2 A1c 6.6 --> 5.3 --> 5.6 --> 5.6  - cont tx - on Ozempic - increase to 2mg - changed to Mounjaro - increase to 7.5 mg --> increased to 12.5 --> 15   - on Mounjaro     4. Hypothyroidsm TSH1.1  - cont Synthroid    5. FARA  - cont CPAP    6. Obesity, BMI 36 --> BMI 35 - 268 lbs -->  BMI 33 - 248 lbs.  BMI 33 - 250 lbs  --> 256 lbs --> 256 lbs  - cont weight loss with diet/exercise     7. Low T  - cont T supplements as needed  - monitor BP     8.  Neck, back, hand wrist pain  - s/p eval -neck surgery pending with Dr. Nam   - deferred surg until 2025  - pending surgery with Dr. Brown - CTS - b/l    9. Pre-OP CV evaluation prior to carpal tunnel surgery with Dr. Brown  Low periop risk of CV events for moderate risk procedure.  Ok to proceed to the scheduled surgery without further cardiac study.  Continue Metoprolol and Statin periop.      Visit today included increased complexity associated with the care of the episodic problem HTN addressed and managing the longitudinal care of the patient due to the serious and/or complex managed problem(s) .      Thank you for allowing me to participate in this patient's care. Please do not hesitate to contact me with any questions or concerns. Consult note has been forwarded to the referral physician.     Kwame Guidry MD, [facility]  Cardiovascular Disease  [facility], [place]  [phone] (P)

## 2024-11-13 NOTE — PROGRESS NOTES
Hand Surgery Clinic Note    Chief Complaint  Chief Complaint   Patient presents with    Left Hand - Pain, Numbness, Swelling    Right Hand - Pain, Numbness, Swelling       History of Present Illness  59-year-old right-hand dominant male  presents for evaluation of bilateral hand pain and numbness.  Patient has a history of type 2 diabetes with a most recent hemoglobin A1c of 5.6 on 09/09/2024.  Patient was had numbness in both of his hands for a year.  The left side is worse than the right.  All 5 fingers are constantly numb.  He feels that he was decreased   strength in his dropping things.  He does have a known history of cervical spine issues as well for which he was being treated by Neurosurgery.  He was seen by Neurosurgery last week where surgical options were discussed.  Patient is electing to wait until next year to consider scheduling surgery for his neck.  He has tried braces which help at night.  He does have pain which wakes him up from sleep at night.  He has never had injections before for carpal tunnel.  He underwent electrodiagnostic testing in June of 2024, the results of which are reviewed below.  He does intermittently experience pain which radiates from his neck down to his hands bilaterally.    Review of Systems  Review of systems negative for chest pain, shortness of breath, fevers, chills, nausea/vomiting.    Past Medical History  Past Medical History:   Diagnosis Date    Allergy     Cancer     Colon    Diabetes mellitus      09/14/2023 Insulin x 10 years    Gastroesophageal reflux disease without esophagitis 06/20/2023    Hypertension     Multiple lung nodules on CT 10/22/2024    Sleep apnea     Thyroid disease        Past Surgical History  Past Surgical History:   Procedure Laterality Date    BACK SURGERY      COLON SURGERY  02/06/21    COLONOSCOPY N/A 12/29/2020    Procedure: COLONOSCOPY;  Surgeon: William Cotter MD;  Location: [facility];  Service: Endoscopy;   Laterality: N/A;  Will need Rapid doesn't live here    COLONOSCOPY N/A 02/10/2022    Procedure: COLONOSCOPY;  Surgeon: Wili Espinosa MD;  Location: [facility] END[facility];  Service: Endoscopy;  Laterality: N/A;    EPIDURAL STEROID INJECTION INTO CERVICAL SPINE N/A 12/01/2022    Procedure: C7/T1 IL SALBADOR;  Surgeon: Leonard Mart MD;  [facility];  Service: Pain Management;  Laterality: N/A;    EPIDURAL STEROID INJECTION INTO CERVICAL SPINE N/A 7/17/2024    Procedure: C6/7 IL SALBADOR, Left Paramedian Approach;  Surgeon: Leonard Mart MD;  Location: [facility];  Service: Pain Management;  Laterality: N/A;    FESS, WITH NASAL SEPTOPLASTY, WITH IMAGING GUIDANCE Bilateral 08/17/2022    Procedure: FESS, WITH NASAL SEPTOPLASTY, WITH IMAGING GUIDANCE;  Surgeon: Viktor Ferrell MD;  Location: [facility] OR;  Service: ENT;  Laterality: Bilateral;    LAPAROSCOPIC RIGHT COLON RESECTION N/A 02/02/2021    Procedure: COLECTOMY, RIGHT, LAPAROSCOPIC, ERAS low;  Surgeon: Melonie Santoyo MD;  [address];  Service: Colon and Rectal;  Laterality: N/A;  needs rapid covid test    LYSIS OF ADHESIONS Bilateral 04/19/2023    Procedure: LYSIS, ADHESIONS;  Surgeon: Viktor Ferrell MD;  Location: [facility] OR;  Service: ENT;  Laterality: Bilateral;    NASAL ENDOSCOPY Bilateral 04/19/2023    Procedure: ENDOSCOPY, NOSE;  Surgeon: Viktor Ferrell MD;  Location: [facility] OR;  Service: ENT;  Laterality: Bilateral;    NASAL TURBINATE REDUCTION Bilateral 08/17/2022    Procedure: REDUCTION, NASAL TURBINATE;  Surgeon: Viktor Ferrell MD;  Location: [facility] OR;  Service: ENT;  Laterality: Bilateral;    RHINOPLASTY Bilateral 04/19/2023    Procedure: RHINOPLASTY;  Surgeon: Viktor Ferrell MD;  Location: [facility] OR;  Service: ENT;  Laterality: Bilateral;    SELECTIVE INJECTION OF ANESTHETIC AGENT AROUND LUMBAR SPINAL NERVE ROOT BY TRANSFORAMINAL APPROACH Bilateral 07/05/2023    Procedure: Bilateral L4/5 TF SALBADOR RN IV Sedation;  Surgeon: Leonard  "MD Barron;  Location: [facility];  Service: Pain Management;  Laterality: Bilateral;    TONSILLECTOMY         Allergies  Review of patient's allergies indicates:   Allergen Reactions    Demerol (pf) [meperidine (pf)] Other (See Comments)     Pt reports,"They lost my blood pressure."    Percocet [oxycodone-acetaminophen] Itching     itching    Allopurinol analogues Diarrhea       Family History  Family History   Problem Relation Name Age of Onset    Hypertension Mother Kesha English     Diabetes Mother Kesha English     Breast cancer Mother Kesha English     Cancer Mother Kesha English     Hypertension Father Sandeep English     Stroke Father Sandeep English     No Known Problems Sister      No Known Problems Sister      Hypertension Brother Brother     Hypertension Brother Mauro English     Cataracts Maternal Grandmother Manda Quinn     Diabetes Maternal Grandmother Manda Quinn     Lung cancer Maternal Grandfather      No Known Problems Paternal Grandmother      No Known Problems Paternal Grandfather         Social History  Social History     Socioeconomic History    Marital status:    Tobacco Use    Smoking status: Never     Passive exposure: Never    Smokeless tobacco: Never   Substance and Sexual Activity    Alcohol use: Never    Drug use: Never    Sexual activity: Yes     Partners: Female     Birth control/protection: None     Social Drivers of Health     Financial Resource Strain: Medium Risk (5/30/2024)    Overall Financial Resource Strain (CARDIA)     Difficulty of Paying Living Expenses: Somewhat hard   Food Insecurity: No Food Insecurity (5/30/2024)    Hunger Vital Sign     Worried About Running Out of Food in the Last Year: Never true     Ran Out of Food in the Last Year: Never true   Physical Activity: Insufficiently Active (5/30/2024)    Exercise Vital Sign     Days of Exercise per Week: 4 days     Minutes of Exercise per Session: 10 min   Stress: Stress Concern Present (5/30/2024)    [place] " [place] [employer] - Occupational Stress Questionnaire     Feeling of Stress : To some extent   Housing Stability: Unknown (5/30/2024)    Housing Stability Vital Sign     Unable to Pay for Housing in the Last Year: No       Vital Signs  There were no vitals filed for this visit.    Physical Exam  Constitutional: Appears well-developed and well-nourished. No distress.   HENT:   Head: Normocephalic.   Eyes: EOM are normal.   Pulmonary/Chest: Effort normal.   Neurological: Oriented to person, place, and time.   Psychiatric: Normal mood and affect.     Right Upper Extremity:  No abrasions, lacerations, wounds.  No swelling.  No erythema.  Full active wrist flexion and extension.  Full pronation and supination. Positive Tinel's in the median nerve distribution at the wrist.  Positive Phalen's.  Negative Tinel's in the ulnar nerve distribution at the elbow.  No ulnar nerve subluxation with elbow flexion.  No thenar or FDI atrophy.  5/5 apb strength.  5/5 FDI strength.  Two-point discrimination is 5 mm in all 5 fingers.  Patient is able to make a fist and extend all his fingers.  No tenderness over the A1 pulleys of all 5 fingers.  No triggering with range of motion.  Palpable radial pulse.  Positive Spurling's.  Negative Rosangela's.  Normoreflexic biceps, triceps, brachioradialis.    Left Upper Extremity:  No abrasions, lacerations, wounds.  No swelling.  No erythema.  Full active wrist flexion and extension.  Full pronation and supination. Positive Tinel's in the median nerve distribution at the wrist.  Positive Phalen's.  Positive elbow flexion test. Positive Tinel's in the ulnar nerve distribution at the elbow.  No ulnar nerve subluxation with elbow flexion.  No thenar or FDI atrophy.  5/5 apb strength.  5/5 FDI strength.  Two-point discrimination: 5/5/5/5/9.  Patient is able to make a fist and extend all his fingers.  No tenderness over the A1 pulleys of all 5 fingers.  No triggering with range of  motion.  Palpable radial pulse.  Left ring finger is shortened distal to the DIPJ joint as a result of a previous traumatic injury.  Positive Spurling's.  Negative Rosangela's.  Normoreflexic biceps, triceps, brachioradialis.      Imaging  Bilateral hand x-rays three views were obtained today and independently reviewed by myself.  Minimal to mild arthritic changes are noted at the DIPJ joints of the lesser fingers.  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  No fracture.  No dislocation or subluxation.  No foreign body.    Electrodiagnostic studies were reviewed by me today that were performed on 6/13/24. The Left median DSL was 5.5 ms and the right was 5.5 ms. The Left median DML was 5.4 ms and the right was 5.3 ms. Left Ulnar velocity across elbow was 68 m/s and Right was 52 m/s.  EMG shows a slightly reduced or reduced recruitment pattern for left biceps, left triceps, left pronator teres, left trapezius, right pronator teres, right deltoid.  Performing physician interpretation: There is electrodiagnostic evidence of a severe demyelinating and axonal median neuropathy (Carpal tunnel syndrome) across the left wrist and a moderate demyelinating CTS across the right wrist. There is a subacute on chronic radiculopathy of the left C4 nerve root and a chronic radiculopathy of the bilateral C5-C7 nerve roots.      Assessment and Plan  59-year-old right-hand dominant male presents with bilateral carpal tunnel syndrome as well as clinical findings consistent with left cubital tunnel syndrome.  He has a known history of cervical spinal stenosis and cervical radiculopathy for which he is being treated by Neurosurgery.  As such, he has a double crush type injury.  I discussed operative and nonoperative treatment options.  I discussed the risks of carpal tunnel release surgery.  In particular, I discussed the risks that patient was going to have complete relief of his numbness  because some of this may be coming from his neck.    In particular, it was noted that carpal tunnel release surgery outcomes can be unpredictable.  Specifically, it was noted that numbness in the fingers may never resolve or may take more than a year to achieve gradual (and often incomplete) improvement.  It was noted that sometimes the only benefit of the surgery is to prevent significant worsening of weakness or symptoms of nerve compression.  It was noted that any muscular weakness or atrophy due to long-standing carpal tunnel syndrome is generally expected not to improve after the surgery.  It was noted that sometimes re-operation is needed to address new symptoms after the surgery or complications such as damage to normal structures within the field of surgery.  It was noted that normal structures within the field of surgery that can be damaged include the median nerve and the nine flexor tendons to the fingers.  Discussed risks of cubital tunnel release as well including nerve instability, ecchymosis, hematoma, nerve injury, bleeding, pain, need for further surgery, incomplete relief of symptoms.    Patient was like to go ahead and proceed with surgery.  Will schedule left carpal tunnel release and left cubital tunnel release with possible ulnar nerve transposition on 12/10/2024.  We will schedule right carpal tunnel release on 12/31/2024.  Consent was obtained for both procedures in clinic.    Lita Brown MD  Orthopaedic Hand Surgery

## 2024-11-19 ENCOUNTER — PATIENT MESSAGE (OUTPATIENT)
Dept: ADMINISTRATIVE | Facility: HOSPITAL | Age: 59
End: 2024-11-19
Payer: COMMERCIAL

## 2024-11-19 ENCOUNTER — PATIENT MESSAGE (OUTPATIENT)
Dept: ORTHOPEDICS | Facility: CLINIC | Age: 59
End: 2024-11-19
Payer: COMMERCIAL

## 2024-11-20 ENCOUNTER — PATIENT OUTREACH (OUTPATIENT)
Dept: ADMINISTRATIVE | Facility: HOSPITAL | Age: 59
End: 2024-11-20
Payer: COMMERCIAL

## 2024-11-20 ENCOUNTER — PATIENT MESSAGE (OUTPATIENT)
Dept: INTERNAL MEDICINE | Facility: CLINIC | Age: 59
End: 2024-11-20
Payer: COMMERCIAL

## 2024-11-20 DIAGNOSIS — Z12.11 SCREENING FOR MALIGNANT NEOPLASM OF COLON: Primary | ICD-10-CM

## 2024-11-22 ENCOUNTER — PATIENT MESSAGE (OUTPATIENT)
Dept: ORTHOPEDICS | Facility: CLINIC | Age: 59
End: 2024-11-22
Payer: COMMERCIAL

## 2024-11-25 DIAGNOSIS — M10.9 GOUT, UNSPECIFIED CAUSE, UNSPECIFIED CHRONICITY, UNSPECIFIED SITE: ICD-10-CM

## 2024-11-27 ENCOUNTER — HOSPITAL ENCOUNTER (OUTPATIENT)
Dept: PREADMISSION TESTING | Facility: HOSPITAL | Age: 59
Discharge: HOME | End: 2024-11-27
Attending: INTERNAL MEDICINE
Payer: COMMERCIAL

## 2024-11-27 DIAGNOSIS — Z12.11 SCREENING FOR MALIGNANT NEOPLASM OF COLON: ICD-10-CM

## 2024-11-27 RX ORDER — INDOMETHACIN 75 MG/1
75 CAPSULE, EXTENDED RELEASE ORAL 2 TIMES DAILY PRN
Qty: 60 CAPSULE | Refills: 0 | Status: SHIPPED | OUTPATIENT
Start: 2024-11-27

## 2024-12-03 ENCOUNTER — PATIENT MESSAGE (OUTPATIENT)
Dept: PREADMISSION TESTING | Facility: HOSPITAL | Age: 59
End: 2024-12-03
Payer: COMMERCIAL

## 2024-12-03 DIAGNOSIS — Z01.818 PRE-OP TESTING: Primary | ICD-10-CM

## 2024-12-04 ENCOUNTER — ANESTHESIA EVENT (OUTPATIENT)
Dept: SURGERY | Facility: HOSPITAL | Age: 59
End: 2024-12-04
Payer: COMMERCIAL

## 2024-12-04 NOTE — ANESTHESIA PREPROCEDURE EVALUATION
12/04/2024  Narendra English Sr. is a 59 y.o., male.      Pre-op Assessment    I have reviewed the Patient Summary Reports.     I have reviewed the Nursing Notes. I have reviewed the NPO Status.   I have reviewed the Medications.     Review of Systems  Anesthesia Hx:  No problems with previous Anesthesia  Grade 1 view with Dan 2,   Grade 1 view with videolaryngoscope. History of prior surgery of interest to airway management or planning:  Previous anesthesia: General       Airway issues documented on chart review include mask, easy, videolaryngoscope used     Denies Family Hx of Anesthesia complications.    Denies Personal Hx of Anesthesia complications.                    Social:  Non-Smoker       Hematology/Oncology:  Hematology Normal                       --  Cancer in past history:                 Oncology Comments: Colon, s/p R colectomy.     Cardiovascular:     Hypertension               Pt had cards w/u recently for CP, NEG stress echo, NL EF.  NO recurrence.                            Pulmonary:        Sleep Apnea           Education provided regarding risk of obstructive sleep apnea            Renal/:  Renal/ Normal                 Hepatic/GI:  Hepatic/GI Normal                    Neurological:  Neurology Normal                                      Endocrine:  Diabetes, type 2 Hypothyroidism          Psych:  Psychiatric Normal                    Physical Exam  General: Well nourished    Airway:  Mallampati: IV / III  Mouth Opening: Small, but > 3cm  TM Distance: Normal  Tongue: Large  Neck ROM: Normal ROM    Dental:  Intact, Periodontal disease        Anesthesia Plan  Type of Anesthesia, risks & benefits discussed:    Anesthesia Type: Gen ETT  Intra-op Monitoring Plan: Standard ASA Monitors  Post Op Pain Control Plan: multimodal analgesia and IV/PO Opioids PRN  Induction:  IV  Informed  Consent: Informed consent signed with the Patient and all parties understand the risks and agree with anesthesia plan.  All questions answered.   ASA Score: 3  Day of Surgery Review of History & Physical: H&P Update referred to the surgeon/provider.    Ready For Surgery From Anesthesia Perspective.     .

## 2024-12-05 ENCOUNTER — PATIENT MESSAGE (OUTPATIENT)
Dept: PREADMISSION TESTING | Facility: HOSPITAL | Age: 59
End: 2024-12-05
Payer: COMMERCIAL

## 2024-12-06 ENCOUNTER — PATIENT MESSAGE (OUTPATIENT)
Dept: ORTHOPEDICS | Facility: CLINIC | Age: 59
End: 2024-12-06
Payer: COMMERCIAL

## 2024-12-09 ENCOUNTER — PATIENT MESSAGE (OUTPATIENT)
Dept: PREADMISSION TESTING | Facility: HOSPITAL | Age: 59
End: 2024-12-09
Payer: COMMERCIAL

## 2024-12-09 ENCOUNTER — PATIENT MESSAGE (OUTPATIENT)
Dept: ORTHOPEDICS | Facility: CLINIC | Age: 59
End: 2024-12-09
Payer: COMMERCIAL

## 2024-12-09 ENCOUNTER — PATIENT MESSAGE (OUTPATIENT)
Dept: RESPIRATORY THERAPY | Facility: HOSPITAL | Age: 59
End: 2024-12-09
Payer: COMMERCIAL

## 2024-12-09 RX ORDER — TIRZEPATIDE 15 MG/.5ML
15 INJECTION, SOLUTION SUBCUTANEOUS WEEKLY
Qty: 4 PEN | Refills: 2 | Status: SHIPPED | OUTPATIENT
Start: 2024-12-09

## 2024-12-10 ENCOUNTER — HOSPITAL ENCOUNTER (OUTPATIENT)
Facility: HOSPITAL | Age: 59
Discharge: HOME | End: 2024-12-10
Attending: STUDENT IN AN ORGANIZED HEALTH CARE EDUCATION/TRAINING PROGRAM | Admitting: STUDENT IN AN ORGANIZED HEALTH CARE EDUCATION/TRAINING PROGRAM
Payer: COMMERCIAL

## 2024-12-10 ENCOUNTER — ANESTHESIA (OUTPATIENT)
Dept: SURGERY | Facility: HOSPITAL | Age: 59
End: 2024-12-10
Payer: COMMERCIAL

## 2024-12-10 VITALS
HEART RATE: 74 BPM | WEIGHT: 259.38 LBS | SYSTOLIC BLOOD PRESSURE: 119 MMHG | HEIGHT: 73 IN | BODY MASS INDEX: 34.38 KG/M2 | DIASTOLIC BLOOD PRESSURE: 67 MMHG | RESPIRATION RATE: 17 BRPM | OXYGEN SATURATION: 99 % | TEMPERATURE: 98 F

## 2024-12-10 DIAGNOSIS — Z98.890 POSTOPERATIVE STATE: Primary | ICD-10-CM

## 2024-12-10 LAB
POCT GLUCOSE: 110 MG/DL (ref 70–110)
POCT GLUCOSE: 127 MG/DL (ref 70–110)

## 2024-12-10 PROCEDURE — 64718 REVISE ULNAR NERVE AT ELBOW: CPT | Mod: LT,,, | Performed by: STUDENT IN AN ORGANIZED HEALTH CARE EDUCATION/TRAINING PROGRAM

## 2024-12-10 PROCEDURE — 25000003 PHARM REV CODE 250: Performed by: NURSE ANESTHETIST, CERTIFIED REGISTERED

## 2024-12-10 PROCEDURE — 63600175 PHARM REV CODE 636 W HCPCS: Mod: JZ,JG | Performed by: STUDENT IN AN ORGANIZED HEALTH CARE EDUCATION/TRAINING PROGRAM

## 2024-12-10 PROCEDURE — 36000707: Performed by: STUDENT IN AN ORGANIZED HEALTH CARE EDUCATION/TRAINING PROGRAM

## 2024-12-10 PROCEDURE — 36000706: Performed by: STUDENT IN AN ORGANIZED HEALTH CARE EDUCATION/TRAINING PROGRAM

## 2024-12-10 PROCEDURE — 71000033 HC RECOVERY, INTIAL HOUR: Performed by: STUDENT IN AN ORGANIZED HEALTH CARE EDUCATION/TRAINING PROGRAM

## 2024-12-10 PROCEDURE — 27200651 HC AIRWAY, LMA: Performed by: NURSE ANESTHETIST, CERTIFIED REGISTERED

## 2024-12-10 PROCEDURE — 37000009 HC ANESTHESIA EA ADD 15 MINS: Performed by: STUDENT IN AN ORGANIZED HEALTH CARE EDUCATION/TRAINING PROGRAM

## 2024-12-10 PROCEDURE — 71000015 HC POSTOP RECOV 1ST HR: Performed by: STUDENT IN AN ORGANIZED HEALTH CARE EDUCATION/TRAINING PROGRAM

## 2024-12-10 PROCEDURE — 64721 CARPAL TUNNEL SURGERY: CPT | Mod: 51,LT,, | Performed by: STUDENT IN AN ORGANIZED HEALTH CARE EDUCATION/TRAINING PROGRAM

## 2024-12-10 PROCEDURE — 37000008 HC ANESTHESIA 1ST 15 MINUTES: Performed by: STUDENT IN AN ORGANIZED HEALTH CARE EDUCATION/TRAINING PROGRAM

## 2024-12-10 PROCEDURE — 63600175 PHARM REV CODE 636 W HCPCS: Performed by: NURSE ANESTHETIST, CERTIFIED REGISTERED

## 2024-12-10 PROCEDURE — 25000003 PHARM REV CODE 250: Performed by: STUDENT IN AN ORGANIZED HEALTH CARE EDUCATION/TRAINING PROGRAM

## 2024-12-10 RX ORDER — GLUCAGON 1 MG
1 KIT INJECTION
Status: DISCONTINUED | OUTPATIENT
Start: 2024-12-10 | End: 2024-12-10 | Stop reason: HOSPADM

## 2024-12-10 RX ORDER — ACETAMINOPHEN 500 MG
500 TABLET ORAL EVERY 8 HOURS PRN
Qty: 30 TABLET | Refills: 0 | Status: SHIPPED | OUTPATIENT
Start: 2024-12-10

## 2024-12-10 RX ORDER — MIDAZOLAM HYDROCHLORIDE 1 MG/ML
INJECTION INTRAMUSCULAR; INTRAVENOUS
Status: DISCONTINUED | OUTPATIENT
Start: 2024-12-10 | End: 2024-12-10

## 2024-12-10 RX ORDER — FENTANYL CITRATE 50 UG/ML
INJECTION, SOLUTION INTRAMUSCULAR; INTRAVENOUS
Status: DISCONTINUED | OUTPATIENT
Start: 2024-12-10 | End: 2024-12-10

## 2024-12-10 RX ORDER — NAPROXEN 500 MG/1
500 TABLET ORAL EVERY 8 HOURS PRN
Qty: 30 TABLET | Refills: 0 | Status: SHIPPED | OUTPATIENT
Start: 2024-12-10

## 2024-12-10 RX ORDER — EPHEDRINE SULFATE 50 MG/ML
INJECTION, SOLUTION INTRAVENOUS
Status: DISCONTINUED | OUTPATIENT
Start: 2024-12-10 | End: 2024-12-10

## 2024-12-10 RX ORDER — BACITRACIN ZINC 500 [USP'U]/G
OINTMENT TOPICAL
Status: DISCONTINUED
Start: 2024-12-10 | End: 2024-12-10 | Stop reason: HOSPADM

## 2024-12-10 RX ORDER — TRAMADOL HYDROCHLORIDE 50 MG/1
50 TABLET ORAL EVERY 8 HOURS PRN
Qty: 5 TABLET | Refills: 0 | Status: SHIPPED | OUTPATIENT
Start: 2024-12-10

## 2024-12-10 RX ORDER — DEXAMETHASONE SODIUM PHOSPHATE 4 MG/ML
INJECTION, SOLUTION INTRA-ARTICULAR; INTRALESIONAL; INTRAMUSCULAR; INTRAVENOUS; SOFT TISSUE
Status: DISCONTINUED | OUTPATIENT
Start: 2024-12-10 | End: 2024-12-10

## 2024-12-10 RX ORDER — ONDANSETRON HYDROCHLORIDE 2 MG/ML
4 INJECTION, SOLUTION INTRAVENOUS DAILY PRN
Status: DISCONTINUED | OUTPATIENT
Start: 2024-12-10 | End: 2024-12-10 | Stop reason: HOSPADM

## 2024-12-10 RX ORDER — FENTANYL CITRATE 50 UG/ML
25 INJECTION, SOLUTION INTRAMUSCULAR; INTRAVENOUS EVERY 5 MIN PRN
Status: DISCONTINUED | OUTPATIENT
Start: 2024-12-10 | End: 2024-12-10 | Stop reason: HOSPADM

## 2024-12-10 RX ORDER — BACITRACIN ZINC 500 UNIT/G
OINTMENT (GRAM) TOPICAL
Status: DISCONTINUED | OUTPATIENT
Start: 2024-12-10 | End: 2024-12-10 | Stop reason: HOSPADM

## 2024-12-10 RX ORDER — PROPOFOL 10 MG/ML
VIAL (ML) INTRAVENOUS
Status: DISCONTINUED | OUTPATIENT
Start: 2024-12-10 | End: 2024-12-10

## 2024-12-10 RX ORDER — LIDOCAINE HYDROCHLORIDE 10 MG/ML
INJECTION, SOLUTION EPIDURAL; INFILTRATION; INTRACAUDAL; PERINEURAL
Status: DISCONTINUED
Start: 2024-12-10 | End: 2024-12-10 | Stop reason: HOSPADM

## 2024-12-10 RX ORDER — BUPIVACAINE HYDROCHLORIDE 2.5 MG/ML
INJECTION, SOLUTION EPIDURAL; INFILTRATION; INTRACAUDAL
Status: DISCONTINUED
Start: 2024-12-10 | End: 2024-12-10 | Stop reason: HOSPADM

## 2024-12-10 RX ORDER — BUPIVACAINE HYDROCHLORIDE 2.5 MG/ML
INJECTION, SOLUTION EPIDURAL; INFILTRATION; INTRACAUDAL
Status: DISCONTINUED | OUTPATIENT
Start: 2024-12-10 | End: 2024-12-10 | Stop reason: HOSPADM

## 2024-12-10 RX ORDER — LIDOCAINE HYDROCHLORIDE 10 MG/ML
INJECTION, SOLUTION EPIDURAL; INFILTRATION; INTRACAUDAL; PERINEURAL
Status: DISCONTINUED | OUTPATIENT
Start: 2024-12-10 | End: 2024-12-10 | Stop reason: HOSPADM

## 2024-12-10 RX ORDER — LIDOCAINE HYDROCHLORIDE 20 MG/ML
INJECTION INTRAVENOUS
Status: DISCONTINUED | OUTPATIENT
Start: 2024-12-10 | End: 2024-12-10

## 2024-12-10 RX ORDER — SODIUM CHLORIDE, SODIUM LACTATE, POTASSIUM CHLORIDE, CALCIUM CHLORIDE 600; 310; 30; 20 MG/100ML; MG/100ML; MG/100ML; MG/100ML
INJECTION, SOLUTION INTRAVENOUS CONTINUOUS PRN
Status: DISCONTINUED | OUTPATIENT
Start: 2024-12-10 | End: 2024-12-10

## 2024-12-10 RX ORDER — ONDANSETRON HYDROCHLORIDE 2 MG/ML
INJECTION, SOLUTION INTRAVENOUS
Status: DISCONTINUED | OUTPATIENT
Start: 2024-12-10 | End: 2024-12-10

## 2024-12-10 RX ADMIN — PROPOFOL 200 MG: 10 INJECTION, EMULSION INTRAVENOUS at 08:12

## 2024-12-10 RX ADMIN — DEXAMETHASONE SODIUM PHOSPHATE 4 MG: 4 INJECTION, SOLUTION INTRA-ARTICULAR; INTRALESIONAL; INTRAMUSCULAR; INTRAVENOUS; SOFT TISSUE at 08:12

## 2024-12-10 RX ADMIN — MIDAZOLAM 2 MG: 1 INJECTION INTRAMUSCULAR; INTRAVENOUS at 08:12

## 2024-12-10 RX ADMIN — FENTANYL CITRATE 25 MCG: 50 INJECTION, SOLUTION INTRAMUSCULAR; INTRAVENOUS at 09:12

## 2024-12-10 RX ADMIN — EPHEDRINE SULFATE 10 MG: 50 INJECTION INTRAVENOUS at 08:12

## 2024-12-10 RX ADMIN — EPHEDRINE SULFATE 15 MG: 50 INJECTION INTRAVENOUS at 09:12

## 2024-12-10 RX ADMIN — FENTANYL CITRATE 50 MCG: 50 INJECTION, SOLUTION INTRAMUSCULAR; INTRAVENOUS at 08:12

## 2024-12-10 RX ADMIN — EPHEDRINE SULFATE 25 MG: 50 INJECTION INTRAVENOUS at 08:12

## 2024-12-10 RX ADMIN — ONDANSETRON 4 MG: 2 INJECTION INTRAMUSCULAR; INTRAVENOUS at 09:12

## 2024-12-10 RX ADMIN — LIDOCAINE HYDROCHLORIDE 50 MG: 20 INJECTION INTRAVENOUS at 08:12

## 2024-12-10 RX ADMIN — SODIUM CHLORIDE, POTASSIUM CHLORIDE, SODIUM LACTATE AND CALCIUM CHLORIDE: 600; 310; 30; 20 INJECTION, SOLUTION INTRAVENOUS at 08:12

## 2024-12-10 RX ADMIN — DEXTROSE 3 G: 50 INJECTION, SOLUTION INTRAVENOUS at 08:12

## 2024-12-10 NOTE — DISCHARGE SUMMARY
[facility]  Discharge Note  Short Stay    Procedure(s) (LRB):  RELEASE, CARPAL TUNNEL (Left)  LEFT CUBITAL TUNNEL RELEASE, IN SITU      OUTCOME: Patient tolerated treatment/procedure well without complication and is now ready for discharge.    DISPOSITION: Home or Self Care    FINAL DIAGNOSIS:  Left carpal tunnel syndrome, left cubital tunnel syndrome    FOLLOWUP: In clinic    DISCHARGE INSTRUCTIONS:    Discharge Procedure Orders   Diet Adult Regular     Leave dressing on - Keep it clean, dry, and intact until clinic visit     Weight bearing restrictions (specify):   Order Comments: No lifting greater than 1 lb with the operative hand. Work on gentle finger range of motion.        TIME SPENT ON DISCHARGE: 5 minutes

## 2024-12-10 NOTE — TRANSFER OF CARE
"Anesthesia Transfer of Care Note    Patient: Narendra Hopkins Amador Brooke.    Procedure(s) Performed: Procedure(s) (LRB):  RELEASE, CARPAL TUNNEL (Left)  *DECOMPRESSION, NERVE, ULNAR* INACTIVE (Left)    Patient location: PACU    Anesthesia Type: general    Transport from OR: Transported from OR on room air with adequate spontaneous ventilation    Post pain: adequate analgesia    Post assessment: no apparent anesthetic complications    Post vital signs: stable    Level of consciousness: awake    Nausea/Vomiting: no nausea/vomiting    Complications: none    Transfer of care protocol was followed      Last vitals: Visit Vitals  BP (!) 149/70 (BP Location: Right arm, Patient Position: Sitting)   Pulse 65   Temp 36.7 °C (98 °F)   Resp 16   Ht 6' 1" (1.854 m)   Wt 117.7 kg (259 lb 5.9 oz)   SpO2 95%   BMI 34.22 kg/m²     "

## 2024-12-10 NOTE — OP NOTE
[facility]  Orthopaedic Hand Surgery  Operative Note    SUMMARY     Date of Procedure: 12/10/2024     Procedure:   43041 Left cubital tunnel release, in situ  01839 Left carpal tunnel release       Surgeons and Role:     * Lita Brown MD - Primary    Assistant: First Zachariah Assist Student    Pre-Operative Diagnosis: Left carpal tunnel syndrome, left cubital tunnel syndrome    Post-Operative Diagnosis: Same    Anesthesia: General, Local    Indication for Procedure:  59 y.o. male presented to clinic with left carpal and cubital tunnel syndrome. Risks and benefits of operative versus nonoperative treatment were discussed with the patient and he elected to proceed with surgery. Consent was obtained for left carpal and cubital tunnel release with possible ulnar nerve transposition.    Operative Findings (including complications, if any): Thickened transverse carpal ligament, compression of ulnar nerve at Morro's ligament    Description of Technical Procedures:   The patient was brought to the operating room and laid supine.  A tourniquet was placed at the left upper arm and the arm prepped with alcohol/chloraprep and draped in the usual sterile surgical fashion.  Preoperative time out was performed with confirmation of correct patient, side, and site, identification of all personnel, confirmation of administration of preoperative antibiotic, dry prep, and confirmation of all needed equipment.  When this was completed, I proceeded with the surgery.     First, carpal tunnel release was performed. A skin marker was used to dante the relevant landmarks. 10cc of lidocaine 1% without epinephrine mixed with 0.25% marcaine was injected for local anesthetic.  An esmarch was used to exsanguinate the limb and the tourniquet was raised to 250 mm Hg.  A 15- blade was used to make the incision.  The incision began just distal to the distal wrist crease in line with the radial border of the ring  finger. The incision was made through the skin, subcutaneous tissue, palmar fascia down to the transverse carpal ligament. Under direct visualization, the transverse carpal ligament was identified and incised just radial to the hook of the hamate. I sharply incised the entirety of the transverse carpal ligament distally under direct visualization just past the fat surrounding the superficial palmar arch. I then incised the proximal portion of the transverse carpal ligament to and well across the wrist crease to include the distal forearm fascia.  The ligament was sharply divided with care given to avoiding injury to adjacent neurovascular structures.  I were able to confirm a complete release proximally and distally by direct visualization and by palpation- there were no constrictive elements affecting the nerve.      Next, cubital tunnel release was performed. A incision was made between the medial epicondyle and olecranon along the course of the ulnar nerve. I incised sharply through skin and sharply through the subcutaneous tissues proximally. No MABC nerve branches were encountered. The ulnar nerve was identified just posterior to the medial intermuscular septum. I sharply incised the overlying fascia and bands over the nerve. I proceeded proximally incising the overlying tissue from the nerve, and identified the arcade of [place] and freed any compressive tissue around the nerve up to 7cm proximal to the medial epicondyle. Once decompressed proximally, I proceeded to incise the fascia over the top of  nerve distally incising the cubital retinaculum and Morro's ligament. There was a significant amount of adhesions/scar tissue formation at the level of Morro's ligament. I incised the FCU fascia over the course of the ulnar nerve. I then bluntly dissected between the heads of FCU and free all tissue over the top of the nerve. From proximal to distal I confirmed the nerve was free of any compression from  the surrounding soft tissue. An FCU branch was noted and protected throughout the case. Once fully decompressed throughout its course, the elbow was taken through full ROM and there was no subluxation of the nerve anterior. The wound was thoroughly irrigated.     Tourniquet was let down, and meticulous hemostasis was achieved. At the elbow, skin was closed with 4-0 vicryl and 4-0 running subcuticular Monocryl followed by dermabond and steri strips. The carpal tunnel incision was closed with 4-0 nylon. Bacitracin, adaptic, 4x4, webril, and ace wrap was placed. All sponge, needle, and instrument counts were correct at the conclusion of the procedure.  The patient was awakened and taken to the recovery room in stable condition.    Estimated Blood Loss (EBL): 3cc           Implants: * No implants in log *    Specimens:   Specimen (24h ago, onward)      None                    Condition: Good    Disposition: PACU - hemodynamically stable.    Attestation: I was present and scrubbed for the entire procedure.    Lita Brown MD  Orthopaedic Hand Surgery

## 2024-12-10 NOTE — INTERVAL H&P NOTE
The patient has been examined and the H&P has been reviewed:    I concur with the findings and no changes have occurred since H&P was written.    Surgery risks, benefits and alternative options discussed and understood by patient/family.    Lita Brown MD  Orthopaedic Hand Surgery

## 2024-12-10 NOTE — ANESTHESIA POSTPROCEDURE EVALUATION
Anesthesia Post Evaluation    Patient: Narendra Hopkins Amador Brooke.    Procedure(s) Performed: Procedure(s) (LRB):  RELEASE, CARPAL TUNNEL (Left)  *DECOMPRESSION, NERVE, ULNAR* INACTIVE (Left)    Final Anesthesia Type: general      Patient location during evaluation: PACU  Patient participation: Yes- Able to Participate  Level of consciousness: awake  Post-procedure vital signs: reviewed and stable  Pain management: adequate  Airway patency: patent    PONV status at discharge: No PONV  Anesthetic complications: no      Cardiovascular status: stable  Respiratory status: unassisted  Hydration status: euvolemic  Follow-up not needed.              Vitals Value Taken Time   /67 12/10/24 1010   Temp 36.6 °C (97.9 °F) 12/10/24 0930   Pulse 78 12/10/24 1011   Resp 17 12/10/24 1010   SpO2 97 % 12/10/24 1011   Vitals shown include unfiled device data.      No case tracking events are documented in the log.      Pain/Shayan Score: Shayan Score: 10 (12/10/2024 10:10 AM)

## 2024-12-19 ENCOUNTER — TELEPHONE (OUTPATIENT)
Dept: CARDIOLOGY | Facility: CLINIC | Age: 59
End: 2024-12-19
Payer: COMMERCIAL

## 2024-12-23 ENCOUNTER — OFFICE VISIT (OUTPATIENT)
Dept: ORTHOPEDICS | Facility: CLINIC | Age: 59
End: 2024-12-23
Payer: COMMERCIAL

## 2024-12-23 ENCOUNTER — LAB VISIT (OUTPATIENT)
Dept: LAB | Facility: HOSPITAL | Age: 59
End: 2024-12-23
Attending: UROLOGY
Payer: COMMERCIAL

## 2024-12-23 VITALS — BODY MASS INDEX: 34.39 KG/M2 | HEIGHT: 73 IN | WEIGHT: 259.5 LBS

## 2024-12-23 DIAGNOSIS — E29.1 HYPOGONADISM MALE: ICD-10-CM

## 2024-12-23 DIAGNOSIS — Z98.890 POST-OPERATIVE STATE: Primary | ICD-10-CM

## 2024-12-23 LAB
ALBUMIN SERPL BCP-MCNC: 3.7 G/DL (ref 3.5–5.2)
ALP SERPL-CCNC: 38 U/L (ref 40–150)
ALT SERPL W/O P-5'-P-CCNC: 31 U/L (ref 10–44)
AST SERPL-CCNC: 23 U/L (ref 10–40)
BASOPHILS # BLD AUTO: 0.06 K/UL (ref 0–0.2)
BASOPHILS NFR BLD: 0.9 % (ref 0–1.9)
BILIRUB DIRECT SERPL-MCNC: 0.2 MG/DL (ref 0.1–0.3)
BILIRUB SERPL-MCNC: 0.4 MG/DL (ref 0.1–1)
DIFFERENTIAL METHOD BLD: NORMAL
EOSINOPHIL # BLD AUTO: 0.3 K/UL (ref 0–0.5)
EOSINOPHIL NFR BLD: 3.9 % (ref 0–8)
ERYTHROCYTE [DISTWIDTH] IN BLOOD BY AUTOMATED COUNT: 14.5 % (ref 11.5–14.5)
HCT VFR BLD AUTO: 48.2 % (ref 40–54)
HGB BLD-MCNC: 15.5 G/DL (ref 14–18)
IMM GRANULOCYTES # BLD AUTO: 0.02 K/UL (ref 0–0.04)
IMM GRANULOCYTES NFR BLD AUTO: 0.3 % (ref 0–0.5)
LYMPHOCYTES # BLD AUTO: 1.7 K/UL (ref 1–4.8)
LYMPHOCYTES NFR BLD: 24.4 % (ref 18–48)
MCH RBC QN AUTO: 30.5 PG (ref 27–31)
MCHC RBC AUTO-ENTMCNC: 32.2 G/DL (ref 32–36)
MCV RBC AUTO: 95 FL (ref 82–98)
MONOCYTES # BLD AUTO: 1 K/UL (ref 0.3–1)
MONOCYTES NFR BLD: 14.7 % (ref 4–15)
NEUTROPHILS # BLD AUTO: 3.9 K/UL (ref 1.8–7.7)
NEUTROPHILS NFR BLD: 55.8 % (ref 38–73)
NRBC BLD-RTO: 0 /100 WBC
PLATELET # BLD AUTO: 241 K/UL (ref 150–450)
PMV BLD AUTO: 12.6 FL (ref 9.2–12.9)
PROT SERPL-MCNC: 6.5 G/DL (ref 6–8.4)
RBC # BLD AUTO: 5.08 M/UL (ref 4.6–6.2)
TESTOST SERPL-MCNC: 265 NG/DL (ref 304–1227)
WBC # BLD AUTO: 7.01 K/UL (ref 3.9–12.7)

## 2024-12-23 PROCEDURE — 99999 PR PBB SHADOW E&M-EST. PATIENT-LVL IV: CPT | Mod: PBBFAC,,, | Performed by: STUDENT IN AN ORGANIZED HEALTH CARE EDUCATION/TRAINING PROGRAM

## 2024-12-23 PROCEDURE — 36415 COLL VENOUS BLD VENIPUNCTURE: CPT | Mod: PO | Performed by: UROLOGY

## 2024-12-23 PROCEDURE — 84403 ASSAY OF TOTAL TESTOSTERONE: CPT | Performed by: UROLOGY

## 2024-12-23 PROCEDURE — 80076 HEPATIC FUNCTION PANEL: CPT | Performed by: UROLOGY

## 2024-12-23 PROCEDURE — 85025 COMPLETE CBC W/AUTO DIFF WBC: CPT | Performed by: UROLOGY

## 2024-12-23 NOTE — H&P (VIEW-ONLY)
Hand Surgery Clinic Postop Note    Surgery Date: 12/10/2024  Surgery: Left carpal tunnel release, left cubital tunnel release in situ    Postoperative Course:  59-year-old male presents for follow up.  He is 13 days status post the above procedure.  Doing great.  Pain level is a 0/10.  He has no night pain.  He notes that the numbness in his hand is improving but it has not returned to normal.  He still notes some numbness in his small finger which was also present before surgery.  No elbow pain.  No fevers or chills.  No other issues.    Patient has previously been diagnosed with right carpal tunnel syndrome and was booked for right carpal tunnel release on 12/31/2024.  See previous note for full details on associated history.    Vital Signs  There were no vitals filed for this visit.    Physical Exam  General - NAD  Resp - nonlabored breathing  CV - RR by RP    Right Upper Extremity:  No abrasions, lacerations, wounds.  No swelling.  No erythema.  Full active wrist flexion and extension.  Full pronation and supination. Positive Tinel's in the median nerve distribution at the wrist.  Positive Phalen's.  Negative Tinel's in the ulnar nerve distribution at the elbow.  No ulnar nerve subluxation with elbow flexion.  No thenar or FDI atrophy.  5/5 apb strength.  5/5 FDI strength.  Two-point discrimination is 5 mm in all 5 fingers.  Patient is able to make a fist and extend all his fingers.  No tenderness over the A1 pulleys of all 5 fingers.  No triggering with range of motion.  Palpable radial pulse.  Positive Spurling's.  Negative Rosangela's.  Normoreflexic biceps, triceps, brachioradialis.    Left Upper Extremity:  Incision is well healed. Nylon suture in place at the carpal tunnel incision and subcuticular Monocryl in place at the cubital tunnel incision. No swelling. No erythema. No drainage.  No ecchymosis.  Patient is able to make a fist and extend all his fingers.  Full active wrist flexion and extension.  Full  pronation and supination.  Full active elbow flexion and extension.  Two-point discrimination: 5/5/5/5/9.  Fires APB.  Fires FDI.  Palpable radial pulse.    Imaging  No new imaging obtained today    Assessment and Plan  59-year-old male presents for follow up.  He is 13 days status post left carpal tunnel release, left cubital tunnel release in-situ.  Doing well.  He has nerve pain has resolved.  He does have some residual loss of two-point discrimination in his small finger.  I discussed that it will take up to a year to see the full extent of recovery of this after surgery.  Patient may have some permanent numbness in his finger as a result of the severity of his cubital tunnel syndrome prior to surgery.  Sutures removed in clinic today.  Okay to shower.  No activity restrictions.  Discussed scar massage.      Patient is scheduled for right carpal tunnel release on 12/31/24 and has a postop appointment scheduled as well.  Consent was obtained for this at a previous clinic visit.    Lita Brown MD  Orthopaedic Hand Surgery

## 2024-12-25 DIAGNOSIS — M10.9 GOUT, UNSPECIFIED CAUSE, UNSPECIFIED CHRONICITY, UNSPECIFIED SITE: ICD-10-CM

## 2024-12-26 ENCOUNTER — PATIENT MESSAGE (OUTPATIENT)
Dept: PREADMISSION TESTING | Facility: HOSPITAL | Age: 59
End: 2024-12-26
Payer: COMMERCIAL

## 2024-12-26 RX ORDER — TIRZEPATIDE 15 MG/.5ML
15 INJECTION, SOLUTION SUBCUTANEOUS WEEKLY
Qty: 4 PEN | Refills: 2 | Status: SHIPPED | OUTPATIENT
Start: 2024-12-26

## 2024-12-27 ENCOUNTER — PATIENT MESSAGE (OUTPATIENT)
Dept: RHEUMATOLOGY | Facility: CLINIC | Age: 59
End: 2024-12-27
Payer: COMMERCIAL

## 2024-12-27 ENCOUNTER — PATIENT MESSAGE (OUTPATIENT)
Dept: PREADMISSION TESTING | Facility: HOSPITAL | Age: 59
End: 2024-12-27
Payer: COMMERCIAL

## 2024-12-27 ENCOUNTER — PATIENT MESSAGE (OUTPATIENT)
Dept: CARDIOLOGY | Facility: CLINIC | Age: 59
End: 2024-12-27
Payer: COMMERCIAL

## 2024-12-27 ENCOUNTER — ANESTHESIA EVENT (OUTPATIENT)
Dept: SURGERY | Facility: HOSPITAL | Age: 59
End: 2024-12-27
Payer: COMMERCIAL

## 2024-12-27 DIAGNOSIS — E11.9 TYPE 2 DIABETES MELLITUS WITHOUT COMPLICATION, WITH LONG-TERM CURRENT USE OF INSULIN: ICD-10-CM

## 2024-12-27 DIAGNOSIS — Z79.4 TYPE 2 DIABETES MELLITUS WITHOUT COMPLICATION, WITH LONG-TERM CURRENT USE OF INSULIN: ICD-10-CM

## 2024-12-27 NOTE — ANESTHESIA PREPROCEDURE EVALUATION
12/27/2024  Narendra English Sr. is a 59 y.o., male.      Pre-op Assessment    I have reviewed the Patient Summary Reports.     I have reviewed the Nursing Notes. I have reviewed the NPO Status.   I have reviewed the Medications.     Review of Systems  Anesthesia Hx:  No problems with previous Anesthesia  Grade 1 view with Dan 2,   Grade 1 view with videolaryngoscope. History of prior surgery of interest to airway management or planning:  Previous anesthesia: General       Airway issues documented on chart review include mask, easy, videolaryngoscope used     Denies Family Hx of Anesthesia complications.    Denies Personal Hx of Anesthesia complications.                    Social:  Non-Smoker       Hematology/Oncology:  Hematology Normal                       --  Cancer in past history:                 Oncology Comments: Colon, s/p R colectomy.     Cardiovascular:     Hypertension               Pt had cards w/u recently for CP, NEG stress echo, NL EF.  NO recurrence.                            Pulmonary:        Sleep Apnea           Education provided regarding risk of obstructive sleep apnea            Renal/:  Renal/ Normal                 Hepatic/GI:     GERD                Neurological:  Neurology Normal                                      Endocrine:  Diabetes, type 2 Hypothyroidism          Psych:  Psychiatric Normal                    Physical Exam  General: Well nourished    Airway:  Mallampati: IV / III  Mouth Opening: Small, but > 3cm  TM Distance: 4 - 6 cm  Tongue: Large  Neck ROM: Normal ROM    Dental:  Intact, Periodontal disease    Chest/Lungs:  Clear to auscultation, Normal Respiratory Rate    Heart:  Rate: Normal  Rhythm: Regular Rhythm        Anesthesia Plan  Type of Anesthesia, risks & benefits discussed:    Anesthesia Type: Gen Supraglottic Airway, Gen Natural Airway, MAC  Intra-op  Monitoring Plan: Standard ASA Monitors  Post Op Pain Control Plan: multimodal analgesia and IV/PO Opioids PRN  Induction:  IV  Informed Consent: Informed consent signed with the Patient and all parties understand the risks and agree with anesthesia plan.  All questions answered. Patient consented to blood products? No  ASA Score: 3  Day of Surgery Review of History & Physical: H&P Update referred to the surgeon/provider.    Ready For Surgery From Anesthesia Perspective.     .

## 2024-12-28 NOTE — TELEPHONE ENCOUNTER
No care due was identified.  [facility] Embedded Care Due Messages. Reference number: [id number].   12/27/2024 11:49:53 PM CST

## 2024-12-28 NOTE — TELEPHONE ENCOUNTER
Care Due:                  Date            Visit Type   Department     Provider  --------------------------------------------------------------------------------                                EP -                              PRIMARY      [facility] INTER[facility]  Last Visit: 09-      CARE ([facility])   MEDICINE       Tabitha Melgoza  Next Visit: None Scheduled  None         None Found                                                            Last  Test          Frequency    Reason                     Performed    Due Date  --------------------------------------------------------------------------------    HBA1C.......  6 months...  metFORMIN................  09- 03-    [facility] Embedded Care Due Messages. Reference number: [id number].   12/27/2024 11:48:59 PM CST

## 2024-12-29 RX ORDER — METFORMIN HYDROCHLORIDE 1000 MG/1
1000 TABLET ORAL 2 TIMES DAILY WITH MEALS
Qty: 180 TABLET | Refills: 0 | OUTPATIENT
Start: 2024-12-29

## 2024-12-29 RX ORDER — METFORMIN HYDROCHLORIDE 1000 MG/1
1000 TABLET ORAL 2 TIMES DAILY WITH MEALS
Qty: 180 TABLET | Refills: 0 | Status: SHIPPED | OUTPATIENT
Start: 2024-12-29

## 2024-12-29 NOTE — TELEPHONE ENCOUNTER
Refill Decision Note   Narendra Amador  is requesting a refill authorization.  Brief Assessment and Rationale for Refill:  Approve     Medication Therapy Plan:         Comments:     Note composed:11:26 AM 12/29/2024

## 2024-12-29 NOTE — TELEPHONE ENCOUNTER
Provider Staff:  Action required for this patient    Requires labs      Please see care gap opportunities below in Care Due Message.    Thanks!  [facility]     Appointments      Date Provider   Last Visit   9/18/2024 Tabitha Melgoza MD   Next Visit   12/27/2024 Tabitha Melgoza MD     Refill Decision Note   Narendra English  is requesting a refill authorization.  Brief Assessment and Rationale for Refill:  Quick Discontinue     Medication Therapy Plan:         Comments:     Note composed:11:25 AM 12/29/2024

## 2024-12-30 ENCOUNTER — PATIENT MESSAGE (OUTPATIENT)
Dept: ORTHOPEDICS | Facility: CLINIC | Age: 59
End: 2024-12-30
Payer: COMMERCIAL

## 2024-12-30 ENCOUNTER — TELEPHONE (OUTPATIENT)
Dept: PREADMISSION TESTING | Facility: HOSPITAL | Age: 59
End: 2024-12-30
Payer: COMMERCIAL

## 2024-12-30 DIAGNOSIS — E11.69 TYPE 2 DIABETES MELLITUS WITH OTHER SPECIFIED COMPLICATION, UNSPECIFIED WHETHER LONG TERM INSULIN USE: Primary | ICD-10-CM

## 2024-12-30 RX ORDER — TIRZEPATIDE 15 MG/.5ML
15 INJECTION, SOLUTION SUBCUTANEOUS WEEKLY
Qty: 4 PEN | Refills: 2 | Status: SHIPPED | OUTPATIENT
Start: 2024-12-30

## 2024-12-30 RX ORDER — INDOMETHACIN 75 MG/1
75 CAPSULE, EXTENDED RELEASE ORAL 2 TIMES DAILY PRN
Qty: 60 CAPSULE | Refills: 0 | Status: SHIPPED | OUTPATIENT
Start: 2024-12-30

## 2024-12-30 NOTE — TELEPHONE ENCOUNTER
Dr. Brown staff,              This patient is scheduled for surgery tomorrow with Dr. Brown for Right CTR, I see in her note that she obtained consent in clinic. Currently, the only consent in media is for his previous Left CTR. If the clinic has the consent can you please upload to media? I will place a blank consent in the chart for convenience, if consent can not be located.     Thank you,   Pre Admit Testing

## 2024-12-31 ENCOUNTER — ANESTHESIA (OUTPATIENT)
Dept: SURGERY | Facility: HOSPITAL | Age: 59
End: 2024-12-31
Payer: COMMERCIAL

## 2024-12-31 ENCOUNTER — HOSPITAL ENCOUNTER (OUTPATIENT)
Facility: HOSPITAL | Age: 59
Discharge: HOME | End: 2024-12-31
Attending: STUDENT IN AN ORGANIZED HEALTH CARE EDUCATION/TRAINING PROGRAM | Admitting: STUDENT IN AN ORGANIZED HEALTH CARE EDUCATION/TRAINING PROGRAM
Payer: COMMERCIAL

## 2024-12-31 VITALS
TEMPERATURE: 99 F | HEART RATE: 68 BPM | BODY MASS INDEX: 32.89 KG/M2 | SYSTOLIC BLOOD PRESSURE: 140 MMHG | HEIGHT: 73 IN | WEIGHT: 248.13 LBS | DIASTOLIC BLOOD PRESSURE: 83 MMHG | OXYGEN SATURATION: 95 % | RESPIRATION RATE: 14 BRPM

## 2024-12-31 DIAGNOSIS — Z98.890 POSTOPERATIVE STATE: Primary | ICD-10-CM

## 2024-12-31 LAB — POCT GLUCOSE: 111 MG/DL (ref 70–110)

## 2024-12-31 PROCEDURE — 25000003 PHARM REV CODE 250: Performed by: STUDENT IN AN ORGANIZED HEALTH CARE EDUCATION/TRAINING PROGRAM

## 2024-12-31 PROCEDURE — 71000033 HC RECOVERY, INTIAL HOUR: Performed by: STUDENT IN AN ORGANIZED HEALTH CARE EDUCATION/TRAINING PROGRAM

## 2024-12-31 PROCEDURE — 63600175 PHARM REV CODE 636 W HCPCS: Mod: JZ,JG | Performed by: STUDENT IN AN ORGANIZED HEALTH CARE EDUCATION/TRAINING PROGRAM

## 2024-12-31 PROCEDURE — 64721 CARPAL TUNNEL SURGERY: CPT | Mod: 79,RT,, | Performed by: STUDENT IN AN ORGANIZED HEALTH CARE EDUCATION/TRAINING PROGRAM

## 2024-12-31 PROCEDURE — 71000015 HC POSTOP RECOV 1ST HR: Performed by: STUDENT IN AN ORGANIZED HEALTH CARE EDUCATION/TRAINING PROGRAM

## 2024-12-31 PROCEDURE — 36000706: Performed by: STUDENT IN AN ORGANIZED HEALTH CARE EDUCATION/TRAINING PROGRAM

## 2024-12-31 PROCEDURE — 37000008 HC ANESTHESIA 1ST 15 MINUTES: Performed by: STUDENT IN AN ORGANIZED HEALTH CARE EDUCATION/TRAINING PROGRAM

## 2024-12-31 PROCEDURE — 37000009 HC ANESTHESIA EA ADD 15 MINS: Performed by: STUDENT IN AN ORGANIZED HEALTH CARE EDUCATION/TRAINING PROGRAM

## 2024-12-31 PROCEDURE — 63600175 PHARM REV CODE 636 W HCPCS: Performed by: NURSE ANESTHETIST, CERTIFIED REGISTERED

## 2024-12-31 PROCEDURE — 36000707: Performed by: STUDENT IN AN ORGANIZED HEALTH CARE EDUCATION/TRAINING PROGRAM

## 2024-12-31 RX ORDER — BACITRACIN ZINC 500 [USP'U]/G
OINTMENT TOPICAL
Status: DISCONTINUED
Start: 2024-12-31 | End: 2024-12-31 | Stop reason: HOSPADM

## 2024-12-31 RX ORDER — LIDOCAINE HYDROCHLORIDE 20 MG/ML
INJECTION INTRAVENOUS
Status: DISCONTINUED | OUTPATIENT
Start: 2024-12-31 | End: 2024-12-31

## 2024-12-31 RX ORDER — PROPOFOL 10 MG/ML
VIAL (ML) INTRAVENOUS
Status: DISCONTINUED | OUTPATIENT
Start: 2024-12-31 | End: 2024-12-31

## 2024-12-31 RX ORDER — CEFAZOLIN SODIUM 1 G/3ML
INJECTION, POWDER, FOR SOLUTION INTRAMUSCULAR; INTRAVENOUS
Status: DISCONTINUED | OUTPATIENT
Start: 2024-12-31 | End: 2024-12-31

## 2024-12-31 RX ORDER — ONDANSETRON HYDROCHLORIDE 2 MG/ML
4 INJECTION, SOLUTION INTRAVENOUS DAILY PRN
Status: DISCONTINUED | OUTPATIENT
Start: 2024-12-31 | End: 2024-12-31 | Stop reason: HOSPADM

## 2024-12-31 RX ORDER — SODIUM CHLORIDE, SODIUM LACTATE, POTASSIUM CHLORIDE, CALCIUM CHLORIDE 600; 310; 30; 20 MG/100ML; MG/100ML; MG/100ML; MG/100ML
INJECTION, SOLUTION INTRAVENOUS CONTINUOUS
Status: DISCONTINUED | OUTPATIENT
Start: 2024-12-31 | End: 2024-12-31 | Stop reason: HOSPADM

## 2024-12-31 RX ORDER — MIDAZOLAM HYDROCHLORIDE 1 MG/ML
INJECTION INTRAMUSCULAR; INTRAVENOUS
Status: DISCONTINUED | OUTPATIENT
Start: 2024-12-31 | End: 2024-12-31

## 2024-12-31 RX ORDER — BUPIVACAINE HYDROCHLORIDE 2.5 MG/ML
INJECTION, SOLUTION EPIDURAL; INFILTRATION; INTRACAUDAL
Status: DISCONTINUED
Start: 2024-12-31 | End: 2024-12-31 | Stop reason: HOSPADM

## 2024-12-31 RX ORDER — NAPROXEN 500 MG/1
500 TABLET ORAL EVERY 8 HOURS PRN
Qty: 30 TABLET | Refills: 0 | Status: SHIPPED | OUTPATIENT
Start: 2024-12-31

## 2024-12-31 RX ORDER — TRAMADOL HYDROCHLORIDE 50 MG/1
50 TABLET ORAL EVERY 8 HOURS PRN
Qty: 5 TABLET | Refills: 0 | Status: SHIPPED | OUTPATIENT
Start: 2024-12-31

## 2024-12-31 RX ORDER — BACITRACIN ZINC 500 UNIT/G
OINTMENT (GRAM) TOPICAL
Status: DISCONTINUED | OUTPATIENT
Start: 2024-12-31 | End: 2024-12-31 | Stop reason: HOSPADM

## 2024-12-31 RX ORDER — FENTANYL CITRATE 50 UG/ML
25 INJECTION, SOLUTION INTRAMUSCULAR; INTRAVENOUS EVERY 5 MIN PRN
Status: DISCONTINUED | OUTPATIENT
Start: 2024-12-31 | End: 2024-12-31 | Stop reason: HOSPADM

## 2024-12-31 RX ORDER — LIDOCAINE HYDROCHLORIDE 10 MG/ML
INJECTION, SOLUTION EPIDURAL; INFILTRATION; INTRACAUDAL; PERINEURAL
Status: DISCONTINUED
Start: 2024-12-31 | End: 2024-12-31 | Stop reason: HOSPADM

## 2024-12-31 RX ORDER — ACETAMINOPHEN 500 MG
500 TABLET ORAL EVERY 8 HOURS PRN
Qty: 30 TABLET | Refills: 0 | Status: SHIPPED | OUTPATIENT
Start: 2024-12-31

## 2024-12-31 RX ORDER — LIDOCAINE HYDROCHLORIDE 10 MG/ML
INJECTION, SOLUTION EPIDURAL; INFILTRATION; INTRACAUDAL; PERINEURAL
Status: DISCONTINUED | OUTPATIENT
Start: 2024-12-31 | End: 2024-12-31 | Stop reason: HOSPADM

## 2024-12-31 RX ORDER — GLUCAGON 1 MG
1 KIT INJECTION
Status: DISCONTINUED | OUTPATIENT
Start: 2024-12-31 | End: 2024-12-31 | Stop reason: HOSPADM

## 2024-12-31 RX ORDER — BUPIVACAINE HYDROCHLORIDE 2.5 MG/ML
INJECTION, SOLUTION EPIDURAL; INFILTRATION; INTRACAUDAL
Status: DISCONTINUED | OUTPATIENT
Start: 2024-12-31 | End: 2024-12-31 | Stop reason: HOSPADM

## 2024-12-31 RX ORDER — FENTANYL CITRATE 50 UG/ML
INJECTION, SOLUTION INTRAMUSCULAR; INTRAVENOUS
Status: DISCONTINUED | OUTPATIENT
Start: 2024-12-31 | End: 2024-12-31

## 2024-12-31 RX ADMIN — PROPOFOL 20 MG: 10 INJECTION, EMULSION INTRAVENOUS at 07:12

## 2024-12-31 RX ADMIN — LIDOCAINE HYDROCHLORIDE 40 MG: 20 INJECTION INTRAVENOUS at 06:12

## 2024-12-31 RX ADMIN — MIDAZOLAM 2 MG: 1 INJECTION INTRAMUSCULAR; INTRAVENOUS at 06:12

## 2024-12-31 RX ADMIN — PROPOFOL 40 MG: 10 INJECTION, EMULSION INTRAVENOUS at 06:12

## 2024-12-31 RX ADMIN — CEFAZOLIN 2 G: 330 INJECTION, POWDER, FOR SOLUTION INTRAMUSCULAR; INTRAVENOUS at 06:12

## 2024-12-31 RX ADMIN — SODIUM CHLORIDE, POTASSIUM CHLORIDE, SODIUM LACTATE AND CALCIUM CHLORIDE: 600; 310; 30; 20 INJECTION, SOLUTION INTRAVENOUS at 06:12

## 2024-12-31 RX ADMIN — FENTANYL CITRATE 50 MCG: 0.05 INJECTION, SOLUTION INTRAMUSCULAR; INTRAVENOUS at 07:12

## 2024-12-31 RX ADMIN — FENTANYL CITRATE 50 MCG: 0.05 INJECTION, SOLUTION INTRAMUSCULAR; INTRAVENOUS at 06:12

## 2024-12-31 NOTE — DISCHARGE SUMMARY
[facility]  Discharge Note  Short Stay    Procedure(s) (LRB):  RELEASE, CARPAL TUNNEL (Right)      OUTCOME: Patient tolerated treatment/procedure well without complication and is now ready for discharge.    DISPOSITION: Home or Self Care    FINAL DIAGNOSIS:  Right carpal tunnel syndrome    FOLLOWUP: In clinic    DISCHARGE INSTRUCTIONS:    Discharge Procedure Orders   Diet Adult Regular     Leave dressing on - Keep it clean, dry, and intact until clinic visit     Weight bearing restrictions (specify):   Order Comments: Work on gentle finger range of motion. No lifting greater than 1 lb with the operative hand.        TIME SPENT ON DISCHARGE: 5 minutes

## 2024-12-31 NOTE — TRANSFER OF CARE
"Anesthesia Transfer of Care Note    Patient: Narendra English .    Procedure(s) Performed: Procedure(s) (LRB):  RELEASE, CARPAL TUNNEL (Right)    Patient location: PACU    Anesthesia Type: MAC    Transport from OR: Transported from OR on room air with adequate spontaneous ventilation    Post pain: adequate analgesia    Post assessment: no apparent anesthetic complications    Post vital signs: stable    Level of consciousness: awake    Nausea/Vomiting: no nausea/vomiting    Complications: none    Transfer of care protocol was followed      Last vitals: Visit Vitals  BP (!) 148/82 (BP Location: Right arm, Patient Position: Sitting)   Pulse 68   Temp 36.8 °C (98.3 °F)   Resp 17   Ht 6' 1" (1.854 m)   Wt 112.5 kg (248 lb 2 oz)   SpO2 97%   BMI 32.74 kg/m²     "

## 2024-12-31 NOTE — OP NOTE
[facility]  Orthopaedic Hand Surgery  Operative Note    SUMMARY     Date of Procedure: 12/31/2024     Procedure:   21035 Right carpal tunnel release       Surgeons and Role:     * Lita Brown MD - Primary    Assistant: Erin Dupree, First Assist Student    Pre-Operative Diagnosis: Right carpal tunnel syndrome    Post-Operative Diagnosis: Same    Anesthesia: Monitor Anesthesia Care, Local    Indication for Procedure:  59 y.o. male presented to clinic with right carpal tunnel syndrome. He underwent left carpal tunnel release and left cubital tunnel release on 12/10/24 without complication. Risks and benefits of operative versus nonoperative treatment were discussed with the patient and he elected to proceed with surgery. Consent was obtained for right carpal tunnel release.      Operative Findings (including complications, if any): Thickened transverse carpal ligament    Description of Technical Procedures:   The patient was brought to the operating room and laid supine.  A tourniquet was placed at the right upper arm and the arm prepped with alcohol/chloraprep and draped in the usual sterile surgical fashion.  Preoperative time out was performed with confirmation of correct patient, side, and site, identification of all personnel, confirmation of administration of preoperative antibiotic, dry prep, and confirmation of all needed equipment.  When this was completed, I proceeded with the surgery.     A skin marker was used to dante the relevant landmarks. 10cc of lidocaine 1% without epinephrine mixed with 0.25% marcaine was injected for local anesthetic.  An esmarch was used to exsanguinate the limb and the tourniquet was raised to 250 mm Hg.  A 15- blade was used to make the incision.  The incision began just distal to the distal wrist crease in line with the radial border of the ring finger. The incision was made through the skin, subcutaneous tissue, palmar fascia down to the transverse  carpal ligament. Under direct visualization, the transverse carpal ligament was identified and incised just radial to the hook of the hamate. I sharply incised the entirety of the transverse carpal ligament distally under direct visualization just past the fat surrounding the superficial palmar arch. I then incised the proximal portion of the transverse carpal ligament to and well across the wrist crease to include the distal forearm fascia.  The ligament was sharply divided with care given to avoiding injury to adjacent neurovascular structures.  I were able to confirm a complete release proximally and distally by direct visualization and by palpation- there were no constrictive elements affecting the nerve.      Tourniquet was let down. Hemostasis was obtained. The wound was copiously irrigated. Incision was closed with 4-0 nylon. Bacitracin, adaptic, 4x4, webril, and coban was placed. All sponge, needle, and instrument counts were correct at the conclusion of the procedure.  The patient was awakened and taken to the recovery room in stable condition.    Estimated Blood Loss (EBL): 3cc           Implants: * No implants in log *    Specimens:   Specimen (24h ago, onward)      None                    Condition: Good    Disposition: PACU - hemodynamically stable.    Attestation: I was present and scrubbed for the entire procedure.    Lita Brown MD  Orthopaedic Hand Surgery

## 2024-12-31 NOTE — ANESTHESIA POSTPROCEDURE EVALUATION
Anesthesia Post Evaluation    Patient: Narendra Hopkins Amador Oliva    Procedure(s) Performed: Procedure(s) (LRB):  RELEASE, CARPAL TUNNEL (Right)    Final Anesthesia Type: MAC      Patient location during evaluation: PACU  Patient participation: Yes- Able to Participate  Level of consciousness: awake  Post-procedure vital signs: reviewed and stable  Pain management: adequate  Airway patency: patent    PONV status at discharge: No PONV  Anesthetic complications: no      Cardiovascular status: stable  Respiratory status: unassisted  Hydration status: euvolemic  Follow-up not needed.              Vitals Value Taken Time   /68 12/31/24 0731        Pulse 66 12/31/24 0731   Resp 12 12/31/24 0731   SpO2 96 % 12/31/24 0731   Vitals shown include unfiled device data.      No case tracking events are documented in the log.      Pain/Shayan Score: No data recorded

## 2025-01-03 ENCOUNTER — TELEPHONE (OUTPATIENT)
Dept: CARDIOLOGY | Facility: CLINIC | Age: 60
End: 2025-01-03
Payer: COMMERCIAL

## 2025-01-03 ENCOUNTER — OFFICE VISIT (OUTPATIENT)
Dept: UROLOGY | Facility: CLINIC | Age: 60
End: 2025-01-03
Payer: COMMERCIAL

## 2025-01-03 VITALS
DIASTOLIC BLOOD PRESSURE: 82 MMHG | HEART RATE: 70 BPM | HEIGHT: 73 IN | SYSTOLIC BLOOD PRESSURE: 140 MMHG | WEIGHT: 257.63 LBS | BODY MASS INDEX: 34.14 KG/M2

## 2025-01-03 DIAGNOSIS — N52.9 ERECTILE DYSFUNCTION, UNSPECIFIED ERECTILE DYSFUNCTION TYPE: ICD-10-CM

## 2025-01-03 DIAGNOSIS — E29.1 HYPOGONADISM MALE: Primary | ICD-10-CM

## 2025-01-03 PROCEDURE — 99999 PR PBB SHADOW E&M-EST. PATIENT-LVL V: CPT | Mod: PBBFAC,,, | Performed by: UROLOGY

## 2025-01-03 RX ORDER — SILDENAFIL 100 MG/1
100 TABLET, FILM COATED ORAL DAILY PRN
Qty: 30 TABLET | Refills: 11 | Status: SHIPPED | OUTPATIENT
Start: 2025-01-03

## 2025-01-03 RX ORDER — TESTOSTERONE CYPIONATE 200 MG/ML
150 INJECTION, SOLUTION INTRAMUSCULAR
Qty: 10 ML | Refills: 5 | Status: SHIPPED | OUTPATIENT
Start: 2025-01-03 | End: 2027-04-23

## 2025-01-03 NOTE — PROGRESS NOTES
Chief Complaint:   Encounter Diagnoses   Name Primary?    Hypogonadism male Yes    Erectile dysfunction, unspecified erectile dysfunction type        HPI:   1/3/25- currently doing well on our new combination, Levitra was working but too expensive, no evidence of voiding complaints.    06/16/2022 - 55 yo male that presents today for evaluation of ED.  Patient notes issues for the last 3-4 years but notes that over the last six months it has gotten worse.  He notes difficulty both achieving and maintaining an erection.  He has previously tried both Cialis and Viagra and both of these cause in to have a very bad headache that make it on tolerable to take these medications, though they do work.  Otherwise he voids with a good stream and feels like he empties well subjectively.  He denies any gross hematuria or family history of  cancers.  Denies prior stones or urologic procedures.    Allergies:  Demerol (pf) [meperidine (pf)], Percocet [oxycodone-acetaminophen], and Demerol [meperidine]    Medications:  has a current medication list which includes the following prescription(s): amlodipine, atorvastatin, azelastine, benazepril, clobetasol 0.05%, clonazepam, colchicine, diclofenac sodium, flash glucose scanning reader, flash glucose sensor, fluticasone propionate, hydralazine, hydrocortisone, indomethacin, toujeo solostar u-300 insulin, levothyroxine, metformin, metoprolol succinate, pregabalin, sildenafil, mounjaro, trazodone, triamcinolone acetonide 0.1%, and vascepa, and the following Facility-Administered Medications: gabapentin and lactated ringers.    Review of Systems:  General: No fever, chills, fatigability, or weight loss.  Skin: No rashes, itching, or changes in color or texture of skin.  Chest: Denies MEJÍA, cyanosis, wheezing, cough, and sputum production.  Abdomen: Appetite fine. No weight loss. Denies diarrhea, abdominal pain, hematemesis, or blood in stool.  Musculoskeletal: No joint stiffness or  swelling. Denies back pain.  : As above.  All other review of systems negative.    PMH:   has a past medical history of Cancer, Diabetes mellitus (8 years), Hypertension, Sleep apnea, and Thyroid disease.    PSH:   has a past surgical history that includes Colonoscopy (N/A, 12/29/2020); Laparoscopic right colon resection (N/A, 02/02/2021); Back surgery; Colonoscopy (N/A, 02/10/2022); Tonsillectomy; fess, with nasal septoplasty, with imaging guidance (Bilateral, 08/17/2022); Nasal turbinate reduction (Bilateral, 08/17/2022); Colon surgery (02/06/21); and Epidural steroid injection into cervical spine (N/A, 12/1/2022).    FamHx: family history includes Breast cancer in his mother; Cancer in his mother; Cataracts in his maternal grandmother; Diabetes in his maternal grandmother and mother; Hypertension in his brother, brother, and mother; Stroke in his father.    SocHx:  reports that he has never smoked. He has never used smokeless tobacco. He reports that he does not drink alcohol and does not use drugs.      Physical Exam:  There were no vitals filed for this visit.    General: A&Ox3, no apparent distress, no deformities  Neck: No masses, normal ROM  Lungs: normal inspiration, no use of accessory muscles  Heart: normal pulse, no arrhythmias  Abdomen: Soft, NT, ND, no masses, no hernias, no hepatosplenomegaly  Skin: The skin is warm and dry. No jaundice.  Ext: No c/c/e.  JAIMIE: 6/22: Normal rectal tone, no hemorrhoids. Prostate smooth and normal, no nodules 30 gm SV not palpable. Perineum and anus normal.    Labs/Studies:   Testopel  5/22/24, 2/23/24, 11/29/23, 8/25/23, 5/26/26, 1/20/23  Testosterone 328 8/22   PSA 0.62 8/24    Impression/Plan:     1. Hypogonadism- testopel was no longer covered, 100 mg every 7 days of testosterone appears to be working well.  See me back in 6 months with labs.  Of note previous AndroGel failure.    2. Erectile dysfunction- sildenafil 100 mg previously did not assist and the TriMix  caused priapism.  Levitra 20 mg has worked but is now too expensive, would like to go back to attempt the Viagra 100 mg.  Call with any issues prior to the next appointment.

## 2025-01-03 NOTE — TELEPHONE ENCOUNTER
Approved on December 19, 2024 by Belia 2017 [employer]  Request Reference Number: [id number]. MOUNJARO INJ 12.5/0.5 is approved through 12/19/2025. Your patient may now fill this prescription and it will be covered.  Authorization Expiration Date: 12/19/2025

## 2025-01-08 ENCOUNTER — PATIENT MESSAGE (OUTPATIENT)
Dept: SLEEP MEDICINE | Facility: CLINIC | Age: 60
End: 2025-01-08

## 2025-01-08 ENCOUNTER — OFFICE VISIT (OUTPATIENT)
Dept: SLEEP MEDICINE | Facility: CLINIC | Age: 60
End: 2025-01-08
Payer: COMMERCIAL

## 2025-01-08 VITALS
RESPIRATION RATE: 21 BRPM | BODY MASS INDEX: 34.62 KG/M2 | DIASTOLIC BLOOD PRESSURE: 70 MMHG | WEIGHT: 261.25 LBS | HEART RATE: 70 BPM | HEIGHT: 73 IN | OXYGEN SATURATION: 98 % | SYSTOLIC BLOOD PRESSURE: 110 MMHG

## 2025-01-08 DIAGNOSIS — F39 MOOD DISORDER: ICD-10-CM

## 2025-01-08 DIAGNOSIS — R91.8 MULTIPLE LUNG NODULES ON CT: Chronic | ICD-10-CM

## 2025-01-08 DIAGNOSIS — G47.26 SHIFT WORK SLEEP DISORDER: ICD-10-CM

## 2025-01-08 DIAGNOSIS — J31.0 CHRONIC NONALLERGIC RHINITIS: ICD-10-CM

## 2025-01-08 DIAGNOSIS — G47.33 OSA ON CPAP: ICD-10-CM

## 2025-01-08 DIAGNOSIS — E66.811 OBESITY, CLASS I, BMI 30-34.9: ICD-10-CM

## 2025-01-08 DIAGNOSIS — G47.33 OSA (OBSTRUCTIVE SLEEP APNEA): ICD-10-CM

## 2025-01-08 DIAGNOSIS — G47.00 INSOMNIA, UNSPECIFIED TYPE: Primary | ICD-10-CM

## 2025-01-08 PROCEDURE — 3072F LOW RISK FOR RETINOPATHY: CPT | Mod: CPTII,S$GLB,, | Performed by: NURSE PRACTITIONER

## 2025-01-08 PROCEDURE — 3078F DIAST BP <80 MM HG: CPT | Mod: CPTII,S$GLB,, | Performed by: NURSE PRACTITIONER

## 2025-01-08 PROCEDURE — 99999 PR PBB SHADOW E&M-EST. PATIENT-LVL V: CPT | Mod: PBBFAC,,, | Performed by: NURSE PRACTITIONER

## 2025-01-08 PROCEDURE — 3008F BODY MASS INDEX DOCD: CPT | Mod: CPTII,S$GLB,, | Performed by: NURSE PRACTITIONER

## 2025-01-08 PROCEDURE — 99214 OFFICE O/P EST MOD 30 MIN: CPT | Mod: S$GLB,,, | Performed by: NURSE PRACTITIONER

## 2025-01-08 PROCEDURE — 3074F SYST BP LT 130 MM HG: CPT | Mod: CPTII,S$GLB,, | Performed by: NURSE PRACTITIONER

## 2025-01-08 PROCEDURE — 1160F RVW MEDS BY RX/DR IN RCRD: CPT | Mod: CPTII,S$GLB,, | Performed by: NURSE PRACTITIONER

## 2025-01-08 PROCEDURE — 1159F MED LIST DOCD IN RCRD: CPT | Mod: CPTII,S$GLB,, | Performed by: NURSE PRACTITIONER

## 2025-01-08 RX ORDER — SUVOREXANT 10 MG/1
10 TABLET, FILM COATED ORAL NIGHTLY PRN
Qty: 30 TABLET | Refills: 5 | Status: SHIPPED | OUTPATIENT
Start: 2025-01-08

## 2025-01-08 NOTE — PROGRESS NOTES
"Subjective:      Patient ID: Narendra English Sr. is a 59 y.o. male.    Chief Complaint: Sleep Apnea    HPI  Presents to clinic today for obstructive sleep apnea with insomnia, shift work disorder.  APAP- sleep study 15yrs ago and gets supplies online.   Still having problems falling asleep and staying asleep. Difficulty shutting off mind. No electronics in bedroom.  Shift scheduled 4/4/4  Bedtime 830 -330 (Am or PM depending on shift)  Days off bedtime 10:00 p.m.-6:00 a.m.    Continues to have sinus congestion. Followed by ENT, allergy. Hx of sinus surgery. Taking flonase, astelin, sinus irrigation.       Patient Active Problem List   Diagnosis    History of malignant neoplasm of colon    Type 2 diabetes mellitus with circulatory disorder, with long-term current use of insulin    Hypothyroidism (acquired)    Gout    Low testosterone    Mood disorder    Hypertension associated with diabetes    FARA (obstructive sleep apnea)    Nasal septal deviation    Adhesions of nasal septum and turbinates    Hypertrophy of inferior nasal turbinate    Hyperlipidemia associated with type 2 diabetes mellitus    Shift work sleep disorder    Erectile dysfunction    Hypogonadism male    Gastroesophageal reflux disease without esophagitis    Bilateral lower extremity edema    Chronic nonallergic rhinitis    Erythema    Pruritic rash    Rhinitis medicamentosa    Rash and nonspecific skin eruption    Type 2 diabetes mellitus without complication, with long-term current use of insulin    Multiple lung nodules on CT     /70   Pulse 70   Resp (!) 21   Ht 6' 1" (1.854 m)   Wt 118.5 kg (261 lb 3.9 oz)   SpO2 98%   BMI 34.47 kg/m²   Body mass index is 34.47 kg/m².    Review of Systems   Constitutional: Negative.    HENT:  Positive for congestion.    Respiratory: Negative.     Cardiovascular: Negative.    Musculoskeletal: Negative.    Gastrointestinal: Negative.    Neurological: Negative.    Psychiatric/Behavioral:  Positive for " sleep disturbance.      Objective:      Physical Exam  Constitutional:       Appearance: He is well-developed. He is obese.   HENT:      Head: Normocephalic and atraumatic.      Nose: Nose normal.   Eyes:      General: Lids are normal.   Pulmonary:      Effort: Pulmonary effort is normal. No tachypnea, bradypnea, accessory muscle usage or respiratory distress.   Musculoskeletal:      Cervical back: Normal range of motion.   Skin:     Findings: No rash.   Neurological:      Mental Status: He is alert and oriented to person, place, and time.   Psychiatric:         Behavior: Behavior normal.         Thought Content: Thought content normal.         Judgment: Judgment normal.       Personal Diagnostic Review      1/8/2025     8:47 AM   EPWORTH SLEEPINESS SCALE   Sitting and reading 3   Watching TV 3   Sitting, inactive in a public place (e.g. a theatre or a meeting) 3   As a passenger in a car for an hour without a break 1   Lying down to rest in the afternoon when circumstances permit 2   Sitting and talking to someone 0   Sitting quietly after a lunch without alcohol 0   In a car, while stopped for a few minutes in traffic 0   Total score 12        Compliance Summary  12/8/2024 - 1/6/2025 (30 days)  Days with Device Usage 27 days  Days without Device Usage 3 days  Percent Days with Device Usage 90.0%  Cumulative Usage 5 days 21 hrs. 17 mins. 53 secs.  Maximum Usage (1 Day) 10 hrs. 47 mins. 29 secs.  Average Usage (All Days) 4 hrs. 42 mins. 35 secs.  Average Usage (Days Used) 5 hrs. 13 mins. 59 secs.  Minimum Usage (1 Day) 2 hrs. 22 mins. 1 secs.  Percent of Days with Usage >= 4 Hours 63.3%  Percent of Days with Usage < 4 Hours 36.7%  Date Range  Total Blower Time 5 days 21 hrs. 31 mins. 54 secs.  Average AHI 3.6  Auto-CPAP Summary  Auto-CPAP Mean Pressure 6.3 cmH2O  Auto-CPAP Peak Average Pressure 12.0 cmH2O  Device Pressure <= 90% of Time 8.0 cmH2O  Average Time in Large Leak Per Day 7 mins. 49 secs.    Assessment:        1. Insomnia, unspecified type    2. Shift work sleep disorder    3. FARA (obstructive sleep apnea)    4. Mood disorder    5. Chronic nonallergic rhinitis    6. FARA on CPAP    7. Obesity, Class I, BMI 30-34.9    8. Multiple lung nodules on CT        Outpatient Encounter Medications as of 1/8/2025   Medication Sig Dispense Refill    acetaminophen (TYLENOL) 500 MG tablet Take 1 tablet (500 mg total) by mouth every 8 (eight) hours as needed for Pain. 30 tablet 0    amLODIPine (NORVASC) 5 MG tablet Take 1 tablet (5 mg total) by mouth once daily. 90 tablet 1    atorvastatin (LIPITOR) 10 MG tablet Take 1 tablet (10 mg total) by mouth every evening. 90 tablet 1    benazepriL (LOTENSIN) 40 MG tablet Take 1 tablet (40 mg total) by mouth once daily. 90 tablet 3    calcipotriene (DOVONOX) 0.005 % cream Apply topically 2 (two) times daily. 120 g 11    clonazePAM (KLONOPIN) 1 MG tablet Take 1 tablet (1 mg total) by mouth every evening. 30 tablet 5    colchicine (COLCRYS) 0.6 mg tablet Take 1 tablet (0.6 mg total) by mouth once daily. 180 tablet 3    febuxostat (ULORIC) 40 mg Tab Take 1 tablet (40 mg total) by mouth once daily. 30 tablet 3    hydrALAZINE (APRESOLINE) 100 MG tablet Take 1 tablet (100 mg total) by mouth every 8 (eight) hours. 270 tablet 1    hydrOXYzine HCL (ATARAX) 25 MG tablet Take 25 mg by mouth 2 (two) times daily.      indomethacin (INDOCIN SR) 75 mg CpSR CR capsule Take 1 capsule (75 mg total) by mouth 2 (two) times daily as needed (for gout flare). 60 capsule 0    insulin glargine, TOUJEO, (TOUJEO SOLOSTAR U-300 INSULIN) 300 unit/mL (1.5 mL) InPn pen Inject 85 Units into the skin once daily. 27 mL 1    ipratropium (ATROVENT) 21 mcg (0.03 %) nasal spray 2 sprays by Each Nostril route 4 (four) times daily as needed for Rhinitis. 30 mL 11    ketoconazole (NIZORAL) 2 % shampoo Apply topically once a week. Lather in for 5-10 min before rinsing 120 mL 5    levothyroxine (SYNTHROID) 112 MCG tablet TAKE 1 TABLET BY  MOUTH ONCE DAILY BEFORE BREAKFAST 90 tablet 2    metFORMIN (GLUCOPHAGE) 1000 MG tablet Take 1 tablet (1,000 mg total) by mouth 2 (two) times daily with meals. 180 tablet 0    metoprolol succinate (TOPROL-XL) 50 MG 24 hr tablet Take 1 tablet (50 mg total) by mouth once daily. 90 tablet 3    MOUNJARO 15 mg/0.5 mL PnIj Inject 15 mg into the skin once a week. 4 Pen 2    naproxen (NAPROSYN) 500 MG tablet Take 1 tablet (500 mg total) by mouth every 8 (eight) hours as needed (Pain). Take with food 30 tablet 0    naproxen (NAPROSYN) 500 MG tablet Take 1 tablet (500 mg total) by mouth every 8 (eight) hours as needed (Pain). Take with food 30 tablet 0    pregabalin (LYRICA) 150 MG capsule TAKE 1 CAPSULE BY MOUTH THREE TIMES DAILY 90 capsule 0    sertraline (ZOLOFT) 100 MG tablet Take 100 mg by mouth once daily.      sildenafiL (VIAGRA) 100 MG tablet Take 1 tablet (100 mg total) by mouth daily as needed for Erectile Dysfunction. 30 tablet 11    tadalafiL (CIALIS) 20 MG Tab Take 1 tablet (20 mg total) by mouth every 24 hours as needed (erectile dysfunction). 20 tablet 11    testosterone cypionate (DEPOTESTOTERONE CYPIONATE) 200 mg/mL injection Inject 0.75 mLs (150 mg total) into the muscle every 7 days. 10 mL 5    tiZANidine (ZANAFLEX) 4 MG tablet Take 1 tablet (4 mg total) by mouth 2 (two) times daily as needed (Neck Pain). 60 tablet 2    traMADoL (ULTRAM) 50 mg tablet Take 1 tablet (50 mg total) by mouth every 8 (eight) hours as needed (Breakthrough pain). 5 tablet 0    traMADoL (ULTRAM) 50 mg tablet Take 1 tablet (50 mg total) by mouth every 8 (eight) hours as needed (Breakthrough pain). 5 tablet 0    triamcinolone acetonide 0.1% (KENALOG) 0.1 % ointment AAA bid 454 g 1    vardenafiL (LEVITRA) 20 MG tablet Take 1 tablet (20 mg total) by mouth daily as needed for Erectile Dysfunction. 15 tablet 11    VASCEPA 1 gram Cap Take 2 capsules (2,000 mg total) by mouth 2 (two) times daily. 360 capsule 1    [DISCONTINUED] doxepin  (SILENOR) 3 mg Tab Take 3 mg by mouth nightly as needed (insomnia). 30 tablet 5    azelastine (ASTELIN) 137 mcg (0.1 %) nasal spray 1 spray (137 mcg total) by Nasal route 2 (two) times daily. 30 mL 3    suvorexant (BELSOMRA) 10 mg Tab Take 10 mg by mouth nightly as needed. 30 tablet 5    [DISCONTINUED] acetaminophen (TYLENOL) 500 MG tablet Take 1 tablet (500 mg total) by mouth every 8 (eight) hours as needed for Pain. 30 tablet 0    [DISCONTINUED] indomethacin (INDOCIN SR) 75 mg CpSR CR capsule Take 1 capsule (75 mg total) by mouth 2 (two) times daily as needed (for gout flare). 60 capsule 0    [DISCONTINUED] metFORMIN (GLUCOPHAGE) 1000 MG tablet Take 1 tablet by mouth twice daily with food 180 tablet 1    [DISCONTINUED] MOUNJARO 15 mg/0.5 mL PnIj Inject 15 mg into the skin once a week. 4 Pen 2    [DISCONTINUED] MOUNJARO 15 mg/0.5 mL PnIj Inject 15 mg into the skin once a week. 4 Pen 2    [DISCONTINUED] testosterone cypionate (DEPOTESTOTERONE CYPIONATE) 200 mg/mL injection Inject 0.75 mLs (150 mg total) into the muscle every 7 days. 10 mL 5     No facility-administered encounter medications on file as of 1/8/2025.     No orders of the defined types were placed in this encounter.    Plan:     Failed lunesta, ambien, trazodone, klonopin/doxepin. Try belsomra   Follow up in a few months  Good CPAP settings. Increase time on CPAP    1. Insomnia, unspecified type  -     suvorexant (BELSOMRA) 10 mg Tab; Take 10 mg by mouth nightly as needed.  Dispense: 30 tablet; Refill: 5    2. Shift work sleep disorder  Overview:  Sleep hygiene and scheduling.       Assessment & Plan:  Try Belsomra    Orders:  -     suvorexant (BELSOMRA) 10 mg Tab; Take 10 mg by mouth nightly as needed.  Dispense: 30 tablet; Refill: 5    3. FARA (obstructive sleep apnea)  Overview:  Compliant with PAP and benefits from use. Follow up annually in the sleep clinic.        4. Mood disorder    5. Chronic nonallergic rhinitis  Overview:  - 03/20/2024: SPT to  inhalants negative with adequate histamine response      6. FRAA on CPAP    7. Obesity, Class I, BMI 30-34.9    8. Multiple lung nodules on CT  Assessment & Plan:  No follow up needed                   I spent a total of 30 minutes on the day of the visit.  This includes face to face time and non-face to face time preparing to see the patient (eg, review of tests), obtaining and/or reviewing separately obtained history, documenting clinical information in the electronic or other health record, independently interpreting results and communicating results to the patient/family/caregiver, or care coordinator.         Elizabeth LeJeune, ACNP, ANP

## 2025-01-13 ENCOUNTER — PATIENT MESSAGE (OUTPATIENT)
Dept: SLEEP MEDICINE | Facility: CLINIC | Age: 60
End: 2025-01-13
Payer: COMMERCIAL

## 2025-01-15 ENCOUNTER — OFFICE VISIT (OUTPATIENT)
Dept: ORTHOPEDICS | Facility: CLINIC | Age: 60
End: 2025-01-15
Payer: COMMERCIAL

## 2025-01-15 VITALS — BODY MASS INDEX: 34.62 KG/M2 | WEIGHT: 261.25 LBS | HEIGHT: 73 IN

## 2025-01-15 DIAGNOSIS — Z98.890 POSTOPERATIVE STATE: Primary | ICD-10-CM

## 2025-01-15 PROCEDURE — 99024 POSTOP FOLLOW-UP VISIT: CPT | Mod: S$GLB,,, | Performed by: STUDENT IN AN ORGANIZED HEALTH CARE EDUCATION/TRAINING PROGRAM

## 2025-01-15 PROCEDURE — 1159F MED LIST DOCD IN RCRD: CPT | Mod: CPTII,S$GLB,, | Performed by: STUDENT IN AN ORGANIZED HEALTH CARE EDUCATION/TRAINING PROGRAM

## 2025-01-15 PROCEDURE — 99999 PR PBB SHADOW E&M-EST. PATIENT-LVL IV: CPT | Mod: PBBFAC,,, | Performed by: STUDENT IN AN ORGANIZED HEALTH CARE EDUCATION/TRAINING PROGRAM

## 2025-01-15 PROCEDURE — 1160F RVW MEDS BY RX/DR IN RCRD: CPT | Mod: CPTII,S$GLB,, | Performed by: STUDENT IN AN ORGANIZED HEALTH CARE EDUCATION/TRAINING PROGRAM

## 2025-01-15 PROCEDURE — 3072F LOW RISK FOR RETINOPATHY: CPT | Mod: CPTII,S$GLB,, | Performed by: STUDENT IN AN ORGANIZED HEALTH CARE EDUCATION/TRAINING PROGRAM

## 2025-01-15 NOTE — PROGRESS NOTES
[facility] Surgery Clinic Postop Note    Surgery Date:   12/31/2024  Surgery: Right carpal tunnel release     Surgery Date: 12/10/2024  Surgery: Left carpal tunnel release, left cubital tunnel release in situ    Postoperative Course:   59-year-old male presents for follow up.  He is 15 days status post right carpal tunnel release;   He is 5 weeks and 1 day status post left carpal tunnel release and left cubital tunnel release.   Doing great.  No fevers.  No chills.  No numbness or tingling.  No pain which wakes him up from sleep at night.  Pain level is a 3/10.  He kept his dressing clean and dry following surgery.    Vital Signs  There were no vitals filed for this visit.    Physical Exam  General - NAD  Resp - nonlabored breathing  CV - RR by RP    Right Upper Extremity:    Incision is nicely healed.  No erythema.  No drainage.  No ecchymosis.  Nylon suture in place.  No swelling.  Patient is able to make a fist and extend all his fingers.   Full active wrist flexion and extension. Palpable radial pulse.   Two-point discrimination is 5 mm in all 5 fingers.  Fires APB.     Left upper extremity:   Incisions are well healed.  No swelling.  No erythema.  No drainage.  No ecchymosis.  Full active elbow flexion and extension.  Full active wrist flexion and extension.   Full pronation and supination.  Fires APB and FDI.  Two-point discrimination: 5/5/5/5/8.  Palpable radial pulse.    Imaging    No new imaging obtained today.    Assessment and Plan    59-year-old male is 15 days status post right carpal tunnel release and 5 weeks and 1 day status post left carpal tunnel release and left cubital tunnel release.   Doing well.  Sutures removed in clinic today.  Activities as tolerated.  No restrictions.  He is a .  I provided a note for his work stating that he can return with no restrictions on 01/27/2025.  Okay to shower. He does have some residual loss of two-point discrimination in his small finger. I discussed that it  will take up to a year to see the full extent of recovery of this after surgery. Patient may have some permanent numbness in his finger as a result of the severity of his cubital tunnel syndrome prior to surgery.    Discussed scar massage.  Follow up in clinic in 4 weeks for re-evaluation.  Discussed that he can cancel this appointment if he is doing well with no issues.    Lita Brown MD  Orthopaedic Hand Surgery

## 2025-01-16 ENCOUNTER — OFFICE VISIT (OUTPATIENT)
Dept: PULMONOLOGY | Facility: CLINIC | Age: 60
End: 2025-01-16
Payer: COMMERCIAL

## 2025-01-16 VITALS
SYSTOLIC BLOOD PRESSURE: 120 MMHG | BODY MASS INDEX: 34.46 KG/M2 | WEIGHT: 260 LBS | RESPIRATION RATE: 10 BRPM | OXYGEN SATURATION: 100 % | HEART RATE: 70 BPM | DIASTOLIC BLOOD PRESSURE: 80 MMHG | HEIGHT: 73 IN

## 2025-01-16 DIAGNOSIS — G47.26 SHIFT WORK SLEEP DISORDER: Primary | ICD-10-CM

## 2025-01-16 DIAGNOSIS — F39 MOOD DISORDER: ICD-10-CM

## 2025-01-16 DIAGNOSIS — G47.33 OSA (OBSTRUCTIVE SLEEP APNEA): ICD-10-CM

## 2025-01-16 DIAGNOSIS — R91.8 MULTIPLE LUNG NODULES ON CT: Chronic | ICD-10-CM

## 2025-01-16 DIAGNOSIS — G47.00 INSOMNIA, UNSPECIFIED TYPE: ICD-10-CM

## 2025-01-16 PROCEDURE — 99999 PR PBB SHADOW E&M-EST. PATIENT-LVL III: CPT | Mod: PBBFAC,,, | Performed by: NURSE PRACTITIONER

## 2025-01-16 PROCEDURE — 99214 OFFICE O/P EST MOD 30 MIN: CPT | Mod: S$GLB,,, | Performed by: NURSE PRACTITIONER

## 2025-01-16 PROCEDURE — 3074F SYST BP LT 130 MM HG: CPT | Mod: CPTII,S$GLB,, | Performed by: NURSE PRACTITIONER

## 2025-01-16 PROCEDURE — 1160F RVW MEDS BY RX/DR IN RCRD: CPT | Mod: CPTII,S$GLB,, | Performed by: NURSE PRACTITIONER

## 2025-01-16 PROCEDURE — 1159F MED LIST DOCD IN RCRD: CPT | Mod: CPTII,S$GLB,, | Performed by: NURSE PRACTITIONER

## 2025-01-16 PROCEDURE — 3008F BODY MASS INDEX DOCD: CPT | Mod: CPTII,S$GLB,, | Performed by: NURSE PRACTITIONER

## 2025-01-16 PROCEDURE — 3072F LOW RISK FOR RETINOPATHY: CPT | Mod: CPTII,S$GLB,, | Performed by: NURSE PRACTITIONER

## 2025-01-16 PROCEDURE — 3079F DIAST BP 80-89 MM HG: CPT | Mod: CPTII,S$GLB,, | Performed by: NURSE PRACTITIONER

## 2025-01-16 RX ORDER — DOXEPIN HYDROCHLORIDE 10 MG/1
10 CAPSULE ORAL NIGHTLY
Qty: 30 CAPSULE | Refills: 11 | Status: SHIPPED | OUTPATIENT
Start: 2025-01-16 | End: 2026-01-16

## 2025-01-16 NOTE — PROGRESS NOTES
"Subjective:      Patient ID: Narendra English Sr. is a 59 y.o. male.    Chief Complaint: Sleep Apnea    HPI  Presents for insomnia. He has obstructive sleep apnea with insomnia, shift work disorder.  Failed lunesta, ambien, trazodone, klonopin/doxepin, and more recently Belsomra. Presents to discuss options.  Still having problems falling asleep and staying asleep. Difficulty shutting off mind. No electronics in bedroom.  Shift scheduled 4/4/4  Bedtime 830 -330 (Am or PM depending on shift)  Days off bedtime 10:00 p.m.-6:00 a.m.      Patient Active Problem List   Diagnosis    History of malignant neoplasm of colon    Type 2 diabetes mellitus with circulatory disorder, with long-term current use of insulin    Hypothyroidism (acquired)    Gout    Low testosterone    Mood disorder    Hypertension associated with diabetes    FARA (obstructive sleep apnea)    Nasal septal deviation    Adhesions of nasal septum and turbinates    Hypertrophy of inferior nasal turbinate    Hyperlipidemia associated with type 2 diabetes mellitus    Shift work sleep disorder    Erectile dysfunction    Hypogonadism male    Gastroesophageal reflux disease without esophagitis    Bilateral lower extremity edema    Chronic nonallergic rhinitis    Erythema    Pruritic rash    Rhinitis medicamentosa    Rash and nonspecific skin eruption    Type 2 diabetes mellitus without complication, with long-term current use of insulin    Multiple lung nodules on CT     /80   Pulse 70   Resp 10   Ht 6' 1" (1.854 m)   Wt 117.9 kg (260 lb)   SpO2 100%   BMI 34.30 kg/m²   Body mass index is 34.3 kg/m².    Review of Systems   Psychiatric/Behavioral:  Positive for sleep disturbance.    All other systems reviewed and are negative.    Objective:      Physical Exam  Constitutional:       Appearance: He is obese.   HENT:      Head: Normocephalic and atraumatic.      Nose: Nose normal.   Cardiovascular:      Rate and Rhythm: Normal rate and regular rhythm. "   Pulmonary:      Effort: Pulmonary effort is normal.      Breath sounds: Normal breath sounds.   Abdominal:      Palpations: Abdomen is soft.      Tenderness: There is no abdominal tenderness.   Musculoskeletal:         General: Normal range of motion.      Cervical back: Normal range of motion and neck supple.   Skin:     General: Skin is warm and dry.   Neurological:      General: No focal deficit present.      Mental Status: He is alert and oriented to person, place, and time.   Psychiatric:         Mood and Affect: Mood normal.         Behavior: Behavior normal.       Personal Diagnostic Review      1/16/2025    10:43 AM   EPWORTH SLEEPINESS SCALE   Sitting and reading 0   Watching TV 1   Sitting, inactive in a public place (e.g. a theatre or a meeting) 0   As a passenger in a car for an hour without a break 0   Lying down to rest in the afternoon when circumstances permit 0   Sitting and talking to someone 0   Sitting quietly after a lunch without alcohol 1   In a car, while stopped for a few minutes in traffic 0   Total score 2      12/8/2024 - 1/6/2025 (30 days)  Days with Device Usage 27 days  Days without Device Usage 3 days  Percent Days with Device Usage 90.0%  Cumulative Usage 5 days 21 hrs. 17 mins. 53 secs.  Maximum Usage (1 Day) 10 hrs. 47 mins. 29 secs.  Average Usage (All Days) 4 hrs. 42 mins. 35 secs.  Average Usage (Days Used) 5 hrs. 13 mins. 59 secs.  Minimum Usage (1 Day) 2 hrs. 22 mins. 1 secs.  Percent of Days with Usage >= 4 Hours 63.3%  Percent of Days with Usage < 4 Hours 36.7%  Date Range  Total Blower Time 5 days 21 hrs. 31 mins. 54 secs.  Average AHI 3.6  Auto-CPAP Summary  Auto-CPAP Mean Pressure 6.3 cmH2O  Auto-CPAP Peak Average Pressure 12.0 cmH2O  Device Pressure <= 90% of Time 8.0 cmH2O  Average Time in Large Leak Per Day 7 mins. 49 secs.    Assessment:       1. Shift work sleep disorder    2. FARA (obstructive sleep apnea)    3. Insomnia, unspecified type    4. Mood disorder    5.  Multiple lung nodules on CT        Outpatient Encounter Medications as of 1/16/2025   Medication Sig Dispense Refill    acetaminophen (TYLENOL) 500 MG tablet Take 1 tablet (500 mg total) by mouth every 8 (eight) hours as needed for Pain. 30 tablet 0    amLODIPine (NORVASC) 5 MG tablet Take 1 tablet (5 mg total) by mouth once daily. 90 tablet 1    atorvastatin (LIPITOR) 10 MG tablet Take 1 tablet (10 mg total) by mouth every evening. 90 tablet 1    benazepriL (LOTENSIN) 40 MG tablet Take 1 tablet (40 mg total) by mouth once daily. 90 tablet 3    clonazePAM (KLONOPIN) 1 MG tablet Take 1 tablet (1 mg total) by mouth every evening. 30 tablet 5    colchicine (COLCRYS) 0.6 mg tablet Take 1 tablet (0.6 mg total) by mouth once daily. 180 tablet 3    febuxostat (ULORIC) 40 mg Tab Take 1 tablet (40 mg total) by mouth once daily. 30 tablet 3    hydrALAZINE (APRESOLINE) 100 MG tablet Take 1 tablet (100 mg total) by mouth every 8 (eight) hours. 270 tablet 1    hydrOXYzine HCL (ATARAX) 25 MG tablet Take 25 mg by mouth 2 (two) times daily.      indomethacin (INDOCIN SR) 75 mg CpSR CR capsule Take 1 capsule (75 mg total) by mouth 2 (two) times daily as needed (for gout flare). 60 capsule 0    insulin glargine, TOUJEO, (TOUJEO SOLOSTAR U-300 INSULIN) 300 unit/mL (1.5 mL) InPn pen Inject 85 Units into the skin once daily. 27 mL 1    ipratropium (ATROVENT) 21 mcg (0.03 %) nasal spray 2 sprays by Each Nostril route 4 (four) times daily as needed for Rhinitis. 30 mL 11    ketoconazole (NIZORAL) 2 % shampoo Apply topically once a week. Lather in for 5-10 min before rinsing 120 mL 5    levothyroxine (SYNTHROID) 112 MCG tablet TAKE 1 TABLET BY MOUTH ONCE DAILY BEFORE BREAKFAST 90 tablet 2    metFORMIN (GLUCOPHAGE) 1000 MG tablet Take 1 tablet (1,000 mg total) by mouth 2 (two) times daily with meals. 180 tablet 0    metoprolol succinate (TOPROL-XL) 50 MG 24 hr tablet Take 1 tablet (50 mg total) by mouth once daily. 90 tablet 3    MOUNJARO  15 mg/0.5 mL PnIj Inject 15 mg into the skin once a week. 4 Pen 2    naproxen (NAPROSYN) 500 MG tablet Take 1 tablet (500 mg total) by mouth every 8 (eight) hours as needed (Pain). Take with food 30 tablet 0    naproxen (NAPROSYN) 500 MG tablet Take 1 tablet (500 mg total) by mouth every 8 (eight) hours as needed (Pain). Take with food 30 tablet 0    pregabalin (LYRICA) 150 MG capsule TAKE 1 CAPSULE BY MOUTH THREE TIMES DAILY 90 capsule 0    sertraline (ZOLOFT) 100 MG tablet Take 100 mg by mouth once daily.      sildenafiL (VIAGRA) 100 MG tablet Take 1 tablet (100 mg total) by mouth daily as needed for Erectile Dysfunction. 30 tablet 11    suvorexant (BELSOMRA) 10 mg Tab Take 10 mg by mouth nightly as needed. 30 tablet 5    tadalafiL (CIALIS) 20 MG Tab Take 1 tablet (20 mg total) by mouth every 24 hours as needed (erectile dysfunction). 20 tablet 11    testosterone cypionate (DEPOTESTOTERONE CYPIONATE) 200 mg/mL injection Inject 0.75 mLs (150 mg total) into the muscle every 7 days. 10 mL 5    tiZANidine (ZANAFLEX) 4 MG tablet Take 1 tablet (4 mg total) by mouth 2 (two) times daily as needed (Neck Pain). 60 tablet 2    triamcinolone acetonide 0.1% (KENALOG) 0.1 % ointment AAA bid 454 g 1    vardenafiL (LEVITRA) 20 MG tablet Take 1 tablet (20 mg total) by mouth daily as needed for Erectile Dysfunction. 15 tablet 11    VASCEPA 1 gram Cap Take 2 capsules (2,000 mg total) by mouth 2 (two) times daily. 360 capsule 1    azelastine (ASTELIN) 137 mcg (0.1 %) nasal spray 1 spray (137 mcg total) by Nasal route 2 (two) times daily. 30 mL 3    [DISCONTINUED] calcipotriene (DOVONOX) 0.005 % cream Apply topically 2 (two) times daily. 120 g 11    [DISCONTINUED] traMADoL (ULTRAM) 50 mg tablet Take 1 tablet (50 mg total) by mouth every 8 (eight) hours as needed (Breakthrough pain). 5 tablet 0    [DISCONTINUED] traMADoL (ULTRAM) 50 mg tablet Take 1 tablet (50 mg total) by mouth every 8 (eight) hours as needed (Breakthrough pain). 5  tablet 0     No facility-administered encounter medications on file as of 1/16/2025.     No orders of the defined types were placed in this encounter.    Plan:     Consider psychotherapy.   Klonopin 1mg and doxepin 10mg (was previously taking 3mg)  Shift work rotation will continue to be an issue for him. He does not have other options with his job schedule.  Add exercise.   Sleep hygiene discussed.          1. Shift work sleep disorder  Overview:  Sleep hygiene and scheduling.         2. FARA (obstructive sleep apnea)  Overview:  Compliant with PAP and benefits from use. Follow up annually in the sleep clinic.        3. Insomnia, unspecified type    4. Mood disorder    5. Multiple lung nodules on CT  Comments:  No follow up needed                        Elizabeth LeJeune, ACNP, ANP

## 2025-01-21 DIAGNOSIS — M54.12 CERVICAL RADICULOPATHY: ICD-10-CM

## 2025-01-21 DIAGNOSIS — M54.16 LUMBAR RADICULOPATHY: ICD-10-CM

## 2025-01-22 RX ORDER — PREGABALIN 150 MG/1
150 CAPSULE ORAL 3 TIMES DAILY
Qty: 90 CAPSULE | Refills: 0 | Status: SHIPPED | OUTPATIENT
Start: 2025-01-22

## 2025-01-24 ENCOUNTER — OFFICE VISIT (OUTPATIENT)
Dept: DERMATOLOGY | Facility: CLINIC | Age: 60
End: 2025-01-24
Payer: COMMERCIAL

## 2025-01-24 DIAGNOSIS — L30.9 DERMATITIS: Primary | ICD-10-CM

## 2025-01-24 PROCEDURE — 88312 SPECIAL STAINS GROUP 1: CPT | Mod: 26,,, | Performed by: PATHOLOGY

## 2025-01-24 PROCEDURE — 3072F LOW RISK FOR RETINOPATHY: CPT | Mod: CPTII,S$GLB,, | Performed by: STUDENT IN AN ORGANIZED HEALTH CARE EDUCATION/TRAINING PROGRAM

## 2025-01-24 PROCEDURE — 99213 OFFICE O/P EST LOW 20 MIN: CPT | Mod: 25,S$GLB,, | Performed by: STUDENT IN AN ORGANIZED HEALTH CARE EDUCATION/TRAINING PROGRAM

## 2025-01-24 PROCEDURE — 88313 SPECIAL STAINS GROUP 2: CPT | Performed by: PATHOLOGY

## 2025-01-24 PROCEDURE — 11104 PUNCH BX SKIN SINGLE LESION: CPT | Mod: S$GLB,,, | Performed by: STUDENT IN AN ORGANIZED HEALTH CARE EDUCATION/TRAINING PROGRAM

## 2025-01-24 PROCEDURE — 88342 IMHCHEM/IMCYTCHM 1ST ANTB: CPT | Mod: 26,,, | Performed by: PATHOLOGY

## 2025-01-24 PROCEDURE — 88305 TISSUE EXAM BY PATHOLOGIST: CPT | Performed by: PATHOLOGY

## 2025-01-24 PROCEDURE — 88305 TISSUE EXAM BY PATHOLOGIST: CPT | Mod: 26,,, | Performed by: PATHOLOGY

## 2025-01-24 PROCEDURE — 88313 SPECIAL STAINS GROUP 2: CPT | Mod: 26,,, | Performed by: PATHOLOGY

## 2025-01-24 PROCEDURE — 88312 SPECIAL STAINS GROUP 1: CPT | Performed by: PATHOLOGY

## 2025-01-24 PROCEDURE — 1160F RVW MEDS BY RX/DR IN RCRD: CPT | Mod: CPTII,S$GLB,, | Performed by: STUDENT IN AN ORGANIZED HEALTH CARE EDUCATION/TRAINING PROGRAM

## 2025-01-24 PROCEDURE — 1159F MED LIST DOCD IN RCRD: CPT | Mod: CPTII,S$GLB,, | Performed by: STUDENT IN AN ORGANIZED HEALTH CARE EDUCATION/TRAINING PROGRAM

## 2025-01-24 PROCEDURE — 88342 IMHCHEM/IMCYTCHM 1ST ANTB: CPT | Performed by: PATHOLOGY

## 2025-01-24 PROCEDURE — 99999 PR PBB SHADOW E&M-EST. PATIENT-LVL V: CPT | Mod: PBBFAC,,, | Performed by: STUDENT IN AN ORGANIZED HEALTH CARE EDUCATION/TRAINING PROGRAM

## 2025-01-24 NOTE — PROGRESS NOTES
Subjective:       Patient ID:  Narendra English Sr. is a 59 y.o. male who presents for   Chief Complaint   Patient presents with    Rash     Located on the neck, shoulders and back X several years s/s itching, redness, burning Tx topical medication/ non effective      History of Present Illness: The patient presents for follow up of a recurring and persistent rash, located on the upper chest, back and neck areas; currently with working diagnosis of urticaria, folliculitis and psoriasis. Last seen by Dr. Jin on 7/2/24. Patient has been on multiple treatments including numerous topical steroids, xolair, and other medications with no improvement in symptoms. Continues to have bright red, burning and itching rash on the these locations on the skin.           Review of Systems   Constitutional:  Negative for fever and chills.   Skin:  Negative for itching, rash and dry skin.        Objective:    Physical Exam   Constitutional: He appears well-developed and well-nourished. No distress.   Neurological: He is alert and oriented to person, place, and time. He is not disoriented.   Psychiatric: He has a normal mood and affect.   Skin:   Areas Examined (abnormalities noted in diagram):   Head / Face Inspection Performed  Neck Inspection Performed  Chest / Axilla Inspection Performed  Back Inspection Performed  RUE Inspected  LUE Inspection Performed              Diagram Legend     Erythematous scaling macule/papule c/w actinic keratosis       Vascular papule c/w angioma      Pigmented verrucoid papule/plaque c/w seborrheic keratosis      Yellow umbilicated papule c/w sebaceous hyperplasia      Irregularly shaped tan macule c/w lentigo     1-2 mm smooth white papules consistent with Milia      Movable subcutaneous cyst with punctum c/w epidermal inclusion cyst      Subcutaneous movable cyst c/w pilar cyst      Firm pink to brown papule c/w dermatofibroma      Pedunculated fleshy papule(s) c/w skin tag(s)      Evenly  pigmented macule c/w junctional nevus     Mildly variegated pigmented, slightly irregular-bordered macule c/w mildly atypical nevus      Flesh colored to evenly pigmented papule c/w intradermal nevus       Pink pearly papule/plaque c/w basal cell carcinoma      Erythematous hyperkeratotic cursted plaque c/w SCC      Surgical scar with no sign of skin cancer recurrence      Open and closed comedones      Inflammatory papules and pustules      Verrucoid papule consistent consistent with wart     Erythematous eczematous patches and plaques     Dystrophic onycholytic nail with subungual debris c/w onychomycosis     Umbilicated papule    Erythematous-base heme-crusted tan verrucoid plaque consistent with inflamed seborrheic keratosis     Erythematous Silvery Scaling Plaque c/w Psoriasis     See annotation            Assessment / Plan:      Pathology Orders:       Normal Orders This Visit    Specimen to Pathology, Dermatology     Comments:    Number of Specimens:->1  ------------------------->-------------------------  Spec 1 Procedure:->Biopsy  Spec 1 Clinical Impression:->recurring minute erythematous  papules and plaques, worsened in sunlight. Burning and  irritation.  Spec 1 Source:->chest    Questions:    Procedure Type: Dermatology and skin neoplasms    Number of Specimens: 1    ------------------------: -------------------------    Spec 1 Procedure: Biopsy    Spec 1 Clinical Impression: recurring minute erythematous papules and plaques, worsened in sunlight. Burning and irritation.    Spec 1 Source: chest    Release to patient:           Dermatitis  -     Specimen to Pathology, Dermatology    Punch biopsy procedure note:  Punch biopsy performed after verbal consent obtained. Area marked and prepped with alcohol. Approximately 1cc of 1% lidocaine with epinephrine injected. 4 mm disposable punch used to remove lesion. Hemostasis obtained and biopsy site closed with 1 - 2 Prolene sutures. Wound care instructions  reviewed with patient and handout given.    Further management pending biopsy results.          Follow up in about 6 months (around 7/24/2025).

## 2025-01-25 DIAGNOSIS — E11.9 TYPE 2 DIABETES MELLITUS WITHOUT COMPLICATION, WITH LONG-TERM CURRENT USE OF INSULIN: ICD-10-CM

## 2025-01-25 DIAGNOSIS — Z79.4 TYPE 2 DIABETES MELLITUS WITHOUT COMPLICATION, WITH LONG-TERM CURRENT USE OF INSULIN: ICD-10-CM

## 2025-01-26 NOTE — TELEPHONE ENCOUNTER
No care due was identified.  [facility] Embedded Care Due Messages. Reference number: [id number].   1/25/2025 10:53:44 PM CST

## 2025-01-29 LAB
FINAL PATHOLOGIC DIAGNOSIS: NORMAL
GROSS: NORMAL
Lab: NORMAL
MICROSCOPIC EXAM: NORMAL

## 2025-01-29 RX ORDER — METFORMIN HYDROCHLORIDE 1000 MG/1
1000 TABLET ORAL 2 TIMES DAILY WITH MEALS
Qty: 180 TABLET | Refills: 2 | Status: SHIPPED | OUTPATIENT
Start: 2025-01-29

## 2025-01-31 ENCOUNTER — TELEPHONE (OUTPATIENT)
Dept: PREADMISSION TESTING | Facility: HOSPITAL | Age: 60
End: 2025-01-31
Payer: COMMERCIAL

## 2025-01-31 ENCOUNTER — PATIENT MESSAGE (OUTPATIENT)
Dept: DERMATOLOGY | Facility: CLINIC | Age: 60
End: 2025-01-31
Payer: COMMERCIAL

## 2025-01-31 RX ORDER — SODIUM, POTASSIUM,MAG SULFATES 17.5-3.13G
1 SOLUTION, RECONSTITUTED, ORAL ORAL DAILY
Qty: 1 KIT | Refills: 0 | Status: SHIPPED | OUTPATIENT
Start: 2025-01-31 | End: 2025-02-02

## 2025-02-03 ENCOUNTER — CLINICAL SUPPORT (OUTPATIENT)
Dept: DERMATOLOGY | Facility: CLINIC | Age: 60
End: 2025-02-03
Payer: COMMERCIAL

## 2025-02-03 DIAGNOSIS — Z48.02 VISIT FOR SUTURE REMOVAL: Primary | ICD-10-CM

## 2025-02-03 PROCEDURE — 99999 PR PBB SHADOW E&M-EST. PATIENT-LVL III: CPT | Mod: PBBFAC,,,

## 2025-02-03 PROCEDURE — 99024 POSTOP FOLLOW-UP VISIT: CPT | Mod: S$GLB,,, | Performed by: STUDENT IN AN ORGANIZED HEALTH CARE EDUCATION/TRAINING PROGRAM

## 2025-02-03 NOTE — PROGRESS NOTES
Patient presents for suture removal. The wound is well healed without signs of infection.  The sutures are removed. Biopsy results, wound care, and activity instructions given. Return prn.

## 2025-02-04 ENCOUNTER — OFFICE VISIT (OUTPATIENT)
Dept: ALLERGY | Facility: CLINIC | Age: 60
End: 2025-02-04
Payer: COMMERCIAL

## 2025-02-04 VITALS
WEIGHT: 265.88 LBS | DIASTOLIC BLOOD PRESSURE: 75 MMHG | SYSTOLIC BLOOD PRESSURE: 133 MMHG | HEIGHT: 73 IN | OXYGEN SATURATION: 99 % | HEART RATE: 68 BPM | BODY MASS INDEX: 35.24 KG/M2

## 2025-02-04 DIAGNOSIS — L28.1 PRURIGO NODULARIS: Primary | ICD-10-CM

## 2025-02-04 PROCEDURE — 3078F DIAST BP <80 MM HG: CPT | Mod: CPTII,S$GLB,, | Performed by: STUDENT IN AN ORGANIZED HEALTH CARE EDUCATION/TRAINING PROGRAM

## 2025-02-04 PROCEDURE — 3075F SYST BP GE 130 - 139MM HG: CPT | Mod: CPTII,S$GLB,, | Performed by: STUDENT IN AN ORGANIZED HEALTH CARE EDUCATION/TRAINING PROGRAM

## 2025-02-04 PROCEDURE — 99999 PR PBB SHADOW E&M-EST. PATIENT-LVL V: CPT | Mod: PBBFAC,,, | Performed by: STUDENT IN AN ORGANIZED HEALTH CARE EDUCATION/TRAINING PROGRAM

## 2025-02-04 PROCEDURE — 1160F RVW MEDS BY RX/DR IN RCRD: CPT | Mod: CPTII,S$GLB,, | Performed by: STUDENT IN AN ORGANIZED HEALTH CARE EDUCATION/TRAINING PROGRAM

## 2025-02-04 PROCEDURE — 3008F BODY MASS INDEX DOCD: CPT | Mod: CPTII,S$GLB,, | Performed by: STUDENT IN AN ORGANIZED HEALTH CARE EDUCATION/TRAINING PROGRAM

## 2025-02-04 PROCEDURE — 99214 OFFICE O/P EST MOD 30 MIN: CPT | Mod: S$GLB,,, | Performed by: STUDENT IN AN ORGANIZED HEALTH CARE EDUCATION/TRAINING PROGRAM

## 2025-02-04 PROCEDURE — 3072F LOW RISK FOR RETINOPATHY: CPT | Mod: CPTII,S$GLB,, | Performed by: STUDENT IN AN ORGANIZED HEALTH CARE EDUCATION/TRAINING PROGRAM

## 2025-02-04 PROCEDURE — 1159F MED LIST DOCD IN RCRD: CPT | Mod: CPTII,S$GLB,, | Performed by: STUDENT IN AN ORGANIZED HEALTH CARE EDUCATION/TRAINING PROGRAM

## 2025-02-06 ENCOUNTER — ANESTHESIA EVENT (OUTPATIENT)
Dept: ENDOSCOPY | Facility: HOSPITAL | Age: 60
End: 2025-02-06
Payer: COMMERCIAL

## 2025-02-06 NOTE — ANESTHESIA PREPROCEDURE EVALUATION
02/06/2025  Narendra English Sr. is a 59 y.o., male.  Patient Active Problem List   Diagnosis    History of malignant neoplasm of colon    Type 2 diabetes mellitus with circulatory disorder, with long-term current use of insulin    Hypothyroidism (acquired)    Gout    Low testosterone    Mood disorder    Hypertension associated with diabetes    FARA (obstructive sleep apnea)    Nasal septal deviation    Adhesions of nasal septum and turbinates    Hypertrophy of inferior nasal turbinate    Hyperlipidemia associated with type 2 diabetes mellitus    Shift work sleep disorder    Erectile dysfunction    Hypogonadism male    Gastroesophageal reflux disease without esophagitis    Bilateral lower extremity edema    Chronic nonallergic rhinitis    Erythema    Pruritic rash    Rhinitis medicamentosa    Rash and nonspecific skin eruption    Type 2 diabetes mellitus without complication, with long-term current use of insulin    Multiple lung nodules on CT     10/24 EKG     Vent. Rate : 076 BPM     Atrial Rate : 076 BPM      P-R Int : 244 ms          QRS Dur : 148 ms       QT Int : 400 ms       P-R-T Axes : 051 -42 -25 degrees      QTc Int : 450 ms     Sinus rhythm with 1st degree A-V block   Left axis deviation   Right bundle branch block   Minimal voltage criteria for LVH, may be normal variant   Abnormal ECG   When compared with ECG of 29-JAN-2024 09:45,   WI interval has increased       Pre-op Assessment    I have reviewed the Patient Summary Reports.     I have reviewed the Nursing Notes. I have reviewed the NPO Status.   I have reviewed the Medications.     Review of Systems  Anesthesia Hx:  No problems with previous Anesthesia             Denies Family Hx of Anesthesia complications.    Denies Personal Hx of Anesthesia complications.                    Social:  Non-Smoker, No Alcohol Use       Cardiovascular:      Hypertension           hyperlipidemia   ECG has been reviewed. 2022 Stress echo neg for ischemia                           Pulmonary:        Sleep Apnea, CPAP                Hepatic/GI:  Bowel Prep.   GERD                Endocrine:  Diabetes, type 2, using insulin Hypothyroidism        Obesity / BMI > 30  Psych:  Psychiatric History                  Physical Exam  General: Well nourished, Cooperative, Alert and Oriented    Airway:  Mallampati: III   Mouth Opening: Normal  TM Distance: 4 - 6 cm  Tongue: Normal  Neck ROM: Normal ROM    Dental:  Intact        Anesthesia Plan  Type of Anesthesia, risks & benefits discussed:    Anesthesia Type: Gen Natural Airway  Intra-op Monitoring Plan: Standard ASA Monitors  Post Op Pain Control Plan: multimodal analgesia  Induction:  IV  Informed Consent: Informed consent signed with the Patient and all parties understand the risks and agree with anesthesia plan.  All questions answered. Patient consented to blood products? No  ASA Score: 3  Day of Surgery Review of History & Physical: H&P Update referred to the surgeon/provider.    Ready For Surgery From Anesthesia Perspective.     .

## 2025-02-07 ENCOUNTER — PATIENT MESSAGE (OUTPATIENT)
Dept: CARDIOLOGY | Facility: CLINIC | Age: 60
End: 2025-02-07
Payer: COMMERCIAL

## 2025-02-09 RX ORDER — LEVOTHYROXINE SODIUM 112 UG/1
TABLET ORAL
Qty: 90 TABLET | Refills: 1 | Status: SHIPPED | OUTPATIENT
Start: 2025-02-09 | End: 2025-02-11 | Stop reason: SDUPTHER

## 2025-02-09 NOTE — TELEPHONE ENCOUNTER
No care due was identified.  [facility] Embedded Care Due Messages. Reference number: [id number].   2/08/2025 11:21:51 PM CST

## 2025-02-10 ENCOUNTER — TELEPHONE (OUTPATIENT)
Dept: INTERNAL MEDICINE | Facility: CLINIC | Age: 60
End: 2025-02-10
Payer: COMMERCIAL

## 2025-02-10 ENCOUNTER — PATIENT MESSAGE (OUTPATIENT)
Dept: OPHTHALMOLOGY | Facility: CLINIC | Age: 60
End: 2025-02-10
Payer: COMMERCIAL

## 2025-02-10 ENCOUNTER — PATIENT MESSAGE (OUTPATIENT)
Dept: INTERNAL MEDICINE | Facility: CLINIC | Age: 60
End: 2025-02-10
Payer: COMMERCIAL

## 2025-02-10 ENCOUNTER — HOSPITAL ENCOUNTER (OUTPATIENT)
Dept: ENDOSCOPY | Facility: HOSPITAL | Age: 60
Discharge: HOME | End: 2025-02-10
Attending: FAMILY MEDICINE
Payer: COMMERCIAL

## 2025-02-10 ENCOUNTER — ANESTHESIA (OUTPATIENT)
Dept: ENDOSCOPY | Facility: HOSPITAL | Age: 60
End: 2025-02-10
Payer: COMMERCIAL

## 2025-02-10 DIAGNOSIS — Z12.11 ENCOUNTER FOR SCREENING COLONOSCOPY: ICD-10-CM

## 2025-02-10 DIAGNOSIS — C18.9 MALIGNANT NEOPLASM OF COLON, UNSPECIFIED PART OF COLON: Primary | ICD-10-CM

## 2025-02-10 LAB — POCT GLUCOSE: 86 MG/DL (ref 70–110)

## 2025-02-10 PROCEDURE — 63600175 PHARM REV CODE 636 W HCPCS: Performed by: NURSE ANESTHETIST, CERTIFIED REGISTERED

## 2025-02-10 PROCEDURE — 27201012 HC FORCEPS, HOT/COLD, DISP

## 2025-02-10 PROCEDURE — 88305 TISSUE EXAM BY PATHOLOGIST: CPT | Mod: 26,,, | Performed by: PATHOLOGY

## 2025-02-10 PROCEDURE — 45380 COLONOSCOPY AND BIOPSY: CPT | Mod: 33,59 | Performed by: INTERNAL MEDICINE

## 2025-02-10 PROCEDURE — 37000009 HC ANESTHESIA EA ADD 15 MINS

## 2025-02-10 PROCEDURE — 27201089 HC SNARE, DISP (ANY)

## 2025-02-10 PROCEDURE — 37000008 HC ANESTHESIA 1ST 15 MINUTES

## 2025-02-10 PROCEDURE — 88305 TISSUE EXAM BY PATHOLOGIST: CPT | Performed by: PATHOLOGY

## 2025-02-10 PROCEDURE — 82962 GLUCOSE BLOOD TEST: CPT

## 2025-02-10 PROCEDURE — 45385 COLONOSCOPY W/LESION REMOVAL: CPT | Mod: ,,, | Performed by: INTERNAL MEDICINE

## 2025-02-10 PROCEDURE — 45385 COLONOSCOPY W/LESION REMOVAL: CPT | Mod: 33 | Performed by: INTERNAL MEDICINE

## 2025-02-10 RX ORDER — SODIUM CHLORIDE, SODIUM LACTATE, POTASSIUM CHLORIDE, CALCIUM CHLORIDE 600; 310; 30; 20 MG/100ML; MG/100ML; MG/100ML; MG/100ML
INJECTION, SOLUTION INTRAVENOUS CONTINUOUS PRN
Status: DISCONTINUED | OUTPATIENT
Start: 2025-02-10 | End: 2025-02-10

## 2025-02-10 RX ORDER — LIDOCAINE HYDROCHLORIDE 20 MG/ML
INJECTION INTRAVENOUS
Status: DISCONTINUED | OUTPATIENT
Start: 2025-02-10 | End: 2025-02-10

## 2025-02-10 RX ORDER — PROPOFOL 10 MG/ML
VIAL (ML) INTRAVENOUS
Status: DISCONTINUED | OUTPATIENT
Start: 2025-02-10 | End: 2025-02-10

## 2025-02-10 RX ADMIN — LIDOCAINE HYDROCHLORIDE 50 MG: 20 INJECTION INTRAVENOUS at 08:02

## 2025-02-10 RX ADMIN — PROPOFOL 25 MG: 10 INJECTION, EMULSION INTRAVENOUS at 08:02

## 2025-02-10 RX ADMIN — PROPOFOL 50 MG: 10 INJECTION, EMULSION INTRAVENOUS at 08:02

## 2025-02-10 RX ADMIN — SODIUM CHLORIDE, SODIUM LACTATE, POTASSIUM CHLORIDE, AND CALCIUM CHLORIDE: .6; .31; .03; .02 INJECTION, SOLUTION INTRAVENOUS at 08:02

## 2025-02-10 NOTE — TRANSFER OF CARE
"Anesthesia Transfer of Care Note    Patient: Narendra English .    Procedure(s) Performed: * No procedures listed *    Patient location: PACU    Anesthesia Type: general    Transport from OR: Transported from OR on room air with adequate spontaneous ventilation    Post pain: adequate analgesia    Post assessment: no apparent anesthetic complications    Post vital signs: stable    Level of consciousness: sedated    Nausea/Vomiting: no nausea/vomiting    Complications: none    Transfer of care protocol was followed      Last vitals: Visit Vitals  BP (!) 100/56 (BP Location: Left arm, Patient Position: Lying)   Pulse 64   Temp 36.7 °C (98 °F) (Temporal)   Resp 15   Ht 6' 1" (1.854 m)   Wt 118 kg (260 lb 2.3 oz)   SpO2 96%   BMI 34.32 kg/m²     "

## 2025-02-10 NOTE — PLAN OF CARE
Discharge instructions reviewed with patient and visitor. Handouts given & verbalized understanding with no further questions at this time. Dr Goss spoke to pt at bedside, reviewed procedure and findings, answered questions. Made aware they are awaiting biopsy results with MD telephone number provided per AVS sheet. VSS on RA, no pain or nausea noted, tolerating po fluids, no complaints noted. Fall precautions reviewed, consents in chart, PIV removed at this time.

## 2025-02-10 NOTE — DISCHARGE INSTRUCTIONS
During your procedure today, you received medications for sedation.  These   medications may affect your judgment, balance and coordination.  Therefore,   for 24 hours, you have the following restrictions:   - DO NOT drive a car, operate machinery, make legal/financial decisions,   sign important papers or drink alcohol.    ACTIVITY:  Today: no heavy lifting, straining or running due to procedural   sedation/anesthesia.  The following day: return to full activity including work.  DIET:  Eat and drink normally unless instructed otherwise.                TREATMENT FOR COMMON SIDE EFFECTS:  - Mild abdominal pain, nausea, belching, bloating or excessive gas:  rest,   eat lightly and use a heating pad.  - Sore Throat: treat with throat lozenges and/or gargle with warm salt   water.  - Because air was used during the procedure, expelling large amounts of air   from your rectum or belching is normal.  - If a bowel prep was taken, you may not have a bowel movement for 1-3 days.    This is normal.  SYMPTOMS TO WATCH FOR AND REPORT TO YOUR PHYSICIAN:  1. Abdominal pain or bloating, other than gas cramps.  2. Chest pain.  3. Back pain.  4. Signs of infection such as: chills or fever occurring within 24 hours   after the procedure.  5. Rectal bleeding, which would show as bright red, maroon, or black stools.   (A tablespoon of blood from the rectum is not serious, especially if   hemorrhoids are present.)  6. Vomiting.  7. Weakness or dizziness.  GO DIRECTLY TO THE NEAREST EMERGENCY ROOM IF YOU HAVE ANY OF THE FOLLOWING:                 Difficulty breathing              Chills and/or fever over 101 F              Persistent vomiting and/or vomiting blood              Severe abdominal pain              Severe chest pain              Black, tarry stools              Bleeding- more than one tablespoon              Any other symptom or condition that you feel may need urgent attention    Your doctor recommends these additional  instructions:  If any biopsies were taken, your doctors clinic will contact you in 1 to 2   weeks with any results.  - Discharge patient to home.   - Resume previous diet.   - Continue present medications.    - Repeat colonoscopy in 3 years for surveillance.   - Return to referring physician as previously scheduled.   - Patient has a contact number available for emergencies.  The signs and   symptoms of potential delayed complications were discussed with the   patient.  Return to normal activities tomorrow.  Written discharge   instructions were provided to the patient.  If you have any questions about the above instructions, call the GI   department at [phone] or call the endoscopy unit at [phone]   from 7am until 3 pm.  [facility] [place], EMERGENCY ROOM PHONE NUMBER:   [phone]  IF A COMPLICATION OR EMERGENCY SITUATION ARISES AND YOU ARE UNABLE TO REACH   YOUR PHYSICIAN - GO DIRECTLY TO THE EMERGENCY ROOM.  I have read or have had read to me these discharge instructions for my   procedure and have received a written copy.  I understand these   instructions and will follow-up with my physician if I have any questions.

## 2025-02-10 NOTE — DISCHARGE SUMMARY
[facility] - Endoscopy 1st Fl  Discharge Note  Short Stay    Colonoscopy      OUTCOME: Patient tolerated treatment/procedure well without complication and is now ready for discharge.    DISPOSITION: Home or Self Care    FINAL DIAGNOSIS:  <principal problem not specified>    FOLLOWUP: With primary care provider    DISCHARGE INSTRUCTIONS:  No discharge procedures on file.     TIME SPENT ON DISCHARGE: 20   minutes

## 2025-02-10 NOTE — H&P
Endoscopy History and Physical    PCP - Tabitha Melgoza MD  Referring Physician - Tabitha Melgoza MD  [id number] [facility] [place] [address]      ASA - per anesthesia  Mallampati - per anesthesia  History of Anesthesia problems - no  Family history Anesthesia problems -  no   Plan of anesthesia - General    HPI  59 y.o. male    Planned Procedure: Colonoscopy  Diagnosis: H/o colon Ca  Chief Complaint: Same as above    Personnel H/o colon polyps:yes  FH of colon cancer:no  Anticoagulation:no      ROS:  Constitutional: No fevers, chills, No weight loss  CV: No chest pain  Pulm: No cough, No shortness of breath  GI: see HPI    Medical History:  has a past medical history of Allergy, Cancer, Diabetes mellitus, Gastroesophageal reflux disease without esophagitis (06/20/2023), Hypertension, Multiple lung nodules on CT (10/22/2024), Sleep apnea, and Thyroid disease.    Surgical History:  has a past surgical history that includes Colonoscopy (N/A, 12/29/2020); Laparoscopic right colon resection (N/A, 02/02/2021); Back surgery; Colonoscopy (N/A, 02/10/2022); Tonsillectomy; fess, with nasal septoplasty, with imaging guidance (Bilateral, 08/17/2022); Nasal turbinate reduction (Bilateral, 08/17/2022); Colon surgery (02/06/21); Epidural steroid injection into cervical spine (N/A, 12/01/2022); Rhinoplasty (Bilateral, 04/19/2023); Nasal endoscopy (Bilateral, 04/19/2023); Lysis of adhesions (Bilateral, 04/19/2023); Selective injection of anesthetic agent around lumbar spinal nerve root by transforaminal approach (Bilateral, 07/05/2023); Epidural steroid injection into cervical spine (N/A, 7/17/2024); Carpal tunnel release (Left, 12/10/2024); Ulnar tunnel release (Left, 12/10/2024); and Carpal tunnel release (Right, 12/31/2024).    Family History: family history includes Breast cancer in his mother; Cancer in his mother; Cataracts in his maternal grandmother; Diabetes in his maternal grandmother and mother;  "Hypertension in his brother, brother, father, and mother; Lung cancer in his maternal grandfather; No Known Problems in his paternal grandfather, paternal grandmother, sister, and sister; Stroke in his father..    Social History:  reports that he has never smoked. He has never been exposed to tobacco smoke. He has never used smokeless tobacco. He reports that he does not drink alcohol and does not use drugs.    Review of patient's allergies indicates:   Allergen Reactions    Demerol (pf) [meperidine (pf)] Other (See Comments)     Pt reports,"They lost my blood pressure."    Percocet [oxycodone-acetaminophen] Itching     itching    Allopurinol analogues Diarrhea       Medications:   (Not in a hospital admission)      Physical Exam:    Vital Signs:   Vitals:    02/10/25 0700   BP: 116/67   Pulse: 60   Resp: 16   Temp: 96.9 °F (36.1 °C)       General Appearance: Well appearing in no acute distress  Abdomen: Soft, non tender, non distended with normal bowel sounds, no masses    Labs:  Lab Results   Component Value Date    WBC 7.01 12/23/2024    HGB 15.5 12/23/2024    HCT 48.2 12/23/2024     12/23/2024    CHOL 115 (L) 09/09/2024    TRIG 146 09/09/2024    HDL 34 (L) 09/09/2024    ALT 31 12/23/2024    AST 23 12/23/2024     09/09/2024    K 4.3 09/09/2024     09/09/2024    CREATININE 1.1 09/09/2024    BUN 22 (H) 09/09/2024    CO2 23 09/09/2024    TSH 2.499 09/09/2024    PSA 0.62 08/19/2024    HGBA1C 5.6 09/09/2024       I have explained the risks and benefits of this endoscopic procedure to the patient including but not limited to bleeding, inflammation, infection, perforation, and death.    SEDATION PLAN: per anesthesia       History reviewed, vital signs satisfactory, cardiopulmonary status satisfactory, sedation options, risks and plans have been discussed with the patient  All their questions were answered and the patient agrees to the sedation procedures as planned and the patient is deemed an " appropriate candidate for the sedation as planned.     The risks, benefits and alternatives of the procedure were discussed with the patient in detail. This discussion was had in the presence of endoscopy staff. The risks include, risks of adverse reaction to sedation requiring the use of reversal agents, bleeding requiring blood transfusion, perforation requiring surgical intervention and technical failure. Other risks include aspiration leading to respiratory distress and respiratory failure resulting in endotracheal intubation and mechanical ventilation including death. If anesthesia is being utilized for this procedure, it is up to the anesthesiologist to determine airway safety including elective endotracheal intubation. Questions were answered, they agree to proceed. There was no language barriers.       Procedure explained to patient, informed consent obtained and placed in chart.       Yemi Goss MD

## 2025-02-10 NOTE — ANESTHESIA POSTPROCEDURE EVALUATION
Anesthesia Post Evaluation    Patient: Narendra Hopkins Amador Brooke.    Procedure(s) Performed: * No procedures listed *    Final Anesthesia Type: general      Patient location during evaluation: PACU  Patient participation: Yes- Able to Participate  Level of consciousness: awake  Post-procedure vital signs: reviewed and stable  Pain management: adequate  Airway patency: patent    PONV status at discharge: No PONV  Anesthetic complications: no      Cardiovascular status: stable  Respiratory status: unassisted  Hydration status: euvolemic  Follow-up not needed.              Vitals Value Taken Time   /58 02/10/25 0846   Temp 36.7 °C (98 °F) 02/10/25 0836   Pulse 63 02/10/25 0848   Resp 15 02/10/25 0848   SpO2 98 % 02/10/25 0848   Vitals shown include unfiled device data.      No case tracking events are documented in the log.      Pain/Shayan Score: Shayan Score: 6 (2/10/2025  8:41 AM)

## 2025-02-10 NOTE — PROGRESS NOTES
Allergy and Immunology  Established Patient Clinic Note    Date: 2/10/2025  Chief Complaint   Patient presents with    Follow-up     History  Narenrda English Sr. is a 59 y.o. male being seen for follow-up today.    Prurigo Nodularis   Pruritic Condition   - Not controlled at this time   - Failed topical steroids   - Itching is causing stress     Allergies, PMH, PSH, Social, and Family History were reviewed.    Current Outpatient Medications on File Prior to Visit   Medication Sig Dispense Refill    amLODIPine (NORVASC) 5 MG tablet Take 1 tablet (5 mg total) by mouth once daily. 90 tablet 1    atorvastatin (LIPITOR) 10 MG tablet Take 1 tablet (10 mg total) by mouth every evening. 90 tablet 1    clonazePAM (KLONOPIN) 1 MG tablet Take 1 tablet (1 mg total) by mouth every evening. 30 tablet 5    colchicine (COLCRYS) 0.6 mg tablet Take 1 tablet (0.6 mg total) by mouth once daily. 180 tablet 3    doxepin (SINEQUAN) 10 MG capsule Take 1 capsule (10 mg total) by mouth every evening. 30 capsule 11    febuxostat (ULORIC) 40 mg Tab Take 1 tablet (40 mg total) by mouth once daily. 30 tablet 3    hydrALAZINE (APRESOLINE) 100 MG tablet Take 1 tablet (100 mg total) by mouth every 8 (eight) hours. 270 tablet 1    hydrOXYzine HCL (ATARAX) 25 MG tablet Take 25 mg by mouth 2 (two) times daily.      indomethacin (INDOCIN SR) 75 mg CpSR CR capsule Take 1 capsule (75 mg total) by mouth 2 (two) times daily as needed (for gout flare). 60 capsule 0    insulin glargine, TOUJEO, (TOUJEO SOLOSTAR U-300 INSULIN) 300 unit/mL (1.5 mL) InPn pen Inject 85 Units into the skin once daily. 27 mL 1    metFORMIN (GLUCOPHAGE) 1000 MG tablet Take 1 tablet (1,000 mg total) by mouth 2 (two) times daily with meals. 180 tablet 2    metoprolol succinate (TOPROL-XL) 50 MG 24 hr tablet Take 1 tablet (50 mg total) by mouth once daily. 90 tablet 3    MOUNJARO 15 mg/0.5 mL PnIj Inject 15 mg into the skin once a week. 4 Pen 2    pregabalin (LYRICA) 150 MG capsule  TAKE 1 CAPSULE BY MOUTH THREE TIMES DAILY 90 capsule 0    sildenafiL (VIAGRA) 100 MG tablet Take 1 tablet (100 mg total) by mouth daily as needed for Erectile Dysfunction. 30 tablet 11    tadalafiL (CIALIS) 20 MG Tab Take 1 tablet (20 mg total) by mouth every 24 hours as needed (erectile dysfunction). 20 tablet 11    testosterone cypionate (DEPOTESTOTERONE CYPIONATE) 200 mg/mL injection Inject 0.75 mLs (150 mg total) into the muscle every 7 days. 10 mL 5    tiZANidine (ZANAFLEX) 4 MG tablet Take 1 tablet (4 mg total) by mouth 2 (two) times daily as needed (Neck Pain). 60 tablet 2    triamcinolone acetonide 0.1% (KENALOG) 0.1 % ointment AAA bid 454 g 1    VASCEPA 1 gram Cap Take 2 capsules (2,000 mg total) by mouth 2 (two) times daily. 360 capsule 1    azelastine (ASTELIN) 137 mcg (0.1 %) nasal spray 1 spray (137 mcg total) by Nasal route 2 (two) times daily. 30 mL 3    benazepriL (LOTENSIN) 40 MG tablet Take 1 tablet (40 mg total) by mouth once daily. 90 tablet 3    ipratropium (ATROVENT) 21 mcg (0.03 %) nasal spray 2 sprays by Each Nostril route 4 (four) times daily as needed for Rhinitis. (Patient not taking: Reported on 2/4/2025) 30 mL 11    vardenafiL (LEVITRA) 20 MG tablet Take 1 tablet (20 mg total) by mouth daily as needed for Erectile Dysfunction. (Patient not taking: Reported on 2/4/2025) 15 tablet 11    [DISCONTINUED] acetaminophen (TYLENOL) 500 MG tablet Take 1 tablet (500 mg total) by mouth every 8 (eight) hours as needed for Pain. 30 tablet 0    [DISCONTINUED] ketoconazole (NIZORAL) 2 % shampoo Apply topically once a week. Lather in for 5-10 min before rinsing 120 mL 5    [DISCONTINUED] naproxen (NAPROSYN) 500 MG tablet Take 1 tablet (500 mg total) by mouth every 8 (eight) hours as needed (Pain). Take with food 30 tablet 0    [DISCONTINUED] naproxen (NAPROSYN) 500 MG tablet Take 1 tablet (500 mg total) by mouth every 8 (eight) hours as needed (Pain). Take with food 30 tablet 0    [DISCONTINUED]  sertraline (ZOLOFT) 100 MG tablet Take 100 mg by mouth once daily.       No current facility-administered medications on file prior to visit.     Physical Examination  Vitals:    02/04/25 0910   BP: 133/75   Pulse: 68     GENERAL:  male in no apparent distress and well developed and well nourished  HEAD:  Normocephalic, without obvious abnormality, atraumatic  EYES: sclera anicteric, conjunctiva normochromic  EARS: normal TM's and external ear canals both ears  NOSE: without erythema or discharge, clear discharge, turbinates normal    OROPHARYNX: moist mucous membranes without erythema, exudates or petechiae  LYMPH NODES: normal, supple, no lymphadenopathy  LUNGS: clear to auscultation, no wheezes, rales or rhonchi, symmetric air entry.  HEART: normal rate, regular rhythm, normal S1, S2, no murmurs, rubs, clicks or gallops.  ABDOMEN: soft, nontender, nondistended, no masses or organomegaly.  MUSCULOSKELETAL: no gross joint deformity or swelling.  NEURO: alert, oriented, normal speech, no focal findings or movement disorder noted.  SKIN: PN of arms, legs, and upper chest - > 20 PN.     Assessment/Plan:   Problem List Items Addressed This Visit    None  Visit Diagnoses       Prurigo nodularis    -  Primary    Relevant Medications    dupilumab (DUPIXENT) 300 mg/2 mL Syrg          - Patient has failed topical steroids, oral steroids, and Xolair   - Development of PN at this time with escalation to Dupixent   - Underlying pruritic cause not clear at this time     Follow up:  Follow up in about 2 months (around 4/4/2025).    Teressa Riggins MD   [facility]  Allergy and Immunology

## 2025-02-10 NOTE — TELEPHONE ENCOUNTER
Refill Decision Note   Narendra Amador  is requesting a refill authorization.  Brief Assessment and Rationale for Refill:  Approve     Medication Therapy Plan:         Comments:     Note composed:9:31 PM 02/09/2025

## 2025-02-11 ENCOUNTER — PATIENT MESSAGE (OUTPATIENT)
Dept: ALLERGY | Facility: CLINIC | Age: 60
End: 2025-02-11
Payer: COMMERCIAL

## 2025-02-11 ENCOUNTER — OFFICE VISIT (OUTPATIENT)
Dept: INTERNAL MEDICINE | Facility: CLINIC | Age: 60
End: 2025-02-11
Payer: COMMERCIAL

## 2025-02-11 VITALS
RESPIRATION RATE: 18 BRPM | SYSTOLIC BLOOD PRESSURE: 124 MMHG | DIASTOLIC BLOOD PRESSURE: 62 MMHG | WEIGHT: 264.56 LBS | OXYGEN SATURATION: 97 % | TEMPERATURE: 97 F | HEART RATE: 75 BPM | HEIGHT: 73 IN | BODY MASS INDEX: 35.06 KG/M2

## 2025-02-11 VITALS
DIASTOLIC BLOOD PRESSURE: 69 MMHG | HEIGHT: 73 IN | OXYGEN SATURATION: 96 % | HEART RATE: 64 BPM | BODY MASS INDEX: 34.47 KG/M2 | RESPIRATION RATE: 18 BRPM | WEIGHT: 260.13 LBS | TEMPERATURE: 98 F | SYSTOLIC BLOOD PRESSURE: 108 MMHG

## 2025-02-11 DIAGNOSIS — E11.59 HYPERTENSION ASSOCIATED WITH DIABETES: Chronic | ICD-10-CM

## 2025-02-11 DIAGNOSIS — Z79.4 TYPE 2 DIABETES MELLITUS WITH OTHER SPECIFIED COMPLICATION, WITH LONG-TERM CURRENT USE OF INSULIN: Primary | Chronic | ICD-10-CM

## 2025-02-11 DIAGNOSIS — E11.69 OBESITY, DIABETES, AND HYPERTENSION SYNDROME: Chronic | ICD-10-CM

## 2025-02-11 DIAGNOSIS — E11.69 TYPE 2 DIABETES MELLITUS WITH OTHER SPECIFIED COMPLICATION, WITH LONG-TERM CURRENT USE OF INSULIN: Primary | Chronic | ICD-10-CM

## 2025-02-11 DIAGNOSIS — E78.5 HYPERLIPIDEMIA ASSOCIATED WITH TYPE 2 DIABETES MELLITUS: Chronic | ICD-10-CM

## 2025-02-11 DIAGNOSIS — E11.59 OBESITY, DIABETES, AND HYPERTENSION SYNDROME: Chronic | ICD-10-CM

## 2025-02-11 DIAGNOSIS — I15.2 HYPERTENSION ASSOCIATED WITH DIABETES: Chronic | ICD-10-CM

## 2025-02-11 DIAGNOSIS — M1A.09X1 IDIOPATHIC CHRONIC GOUT OF MULTIPLE SITES WITH TOPHUS: Chronic | ICD-10-CM

## 2025-02-11 DIAGNOSIS — E66.9 OBESITY, DIABETES, AND HYPERTENSION SYNDROME: Chronic | ICD-10-CM

## 2025-02-11 DIAGNOSIS — E03.9 HYPOTHYROIDISM (ACQUIRED): Chronic | ICD-10-CM

## 2025-02-11 DIAGNOSIS — E11.69 HYPERLIPIDEMIA ASSOCIATED WITH TYPE 2 DIABETES MELLITUS: Chronic | ICD-10-CM

## 2025-02-11 DIAGNOSIS — I15.2 OBESITY, DIABETES, AND HYPERTENSION SYNDROME: Chronic | ICD-10-CM

## 2025-02-11 PROBLEM — L28.1 PRURIGO NODULARIS: Status: ACTIVE | Noted: 2025-02-11

## 2025-02-11 PROCEDURE — 1159F MED LIST DOCD IN RCRD: CPT | Mod: CPTII,S$GLB,, | Performed by: FAMILY MEDICINE

## 2025-02-11 PROCEDURE — 3074F SYST BP LT 130 MM HG: CPT | Mod: CPTII,S$GLB,, | Performed by: FAMILY MEDICINE

## 2025-02-11 PROCEDURE — 3066F NEPHROPATHY DOC TX: CPT | Mod: CPTII,S$GLB,, | Performed by: FAMILY MEDICINE

## 2025-02-11 PROCEDURE — 3008F BODY MASS INDEX DOCD: CPT | Mod: CPTII,S$GLB,, | Performed by: FAMILY MEDICINE

## 2025-02-11 PROCEDURE — 1160F RVW MEDS BY RX/DR IN RCRD: CPT | Mod: CPTII,S$GLB,, | Performed by: FAMILY MEDICINE

## 2025-02-11 PROCEDURE — 3072F LOW RISK FOR RETINOPATHY: CPT | Mod: CPTII,S$GLB,, | Performed by: FAMILY MEDICINE

## 2025-02-11 PROCEDURE — 4010F ACE/ARB THERAPY RXD/TAKEN: CPT | Mod: CPTII,S$GLB,, | Performed by: FAMILY MEDICINE

## 2025-02-11 PROCEDURE — 99214 OFFICE O/P EST MOD 30 MIN: CPT | Mod: S$GLB,,, | Performed by: FAMILY MEDICINE

## 2025-02-11 PROCEDURE — G2211 COMPLEX E/M VISIT ADD ON: HCPCS | Mod: S$GLB,,, | Performed by: FAMILY MEDICINE

## 2025-02-11 PROCEDURE — 3078F DIAST BP <80 MM HG: CPT | Mod: CPTII,S$GLB,, | Performed by: FAMILY MEDICINE

## 2025-02-11 PROCEDURE — 3061F NEG MICROALBUMINURIA REV: CPT | Mod: CPTII,S$GLB,, | Performed by: FAMILY MEDICINE

## 2025-02-11 PROCEDURE — 99999 PR PBB SHADOW E&M-EST. PATIENT-LVL V: CPT | Mod: PBBFAC,,, | Performed by: FAMILY MEDICINE

## 2025-02-11 PROCEDURE — 3044F HG A1C LEVEL LT 7.0%: CPT | Mod: CPTII,S$GLB,, | Performed by: FAMILY MEDICINE

## 2025-02-11 RX ORDER — HYDRALAZINE HYDROCHLORIDE 100 MG/1
100 TABLET, FILM COATED ORAL EVERY 8 HOURS
Qty: 270 TABLET | Refills: 1 | Status: SHIPPED | OUTPATIENT
Start: 2025-02-11 | End: 2026-02-11

## 2025-02-11 RX ORDER — ICOSAPENT ETHYL 1000 MG/1
2 CAPSULE ORAL 2 TIMES DAILY
Qty: 360 CAPSULE | Refills: 1 | Status: SHIPPED | OUTPATIENT
Start: 2025-02-11

## 2025-02-11 RX ORDER — ATORVASTATIN CALCIUM 10 MG/1
10 TABLET, FILM COATED ORAL NIGHTLY
Qty: 90 TABLET | Refills: 1 | Status: SHIPPED | OUTPATIENT
Start: 2025-02-11

## 2025-02-11 RX ORDER — METFORMIN HYDROCHLORIDE 1000 MG/1
1000 TABLET ORAL 2 TIMES DAILY WITH MEALS
Qty: 180 TABLET | Refills: 1 | Status: SHIPPED | OUTPATIENT
Start: 2025-02-11

## 2025-02-11 RX ORDER — METOPROLOL SUCCINATE 50 MG/1
50 TABLET, EXTENDED RELEASE ORAL DAILY
Qty: 90 TABLET | Refills: 3 | Status: SHIPPED | OUTPATIENT
Start: 2025-02-11

## 2025-02-11 RX ORDER — LEVOTHYROXINE SODIUM 112 UG/1
TABLET ORAL
Qty: 90 TABLET | Refills: 1 | Status: SHIPPED | OUTPATIENT
Start: 2025-02-11 | End: 2025-02-25

## 2025-02-11 RX ORDER — BENAZEPRIL HYDROCHLORIDE 40 MG/1
40 TABLET ORAL DAILY
Qty: 90 TABLET | Refills: 1 | Status: SHIPPED | OUTPATIENT
Start: 2025-02-11 | End: 2026-02-11

## 2025-02-11 RX ORDER — AMLODIPINE BESYLATE 5 MG/1
5 TABLET ORAL DAILY
Qty: 90 TABLET | Refills: 1 | Status: SHIPPED | OUTPATIENT
Start: 2025-02-11

## 2025-02-11 RX ORDER — CALCIPOTRIENE 50 UG/G
1 CREAM TOPICAL 2 TIMES DAILY
COMMUNITY
Start: 2025-02-10

## 2025-02-11 RX ORDER — AMLODIPINE BESYLATE 5 MG/1
5 TABLET ORAL
Qty: 90 TABLET | Refills: 0 | OUTPATIENT
Start: 2025-02-11

## 2025-02-11 RX ORDER — INSULIN GLARGINE 300 [IU]/ML
48 INJECTION, SOLUTION SUBCUTANEOUS DAILY
Qty: 12 ML | Refills: 1 | Status: SHIPPED | OUTPATIENT
Start: 2025-02-11

## 2025-02-11 RX ORDER — TIRZEPATIDE 15 MG/.5ML
15 INJECTION, SOLUTION SUBCUTANEOUS WEEKLY
Qty: 4 PEN | Refills: 2 | Status: SHIPPED | OUTPATIENT
Start: 2025-02-11

## 2025-02-11 NOTE — PROGRESS NOTES
OFFICE VISIT 2/11/25 11:20 AM CST    CHIEF COMPLAINT: Establish Care     History of Present Illness    CHIEF COMPLAINT:  Narendra presents today seeking to establish care and coordinate multiple specialists    DIABETES:  He reports significant improvement in blood sugar with A1C approaching normal range. He currently takes Mounjaro 15 mg and insulin 48 units daily, reduced from a previous dose of 85 units which he found excessive.    HYPERTENSION:  He is currently taking multiple antihypertensive medications including Amlodipine, Benazepril, Hydralazine, and Metoprolol 50 mg daily. He expresses concern about being on multiple blood pressure medications.    CHRONIC DERMATOLOGICAL CONDITION:  He reports a persistent rash present for two years. Despite evaluation by both dermatology and allergy specialists with various tests performed, the etiology remains undetermined. Dupixent was recommended by allergist but has not been started pending insurance approval.    GOUT:  He has gout affecting multiple joints including knee, ankles, and great toe. His knee has required crystal drainage due to severe involvement. He reports persistent symptoms in the ankles. Last rheumatology evaluation was in December 2023.       Review of Systems   Respiratory:  Negative for chest tightness and shortness of breath.    Cardiovascular:  Negative for chest pain.   Endocrine: Negative for polydipsia and polyuria.       Assessment & Plan     Assumed role of primary care coordinator to manage patient's multiple specialists and medications   Reviewed complex gout management; deferred to rheumatologist for current treatment plan   Assessed diabetes control; noted improved A1C and blood sugar   Evaluated cardiovascular medications; maintained current regimen   Performed diabetic foot exam; confirmed intact sensation   Reviewed recent labs ordered by urologist, including testosterone levels for ongoing injections    ACTION ITEMS/LIFESTYLE:   Narendra  to limit red meat consumption    MEDICATIONS:   Refilled Amlodipine   Refilled Atorvastatin   Refilled Benazepril   Refilled Hydralazine   Refilled Metoprolol 50 mg daily   Refilled Mounjaro 15 mg   Refilled Vascepa   Refilled Metformin twice daily (morning and bedtime)   Refilled Trulicity insulin 48 units; provided 8 syringes (approximately 2.5 month supply)   Refilled Levothyroxine    ORDERS:   Ordered comprehensive lab panel including diabetes, cholesterol, and uric acid levels   Scheduled labs for next week, prior to cardiology appointment on 2/19    REFERRALS:   Referred to rheumatology for follow-up visit    FOLLOW UP:   Follow up with video visit for reevaluation after lab results are available   Contact rheumatology through patient portal to schedule an appointment   Informed patient about working with advanced practice partner, Mirtha Padilla       1. Type 2 diabetes mellitus with other specified complication, with long-term current use of insulin  -     metFORMIN (GLUCOPHAGE) 1000 MG tablet; Take 1 tablet (1,000 mg total) by mouth 2 (two) times daily with meals.  Dispense: 180 tablet; Refill: 1  -     MOUNJARO 15 mg/0.5 mL PnIj; Inject 15 mg into the skin once a week.  Dispense: 4 Pen; Refill: 2  -     Hemoglobin A1C; Standing  -     Comprehensive Metabolic Panel; Standing  -     Lipid Panel; Standing  -     Microalbumin/Creatinine Ratio, Urine; Standing  -     insulin glargine, TOUJEO, (TOUJEO SOLOSTAR U-300 INSULIN) 300 unit/mL (1.5 mL) InPn pen; Inject 48 Units into the skin once daily.  Dispense: 12 mL; Refill: 1    2. Hypertension associated with diabetes  -     amLODIPine (NORVASC) 5 MG tablet; Take 1 tablet (5 mg total) by mouth once daily.  Dispense: 90 tablet; Refill: 1  -     benazepriL (LOTENSIN) 40 MG tablet; Take 1 tablet (40 mg total) by mouth once daily.  Dispense: 90 tablet; Refill: 1  -     hydrALAZINE (APRESOLINE) 100 MG tablet; Take 1 tablet (100 mg total) by mouth every 8 (eight)  hours.  Dispense: 270 tablet; Refill: 1  -     metoprolol succinate (TOPROL-XL) 50 MG 24 hr tablet; Take 1 tablet (50 mg total) by mouth once daily.  Dispense: 90 tablet; Refill: 3  -     Comprehensive Metabolic Panel; Standing    3. Hypothyroidism (acquired)  Assessment & Plan:  Lab Results   Component Value Date    TSH 2.499 09/09/2024    TSH 1.479 03/01/2024    TSH 1.952 09/15/2023    FREET4 0.91 09/09/2024    FREET4 0.98 09/15/2023    FREET4 0.96 03/09/2023       Orders:  -     Discontinue: levothyroxine (SYNTHROID) 112 MCG tablet; TAKE 1 TABLET BY MOUTH ONCE DAILY BEFORE BREAKFAST  Dispense: 90 tablet; Refill: 1  -     TSH; Standing    4. Hyperlipidemia associated with type 2 diabetes mellitus  -     atorvastatin (LIPITOR) 10 MG tablet; Take 1 tablet (10 mg total) by mouth every evening.  Dispense: 90 tablet; Refill: 1  -     VASCEPA 1 gram Cap; Take 2 capsules (2,000 mg total) by mouth 2 (two) times daily.  Dispense: 360 capsule; Refill: 1  -     Comprehensive Metabolic Panel; Standing  -     Lipid Panel; Standing    5. Obesity, diabetes, and hypertension syndrome  Overview:  No history or family history of thyroid cancer or multiple endocrine neoplasia syndrome.     Orders:  -     benazepriL (LOTENSIN) 40 MG tablet; Take 1 tablet (40 mg total) by mouth once daily.  Dispense: 90 tablet; Refill: 1  -     Comprehensive Metabolic Panel; Standing    6. [facility]  -     Ambulatory referral/consult to Rheumatology; Future; Expected date: 02/18/2025  -     Uric Acid; Future; Expected date: 02/11/2025          No other significant complaints or concerns were reported.  Today's visit involved the intricate management of episodic problem(s) and the ongoing care for the patient's serious or complex condition(s) listed above, reflecting the inherent complexity of providing longitudinal, comprehensive evaluation and management as the central hub for the patient's primary care  "services.  Vitals:    02/11/25 1137   BP: 124/62   BP Location: Left arm   Patient Position: Sitting   Pulse: 75   Resp: 18   Temp: 97.1 °F (36.2 °C)   TempSrc: Tympanic   SpO2: 97%   Weight: 120 kg (264 lb 8.8 oz)   Height: 6' 1" (1.854 m)   Physical Exam  Vitals reviewed.   Constitutional:       General: He is not in acute distress.     Appearance: Normal appearance. He is not ill-appearing, toxic-appearing or diaphoretic.   Cardiovascular:      Rate and Rhythm: Normal rate and regular rhythm.   Pulmonary:      Effort: Pulmonary effort is normal.   Neurological:      General: No focal deficit present.      Mental Status: He is alert and oriented to person, place, and time. Mental status is at baseline.   Psychiatric:         Mood and Affect: Mood normal.         Behavior: Behavior normal.         Judgment: Judgment normal.       DIABETIC FOOT EXAM:  Protective Sensation (w/ 10 gram monofilament):  Right: Intact  Left: Intact    Visual Inspection:  Normal -  Bilateral    Pedal Pulses:   Right: Present  Left: Present    Posterior Tibialis Pulses:   Right:Present  Left: Present     This note was generated with the assistance of ambient listening technology. Verbal consent was obtained by the patient and accompanying visitor(s) for the recording of patient appointment to facilitate this note. I attest to having reviewed and edited the generated note for accuracy, though some syntax or spelling errors may persist. Please contact the author of this note for any clarification.    Documentation entered by me for this encounter may have been done in part using speech-recognition technology. Although I have made an effort to ensure accuracy, "sound like" errors may exist and should be interpreted in context.     Future Appointments   Date Time Provider Department Center   2/19/2025 10:20 AM Kwame Guidry MD HG CARDIO [facility]   3/11/2025  8:40 AM LABORATORY, DARVIN VELASQUEZ [facility] LAB [facility]   4/14/2025  9:00 AM Gilson" MD Teressa ON ALLERGY [facility]   6/10/2025  9:00 AM Lejeune, Elizabeth B, NP [facility] SLEEP [place]   7/18/2025  9:30 AM LABORATORY, DARVIN VELASQUEZ ON LAB [facility]   7/18/2025  2:15 PM Shad Acosta MD ON UROLOGY  [facility]       WRAP-UP INSTRUCTIONS  Fasting labs soon.  VV F/U with me in late March

## 2025-02-11 NOTE — ASSESSMENT & PLAN NOTE
Lab Results   Component Value Date    TSH 2.499 09/09/2024    TSH 1.479 03/01/2024    TSH 1.952 09/15/2023    FREET4 0.91 09/09/2024    FREET4 0.98 09/15/2023    FREET4 0.96 03/09/2023

## 2025-02-12 ENCOUNTER — PATIENT MESSAGE (OUTPATIENT)
Dept: DERMATOLOGY | Facility: CLINIC | Age: 60
End: 2025-02-12
Payer: COMMERCIAL

## 2025-02-12 LAB
FINAL PATHOLOGIC DIAGNOSIS: NORMAL
GROSS: NORMAL
Lab: NORMAL

## 2025-02-13 ENCOUNTER — PATIENT MESSAGE (OUTPATIENT)
Dept: DERMATOLOGY | Facility: CLINIC | Age: 60
End: 2025-02-13
Payer: COMMERCIAL

## 2025-02-13 RX ORDER — MOMETASONE FUROATE 1 MG/G
OINTMENT TOPICAL DAILY
Qty: 45 G | Refills: 2 | Status: SHIPPED | OUTPATIENT
Start: 2025-02-13

## 2025-02-13 NOTE — TELEPHONE ENCOUNTER
I sent over mometasone. I'm not sure why he's having the amount of itching he is having. The biopsy did not show anything that would account for why he is itching. He can use the topicals that we prescribed, along with antihistamines.

## 2025-02-17 ENCOUNTER — PATIENT MESSAGE (OUTPATIENT)
Dept: ALLERGY | Facility: CLINIC | Age: 60
End: 2025-02-17
Payer: COMMERCIAL

## 2025-02-17 ENCOUNTER — LAB VISIT (OUTPATIENT)
Dept: LAB | Facility: HOSPITAL | Age: 60
End: 2025-02-17
Attending: FAMILY MEDICINE
Payer: COMMERCIAL

## 2025-02-17 DIAGNOSIS — I15.2 OBESITY, DIABETES, AND HYPERTENSION SYNDROME: Chronic | ICD-10-CM

## 2025-02-17 DIAGNOSIS — E11.59 OBESITY, DIABETES, AND HYPERTENSION SYNDROME: Chronic | ICD-10-CM

## 2025-02-17 DIAGNOSIS — I15.2 HYPERTENSION ASSOCIATED WITH DIABETES: Chronic | ICD-10-CM

## 2025-02-17 DIAGNOSIS — E03.9 HYPOTHYROIDISM (ACQUIRED): Chronic | ICD-10-CM

## 2025-02-17 DIAGNOSIS — E11.59 HYPERTENSION ASSOCIATED WITH DIABETES: Chronic | ICD-10-CM

## 2025-02-17 DIAGNOSIS — E11.69 TYPE 2 DIABETES MELLITUS WITH OTHER SPECIFIED COMPLICATION, WITH LONG-TERM CURRENT USE OF INSULIN: Chronic | ICD-10-CM

## 2025-02-17 DIAGNOSIS — E11.69 HYPERLIPIDEMIA ASSOCIATED WITH TYPE 2 DIABETES MELLITUS: Chronic | ICD-10-CM

## 2025-02-17 DIAGNOSIS — E11.69 OBESITY, DIABETES, AND HYPERTENSION SYNDROME: Chronic | ICD-10-CM

## 2025-02-17 DIAGNOSIS — E66.9 OBESITY, DIABETES, AND HYPERTENSION SYNDROME: Chronic | ICD-10-CM

## 2025-02-17 DIAGNOSIS — M1A.09X1 IDIOPATHIC CHRONIC GOUT OF MULTIPLE SITES WITH TOPHUS: Chronic | ICD-10-CM

## 2025-02-17 DIAGNOSIS — E78.5 HYPERLIPIDEMIA ASSOCIATED WITH TYPE 2 DIABETES MELLITUS: Chronic | ICD-10-CM

## 2025-02-17 DIAGNOSIS — Z79.4 TYPE 2 DIABETES MELLITUS WITH OTHER SPECIFIED COMPLICATION, WITH LONG-TERM CURRENT USE OF INSULIN: Chronic | ICD-10-CM

## 2025-02-17 LAB
ALBUMIN SERPL BCP-MCNC: 3.7 G/DL (ref 3.5–5.2)
ALP SERPL-CCNC: 30 U/L (ref 40–150)
ALT SERPL W/O P-5'-P-CCNC: 41 U/L (ref 10–44)
ANION GAP SERPL CALC-SCNC: 8 MMOL/L (ref 8–16)
AST SERPL-CCNC: 34 U/L (ref 10–40)
BILIRUB SERPL-MCNC: 0.8 MG/DL (ref 0.1–1)
BUN SERPL-MCNC: 25 MG/DL (ref 6–20)
CALCIUM SERPL-MCNC: 9.6 MG/DL (ref 8.7–10.5)
CHLORIDE SERPL-SCNC: 106 MMOL/L (ref 95–110)
CHOLEST SERPL-MCNC: 103 MG/DL (ref 120–199)
CHOLEST/HDLC SERPL: 3.6 {RATIO} (ref 2–5)
CO2 SERPL-SCNC: 24 MMOL/L (ref 23–29)
CREAT SERPL-MCNC: 1.2 MG/DL (ref 0.5–1.4)
EST. GFR  (NO RACE VARIABLE): >60 ML/MIN/1.73 M^2
ESTIMATED AVG GLUCOSE: 111 MG/DL (ref 68–131)
GLUCOSE SERPL-MCNC: 72 MG/DL (ref 70–110)
HBA1C MFR BLD: 5.5 % (ref 4–5.6)
HDLC SERPL-MCNC: 29 MG/DL (ref 40–75)
HDLC SERPL: 28.2 % (ref 20–50)
LDLC SERPL CALC-MCNC: 43.8 MG/DL (ref 63–159)
NONHDLC SERPL-MCNC: 74 MG/DL
POTASSIUM SERPL-SCNC: 4.5 MMOL/L (ref 3.5–5.1)
PROT SERPL-MCNC: 6.6 G/DL (ref 6–8.4)
SODIUM SERPL-SCNC: 138 MMOL/L (ref 136–145)
T4 FREE SERPL-MCNC: 0.91 NG/DL (ref 0.71–1.51)
TRIGL SERPL-MCNC: 151 MG/DL (ref 30–150)
TSH SERPL DL<=0.005 MIU/L-ACNC: 6.38 UIU/ML (ref 0.4–4)
URATE SERPL-MCNC: 6.2 MG/DL (ref 3.4–7)

## 2025-02-17 PROCEDURE — 80053 COMPREHEN METABOLIC PANEL: CPT | Performed by: FAMILY MEDICINE

## 2025-02-17 PROCEDURE — 36415 COLL VENOUS BLD VENIPUNCTURE: CPT | Mod: PO | Performed by: FAMILY MEDICINE

## 2025-02-17 PROCEDURE — 84443 ASSAY THYROID STIM HORMONE: CPT | Performed by: FAMILY MEDICINE

## 2025-02-17 PROCEDURE — 80061 LIPID PANEL: CPT | Performed by: FAMILY MEDICINE

## 2025-02-17 PROCEDURE — 84439 ASSAY OF FREE THYROXINE: CPT | Performed by: FAMILY MEDICINE

## 2025-02-17 PROCEDURE — 83036 HEMOGLOBIN GLYCOSYLATED A1C: CPT | Performed by: FAMILY MEDICINE

## 2025-02-17 PROCEDURE — 84550 ASSAY OF BLOOD/URIC ACID: CPT | Performed by: FAMILY MEDICINE

## 2025-02-18 ENCOUNTER — PATIENT MESSAGE (OUTPATIENT)
Dept: INTERNAL MEDICINE | Facility: CLINIC | Age: 60
End: 2025-02-18
Payer: COMMERCIAL

## 2025-02-18 ENCOUNTER — PATIENT MESSAGE (OUTPATIENT)
Dept: RHEUMATOLOGY | Facility: CLINIC | Age: 60
End: 2025-02-18
Payer: COMMERCIAL

## 2025-02-18 ENCOUNTER — RESULTS FOLLOW-UP (OUTPATIENT)
Dept: RHEUMATOLOGY | Facility: CLINIC | Age: 60
End: 2025-02-18

## 2025-02-18 DIAGNOSIS — E03.9 HYPOTHYROIDISM (ACQUIRED): Primary | ICD-10-CM

## 2025-02-18 DIAGNOSIS — M10.9 GOUT, UNSPECIFIED CAUSE, UNSPECIFIED CHRONICITY, UNSPECIFIED SITE: ICD-10-CM

## 2025-02-18 RX ORDER — FEBUXOSTAT 40 MG/1
40 TABLET, FILM COATED ORAL DAILY
Qty: 30 TABLET | Refills: 5 | Status: SHIPPED | OUTPATIENT
Start: 2025-02-18

## 2025-02-19 ENCOUNTER — OFFICE VISIT (OUTPATIENT)
Dept: CARDIOLOGY | Facility: CLINIC | Age: 60
End: 2025-02-19
Payer: COMMERCIAL

## 2025-02-19 VITALS
BODY MASS INDEX: 35.62 KG/M2 | HEIGHT: 73 IN | HEART RATE: 71 BPM | SYSTOLIC BLOOD PRESSURE: 136 MMHG | WEIGHT: 268.75 LBS | OXYGEN SATURATION: 98 % | DIASTOLIC BLOOD PRESSURE: 74 MMHG

## 2025-02-19 DIAGNOSIS — E11.69 TYPE 2 DIABETES MELLITUS WITH OTHER SPECIFIED COMPLICATION, UNSPECIFIED WHETHER LONG TERM INSULIN USE: ICD-10-CM

## 2025-02-19 DIAGNOSIS — Z79.4 TYPE 2 DIABETES MELLITUS WITH OTHER CIRCULATORY COMPLICATION, WITH LONG-TERM CURRENT USE OF INSULIN: ICD-10-CM

## 2025-02-19 DIAGNOSIS — E11.9 TYPE 2 DIABETES MELLITUS WITHOUT COMPLICATION, WITH LONG-TERM CURRENT USE OF INSULIN: ICD-10-CM

## 2025-02-19 DIAGNOSIS — I10 BENIGN ESSENTIAL HTN: ICD-10-CM

## 2025-02-19 DIAGNOSIS — E11.59 TYPE 2 DIABETES MELLITUS WITH OTHER CIRCULATORY COMPLICATION, WITH LONG-TERM CURRENT USE OF INSULIN: ICD-10-CM

## 2025-02-19 DIAGNOSIS — I15.2 HYPERTENSION ASSOCIATED WITH DIABETES: Primary | ICD-10-CM

## 2025-02-19 DIAGNOSIS — E78.49 OTHER HYPERLIPIDEMIA: ICD-10-CM

## 2025-02-19 DIAGNOSIS — E11.9 TYPE 2 DIABETES MELLITUS WITHOUT COMPLICATION, WITHOUT LONG-TERM CURRENT USE OF INSULIN: ICD-10-CM

## 2025-02-19 DIAGNOSIS — E11.59 HYPERTENSION ASSOCIATED WITH DIABETES: Primary | ICD-10-CM

## 2025-02-19 DIAGNOSIS — E03.9 HYPOTHYROIDISM (ACQUIRED): ICD-10-CM

## 2025-02-19 DIAGNOSIS — G47.33 OSA (OBSTRUCTIVE SLEEP APNEA): ICD-10-CM

## 2025-02-19 DIAGNOSIS — E78.5 HYPERLIPIDEMIA, UNSPECIFIED HYPERLIPIDEMIA TYPE: ICD-10-CM

## 2025-02-19 DIAGNOSIS — Z79.4 TYPE 2 DIABETES MELLITUS WITHOUT COMPLICATION, WITH LONG-TERM CURRENT USE OF INSULIN: ICD-10-CM

## 2025-02-19 DIAGNOSIS — R94.31 NONSPECIFIC ABNORMAL ELECTROCARDIOGRAM (ECG) (EKG): ICD-10-CM

## 2025-02-19 DIAGNOSIS — R79.89 LOW TESTOSTERONE: ICD-10-CM

## 2025-02-19 DIAGNOSIS — E78.1 HYPERTRIGLYCERIDEMIA: ICD-10-CM

## 2025-02-19 NOTE — PROGRESS NOTES
Subjective:   Patient ID:  Narendra English Sr. is a 59 y.o. male who presents for cardiac consult of No chief complaint on file.      Referring Physician:    Tabitha Melgoza MD [[id number]] [id number] [facility] [place] [address]      Reason for consult: HTN    HPI  The patient came in today for cardiac consult of No chief complaint on file.      Narendra English Sr. is a 59 y.o. male pt with HTN, HLD - elevated TGs, DM2, FARA, obesity, gout, hypothyroidism, low T presents for follow up CV eval.      11/13/24  Pt had recent with eval with Dr. Nam - no plans for surgery until 2025  - will continue conservative therapy for now.   He saw Dr. Brown for CTS - b/l hands.   BP and HR stable. BMI 33 - 256 lbs     2/19/25  Pt had CTS sx with Dr. Brown after last visit.   Lipids well controlled 2/2025.   BP and HR stable. BMI 35 - 268 lbs     Stress ECHO 2/2022  Summary    Concentric remodeling and normal systolic function.  There were no arrhythmias during stress.  The estimated ejection fraction is 60%.  Normal left ventricular diastolic function.  With normal right ventricular systolic function.  The stress echo portion of this study is negative for myocardial ischemia.  The ECG portion of this study is negative for myocardial ischemia.    Past Medical History:   Diagnosis Date    Allergy     Cancer     Colon    Diabetes mellitus      09/14/2023 Insulin x 10 years    Encounter for screening colonoscopy 1/20/2023    Gastroesophageal reflux disease without esophagitis 06/20/2023    Hypertension     Multiple lung nodules on CT 10/22/2024    Sleep apnea     Thyroid disease        Past Surgical History:   Procedure Laterality Date    BACK SURGERY      CARPAL TUNNEL RELEASE Left 12/10/2024    Procedure: RELEASE, CARPAL TUNNEL;  Surgeon: Lita Brown MD;  Location: [facility];  Service: Orthopedics;  Laterality: Left;  46242 Left cubital tunnel release, in situ  [id number] Left carpal tunnel release     CARPAL TUNNEL RELEASE Right 12/31/2024    Procedure: RELEASE, CARPAL TUNNEL;  Surgeon: Lita Brown MD;  Location: [facility] OR;  Service: Orthopedics;  Laterality: Right;    COLON SURGERY  02/06/21    COLONOSCOPY N/A 12/29/2020    Procedure: COLONOSCOPY;  Surgeon: William Cotter MD;  Location: [facility] ENDO;  Service: Endoscopy;  Laterality: N/A;  Will need Rapid doesn't live here    COLONOSCOPY N/A 02/10/2022    Procedure: COLONOSCOPY;  Surgeon: Wili Espinosa MD;  Location: [facility] ENDO;  Service: Endoscopy;  Laterality: N/A;    EPIDURAL STEROID INJECTION INTO CERVICAL SPINE N/A 12/01/2022    Procedure: C7/T1 IL SALBADOR;  Surgeon: Leonard Mart MD;  Location: [facility] PAIN MGT;  Service: Pain Management;  Laterality: N/A;    EPIDURAL STEROID INJECTION INTO CERVICAL SPINE N/A 7/17/2024    Procedure: C6/7 IL SALBADOR, Left Paramedian Approach;  Surgeon: Leonard Mart MD;  [facility]: [facility] PAIN [facility];  Service: Pain Management;  Laterality: N/A;    FESS, WITH NASAL SEPTOPLASTY, WITH IMAGING GUIDANCE Bilateral 08/17/2022    Procedure: FESS, WITH NASAL SEPTOPLASTY, WITH IMAGING GUIDANCE;  Surgeon: Viktor Ferrell MD;  Location: [facility] OR;  Service: ENT;  Laterality: Bilateral;    LAPAROSCOPIC RIGHT COLON RESECTION N/A 02/02/2021    Procedure: COLECTOMY, RIGHT, LAPAROSCOPIC, ERAS low;  Surgeon: Melonie Santoyo MD;  [facility];  Service: Colon and Rectal;  Laterality: N/A;  needs rapid covid test    LYSIS OF ADHESIONS Bilateral 04/19/2023    Procedure: LYSIS, ADHESIONS;  Surgeon: Viktor Ferrell MD;  Location: [facility] OR;  Service: ENT;  Laterality: Bilateral;    NASAL ENDOSCOPY Bilateral 04/19/2023    Procedure: ENDOSCOPY, NOSE;  Surgeon: Viktor Ferrell MD;  Location: [facility] OR;  Service: ENT;  Laterality: Bilateral;    NASAL TURBINATE REDUCTION Bilateral 08/17/2022    Procedure: REDUCTION, NASAL TURBINATE;  Surgeon: Viktor Ferrell MD;  Location: [facility] OR;  Service: ENT;  Laterality: Bilateral;     RHINOPLASTY Bilateral 04/19/2023    Procedure: RHINOPLASTY;  Surgeon: Viktor Ferrell MD;  Location: [facility] OR;  Service: ENT;  Laterality: Bilateral;    SELECTIVE INJECTION OF ANESTHETIC AGENT AROUND LUMBAR SPINAL NERVE ROOT BY TRANSFORAMINAL APPROACH Bilateral 07/05/2023    Procedure: Bilateral L4/5 TF SALBADOR RN IV Sedation;  Surgeon: Leonard Mart MD;  Location: [facility] PAIN MGT;  Service: Pain Management;  Laterality: Bilateral;    TONSILLECTOMY      ULNAR TUNNEL RELEASE Left 12/10/2024    Procedure: RELEASE, CUBITAL TUNNEL;  Surgeon: Lita Brown MD;  Location: [facility] OR;  Service: Orthopedics;  Laterality: Left;       Social History     Tobacco Use    Smoking status: Never     Passive exposure: Never    Smokeless tobacco: Never   Substance Use Topics    Alcohol use: Never    Drug use: Never       Family History   Problem Relation Name Age of Onset    Hypertension Mother Kesha English     Diabetes Mother Kesha English     Breast cancer Mother Kseha English     Cancer Mother Kesha English     Hypertension Father Sandeep English     Stroke Father Sandeep English     No Known Problems Sister      No Known Problems Sister      Hypertension Brother Brother     Hypertension Brother Mauro English     Cataracts Maternal Grandmother Manda Kai     Diabetes Maternal Grandmother Manda Kai     Lung cancer Maternal Grandfather      No Known Problems Paternal Grandmother      No Known Problems Paternal Grandfather         Patient's Medications   New Prescriptions    No medications on file   Previous Medications    AMLODIPINE (NORVASC) 5 MG TABLET    Take 1 tablet (5 mg total) by mouth once daily.    ATORVASTATIN (LIPITOR) 10 MG TABLET    Take 1 tablet (10 mg total) by mouth every evening.    BENAZEPRIL (LOTENSIN) 40 MG TABLET    Take 1 tablet (40 mg total) by mouth once daily.    CALCIPOTRIENE (DOVONOX) 0.005 % CREAM    Apply 1 application  topically 2 (two) times daily.    CLONAZEPAM (KLONOPIN) 1 MG TABLET    Take 1  tablet (1 mg total) by mouth every evening.    COLCHICINE (COLCRYS) 0.6 MG TABLET    Take 1 tablet (0.6 mg total) by mouth once daily.    DOXEPIN (SINEQUAN) 10 MG CAPSULE    Take 1 capsule (10 mg total) by mouth every evening.    DUPILUMAB (DUPIXENT) 300 MG/2 ML SYRG    Inject 2 mLs (300 mg total) into the skin every 14 (fourteen) days.    DUPILUMAB (DUPIXENT) 300 MG/2 ML SYRG    Inject 4 mLs (600 mg total) into the skin once. for 1 dose    FEBUXOSTAT (ULORIC) 40 MG TAB    Take 1 tablet (40 mg total) by mouth once daily.    HYDRALAZINE (APRESOLINE) 100 MG TABLET    Take 1 tablet (100 mg total) by mouth every 8 (eight) hours.    HYDROXYZINE HCL (ATARAX) 25 MG TABLET    Take 25 mg by mouth 2 (two) times daily.    INDOMETHACIN (INDOCIN SR) 75 MG CPSR CR CAPSULE    Take 1 capsule (75 mg total) by mouth 2 (two) times daily as needed (for gout flare).    INSULIN GLARGINE, TOUJEO, (TOUJEO SOLOSTAR U-300 INSULIN) 300 UNIT/ML (1.5 ML) INPN PEN    Inject 48 Units into the skin once daily.    LEVOTHYROXINE (SYNTHROID) 112 MCG TABLET    TAKE 1 TABLET BY MOUTH ONCE DAILY BEFORE BREAKFAST    METFORMIN (GLUCOPHAGE) 1000 MG TABLET    Take 1 tablet (1,000 mg total) by mouth 2 (two) times daily with meals.    METOPROLOL SUCCINATE (TOPROL-XL) 50 MG 24 HR TABLET    Take 1 tablet (50 mg total) by mouth once daily.    MOMETASONE (ELOCON) 0.1 % OINTMENT    Apply topically once daily.    MOUNJARO 15 MG/0.5 ML PNIJ    Inject 15 mg into the skin once a week.    PREGABALIN (LYRICA) 150 MG CAPSULE    TAKE 1 CAPSULE BY MOUTH THREE TIMES DAILY    SILDENAFIL (VIAGRA) 100 MG TABLET    Take 1 tablet (100 mg total) by mouth daily as needed for Erectile Dysfunction.    TADALAFIL (CIALIS) 20 MG TAB    Take 1 tablet (20 mg total) by mouth every 24 hours as needed (erectile dysfunction).    TESTOSTERONE CYPIONATE (DEPOTESTOTERONE CYPIONATE) 200 MG/ML INJECTION    Inject 0.75 mLs (150 mg total) into the muscle every 7 days.    TIZANIDINE (ZANAFLEX) 4 MG  TABLET    Take 1 tablet (4 mg total) by mouth 2 (two) times daily as needed (Neck Pain).    TRIAMCINOLONE ACETONIDE 0.1% (KENALOG) 0.1 % OINTMENT    AAA bid    VARDENAFIL (LEVITRA) 20 MG TABLET    Take 1 tablet (20 mg total) by mouth daily as needed for Erectile Dysfunction.    VASCEPA 1 GRAM CAP    Take 2 capsules (2,000 mg total) by mouth 2 (two) times daily.   Modified Medications    No medications on file   Discontinued Medications    No medications on file       Review of Systems   Constitutional:  Positive for malaise/fatigue.   HENT: Negative.     Eyes: Negative.    Respiratory: Negative.     Cardiovascular: Negative.    Gastrointestinal: Negative.    Genitourinary: Negative.    Musculoskeletal:  Positive for back pain and joint pain.   Skin: Negative.    Neurological: Negative.    Endo/Heme/Allergies: Negative.    Psychiatric/Behavioral: Negative.     All 12 systems otherwise negative.      Wt Readings from Last 3 Encounters:   02/11/25 120 kg (264 lb 8.8 oz)   02/10/25 118 kg (260 lb 2.3 oz)   02/04/25 120.6 kg (265 lb 14 oz)     Temp Readings from Last 3 Encounters:   02/11/25 97.1 °F (36.2 °C) (Tympanic)   02/10/25 98 °F (36.7 °C) (Temporal)   12/31/24 98.6 °F (37 °C) (Temporal)     BP Readings from Last 3 Encounters:   02/11/25 124/62   02/10/25 108/69   02/04/25 133/75     Pulse Readings from Last 3 Encounters:   02/11/25 75   02/10/25 64   02/04/25 68       There were no vitals taken for this visit.    Objective:   Physical Exam  Vitals and nursing note reviewed.   Constitutional:       General: He is not in acute distress.     Appearance: He is well-developed. He is obese. He is not diaphoretic.   HENT:      Head: Normocephalic and atraumatic.      Nose: Nose normal.   Eyes:      General: No scleral icterus.     Conjunctiva/sclera: Conjunctivae normal.   Neck:      Thyroid: No thyromegaly.      Vascular: No JVD.   Cardiovascular:      Rate and Rhythm: Normal rate and regular rhythm.      Heart sounds:  S1 normal and S2 normal. No murmur heard.     No friction rub. No gallop. No S3 or S4 sounds.   Pulmonary:      Effort: Pulmonary effort is normal. No respiratory distress.      Breath sounds: Normal breath sounds. No stridor. No wheezing or rales.   Chest:      Chest wall: No tenderness.   Abdominal:      General: Bowel sounds are normal. There is no distension.      Palpations: Abdomen is soft. There is no mass.      Tenderness: There is no abdominal tenderness. There is no rebound.   Genitourinary:     Comments: Deferred  Musculoskeletal:         General: No tenderness or deformity. Normal range of motion.      Cervical back: Normal range of motion and neck supple.   Lymphadenopathy:      Cervical: No cervical adenopathy.   Skin:     General: Skin is warm and dry.      Coloration: Skin is not pale.      Findings: No erythema or rash.   Neurological:      Mental Status: He is alert and oriented to person, place, and time.      Motor: No abnormal muscle tone.      Coordination: Coordination normal.   Psychiatric:         Behavior: Behavior normal.         Thought Content: Thought content normal.         Judgment: Judgment normal.         Lab Results   Component Value Date     02/17/2025    K 4.5 02/17/2025     02/17/2025    CO2 24 02/17/2025    BUN 25 (H) 02/17/2025    CREATININE 1.2 02/17/2025    GLU 72 02/17/2025    HGBA1C 5.5 02/17/2025    MG 1.6 12/07/2023    AST 34 02/17/2025    ALT 41 02/17/2025    ALBUMIN 3.7 02/17/2025    ALBUMIN 4.1 08/12/2022    PROT 6.6 02/17/2025    BILITOT 0.8 02/17/2025    WBC 7.01 12/23/2024    HGB 15.5 12/23/2024    HCT 48.2 12/23/2024    MCV 95 12/23/2024     12/23/2024    TSH 6.380 (H) 02/17/2025    CHOL 103 (L) 02/17/2025    HDL 29 (L) 02/17/2025    LDLCALC 43.8 (L) 02/17/2025    TRIG 151 (H) 02/17/2025    BNP <10 11/15/2021     Assessment:      1. Hypertension associated with diabetes    2. Type 2 diabetes mellitus with other specified complication,  unspecified whether long term insulin use    3. Benign essential HTN    4. Hyperlipidemia, unspecified hyperlipidemia type    5. FARA (obstructive sleep apnea)    6. Type 2 diabetes mellitus with other circulatory complication, with long-term current use of insulin    7. Hypertriglyceridemia    8. Hypothyroidism (acquired)    9. Nonspecific abnormal electrocardiogram (ECG) (EKG)    10. Type 2 diabetes mellitus without complication, without long-term current use of insulin    11. Low testosterone    12. Other hyperlipidemia    13. Type 2 diabetes mellitus without complication, with long-term current use of insulin             Plan:     HTN   - titrate meds   - dec Norvasc to 5mg     2. HLD,   - cont meds and titrate  - lowered statin dose to Lipitor 10 mg   - Lipids well controlled 2/2025.     3. DM2 A1c 6.6 --> 5.3 --> 5.6 --> 5.6 --> 5.5   - cont tx - on Ozempic - increase to 2mg - changed to Mounjaro - increase to 7.5 mg --> increased to 12.5 --> 15   - on Mounjaro     4. Hypothyroidsm TSH1.1  - cont Synthroid    5. FARA  - cont CPAP    6. Severe Obesity, BMI 36 --> BMI 35 - 268 lbs -->  BMI 33 - 248 lbs.  BMI 33 - 250 lbs  --> 256 lbs --> 256 lbs --> 268 lbs  - cont weight loss with diet/exercise     7. Low T  - cont T supplements as needed  - monitor BP     8.  Neck, back, hand wrist pain  - s/p eval -neck surgery pending with Dr. Nam   - deferred surg until 2025  - s/p surgery with Dr. Brown - CTS - b/l        Visit today included increased complexity associated with the care of the episodic problem HTN addressed and managing the longitudinal care of the patient due to the serious and/or complex managed problem(s) .      Thank you for allowing me to participate in this patient's care. Please do not hesitate to contact me with any questions or concerns. Consult note has been forwarded to the referral physician.     Kwame Guidry MD, [facility]  Cardiovascular Disease  [facility], [place]  [phone]  (P)

## 2025-02-20 ENCOUNTER — PATIENT MESSAGE (OUTPATIENT)
Dept: INTERNAL MEDICINE | Facility: CLINIC | Age: 60
End: 2025-02-20
Payer: COMMERCIAL

## 2025-02-20 ENCOUNTER — E-VISIT (OUTPATIENT)
Dept: INTERNAL MEDICINE | Facility: CLINIC | Age: 60
End: 2025-02-20
Payer: COMMERCIAL

## 2025-02-20 DIAGNOSIS — E03.9 HYPOTHYROIDISM (ACQUIRED): Primary | ICD-10-CM

## 2025-02-21 ENCOUNTER — PATIENT MESSAGE (OUTPATIENT)
Dept: INTERNAL MEDICINE | Facility: CLINIC | Age: 60
End: 2025-02-21

## 2025-02-21 NOTE — PROGRESS NOTES
Patient ID: Narendra English Sr. is a 59 y.o. male.    Chief Complaint: Thyroid Problem (Entered automatically based on patient selection in [email or web address] Ã¨â€¹Â±Ã¨â€¦Â¾Ã§Â½â€˜.)    The patient initiated a request through [email or web address] Ã¨â€¹Â±Ã¨â€¦[id number]§[id number]€˜ on 2/20/2025 for evaluation and management with a chief complaint of Thyroid Problem (Entered automatically based on patient selection in [email or web address] Ã¨â€¹Â±Ã¨â€¦Â¾Ã§Â½â€˜.)     I evaluated the questionnaire submission on 02/21/2025.    Ohs Peq Evisit Thyroid    2/20/2025 11:27 PM CST - Filed by Patient   Do you agree to participate in an E-Visit? Yes   If you have any of the symptoms below, please do not complete an E-Visit. Instead, go to your local emergency room: I acknowledge   Choose the state of your primary residence [place]   What is the main issue you would like addressed today? Thyroid medication   In the past month, have you felt tired and sluggish? Yes   Have you had unusual weight gain or weight loss? Yes   Have you had hair loss, hair thinning, or brittle hair in the past month? No   Do you have trouble adjusting to temperature changes or sensitivity to cold? Yes   Do you have any of the following: Choking sensation;  Neck pain;  Trouble swallowing    Have you had constipation or diarrhea in the past month? Yes   Describe your bowel movements? Diarrhea   Does anyone in your immediate family have a thyroid problem? No   Have you had any of the following?  Brain fog;  Memory loss   I would like to address: Medication for Thyroid   Do you want to address a new or existing medication? I would like to address a medication I currently take   Would you like to change or continue your medication? Continue medication   What medication would you like to continue?  Levothyroxin   Are you taking it as prescribed? Yes    What medical condition is the  medication intended to treat? Thyroid   Is the medication helping your condition? I don't know   Are you having any side effects from the medication? No   Provide any additional information  "you feel is important. I have up my doseage twice in the last 10 years   Please attach any relevant images or files    Are you able to take your vital signs? No       Current Outpatient Medications   Medication    amLODIPine (NORVASC) 5 MG tablet    atorvastatin (LIPITOR) 10 MG tablet    benazepriL (LOTENSIN) 40 MG tablet    calcipotriene (DOVONOX) 0.005 % cream    clonazePAM (KLONOPIN) 1 MG tablet    colchicine (COLCRYS) 0.6 mg tablet    doxepin (SINEQUAN) 10 MG capsule    dupilumab (DUPIXENT) 300 mg/2 mL Syrg    febuxostat (ULORIC) 40 mg Tab    hydrALAZINE (APRESOLINE) 100 MG tablet    hydrOXYzine HCL (ATARAX) 25 MG tablet    indomethacin (INDOCIN SR) 75 mg CpSR CR capsule    insulin glargine, TOUJEO, (TOUJEO SOLOSTAR U-300 INSULIN) 300 unit/mL (1.5 mL) InPn pen    levothyroxine (SYNTHROID) 112 MCG tablet    metFORMIN (GLUCOPHAGE) 1000 MG tablet    metoprolol succinate (TOPROL-XL) 50 MG 24 hr tablet    mometasone (ELOCON) 0.1 % ointment    MOUNJARO 15 mg/0.5 mL PnIj    pregabalin (LYRICA) 150 MG capsule    sildenafiL (VIAGRA) 100 MG tablet    tadalafiL (CIALIS) 20 MG Tab    testosterone cypionate (DEPOTESTOTERONE CYPIONATE) 200 mg/mL injection    tiZANidine (ZANAFLEX) 4 MG tablet    triamcinolone acetonide 0.1% (KENALOG) 0.1 % ointment    vardenafiL (LEVITRA) 20 MG tablet    VASCEPA 1 gram Cap     No current facility-administered medications for this visit.      Estimated body mass index is 35.46 kg/m² as calculated from the following:    Height as of 2/19/25: 6' 1" (1.854 m).    Weight as of 2/19/25: 121.9 kg (268 lb 11.9 oz).    No diagnosis found.     Dear Narendra,    I reviewed the information you sent with your E-visit request. Thanks for sharing the information about the symptoms you are experiencing: choking sensation, difficulty swallowing, brain fog, and fatigue. I can imagine how unsettling that must feel. I want you to know that your concerns are important, and I am here to help you find answers and " "relief.    I believe it would be best for you to have an appointment. This approach will allow us to examine your condition more closely and address any concerns you may have.    During the appointment, we can discuss your condition face-to-face, ensuring you receive the most accurate and helpful advice.    I want to make your access to care as smooth as possible, so please take the time now to click on the accompanying link to schedule a virtual video visit or in-office appointment soon with me or my Advanced Practice Partner, YANI Barclay. If you have any difficulty scheduling this appointment, call our appointment desk at [phone], and they will be glad to help.    As your family physician, I am committed to ensuring you receive the best care and look forward to continuing our healthcare journey together. Thank you for allowing me and my team to care for you, and for placing your trust in [facility] for your healthcare needs.    Warm regards,    Dr. LINETTE LESLIE. It looks like an earlier appointment is available with my Advanced Practice Partner, YANI Barclay. Affectionately known as "Trecharbel" by patients and friends, she brings a wealth of experience and a warm, compassionate approach to our practice. Stanley works alongside me to enhance the quality of care you receive and to ensure you have timely access to the services you need. If you choose to schedule your appointment with her, I'm confident you'll appreciate her expertise and find that her dedication to patient care aligns seamlessly with our practice's mission to provide high-quality healthcare.    P.P.S. Since you will be scheduling an appointment for evaluation, I'm entering NO CHARGE for your E-visit request.    Future Appointments   Date Time Provider Department Gate   3/11/2025  8:40 AM LABORATORY, [facility] LAB [facility]   3/20/2025  8:00 AM TESSY Magdaleno MD [facility]   4/14/2025  9:00 AM Teressa Riggins MD [facility] " ALLERGY  Medical C   5/23/2025  3:40 PM Kwame Guidry MD [facility] CARDIO [facility]   6/10/2025  9:00 AM Lejeune, Elizabeth B, NP [facility] SLEEP [facility]   7/18/2025  9:30 AM LABORATORY, [facility] LAB [facility]   7/18/2025  2:15 PM Shad Acosta MD [facility] UROLOGY  Medical C   7/29/2025  3:00 PM Mayur Webb MD [facility]

## 2025-02-25 ENCOUNTER — OFFICE VISIT (OUTPATIENT)
Dept: INTERNAL MEDICINE | Facility: CLINIC | Age: 60
End: 2025-02-25
Payer: COMMERCIAL

## 2025-02-25 VITALS
TEMPERATURE: 97 F | OXYGEN SATURATION: 96 % | SYSTOLIC BLOOD PRESSURE: 124 MMHG | HEIGHT: 73 IN | WEIGHT: 262.56 LBS | DIASTOLIC BLOOD PRESSURE: 60 MMHG | BODY MASS INDEX: 34.8 KG/M2 | HEART RATE: 79 BPM

## 2025-02-25 DIAGNOSIS — R63.5 WEIGHT GAIN: ICD-10-CM

## 2025-02-25 DIAGNOSIS — E03.9 HYPOTHYROIDISM (ACQUIRED): Primary | ICD-10-CM

## 2025-02-25 DIAGNOSIS — I15.2 HYPERTENSION ASSOCIATED WITH DIABETES: Chronic | ICD-10-CM

## 2025-02-25 DIAGNOSIS — E11.59 HYPERTENSION ASSOCIATED WITH DIABETES: Chronic | ICD-10-CM

## 2025-02-25 PROCEDURE — 99999 PR PBB SHADOW E&M-EST. PATIENT-LVL V: CPT | Mod: PBBFAC,,, | Performed by: NURSE PRACTITIONER

## 2025-02-25 PROCEDURE — 3072F LOW RISK FOR RETINOPATHY: CPT | Mod: CPTII,S$GLB,, | Performed by: NURSE PRACTITIONER

## 2025-02-25 PROCEDURE — 4010F ACE/ARB THERAPY RXD/TAKEN: CPT | Mod: CPTII,S$GLB,, | Performed by: NURSE PRACTITIONER

## 2025-02-25 PROCEDURE — 99214 OFFICE O/P EST MOD 30 MIN: CPT | Mod: S$GLB,,, | Performed by: NURSE PRACTITIONER

## 2025-02-25 PROCEDURE — 3078F DIAST BP <80 MM HG: CPT | Mod: CPTII,S$GLB,, | Performed by: NURSE PRACTITIONER

## 2025-02-25 PROCEDURE — 3061F NEG MICROALBUMINURIA REV: CPT | Mod: CPTII,S$GLB,, | Performed by: NURSE PRACTITIONER

## 2025-02-25 PROCEDURE — 3044F HG A1C LEVEL LT 7.0%: CPT | Mod: CPTII,S$GLB,, | Performed by: NURSE PRACTITIONER

## 2025-02-25 PROCEDURE — 3066F NEPHROPATHY DOC TX: CPT | Mod: CPTII,S$GLB,, | Performed by: NURSE PRACTITIONER

## 2025-02-25 PROCEDURE — 3074F SYST BP LT 130 MM HG: CPT | Mod: CPTII,S$GLB,, | Performed by: NURSE PRACTITIONER

## 2025-02-25 PROCEDURE — 3008F BODY MASS INDEX DOCD: CPT | Mod: CPTII,S$GLB,, | Performed by: NURSE PRACTITIONER

## 2025-02-25 PROCEDURE — G2211 COMPLEX E/M VISIT ADD ON: HCPCS | Mod: S$GLB,,, | Performed by: NURSE PRACTITIONER

## 2025-02-25 RX ORDER — LEVOTHYROXINE SODIUM 125 UG/1
125 TABLET ORAL DAILY
Qty: 90 TABLET | Refills: 0 | Status: SHIPPED | OUTPATIENT
Start: 2025-02-25

## 2025-02-25 NOTE — PROGRESS NOTES
"  Subjective:      Patient ID: Narendra English Sr. is a 59 y.o. male.    Chief Complaint: Follow-up    History of Present Illness    CHIEF COMPLAINT:  Narendra presents today for follow up of thyroid condition    THYROID DISORDER:  TSH level is 6.38. He has required medication changes over the past 10 years for thyroid management. Currently taking levothyroxine 112 mcg. He reports feeling sleepy.    WEIGHT MANAGEMENT:  He has experienced weight gain from 257 to 262 lbs since the beginning of the year. He reports constant hunger.    DIABETES:  A1c is 5.5, which is on the upper end of normal, showing significant improvement from previous A1c of 12. He continues all diabetic medications.    DIET:  He self-describes as a picky eater with preference for meat and potatoes, expressing aversion to salads and similar foods.    MEDICATIONS:  He takes Centrum Silver 50 and older multivitamin, levothyroxine 112 mcg, and diabetic medications.      ROS:  General: -fever, -chills, +fatigue, +weight gain, -weight loss  Eyes: -vision changes, -redness, -discharge  ENT: -ear pain, -nasal congestion, -sore throat  Cardiovascular: -chest pain, -palpitations, -lower extremity edema  Respiratory: -cough, -shortness of breath  Gastrointestinal: -abdominal pain, -nausea, -vomiting, -diarrhea, -constipation, -blood in stool  Genitourinary: -dysuria, -hematuria, -frequency  Musculoskeletal: -joint pain, -muscle pain  Skin: -rash, -lesion  Neurological: -headache, -dizziness, -numbness, -tingling  Psychiatric: -anxiety, -depression, -sleep difficulty          Problem List[1]    Current Medications[2]      Objective:   /60 (BP Location: Left arm, Patient Position: Sitting)   Pulse 79   Temp 96.9 °F (36.1 °C) (Tympanic)   Ht 6' 1" (1.854 m)   Wt 119.1 kg (262 lb 9.1 oz)   SpO2 96%   BMI 34.64 kg/m²     Physical Exam             Physical Exam  Vitals and nursing note reviewed.   Constitutional:       General: He is awake. He is not " in acute distress.     Appearance: Normal appearance. He is well-developed and well-groomed. He is not ill-appearing, toxic-appearing or diaphoretic.   HENT:      Head: Normocephalic and atraumatic.   Cardiovascular:      Rate and Rhythm: Normal rate and regular rhythm.      Heart sounds: Normal heart sounds. No murmur heard.  Pulmonary:      Effort: Pulmonary effort is normal. No tachypnea, bradypnea, accessory muscle usage, prolonged expiration, respiratory distress or retractions.      Breath sounds: Normal breath sounds. No stridor, decreased air movement or transmitted upper airway sounds. No decreased breath sounds, wheezing, rhonchi or rales.   Musculoskeletal:      Cervical back: Normal range of motion.   Skin:     General: Skin is warm and dry.   Neurological:      Mental Status: He is alert.      Gait: Gait normal.   Psychiatric:         Attention and Perception: Attention and perception normal.         Mood and Affect: Mood and affect normal.         Speech: Speech normal.         Behavior: Behavior normal. Behavior is cooperative.         Cognition and Memory: Cognition normal.        Wt Readings from Last 20 Encounters:   02/25/25 119.1 kg (262 lb 9.1 oz)   02/19/25 121.9 kg (268 lb 11.9 oz)   02/11/25 120 kg (264 lb 8.8 oz)   02/10/25 118 kg (260 lb 2.3 oz)   02/04/25 120.6 kg (265 lb 14 oz)   01/16/25 117.9 kg (260 lb)   01/15/25 118.5 kg (261 lb 3.9 oz)   01/08/25 118.5 kg (261 lb 3.9 oz)   01/03/25 116.8 kg (257 lb 9.7 oz)   12/31/24 112.5 kg (248 lb 2 oz)   12/23/24 117.7 kg (259 lb 7.7 oz)   12/10/24 117.7 kg (259 lb 5.9 oz)   11/13/24 116.4 kg (256 lb 9.9 oz)   11/13/24 116.4 kg (256 lb 9.9 oz)   11/12/24 114.7 kg (252 lb 13.9 oz)   11/05/24 114.7 kg (252 lb 13.9 oz)   10/22/24 118.2 kg (260 lb 9.3 oz)   10/14/24 116.2 kg (256 lb 2.8 oz)   09/27/24 113.4 kg (250 lb)   09/25/24 114.4 kg (252 lb 3.3 oz)       Assessment/Plan :   1. Hypothyroidism (acquired)  -     levothyroxine (SYNTHROID) 125 MCG  tablet; Take 1 tablet (125 mcg total) by mouth once daily.  Dispense: 90 tablet; Refill: 0    2. Hypertension associated with diabetes    3. Weight gain         Assessment & Plan    Increased levothyroxine dose from 112 mcg to 125 mcg due to elevated TSH of 6.38  Noted weight gain likely related to underactive thyroid  Observed normal but borderline elevated HbA1c at 5.5%, indicating risk of pre-diabetes which had been stable given his history of diabetes.  Recommend fish oil omega-3 fatty acid supplement to address rising triglyceride levels    THYROID DISORDER:  - Evaluated the patient's thyroid level (TSH) at 6.38, which is elevated.  - Confirmed the patient is taking levothyroxine 112 mcg daily without skipping doses.  - Increased levothyroxine dosage from 112 mcg to 125 mcg daily and prescribed a 90-day supply.  - Explained the impact of biotin in multivitamins on thyroid test results.  - Instructed the patient to stop taking multivitamins containing biotin 5 days before thyroid tests.  - Discussed the connection between thyroid function, energy levels, weight gain, and increased hunger.  - Chronic  - exacerbating  - Ordered TSH test for 6 weeks from today, to be performed 1 week before follow-up visit.    DIABETES:  - Evaluated the patient's hemoglobin A1c at 5.5, which is on the upper end of the normal range.  - Noted significant improvement in diabetes control from a previous A1c of 12.  - Improved  - in remission  - will continue to monitor  - Advised the patient to maintain current diabetes management.  - Continued current diabetic medications.  - Confirmed the patient's continued use of diabetic medication.    WEIGHT MANAGEMENT:  - Monitored the patient's weight, noting an increase from 257 to 262 since the beginning of the year.  - Assessed that weight gain is likely attributed to underactive thyroid and hormonal imbalance.  - Discussed the relationship between underactive thyroid and increased  appetite/weight gain.    MEDICATIONS/SUPPLEMENTS:  - Started fish oil omega-3 fatty acid supplement.    FOLLOW UP:  - Scheduled follow up in 6 weeks (around April 15th) for an in-person appointment.  - Advised the patient to contact the office to reschedule the virtual appointment with Dr. Magdaleno on March 20th to review thyroid levels in person.          Follow up if symptoms worsen or fail to improve.    This note was generated with the assistance of ambient listening technology. Verbal consent was obtained by the patient and accompanying visitor(s) for the recording of patient appointment to facilitate this note. I attest to having reviewed and edited the generated note for accuracy, though some syntax or spelling errors may persist. Please contact the author of this note for any clarification.            [1]   Patient Active Problem List  Diagnosis    History of malignant neoplasm of colon    Type 2 diabetes mellitus with circulatory disorder, with long-term current use of insulin    Hypothyroidism (acquired)    Gout    Low testosterone    Mood disorder    Hypertension associated with diabetes    FARA (obstructive sleep apnea)    Nasal septal deviation    Adhesions of nasal septum and turbinates    Hypertrophy of inferior nasal turbinate    Hyperlipidemia associated with type 2 diabetes mellitus    Shift work sleep disorder    Erectile dysfunction    Encounter for screening colonoscopy    Hypogonadism male    Gastroesophageal reflux disease without esophagitis    Bilateral lower extremity edema    Chronic nonallergic rhinitis    Erythema    Pruritic rash    Rhinitis medicamentosa    Rash and nonspecific skin eruption    Type 2 diabetes mellitus with other specified complication, on chronic insulin    Multiple lung nodules on CT    Colon cancer    Obesity, diabetes, and hypertension syndrome    Prurigo nodularis   [2]   Current Outpatient Medications:     amLODIPine (NORVASC) 5 MG tablet, Take 1 tablet (5 mg  total) by mouth once daily., Disp: 90 tablet, Rfl: 1    atorvastatin (LIPITOR) 10 MG tablet, Take 1 tablet (10 mg total) by mouth every evening., Disp: 90 tablet, Rfl: 1    benazepriL (LOTENSIN) 40 MG tablet, Take 1 tablet (40 mg total) by mouth once daily., Disp: 90 tablet, Rfl: 1    calcipotriene (DOVONOX) 0.005 % cream, Apply 1 application  topically 2 (two) times daily., Disp: , Rfl:     clonazePAM (KLONOPIN) 1 MG tablet, Take 1 tablet (1 mg total) by mouth every evening., Disp: 30 tablet, Rfl: 5    colchicine (COLCRYS) 0.6 mg tablet, Take 1 tablet (0.6 mg total) by mouth once daily., Disp: 180 tablet, Rfl: 3    doxepin (SINEQUAN) 10 MG capsule, Take 1 capsule (10 mg total) by mouth every evening., Disp: 30 capsule, Rfl: 11    dupilumab (DUPIXENT) 300 mg/2 mL Syrg, Inject 2 mLs (300 mg total) into the skin every 14 (fourteen) days., Disp: 4 mL, Rfl: 11    febuxostat (ULORIC) 40 mg Tab, Take 1 tablet (40 mg total) by mouth once daily., Disp: 30 tablet, Rfl: 5    hydrALAZINE (APRESOLINE) 100 MG tablet, Take 1 tablet (100 mg total) by mouth every 8 (eight) hours., Disp: 270 tablet, Rfl: 1    hydrOXYzine HCL (ATARAX) 25 MG tablet, Take 25 mg by mouth 2 (two) times daily., Disp: , Rfl:     indomethacin (INDOCIN SR) 75 mg CpSR CR capsule, Take 1 capsule (75 mg total) by mouth 2 (two) times daily as needed (for gout flare)., Disp: 60 capsule, Rfl: 0    insulin glargine, TOUJEO, (TOUJEO SOLOSTAR U-300 INSULIN) 300 unit/mL (1.5 mL) InPn pen, Inject 48 Units into the skin once daily., Disp: 12 mL, Rfl: 1    metFORMIN (GLUCOPHAGE) 1000 MG tablet, Take 1 tablet (1,000 mg total) by mouth 2 (two) times daily with meals., Disp: 180 tablet, Rfl: 1    metoprolol succinate (TOPROL-XL) 50 MG 24 hr tablet, Take 1 tablet (50 mg total) by mouth once daily., Disp: 90 tablet, Rfl: 3    mometasone (ELOCON) 0.1 % ointment, Apply topically once daily., Disp: 45 g, Rfl: 2    MOUNJARO 15 mg/0.5 mL PnIj, Inject 15 mg into the skin once a  week., Disp: 4 Pen, Rfl: 2    pregabalin (LYRICA) 150 MG capsule, TAKE 1 CAPSULE BY MOUTH THREE TIMES DAILY, Disp: 90 capsule, Rfl: 0    sildenafiL (VIAGRA) 100 MG tablet, Take 1 tablet (100 mg total) by mouth daily as needed for Erectile Dysfunction., Disp: 30 tablet, Rfl: 11    tadalafiL (CIALIS) 20 MG Tab, Take 1 tablet (20 mg total) by mouth every 24 hours as needed (erectile dysfunction)., Disp: 20 tablet, Rfl: 11    testosterone cypionate (DEPOTESTOTERONE CYPIONATE) 200 mg/mL injection, Inject 0.75 mLs (150 mg total) into the muscle every 7 days., Disp: 10 mL, Rfl: 5    tiZANidine (ZANAFLEX) 4 MG tablet, Take 1 tablet (4 mg total) by mouth 2 (two) times daily as needed (Neck Pain)., Disp: 60 tablet, Rfl: 2    triamcinolone acetonide 0.1% (KENALOG) 0.1 % ointment, AAA bid, Disp: 454 g, Rfl: 1    VASCEPA 1 gram Cap, Take 2 capsules (2,000 mg total) by mouth 2 (two) times daily., Disp: 360 capsule, Rfl: 1    levothyroxine (SYNTHROID) 125 MCG tablet, Take 1 tablet (125 mcg total) by mouth once daily., Disp: 90 tablet, Rfl: 0

## 2025-03-09 DIAGNOSIS — M54.12 CERVICAL RADICULOPATHY: ICD-10-CM

## 2025-03-09 DIAGNOSIS — M10.9 GOUT, UNSPECIFIED CAUSE, UNSPECIFIED CHRONICITY, UNSPECIFIED SITE: ICD-10-CM

## 2025-03-09 DIAGNOSIS — M54.16 LUMBAR RADICULOPATHY: ICD-10-CM

## 2025-03-09 RX ORDER — PREGABALIN 150 MG/1
150 CAPSULE ORAL 3 TIMES DAILY
Qty: 90 CAPSULE | Refills: 0 | Status: SHIPPED | OUTPATIENT
Start: 2025-03-09

## 2025-03-10 RX ORDER — INDOMETHACIN 75 MG/1
75 CAPSULE, EXTENDED RELEASE ORAL 2 TIMES DAILY PRN
Qty: 60 CAPSULE | Refills: 0 | Status: SHIPPED | OUTPATIENT
Start: 2025-03-10

## 2025-03-14 DIAGNOSIS — M10.9 GOUT, UNSPECIFIED CAUSE, UNSPECIFIED CHRONICITY, UNSPECIFIED SITE: ICD-10-CM

## 2025-03-14 RX ORDER — FEBUXOSTAT 40 MG/1
40 TABLET, FILM COATED ORAL DAILY
Qty: 30 TABLET | Refills: 5 | OUTPATIENT
Start: 2025-03-14

## 2025-03-20 ENCOUNTER — PATIENT MESSAGE (OUTPATIENT)
Dept: OPHTHALMOLOGY | Facility: CLINIC | Age: 60
End: 2025-03-20
Payer: COMMERCIAL

## 2025-03-22 ENCOUNTER — PATIENT MESSAGE (OUTPATIENT)
Dept: INTERNAL MEDICINE | Facility: CLINIC | Age: 60
End: 2025-03-22
Payer: COMMERCIAL

## 2025-03-23 DIAGNOSIS — M47.812 CERVICAL SPONDYLOSIS: ICD-10-CM

## 2025-03-23 DIAGNOSIS — M54.12 CERVICAL RADICULOPATHY: ICD-10-CM

## 2025-03-23 DIAGNOSIS — M48.02 CERVICAL STENOSIS OF SPINAL CANAL: ICD-10-CM

## 2025-03-23 DIAGNOSIS — M50.30 DDD (DEGENERATIVE DISC DISEASE), CERVICAL: ICD-10-CM

## 2025-03-24 RX ORDER — TIZANIDINE 4 MG/1
TABLET ORAL
Qty: 60 TABLET | Refills: 0 | Status: SHIPPED | OUTPATIENT
Start: 2025-03-24

## 2025-03-24 RX ORDER — TADALAFIL 20 MG/1
20 TABLET ORAL
Qty: 20 TABLET | Refills: 11 | Status: SHIPPED | OUTPATIENT
Start: 2025-03-24

## 2025-03-27 ENCOUNTER — PATIENT MESSAGE (OUTPATIENT)
Dept: ADMINISTRATIVE | Facility: OTHER | Age: 60
End: 2025-03-27
Payer: COMMERCIAL

## 2025-04-04 ENCOUNTER — LAB VISIT (OUTPATIENT)
Dept: LAB | Facility: HOSPITAL | Age: 60
End: 2025-04-04
Attending: FAMILY MEDICINE
Payer: COMMERCIAL

## 2025-04-04 DIAGNOSIS — E03.9 HYPOTHYROIDISM (ACQUIRED): Chronic | ICD-10-CM

## 2025-04-04 DIAGNOSIS — E66.9 OBESITY, DIABETES, AND HYPERTENSION SYNDROME: Chronic | ICD-10-CM

## 2025-04-04 DIAGNOSIS — Z79.4 TYPE 2 DIABETES MELLITUS WITH OTHER SPECIFIED COMPLICATION, WITH LONG-TERM CURRENT USE OF INSULIN: Chronic | ICD-10-CM

## 2025-04-04 DIAGNOSIS — E11.59 OBESITY, DIABETES, AND HYPERTENSION SYNDROME: Chronic | ICD-10-CM

## 2025-04-04 DIAGNOSIS — E11.69 HYPERLIPIDEMIA ASSOCIATED WITH TYPE 2 DIABETES MELLITUS: Chronic | ICD-10-CM

## 2025-04-04 DIAGNOSIS — I15.2 HYPERTENSION ASSOCIATED WITH DIABETES: Chronic | ICD-10-CM

## 2025-04-04 DIAGNOSIS — I15.2 OBESITY, DIABETES, AND HYPERTENSION SYNDROME: Chronic | ICD-10-CM

## 2025-04-04 DIAGNOSIS — E78.5 HYPERLIPIDEMIA ASSOCIATED WITH TYPE 2 DIABETES MELLITUS: Chronic | ICD-10-CM

## 2025-04-04 DIAGNOSIS — E11.69 TYPE 2 DIABETES MELLITUS WITH OTHER SPECIFIED COMPLICATION, WITH LONG-TERM CURRENT USE OF INSULIN: Chronic | ICD-10-CM

## 2025-04-04 DIAGNOSIS — E11.59 HYPERTENSION ASSOCIATED WITH DIABETES: Chronic | ICD-10-CM

## 2025-04-04 DIAGNOSIS — E11.69 OBESITY, DIABETES, AND HYPERTENSION SYNDROME: Chronic | ICD-10-CM

## 2025-04-04 LAB
ALBUMIN SERPL BCP-MCNC: 3.5 G/DL (ref 3.5–5.2)
ALBUMIN/CREAT UR: 10.9 UG/MG
ALP SERPL-CCNC: 32 UNIT/L (ref 40–150)
ALT SERPL W/O P-5'-P-CCNC: 34 UNIT/L (ref 10–44)
ANION GAP (OHS): 7 MMOL/L (ref 8–16)
AST SERPL-CCNC: 29 UNIT/L (ref 11–45)
BILIRUB SERPL-MCNC: 0.5 MG/DL (ref 0.1–1)
BUN SERPL-MCNC: 21 MG/DL (ref 6–20)
CALCIUM SERPL-MCNC: 8.9 MG/DL (ref 8.7–10.5)
CHLORIDE SERPL-SCNC: 110 MMOL/L (ref 95–110)
CHOLEST SERPL-MCNC: 88 MG/DL (ref 120–199)
CHOLEST/HDLC SERPL: 3.4 {RATIO} (ref 2–5)
CO2 SERPL-SCNC: 23 MMOL/L (ref 23–29)
CREAT SERPL-MCNC: 1.3 MG/DL (ref 0.5–1.4)
CREAT UR-MCNC: 193 MG/DL (ref 23–375)
EAG (OHS): 103 MG/DL (ref 68–131)
GFR SERPLBLD CREATININE-BSD FMLA CKD-EPI: >60 ML/MIN/1.73/M2
GLUCOSE SERPL-MCNC: 67 MG/DL (ref 70–110)
HBA1C MFR BLD: 5.2 % (ref 4–5.6)
HDLC SERPL-MCNC: 26 MG/DL (ref 40–75)
HDLC SERPL: 29.5 % (ref 20–50)
LDLC SERPL CALC-MCNC: 34.8 MG/DL (ref 63–159)
MICROALBUMIN UR-MCNC: 21 UG/ML (ref ?–5000)
NONHDLC SERPL-MCNC: 62 MG/DL
POTASSIUM SERPL-SCNC: 4.1 MMOL/L (ref 3.5–5.1)
PROT SERPL-MCNC: 6.5 GM/DL (ref 6–8.4)
SODIUM SERPL-SCNC: 140 MMOL/L (ref 136–145)
TRIGL SERPL-MCNC: 136 MG/DL (ref 30–150)
TSH SERPL-ACNC: 3 UIU/ML (ref 0.4–4)

## 2025-04-04 PROCEDURE — 80061 LIPID PANEL: CPT

## 2025-04-04 PROCEDURE — 80053 COMPREHEN METABOLIC PANEL: CPT

## 2025-04-04 PROCEDURE — 36415 COLL VENOUS BLD VENIPUNCTURE: CPT | Mod: PO

## 2025-04-04 PROCEDURE — 83036 HEMOGLOBIN GLYCOSYLATED A1C: CPT

## 2025-04-04 PROCEDURE — 84443 ASSAY THYROID STIM HORMONE: CPT

## 2025-04-04 PROCEDURE — 82043 UR ALBUMIN QUANTITATIVE: CPT

## 2025-04-05 NOTE — PROGRESS NOTES
Results to be addressed at upcoming appointment(s) already scheduled.Future Appointments   Date Time Provider Department Center   4/14/2025  9:00 AM Teressa Riggins MD [facility] ALLERGY [facility] [place] [facility]   4/15/2025  8:20 AM TESSY Magdaleno MD [facility] IM [facility]   4/30/2025 11:30 AM Shad Acosta MD [facility] UROLOGY [facility] [place] [facility]   5/23/2025  3:40 PM Kwame Guidry MD [facility] CARDIO [facility]   6/10/2025  9:00 AM Lejeune, Elizabeth B, NP [facility] SLEEP [facility]   7/18/2025  9:30 AM LABORATORY, [facility] LAB [facility]   7/18/2025  2:15 PM Shad Acosta MD [facility] UROLOGY [facility] [place] [facility]   7/29/2025  3:00 PM Mayur Webb MD [facility]

## 2025-04-14 ENCOUNTER — OFFICE VISIT (OUTPATIENT)
Dept: ALLERGY | Facility: CLINIC | Age: 60
End: 2025-04-14
Payer: COMMERCIAL

## 2025-04-14 VITALS
DIASTOLIC BLOOD PRESSURE: 74 MMHG | BODY MASS INDEX: 35.3 KG/M2 | HEIGHT: 73 IN | OXYGEN SATURATION: 98 % | SYSTOLIC BLOOD PRESSURE: 119 MMHG | WEIGHT: 266.31 LBS | HEART RATE: 74 BPM | TEMPERATURE: 98 F

## 2025-04-14 DIAGNOSIS — R21 RASH AND NONSPECIFIC SKIN ERUPTION: ICD-10-CM

## 2025-04-14 DIAGNOSIS — L53.9 ERYTHEMA: ICD-10-CM

## 2025-04-14 DIAGNOSIS — L28.1 PRURIGO NODULARIS: Primary | ICD-10-CM

## 2025-04-14 DIAGNOSIS — M10.9 GOUT, UNSPECIFIED CAUSE, UNSPECIFIED CHRONICITY, UNSPECIFIED SITE: ICD-10-CM

## 2025-04-14 PROCEDURE — 3074F SYST BP LT 130 MM HG: CPT | Mod: CPTII,S$GLB,, | Performed by: STUDENT IN AN ORGANIZED HEALTH CARE EDUCATION/TRAINING PROGRAM

## 2025-04-14 PROCEDURE — 99999 PR PBB SHADOW E&M-EST. PATIENT-LVL V: CPT | Mod: PBBFAC,,, | Performed by: STUDENT IN AN ORGANIZED HEALTH CARE EDUCATION/TRAINING PROGRAM

## 2025-04-14 PROCEDURE — 3066F NEPHROPATHY DOC TX: CPT | Mod: CPTII,S$GLB,, | Performed by: STUDENT IN AN ORGANIZED HEALTH CARE EDUCATION/TRAINING PROGRAM

## 2025-04-14 PROCEDURE — 3061F NEG MICROALBUMINURIA REV: CPT | Mod: CPTII,S$GLB,, | Performed by: STUDENT IN AN ORGANIZED HEALTH CARE EDUCATION/TRAINING PROGRAM

## 2025-04-14 PROCEDURE — 3044F HG A1C LEVEL LT 7.0%: CPT | Mod: CPTII,S$GLB,, | Performed by: STUDENT IN AN ORGANIZED HEALTH CARE EDUCATION/TRAINING PROGRAM

## 2025-04-14 PROCEDURE — 1159F MED LIST DOCD IN RCRD: CPT | Mod: CPTII,S$GLB,, | Performed by: STUDENT IN AN ORGANIZED HEALTH CARE EDUCATION/TRAINING PROGRAM

## 2025-04-14 PROCEDURE — 99214 OFFICE O/P EST MOD 30 MIN: CPT | Mod: S$GLB,,, | Performed by: STUDENT IN AN ORGANIZED HEALTH CARE EDUCATION/TRAINING PROGRAM

## 2025-04-14 PROCEDURE — 3008F BODY MASS INDEX DOCD: CPT | Mod: CPTII,S$GLB,, | Performed by: STUDENT IN AN ORGANIZED HEALTH CARE EDUCATION/TRAINING PROGRAM

## 2025-04-14 PROCEDURE — 4010F ACE/ARB THERAPY RXD/TAKEN: CPT | Mod: CPTII,S$GLB,, | Performed by: STUDENT IN AN ORGANIZED HEALTH CARE EDUCATION/TRAINING PROGRAM

## 2025-04-14 PROCEDURE — 1160F RVW MEDS BY RX/DR IN RCRD: CPT | Mod: CPTII,S$GLB,, | Performed by: STUDENT IN AN ORGANIZED HEALTH CARE EDUCATION/TRAINING PROGRAM

## 2025-04-14 PROCEDURE — 3078F DIAST BP <80 MM HG: CPT | Mod: CPTII,S$GLB,, | Performed by: STUDENT IN AN ORGANIZED HEALTH CARE EDUCATION/TRAINING PROGRAM

## 2025-04-14 PROCEDURE — 3072F LOW RISK FOR RETINOPATHY: CPT | Mod: CPTII,S$GLB,, | Performed by: STUDENT IN AN ORGANIZED HEALTH CARE EDUCATION/TRAINING PROGRAM

## 2025-04-14 RX ORDER — DOXYCYCLINE 100 MG/1
100 CAPSULE ORAL DAILY
Qty: 60 CAPSULE | Refills: 0 | Status: SHIPPED | OUTPATIENT
Start: 2025-04-14 | End: 2025-06-13

## 2025-04-14 NOTE — PROGRESS NOTES
Allergy and Immunology  Established Patient Clinic Note    Date: 4/14/2025  Chief Complaint   Patient presents with    Allergies     History  Narendra English Sr. is a 59 y.o. male being seen for follow-up today.    Prurigo Nodularis/Pruritic Condition   Rosacea   - Interval improvement since starting Dupixent   - Pruritus improved but flushing/erythema remains  - Ordered Doxycycline 100 mg daily for off label tx of rosacea   - Discussed risk and benefits with patient     Allergies, PMH, PSH, Social, and Family History were reviewed.    Medications Ordered Prior to Encounter[1]    Physical Examination  Vitals:    04/14/25 0853   BP: 119/74   Pulse: 74   Temp: 97.9 °F (36.6 °C)     GENERAL:  male in no apparent distress and well developed and well nourished  HEAD:  Normocephalic, without obvious abnormality, atraumatic  EYES: sclera anicteric, conjunctiva normochromic  EARS: normal TM's and external ear canals both ears  NOSE: without erythema or discharge, clear discharge, turbinates normal    OROPHARYNX: moist mucous membranes without erythema, exudates or petechiae  LYMPH NODES: normal, supple, no lymphadenopathy  LUNGS: clear to auscultation, no wheezes, rales or rhonchi, symmetric air entry.  HEART: normal rate, regular rhythm, normal S1, S2, no murmurs, rubs, clicks or gallops.  ABDOMEN: soft, nontender, nondistended, no masses or organomegaly.  MUSCULOSKELETAL: no gross joint deformity or swelling.  NEURO: alert, oriented, normal speech, no focal findings or movement disorder noted.  SKIN: normal coloration and turgor, no rashes, no suspicious skin lesions noted.   Flushing of face and upper chest - no swelling.     Assessment/Plan:   Problem List Items Addressed This Visit       Erythema    Rash and nonspecific skin eruption    Prurigo nodularis - Primary     - Interval improvement since starting Dupixent   - Pruritus improved but flushing/erythema remains  - Ordered Doxycycline 100 mg daily for off label  tx of rosacea   - Discussed risk and benefits with patient  - Recommended daily sunscreen     Follow up:  Follow up in about 2 months (around 6/14/2025).    Teressa Riggins MD   [facility]  Allergy and Immunology       [1]   Current Outpatient Medications on File Prior to Visit   Medication Sig Dispense Refill    amLODIPine (NORVASC) 5 MG tablet Take 1 tablet (5 mg total) by mouth once daily. 90 tablet 1    atorvastatin (LIPITOR) 10 MG tablet Take 1 tablet (10 mg total) by mouth every evening. 90 tablet 1    benazepriL (LOTENSIN) 40 MG tablet Take 1 tablet (40 mg total) by mouth once daily. 90 tablet 1    calcipotriene (DOVONOX) 0.005 % cream Apply 1 application  topically 2 (two) times daily.      clonazePAM (KLONOPIN) 1 MG tablet Take 1 tablet (1 mg total) by mouth every evening. 30 tablet 5    colchicine (COLCRYS) 0.6 mg tablet Take 1 tablet (0.6 mg total) by mouth once daily. 180 tablet 3    doxepin (SINEQUAN) 10 MG capsule Take 1 capsule (10 mg total) by mouth every evening. 30 capsule 11    dupilumab (DUPIXENT) 300 mg/2 mL Syrg Inject 2 mLs (300 mg total) into the skin every 14 (fourteen) days. 4 mL 11    febuxostat (ULORIC) 40 mg Tab Take 1 tablet (40 mg total) by mouth once daily. 30 tablet 5    hydrALAZINE (APRESOLINE) 100 MG tablet Take 1 tablet (100 mg total) by mouth every 8 (eight) hours. 270 tablet 1    hydrOXYzine HCL (ATARAX) 25 MG tablet Take 25 mg by mouth 2 (two) times daily.      indomethacin (INDOCIN SR) 75 mg CpSR CR capsule Take 1 capsule (75 mg total) by mouth 2 (two) times daily as needed (for gout flare). 60 capsule 0    insulin glargine, TOUJEO, (TOUJEO SOLOSTAR U-300 INSULIN) 300 unit/mL (1.5 mL) InPn pen Inject 48 Units into the skin once daily. 12 mL 1    levothyroxine (SYNTHROID) 125 MCG tablet Take 1 tablet (125 mcg total) by mouth once daily. 90 tablet 0    metFORMIN (GLUCOPHAGE) 1000 MG tablet Take 1 tablet (1,000 mg total) by mouth 2 (two) times daily with meals. 180  tablet 1    metoprolol succinate (TOPROL-XL) 50 MG 24 hr tablet Take 1 tablet (50 mg total) by mouth once daily. 90 tablet 3    mometasone (ELOCON) 0.1 % ointment Apply topically once daily. 45 g 2    MOUNJARO 15 mg/0.5 mL PnIj Inject 15 mg into the skin once a week. 4 Pen 2    pregabalin (LYRICA) 150 MG capsule TAKE 1 CAPSULE BY MOUTH THREE TIMES DAILY 90 capsule 0    sildenafiL (VIAGRA) 100 MG tablet Take 1 tablet (100 mg total) by mouth daily as needed for Erectile Dysfunction. 30 tablet 11    tadalafiL (CIALIS) 20 MG Tab Take 1 tablet (20 mg total) by mouth every 24 hours as needed (erectile dysfunction). 20 tablet 11    testosterone cypionate (DEPOTESTOTERONE CYPIONATE) 200 mg/mL injection Inject 0.75 mLs (150 mg total) into the muscle every 7 days. 10 mL 5    tiZANidine (ZANAFLEX) 4 MG tablet TAKE 1 TABLET BY MOUTH TWICE DAILY AS NEEDED (NECK  PAIN) 60 tablet 0    triamcinolone acetonide 0.1% (KENALOG) 0.1 % ointment AAA bid 454 g 1    VASCEPA 1 gram Cap Take 2 capsules (2,000 mg total) by mouth 2 (two) times daily. 360 capsule 1     No current facility-administered medications on file prior to visit.

## 2025-04-15 ENCOUNTER — PATIENT MESSAGE (OUTPATIENT)
Dept: INTERNAL MEDICINE | Facility: CLINIC | Age: 60
End: 2025-04-15

## 2025-04-15 ENCOUNTER — OFFICE VISIT (OUTPATIENT)
Dept: OTOLARYNGOLOGY | Facility: CLINIC | Age: 60
End: 2025-04-15
Payer: COMMERCIAL

## 2025-04-15 ENCOUNTER — OFFICE VISIT (OUTPATIENT)
Dept: INTERNAL MEDICINE | Facility: CLINIC | Age: 60
End: 2025-04-15
Payer: COMMERCIAL

## 2025-04-15 VITALS
RESPIRATION RATE: 18 BRPM | HEART RATE: 100 BPM | DIASTOLIC BLOOD PRESSURE: 70 MMHG | WEIGHT: 269.38 LBS | HEIGHT: 73 IN | TEMPERATURE: 98 F | SYSTOLIC BLOOD PRESSURE: 130 MMHG | BODY MASS INDEX: 35.7 KG/M2 | OXYGEN SATURATION: 94 %

## 2025-04-15 VITALS — HEIGHT: 73 IN | WEIGHT: 269.63 LBS | BODY MASS INDEX: 35.73 KG/M2

## 2025-04-15 DIAGNOSIS — Z98.890 HISTORY OF RHINOPLASTY: ICD-10-CM

## 2025-04-15 DIAGNOSIS — J34.89 NASAL OBSTRUCTION: Primary | ICD-10-CM

## 2025-04-15 DIAGNOSIS — M1A.0690 IDIOPATHIC CHRONIC GOUT OF KNEE WITHOUT TOPHUS, UNSPECIFIED LATERALITY: Chronic | ICD-10-CM

## 2025-04-15 DIAGNOSIS — E11.59 HYPERTENSION ASSOCIATED WITH DIABETES: Chronic | ICD-10-CM

## 2025-04-15 DIAGNOSIS — Z79.4 TYPE 2 DIABETES MELLITUS WITH OTHER SPECIFIED COMPLICATION, WITH LONG-TERM CURRENT USE OF INSULIN: Chronic | ICD-10-CM

## 2025-04-15 DIAGNOSIS — I15.2 OBESITY, DIABETES, AND HYPERTENSION SYNDROME: Chronic | ICD-10-CM

## 2025-04-15 DIAGNOSIS — J06.9 URI WITH COUGH AND CONGESTION: Primary | ICD-10-CM

## 2025-04-15 DIAGNOSIS — E78.5 HYPERLIPIDEMIA ASSOCIATED WITH TYPE 2 DIABETES MELLITUS: Chronic | ICD-10-CM

## 2025-04-15 DIAGNOSIS — J34.829 NASAL VALVE COLLAPSE: ICD-10-CM

## 2025-04-15 DIAGNOSIS — E11.69 HYPERLIPIDEMIA ASSOCIATED WITH TYPE 2 DIABETES MELLITUS: Chronic | ICD-10-CM

## 2025-04-15 DIAGNOSIS — K64.5 THROMBOSED EXTERNAL HEMORRHOID: ICD-10-CM

## 2025-04-15 DIAGNOSIS — I15.2 HYPERTENSION ASSOCIATED WITH DIABETES: Chronic | ICD-10-CM

## 2025-04-15 DIAGNOSIS — E03.9 HYPOTHYROIDISM (ACQUIRED): Chronic | ICD-10-CM

## 2025-04-15 DIAGNOSIS — E11.69 OBESITY, DIABETES, AND HYPERTENSION SYNDROME: Chronic | ICD-10-CM

## 2025-04-15 DIAGNOSIS — E11.59 OBESITY, DIABETES, AND HYPERTENSION SYNDROME: Chronic | ICD-10-CM

## 2025-04-15 DIAGNOSIS — E66.9 OBESITY, DIABETES, AND HYPERTENSION SYNDROME: Chronic | ICD-10-CM

## 2025-04-15 DIAGNOSIS — J34.89 NASAL OBSTRUCTION: Chronic | ICD-10-CM

## 2025-04-15 DIAGNOSIS — Z98.890 HISTORY OF ENDOSCOPIC SINUS SURGERY: ICD-10-CM

## 2025-04-15 DIAGNOSIS — E11.69 TYPE 2 DIABETES MELLITUS WITH OTHER SPECIFIED COMPLICATION, WITH LONG-TERM CURRENT USE OF INSULIN: Chronic | ICD-10-CM

## 2025-04-15 PROCEDURE — 1159F MED LIST DOCD IN RCRD: CPT | Mod: CPTII,S$GLB,, | Performed by: OTOLARYNGOLOGY

## 2025-04-15 PROCEDURE — G2211 COMPLEX E/M VISIT ADD ON: HCPCS | Mod: S$GLB,,, | Performed by: FAMILY MEDICINE

## 2025-04-15 PROCEDURE — 3066F NEPHROPATHY DOC TX: CPT | Mod: CPTII,S$GLB,, | Performed by: OTOLARYNGOLOGY

## 2025-04-15 PROCEDURE — 3008F BODY MASS INDEX DOCD: CPT | Mod: CPTII,S$GLB,, | Performed by: OTOLARYNGOLOGY

## 2025-04-15 PROCEDURE — 3044F HG A1C LEVEL LT 7.0%: CPT | Mod: CPTII,S$GLB,, | Performed by: OTOLARYNGOLOGY

## 2025-04-15 PROCEDURE — 99999 PR PBB SHADOW E&M-EST. PATIENT-LVL V: CPT | Mod: PBBFAC,,, | Performed by: FAMILY MEDICINE

## 2025-04-15 PROCEDURE — 4010F ACE/ARB THERAPY RXD/TAKEN: CPT | Mod: CPTII,S$GLB,, | Performed by: OTOLARYNGOLOGY

## 2025-04-15 PROCEDURE — 1159F MED LIST DOCD IN RCRD: CPT | Mod: CPTII,S$GLB,, | Performed by: FAMILY MEDICINE

## 2025-04-15 PROCEDURE — 3061F NEG MICROALBUMINURIA REV: CPT | Mod: CPTII,S$GLB,, | Performed by: OTOLARYNGOLOGY

## 2025-04-15 PROCEDURE — 3075F SYST BP GE 130 - 139MM HG: CPT | Mod: CPTII,S$GLB,, | Performed by: FAMILY MEDICINE

## 2025-04-15 PROCEDURE — 3066F NEPHROPATHY DOC TX: CPT | Mod: CPTII,S$GLB,, | Performed by: FAMILY MEDICINE

## 2025-04-15 PROCEDURE — 1160F RVW MEDS BY RX/DR IN RCRD: CPT | Mod: CPTII,S$GLB,, | Performed by: FAMILY MEDICINE

## 2025-04-15 PROCEDURE — 99214 OFFICE O/P EST MOD 30 MIN: CPT | Mod: S$GLB,,, | Performed by: FAMILY MEDICINE

## 2025-04-15 PROCEDURE — 3072F LOW RISK FOR RETINOPATHY: CPT | Mod: CPTII,S$GLB,, | Performed by: FAMILY MEDICINE

## 2025-04-15 PROCEDURE — 99213 OFFICE O/P EST LOW 20 MIN: CPT | Mod: S$GLB,,, | Performed by: OTOLARYNGOLOGY

## 2025-04-15 PROCEDURE — 3078F DIAST BP <80 MM HG: CPT | Mod: CPTII,S$GLB,, | Performed by: FAMILY MEDICINE

## 2025-04-15 PROCEDURE — 99999 PR PBB SHADOW E&M-EST. PATIENT-LVL IV: CPT | Mod: PBBFAC,,, | Performed by: OTOLARYNGOLOGY

## 2025-04-15 PROCEDURE — 3044F HG A1C LEVEL LT 7.0%: CPT | Mod: CPTII,S$GLB,, | Performed by: FAMILY MEDICINE

## 2025-04-15 PROCEDURE — 4010F ACE/ARB THERAPY RXD/TAKEN: CPT | Mod: CPTII,S$GLB,, | Performed by: FAMILY MEDICINE

## 2025-04-15 PROCEDURE — 3008F BODY MASS INDEX DOCD: CPT | Mod: CPTII,S$GLB,, | Performed by: FAMILY MEDICINE

## 2025-04-15 PROCEDURE — 1160F RVW MEDS BY RX/DR IN RCRD: CPT | Mod: CPTII,S$GLB,, | Performed by: OTOLARYNGOLOGY

## 2025-04-15 PROCEDURE — 3061F NEG MICROALBUMINURIA REV: CPT | Mod: CPTII,S$GLB,, | Performed by: FAMILY MEDICINE

## 2025-04-15 PROCEDURE — 3072F LOW RISK FOR RETINOPATHY: CPT | Mod: CPTII,S$GLB,, | Performed by: OTOLARYNGOLOGY

## 2025-04-15 RX ORDER — INDOMETHACIN 75 MG/1
75 CAPSULE, EXTENDED RELEASE ORAL 2 TIMES DAILY PRN
Qty: 60 CAPSULE | Refills: 0 | OUTPATIENT
Start: 2025-04-15

## 2025-04-15 RX ORDER — INSULIN GLARGINE 300 [IU]/ML
48 INJECTION, SOLUTION SUBCUTANEOUS DAILY
Qty: 12 ML | Refills: 3 | Status: SHIPPED | OUTPATIENT
Start: 2025-04-15

## 2025-04-15 RX ORDER — ATORVASTATIN CALCIUM 10 MG/1
10 TABLET, FILM COATED ORAL NIGHTLY
Qty: 90 TABLET | Refills: 3 | Status: SHIPPED | OUTPATIENT
Start: 2025-04-15

## 2025-04-15 RX ORDER — ICOSAPENT ETHYL 1000 MG/1
2 CAPSULE ORAL 2 TIMES DAILY
Qty: 360 CAPSULE | Refills: 1 | Status: SHIPPED | OUTPATIENT
Start: 2025-04-15

## 2025-04-15 RX ORDER — BENAZEPRIL HYDROCHLORIDE 40 MG/1
40 TABLET ORAL DAILY
Qty: 90 TABLET | Refills: 3 | Status: SHIPPED | OUTPATIENT
Start: 2025-04-15 | End: 2026-04-15

## 2025-04-15 RX ORDER — METOPROLOL SUCCINATE 50 MG/1
50 TABLET, EXTENDED RELEASE ORAL DAILY
Qty: 90 TABLET | Refills: 3 | Status: SHIPPED | OUTPATIENT
Start: 2025-04-15

## 2025-04-15 RX ORDER — METFORMIN HYDROCHLORIDE 1000 MG/1
1000 TABLET ORAL 2 TIMES DAILY WITH MEALS
Qty: 180 TABLET | Refills: 3 | Status: SHIPPED | OUTPATIENT
Start: 2025-04-15

## 2025-04-15 RX ORDER — INDOMETHACIN 75 MG/1
75 CAPSULE, EXTENDED RELEASE ORAL 2 TIMES DAILY PRN
Qty: 30 CAPSULE | Refills: 2 | Status: SHIPPED | OUTPATIENT
Start: 2025-04-15

## 2025-04-15 RX ORDER — TIRZEPATIDE 15 MG/.5ML
15 INJECTION, SOLUTION SUBCUTANEOUS WEEKLY
Qty: 4 PEN | Refills: 3 | Status: SHIPPED | OUTPATIENT
Start: 2025-04-15

## 2025-04-15 RX ORDER — HYDRALAZINE HYDROCHLORIDE 100 MG/1
100 TABLET, FILM COATED ORAL EVERY 8 HOURS
Qty: 270 TABLET | Refills: 3 | Status: SHIPPED | OUTPATIENT
Start: 2025-04-15 | End: 2026-04-15

## 2025-04-15 RX ORDER — AMLODIPINE BESYLATE 5 MG/1
5 TABLET ORAL DAILY
Qty: 90 TABLET | Refills: 3 | Status: SHIPPED | OUTPATIENT
Start: 2025-04-15

## 2025-04-15 NOTE — PROGRESS NOTES
OFFICE VISIT 4/15/25  8:20 AM CDT    CHIEF COMPLAINT: Follow-up     History of Present Illness    CHIEF COMPLAINT:  Narendra presents today for head congestion and cough.    UPPER RESPIRATORY SYMPTOMS:  Head congestion and cough that started 2 days ago, typically experiencing these symptoms with weather changes. He reports difficulty breathing through nose at night which interferes with CPAP use, with symptoms being most severe last night. He currently uses nasal saline irrigation. He previously attempted using nasal cones for approximately one week without improvement and discontinued use of Afrin approximately 6 months ago after chronic use.    SURGICAL HISTORY:  He has undergone two previous nasal surgeries.    HEMORRHOIDS:  He reports recurrent hemorrhoids currently bothering him for over a week. The hemorrhoid was initially about the size of a golf ball but has since reduced to approximately half that size. He denies bleeding. This is a recurrent issue occurring every few years, with the last episode at least two years ago.    GOUT:  He experiences gout affecting bilateral knees, ankles, and occasionally the first metatarsophalangeal joint, with knees being the primary site of involvement. Flare-ups occur 2-3 times per month. He maintains dietary modifications including avoidance of red meat and seafood to manage symptoms.    CURRENT MEDICATIONS:  He started antibiotics yesterday for possible rosacea. He continues Dupixent injections, amlodipine, benazepril, and hydralazine for blood pressure management, atorvastatin for cholesterol control, and Trulicity for diabetes management.       Except as noted herein, ROS is otherwise negative.    Assessment & Plan    IMPRESSION:   Assessed symptoms as viral head and chest congestion, likely exacerbated by weather changes.   Evaluated use of CPAP and difficulty breathing through nose at night.   Identified recurrent hemorrhoid issue as likely thrombosed.   Reviewed gout  "management and current medications.   Noted good A1c levels and current diabetes management.   Explained that nasal congestion appears to be an anatomic problem rather than one that can be resolved with medications or sprays.    PATIENT EDUCATION:   Discussed the nature of thrombosed hemorrhoids and potential surgical treatment.    MEDICATIONS:   Started Afrin nasal spray for a few days to alleviate nasal congestion, with strict instructions to limit use to no more than a few days due to history.   Refilled Indomethacin for gout flare-ups, with instructions to use maximum 3 times per week.   Continued Uloric for gout management.   Continued amlodipine, benazepril, and hydralazine for BP management.   Continued atorvastatin for cholesterol management.   Continued Trulicity insulin for diabetes management.    REFERRALS:   Referred to colorectal surgeon for evaluation and potential treatment of thrombosed hemorrhoid.   Referred to ENT for follow-up visit regarding chronic nasal issues and potential surgical intervention.       1. URI with cough and congestion    2. Nasal obstruction (chronic)  -     Ambulatory referral/consult to ENT; Future; Expected date: 04/22/2025    3. Thrombosed external hemorrhoid  -     Ambulatory referral/consult to Colorectal Surgery; Future; Expected date: 04/22/2025    4. Hypothyroidism (acquired)  Assessment & Plan:  Lab Results   Component Value Date    TSH 2.999 04/04/2025    TSH 6.380 (H) 02/17/2025    TSH 2.499 09/09/2024    FREET4 0.91 02/17/2025    FREET4 0.91 09/09/2024    FREET4 0.98 09/15/2023     No results found for: "THYROIDSTIMI", "THYROPEROXID", "THYGLBTUM", "THGABSCRN", "THYRGINTERP"       5. 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 MAX  3 TABLETS PER WEEK.  Dispense: 30 capsule; Refill: 2    6. " Hypertension associated with diabetes  -     amLODIPine (NORVASC) 5 MG tablet; Take 1 tablet (5 mg total) by mouth once daily.  Dispense: 90 tablet; Refill: 3  -     benazepriL (LOTENSIN) 40 MG tablet; Take 1 tablet (40 mg total) by mouth once daily.  Dispense: 90 tablet; Refill: 3  -     hydrALAZINE (APRESOLINE) 100 MG tablet; Take 1 tablet (100 mg total) by mouth every 8 (eight) hours.  Dispense: 270 tablet; Refill: 3  -     metoprolol succinate (TOPROL-XL) 50 MG 24 hr tablet; Take 1 tablet (50 mg total) by mouth once daily.  Dispense: 90 tablet; Refill: 3    7. Hyperlipidemia associated with type 2 diabetes mellitus  -     atorvastatin (LIPITOR) 10 MG tablet; Take 1 tablet (10 mg total) by mouth every evening.  Dispense: 90 tablet; Refill: 3  -     VASCEPA 1 gram Cap; Take 2 capsules (2,000 mg total) by mouth 2 (two) times daily.  Dispense: 360 capsule; Refill: 1    8. Obesity, diabetes, and hypertension syndrome  Overview:  No history or family history of thyroid cancer or multiple endocrine neoplasia syndrome.     Orders:  -     benazepriL (LOTENSIN) 40 MG tablet; Take 1 tablet (40 mg total) by mouth once daily.  Dispense: 90 tablet; Refill: 3    9. Type 2 diabetes mellitus with other specified complication, with long-term current use of insulin  -     insulin glargine, TOUJEO, (TOUJEO SOLOSTAR U-300 INSULIN) 300 unit/mL (1.5 mL) InPn pen; Inject 48 Units into the skin once daily.  Dispense: 12 mL; Refill: 3  -     metFORMIN (GLUCOPHAGE) 1000 MG tablet; Take 1 tablet (1,000 mg total) by mouth 2 (two) times daily with meals.  Dispense: 180 tablet; Refill: 3  -     MOUNJARO 15 mg/0.5 mL PnIj; Inject 15 mg into the skin once a week.  Dispense: 4 Pen; Refill: 3       Unless specified otherwise, chronic conditions are represented as and appear to be compensated/controlled and stable.  Today's visit involved the intricate management of episodic problem(s) and the ongoing care for the patient's serious or complex  "condition(s) listed above, reflecting the inherent complexity of providing longitudinal, comprehensive evaluation and management as the central hub for the patient's primary care services.  Any education and instructions provided to the patient via Patient Portal Message today are incorporated herein by reference.  Vitals:    04/15/25 0825   BP: 130/70   BP Location: Left arm   Patient Position: Sitting   Pulse: 100   Resp: 18   Temp: 98.1 °F (36.7 °C)   TempSrc: Tympanic   SpO2: (!) 94%   Weight: 122.2 kg (269 lb 6.4 oz)   Height: 6' 1" (1.854 m)   Physical Exam  Vitals reviewed.   Constitutional:       General: He is not in acute distress.     Appearance: Normal appearance. He is not ill-appearing or diaphoretic.   Cardiovascular:      Rate and Rhythm: Normal rate and regular rhythm.   Pulmonary:      Effort: Pulmonary effort is normal.      Breath sounds: Normal breath sounds.   Genitourinary:     Rectum: External hemorrhoid (thrombosed, moderately tender) present.       Skin:     General: Skin is warm and dry.   Neurological:      Mental Status: He is alert and oriented to person, place, and time. Mental status is at baseline.   Psychiatric:         Mood and Affect: Mood normal.         Behavior: Behavior normal.         Judgment: Judgment normal.       This note was generated with the assistance of ambient listening technology. Verbal consent was obtained by the patient and accompanying visitor(s) for the recording of patient appointment to facilitate this note. I attest to having reviewed and edited the generated note for accuracy, though some syntax or spelling errors may persist. Please contact the author of this note for any clarification.    Some sections of this note may have been produced using ambient-listening and speech-recognition technologies. I have reviewed the content for accuracy, though minor errors in syntax, spelling, or similar-sounding words may be present and should be understood in " context. Please contact the author for any clarification.    Follow up if symptoms worsen or fail to improve.  Future Appointments   Date Time Provider Department Center   4/15/2025 11:15 AM Benny Winkler MD [facility] ENT [place]   4/30/2025 11:30 AM Shad Acosta MD [facility] UROLOGY [facility]   4/30/2025  3:45 PM [facility]   5/23/2025  3:40 PM Kwame Guidry MD [facility] CARDIO [facility]   6/10/2025  9:00 AM Lejeune, Elizabeth B, NP [facility] SLEEP [facility]   6/24/2025  7:30 AM Teressa Riggins MD [facility] ALLERGY [facility]   7/18/2025  9:30 AM LABORATORY, [facility]   7/18/2025  2:15 PM Shad Acosta MD [facility] UROLOGY [facility]   7/29/2025  3:00 PM Mayur Webb MD [facility] RHEUM [place]

## 2025-04-15 NOTE — TELEPHONE ENCOUNTER
REFILL NOT APPROVED  REASON: He has upcoming appointment soon. We can address this and any other refills at that appointment.  Requested Prescriptions     Pending Prescriptions Disp Refills    indomethacin (INDOCIN SR) 75 mg CpSR CR capsule 60 capsule 0     Sig: Take 1 capsule (75 mg total) by mouth 2 (two) times daily as needed (for gout flare).      #LMRX   Future Appointments   Date Time Provider Department Center   4/30/2025 11:30 AM Shad Acosta MD [facility] UROLOGY  Medical    5/23/2025  3:40 PM Kwame Guidry MD [facility] CARDIO [facility]   6/10/2025  9:00 AM Lejeune, Elizabeth B, NP [facility] SLEEP [facility]   6/24/2025  7:30 AM Teressa Riggins MD [facility] ALLERGY [facility]   7/18/2025  9:30 AM LABORATORY, [facility]   7/18/2025  2:15 PM Shad Acosta MD [facility] UROLOGY [facility]   7/29/2025  3:00 PM Mayur Webb MD [facility]

## 2025-04-15 NOTE — Clinical Note
FYI - this carlie is coming back your way, still complaining of left sided nasal obstruction.  Just wanted to let you know.  DH

## 2025-04-15 NOTE — PROGRESS NOTES
Chief complaint:    Chief Complaint   Patient presents with    Nasal Lacrimal Obstruction     Pt states he has had 2 previous nasal surgeries. C/o nasal blockage completely on left side. C/o congestion, PND, which he keeps. C/o feeling like there is sores in left side. States he is unable to sleep and CPAP makes him feel like he is suffocating.         Referring Provider:  TESSY Magdaleno Md  [address] [place] [address]      History of present illness:     Mr. English is a 59 y.o. presenting for evaluation of sinus and nasal symptoms.     Sore in right nostril since surgery. Scabbing over. Bled some.     Some nasal congestion recently as well and some facial pressure.     Overall the nasal obstruction is markedly improved. No purulent rhinorrhea.     Saline spray. Not using other sprays at this time.     Return clinic visit, 8/7/23    Overall doing pretty well, but does have some facial pressure on the left. Intermittent obstruction, left > right. Improved since surgery.  Denies purulent rhinorrhea.     Currently using the astelin and saline irrigations.     Return clinic visit, 12/7/23  Over last month has had worsening left sided nasal obstruction  Trouble with his CPAP mask.  Denies purulent rhinorrhea or facial pressure.   Using astelin and flonase at times  Has started afrin again. At night.     Return clinic visit, 10/21/24  He endorses left > right nasal obstruction, worse at night. Doing well during day  Doing well with nasal breathing during day.   Using astelin night    Return clinic visit 4/15/25:    S/p FESS/septo/turbs and revision SRP with Dr. Ferrell, still complaining of left greater than right nasal obstruction that is interfering with his nasal breathing during the day and sleep at night.  He has failed medical management with spray as well as breathe right strips, nasal cones.        History      Past Medical History:   Past Medical History:   Diagnosis Date    Allergy      Cancer     Colon    Diabetes mellitus      09/14/2023 Insulin x 10 years    Encounter for screening colonoscopy 1/20/2023    Gastroesophageal reflux disease without esophagitis 06/20/2023    Hypertension     Multiple lung nodules on CT 10/22/2024    Sleep apnea     Thyroid disease          Past Surgical History:  Past Surgical History:   Procedure Laterality Date    BACK SURGERY      CARPAL TUNNEL RELEASE Left 12/10/2024    Procedure: RELEASE, CARPAL TUNNEL;  Surgeon: Lita Brown MD;  Location: [facility] OR;  Service: Orthopedics;  Laterality: Left;  49818 Left cubital tunnel release, in situ  78305 Left carpal tunnel release    CARPAL TUNNEL RELEASE Right 12/31/2024    Procedure: RELEASE, CARPAL TUNNEL;  Surgeon: Lita Brown MD;  Location: [facility] OR;  Service: Orthopedics;  Laterality: Right;    COLON SURGERY  02/06/21    COLONOSCOPY N/A 12/29/2020    Procedure: COLONOSCOPY;  Surgeon: William Cotter MD;  Location: [facility] ENDO;  Service: Endoscopy;  Laterality: N/A;  Will need Rapid doesn't live here    COLONOSCOPY N/A 02/10/2022    Procedure: COLONOSCOPY;  Surgeon: Wili Espinosa MD;  Location: [facility] ENDO;  Service: Endoscopy;  Laterality: N/A;    EPIDURAL STEROID INJECTION INTO CERVICAL SPINE N/A 12/01/2022    Procedure: C7/T1 IL SALBADOR;  Surgeon: Leonard Mart MD;  Location: [facility] PAIN MGT;  Service: Pain Management;  Laterality: N/A;    EPIDURAL STEROID INJECTION INTO CERVICAL SPINE N/A 7/17/2024    Procedure: C6/7 IL SALBADOR, Left Paramedian Approach;  Surgeon: Leonard Mart MD;  Location: [facility] PAIN MGT;  Service: Pain Management;  Laterality: N/A;    FESS, WITH NASAL SEPTOPLASTY, WITH IMAGING GUIDANCE Bilateral 08/17/2022    Procedure: FESS, WITH NASAL SEPTOPLASTY, WITH IMAGING GUIDANCE;  Surgeon: Viktor Ferrell MD;  Location: [facility] OR;  Service: ENT;  Laterality: Bilateral;    LAPAROSCOPIC RIGHT COLON RESECTION N/A 02/02/2021    Procedure: COLECTOMY, RIGHT, LAPAROSCOPIC, ERAS low;   "Surgeon: Melonie Santoyo MD;  [facility];  Service: Colon and Rectal;  Laterality: N/A;  needs rapid covid test    LYSIS OF ADHESIONS Bilateral 04/19/2023    Procedure: LYSIS, ADHESIONS;  Surgeon: Viktor Ferrell MD;  Location: [facility] OR;  Service: ENT;  Laterality: Bilateral;    NASAL ENDOSCOPY Bilateral 04/19/2023    Procedure: ENDOSCOPY, NOSE;  Surgeon: Viktor Ferrell MD;  Location: [facility] OR;  Service: ENT;  Laterality: Bilateral;    NASAL TURBINATE REDUCTION Bilateral 08/17/2022    Procedure: REDUCTION, NASAL TURBINATE;  Surgeon: Viktor Ferrell MD;  Location: [facility] OR;  Service: ENT;  Laterality: Bilateral;    RHINOPLASTY Bilateral 04/19/2023    Procedure: RHINOPLASTY;  Surgeon: Viktor Ferrell MD;  Location: [facility] OR;  Service: ENT;  Laterality: Bilateral;    SELECTIVE INJECTION OF ANESTHETIC AGENT AROUND LUMBAR SPINAL NERVE ROOT BY TRANSFORAMINAL APPROACH Bilateral 07/05/2023    Procedure: Bilateral L4/5 TF SALBADOR RN IV Sedation;  Surgeon: Leonard Mart MD;  Location: [facility] PAIN MGT;  Service: Pain Management;  Laterality: Bilateral;    TONSILLECTOMY      ULNAR TUNNEL RELEASE Left 12/10/2024    Procedure: RELEASE, CUBITAL TUNNEL;  Surgeon: Lita Brown MD;  Location: [facility] OR;  Service: Orthopedics;  Laterality: Left;         Medications: Medication list reviewed. He  has a current medication list which includes the following prescription(s): amlodipine, atorvastatin, benazepril, calcipotriene, clonazepam, colchicine, doxepin, doxycycline, dupilumab, febuxostat, hydralazine, hydroxyzine hcl, indomethacin, insulin glargine (toujeo), levothyroxine, metformin, metoprolol succinate, mometasone, mounjaro, pregabalin, tadalafil, testosterone cypionate, tizanidine, triamcinolone acetonide 0.1%, and vascepa.     Allergies:   Review of patient's allergies indicates:   Allergen Reactions    Demerol (pf) [meperidine (pf)] Other (See Comments)     Pt reports,"They lost my blood pressure."    " "Percocet [oxycodone-acetaminophen] Itching     itching    Allopurinol analogues Diarrhea         Family history: family history includes Breast cancer in his mother; Cancer in his mother; Cataracts in his maternal grandmother; Diabetes in his maternal grandmother and mother; Hypertension in his brother, brother, father, and mother; Lung cancer in his maternal grandfather; No Known Problems in his paternal grandfather, paternal grandmother, sister, and sister; Stroke in his father.         Social History          Alcohol use:  reports no history of alcohol use.            Tobacco:  reports that he has never smoked. He has never been exposed to tobacco smoke. He has never used smokeless tobacco.         Physical Examination      Vitals: Height 6' 1" (1.854 m), weight 122.3 kg (269 lb 10 oz).      General: Well developed, well nourished, well hydrated.     Voice: no dysphonia, no dysarthria      Head/Face: Normocephalic, atraumatic. No scars or lesions. Facial musculature equal.     Eyes: No scleral icterus or conjunctival hemorrhage. EOMI. PERRLA.     Ears:     Right ear: No gross deformity. EAC is clear of debris and erythema. TM are intact with a pneumatized middle ear. No signs of retraction, fluid or infection.      Left ear: No gross deformity. EAC is clear of debris and erythema. TM are intact with a pneumatized middle ear. No signs of retraction, fluid or infection.      Nose: septum midline. Well healed transcolumellar scar. Marked external valve collapse with inspiration. Internal valve well supported                    Mouth/Oropharynx: Lips without any lesions. No mucosal lesions within the oropharynx. No tonsillar exudate or lesions. Pharyngeal walls symmetrical. Uvula midline. Tongue midline without lesions.    Neurologic: Moving all extremities without gross abnormality.CN II-XII grossly intact. House-Brackmann 1/6. No signs of nystagmus.        Data reviewed      Review of records:      I reviewed " "records from the referring provider's office visits describing the history, workup, and/or treatment of this problem thus far.    Imaging  I have independently reviewed the following imaging with the findings noted below:      CT sinus,  Bilateral toya bullosa  S-shaped septal deviation, left mid-septal spur  Inferior turbinate reduction   Left frontal thickening and obstruction of outflow  Bilateral anterior ethmoid disease     Op note, 8/20/22     PROCEDURE:   Endoscopic septoplasty   Bilateral inferior turbinate submucosal resection  Nasal endoscopy with resection of bilateral toya bullosa   Bilateral nasal endoscopy with maxillary antrostomy   Bilateral nasal endoscopy with anterior ethmoidectomy    Left nasal endoscopy with frontal sinusotomy and frontal sinus exploration   Functional endoscopic sinus surgery with CT image guided electromagnetic system     OPERATIVE FINDINGS:   Polypoid mucosal thickening at the left frontal outflow tract  Bilateral polypoid thickening in anterior ethmoids  Left septal deviation with large left septal spur      Pathology reviewed  1. "left sinus contents", excision:       -  Sinonasal mucosa with chronic inflammation       - No eosinophils seen   2. "septal cartilage", excision:       - Portion of cartilage         NASAL ENDOSCOPY       Indication: Narendra Hopkins Amador Oliva is a 59 y.o. male  with sinonasal symptoms that were not able to be explained by anterior rhinoscopy alone, thus necessitating nasal endoscopy.     Procedure: Risks, benefits, and alternatives of the procedure were discussed with the patient, and the patient consented to the nasal endoscopy.  The nasal cavity was sprayed with a topical decongestant and anesthetic (if needed). The endoscope was passed into each nostril and each nasal cavity was visualized.  On each side the nasal cavity, sinuses (if open), turbinates, middle and superior meatus, sphenoethmoidal recess and septum were examined with the " findings described below. At the end of the examination, the scope was removed. The patient tolerated the procedure well with no complications.       Endoscopic Sinonasal Exam Findings:  -     The right side has normal mucosa, patent max, ethmoids, and frontals  -     The left side has normal mucosa, patent max, ethmoids, and frontals  -     Nasal secretions: No discolored secretions noted bilaterally  -     Nasal septum: no significant deviation or perforation appreciated   -     Inferior turbinate: Normal mucosa without significant hypertrophy bilaterally  -     Middle turbinate: Normal mucosa without significant hypertrophy bilaterally  -     Other findings: No further mucopurulence and no polyps. There is a  small adhesions of the middle turbinates to the lateral nasal side wall on the right        Assessment/Plan:    1. Nasal obstruction (chronic)    2. History of rhinoplasty    3. Nasal valve collapse    4. History of endoscopic sinus surgery                S/p FESS and septoplasty 8/17/22 with post op epistaxis complicated by hypertensive urgency (resolved, pack removed, HTN better controlled)  S/p septorhinoplaty for persistent caudal septal deviation 4/19/23      Still with some night time obstruction. Structurally the nose and sinuses look widely patent.     The primary structural issue that remains is collapse of the external valve and ala on the left. We dicussed nasal valve repair with ear cartilage graft vs trial of nasal cones. He will try nasal cones first. Return to clinic if insufficient improvement and surgical correction is desired.     4/15/25 update:  Similar exam to Dr. Ferrell's last visit.  Discussed options and agreed on a re-evaluation by Mariaelena in the near future.  May need revision.

## 2025-04-15 NOTE — ASSESSMENT & PLAN NOTE
"Lab Results   Component Value Date    TSH 2.999 04/04/2025    TSH 6.380 (H) 02/17/2025    TSH 2.499 09/09/2024    FREET4 0.91 02/17/2025    FREET4 0.91 09/09/2024    FREET4 0.98 09/15/2023     No results found for: "THYROIDSTIMI", "THYROPEROXID", "THYGLBTUM", "THGABSCRN", "THYRGINTERP"   "

## 2025-04-20 DIAGNOSIS — G47.00 INSOMNIA, UNSPECIFIED TYPE: ICD-10-CM

## 2025-04-21 RX ORDER — CLONAZEPAM 1 MG/1
1 TABLET ORAL NIGHTLY
Qty: 30 TABLET | Refills: 4 | Status: SHIPPED | OUTPATIENT
Start: 2025-04-21

## 2025-04-22 ENCOUNTER — OFFICE VISIT (OUTPATIENT)
Dept: OTOLARYNGOLOGY | Facility: CLINIC | Age: 60
End: 2025-04-22
Payer: COMMERCIAL

## 2025-04-22 VITALS — WEIGHT: 269.63 LBS | BODY MASS INDEX: 35.73 KG/M2 | HEIGHT: 73 IN

## 2025-04-22 DIAGNOSIS — J34.89 NASAL OBSTRUCTION: Primary | ICD-10-CM

## 2025-04-22 DIAGNOSIS — Z98.890 HISTORY OF RHINOPLASTY: ICD-10-CM

## 2025-04-22 DIAGNOSIS — Z98.890 HISTORY OF ENDOSCOPIC SINUS SURGERY: ICD-10-CM

## 2025-04-22 DIAGNOSIS — J34.829 NASAL VALVE COLLAPSE: ICD-10-CM

## 2025-04-22 PROCEDURE — 99999 PR PBB SHADOW E&M-EST. PATIENT-LVL IV: CPT | Mod: PBBFAC,,, | Performed by: STUDENT IN AN ORGANIZED HEALTH CARE EDUCATION/TRAINING PROGRAM

## 2025-04-22 PROCEDURE — 3044F HG A1C LEVEL LT 7.0%: CPT | Mod: CPTII,S$GLB,, | Performed by: STUDENT IN AN ORGANIZED HEALTH CARE EDUCATION/TRAINING PROGRAM

## 2025-04-22 PROCEDURE — 99214 OFFICE O/P EST MOD 30 MIN: CPT | Mod: 25,S$GLB,, | Performed by: STUDENT IN AN ORGANIZED HEALTH CARE EDUCATION/TRAINING PROGRAM

## 2025-04-22 PROCEDURE — 3066F NEPHROPATHY DOC TX: CPT | Mod: CPTII,S$GLB,, | Performed by: STUDENT IN AN ORGANIZED HEALTH CARE EDUCATION/TRAINING PROGRAM

## 2025-04-22 PROCEDURE — 31231 NASAL ENDOSCOPY DX: CPT | Mod: S$GLB,,, | Performed by: STUDENT IN AN ORGANIZED HEALTH CARE EDUCATION/TRAINING PROGRAM

## 2025-04-22 PROCEDURE — 4010F ACE/ARB THERAPY RXD/TAKEN: CPT | Mod: CPTII,S$GLB,, | Performed by: STUDENT IN AN ORGANIZED HEALTH CARE EDUCATION/TRAINING PROGRAM

## 2025-04-22 PROCEDURE — 3072F LOW RISK FOR RETINOPATHY: CPT | Mod: CPTII,S$GLB,, | Performed by: STUDENT IN AN ORGANIZED HEALTH CARE EDUCATION/TRAINING PROGRAM

## 2025-04-22 PROCEDURE — 1159F MED LIST DOCD IN RCRD: CPT | Mod: CPTII,S$GLB,, | Performed by: STUDENT IN AN ORGANIZED HEALTH CARE EDUCATION/TRAINING PROGRAM

## 2025-04-22 PROCEDURE — 3061F NEG MICROALBUMINURIA REV: CPT | Mod: CPTII,S$GLB,, | Performed by: STUDENT IN AN ORGANIZED HEALTH CARE EDUCATION/TRAINING PROGRAM

## 2025-04-22 PROCEDURE — 3008F BODY MASS INDEX DOCD: CPT | Mod: CPTII,S$GLB,, | Performed by: STUDENT IN AN ORGANIZED HEALTH CARE EDUCATION/TRAINING PROGRAM

## 2025-04-22 NOTE — PROGRESS NOTES
Chief complaint:    Chief Complaint   Patient presents with    Sinus Problem     Pt is coming in today for nasal obstruction         Referring Provider:  No referring provider defined for this encounter.      History of present illness:     Mr. English is a 59 y.o. presenting for evaluation of sinus and nasal symptoms.     Sore in right nostril since surgery. Scabbing over. Bled some.     Some nasal congestion recently as well and some facial pressure.     Overall the nasal obstruction is markedly improved. No purulent rhinorrhea.     Saline spray. Not using other sprays at this time.     Return clinic visit, 8/7/23    Overall doing pretty well, but does have some facial pressure on the left. Intermittent obstruction, left > right. Improved since surgery.  Denies purulent rhinorrhea.     Currently using the astelin and saline irrigations.     Return clinic visit, 12/7/23  Over last month has had worsening left sided nasal obstruction  Trouble with his CPAP mask.  Denies purulent rhinorrhea or facial pressure.   Using astelin and flonase at times  Has started afrin again. At night.     Return clinic visit, 10/21/24  He endorses left > right nasal obstruction, worse at night. Doing well during day  Doing well with nasal breathing during day.   Using astelin night    Return clinic visit, 4/22/25  Conitued nasal obstruction L>R  Worse at night  Tried nasal cones and breathe right strips  He does have CPAP and is tolerating  Using astelin at times still    History      Past Medical History:   Past Medical History:   Diagnosis Date    Allergy     Cancer     Colon    Diabetes mellitus      09/14/2023 Insulin x 10 years    Encounter for screening colonoscopy 1/20/2023    Gastroesophageal reflux disease without esophagitis 06/20/2023    Hypertension     Multiple lung nodules on CT 10/22/2024    Sleep apnea     Thyroid disease          Past Surgical History:  Past Surgical History:   Procedure Laterality Date    BACK  SURGERY      CARPAL TUNNEL RELEASE Left 12/10/2024    Procedure: RELEASE, CARPAL TUNNEL;  Surgeon: Lita Brown MD;  Location: [facility] OR;  Service: Orthopedics;  Laterality: Left;  52727 Left cubital tunnel release, in situ  86952 Left carpal tunnel release    CARPAL TUNNEL RELEASE Right 12/31/2024    Procedure: RELEASE, CARPAL TUNNEL;  Surgeon: Lita Brown MD;  Location: [facility] OR;  Service: Orthopedics;  Laterality: Right;    COLON SURGERY  02/06/21    COLONOSCOPY N/A 12/29/2020    Procedure: COLONOSCOPY;  Surgeon: William Cotter MD;  Location: [facility] ENDO;  Service: Endoscopy;  Laterality: N/A;  Will need Rapid doesn't live here    COLONOSCOPY N/A 02/10/2022    Procedure: COLONOSCOPY;  Surgeon: Wili Esipnosa MD;  Location: [facility] ENDO;  Service: Endoscopy;  Laterality: N/A;    EPIDURAL STEROID INJECTION INTO CERVICAL SPINE N/A 12/01/2022    Procedure: C7/T1 IL SALBADOR;  Surgeon: Leonard Mart MD;  [facility];  Service: Pain Management;  Laterality: N/A;    EPIDURAL STEROID INJECTION INTO CERVICAL SPINE N/A 7/17/2024    Procedure: C6/7 IL SALBADOR, Left Paramedian Approach;  Surgeon: Leonard Mart MD;  [facility]: [facility];  Service: Pain Management;  Laterality: N/A;    FESS, WITH NASAL SEPTOPLASTY, WITH IMAGING GUIDANCE Bilateral 08/17/2022    Procedure: FESS, WITH NASAL SEPTOPLASTY, WITH IMAGING GUIDANCE;  Surgeon: Viktor Ferrell MD;  Location: [facility] OR;  Service: ENT;  Laterality: Bilateral;    LAPAROSCOPIC RIGHT COLON RESECTION N/A 02/02/2021    Procedure: COLECTOMY, RIGHT, LAPAROSCOPIC, ERAS low;  Surgeon: Melonie Santoyo MD;  Location: [facility] OR [facility] FLR;  Service: Colon and Rectal;  Laterality: N/A;  needs rapid covid test    LYSIS OF ADHESIONS Bilateral 04/19/2023    Procedure: LYSIS, ADHESIONS;  Surgeon: Viktor Ferrell MD;  Location: [facility] OR;  Service: ENT;  Laterality: Bilateral;    NASAL ENDOSCOPY Bilateral 04/19/2023    Procedure: ENDOSCOPY, NOSE;  Surgeon: Viktor  "LARA Ferrell MD;  Location: [facility] OR;  Service: ENT;  Laterality: Bilateral;    NASAL TURBINATE REDUCTION Bilateral 08/17/2022    Procedure: REDUCTION, NASAL TURBINATE;  Surgeon: Viktor Ferrell MD;  Location: [facility] OR;  Service: ENT;  Laterality: Bilateral;    RHINOPLASTY Bilateral 04/19/2023    Procedure: RHINOPLASTY;  Surgeon: Viktor Ferrell MD;  Location: [facility] OR;  Service: ENT;  Laterality: Bilateral;    SELECTIVE INJECTION OF ANESTHETIC AGENT AROUND LUMBAR SPINAL NERVE ROOT BY TRANSFORAMINAL APPROACH Bilateral 07/05/2023    Procedure: Bilateral L4/5 TF SALBADOR RN IV Sedation;  Surgeon: Leonard Mart MD;  Location: [facility] PAIN MGT;  Service: Pain Management;  Laterality: Bilateral;    TONSILLECTOMY      ULNAR TUNNEL RELEASE Left 12/10/2024    Procedure: RELEASE, CUBITAL TUNNEL;  Surgeon: Lita Brown MD;  Location: [facility] OR;  Service: Orthopedics;  Laterality: Left;         Medications: Medication list reviewed. He  has a current medication list which includes the following prescription(s): amlodipine, atorvastatin, benazepril, calcipotriene, clonazepam, colchicine, doxepin, doxycycline, dupilumab, febuxostat, hydralazine, hydroxyzine hcl, indomethacin, insulin glargine (toujeo), levothyroxine, metformin, metoprolol succinate, mometasone, mounjaro, pregabalin, tadalafil, testosterone cypionate, tizanidine, triamcinolone acetonide 0.1%, and vascepa.     Allergies:   Review of patient's allergies indicates:   Allergen Reactions    Demerol (pf) [meperidine (pf)] Other (See Comments)     Pt reports,"They lost my blood pressure."    Percocet [oxycodone-acetaminophen] Itching     itching    Allopurinol analogues Diarrhea         Family history: family history includes Breast cancer in his mother; Cancer in his mother; Cataracts in his maternal grandmother; Diabetes in his maternal grandmother and mother; Hypertension in his brother, brother, father, and mother; Lung cancer in his maternal grandfather; No Known " "Problems in his paternal grandfather, paternal grandmother, sister, and sister; Stroke in his father.         Social History          Alcohol use:  reports no history of alcohol use.            Tobacco:  reports that he has never smoked. He has never been exposed to tobacco smoke. He has never used smokeless tobacco.         Physical Examination      Vitals: Height 6' 1.2" (1.859 m), weight 122.3 kg (269 lb 10 oz).      General: Well developed, well nourished, well hydrated.     Voice: no dysphonia, no dysarthria      Head/Face: Normocephalic, atraumatic. No scars or lesions. Facial musculature equal.     Eyes: No scleral icterus or conjunctival hemorrhage. EOMI. PERRLA.     Ears:     Right ear: No gross deformity. EAC is clear of debris and erythema. TM are intact with a pneumatized middle ear. No signs of retraction, fluid or infection.      Left ear: No gross deformity. EAC is clear of debris and erythema. TM are intact with a pneumatized middle ear. No signs of retraction, fluid or infection.      Nose: septum midline. Well healed transcolumellar scar. Mild external valve collapse with inspiration on the left. Internal valves well supported. Fair tip support. + wood on the left, - on the right      Mouth/Oropharynx: Lips without any lesions. No mucosal lesions within the oropharynx. No tonsillar exudate or lesions. Pharyngeal walls symmetrical. Uvula midline. Tongue midline without lesions.    Neurologic: Moving all extremities without gross abnormality.CN II-XII grossly intact. House-Brackmann 1/6. No signs of nystagmus.        Data reviewed      Review of records:      I reviewed records from the referring provider's office visits describing the history, workup, and/or treatment of this problem thus far.    Imaging  I have independently reviewed the following imaging with the findings noted below:      CT sinus,  Bilateral toya bullosa  S-shaped septal deviation, left mid-septal spur  Inferior turbinate " "reduction   Left frontal thickening and obstruction of outflow  Bilateral anterior ethmoid disease     Op note, 8/20/22     PROCEDURE:   Endoscopic septoplasty   Bilateral inferior turbinate submucosal resection  Nasal endoscopy with resection of bilateral toya bullosa   Bilateral nasal endoscopy with maxillary antrostomy   Bilateral nasal endoscopy with anterior ethmoidectomy    Left nasal endoscopy with frontal sinusotomy and frontal sinus exploration   Functional endoscopic sinus surgery with CT image guided electromagnetic system     OPERATIVE FINDINGS:   Polypoid mucosal thickening at the left frontal outflow tract  Bilateral polypoid thickening in anterior ethmoids  Left septal deviation with large left septal spur      Pathology reviewed  1. "left sinus contents", excision:       -  Sinonasal mucosa with chronic inflammation       - No eosinophils seen   2. "septal cartilage", excision:       - Portion of cartilage         NASAL ENDOSCOPY       Indication: Narendra English Sr. is a 59 y.o. male  with sinonasal symptoms that were not able to be explained by anterior rhinoscopy alone, thus necessitating nasal endoscopy.     Procedure: Risks, benefits, and alternatives of the procedure were discussed with the patient, and the patient consented to the nasal endoscopy.  The nasal cavity was sprayed with a topical decongestant and anesthetic (if needed). The endoscope was passed into each nostril and each nasal cavity was visualized.  On each side the nasal cavity, sinuses (if open), turbinates, middle and superior meatus, sphenoethmoidal recess and septum were examined with the findings described below. At the end of the examination, the scope was removed. The patient tolerated the procedure well with no complications.       Endoscopic Sinonasal Exam Findings:  -     The right side has normal mucosa, patent max, ethmoids, and frontals  -     The left side has normal mucosa, patent max, ethmoids, and " frontals  -     Nasal secretions: No discolored secretions noted bilaterally  -     Nasal septum: no significant deviation or perforation appreciated   -     Inferior turbinate: Normal mucosa without significant hypertrophy bilaterally  -     Middle turbinate: Normal mucosa without significant hypertrophy bilaterally  -     Other findings: No further mucopurulence and no polyps. There is a  small adhesions of the middle turbinates to the lateral nasal side wall on the right        Assessment/Plan:    1. Nasal obstruction (chronic)    2. History of rhinoplasty    3. Nasal valve collapse    4. History of endoscopic sinus surgery            S/p FESS and septoplasty 8/17/22 with post op epistaxis complicated by hypertensive urgency (resolved, pack removed, HTN better controlled). His sinus disease is markedly improved.    Then S/p septorhinoplaty for persistent caudal septal deviation 4/19/23 with marked structural improvement, but continued bothersome LACY. His septum is midline, and he does have mild residual NV collapse, primarily with deep inspiration on the left. His sypmtoms are mild during the day, worse at night, but he is still able to tolerate his CPAP. I would have guarded expectations about additional surgery, but he would like a second option. I will refer him to Dr. Biggs for his opinion.        Viktor Ferrell MD  [facility]   [facility]  [id number] [place] [address].  [place] [id number]  P: [phone]  F: [phone]

## 2025-04-30 ENCOUNTER — OFFICE VISIT (OUTPATIENT)
Dept: SURGERY | Facility: CLINIC | Age: 60
End: 2025-04-30
Payer: COMMERCIAL

## 2025-04-30 VITALS
BODY MASS INDEX: 35.14 KG/M2 | OXYGEN SATURATION: 97 % | HEART RATE: 81 BPM | TEMPERATURE: 98 F | SYSTOLIC BLOOD PRESSURE: 147 MMHG | DIASTOLIC BLOOD PRESSURE: 84 MMHG | WEIGHT: 265.13 LBS | HEIGHT: 73 IN

## 2025-04-30 DIAGNOSIS — K64.5 THROMBOSED EXTERNAL HEMORRHOID: ICD-10-CM

## 2025-04-30 PROCEDURE — 3008F BODY MASS INDEX DOCD: CPT | Mod: CPTII,S$GLB,, | Performed by: STUDENT IN AN ORGANIZED HEALTH CARE EDUCATION/TRAINING PROGRAM

## 2025-04-30 PROCEDURE — 3072F LOW RISK FOR RETINOPATHY: CPT | Mod: CPTII,S$GLB,, | Performed by: STUDENT IN AN ORGANIZED HEALTH CARE EDUCATION/TRAINING PROGRAM

## 2025-04-30 PROCEDURE — 99999 PR PBB SHADOW E&M-EST. PATIENT-LVL V: CPT | Mod: PBBFAC,,, | Performed by: STUDENT IN AN ORGANIZED HEALTH CARE EDUCATION/TRAINING PROGRAM

## 2025-04-30 PROCEDURE — 3066F NEPHROPATHY DOC TX: CPT | Mod: CPTII,S$GLB,, | Performed by: STUDENT IN AN ORGANIZED HEALTH CARE EDUCATION/TRAINING PROGRAM

## 2025-04-30 PROCEDURE — 99203 OFFICE O/P NEW LOW 30 MIN: CPT | Mod: S$GLB,,, | Performed by: STUDENT IN AN ORGANIZED HEALTH CARE EDUCATION/TRAINING PROGRAM

## 2025-04-30 PROCEDURE — 4010F ACE/ARB THERAPY RXD/TAKEN: CPT | Mod: CPTII,S$GLB,, | Performed by: STUDENT IN AN ORGANIZED HEALTH CARE EDUCATION/TRAINING PROGRAM

## 2025-04-30 PROCEDURE — 3061F NEG MICROALBUMINURIA REV: CPT | Mod: CPTII,S$GLB,, | Performed by: STUDENT IN AN ORGANIZED HEALTH CARE EDUCATION/TRAINING PROGRAM

## 2025-04-30 PROCEDURE — 3079F DIAST BP 80-89 MM HG: CPT | Mod: CPTII,S$GLB,, | Performed by: STUDENT IN AN ORGANIZED HEALTH CARE EDUCATION/TRAINING PROGRAM

## 2025-04-30 PROCEDURE — 3044F HG A1C LEVEL LT 7.0%: CPT | Mod: CPTII,S$GLB,, | Performed by: STUDENT IN AN ORGANIZED HEALTH CARE EDUCATION/TRAINING PROGRAM

## 2025-04-30 PROCEDURE — 3077F SYST BP >= 140 MM HG: CPT | Mod: CPTII,S$GLB,, | Performed by: STUDENT IN AN ORGANIZED HEALTH CARE EDUCATION/TRAINING PROGRAM

## 2025-05-01 ENCOUNTER — OFFICE VISIT (OUTPATIENT)
Dept: UROLOGY | Facility: CLINIC | Age: 60
End: 2025-05-01
Payer: COMMERCIAL

## 2025-05-01 VITALS — DIASTOLIC BLOOD PRESSURE: 79 MMHG | SYSTOLIC BLOOD PRESSURE: 165 MMHG | HEART RATE: 93 BPM

## 2025-05-01 DIAGNOSIS — N52.9 ERECTILE DYSFUNCTION, UNSPECIFIED ERECTILE DYSFUNCTION TYPE: ICD-10-CM

## 2025-05-01 DIAGNOSIS — E29.1 HYPOGONADISM MALE: Primary | ICD-10-CM

## 2025-05-01 PROCEDURE — 3066F NEPHROPATHY DOC TX: CPT | Mod: CPTII,S$GLB,, | Performed by: UROLOGY

## 2025-05-01 PROCEDURE — 1160F RVW MEDS BY RX/DR IN RCRD: CPT | Mod: CPTII,S$GLB,, | Performed by: UROLOGY

## 2025-05-01 PROCEDURE — 3077F SYST BP >= 140 MM HG: CPT | Mod: CPTII,S$GLB,, | Performed by: UROLOGY

## 2025-05-01 PROCEDURE — 3078F DIAST BP <80 MM HG: CPT | Mod: CPTII,S$GLB,, | Performed by: UROLOGY

## 2025-05-01 PROCEDURE — 1159F MED LIST DOCD IN RCRD: CPT | Mod: CPTII,S$GLB,, | Performed by: UROLOGY

## 2025-05-01 PROCEDURE — 99999 PR PBB SHADOW E&M-EST. PATIENT-LVL III: CPT | Mod: PBBFAC,,, | Performed by: UROLOGY

## 2025-05-01 PROCEDURE — 3072F LOW RISK FOR RETINOPATHY: CPT | Mod: CPTII,S$GLB,, | Performed by: UROLOGY

## 2025-05-01 PROCEDURE — 99214 OFFICE O/P EST MOD 30 MIN: CPT | Mod: S$GLB,,, | Performed by: UROLOGY

## 2025-05-01 PROCEDURE — 3061F NEG MICROALBUMINURIA REV: CPT | Mod: CPTII,S$GLB,, | Performed by: UROLOGY

## 2025-05-01 PROCEDURE — 3044F HG A1C LEVEL LT 7.0%: CPT | Mod: CPTII,S$GLB,, | Performed by: UROLOGY

## 2025-05-01 PROCEDURE — 4010F ACE/ARB THERAPY RXD/TAKEN: CPT | Mod: CPTII,S$GLB,, | Performed by: UROLOGY

## 2025-05-01 NOTE — PROGRESS NOTES
Chief Complaint:   Encounter Diagnoses   Name Primary?    Hypogonadism male Yes    Erectile dysfunction, unspecified erectile dysfunction type        HPI:   5/1/25- current testosterone regimen is good, Cialis seems to work little bit better.  Patient does desire semen analysis due to diminished fertility.    06/16/2022 - 55 yo male that presents today for evaluation of ED.  Patient notes issues for the last 3-4 years but notes that over the last six months it has gotten worse.  He notes difficulty both achieving and maintaining an erection.  He has previously tried both Cialis and Viagra and both of these cause in to have a very bad headache that make it on tolerable to take these medications, though they do work.  Otherwise he voids with a good stream and feels like he empties well subjectively.  He denies any gross hematuria or family history of  cancers.  Denies prior stones or urologic procedures.    Allergies:  Demerol (pf) [meperidine (pf)], Percocet [oxycodone-acetaminophen], and Demerol [meperidine]    Medications:  has a current medication list which includes the following prescription(s): amlodipine, atorvastatin, azelastine, benazepril, clobetasol 0.05%, clonazepam, colchicine, diclofenac sodium, flash glucose scanning reader, flash glucose sensor, fluticasone propionate, hydralazine, hydrocortisone, indomethacin, toujeo solostar u-300 insulin, levothyroxine, metformin, metoprolol succinate, pregabalin, sildenafil, mounjaro, trazodone, triamcinolone acetonide 0.1%, and vascepa, and the following Facility-Administered Medications: gabapentin and lactated ringers.    Review of Systems:  General: No fever, chills, fatigability, or weight loss.  Skin: No rashes, itching, or changes in color or texture of skin.  Chest: Denies MEJÍA, cyanosis, wheezing, cough, and sputum production.  Abdomen: Appetite fine. No weight loss. Denies diarrhea, abdominal pain, hematemesis, or blood in stool.  Musculoskeletal: No  joint stiffness or swelling. Denies back pain.  : As above.  All other review of systems negative.    PMH:   has a past medical history of Cancer, Diabetes mellitus (8 years), Hypertension, Sleep apnea, and Thyroid disease.    PSH:   has a past surgical history that includes Colonoscopy (N/A, 12/29/2020); Laparoscopic right colon resection (N/A, 02/02/2021); Back surgery; Colonoscopy (N/A, 02/10/2022); Tonsillectomy; fess, with nasal septoplasty, with imaging guidance (Bilateral, 08/17/2022); Nasal turbinate reduction (Bilateral, 08/17/2022); Colon surgery (02/06/21); and Epidural steroid injection into cervical spine (N/A, 12/1/2022).    FamHx: family history includes Breast cancer in his mother; Cancer in his mother; Cataracts in his maternal grandmother; Diabetes in his maternal grandmother and mother; Hypertension in his brother, brother, and mother; Stroke in his father.    SocHx:  reports that he has never smoked. He has never used smokeless tobacco. He reports that he does not drink alcohol and does not use drugs.      Physical Exam:  There were no vitals filed for this visit.    General: A&Ox3, no apparent distress, no deformities  Neck: No masses, normal ROM  Lungs: normal inspiration, no use of accessory muscles  Heart: normal pulse, no arrhythmias  Abdomen: Soft, NT, ND, no masses, no hernias, no hepatosplenomegaly  Skin: The skin is warm and dry. No jaundice.  Ext: No c/c/e.  JAIMIE: 6/22: Normal rectal tone, no hemorrhoids. Prostate smooth and normal, no nodules 30 gm SV not palpable. Perineum and anus normal.    Labs/Studies:   Testopel  5/22/24, 2/23/24, 11/29/23, 8/25/23, 5/26/26, 1/20/23  Testosterone 328 8/22   PSA 0.62 8/24    Impression/Plan:      1. Hypogonadism- testopel was no longer covered, 100 mg every 7 days of testosterone appears to be working well.  Of note previous AndroGel failure.  Patient desires semen analysis as they have had some issues with fertility, I have informed him that if  this is normal then he should hold the testosterone for a few months to assist with fertility.  Otherwise see me in 6 months with labs.    2. Erectile dysfunction- Cialis 20 mg seems to assist more than the sildenafil 100 mg, previous usage of TriMix caused priapism.  Levitra 20 mg has worked but is now too expensive.  Call with any issues prior to the next appointment.

## 2025-05-05 NOTE — PROGRESS NOTES
"  CRS Office Visit    SUBJECTIVE:     Chief Complaint:  Chronic thrombosed hemorrhoid    History of Present Illness:  Patient is a 59 y.o. male presents with complaints of a chronic thrombosed hemorrhoid.  This has been ongoing for many weeks.  He had severe anal pain at the start and pain has improved.  Lesion was large at 1st but has been decreasing.  Has been using over-the-counter creams as well as witch Hazel    Colonoscopy:  01/2025  Overall Impression:   5 subcentimeter polyps  Diverticulosis of mild severity in the descending colon  Healthy ileocolonic anastomosis in the proximal ascending colon  Pathology:    Diagnosis 1.  Transverse colon, polyps x2, polypectomies:  Tubular adenomas, fragmented.      2. Descending colon, polyps x2, polypectomies:  Colonic mucosa with superficial hyperplastic changes and underlying benign lymphoid aggregate.    3.  Rectum, polyp, polypectomy:  Tubular adenoma, fragmented.       Review of patient's allergies indicates:   Allergen Reactions    Demerol (pf) [meperidine (pf)] Other (See Comments)     Pt reports,"They lost my blood pressure."    Percocet [oxycodone-acetaminophen] Itching     itching    Allopurinol analogues Diarrhea       Past Medical History:   Diagnosis Date    Allergy     Cancer     Colon    Diabetes mellitus      09/14/2023 Insulin x 10 years    Encounter for screening colonoscopy 1/20/2023    Gastroesophageal reflux disease without esophagitis 06/20/2023    Hypertension     Multiple lung nodules on CT 10/22/2024    Sleep apnea     Thyroid disease      Past Surgical History:   Procedure Laterality Date    BACK SURGERY      CARPAL TUNNEL RELEASE Left 12/10/2024    Procedure: RELEASE, CARPAL TUNNEL;  Surgeon: Lita Brown MD;  Location: [facility];  Service: Orthopedics;  Laterality: Left;  16213 Left cubital tunnel release, in situ  63292 Left carpal tunnel release    CARPAL TUNNEL RELEASE Right 12/31/2024    Procedure: RELEASE, CARPAL TUNNEL;  " Surgeon: Lita Brown MD;  Location: [facility] OR;  Service: Orthopedics;  Laterality: Right;    COLON SURGERY  02/06/21    COLONOSCOPY N/A 12/29/2020    Procedure: COLONOSCOPY;  Surgeon: William Cotter MD;  Location: [facility] ENDO;  Service: Endoscopy;  Laterality: N/A;  Will need Rapid doesn't live here    COLONOSCOPY N/A 02/10/2022    Procedure: COLONOSCOPY;  Surgeon: Wili Espinosa MD;  Location: [facility] ENDO;  Service: Endoscopy;  Laterality: N/A;    EPIDURAL STEROID INJECTION INTO CERVICAL SPINE N/A 12/01/2022    Procedure: C7/T1 IL SALBADOR;  Surgeon: Leonard Mart MD;  Location: [facility] PAIN MGT;  Service: Pain Management;  Laterality: N/A;    EPIDURAL STEROID INJECTION INTO CERVICAL SPINE N/A 7/17/2024    Procedure: C6/7 IL SALBADOR, Left Paramedian Approach;  Surgeon: Leonard Mart MD;  Location: [facility] PAIN MGT;  Service: Pain Management;  Laterality: N/A;    FESS, WITH NASAL SEPTOPLASTY, WITH IMAGING GUIDANCE Bilateral 08/17/2022    Procedure: FESS, WITH NASAL SEPTOPLASTY, WITH IMAGING GUIDANCE;  Surgeon: Viktor Ferrell MD;  Location: [facility] OR;  Service: ENT;  Laterality: Bilateral;    LAPAROSCOPIC RIGHT COLON RESECTION N/A 02/02/2021    Procedure: COLECTOMY, RIGHT, LAPAROSCOPIC, ERAS low;  Surgeon: Melonie Santoyo MD;  [address]  Service: Colon and Rectal;  Laterality: N/A;  needs rapid covid test    LYSIS OF ADHESIONS Bilateral 04/19/2023    Procedure: LYSIS, ADHESIONS;  Surgeon: Viktor Ferrell MD;  Location: [facility] OR;  Service: ENT;  Laterality: Bilateral;    NASAL ENDOSCOPY Bilateral 04/19/2023    Procedure: ENDOSCOPY, NOSE;  Surgeon: Viktor Ferrell MD;  Location: [facility] OR;  Service: ENT;  Laterality: Bilateral;    NASAL TURBINATE REDUCTION Bilateral 08/17/2022    Procedure: REDUCTION, NASAL TURBINATE;  Surgeon: Viktor Ferrell MD;  Location: [facility] OR;  Service: ENT;  Laterality: Bilateral;    RHINOPLASTY Bilateral 04/19/2023    Procedure: RHINOPLASTY;  Surgeon: Viktor BERMUDEZ  MD Mariaelena;  Location: [facility] OR;  Service: ENT;  Laterality: Bilateral;    SELECTIVE INJECTION OF ANESTHETIC AGENT AROUND LUMBAR SPINAL NERVE ROOT BY TRANSFORAMINAL APPROACH Bilateral 07/05/2023    Procedure: Bilateral L4/5 TF SALBADOR RN IV Sedation;  Surgeon: Leonard Mart MD;  Location: [facility] PAIN MGT;  Service: Pain Management;  Laterality: Bilateral;    TONSILLECTOMY      ULNAR TUNNEL RELEASE Left 12/10/2024    Procedure: RELEASE, CUBITAL TUNNEL;  Surgeon: Lita Brown MD;  Location: [facility] OR;  Service: Orthopedics;  Laterality: Left;     Family History   Problem Relation Name Age of Onset    Hypertension Mother Kesha English     Diabetes Mother Kesha English     Breast cancer Mother Kesha Amador     Cancer Mother Kesha English     Hypertension Father Sandeep English     Stroke Father Sandeep English     No Known Problems Sister      No Known Problems Sister      Hypertension Brother Brother     Hypertension Brother Mauro English     Cataracts Maternal Grandmother Manda Quinn     Diabetes Maternal Grandmother Manda Quinn     Lung cancer Maternal Grandfather      No Known Problems Paternal Grandmother      No Known Problems Paternal Grandfather       Social History[1]     OBJECTIVE:     Vital Signs (Most Recent)  Temp: 97.9 °F (36.6 °C) (04/30/25 1528)  Pulse: 81 (04/30/25 1528)  BP: (!) 147/84 (04/30/25 1528)  SpO2: 97 % (04/30/25 1528)    Physical Exam:  Physical Exam  Constitutional:       Appearance: He is well-developed.   HENT:      Head: Normocephalic and atraumatic.   Eyes:      Conjunctiva/sclera: Conjunctivae normal.      Pupils: Pupils are equal, round, and reactive to light.   Neck:      Thyroid: No thyromegaly.   Cardiovascular:      Rate and Rhythm: Normal rate and regular rhythm.   Pulmonary:      Effort: Pulmonary effort is normal. No respiratory distress.   Abdominal:      General: There is no distension.      Palpations: Abdomen is soft. There is no mass.      Tenderness: There is no  abdominal tenderness.   Genitourinary:     Comments: External anal exam with chronic, rubbery thrombosed external hemorrhoid right anterior, soft and nontender  Musculoskeletal:         General: No tenderness. Normal range of motion.      Cervical back: Normal range of motion.   Skin:     General: Skin is warm and dry.      Capillary Refill: Capillary refill takes less than 2 seconds.   Neurological:      General: No focal deficit present.      Mental Status: He is alert and oriented to person, place, and time.           ASSESSMENT/PLAN:     Diagnoses and all orders for this visit:    Thrombosed external hemorrhoid  -     Ambulatory referral/consult to Colorectal Surgery    - reviewed with the patient that given his symptoms are improving would not offer surgical excision  - can continue to use witch Hazel tucks pads, nodule should completely resolve and leave residual skin tag  - Discussed that a minimum I would recommend behavioral, lifestyle and medication modifications to improve bowel habits. Usual bowel management handout given to patient. This includes a stool softener twice per day, fiber powder supplementation daily, drinking at least 64oz of water/day, avoiding straining with bowel movements, spending less than 5 min on toilet per bowel movement, eating a high fiber diet, using miralax as needed to achieve a bowel movement daily and using wet wipes to wipe after bowel movements when irritated.   - prevention of recurrence depends on compliance to bowel regimen  - RTC p.r.n.         [1]   Social History  Tobacco Use    Smoking status: Never     Passive exposure: Never    Smokeless tobacco: Never   Substance Use Topics    Alcohol use: Never    Drug use: Never

## 2025-05-06 DIAGNOSIS — M54.16 LUMBAR RADICULOPATHY: ICD-10-CM

## 2025-05-06 DIAGNOSIS — M54.12 CERVICAL RADICULOPATHY: ICD-10-CM

## 2025-05-06 RX ORDER — PREGABALIN 150 MG/1
150 CAPSULE ORAL 3 TIMES DAILY
Qty: 90 CAPSULE | Refills: 0 | Status: CANCELLED | OUTPATIENT
Start: 2025-05-06

## 2025-05-07 ENCOUNTER — OFFICE VISIT (OUTPATIENT)
Dept: OTOLARYNGOLOGY | Facility: CLINIC | Age: 60
End: 2025-05-07
Payer: COMMERCIAL

## 2025-05-07 ENCOUNTER — LAB VISIT (OUTPATIENT)
Dept: LAB | Facility: HOSPITAL | Age: 60
End: 2025-05-07
Attending: UROLOGY
Payer: COMMERCIAL

## 2025-05-07 DIAGNOSIS — E29.1 HYPOGONADISM MALE: ICD-10-CM

## 2025-05-07 DIAGNOSIS — Z98.890 HISTORY OF RHINOPLASTY: ICD-10-CM

## 2025-05-07 DIAGNOSIS — J34.829 NASAL VALVE COLLAPSE: ICD-10-CM

## 2025-05-07 DIAGNOSIS — J34.89 NASAL OBSTRUCTION: Primary | ICD-10-CM

## 2025-05-07 DIAGNOSIS — J34.3 HYPERTROPHY OF INFERIOR NASAL TURBINATE: ICD-10-CM

## 2025-05-07 DIAGNOSIS — Z98.890 HISTORY OF ENDOSCOPIC SINUS SURGERY: ICD-10-CM

## 2025-05-07 LAB
ABSOLUTE EOSINOPHIL (OHS): 0.29 K/UL
ABSOLUTE MONOCYTE (OHS): 1.25 K/UL (ref 0.3–1)
ABSOLUTE NEUTROPHIL COUNT (OHS): 3.65 K/UL (ref 1.8–7.7)
ALBUMIN SERPL BCP-MCNC: 4 G/DL (ref 3.5–5.2)
ALP SERPL-CCNC: 33 UNIT/L (ref 40–150)
ALT SERPL W/O P-5'-P-CCNC: 40 UNIT/L (ref 10–44)
AST SERPL-CCNC: 32 UNIT/L (ref 11–45)
BASOPHILS # BLD AUTO: 0.08 K/UL
BASOPHILS NFR BLD AUTO: 1.1 %
BILIRUB DIRECT SERPL-MCNC: 0.3 MG/DL (ref 0.1–0.3)
BILIRUB SERPL-MCNC: 0.7 MG/DL (ref 0.1–1)
ERYTHROCYTE [DISTWIDTH] IN BLOOD BY AUTOMATED COUNT: 15.5 % (ref 11.5–14.5)
HCT VFR BLD AUTO: 54.5 % (ref 40–54)
HGB BLD-MCNC: 16.6 GM/DL (ref 14–18)
IMM GRANULOCYTES # BLD AUTO: 0.02 K/UL (ref 0–0.04)
IMM GRANULOCYTES NFR BLD AUTO: 0.3 % (ref 0–0.5)
LYMPHOCYTES # BLD AUTO: 1.95 K/UL (ref 1–4.8)
MCH RBC QN AUTO: 29.3 PG (ref 27–31)
MCHC RBC AUTO-ENTMCNC: 30.5 G/DL (ref 32–36)
MCV RBC AUTO: 96 FL (ref 82–98)
NUCLEATED RBC (/100WBC) (OHS): 0 /100 WBC
PLATELET # BLD AUTO: 284 K/UL (ref 150–450)
PMV BLD AUTO: 12.8 FL (ref 9.2–12.9)
PROT SERPL-MCNC: 7.1 GM/DL (ref 6–8.4)
PSA SERPL-MCNC: 1.58 NG/ML
RBC # BLD AUTO: 5.66 M/UL (ref 4.6–6.2)
RELATIVE EOSINOPHIL (OHS): 4 %
RELATIVE LYMPHOCYTE (OHS): 26.9 % (ref 18–48)
RELATIVE MONOCYTE (OHS): 17.3 % (ref 4–15)
RELATIVE NEUTROPHIL (OHS): 50.4 % (ref 38–73)
TESTOST SERPL-MCNC: 517 NG/DL (ref 304–1227)
WBC # BLD AUTO: 7.24 K/UL (ref 3.9–12.7)

## 2025-05-07 PROCEDURE — 4010F ACE/ARB THERAPY RXD/TAKEN: CPT | Mod: CPTII,S$GLB,, | Performed by: OTOLARYNGOLOGY

## 2025-05-07 PROCEDURE — 1159F MED LIST DOCD IN RCRD: CPT | Mod: CPTII,S$GLB,, | Performed by: OTOLARYNGOLOGY

## 2025-05-07 PROCEDURE — 85025 COMPLETE CBC W/AUTO DIFF WBC: CPT

## 2025-05-07 PROCEDURE — 3044F HG A1C LEVEL LT 7.0%: CPT | Mod: CPTII,S$GLB,, | Performed by: OTOLARYNGOLOGY

## 2025-05-07 PROCEDURE — 3061F NEG MICROALBUMINURIA REV: CPT | Mod: CPTII,S$GLB,, | Performed by: OTOLARYNGOLOGY

## 2025-05-07 PROCEDURE — 3072F LOW RISK FOR RETINOPATHY: CPT | Mod: CPTII,S$GLB,, | Performed by: OTOLARYNGOLOGY

## 2025-05-07 PROCEDURE — 3066F NEPHROPATHY DOC TX: CPT | Mod: CPTII,S$GLB,, | Performed by: OTOLARYNGOLOGY

## 2025-05-07 PROCEDURE — 31231 NASAL ENDOSCOPY DX: CPT | Mod: S$GLB,,, | Performed by: OTOLARYNGOLOGY

## 2025-05-07 PROCEDURE — 36415 COLL VENOUS BLD VENIPUNCTURE: CPT

## 2025-05-07 PROCEDURE — 99214 OFFICE O/P EST MOD 30 MIN: CPT | Mod: 25,S$GLB,, | Performed by: OTOLARYNGOLOGY

## 2025-05-07 PROCEDURE — 84153 ASSAY OF PSA TOTAL: CPT

## 2025-05-07 PROCEDURE — 84403 ASSAY OF TOTAL TESTOSTERONE: CPT

## 2025-05-07 PROCEDURE — 82040 ASSAY OF SERUM ALBUMIN: CPT

## 2025-05-07 PROCEDURE — 99999 PR PBB SHADOW E&M-EST. PATIENT-LVL IV: CPT | Mod: PBBFAC,,, | Performed by: OTOLARYNGOLOGY

## 2025-05-07 PROCEDURE — 1160F RVW MEDS BY RX/DR IN RCRD: CPT | Mod: CPTII,S$GLB,, | Performed by: OTOLARYNGOLOGY

## 2025-05-07 PROCEDURE — 89320 SEMEN ANAL VOL/COUNT/MOT: CPT

## 2025-05-08 ENCOUNTER — RESULTS FOLLOW-UP (OUTPATIENT)
Dept: UROLOGY | Facility: CLINIC | Age: 60
End: 2025-05-08

## 2025-05-09 NOTE — PROGRESS NOTES
History of Present Illness:   Narendra English is a 59 y.o. year old male evaluated in the Otolaryngology-Head and Neck Surgery Clinic at [facility]. The patient was referred by Dr. Ferrell for evaluation for 2nd opinion in the setting of 2 prior nasal surgeries and continued nasal valve collapse.  Of note patient is on CPAP for FARA.  He had limited sinus surgery with Dr. Ferrell with subsequent septorhinoplasty with extracorporeal septoplasty and repositioning of the caudal septum.  He had initial improvement he reports continued nasal obstruction that is worse at night.  He reports that this is occasionally worse on the left side but can be bilateral.  He is followed by Allergy and is on Dupixent.  He does use irrigation and has tried Flonase Astelin and several other nasal sprays with no benefit.  He presents to discuss any other options.            Past Medical/Surgical History  Past Medical History:   Diagnosis Date    Allergy     Cancer     Colon    Diabetes mellitus      09/14/2023 Insulin x 10 years    Encounter for screening colonoscopy 1/20/2023    Gastroesophageal reflux disease without esophagitis 06/20/2023    Hypertension     Multiple lung nodules on CT 10/22/2024    Sleep apnea     Thyroid disease      His  has a past surgical history that includes Colonoscopy (N/A, 12/29/2020); Laparoscopic right colon resection (N/A, 02/02/2021); Back surgery; Colonoscopy (N/A, 02/10/2022); Tonsillectomy; fess, with nasal septoplasty, with imaging guidance (Bilateral, 08/17/2022); Nasal turbinate reduction (Bilateral, 08/17/2022); Colon surgery (02/06/21); Epidural steroid injection into cervical spine (N/A, 12/01/2022); Rhinoplasty (Bilateral, 04/19/2023); Nasal endoscopy (Bilateral, 04/19/2023); Lysis of adhesions (Bilateral, 04/19/2023); Selective injection of anesthetic agent around lumbar spinal nerve root by transforaminal approach (Bilateral, 07/05/2023); Epidural steroid injection into  cervical spine (N/A, 7/17/2024); Carpal tunnel release (Left, 12/10/2024); Ulnar tunnel release (Left, 12/10/2024); and Carpal tunnel release (Right, 12/31/2024).     Past Family/Social History  His family history includes Breast cancer in his mother; Cancer in his mother; Cataracts in his maternal grandmother; Diabetes in his maternal grandmother and mother; Hypertension in his brother, brother, father, and mother; Lung cancer in his maternal grandfather; No Known Problems in his paternal grandfather, paternal grandmother, sister, and sister; Stroke in his father.  He  reports that he has never smoked. He has never been exposed to tobacco smoke. He has never used smokeless tobacco. He reports that he does not drink alcohol and does not use drugs.     Medications/Allergies/Immunizations  His current medication(s) include:   Current Medications[1]     Allergies: Demerol (pf) [meperidine (pf)], Percocet [oxycodone-acetaminophen], and Allopurinol analogues     Immunizations:   Immunization History   Administered Date(s) Administered    COVID-19 MRNA, LN-S PF (MODERNA HALF 0.25 ML DOSE) 10/30/2021, 05/15/2022    COVID-19, MRNA, LN-S, PF (MODERNA FULL 0.5 ML DOSE) 12/29/2020, 01/26/2021    COVID-19, MRNA, LN-S, PF (Pfizer) (Purple Cap) 11/08/2022    COVID-19, mRNA, LNP-S, PF (Moderna) Ages 12+ 01/14/2024    COVID-19, mRNA, LNP-S, bivalent booster, PF (Moderna Omicron)12 + YEARS 11/08/2022    Influenza (FLUBLOK) - Quadrivalent - Recombinant - PF *Preferred* (egg allergy) 10/01/2020, 09/10/2024    Influenza - Quadrivalent - MDCK - PF 11/25/2019    Influenza - Quadrivalent - PF *Preferred* (6 months and older) 10/11/2021, 09/16/2023    Influenza - Trivalent - Afluria, Fluzone MDV 10/19/2014    Influenza - Trivalent - Flublok - PF (18 years and older) 09/10/2024    Pneumococcal Conjugate - 20 Valent 05/29/2023    Pneumococcal Polysaccharide - 23 Valent 11/25/2019    Tdap 10/19/2014, 09/10/2024    Zoster Recombinant  05/11/2018, 10/22/2018         Review of Systems   Answers submitted by the patient for this visit:  Review of Symptoms Questionnaire  (Submitted on 5/5/2025)  Fatigue (Tiredness)?: Yes  hearing loss: Yes  postnasal drip: Yes  None of these : Yes  Sleep Apnea?: Yes  None of these : Yes  None of these: Yes  None of these: Yes  back pain: Yes  neck pain: Yes  None of these : Yes  Seasonal Allergies?: Yes  None of these : Yes  None of these: Yes  None of these: Yes  None of these: Yes    All other systems are negative except for that listed in the HPI.      PHYSICAL EXAM:   Vital Signs:  There were no vitals taken for this visit.     General:  Well-developed, well-nourished  Communication and Voice:  Clear pitch and clarity  Hearing: Hearing adequate for verbal communication bilaterally   Inspection:  Normocephalic and atraumatic without mass or lesion  Palpation:  Facial skeleton intact without bony stepoffs  Parotid Glands:  No mass or tenderness  Facial Strength:  Facial motility symmetric and full bilaterally  Pinna:  External ear intact and fully developed  External canal:  Canal is patent with intact skin  Tympanic Membrane:  Clear and mobile  External nose:  No scar or anatomic deformity well healed columellar incision.  He has medium to thicker skin.  He does have left greater than right zone 1 external nasal valve collapse that does improve with Emery and modified Sandeep maneuvers  Internal Nose:  Septum intact and midline mild narrowing of right internal nasal valve very small spur residual on the left but otherwise and midline with patent nasal airway see scope for details  TMJ:  No pain to palpation with full mobility  Oral cavity, Lips, Teeth, and Gums:  Mucosa and teeth intact and viable, No lesions, masses or ulcers  Oropharynx: No erythema or exudate, no masses or ulcerations, non-obstructive tonsils  Nasopharynx:  No mass or lesion with intact mucosa  Hypopharynx:  Not well visualized secondary to  gagging  Larynx:  Not well visualized secondary to gagging  Neck, Trachea, Lymphatics:  Midline trachea without mass or lesion, no lymphadenopathy  Thyroid:  No mass or nodularity  Eyes: No nystagmus with equal extraocular motion bilaterally  Neuro/Psych/Balance: Patient oriented and appropriate in interaction;  Appropriate mood and affect;  Gait is intact with no imbalance; Cranial nerves I-XII are intact  Respiratory effort:  Equal inspiration and expiration without stridor  Peripheral Vascular:  Warm extremities with equal pulses       Procedure: Rigid endoscopic nasal exam   Surgeon: Yadiel Biggs MD  Procedure note/findings: After informed discussion of the risks, benefits, and alternatives after indications as noted above, nasal cavities were topically anesthetized and decongested with 4% lidocaine and Afrin solutions. A rigid 0 degree nasal endoscope was inserted into bilateral nasal cavities and the following was noted    Right:   Inferior turbinate with moderate hypertrophy and middle turbinate normal without hypertrophy  The Osteomeatal complex including the middle and superior meatus shows postoperative antrostomies into the maxillary and ethmoid with no mucopurulence or recurrent polyposis or scar   The sphenoethmoid recess was clear    Left   Both inferior and middle turbinates are normal without hypertrophy  The Osteomeatal complex including the middle and superior meatus does not show any polyps or lesions  The sphenoethmoid recess was clear    Nasopharynx, similarly, revealed no mass lesion or granularity. There was no ulceration. There was no gross asymmetry. Fossa of [place]mu[place] was intact bilaterally. The eustachian tube orifices were intact bilaterally and patent.  The scope was then withdrawn and removed. He tolerated this well.          ASSESSMENT:   1. Nasal obstruction    2. History of rhinoplasty    3. Nasal valve collapse    4. History of endoscopic sinus surgery    5. Hypertrophy of  inferior nasal turbinate            PLAN:   Multifactorial nature of patient's nasal obstruction discussed with the patient.  Lower chance of success of nasal surgery discussed with the patient given that he is overall open anatomically.  He does have some moderate residual nasal valve collapse would she does respond to Sandeep maneuvers.  Options of Latera implant versus RhinAer discussed with the patient.  He wishes to proceed withRhinAer procedure in the clinic.  I explained to him that this is only available in [place] and I will schedule him at [facility] for in officeRhinAer procedure.    I believe that Mr. English has a good understanding of the issues involved and I answered all of his questions.     DISCLAIMER: This note was prepared with [employer] voice recognition transcription software. Garbled syntax, mangled pronouns, and other bizarre constructions may be attributed to that software system. While efforts were made to correct any mistakes made by this voice recognition program, some errors and/or omissions may remain in the note that were missed when the note was originally created.         [1]   Current Outpatient Medications   Medication Sig Dispense Refill    amLODIPine (NORVASC) 5 MG tablet Take 1 tablet (5 mg total) by mouth once daily. 90 tablet 3    atorvastatin (LIPITOR) 10 MG tablet Take 1 tablet (10 mg total) by mouth every evening. 90 tablet 3    benazepriL (LOTENSIN) 40 MG tablet Take 1 tablet (40 mg total) by mouth once daily. 90 tablet 3    calcipotriene (DOVONOX) 0.005 % cream Apply 1 application  topically 2 (two) times daily.      clonazePAM (KLONOPIN) 1 MG tablet Take 1 tablet by mouth in the evening 30 tablet 4    colchicine (COLCRYS) 0.6 mg tablet Take 1 tablet (0.6 mg total) by mouth once daily. 180 tablet 3    doxepin (SINEQUAN) 10 MG capsule Take 1 capsule (10 mg total) by mouth every evening. 30 capsule 11    doxycycline (VIBRAMYCIN) 100 MG Cap Take 1 capsule (100 mg total) by  mouth once daily. 60 capsule 0    dupilumab (DUPIXENT) 300 mg/2 mL Syrg Inject 2 mLs (300 mg total) into the skin every 14 (fourteen) days. 4 mL 11    febuxostat (ULORIC) 40 mg Tab Take 1 tablet (40 mg total) by mouth once daily. 30 tablet 5    hydrALAZINE (APRESOLINE) 100 MG tablet Take 1 tablet (100 mg total) by mouth every 8 (eight) hours. 270 tablet 3    hydrOXYzine HCL (ATARAX) 25 MG tablet Take 25 mg by mouth 2 (two) times daily.      indomethacin (INDOCIN SR) 75 mg CpSR CR capsule Take 1 capsule (75 mg total) by mouth 2 (two) times daily as needed (for gout flare). MAX  3 TABLETS PER WEEK. 30 capsule 2    insulin glargine, TOUJEO, (TOUJEO SOLOSTAR U-300 INSULIN) 300 unit/mL (1.5 mL) InPn pen Inject 48 Units into the skin once daily. 12 mL 3    levothyroxine (SYNTHROID) 125 MCG tablet Take 1 tablet (125 mcg total) by mouth once daily. 90 tablet 0    metFORMIN (GLUCOPHAGE) 1000 MG tablet Take 1 tablet (1,000 mg total) by mouth 2 (two) times daily with meals. 180 tablet 3    metoprolol succinate (TOPROL-XL) 50 MG 24 hr tablet Take 1 tablet (50 mg total) by mouth once daily. 90 tablet 3    mometasone (ELOCON) 0.1 % ointment Apply topically once daily. 45 g 2    MOUNJARO 15 mg/0.5 mL PnIj Inject 15 mg into the skin once a week. 4 Pen 3    pregabalin (LYRICA) 150 MG capsule TAKE 1 CAPSULE BY MOUTH THREE TIMES DAILY 90 capsule 0    tadalafiL (CIALIS) 20 MG Tab Take 1 tablet (20 mg total) by mouth every 24 hours as needed (erectile dysfunction). 20 tablet 11    testosterone cypionate (DEPOTESTOTERONE CYPIONATE) 200 mg/mL injection Inject 0.75 mLs (150 mg total) into the muscle every 7 days. 10 mL 5    tiZANidine (ZANAFLEX) 4 MG tablet TAKE 1 TABLET BY MOUTH TWICE DAILY AS NEEDED (NECK  PAIN) 60 tablet 0    triamcinolone acetonide 0.1% (KENALOG) 0.1 % ointment AAA bid 454 g 1    VASCEPA 1 gram Cap Take 2 capsules (2,000 mg total) by mouth 2 (two) times daily. 360 capsule 1     No current facility-administered  medications for this visit.

## 2025-05-12 ENCOUNTER — TELEPHONE (OUTPATIENT)
Dept: OTOLARYNGOLOGY | Facility: CLINIC | Age: 60
End: 2025-05-12
Payer: COMMERCIAL

## 2025-05-14 ENCOUNTER — PATIENT MESSAGE (OUTPATIENT)
Dept: UROLOGY | Facility: CLINIC | Age: 60
End: 2025-05-14
Payer: COMMERCIAL

## 2025-05-14 ENCOUNTER — PATIENT MESSAGE (OUTPATIENT)
Dept: INTERNAL MEDICINE | Facility: CLINIC | Age: 60
End: 2025-05-14
Payer: COMMERCIAL

## 2025-05-15 ENCOUNTER — OFFICE VISIT (OUTPATIENT)
Dept: PAIN MEDICINE | Facility: CLINIC | Age: 60
End: 2025-05-15
Payer: COMMERCIAL

## 2025-05-15 ENCOUNTER — TELEPHONE (OUTPATIENT)
Dept: PAIN MEDICINE | Facility: CLINIC | Age: 60
End: 2025-05-15
Payer: COMMERCIAL

## 2025-05-15 VITALS
SYSTOLIC BLOOD PRESSURE: 150 MMHG | BODY MASS INDEX: 33.9 KG/M2 | RESPIRATION RATE: 17 BRPM | DIASTOLIC BLOOD PRESSURE: 81 MMHG | WEIGHT: 255.75 LBS | HEIGHT: 73 IN

## 2025-05-15 DIAGNOSIS — M54.12 CERVICAL RADICULOPATHY: ICD-10-CM

## 2025-05-15 DIAGNOSIS — M50.30 DDD (DEGENERATIVE DISC DISEASE), CERVICAL: ICD-10-CM

## 2025-05-15 DIAGNOSIS — E78.5 HYPERLIPIDEMIA ASSOCIATED WITH TYPE 2 DIABETES MELLITUS: Primary | Chronic | ICD-10-CM

## 2025-05-15 DIAGNOSIS — M54.16 LUMBAR RADICULOPATHY: ICD-10-CM

## 2025-05-15 DIAGNOSIS — E11.59 TYPE 2 DIABETES MELLITUS WITH OTHER CIRCULATORY COMPLICATION, WITH LONG-TERM CURRENT USE OF INSULIN: ICD-10-CM

## 2025-05-15 DIAGNOSIS — M48.02 CERVICAL STENOSIS OF SPINAL CANAL: ICD-10-CM

## 2025-05-15 DIAGNOSIS — E11.69 HYPERLIPIDEMIA ASSOCIATED WITH TYPE 2 DIABETES MELLITUS: Primary | Chronic | ICD-10-CM

## 2025-05-15 DIAGNOSIS — Z79.4 TYPE 2 DIABETES MELLITUS WITH OTHER CIRCULATORY COMPLICATION, WITH LONG-TERM CURRENT USE OF INSULIN: ICD-10-CM

## 2025-05-15 DIAGNOSIS — M47.812 CERVICAL SPONDYLOSIS: ICD-10-CM

## 2025-05-15 PROCEDURE — 3008F BODY MASS INDEX DOCD: CPT | Mod: CPTII,S$GLB,, | Performed by: PHYSICIAN ASSISTANT

## 2025-05-15 PROCEDURE — 1159F MED LIST DOCD IN RCRD: CPT | Mod: CPTII,S$GLB,, | Performed by: PHYSICIAN ASSISTANT

## 2025-05-15 PROCEDURE — 99999 PR PBB SHADOW E&M-EST. PATIENT-LVL V: CPT | Mod: PBBFAC,,, | Performed by: PHYSICIAN ASSISTANT

## 2025-05-15 PROCEDURE — 3079F DIAST BP 80-89 MM HG: CPT | Mod: CPTII,S$GLB,, | Performed by: PHYSICIAN ASSISTANT

## 2025-05-15 PROCEDURE — 99214 OFFICE O/P EST MOD 30 MIN: CPT | Mod: S$GLB,,, | Performed by: PHYSICIAN ASSISTANT

## 2025-05-15 PROCEDURE — 3044F HG A1C LEVEL LT 7.0%: CPT | Mod: CPTII,S$GLB,, | Performed by: PHYSICIAN ASSISTANT

## 2025-05-15 PROCEDURE — 3072F LOW RISK FOR RETINOPATHY: CPT | Mod: CPTII,S$GLB,, | Performed by: PHYSICIAN ASSISTANT

## 2025-05-15 PROCEDURE — 3066F NEPHROPATHY DOC TX: CPT | Mod: CPTII,S$GLB,, | Performed by: PHYSICIAN ASSISTANT

## 2025-05-15 PROCEDURE — 1160F RVW MEDS BY RX/DR IN RCRD: CPT | Mod: CPTII,S$GLB,, | Performed by: PHYSICIAN ASSISTANT

## 2025-05-15 PROCEDURE — 4010F ACE/ARB THERAPY RXD/TAKEN: CPT | Mod: CPTII,S$GLB,, | Performed by: PHYSICIAN ASSISTANT

## 2025-05-15 PROCEDURE — 3061F NEG MICROALBUMINURIA REV: CPT | Mod: CPTII,S$GLB,, | Performed by: PHYSICIAN ASSISTANT

## 2025-05-15 PROCEDURE — 3077F SYST BP >= 140 MM HG: CPT | Mod: CPTII,S$GLB,, | Performed by: PHYSICIAN ASSISTANT

## 2025-05-15 RX ORDER — TIZANIDINE 4 MG/1
TABLET ORAL
Qty: 60 TABLET | Refills: 1 | Status: SHIPPED | OUTPATIENT
Start: 2025-05-15

## 2025-05-15 RX ORDER — PREGABALIN 150 MG/1
150 CAPSULE ORAL 3 TIMES DAILY
Qty: 90 CAPSULE | Refills: 0 | Status: SHIPPED | OUTPATIENT
Start: 2025-05-15 | End: 2025-05-15

## 2025-05-15 RX ORDER — PREGABALIN 150 MG/1
150 CAPSULE ORAL 2 TIMES DAILY
Qty: 60 CAPSULE | Refills: 2 | Status: SHIPPED | OUTPATIENT
Start: 2025-05-15
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
## 2025-05-16 LAB
GERM CELLS (OHS): ABNORMAL
PH SMN: 8 [PH]
PMNS/100 SPERMATOZOA (OHS): ABNORMAL
SPECIMEN VOL SMN: 5 ML
SPERM # SMN: 0 M
SPERM # SMN: 0 M/ML
SPERM IMMOTILE NFR SMN: ABNORMAL %
SPERM MORPHOLOGY - COMMENT: ABNORMAL
SPERM NONPROG NFR SMN: ABNORMAL %
SPERM NORM NFR SMN: 0 %
SPERM PROG NFR SMN: ABNORMAL %
VISC SMN QL: ABNORMAL

## 2025-05-18 DIAGNOSIS — E03.9 HYPOTHYROIDISM (ACQUIRED): ICD-10-CM

## 2025-05-19 ENCOUNTER — PATIENT MESSAGE (OUTPATIENT)
Dept: ADMINISTRATIVE | Facility: OTHER | Age: 60
End: 2025-05-19
Payer: COMMERCIAL

## 2025-05-19 NOTE — TELEPHONE ENCOUNTER
Refill Routing Note   Medication(s) are not appropriate for processing by [facility] for the following reason(s):        No active prescription written by provider  New or recently adjusted medication    [facility] action(s):  Defer               Appointments  past 12m or future 3m with PCP    Date Provider   Last Visit   4/15/2025 TESSY Magdaleno MD   Next Visit   Visit date not found TESSY Magdaleno MD   ED visits in past 90 days: 0        Note composed:1:13 PM 05/19/2025

## 2025-05-19 NOTE — TELEPHONE ENCOUNTER
No care due was identified.  [facility] Embedded Care Due Messages. Reference number: [id number].   5/18/2025 9:10:43 PM CDT

## 2025-05-20 RX ORDER — LEVOTHYROXINE SODIUM 125 UG/1
125 TABLET ORAL DAILY
Qty: 90 TABLET | Refills: 3 | Status: SHIPPED | OUTPATIENT
Start: 2025-05-20

## 2025-05-20 NOTE — TELEPHONE ENCOUNTER
REFILL REQUEST APPROVED.  Requested Prescriptions     Pending Prescriptions Disp Refills    levothyroxine (SYNTHROID) 125 MCG tablet 90 tablet 3     Sig: Take 1 tablet (125 mcg total) by mouth once daily.

## 2025-05-22 ENCOUNTER — OFFICE VISIT (OUTPATIENT)
Dept: SURGERY | Facility: CLINIC | Age: 60
End: 2025-05-22
Payer: COMMERCIAL

## 2025-05-22 VITALS
HEIGHT: 73 IN | BODY MASS INDEX: 33.46 KG/M2 | WEIGHT: 252.44 LBS | HEART RATE: 75 BPM | DIASTOLIC BLOOD PRESSURE: 70 MMHG | OXYGEN SATURATION: 95 % | SYSTOLIC BLOOD PRESSURE: 118 MMHG

## 2025-05-22 DIAGNOSIS — K64.5 THROMBOSED EXTERNAL HEMORRHOID: Primary | ICD-10-CM

## 2025-05-22 PROCEDURE — 3008F BODY MASS INDEX DOCD: CPT | Mod: CPTII,S$GLB,, | Performed by: STUDENT IN AN ORGANIZED HEALTH CARE EDUCATION/TRAINING PROGRAM

## 2025-05-22 PROCEDURE — 3074F SYST BP LT 130 MM HG: CPT | Mod: CPTII,S$GLB,, | Performed by: STUDENT IN AN ORGANIZED HEALTH CARE EDUCATION/TRAINING PROGRAM

## 2025-05-22 PROCEDURE — 3044F HG A1C LEVEL LT 7.0%: CPT | Mod: CPTII,S$GLB,, | Performed by: STUDENT IN AN ORGANIZED HEALTH CARE EDUCATION/TRAINING PROGRAM

## 2025-05-22 PROCEDURE — 1159F MED LIST DOCD IN RCRD: CPT | Mod: CPTII,S$GLB,, | Performed by: STUDENT IN AN ORGANIZED HEALTH CARE EDUCATION/TRAINING PROGRAM

## 2025-05-22 PROCEDURE — 99213 OFFICE O/P EST LOW 20 MIN: CPT | Mod: S$GLB,,, | Performed by: STUDENT IN AN ORGANIZED HEALTH CARE EDUCATION/TRAINING PROGRAM

## 2025-05-22 PROCEDURE — 4010F ACE/ARB THERAPY RXD/TAKEN: CPT | Mod: CPTII,S$GLB,, | Performed by: STUDENT IN AN ORGANIZED HEALTH CARE EDUCATION/TRAINING PROGRAM

## 2025-05-22 PROCEDURE — 99999 PR PBB SHADOW E&M-EST. PATIENT-LVL IV: CPT | Mod: PBBFAC,,, | Performed by: STUDENT IN AN ORGANIZED HEALTH CARE EDUCATION/TRAINING PROGRAM

## 2025-05-22 PROCEDURE — 3066F NEPHROPATHY DOC TX: CPT | Mod: CPTII,S$GLB,, | Performed by: STUDENT IN AN ORGANIZED HEALTH CARE EDUCATION/TRAINING PROGRAM

## 2025-05-22 PROCEDURE — 3061F NEG MICROALBUMINURIA REV: CPT | Mod: CPTII,S$GLB,, | Performed by: STUDENT IN AN ORGANIZED HEALTH CARE EDUCATION/TRAINING PROGRAM

## 2025-05-22 PROCEDURE — 3072F LOW RISK FOR RETINOPATHY: CPT | Mod: CPTII,S$GLB,, | Performed by: STUDENT IN AN ORGANIZED HEALTH CARE EDUCATION/TRAINING PROGRAM

## 2025-05-22 PROCEDURE — 3078F DIAST BP <80 MM HG: CPT | Mod: CPTII,S$GLB,, | Performed by: STUDENT IN AN ORGANIZED HEALTH CARE EDUCATION/TRAINING PROGRAM

## 2025-05-22 NOTE — PROGRESS NOTES
"CRS Office Visit    SUBJECTIVE:     Chief Complaint:  Follow up external hemorrhoid    History of Present Illness:  Patient is a 59 y.o. male presents as a follow up for previously thrombosed external hemorrhoid.  He denies pain.  He still feels the palpable lump in his anus but it is mostly not bothersome.  The knowledge of its presence in his anus is what is most bothersome.  He denies pain, bleeding.  Has been compliant with prescribed bowel regimen and has avoided straining and prolonged time on the toilet      Review of patient's allergies indicates:   Allergen Reactions    Demerol (pf) [meperidine (pf)] Other (See Comments)     Pt reports,"They lost my blood pressure."    Percocet [oxycodone-acetaminophen] Itching     itching    Allopurinol analogues Diarrhea       Past Medical History:   Diagnosis Date    Allergy     Cancer     Colon    Diabetes mellitus      09/14/2023 Insulin x 10 years    Encounter for screening colonoscopy 1/20/2023    Gastroesophageal reflux disease without esophagitis 06/20/2023    Hypertension     Multiple lung nodules on CT 10/22/2024    Sleep apnea     Thyroid disease      Past Surgical History:   Procedure Laterality Date    BACK SURGERY      CARPAL TUNNEL RELEASE Left 12/10/2024    Procedure: RELEASE, CARPAL TUNNEL;  Surgeon: Lita Brown MD;  Location: [facility] OR;  Service: Orthopedics;  Laterality: Left;  05694 Left cubital tunnel release, in situ  [id number] Left carpal tunnel release    CARPAL TUNNEL RELEASE Right 12/31/2024    Procedure: RELEASE, CARPAL TUNNEL;  Surgeon: Lita Brown MD;  Location: [facility] OR;  Service: Orthopedics;  Laterality: Right;    COLON SURGERY  02/06/21    COLONOSCOPY N/A 12/29/2020    Procedure: COLONOSCOPY;  Surgeon: William Cotter MD;  Location: [facility] ENDO;  Service: Endoscopy;  Laterality: N/A;  Will need Rapid doesn't live here    COLONOSCOPY N/A 02/10/2022    Procedure: COLONOSCOPY;  Surgeon: Wili Espinosa MD;  Location: " [facility] ENDO;  Service: Endoscopy;  Laterality: N/A;    EPIDURAL STEROID INJECTION INTO CERVICAL SPINE N/A 12/01/2022    Procedure: C7/T1 IL SALBADOR;  Surgeon: Leonard Mart MD;  Location: [facility];  Service: Pain Management;  Laterality: N/A;    EPIDURAL STEROID INJECTION INTO CERVICAL SPINE N/A 7/17/2024    Procedure: C6/7 IL SALBADOR, Left Paramedian Approach;  Surgeon: Leonard Mart MD;  Location: [facility];  Service: Pain Management;  Laterality: N/A;    FESS, WITH NASAL SEPTOPLASTY, WITH IMAGING GUIDANCE Bilateral 08/17/2022    Procedure: FESS, WITH NASAL SEPTOPLASTY, WITH IMAGING GUIDANCE;  Surgeon: Viktor Ferrell MD;  Location: [facility] OR;  Service: ENT;  Laterality: Bilateral;    LAPAROSCOPIC RIGHT COLON RESECTION N/A 02/02/2021    Procedure: COLECTOMY, RIGHT, LAPAROSCOPIC, ERAS low;  Surgeon: Melonie Santoyo MD;  [facility];  Service: Colon and Rectal;  Laterality: N/A;  needs rapid covid test    LYSIS OF ADHESIONS Bilateral 04/19/2023    Procedure: LYSIS, ADHESIONS;  Surgeon: Viktor Ferrell MD;  Location: [facility] OR;  Service: ENT;  Laterality: Bilateral;    NASAL ENDOSCOPY Bilateral 04/19/2023    Procedure: ENDOSCOPY, NOSE;  Surgeon: Viktor Ferrell MD;  Location: [facility] OR;  Service: ENT;  Laterality: Bilateral;    NASAL TURBINATE REDUCTION Bilateral 08/17/2022    Procedure: REDUCTION, NASAL TURBINATE;  Surgeon: Viktor Ferrell MD;  Location: [facility] OR;  Service: ENT;  Laterality: Bilateral;    RHINOPLASTY Bilateral 04/19/2023    Procedure: RHINOPLASTY;  Surgeon: Viktor Ferrell MD;  Location: [facility] OR;  Service: ENT;  Laterality: Bilateral;    SELECTIVE INJECTION OF ANESTHETIC AGENT AROUND LUMBAR SPINAL NERVE ROOT BY TRANSFORAMINAL APPROACH Bilateral 07/05/2023    Procedure: Bilateral L4/5 TF SALBADOR RN IV Sedation;  Surgeon: Leonard Mart MD;  Location: [facility];  Service: Pain Management;  Laterality: Bilateral;    TONSILLECTOMY      ULNAR TUNNEL RELEASE Left 12/10/2024     Procedure: RELEASE, CUBITAL TUNNEL;  Surgeon: Lita Brown MD;  Location: [facility];  Service: Orthopedics;  Laterality: Left;     Family History   Problem Relation Name Age of Onset    Hypertension Mother Kesha English     Diabetes Mother Kesha English     Breast cancer Mother Kesha English     Cancer Mother Kesha English     Hypertension Father Sandeep English     Stroke Father Sandeep English     No Known Problems Sister      No Known Problems Sister      Hypertension Brother Brother     Hypertension Brother Mauro English     Cataracts Maternal Grandmother Manda Quinn     Diabetes Maternal Grandmother Manda Quinn     Lung cancer Maternal Grandfather      No Known Problems Paternal Grandmother      No Known Problems Paternal Grandfather       Social History[1]       OBJECTIVE:     Vital Signs (Most Recent)  Pulse: 75 (05/22/25 1308)  BP: 118/70 (05/22/25 1308)  SpO2: 95 % (05/22/25 1308)    Physical Exam:  Physical Exam  Constitutional:       Appearance: He is well-developed.   HENT:      Head: Normocephalic and atraumatic.   Eyes:      Conjunctiva/sclera: Conjunctivae normal.      Pupils: Pupils are equal, round, and reactive to light.   Neck:      Thyroid: No thyromegaly.   Cardiovascular:      Rate and Rhythm: Normal rate and regular rhythm.   Pulmonary:      Effort: Pulmonary effort is normal. No respiratory distress.   Abdominal:      General: There is no distension.      Palpations: Abdomen is soft. There is no mass.      Tenderness: There is no abdominal tenderness.   Genitourinary:     Comments: External anal exam with soft left lateral external hemorrhoid.  Musculoskeletal:         General: No tenderness. Normal range of motion.      Cervical back: Normal range of motion.   Skin:     General: Skin is warm and dry.      Capillary Refill: Capillary refill takes less than 2 seconds.   Neurological:      General: No focal deficit present.      Mental Status: He is alert and oriented to person, place, and  time.           ASSESSMENT/PLAN:     Diagnoses and all orders for this visit:    Thrombosed external hemorrhoid      - discussed options with the patient.  Since he is no longer having pain can consider nonoperative management.  If the external hemorrhoids bothersome to him could offer in office external hemorrhoidectomy.  At this time he prefers conservative management and will return to clinic should he desire excision         [1]   Social History  Tobacco Use    Smoking status: Never     Passive exposure: Never    Smokeless tobacco: Never   Substance Use Topics    Alcohol use: Never    Drug use: Never

## 2025-05-23 ENCOUNTER — OFFICE VISIT (OUTPATIENT)
Dept: CARDIOLOGY | Facility: CLINIC | Age: 60
End: 2025-05-23
Payer: COMMERCIAL

## 2025-05-23 ENCOUNTER — HOSPITAL ENCOUNTER (OUTPATIENT)
Dept: CARDIOLOGY | Facility: HOSPITAL | Age: 60
Discharge: HOME | End: 2025-05-23
Attending: INTERNAL MEDICINE
Payer: COMMERCIAL

## 2025-05-23 ENCOUNTER — PROCEDURE VISIT (OUTPATIENT)
Dept: OTOLARYNGOLOGY | Facility: CLINIC | Age: 60
End: 2025-05-23
Payer: COMMERCIAL

## 2025-05-23 VITALS
HEIGHT: 73 IN | BODY MASS INDEX: 33.6 KG/M2 | SYSTOLIC BLOOD PRESSURE: 108 MMHG | OXYGEN SATURATION: 97 % | HEART RATE: 64 BPM | DIASTOLIC BLOOD PRESSURE: 70 MMHG | WEIGHT: 253.5 LBS

## 2025-05-23 DIAGNOSIS — E11.69 HYPERLIPIDEMIA ASSOCIATED WITH TYPE 2 DIABETES MELLITUS: Chronic | ICD-10-CM

## 2025-05-23 DIAGNOSIS — E11.69 HYPERLIPIDEMIA ASSOCIATED WITH TYPE 2 DIABETES MELLITUS: ICD-10-CM

## 2025-05-23 DIAGNOSIS — E78.5 HYPERLIPIDEMIA ASSOCIATED WITH TYPE 2 DIABETES MELLITUS: Chronic | ICD-10-CM

## 2025-05-23 DIAGNOSIS — E11.59 HYPERTENSION ASSOCIATED WITH DIABETES: ICD-10-CM

## 2025-05-23 DIAGNOSIS — E78.5 HYPERLIPIDEMIA ASSOCIATED WITH TYPE 2 DIABETES MELLITUS: ICD-10-CM

## 2025-05-23 DIAGNOSIS — E11.69 TYPE 2 DIABETES MELLITUS WITH OTHER SPECIFIED COMPLICATION, UNSPECIFIED WHETHER LONG TERM INSULIN USE: ICD-10-CM

## 2025-05-23 DIAGNOSIS — E78.1 HYPERTRIGLYCERIDEMIA: ICD-10-CM

## 2025-05-23 DIAGNOSIS — E78.5 HYPERLIPIDEMIA, UNSPECIFIED HYPERLIPIDEMIA TYPE: ICD-10-CM

## 2025-05-23 DIAGNOSIS — I10 BENIGN ESSENTIAL HTN: Primary | ICD-10-CM

## 2025-05-23 DIAGNOSIS — E11.9 TYPE 2 DIABETES MELLITUS WITHOUT COMPLICATION, WITH LONG-TERM CURRENT USE OF INSULIN: ICD-10-CM

## 2025-05-23 DIAGNOSIS — I10 HYPERTENSION, UNSPECIFIED TYPE: ICD-10-CM

## 2025-05-23 DIAGNOSIS — R94.31 NONSPECIFIC ABNORMAL ELECTROCARDIOGRAM (ECG) (EKG): ICD-10-CM

## 2025-05-23 DIAGNOSIS — G47.33 OSA (OBSTRUCTIVE SLEEP APNEA): ICD-10-CM

## 2025-05-23 DIAGNOSIS — J34.829 NASAL VALVE COLLAPSE: Primary | ICD-10-CM

## 2025-05-23 DIAGNOSIS — R79.89 LOW TESTOSTERONE: ICD-10-CM

## 2025-05-23 DIAGNOSIS — E11.59 TYPE 2 DIABETES MELLITUS WITH OTHER CIRCULATORY COMPLICATION, WITH LONG-TERM CURRENT USE OF INSULIN: ICD-10-CM

## 2025-05-23 DIAGNOSIS — Z79.4 TYPE 2 DIABETES MELLITUS WITHOUT COMPLICATION, WITH LONG-TERM CURRENT USE OF INSULIN: ICD-10-CM

## 2025-05-23 DIAGNOSIS — E03.9 HYPOTHYROIDISM (ACQUIRED): ICD-10-CM

## 2025-05-23 DIAGNOSIS — Z79.4 TYPE 2 DIABETES MELLITUS WITH OTHER CIRCULATORY COMPLICATION, WITH LONG-TERM CURRENT USE OF INSULIN: ICD-10-CM

## 2025-05-23 DIAGNOSIS — I15.2 HYPERTENSION ASSOCIATED WITH DIABETES: ICD-10-CM

## 2025-05-23 LAB
OHS QRS DURATION: 150 MS
OHS QTC CALCULATION: 457 MS

## 2025-05-23 PROCEDURE — 93010 ELECTROCARDIOGRAM REPORT: CPT | Mod: ,,, | Performed by: INTERNAL MEDICINE

## 2025-05-23 PROCEDURE — 30469 RPR NSL VLV COLLAPSE W/RMDLG: CPT | Mod: S$GLB,,, | Performed by: OTOLARYNGOLOGY

## 2025-05-23 PROCEDURE — 93005 ELECTROCARDIOGRAM TRACING: CPT

## 2025-05-23 PROCEDURE — 99999 PR PBB SHADOW E&M-EST. PATIENT-LVL IV: CPT | Mod: PBBFAC,,, | Performed by: INTERNAL MEDICINE

## 2025-05-23 PROCEDURE — 99211 OFF/OP EST MAY X REQ PHY/QHP: CPT | Mod: 25,S$GLB,, | Performed by: OTOLARYNGOLOGY

## 2025-05-23 NOTE — PROGRESS NOTES
Subjective:   Patient ID:  Narendra English Sr. is a 59 y.o. male who presents for cardiac consult of No chief complaint on file.      Referring Physician:    Tabitha Melgoza MD [[id number]] [id number] [facility] [place] [address]      Reason for consult: HTN    HPI  The patient came in today for cardiac consult of No chief complaint on file.      Narendra English Sr. is a 59 y.o. male pt with HTN, HLD - elevated TGs, DM2, FARA, obesity, gout, hypothyroidism, low T presents for follow up CV eval.        2/19/25  Pt had CTS sx with Dr. Brown after last visit.   Lipids well controlled 2/2025.   BP and HR stable. BMI 35 - 268 lbs     5/23/25  BP and HR stable. BMI 33 - 253 lbs   Bp was elevated a few weeks ago had elevated H&H - was told to donate blood/get phlebotomy     Stress ECHO 2/2022  Summary    Concentric remodeling and normal systolic function.  There were no arrhythmias during stress.  The estimated ejection fraction is 60%.  Normal left ventricular diastolic function.  With normal right ventricular systolic function.  The stress echo portion of this study is negative for myocardial ischemia.  The ECG portion of this study is negative for myocardial ischemia.    Past Medical History:   Diagnosis Date    Allergy     Cancer     Colon    Diabetes mellitus      09/14/2023 Insulin x 10 years    Encounter for screening colonoscopy 1/20/2023    Gastroesophageal reflux disease without esophagitis 06/20/2023    Hypertension     Multiple lung nodules on CT 10/22/2024    Sleep apnea     Thyroid disease        Past Surgical History:   Procedure Laterality Date    BACK SURGERY      CARPAL TUNNEL RELEASE Left 12/10/2024    Procedure: RELEASE, CARPAL TUNNEL;  Surgeon: Lita Brown MD;  Location: [facility];  Service: Orthopedics;  Laterality: Left;  [id number] Left cubital tunnel release, in situ  19217 Left carpal tunnel release    CARPAL TUNNEL RELEASE Right 12/31/2024    Procedure: RELEASE, CARPAL  TUNNEL;  Surgeon: Lita Brown MD;  Location: [facility] OR;  Service: Orthopedics;  Laterality: Right;    COLON SURGERY  02/06/21    COLONOSCOPY N/A 12/29/2020    Procedure: COLONOSCOPY;  Surgeon: William Cotter MD;  Location: [facility] ENDO;  Service: Endoscopy;  Laterality: N/A;  Will need Rapid doesn't live here    COLONOSCOPY N/A 02/10/2022    Procedure: COLONOSCOPY;  Surgeon: Wili Espinosa MD;  Location: [facility] ENDO;  Service: Endoscopy;  Laterality: N/A;    EPIDURAL STEROID INJECTION INTO CERVICAL SPINE N/A 12/01/2022    Procedure: C7/T1 IL SALBADOR;  Surgeon: Leonard Mart MD;  Location: [facility] PAIN [facility]GT;  Service: Pain Management;  Laterality: N/A;    EPIDURAL STEROID INJECTION INTO CERVICAL SPINE N/A 7/17/2024    Procedure: C6/7 IL SALBADOR, Left Paramedian Approach;  Surgeon: Leonard Mart MD;  Location: [facility]IN [facility]GT;  Service: Pain Management;  Laterality: N/A;    FESS, WITH NASAL SEPTOPLASTY, WITH IMAGING GUIDANCE Bilateral 08/17/2022    Procedure: FESS, WITH NASAL SEPTOPLASTY, WITH IMAGING GUIDANCE;  Surgeon: Viktor Ferrell MD;  Location: [facility] OR;  Service: ENT;  Laterality: Bilateral;    LAPAROSCOPIC RIGHT COLON RESECTION N/A 02/02/2021    Procedure: COLECTOMY, RIGHT, LAPAROSCOPIC, ERAS low;  Surgeon: Melonie Santoyo MD;  [facility];  Service: Colon and Rectal;  Laterality: N/A;  needs rapid covid test    LYSIS OF ADHESIONS Bilateral 04/19/2023    Procedure: LYSIS, ADHESIONS;  Surgeon: Viktor Ferrell MD;  Location: [facility] OR;  Service: ENT;  Laterality: Bilateral;    NASAL ENDOSCOPY Bilateral 04/19/2023    Procedure: ENDOSCOPY, NOSE;  Surgeon: Viktor Ferrell MD;  Location: [facility] OR;  Service: ENT;  Laterality: Bilateral;    NASAL TURBINATE REDUCTION Bilateral 08/17/2022    Procedure: REDUCTION, NASAL TURBINATE;  Surgeon: Viktor Ferrell MD;  Location: [facility] OR;  Service: ENT;  Laterality: Bilateral;    RHINOPLASTY Bilateral 04/19/2023    Procedure: RHINOPLASTY;  Surgeon: Viktor  LARA Ferrell MD;  Location: [facility] OR;  Service: ENT;  Laterality: Bilateral;    SELECTIVE INJECTION OF ANESTHETIC AGENT AROUND LUMBAR SPINAL NERVE ROOT BY TRANSFORAMINAL APPROACH Bilateral 07/05/2023    Procedure: Bilateral L4/5 TF SALBADOR RN IV Sedation;  Surgeon: Leonard Mart MD;  Location: [facility] PAIN [facility]GT;  Service: Pain Management;  Laterality: Bilateral;    TONSILLECTOMY      ULNAR TUNNEL RELEASE Left 12/10/2024    Procedure: RELEASE, CUBITAL TUNNEL;  Surgeon: Lita Brown MD;  Location: [facility] OR;  Service: Orthopedics;  Laterality: Left;       Social History     Tobacco Use    Smoking status: Never     Passive exposure: Never    Smokeless tobacco: Never   Substance Use Topics    Alcohol use: Never    Drug use: Never       Family History   Problem Relation Name Age of Onset    Hypertension Mother Kesha English     Diabetes Mother Kesha English     Breast cancer Mother Kesha English     Cancer Mother Kesha English     Hypertension Father Sandeep English     Stroke Father Sandeep English     No Known Problems Sister      No Known Problems Sister      Hypertension Brother Brother     Hypertension Brother Mauro English     Cataracts Maternal Grandmother Manda Kai     Diabetes Maternal Grandmother Manda Kai     Lung cancer Maternal Grandfather      No Known Problems Paternal Grandmother      No Known Problems Paternal Grandfather         Patient's Medications   New Prescriptions    No medications on file   Previous Medications    ATORVASTATIN (LIPITOR) 10 MG TABLET    Take 1 tablet (10 mg total) by mouth every evening.    BENAZEPRIL (LOTENSIN) 40 MG TABLET    Take 1 tablet (40 mg total) by mouth once daily.    CALCIPOTRIENE (DOVONOX) 0.005 % CREAM    Apply 1 application  topically 2 (two) times daily.    CLONAZEPAM (KLONOPIN) 1 MG TABLET    Take 1 tablet by mouth in the evening    COLCHICINE (COLCRYS) 0.6 MG TABLET    Take 1 tablet (0.6 mg total) by mouth once daily.    DOXEPIN (SINEQUAN) 10 MG CAPSULE    Take  1 capsule (10 mg total) by mouth every evening.    DOXYCYCLINE (VIBRAMYCIN) 100 MG CAP    Take 1 capsule (100 mg total) by mouth once daily.    DUPILUMAB (DUPIXENT) 300 MG/2 ML SYRG    Inject 2 mLs (300 mg total) into the skin every 14 (fourteen) days.    FEBUXOSTAT (ULORIC) 40 MG TAB    Take 1 tablet (40 mg total) by mouth once daily.    HYDRALAZINE (APRESOLINE) 100 MG TABLET    Take 1 tablet (100 mg total) by mouth every 8 (eight) hours.    HYDROXYZINE HCL (ATARAX) 25 MG TABLET    Take 25 mg by mouth 2 (two) times daily.    INDOMETHACIN (INDOCIN SR) 75 MG CPSR CR CAPSULE    Take 1 capsule (75 mg total) by mouth 2 (two) times daily as needed (for gout flare). MAX  3 TABLETS PER WEEK.    INSULIN GLARGINE, TOUJEO, (TOUJEO SOLOSTAR U-300 INSULIN) 300 UNIT/ML (1.5 ML) INPN PEN    Inject 48 Units into the skin once daily.    LEVOTHYROXINE (SYNTHROID) 125 MCG TABLET    Take 1 tablet (125 mcg total) by mouth once daily.    METFORMIN (GLUCOPHAGE) 1000 MG TABLET    Take 1 tablet (1,000 mg total) by mouth 2 (two) times daily with meals.    METOPROLOL SUCCINATE (TOPROL-XL) 50 MG 24 HR TABLET    Take 1 tablet (50 mg total) by mouth once daily.    MOMETASONE (ELOCON) 0.1 % OINTMENT    Apply topically once daily.    MOUNJARO 15 MG/0.5 ML PNIJ    Inject 15 mg into the skin once a week.    PREGABALIN (LYRICA) 150 MG CAPSULE    Take 1 capsule (150 mg total) by mouth 2 (two) times daily.    TADALAFIL (CIALIS) 20 MG TAB    Take 1 tablet (20 mg total) by mouth every 24 hours as needed (erectile dysfunction).    TESTOSTERONE CYPIONATE (DEPOTESTOTERONE CYPIONATE) 200 MG/ML INJECTION    Inject 0.75 mLs (150 mg total) into the muscle every 7 days.    TIZANIDINE (ZANAFLEX) 4 MG TABLET    TAKE 1 TABLET BY MOUTH TWICE DAILY AS NEEDED (NECK  PAIN)    TRIAMCINOLONE ACETONIDE 0.1% (KENALOG) 0.1 % OINTMENT    AAA bid    VASCEPA 1 GRAM CAP    Take 2 capsules (2,000 mg total) by mouth 2 (two) times daily.   Modified Medications    No medications  "on file   Discontinued Medications    AMLODIPINE (NORVASC) 5 MG TABLET    Take 1 tablet (5 mg total) by mouth once daily.       Review of Systems   Constitutional:  Positive for malaise/fatigue.   HENT: Negative.     Eyes: Negative.    Respiratory: Negative.     Cardiovascular: Negative.    Gastrointestinal: Negative.    Genitourinary: Negative.    Musculoskeletal:  Positive for back pain and joint pain.   Skin: Negative.    Neurological: Negative.    Endo/Heme/Allergies: Negative.    Psychiatric/Behavioral: Negative.     All 12 systems otherwise negative.      Wt Readings from Last 3 Encounters:   05/23/25 115 kg (253 lb 8.5 oz)   05/22/25 114.5 kg (252 lb 6.8 oz)   05/15/25 116 kg (255 lb 11.7 oz)     Temp Readings from Last 3 Encounters:   04/30/25 97.9 °F (36.6 °C) (Oral)   04/15/25 98.1 °F (36.7 °C) (Tympanic)   04/14/25 97.9 °F (36.6 °C)     BP Readings from Last 3 Encounters:   05/23/25 108/70   05/22/25 118/70   05/15/25 (!) 150/81     Pulse Readings from Last 3 Encounters:   05/23/25 64   05/22/25 75   05/15/25 (P) 72       /70 (BP Location: Left arm, Patient Position: Sitting)   Pulse 64   Ht 6' 1" (1.854 m)   Wt 115 kg (253 lb 8.5 oz)   SpO2 97%   BMI 33.45 kg/m²     Objective:   Physical Exam  Vitals and nursing note reviewed.   Constitutional:       General: He is not in acute distress.     Appearance: He is well-developed. He is obese. He is not diaphoretic.   HENT:      Head: Normocephalic and atraumatic.      Nose: Nose normal.   Eyes:      General: No scleral icterus.     Conjunctiva/sclera: Conjunctivae normal.   Neck:      Thyroid: No thyromegaly.      Vascular: No JVD.   Cardiovascular:      Rate and Rhythm: Normal rate and regular rhythm.      Heart sounds: S1 normal and S2 normal. No murmur heard.     No friction rub. No gallop. No S3 or S4 sounds.   Pulmonary:      Effort: Pulmonary effort is normal. No respiratory distress.      Breath sounds: Normal breath sounds. No stridor. No " wheezing or rales.   Chest:      Chest wall: No tenderness.   Abdominal:      General: Bowel sounds are normal. There is no distension.      Palpations: Abdomen is soft. There is no mass.      Tenderness: There is no abdominal tenderness. There is no rebound.   Genitourinary:     Comments: Deferred  Musculoskeletal:         General: No tenderness or deformity. Normal range of motion.      Cervical back: Normal range of motion and neck supple.   Lymphadenopathy:      Cervical: No cervical adenopathy.   Skin:     General: Skin is warm and dry.      Coloration: Skin is not pale.      Findings: No erythema or rash.   Neurological:      Mental Status: He is alert and oriented to person, place, and time.      Motor: No abnormal muscle tone.      Coordination: Coordination normal.   Psychiatric:         Behavior: Behavior normal.         Thought Content: Thought content normal.         Judgment: Judgment normal.         Lab Results   Component Value Date     04/04/2025     02/17/2025    K 4.1 04/04/2025    K 4.5 02/17/2025     04/04/2025     02/17/2025    CO2 23 04/04/2025    CO2 24 02/17/2025    BUN 21 (H) 04/04/2025    CREATININE 1.3 04/04/2025    GLU 67 (L) 04/04/2025    GLU 72 02/17/2025    HGBA1C 5.2 04/04/2025    HGBA1C 5.5 02/17/2025    MG 1.6 12/07/2023    AST 32 05/07/2025    AST 34 02/17/2025    ALT 40 05/07/2025    ALT 41 02/17/2025    ALBUMIN 4.0 05/07/2025    ALBUMIN 3.7 02/17/2025    ALBUMIN 4.1 08/12/2022    PROT 7.1 05/07/2025    PROT 6.6 02/17/2025    BILITOT 0.7 05/07/2025    BILITOT 0.8 02/17/2025    WBC 7.24 05/07/2025    HGB 16.6 05/07/2025    HGB 15.5 12/23/2024    HCT 54.5 (H) 05/07/2025    HCT 48.2 12/23/2024    MCV 96 05/07/2025    MCV 95 12/23/2024     05/07/2025     12/23/2024    TSH 2.999 04/04/2025    TSH 6.380 (H) 02/17/2025    CHOL 88 (L) 04/04/2025    CHOL 103 (L) 02/17/2025    HDL 26 (L) 04/04/2025    LDLCALC 34.8 (L) 04/04/2025    TRIG 136 04/04/2025     TRIG 151 (H) 02/17/2025    BNP <10 11/15/2021     Assessment:      1. Benign essential HTN    2. Hypertension associated with diabetes    3. Hypertriglyceridemia    4. FARA (obstructive sleep apnea)    5. Hypothyroidism (acquired)    6. Hyperlipidemia, unspecified hyperlipidemia type    7. Type 2 diabetes mellitus with other specified complication, unspecified whether long term insulin use    8. Nonspecific abnormal electrocardiogram (ECG) (EKG)    9. Type 2 diabetes mellitus without complication, with long-term current use of insulin    10. Low testosterone    11. Hyperlipidemia associated with type 2 diabetes mellitus    12. Hypertension, unspecified type            Plan:     HTN - low normal today   - titrate meds   - dec Norvasc to 5mg  - stop now    2. HLD,   - cont meds and titrate  - lowered statin dose to Lipitor 10 mg   - Lipids well controlled     3. DM2 A1c 6.6 --> 5.3 --> 5.6 --> 5.6 --> 5.5   - cont tx - on Ozempic - increase to 2mg - changed to Mounjaro - increase to 7.5 mg --> increased to 12.5 --> 15   - on Mounjaro     4. Hypothyroidsm TSH1.1  - cont Synthroid    5. FARA  - cont CPAP    6. Severe Obesity,  --> 268 lbs --> BMI 33 - 253 lbs   - cont weight loss with diet/exercise     7. Low T  - cont T supplements as needed  - monitor BP   - has elevated H&H - rec phlebotomies     8.  Neck, back, hand wrist pain  - s/p eval -neck surgery pending with Dr. Nam  - may need surgery  - deferred surg until 2025  - s/p surgery with Dr. Brown - CTS - b/l      Visit today included increased complexity associated with the care of the episodic problem HTN addressed and managing the longitudinal care of the patient due to the serious and/or complex managed problem(s) .      Thank you for allowing me to participate in this patient's care. Please do not hesitate to contact me with any questions or concerns. Consult note has been forwarded to the referral physician.     Kwame Guidry MD, [facility]  Cardiovascular  Disease  [facility], [facility]  [phone] (P)

## 2025-06-02 ENCOUNTER — PATIENT MESSAGE (OUTPATIENT)
Dept: UROLOGY | Facility: CLINIC | Age: 60
End: 2025-06-02
Payer: COMMERCIAL

## 2025-06-08 ENCOUNTER — PATIENT MESSAGE (OUTPATIENT)
Dept: INTERNAL MEDICINE | Facility: CLINIC | Age: 60
End: 2025-06-08
Payer: COMMERCIAL

## 2025-06-09 ENCOUNTER — LAB VISIT (OUTPATIENT)
Dept: LAB | Facility: HOSPITAL | Age: 60
End: 2025-06-09
Attending: UROLOGY
Payer: COMMERCIAL

## 2025-06-09 ENCOUNTER — OFFICE VISIT (OUTPATIENT)
Dept: UROLOGY | Facility: CLINIC | Age: 60
End: 2025-06-09
Payer: COMMERCIAL

## 2025-06-09 DIAGNOSIS — E29.1 HYPOGONADISM MALE: ICD-10-CM

## 2025-06-09 DIAGNOSIS — E29.1 HYPOGONADISM MALE: Primary | ICD-10-CM

## 2025-06-09 DIAGNOSIS — N52.9 ERECTILE DYSFUNCTION, UNSPECIFIED ERECTILE DYSFUNCTION TYPE: ICD-10-CM

## 2025-06-09 LAB
FSH SERPL-ACNC: 0.3 MIU/ML (ref 0.95–11.95)
LH SERPL-ACNC: <0.1 MIU/ML (ref 0.6–12.1)
PROLACTIN SERPL IA-MCNC: 19.9 NG/ML (ref 3.5–19.4)

## 2025-06-09 PROCEDURE — 1159F MED LIST DOCD IN RCRD: CPT | Mod: CPTII,S$GLB,, | Performed by: UROLOGY

## 2025-06-09 PROCEDURE — 1160F RVW MEDS BY RX/DR IN RCRD: CPT | Mod: CPTII,S$GLB,, | Performed by: UROLOGY

## 2025-06-09 PROCEDURE — 99999 PR PBB SHADOW E&M-EST. PATIENT-LVL II: CPT | Mod: PBBFAC,,, | Performed by: UROLOGY

## 2025-06-09 PROCEDURE — 36415 COLL VENOUS BLD VENIPUNCTURE: CPT

## 2025-06-09 PROCEDURE — 3061F NEG MICROALBUMINURIA REV: CPT | Mod: CPTII,S$GLB,, | Performed by: UROLOGY

## 2025-06-09 PROCEDURE — 3044F HG A1C LEVEL LT 7.0%: CPT | Mod: CPTII,S$GLB,, | Performed by: UROLOGY

## 2025-06-09 PROCEDURE — 82672 ASSAY OF ESTROGEN: CPT

## 2025-06-09 PROCEDURE — 83001 ASSAY OF GONADOTROPIN (FSH): CPT

## 2025-06-09 PROCEDURE — 83002 ASSAY OF GONADOTROPIN (LH): CPT

## 2025-06-09 PROCEDURE — 99214 OFFICE O/P EST MOD 30 MIN: CPT | Mod: S$GLB,,, | Performed by: UROLOGY

## 2025-06-09 PROCEDURE — 3066F NEPHROPATHY DOC TX: CPT | Mod: CPTII,S$GLB,, | Performed by: UROLOGY

## 2025-06-09 PROCEDURE — 4010F ACE/ARB THERAPY RXD/TAKEN: CPT | Mod: CPTII,S$GLB,, | Performed by: UROLOGY

## 2025-06-09 PROCEDURE — 84146 ASSAY OF PROLACTIN: CPT

## 2025-06-09 PROCEDURE — 3072F LOW RISK FOR RETINOPATHY: CPT | Mod: CPTII,S$GLB,, | Performed by: UROLOGY

## 2025-06-09 NOTE — PROGRESS NOTES
Chief Complaint:   Encounter Diagnoses   Name Primary?    Hypogonadism male Yes    Erectile dysfunction, unspecified erectile dysfunction type        HPI:   6/9/25- here today to discuss his semen analysis, no other symptoms, still taking testosterone.    06/16/2022 - 55 yo male that presents today for evaluation of ED.  Patient notes issues for the last 3-4 years but notes that over the last six months it has gotten worse.  He notes difficulty both achieving and maintaining an erection.  He has previously tried both Cialis and Viagra and both of these cause in to have a very bad headache that make it on tolerable to take these medications, though they do work.  Otherwise he voids with a good stream and feels like he empties well subjectively.  He denies any gross hematuria or family history of  cancers.  Denies prior stones or urologic procedures.    Allergies:  Demerol (pf) [meperidine (pf)], Percocet [oxycodone-acetaminophen], and Demerol [meperidine]    Medications:  has a current medication list which includes the following prescription(s): amlodipine, atorvastatin, azelastine, benazepril, clobetasol 0.05%, clonazepam, colchicine, diclofenac sodium, flash glucose scanning reader, flash glucose sensor, fluticasone propionate, hydralazine, hydrocortisone, indomethacin, toujeo solostar u-300 insulin, levothyroxine, metformin, metoprolol succinate, pregabalin, sildenafil, mounjaro, trazodone, triamcinolone acetonide 0.1%, and vascepa, and the following Facility-Administered Medications: gabapentin and lactated ringers.    Review of Systems:  General: No fever, chills, fatigability, or weight loss.  Skin: No rashes, itching, or changes in color or texture of skin.  Chest: Denies MEJÍA, cyanosis, wheezing, cough, and sputum production.  Abdomen: Appetite fine. No weight loss. Denies diarrhea, abdominal pain, hematemesis, or blood in stool.  Musculoskeletal: No joint stiffness or swelling. Denies back pain.  : As  above.  All other review of systems negative.    PMH:   has a past medical history of Cancer, Diabetes mellitus (8 years), Hypertension, Sleep apnea, and Thyroid disease.    PSH:   has a past surgical history that includes Colonoscopy (N/A, 12/29/2020); Laparoscopic right colon resection (N/A, 02/02/2021); Back surgery; Colonoscopy (N/A, 02/10/2022); Tonsillectomy; fess, with nasal septoplasty, with imaging guidance (Bilateral, 08/17/2022); Nasal turbinate reduction (Bilateral, 08/17/2022); Colon surgery (02/06/21); and Epidural steroid injection into cervical spine (N/A, 12/1/2022).    FamHx: family history includes Breast cancer in his mother; Cancer in his mother; Cataracts in his maternal grandmother; Diabetes in his maternal grandmother and mother; Hypertension in his brother, brother, and mother; Stroke in his father.    SocHx:  reports that he has never smoked. He has never used smokeless tobacco. He reports that he does not drink alcohol and does not use drugs.      Physical Exam:  There were no vitals filed for this visit.    General: A&Ox3, no apparent distress, no deformities  Neck: No masses, normal ROM  Lungs: normal inspiration, no use of accessory muscles  Heart: normal pulse, no arrhythmias  Abdomen: Soft, NT, ND, no masses, no hernias, no hepatosplenomegaly  Skin: The skin is warm and dry. No jaundice.  Ext: No c/c/e.  JAIMIE: 6/22: Normal rectal tone, no hemorrhoids. Prostate smooth and normal, no nodules 30 gm SV not palpable. Perineum and anus normal.    Labs/Studies:   Testopel  5/22/24, 2/23/24, 11/29/23, 8/25/23, 5/26/26, 1/20/23  Testosterone 328 8/22   PSA 0.62 8/24    Impression/Plan:       1. Hypogonadism- testopel was no longer covered, 100 mg every 7 days of testosterone appears to be working well.  Of note previous AndroGel failure.  Due to findings on semen analysis though, will hold as stated below.  Will pursue therapeutic phlebotomies.    2. Erectile dysfunction- Cialis 20 mg seems to  assist more than the sildenafil 100 mg, previous usage of TriMix caused priapism.  Levitra 20 mg has worked but is now too expensive.    3. Azoospermia- possibly related to his testosterone replacement, therefore obtain full hormonal labs and a scrotal ultrasound.  He will hold testosterone for 3 months and repeat a semen analysis.  For any abnormalities or persistent oligospermia or azoospermia, we will refer to our fertility specialists in [place].  Call with any other issues prior to the next appointment.

## 2025-06-10 ENCOUNTER — PATIENT MESSAGE (OUTPATIENT)
Dept: UROLOGY | Facility: CLINIC | Age: 60
End: 2025-06-10
Payer: COMMERCIAL

## 2025-06-10 ENCOUNTER — OFFICE VISIT (OUTPATIENT)
Dept: SLEEP MEDICINE | Facility: CLINIC | Age: 60
End: 2025-06-10
Payer: COMMERCIAL

## 2025-06-10 VITALS
SYSTOLIC BLOOD PRESSURE: 118 MMHG | BODY MASS INDEX: 34.57 KG/M2 | HEART RATE: 75 BPM | RESPIRATION RATE: 15 BRPM | DIASTOLIC BLOOD PRESSURE: 70 MMHG | WEIGHT: 260.81 LBS | HEIGHT: 73 IN | OXYGEN SATURATION: 98 %

## 2025-06-10 DIAGNOSIS — G47.26 SHIFT WORK SLEEP DISORDER: ICD-10-CM

## 2025-06-10 DIAGNOSIS — G47.33 OSA (OBSTRUCTIVE SLEEP APNEA): Primary | ICD-10-CM

## 2025-06-10 DIAGNOSIS — G47.00 INSOMNIA, UNSPECIFIED TYPE: ICD-10-CM

## 2025-06-10 PROCEDURE — 3072F LOW RISK FOR RETINOPATHY: CPT | Mod: CPTII,S$GLB,, | Performed by: NURSE PRACTITIONER

## 2025-06-10 PROCEDURE — 99999 PR PBB SHADOW E&M-EST. PATIENT-LVL V: CPT | Mod: PBBFAC,,, | Performed by: NURSE PRACTITIONER

## 2025-06-10 PROCEDURE — 3074F SYST BP LT 130 MM HG: CPT | Mod: CPTII,S$GLB,, | Performed by: NURSE PRACTITIONER

## 2025-06-10 PROCEDURE — 3061F NEG MICROALBUMINURIA REV: CPT | Mod: CPTII,S$GLB,, | Performed by: NURSE PRACTITIONER

## 2025-06-10 PROCEDURE — 3078F DIAST BP <80 MM HG: CPT | Mod: CPTII,S$GLB,, | Performed by: NURSE PRACTITIONER

## 2025-06-10 PROCEDURE — 99214 OFFICE O/P EST MOD 30 MIN: CPT | Mod: S$GLB,,, | Performed by: NURSE PRACTITIONER

## 2025-06-10 PROCEDURE — 1159F MED LIST DOCD IN RCRD: CPT | Mod: CPTII,S$GLB,, | Performed by: NURSE PRACTITIONER

## 2025-06-10 PROCEDURE — 3044F HG A1C LEVEL LT 7.0%: CPT | Mod: CPTII,S$GLB,, | Performed by: NURSE PRACTITIONER

## 2025-06-10 PROCEDURE — 4010F ACE/ARB THERAPY RXD/TAKEN: CPT | Mod: CPTII,S$GLB,, | Performed by: NURSE PRACTITIONER

## 2025-06-10 PROCEDURE — 3008F BODY MASS INDEX DOCD: CPT | Mod: CPTII,S$GLB,, | Performed by: NURSE PRACTITIONER

## 2025-06-10 PROCEDURE — 1160F RVW MEDS BY RX/DR IN RCRD: CPT | Mod: CPTII,S$GLB,, | Performed by: NURSE PRACTITIONER

## 2025-06-10 PROCEDURE — 3066F NEPHROPATHY DOC TX: CPT | Mod: CPTII,S$GLB,, | Performed by: NURSE PRACTITIONER

## 2025-06-10 NOTE — PROGRESS NOTES
"  Patient ID: Narendra English Sr. is a 59 y.o. male.    Chief Complaint: Sleep Apnea      Subjective:      History of Present Illness    HPI:  Mr. English reports using his CPAP machine nightly with improved sleep quality. He typically gets 6 hours of sleep per night, taking about an hour to fall asleep. His sleep schedule varies due to shift work, alternating between day and night shifts. For day shifts, he aims to be in bed by 9 PM, fall asleep around 10-10:30 PM, and wake up at 3:30 AM. For night shifts, he tries to be in bed by 7-7:30 AM, waking up around 2 PM. He falls asleep easier after night shifts, possibly due to exhaustion. Back pain occasionally disrupts his sleep. When unable to fall asleep, he typically lies in bed until sleep comes. During the day, he functions but becomes sleepy when sitting down to watch TV. He is currently taking clonazepam and doxepin for sleep which has been most effective out of multiple medications.    He denies using caffeine or having electronics in the bedroom.    SOCIAL HISTORY:  Occupation: Works shifts, including night shifts             1/16/25  Presents for insomnia. He has obstructive sleep apnea with insomnia, shift work disorder.  Failed lunesta, ambien, trazodone, klonopin/doxepin, and more recently Belsomra. Presents to discuss options.  Still having problems falling asleep and staying asleep. Difficulty shutting off mind. No electronics in bedroom.  Shift scheduled 4/4/4  Bedtime 830 -330 (Am or PM depending on shift)  Days off bedtime 10:00 p.m.-6:00 a.m.       Problem List[1]  /70   Pulse 75   Resp 15   Ht 6' 1" (1.854 m)   Wt 118.3 kg (260 lb 12.9 oz)   SpO2 98%   BMI 34.41 kg/m²   Body mass index is 34.41 kg/m².    Review of Systems   Constitutional: Negative.    HENT: Negative.     Respiratory: Negative.     Cardiovascular: Negative.    Musculoskeletal: Negative.    Gastrointestinal: Negative.    Neurological: Negative.  "   Psychiatric/Behavioral:  Positive for sleep disturbance.        Objective:      Physical Exam  Constitutional:       Appearance: He is obese.   HENT:      Head: Normocephalic and atraumatic.      Nose: Nose normal.   Cardiovascular:      Rate and Rhythm: Normal rate and regular rhythm.   Pulmonary:      Effort: Pulmonary effort is normal.      Breath sounds: Normal breath sounds.   Abdominal:      Palpations: Abdomen is soft.      Tenderness: There is no abdominal tenderness.   Musculoskeletal:         General: Normal range of motion.      Cervical back: Normal range of motion and neck supple.   Skin:     General: Skin is warm and dry.   Neurological:      General: No focal deficit present.      Mental Status: He is alert and oriented to person, place, and time.   Psychiatric:         Mood and Affect: Mood normal.         Behavior: Behavior normal.       Personal Diagnostic Review      1/16/2025    10:43 AM   EPWORTH SLEEPINESS SCALE   Sitting and reading 0   Watching TV 1   Sitting, inactive in a public place (e.g. a theatre or a meeting) 0   As a passenger in a car for an hour without a break 0   Lying down to rest in the afternoon when circumstances permit 0   Sitting and talking to someone 0   Sitting quietly after a lunch without alcohol 1   In a car, while stopped for a few minutes in traffic 0   Total score 2            Assessment:       1. FARA (obstructive sleep apnea)    2. Shift work sleep disorder    3. Insomnia, unspecified type        Encounter Medications[2]  No orders of the defined types were placed in this encounter.    Plan:       1. FARA (obstructive sleep apnea)  Overview:  Compliant with PAP and benefits from use. Follow up annually in the sleep clinic.        2. Shift work sleep disorder  Overview:  Sleep hygiene and scheduling.         3. Insomnia, unspecified type       Assessment & Plan    IMPRESSION:  - Assessed sleep quality and CPAP usage, noting improvement with current regimen of  clonazepam and doxepin.  - Considered impact of shifting work schedule on sleep patterns.  - Evaluated bedtime routine and sleep environment, noting appropriate sleep hygiene practices.      INSOMNIA:  -  Educated on stimulus control technique for improving sleep onset:  - If unable to fall asleep within 20-30 minutes, leave bedroom.  - Engage in low-stimulation activity (e.g., reading) in dim light.  - Return to bed when feeling sleepy.  - Repeat if necessary.  - Avoid looking at clock when trying to fall asleep. Continue clonazepam 1 mg and doxepin 10 mg for sleep.     FOLLOW-UP:  - Follow up in 6 months for clonazepam refills.          This note was generated with the assistance of ambient listening technology. Verbal consent was obtained by the patient and accompanying visitor(s) for the recording of patient appointment to facilitate this note. I attest to having reviewed and edited the generated note for accuracy, though some syntax or spelling errors may persist. Please contact the author of this note for any clarification.         I spent a total of 30 minutes on the day of the visit.  This includes face to face time and non-face to face time preparing to see the patient (eg, review of tests), obtaining and/or reviewing separately obtained history, documenting clinical information in the electronic or other health record, independently interpreting results and communicating results to the patient/family/caregiver, or care coordinator.         Elizabeth LeJeune, ACNP, ANP         [1]   Patient Active Problem List  Diagnosis    History of malignant neoplasm of colon    Type 2 diabetes mellitus with circulatory disorder, with long-term current use of insulin    Hypothyroidism (acquired)    Gout    Low testosterone    Mood disorder    Hypertension associated with diabetes    FARA (obstructive sleep apnea)    Nasal septal deviation    Adhesions of nasal septum and turbinates    Hypertrophy of inferior nasal  turbinate    Hyperlipidemia associated with type 2 diabetes mellitus    Shift work sleep disorder    Erectile dysfunction    Encounter for screening colonoscopy    Hypogonadism male    Gastroesophageal reflux disease without esophagitis    Bilateral lower extremity edema    Chronic nonallergic rhinitis    Erythema    Pruritic rash    Rhinitis medicamentosa    Rash and nonspecific skin eruption    Type 2 diabetes mellitus with other specified complication, on chronic insulin    Multiple lung nodules on CT    Colon cancer    Obesity, diabetes, and hypertension syndrome    Prurigo nodularis    Insomnia   [2]   Outpatient Encounter Medications as of 6/10/2025   Medication Sig Dispense Refill    atorvastatin (LIPITOR) 10 MG tablet Take 1 tablet (10 mg total) by mouth every evening. 90 tablet 3    benazepriL (LOTENSIN) 40 MG tablet Take 1 tablet (40 mg total) by mouth once daily. 90 tablet 3    calcipotriene (DOVONOX) 0.005 % cream Apply 1 application  topically 2 (two) times daily.      clonazePAM (KLONOPIN) 1 MG tablet Take 1 tablet by mouth in the evening 30 tablet 4    colchicine (COLCRYS) 0.6 mg tablet Take 1 tablet (0.6 mg total) by mouth once daily. 180 tablet 3    doxepin (SINEQUAN) 10 MG capsule Take 1 capsule (10 mg total) by mouth every evening. 30 capsule 11    doxycycline (VIBRAMYCIN) 100 MG Cap Take 1 capsule (100 mg total) by mouth once daily. 60 capsule 0    dupilumab (DUPIXENT) 300 mg/2 mL Syrg Inject 2 mLs (300 mg total) into the skin every 14 (fourteen) days. 4 mL 11    febuxostat (ULORIC) 40 mg Tab Take 1 tablet (40 mg total) by mouth once daily. 30 tablet 5    hydrALAZINE (APRESOLINE) 100 MG tablet Take 1 tablet (100 mg total) by mouth every 8 (eight) hours. 270 tablet 3    hydrOXYzine HCL (ATARAX) 25 MG tablet Take 25 mg by mouth 2 (two) times daily.      indomethacin (INDOCIN SR) 75 mg CpSR CR capsule Take 1 capsule (75 mg total) by mouth 2 (two) times daily as needed (for gout flare). MAX  3  TABLETS PER WEEK. 30 capsule 2    insulin glargine, TOUJEO, (TOUJEO SOLOSTAR U-300 INSULIN) 300 unit/mL (1.5 mL) InPn pen Inject 48 Units into the skin once daily. 12 mL 3    levothyroxine (SYNTHROID) 125 MCG tablet Take 1 tablet (125 mcg total) by mouth once daily. 90 tablet 3    metFORMIN (GLUCOPHAGE) 1000 MG tablet Take 1 tablet (1,000 mg total) by mouth 2 (two) times daily with meals. 180 tablet 3    metoprolol succinate (TOPROL-XL) 50 MG 24 hr tablet Take 1 tablet (50 mg total) by mouth once daily. 90 tablet 3    mometasone (ELOCON) 0.1 % ointment Apply topically once daily. 45 g 2    MOUNJARO 15 mg/0.5 mL PnIj Inject 15 mg into the skin once a week. 4 Pen 3    pregabalin (LYRICA) 150 MG capsule Take 1 capsule (150 mg total) by mouth 2 (two) times daily. 60 capsule 2    tadalafiL (CIALIS) 20 MG Tab Take 1 tablet (20 mg total) by mouth every 24 hours as needed (erectile dysfunction). 20 tablet 11    testosterone cypionate (DEPOTESTOTERONE CYPIONATE) 200 mg/mL injection Inject 0.75 mLs (150 mg total) into the muscle every 7 days. 10 mL 5    tiZANidine (ZANAFLEX) 4 MG tablet TAKE 1 TABLET BY MOUTH TWICE DAILY AS NEEDED (NECK  PAIN) 60 tablet 1    triamcinolone acetonide 0.1% (KENALOG) 0.1 % ointment AAA bid 454 g 1    VASCEPA 1 gram Cap Take 2 capsules (2,000 mg total) by mouth 2 (two) times daily. 360 capsule 1    [DISCONTINUED] amLODIPine (NORVASC) 5 MG tablet Take 1 tablet (5 mg total) by mouth once daily. 90 tablet 3    [DISCONTINUED] levothyroxine (SYNTHROID) 125 MCG tablet Take 1 tablet (125 mcg total) by mouth once daily. 90 tablet 0    [DISCONTINUED] pregabalin (LYRICA) 150 MG capsule TAKE 1 CAPSULE BY MOUTH THREE TIMES DAILY 90 capsule 0    [DISCONTINUED] tiZANidine (ZANAFLEX) 4 MG tablet TAKE 1 TABLET BY MOUTH TWICE DAILY AS NEEDED (NECK  PAIN) 60 tablet 0     No facility-administered encounter medications on file as of 6/10/2025.

## 2025-06-11 ENCOUNTER — HOSPITAL ENCOUNTER (OUTPATIENT)
Dept: RADIOLOGY | Facility: HOSPITAL | Age: 60
Discharge: HOME | End: 2025-06-11
Attending: UROLOGY
Payer: COMMERCIAL

## 2025-06-11 DIAGNOSIS — E29.1 HYPOGONADISM MALE: ICD-10-CM

## 2025-06-11 PROCEDURE — 76870 US EXAM SCROTUM: CPT | Mod: TC

## 2025-06-11 PROCEDURE — 76870 US EXAM SCROTUM: CPT | Mod: 26,,, | Performed by: RADIOLOGY

## 2025-06-12 ENCOUNTER — PATIENT MESSAGE (OUTPATIENT)
Dept: UROLOGY | Facility: CLINIC | Age: 60
End: 2025-06-12
Payer: COMMERCIAL

## 2025-06-13 ENCOUNTER — PATIENT MESSAGE (OUTPATIENT)
Dept: ADMINISTRATIVE | Facility: OTHER | Age: 60
End: 2025-06-13
Payer: COMMERCIAL

## 2025-06-13 LAB
ESTRADIOL: 90 PG/ML
ESTROGEN SERPL-MCNC: 172 PG/ML
ESTRONE PG/ML: 82 PG/ML

## 2025-06-16 ENCOUNTER — TELEPHONE (OUTPATIENT)
Dept: UROLOGY | Facility: CLINIC | Age: 60
End: 2025-06-16
Payer: COMMERCIAL

## 2025-06-16 ENCOUNTER — OFFICE VISIT (OUTPATIENT)
Dept: INTERNAL MEDICINE | Facility: CLINIC | Age: 60
End: 2025-06-16
Payer: COMMERCIAL

## 2025-06-16 ENCOUNTER — RESULTS FOLLOW-UP (OUTPATIENT)
Dept: INTERNAL MEDICINE | Facility: CLINIC | Age: 60
End: 2025-06-16

## 2025-06-16 ENCOUNTER — HOSPITAL ENCOUNTER (OUTPATIENT)
Dept: RADIOLOGY | Facility: HOSPITAL | Age: 60
Discharge: HOME | End: 2025-06-16
Attending: INTERNAL MEDICINE
Payer: COMMERCIAL

## 2025-06-16 VITALS
WEIGHT: 262.13 LBS | DIASTOLIC BLOOD PRESSURE: 80 MMHG | TEMPERATURE: 97 F | SYSTOLIC BLOOD PRESSURE: 139 MMHG | HEART RATE: 79 BPM | BODY MASS INDEX: 34.58 KG/M2 | OXYGEN SATURATION: 95 %

## 2025-06-16 DIAGNOSIS — Z79.4 TYPE 2 DIABETES MELLITUS WITH OTHER CIRCULATORY COMPLICATION, WITH LONG-TERM CURRENT USE OF INSULIN: Chronic | ICD-10-CM

## 2025-06-16 DIAGNOSIS — E66.811 OBESITY (BMI 30.0-34.9): Chronic | ICD-10-CM

## 2025-06-16 DIAGNOSIS — Z85.038 HISTORY OF MALIGNANT NEOPLASM OF COLON: Chronic | ICD-10-CM

## 2025-06-16 DIAGNOSIS — Z98.890 HISTORY OF COLON SURGERY: ICD-10-CM

## 2025-06-16 DIAGNOSIS — R10.84 GENERALIZED ABDOMINAL PAIN: Primary | ICD-10-CM

## 2025-06-16 DIAGNOSIS — R74.8 ELEVATED LIPASE: Primary | ICD-10-CM

## 2025-06-16 DIAGNOSIS — Z84.1 FAMILY HISTORY OF KIDNEY STONES: ICD-10-CM

## 2025-06-16 DIAGNOSIS — K59.00 CONSTIPATION, UNSPECIFIED CONSTIPATION TYPE: ICD-10-CM

## 2025-06-16 DIAGNOSIS — I15.2 HYPERTENSION ASSOCIATED WITH DIABETES: Chronic | ICD-10-CM

## 2025-06-16 DIAGNOSIS — R10.12 LEFT UPPER QUADRANT ABDOMINAL PAIN: ICD-10-CM

## 2025-06-16 DIAGNOSIS — E11.59 TYPE 2 DIABETES MELLITUS WITH OTHER CIRCULATORY COMPLICATION, WITH LONG-TERM CURRENT USE OF INSULIN: Chronic | ICD-10-CM

## 2025-06-16 DIAGNOSIS — E11.59 HYPERTENSION ASSOCIATED WITH DIABETES: Chronic | ICD-10-CM

## 2025-06-16 DIAGNOSIS — R10.84 GENERALIZED ABDOMINAL PAIN: ICD-10-CM

## 2025-06-16 LAB
BILIRUB SERPL-MCNC: NORMAL MG/DL
BLOOD URINE, POC: NORMAL
CLARITY, POC UA: CLEAR
COLOR, POC UA: YELLOW
GLUCOSE UR QL STRIP: NORMAL
KETONES UR QL STRIP: NORMAL
LEUKOCYTE ESTERASE URINE, POC: NORMAL
NITRITE, POC UA: NORMAL
PH, POC UA: 5
PROTEIN, POC: NORMAL
SPECIFIC GRAVITY, POC UA: 1.02
UROBILINOGEN, POC UA: 0.2

## 2025-06-16 PROCEDURE — 3008F BODY MASS INDEX DOCD: CPT | Mod: CPTII,S$GLB,, | Performed by: INTERNAL MEDICINE

## 2025-06-16 PROCEDURE — 3066F NEPHROPATHY DOC TX: CPT | Mod: CPTII,S$GLB,, | Performed by: INTERNAL MEDICINE

## 2025-06-16 PROCEDURE — 3075F SYST BP GE 130 - 139MM HG: CPT | Mod: CPTII,S$GLB,, | Performed by: INTERNAL MEDICINE

## 2025-06-16 PROCEDURE — 3061F NEG MICROALBUMINURIA REV: CPT | Mod: CPTII,S$GLB,, | Performed by: INTERNAL MEDICINE

## 2025-06-16 PROCEDURE — 81002 URINALYSIS NONAUTO W/O SCOPE: CPT | Mod: S$GLB,,, | Performed by: INTERNAL MEDICINE

## 2025-06-16 PROCEDURE — 3072F LOW RISK FOR RETINOPATHY: CPT | Mod: CPTII,S$GLB,, | Performed by: INTERNAL MEDICINE

## 2025-06-16 PROCEDURE — 4010F ACE/ARB THERAPY RXD/TAKEN: CPT | Mod: CPTII,S$GLB,, | Performed by: INTERNAL MEDICINE

## 2025-06-16 PROCEDURE — 99999 PR PBB SHADOW E&M-EST. PATIENT-LVL V: CPT | Mod: PBBFAC,,, | Performed by: INTERNAL MEDICINE

## 2025-06-16 PROCEDURE — G0447 BEHAVIOR COUNSEL OBESITY 15M: HCPCS | Mod: 59,S$GLB,, | Performed by: INTERNAL MEDICINE

## 2025-06-16 PROCEDURE — G2211 COMPLEX E/M VISIT ADD ON: HCPCS | Mod: S$GLB,,, | Performed by: INTERNAL MEDICINE

## 2025-06-16 PROCEDURE — 3044F HG A1C LEVEL LT 7.0%: CPT | Mod: CPTII,S$GLB,, | Performed by: INTERNAL MEDICINE

## 2025-06-16 PROCEDURE — 1160F RVW MEDS BY RX/DR IN RCRD: CPT | Mod: CPTII,S$GLB,, | Performed by: INTERNAL MEDICINE

## 2025-06-16 PROCEDURE — 3079F DIAST BP 80-89 MM HG: CPT | Mod: CPTII,S$GLB,, | Performed by: INTERNAL MEDICINE

## 2025-06-16 PROCEDURE — 74018 RADEX ABDOMEN 1 VIEW: CPT | Mod: TC,FY,PO

## 2025-06-16 PROCEDURE — 1159F MED LIST DOCD IN RCRD: CPT | Mod: CPTII,S$GLB,, | Performed by: INTERNAL MEDICINE

## 2025-06-16 PROCEDURE — 99214 OFFICE O/P EST MOD 30 MIN: CPT | Mod: 25,S$GLB,, | Performed by: INTERNAL MEDICINE

## 2025-06-16 PROCEDURE — 74018 RADEX ABDOMEN 1 VIEW: CPT | Mod: 26,,, | Performed by: RADIOLOGY

## 2025-06-16 RX ORDER — DOCUSATE SODIUM 100 MG/1
100 CAPSULE, LIQUID FILLED ORAL 2 TIMES DAILY
Qty: 20 CAPSULE | Refills: 0 | Status: SHIPPED | OUTPATIENT
Start: 2025-06-16 | End: 2025-06-26

## 2025-06-16 RX ORDER — TRAMADOL HYDROCHLORIDE 50 MG/1
50 TABLET, FILM COATED ORAL EVERY 8 HOURS PRN
Qty: 60 TABLET | Refills: 0 | Status: SHIPPED | OUTPATIENT
Start: 2025-06-16

## 2025-06-16 RX ORDER — POLYETHYLENE GLYCOL 3350 17 G/17G
17 POWDER, FOR SOLUTION ORAL DAILY PRN
Qty: 10 EACH | Refills: 0 | Status: SHIPPED | OUTPATIENT
Start: 2025-06-16 | End: 2025-06-26

## 2025-06-16 NOTE — PROGRESS NOTES
Chief Complaint  No chief complaint on file.       HPI  Narendra English Sr. is a 59 y.o. male here today for the following: The primary encounter diagnosis was Generalized abdominal pain. Diagnoses of Left upper quadrant abdominal pain, History of colon surgery, History of malignant neoplasm of colon, Type 2 diabetes mellitus with other circulatory complication, with long-term current use of insulin, Hypertension associated with diabetes, Obesity (BMI 30.0-34.9), and Family history of kidney stones were also pertinent to this visit.  Patient reported not experiencing significant side effects, except as noted in HPI if applicable.    June 16, 2025.  Patient reported here for abdominal pain.  Patient of Dr. Magdaleno and follows with Urology and estrogen level was high recently.  BP was at goal. MD counseled patient and answered questions.   MD obtained and reviewed records and discuss results with patient.  A1c was 5.2 April 2025.  MD counseled patient and answered questions.  LDL cholesterol was 34 April 2025 consistent with excellent control.  MD counseled patient and answered questions.  Abdominal pain lateral left upper quadrant, history of colon cancer December 2020 status post colon surgery.  Patient reported abdominal pain that began about 5 days ago and is stable.  Patient denied triggering event including no unusual foods, no new medications, no one with similar symptoms.  Patient reported does not drink alcohol, did drink in his 20s but not very much occasional beers not every night.  Patient reported tried ibuprofen and helped a little, did not try Tylenol.  Patient reported has taken and tolerated tramadol in the past and helped.  MD counseled patient and answered questions.  Patient agreed to lab work, imaging, referral to GI, tramadol can take with Tylenol.  ER precautions advised.  MD counseled patient and answered questions.   Answers submitted by the patient for this visit:  Abdominal Pain  "Questionnaire (Submitted on 6/16/2025)  Chief Complaint: Abdominal pain  Chronicity: new  Onset: in the past 7 days  Onset quality: sudden  Frequency: constantly  Episode duration: 5 Days  Pain location: LUQ  Pain - numeric: 6/10  Pain quality: sharp  Radiates to: chest, back  anorexia: No  arthralgias: No  belching: No  flatus: No  hematochezia: No  Aggravated by: deep breathing  Relieved by: nothing  Diagnostic workup: lower endoscopy, ultrasound  Pain severity: moderate  Treatments tried: nothing  Improvement on treatment: no relief  abdominal surgery: No  colon cancer: Yes  Crohn's disease: No  gallstones: No  GERD: Yes  irritable bowel syndrome: No  kidney stones: No  pancreatitis: No  PUD: No  ulcerative colitis: No  UTI: No      PHQ-2 (Reflex to PHQ-9) Interpretation:  Over the last two weeks how often have you been bothered by little interest or pleasure in doing things: 0  Over the last two weeks how often have you been bothered by feeling down, depressed or hopeless: 0  PHQ-2 Total Score: 0       Review of patient's allergies indicates:   Allergen Reactions    Demerol (pf) [meperidine (pf)] Other (See Comments)     Pt reports,"They lost my blood pressure."    Percocet [oxycodone-acetaminophen] Itching     Itching. Tolerated tramadol.     Allopurinol analogues Diarrhea       MEDS:  Current Outpatient Medications   Medication Instructions    atorvastatin (LIPITOR) 10 mg, Oral, Nightly    benazepriL (LOTENSIN) 40 mg, Oral, Daily    calcipotriene (DOVONOX) 0.005 % cream 1 application , 2 times daily    clonazePAM (KLONOPIN) 1 mg, Oral, Nightly    colchicine (COLCRYS) 0.6 mg, Oral, Daily    doxepin (SINEQUAN) 10 mg, Oral, Nightly    DUPIXENT SYRINGE 300 mg, Subcutaneous, Every 14 days    febuxostat (ULORIC) 40 mg, Oral, Daily    hydrALAZINE (APRESOLINE) 100 mg, Oral, Every 8 hours    hydrOXYzine HCL (ATARAX) 25 mg, 2 times daily    indomethacin (INDOCIN SR) 75 mg, Oral, 2 times daily PRN, MAX  3 TABLETS PER " WEEK.    insulin glargine (TOUJEO) (TOUJEO SOLOSTAR U-300 INSULIN) 48 Units, Subcutaneous, Daily    levothyroxine (SYNTHROID) 125 mcg, Oral, Daily    metFORMIN (GLUCOPHAGE) 1,000 mg, Oral, 2 times daily with meals    metoprolol succinate (TOPROL-XL) 50 mg, Oral, Daily    mometasone (ELOCON) 0.1 % ointment Topical (Top), Daily    MOUNJARO 15 mg, Subcutaneous, Weekly    pregabalin (LYRICA) 150 mg, Oral, 2 times daily    tadalafiL (CIALIS) 20 mg, Oral, Every 24 hours as needed    testosterone cypionate (DEPOTESTOTERONE CYPIONATE) 150 mg, Intramuscular, Every 7 days    tiZANidine (ZANAFLEX) 4 MG tablet TAKE 1 TABLET BY MOUTH TWICE DAILY AS NEEDED (NECK  PAIN)    traMADoL (ULTRAM) 50 mg, Oral, Every 8 hours PRN    triamcinolone acetonide 0.1% (KENALOG) 0.1 % ointment AAA bid    VASCEPA 2,000 mg, Oral, 2 times daily        Past Medical History:   Diagnosis Date    Allergy     Cancer     Colon    Diabetes mellitus      09/14/2023 Insulin x 10 years    Encounter for screening colonoscopy 1/20/2023    Gastroesophageal reflux disease without esophagitis 06/20/2023    Hypertension     Multiple lung nodules on CT 10/22/2024    Sleep apnea     Thyroid disease        Past Surgical History:   Procedure Laterality Date    BACK SURGERY      CARPAL TUNNEL RELEASE Left 12/10/2024    Procedure: RELEASE, CARPAL TUNNEL;  Surgeon: Lita Brown MD;  Location: [facility] OR;  Service: Orthopedics;  Laterality: Left;  75173 Left cubital tunnel release, in situ  18603 Left carpal tunnel release    CARPAL TUNNEL RELEASE Right 12/31/2024    Procedure: RELEASE, CARPAL TUNNEL;  Surgeon: Lita Brown MD;  Location: [facility] OR;  Service: Orthopedics;  Laterality: Right;    COLON SURGERY  02/06/21    COLONOSCOPY N/A 12/29/2020    Procedure: COLONOSCOPY;  Surgeon: William Cotter MD;  Location: [facility] ENDO;  Service: Endoscopy;  Laterality: N/A;  Will need Rapid doesn't live here    COLONOSCOPY N/A 02/10/2022    Procedure: COLONOSCOPY;   Surgeon: Wili Espinosa MD;  Location: [facility] ENDO;  Service: Endoscopy;  Laterality: N/A;    EPIDURAL STEROID INJECTION INTO CERVICAL SPINE N/A 12/01/2022    Procedure: C7/T1 IL SALBADOR;  Surgeon: Leonard Mart MD;  Location: [facility] PAIN MGT;  Service: Pain Management;  Laterality: N/A;    EPIDURAL STEROID INJECTION INTO CERVICAL SPINE N/A 7/17/2024    Procedure: C6/7 IL SALBADOR, Left Paramedian Approach;  Surgeon: Leonard Mart MD;  Location: [facility] PAIN MGT;  Service: Pain Management;  Laterality: N/A;    FESS, WITH NASAL SEPTOPLASTY, WITH IMAGING GUIDANCE Bilateral 08/17/2022    Procedure: FESS, WITH NASAL SEPTOPLASTY, WITH IMAGING GUIDANCE;  Surgeon: Viktor Ferrell MD;  Location: [facility] OR;  Service: ENT;  Laterality: Bilateral;    LAPAROSCOPIC RIGHT COLON RESECTION N/A 02/02/2021    Procedure: COLECTOMY, RIGHT, LAPAROSCOPIC, ERAS low;  Surgeon: Melonie Santoyo MD;  Location: [facility] OR [address] [place] [address];  Service: Colon and Rectal;  Laterality: N/A;  needs rapid covid test    LYSIS OF ADHESIONS Bilateral 04/19/2023    Procedure: LYSIS, ADHESIONS;  Surgeon: Viktor Ferrell MD;  Location: [facility] OR;  Service: ENT;  Laterality: Bilateral;    NASAL ENDOSCOPY Bilateral 04/19/2023    Procedure: ENDOSCOPY, NOSE;  Surgeon: Viktor Ferrell MD;  Location: [facility] OR;  Service: ENT;  Laterality: Bilateral;    NASAL TURBINATE REDUCTION Bilateral 08/17/2022    Procedure: REDUCTION, NASAL TURBINATE;  Surgeon: Viktor Ferrell MD;  Location: [facility] OR;  Service: ENT;  Laterality: Bilateral;    RHINOPLASTY Bilateral 04/19/2023    Procedure: RHINOPLASTY;  Surgeon: Viktor Ferrell MD;  Location: [facility] OR;  Service: ENT;  Laterality: Bilateral;    SELECTIVE INJECTION OF ANESTHETIC AGENT AROUND LUMBAR SPINAL NERVE ROOT BY TRANSFORAMINAL APPROACH Bilateral 07/05/2023    Procedure: Bilateral L4/5 TF SALBADOR RN IV Sedation;  Surgeon: Leonard Mart MD;  Location: [facility] PAIN MGT;  Service: Pain Management;  Laterality: Bilateral;    TONSILLECTOMY       ULNAR TUNNEL RELEASE Left 12/10/2024    Procedure: RELEASE, CUBITAL TUNNEL;  Surgeon: Lita Brown MD;  Location: [facility] OR;  Service: Orthopedics;  Laterality: Left;       Family History   Problem Relation Name Age of Onset    Hypertension Mother Kesha English     Diabetes Mother Kesha English     Breast cancer Mother Kesha English     Cancer Mother Kesha English     Hypertension Father Sandeep English     Stroke Father Sandeep English     No Known Problems Sister      No Known Problems Sister      Hypertension Brother Brother     Hypertension Brother Mauro English     Cataracts Maternal Grandmother Manda Quinn     Diabetes Maternal Grandmother Manda Quinn     Lung cancer Maternal Grandfather      No Known Problems Paternal Grandmother      No Known Problems Paternal Grandfather         Social History     Substance and Sexual Activity   Drug Use Never       Review of Systems   Review of Systems   Constitutional:         As per HPI, otherwise negative.    HENT:          As per HPI, otherwise negative.    Eyes:         As per HPI, otherwise negative.    Respiratory:          As per HPI, otherwise negative.    Cardiovascular:         As per HPI, otherwise negative.    Gastrointestinal:         As per HPI, otherwise negative.    Genitourinary:         As per HPI, otherwise negative.    Musculoskeletal:         As per HPI, otherwise negative.    Skin:         As per HPI, otherwise negative.    Neurological:         As per HPI, otherwise negative.    Endo/Heme/Allergies:         As per HPI, otherwise negative.    Psychiatric/Behavioral:          As per HPI, otherwise negative.        Objective   Vitals:    06/16/25 0850 06/16/25 0912   BP: (!) 144/82 139/80   BP Location:  Right arm   Patient Position:  Sitting   Pulse: 79    Temp: 97 °F (36.1 °C)    TempSrc: Tympanic    SpO2: 95%    Weight: 118.9 kg (262 lb 2 oz)        Body mass index is 34.58 kg/m².    Physical Exam  HENT:      Head: Normocephalic.   Eyes:       Extraocular Movements: Extraocular movements intact.   Cardiovascular:      Rate and Rhythm: Regular rhythm.   Pulmonary:      Effort: No respiratory distress.   Abdominal:      Palpations: Abdomen is soft.   Musculoskeletal:      Cervical back: Neck supple.   Skin:     General: Skin is warm.   Neurological:      Mental Status: He is alert. Mental status is at baseline.         Recent labs:   No results found for this or any previous visit (from the past 24 hours).    Assessment / Plan    Obesity.  MD counseled patient on obesity including diet exercise and lifestyle changes.  Spent >= 15 minutes face to face with patient.  Patient verbalized understanding and agreement.      Diagnoses and all orders for this visit:    Generalized abdominal pain  Comments:  Acute, stable, lab work, imaging, referral to GI.  Orders:  -     US Abdomen Complete; Future  -     Amylase; Future  -     Lipase; Future  -     X-Ray Abdomen AP 1 View; Future  -     traMADoL (ULTRAM) 50 mg tablet; Take 1 tablet (50 mg total) by mouth every 8 (eight) hours as needed for Pain.  -     POCT URINE DIPSTICK WITHOUT MICROSCOPE  -     Ambulatory referral/consult to Gastroenterology; Future    Left upper quadrant abdominal pain  Comments:  Acute, stable, lab work, imaging, referral to GI.  Orders:  -     US Abdomen Complete; Future  -     Amylase; Future  -     Lipase; Future  -     X-Ray Abdomen AP 1 View; Future  -     traMADoL (ULTRAM) 50 mg tablet; Take 1 tablet (50 mg total) by mouth every 8 (eight) hours as needed for Pain.  -     POCT URINE DIPSTICK WITHOUT MICROSCOPE  -     Ambulatory referral/consult to Gastroenterology; Future    History of colon surgery  Comments:  Colon cancer diagnosed December 2020.  Chronic, stable, monitor.    History of malignant neoplasm of colon  Comments:  December 2020.  Chronic, stable, monitor.  Orders:  -     Ambulatory referral/consult to Gastroenterology; Future    Type 2 diabetes mellitus with other  circulatory complication, with long-term current use of insulin  Comments:  Chronic, stable, insulin.  Orders:  -     Comprehensive Metabolic Panel; Future    Hypertension associated with diabetes  Comments:  Chronic, stable, benazepril.  Orders:  -     Comprehensive Metabolic Panel; Future    Obesity (BMI 30.0-34.9)  Comments:  Chronic, stable, eat healthier, stay active.  Orders:  -     Comprehensive Metabolic Panel; Future    Family history of kidney stones  Comments:  Brother at about 52yo.         Follow up if symptoms worsen or fail to improve.

## 2025-06-17 ENCOUNTER — HOSPITAL ENCOUNTER (OUTPATIENT)
Dept: RADIOLOGY | Facility: HOSPITAL | Age: 60
Discharge: HOME | End: 2025-06-17
Attending: INTERNAL MEDICINE
Payer: COMMERCIAL

## 2025-06-17 DIAGNOSIS — R10.12 LEFT UPPER QUADRANT ABDOMINAL PAIN: ICD-10-CM

## 2025-06-17 DIAGNOSIS — R10.84 GENERALIZED ABDOMINAL PAIN: ICD-10-CM

## 2025-06-17 PROCEDURE — 76700 US EXAM ABDOM COMPLETE: CPT | Mod: 26,,, | Performed by: RADIOLOGY

## 2025-06-17 PROCEDURE — 76700 US EXAM ABDOM COMPLETE: CPT | Mod: TC,PO

## 2025-06-18 ENCOUNTER — PATIENT MESSAGE (OUTPATIENT)
Dept: GASTROENTEROLOGY | Facility: CLINIC | Age: 60
End: 2025-06-18

## 2025-06-18 ENCOUNTER — OFFICE VISIT (OUTPATIENT)
Dept: GASTROENTEROLOGY | Facility: CLINIC | Age: 60
End: 2025-06-18
Payer: COMMERCIAL

## 2025-06-18 VITALS
DIASTOLIC BLOOD PRESSURE: 80 MMHG | SYSTOLIC BLOOD PRESSURE: 130 MMHG | BODY MASS INDEX: 34.47 KG/M2 | HEART RATE: 79 BPM | HEIGHT: 73 IN | WEIGHT: 260.13 LBS

## 2025-06-18 DIAGNOSIS — Z85.038 HISTORY OF MALIGNANT NEOPLASM OF COLON: Chronic | ICD-10-CM

## 2025-06-18 DIAGNOSIS — K21.9 GASTROESOPHAGEAL REFLUX DISEASE WITHOUT ESOPHAGITIS: ICD-10-CM

## 2025-06-18 DIAGNOSIS — R10.12 LEFT UPPER QUADRANT ABDOMINAL PAIN: ICD-10-CM

## 2025-06-18 DIAGNOSIS — R10.84 GENERALIZED ABDOMINAL PAIN: Primary | ICD-10-CM

## 2025-06-18 DIAGNOSIS — R74.8 ELEVATED LIPASE: ICD-10-CM

## 2025-06-18 PROCEDURE — 3079F DIAST BP 80-89 MM HG: CPT | Mod: CPTII,S$GLB,, | Performed by: INTERNAL MEDICINE

## 2025-06-18 PROCEDURE — 3044F HG A1C LEVEL LT 7.0%: CPT | Mod: CPTII,S$GLB,, | Performed by: INTERNAL MEDICINE

## 2025-06-18 PROCEDURE — G2211 COMPLEX E/M VISIT ADD ON: HCPCS | Mod: S$GLB,,, | Performed by: INTERNAL MEDICINE

## 2025-06-18 PROCEDURE — 3061F NEG MICROALBUMINURIA REV: CPT | Mod: CPTII,S$GLB,, | Performed by: INTERNAL MEDICINE

## 2025-06-18 PROCEDURE — 99999 PR PBB SHADOW E&M-EST. PATIENT-LVL V: CPT | Mod: PBBFAC,,, | Performed by: INTERNAL MEDICINE

## 2025-06-18 PROCEDURE — 99214 OFFICE O/P EST MOD 30 MIN: CPT | Mod: S$GLB,,, | Performed by: INTERNAL MEDICINE

## 2025-06-18 PROCEDURE — 3008F BODY MASS INDEX DOCD: CPT | Mod: CPTII,S$GLB,, | Performed by: INTERNAL MEDICINE

## 2025-06-18 PROCEDURE — 3075F SYST BP GE 130 - 139MM HG: CPT | Mod: CPTII,S$GLB,, | Performed by: INTERNAL MEDICINE

## 2025-06-18 PROCEDURE — 3066F NEPHROPATHY DOC TX: CPT | Mod: CPTII,S$GLB,, | Performed by: INTERNAL MEDICINE

## 2025-06-18 PROCEDURE — 4010F ACE/ARB THERAPY RXD/TAKEN: CPT | Mod: CPTII,S$GLB,, | Performed by: INTERNAL MEDICINE

## 2025-06-18 PROCEDURE — 1160F RVW MEDS BY RX/DR IN RCRD: CPT | Mod: CPTII,S$GLB,, | Performed by: INTERNAL MEDICINE

## 2025-06-18 PROCEDURE — 1159F MED LIST DOCD IN RCRD: CPT | Mod: CPTII,S$GLB,, | Performed by: INTERNAL MEDICINE

## 2025-06-18 PROCEDURE — 3072F LOW RISK FOR RETINOPATHY: CPT | Mod: CPTII,S$GLB,, | Performed by: INTERNAL MEDICINE

## 2025-06-18 RX ORDER — DOXYCYCLINE 100 MG/1
100 CAPSULE ORAL DAILY
COMMUNITY
Start: 2025-06-16

## 2025-06-18 NOTE — PROGRESS NOTES
[facility]  Gastroenterology    Patient evaluated at the request of Ronald Bell Md  [id number] [place] [address]     PCP: TESSY Magdaleno MD    Chief Complaint    Abdominal Pain       History of present illness/subjective    Abdominal Pain: Patient complains of abdominal pain. The pain is located in the LUQ. The pain is described as dull, and is 5/10 in intensity. Onset was 1 week ago. Symptoms have been stable since. Aggravating factors include movement and taking a deep breath.  Alleviating factors include standing. Associated symptoms include belching. The patient denies chills, fever, hematochezia, melena, and vomiting. Patient recently had labs and they were OK except lipase.   The patient is a 59-year-old male presenting with abdominal pain localized to the left upper quadrant (LUQ). The pain is dull in nature and rated 5/10 in intensity. Onset was one week ago, and symptoms have remained stable since. The pain is aggravated by movement and deep inspiration, while standing provides some relief. Associated symptoms include belching, but the patient denies chills, fever, hematochezia, melena, or vomiting. Recent laboratory testing was unremarkable, except for lipase levels.     Past Medical History:   Diagnosis Date    Allergy     Cancer     Colon    Diabetes mellitus      09/14/2023 Insulin x 10 years    Encounter for screening colonoscopy 1/20/2023    Gastroesophageal reflux disease without esophagitis 06/20/2023    Hypertension     Multiple lung nodules on CT 10/22/2024    Sleep apnea     Thyroid disease        Past Surgical History:   Procedure Laterality Date    BACK SURGERY      CARPAL TUNNEL RELEASE Left 12/10/2024    Procedure: RELEASE, CARPAL TUNNEL;  Surgeon: Lita Brown MD;  Location: [facility];  Service: Orthopedics;  Laterality: Left;  56404 Left cubital tunnel release, in situ  [id number] Left carpal tunnel release    CARPAL TUNNEL RELEASE Right 12/31/2024     Procedure: RELEASE, CARPAL TUNNEL;  Surgeon: Lita Brown MD;  Location: [facility] OR;  Service: Orthopedics;  Laterality: Right;    COLON SURGERY  02/06/21    COLONOSCOPY N/A 12/29/2020    Procedure: COLONOSCOPY;  Surgeon: William Cotter MD;  Location: [facility] ENDO;  Service: Endoscopy;  Laterality: N/A;  Will need Rapid doesn't live here    COLONOSCOPY N/A 02/10/2022    Procedure: COLONOSCOPY;  Surgeon: Wili Espinosa MD;  Location: [facility] ENDO;  Service: Endoscopy;  Laterality: N/A;    EPIDURAL STEROID INJECTION INTO CERVICAL SPINE N/A 12/01/2022    Procedure: C7/T1 IL SALBADOR;  Surgeon: Leonard Mart MD;  Location: [facility] PAIN MGT;  Service: Pain Management;  Laterality: N/A;    EPIDURAL STEROID INJECTION INTO CERVICAL SPINE N/A 7/17/2024    Procedure: C6/7 IL SALBADOR, Left Paramedian Approach;  Surgeon: Leonard Mart MD;  Location: [facility] PAIN MGT;  Service: Pain Management;  Laterality: N/A;    FESS, WITH NASAL SEPTOPLASTY, WITH IMAGING GUIDANCE Bilateral 08/17/2022    Procedure: FESS, WITH NASAL SEPTOPLASTY, WITH IMAGING GUIDANCE;  Surgeon: Viktor Ferrell MD;  Location: [facility] OR;  Service: ENT;  Laterality: Bilateral;    LAPAROSCOPIC RIGHT COLON RESECTION N/A 02/02/2021    Procedure: COLECTOMY, RIGHT, LAPAROSCOPIC, ERAS low;  Surgeon: Melonie Santoyo MD;  [facility];  Service: Colon and Rectal;  Laterality: N/A;  needs rapid covid test    LYSIS OF ADHESIONS Bilateral 04/19/2023    Procedure: LYSIS, ADHESIONS;  Surgeon: Viktor Ferrell MD;  Location: [facility] OR;  Service: ENT;  Laterality: Bilateral;    NASAL ENDOSCOPY Bilateral 04/19/2023    Procedure: ENDOSCOPY, NOSE;  Surgeon: Viktor Ferrell MD;  Location: [facility] OR;  Service: ENT;  Laterality: Bilateral;    NASAL TURBINATE REDUCTION Bilateral 08/17/2022    Procedure: REDUCTION, NASAL TURBINATE;  Surgeon: Viktor Ferrell MD;  Location: [facility] OR;  Service: ENT;  Laterality: Bilateral;    RHINOPLASTY Bilateral 04/19/2023    Procedure:  "RHINOPLASTY;  Surgeon: Viktor Ferrell MD;  Location: [facility] OR;  Service: ENT;  Laterality: Bilateral;    SELECTIVE INJECTION OF ANESTHETIC AGENT AROUND LUMBAR SPINAL NERVE ROOT BY TRANSFORAMINAL APPROACH Bilateral 07/05/2023    Procedure: Bilateral L4/5 TF SALBADOR RN IV Sedation;  Surgeon: Leonard Mart MD;  Location: [facility] PAIN MGT;  Service: Pain Management;  Laterality: Bilateral;    TONSILLECTOMY      ULNAR TUNNEL RELEASE Left 12/10/2024    Procedure: RELEASE, CUBITAL TUNNEL;  Surgeon: Lita Brwon MD;  Location: [facility] OR;  Service: Orthopedics;  Laterality: Left;       Medications Ordered Prior to Encounter[1]    Review of patient's allergies indicates:   Allergen Reactions    Demerol (pf) [meperidine (pf)] Other (See Comments)     Pt reports,"They lost my blood pressure."    Percocet [oxycodone-acetaminophen] Itching     Itching. Tolerated tramadol.     Allopurinol analogues Diarrhea       Social History[2]    Family History   Problem Relation Name Age of Onset    Hypertension Mother Kesha English     Diabetes Mother Kesha English     Breast cancer Mother Kesha English     Cancer Mother Kesha English     Hypertension Father Sandeep Egnlish     Stroke Father Sandeep English     No Known Problems Sister      No Known Problems Sister      Hypertension Brother Brother     Hypertension Brother Mauro English     Cataracts Maternal Grandmother Manda Quinn     Diabetes Maternal Grandmother Manda Quinn     Lung cancer Maternal Grandfather      No Known Problems Paternal Grandmother      No Known Problems Paternal Grandfather         Vitals:    06/18/25 0844   BP: 130/80   Pulse: 79   Weight: 118 kg (260 lb 2.3 oz)   Height: 6' 1" (1.854 m)     Body mass index is 34.32 kg/m².    Physical Exam  Vitals and nursing note reviewed.   Constitutional:       Appearance: He is well-developed. He is obese.   HENT:      Head: Normocephalic and atraumatic.   Eyes:      Conjunctiva/sclera: Conjunctivae normal.      Pupils: " Pupils are equal, round, and reactive to light.   Neck:      Thyroid: No thyromegaly.      Trachea: No tracheal deviation.   Cardiovascular:      Rate and Rhythm: Normal rate and regular rhythm.   Pulmonary:      Effort: Pulmonary effort is normal.      Breath sounds: Normal breath sounds. No wheezing or rales.   Chest:      Chest wall: No tenderness.   Abdominal:      General: Bowel sounds are normal. There is no distension.      Palpations: Abdomen is soft. There is no mass.      Tenderness: There is no abdominal tenderness. There is no guarding.   Musculoskeletal:         General: Normal range of motion.      Cervical back: Normal range of motion and neck supple.   Lymphadenopathy:      Cervical: No cervical adenopathy.   Skin:     General: Skin is warm and dry.   Neurological:      Mental Status: He is alert and oriented to person, place, and time.      Cranial Nerves: No cranial nerve deficit.   Psychiatric:         Behavior: Behavior normal.         Lab Results   Component Value Date    WBC 7.24 05/07/2025    HGB 16.6 05/07/2025    HCT 54.5 (H) 05/07/2025    MCV 96 05/07/2025     05/07/2025         BMP  Lab Results   Component Value Date     06/16/2025    K 4.6 06/16/2025     06/16/2025    CO2 27 06/16/2025    BUN 19 06/16/2025    CREATININE 1.3 06/16/2025    CALCIUM 9.7 06/16/2025    ANIONGAP 9 06/16/2025    EGFRNORACEVR >60 06/16/2025       Lab Results   Component Value Date    ALT 43 06/16/2025    AST 30 06/16/2025    ALKPHOS 38 (L) 06/16/2025    BILITOT 0.5 06/16/2025       Lab Results   Component Value Date    LIPASE 101 (H) 06/16/2025       ASSESSMENT AND RECOMMENDATIONS     1. Generalized abdominal pain  Comments:  LUQ pain, constant, dull, 5/10, radiates to his back. Worse if bending over; standing makes it better.  Orders:  -     Ambulatory referral/consult to Gastroenterology    2. Left upper quadrant abdominal pain  Comments:  Acute, stable, lab work, imaging, referral to  GI.  Orders:  -     Ambulatory referral/consult to Gastroenterology  -     Ambulatory referral/consult to Endo Procedure ; Future; Expected date: 06/19/2025  -     EGD; Future; Expected date: 06/18/2025    3. History of malignant neoplasm of colon  Comments:  Last Colonoscopy 2/2025  Orders:  -     Ambulatory referral/consult to Gastroenterology    4. Elevated lipase  Overview:  June 2025.    Orders:  -     MRI MRCP Abdomen WO Contrast 3D WO Independent WS XPD; Future; Expected date: 06/18/2025    5. Gastroesophageal reflux disease without esophagitis       To support optimal gut health and maintain regular bowel movements, we recommend incorporating the following into your daily routine:    Psyllium Husk - A soluble fiber that aids digestion, promotes stool regularity, and supports overall gut health.  Magnesium Glycinate - Helps with muscle relaxation, including the intestinal muscles, to ease bowel movements and reduce cramping.  Ground Flaxseed - A rich source of fiber and omega-3 fatty acids that supports digestion and reduces inflammation.  Probiotics (Florastor or Align) - Supports a healthy gut microbiome by replenishing beneficial bacteria and promoting digestive balance.    Patients may adjust the frequency and dosage of these supplements based on their individual tolerance and response. If you experience loose stools or discomfort, you may reduce the dosage or take them less frequently. If you need additional digestive support, gradual increases may be beneficial.    It is essential to drink plenty of water throughout the day to ensure these supplements work effectively and to prevent constipation.     Additionally, lifestyle modifications can significantly improve symptoms of acid reflux. These include:  Maintaining a healthy weight  Elevating the head of the bed to reduce nighttime reflux  Avoiding trigger foods such as spicy, acidic, or fatty foods  Refraining from eating late meals and  staying upright for at least 1.5 hours after eating    Please follow the recommended guidelines, listen to your body, and consult your healthcare provider if you experience any concerns or changes in your digestive health.    Body mass index is 34.32 kg/m². Morbid obesity complicates all aspects of disease management from diagnostic modalities to treatment. Weight loss encouraged and health benefits explained to patient.     General weight loss/lifestyle modification strategies discussed (elicit support from others; identify saboteurs; non-food rewards, etc).  Informal exercise measures discussed, e.g. taking stairs instead of elevator.  Regular aerobic exercise program discussed.       Risks, benefits and alternative options were discussed with the patient. Risks including but not limited to infection, bleeding, heart or respiratory problems and perforation that may require surgery were all explained to the patient. The patient had a chance for questions if there were doubts or concerns about the test. The referring provider will be notified that our consultation is complete and available through the patient's records.    Thanks for letting us participate in the care of this nice patient,          Gama Levi               [1]   Current Outpatient Medications on File Prior to Visit   Medication Sig Dispense Refill    atorvastatin (LIPITOR) 10 MG tablet Take 1 tablet (10 mg total) by mouth every evening. 90 tablet 3    benazepriL (LOTENSIN) 40 MG tablet Take 1 tablet (40 mg total) by mouth once daily. 90 tablet 3    calcipotriene (DOVONOX) 0.005 % cream Apply 1 application  topically 2 (two) times daily.      clonazePAM (KLONOPIN) 1 MG tablet Take 1 tablet by mouth in the evening 30 tablet 4    colchicine (COLCRYS) 0.6 mg tablet Take 1 tablet (0.6 mg total) by mouth once daily. 180 tablet 3    docusate sodium (COLACE) 100 MG capsule Take 1 capsule (100 mg total) by mouth 2 (two) times daily. for 10 days 20 capsule  0    doxepin (SINEQUAN) 10 MG capsule Take 1 capsule (10 mg total) by mouth every evening. 30 capsule 11    doxycycline (VIBRAMYCIN) 100 MG Cap Take 100 mg by mouth once daily.      dupilumab (DUPIXENT) 300 mg/2 mL Syrg Inject 2 mLs (300 mg total) into the skin every 14 (fourteen) days. 4 mL 11    febuxostat (ULORIC) 40 mg Tab Take 1 tablet (40 mg total) by mouth once daily. 30 tablet 5    hydrALAZINE (APRESOLINE) 100 MG tablet Take 1 tablet (100 mg total) by mouth every 8 (eight) hours. 270 tablet 3    hydrOXYzine HCL (ATARAX) 25 MG tablet Take 25 mg by mouth 2 (two) times daily.      indomethacin (INDOCIN SR) 75 mg CpSR CR capsule Take 1 capsule (75 mg total) by mouth 2 (two) times daily as needed (for gout flare). MAX  3 TABLETS PER WEEK. 30 capsule 2    insulin glargine, TOUJEO, (TOUJEO SOLOSTAR U-300 INSULIN) 300 unit/mL (1.5 mL) InPn pen Inject 48 Units into the skin once daily. 12 mL 3    levothyroxine (SYNTHROID) 125 MCG tablet Take 1 tablet (125 mcg total) by mouth once daily. 90 tablet 3    metFORMIN (GLUCOPHAGE) 1000 MG tablet Take 1 tablet (1,000 mg total) by mouth 2 (two) times daily with meals. 180 tablet 3    metoprolol succinate (TOPROL-XL) 50 MG 24 hr tablet Take 1 tablet (50 mg total) by mouth once daily. 90 tablet 3    mometasone (ELOCON) 0.1 % ointment Apply topically once daily. 45 g 2    MOUNJARO 15 mg/0.5 mL PnIj Inject 15 mg into the skin once a week. 4 Pen 3    polyethylene glycol (CLEARLAX) 17 gram/dose powder Take 17 g by mouth daily as needed for Constipation. 10 each 0    pregabalin (LYRICA) 150 MG capsule Take 1 capsule (150 mg total) by mouth 2 (two) times daily. 60 capsule 2    tadalafiL (CIALIS) 20 MG Tab Take 1 tablet (20 mg total) by mouth every 24 hours as needed (erectile dysfunction). 20 tablet 11    testosterone cypionate (DEPOTESTOTERONE CYPIONATE) 200 mg/mL injection Inject 0.75 mLs (150 mg total) into the muscle every 7 days. 10 mL 5    tiZANidine (ZANAFLEX) 4 MG tablet TAKE  1 TABLET BY MOUTH TWICE DAILY AS NEEDED (NECK  PAIN) 60 tablet 1    traMADoL (ULTRAM) 50 mg tablet Take 1 tablet (50 mg total) by mouth every 8 (eight) hours as needed for Pain. 60 tablet 0    triamcinolone acetonide 0.1% (KENALOG) 0.1 % ointment AAA bid 454 g 1    VASCEPA 1 gram Cap Take 2 capsules (2,000 mg total) by mouth 2 (two) times daily. 360 capsule 1     No current facility-administered medications on file prior to visit.   [2]   Social History  Socioeconomic History    Marital status:    Tobacco Use    Smoking status: Never     Passive exposure: Never    Smokeless tobacco: Never   Substance and Sexual Activity    Alcohol use: Never    Drug use: Never    Sexual activity: Yes     Partners: Female     Birth control/protection: None     Social Drivers of Health     Financial Resource Strain: Medium Risk (2/18/2025)    Overall Financial Resource Strain (CARDIA)     Difficulty of Paying Living Expenses: Somewhat hard   Food Insecurity: No Food Insecurity (2/18/2025)    Hunger Vital Sign     Worried About Running Out of Food in the Last Year: Never true     Ran Out of Food in the Last Year: Never true   Transportation Needs: No Transportation Needs (2/18/2025)    PRAPARE - Transportation     Lack of Transportation (Medical): No     Lack of Transportation (Non-Medical): No   Physical Activity: Insufficiently Active (2/18/2025)    Exercise Vital Sign     Days of Exercise per Week: 3 days     Minutes of Exercise per Session: 30 min   Stress: Stress Concern Present (2/18/2025)    [employer] - Occupational Stress Questionnaire     Feeling of Stress : Rather much   Housing Stability: Low Risk  (2/18/2025)    Housing Stability Vital Sign     Unable to Pay for Housing in the Last Year: No     Number of Times Moved in the Last Year: 0     Homeless in the Last Year: No

## 2025-06-18 NOTE — PATIENT INSTRUCTIONS
06/18/2025    Dear Narendra English Sr.,    We are writing to express our heartfelt gratitude for choosing us as your healthcare provider and entrusting us with your well-being. Your health and satisfaction are our top priorities, and we are truly honored to have the opportunity to serve you.    At [facility], we strive to provide the best possible care and support for our patients. My assessment and recommendations are as follows:    Generalized abdominal pain  Comments:  LUQ pain, constant, dull, 5/10, radiates to his back. Worse if bending over; standing makes it better.  Orders:  -     Ambulatory referral/consult to Gastroenterology    Left upper quadrant abdominal pain  Comments:  Acute, stable, lab work, imaging, referral to GI.  Orders:  -     Ambulatory referral/consult to Gastroenterology  -     Ambulatory referral/consult to Endo Procedure ; Future; Expected date: 06/19/2025  -     EGD; Future; Expected date: 06/18/2025    History of malignant neoplasm of colon  Comments:  Last Colonoscopy 2/2025  Orders:  -     Ambulatory referral/consult to Gastroenterology    Elevated lipase  -     MRI MRCP Abdomen WO Contrast 3D WO Independent WS XPD; Future; Expected date: 06/18/2025    Gastroesophageal reflux disease without esophagitis        To support optimal gut health and maintain regular bowel movements, we recommend incorporating the following into your daily routine:    Psyllium Husk - A soluble fiber that aids digestion, promotes stool regularity, and supports overall gut health.  Magnesium Glycinate - Helps with muscle relaxation, including the intestinal muscles, to ease bowel movements and reduce cramping.  Ground Flaxseed - A rich source of fiber and omega-3 fatty acids that supports digestion and reduces inflammation.  Probiotics (Florastor or Align) - Supports a healthy gut microbiome by replenishing beneficial bacteria and promoting digestive balance.    Patients may adjust the  frequency and dosage of these supplements based on their individual tolerance and response. If you experience loose stools or discomfort, you may reduce the dosage or take them less frequently. If you need additional digestive support, gradual increases may be beneficial.    It is essential to drink plenty of water throughout the day to ensure these supplements work effectively and to prevent constipation.     Additionally, lifestyle modifications can significantly improve symptoms of acid reflux. These include:  Maintaining a healthy weight  Elevating the head of the bed to reduce nighttime reflux  Avoiding trigger foods such as spicy, acidic, or fatty foods  Refraining from eating late meals and staying upright for at least 1.5 hours after eating    We genuinely value your feedback and believe that it plays a crucial role in our pursuit of excellence. We kindly request you to take a few minutes of your time to share your experience with us by posting a Google review. Your honest thoughts and opinions can make a significant difference for others seeking healthcare services and will help us better understand how we can further enhance our patient care.    Your kind words and positive reviews will not only motivate our dedicated team but will also inspire confidence in potential patients who are looking for [facility] healthcare services.    We genuinely value your feedback and believe that it plays a crucial role in our pursuit of excellence. We kindly request you to take a few minutes of your time to share your experience with us by posting a Google review. Your honest thoughts and opinions can make a significant difference for others seeking healthcare services and will help us better understand how we can further enhance our patient care.    Thank you for trusting us with your care,        Gama Levi M.D.  click on the link for contact information.       PS: At [facility] we offer virtual visits. Please use  "your [facility]al to schedule a virtual visit.     To rate your experience with Dr. Levi, click on this link    To post a review, simply follow these quick steps:  1. Visit [email or web address] or search for my name on Google.  2. Click on the "Write a review" button on the left-hand side of the page.  3. Rate your experience by choosing the number of stars that reflect your satisfaction.  4. Share your thoughts and insights about your visit - we'd love to hear your feedback!    "

## 2025-06-18 NOTE — TELEPHONE ENCOUNTER
In addition to the referrals already mentioned (colorectal surgery and ENT), I also gave him the referral listed below. If this is not what he's referring to, then I don't know what else it would be. In that case, I recommend he schedule a virtual video visit so that we can discuss this further and clarify exactly what he needs.     Referral Information  Referral #: [id number]  Creation Date: 02/11/2025  To Provider Specialty: Rheumatology  To Provider: Mayela Venegas MD

## 2025-06-19 ENCOUNTER — HOSPITAL ENCOUNTER (OUTPATIENT)
Dept: PREADMISSION TESTING | Facility: HOSPITAL | Age: 60
Discharge: HOME | End: 2025-06-19
Attending: INTERNAL MEDICINE
Payer: COMMERCIAL

## 2025-06-19 DIAGNOSIS — R10.12 LEFT UPPER QUADRANT ABDOMINAL PAIN: ICD-10-CM

## 2025-06-24 ENCOUNTER — LAB VISIT (OUTPATIENT)
Dept: LAB | Facility: HOSPITAL | Age: 60
End: 2025-06-24
Attending: STUDENT IN AN ORGANIZED HEALTH CARE EDUCATION/TRAINING PROGRAM
Payer: COMMERCIAL

## 2025-06-24 ENCOUNTER — OFFICE VISIT (OUTPATIENT)
Dept: ALLERGY | Facility: CLINIC | Age: 60
End: 2025-06-24
Payer: COMMERCIAL

## 2025-06-24 VITALS
WEIGHT: 261.94 LBS | SYSTOLIC BLOOD PRESSURE: 138 MMHG | DIASTOLIC BLOOD PRESSURE: 80 MMHG | HEIGHT: 73 IN | OXYGEN SATURATION: 98 % | HEART RATE: 63 BPM | BODY MASS INDEX: 34.71 KG/M2

## 2025-06-24 DIAGNOSIS — L30.9 SEVERE ECZEMA: Primary | ICD-10-CM

## 2025-06-24 DIAGNOSIS — L30.9 SEVERE ECZEMA: ICD-10-CM

## 2025-06-24 LAB
ABSOLUTE EOSINOPHIL (OHS): 0.16 K/UL
ABSOLUTE MONOCYTE (OHS): 1.12 K/UL (ref 0.3–1)
ABSOLUTE NEUTROPHIL COUNT (OHS): 4.18 K/UL (ref 1.8–7.7)
ALBUMIN SERPL BCP-MCNC: 4 G/DL (ref 3.5–5.2)
ALP SERPL-CCNC: 33 UNIT/L (ref 40–150)
ALT SERPL W/O P-5'-P-CCNC: 39 UNIT/L (ref 10–44)
ANION GAP (OHS): 11 MMOL/L (ref 8–16)
AST SERPL-CCNC: 29 UNIT/L (ref 11–45)
BASOPHILS # BLD AUTO: 0.05 K/UL
BASOPHILS NFR BLD AUTO: 0.7 %
BILIRUB SERPL-MCNC: 0.6 MG/DL (ref 0.1–1)
BUN SERPL-MCNC: 19 MG/DL (ref 6–20)
CALCIUM SERPL-MCNC: 9 MG/DL (ref 8.7–10.5)
CHLORIDE SERPL-SCNC: 106 MMOL/L (ref 95–110)
CHOLEST SERPL-MCNC: 98 MG/DL (ref 120–199)
CHOLEST/HDLC SERPL: 3.6 {RATIO} (ref 2–5)
CO2 SERPL-SCNC: 25 MMOL/L (ref 23–29)
CREAT SERPL-MCNC: 1.3 MG/DL (ref 0.5–1.4)
ERYTHROCYTE [DISTWIDTH] IN BLOOD BY AUTOMATED COUNT: 16.4 % (ref 11.5–14.5)
GFR SERPLBLD CREATININE-BSD FMLA CKD-EPI: >60 ML/MIN/1.73/M2
GLUCOSE SERPL-MCNC: 101 MG/DL (ref 70–110)
HBV CORE AB SERPL QL IA: NORMAL
HCT VFR BLD AUTO: 51.7 % (ref 40–54)
HCV AB SERPL QL IA: NORMAL
HDLC SERPL-MCNC: 27 MG/DL (ref 40–75)
HDLC SERPL: 27.6 % (ref 20–50)
HGB BLD-MCNC: 16.4 GM/DL (ref 14–18)
IMM GRANULOCYTES # BLD AUTO: 0.04 K/UL (ref 0–0.04)
IMM GRANULOCYTES NFR BLD AUTO: 0.5 % (ref 0–0.5)
LDLC SERPL CALC-MCNC: 32.4 MG/DL (ref 63–159)
LYMPHOCYTES # BLD AUTO: 1.9 K/UL (ref 1–4.8)
MCH RBC QN AUTO: 30.3 PG (ref 27–31)
MCHC RBC AUTO-ENTMCNC: 31.7 G/DL (ref 32–36)
MCV RBC AUTO: 95 FL (ref 82–98)
NONHDLC SERPL-MCNC: 71 MG/DL
NUCLEATED RBC (/100WBC) (OHS): 0 /100 WBC
PLATELET # BLD AUTO: 218 K/UL (ref 150–450)
PMV BLD AUTO: 12.6 FL (ref 9.2–12.9)
POTASSIUM SERPL-SCNC: 4.5 MMOL/L (ref 3.5–5.1)
PROT SERPL-MCNC: 6.7 GM/DL (ref 6–8.4)
RBC # BLD AUTO: 5.42 M/UL (ref 4.6–6.2)
RELATIVE EOSINOPHIL (OHS): 2.1 %
RELATIVE LYMPHOCYTE (OHS): 25.5 % (ref 18–48)
RELATIVE MONOCYTE (OHS): 15 % (ref 4–15)
RELATIVE NEUTROPHIL (OHS): 56.2 % (ref 38–73)
SODIUM SERPL-SCNC: 142 MMOL/L (ref 136–145)
TRIGL SERPL-MCNC: 193 MG/DL (ref 30–150)
WBC # BLD AUTO: 7.45 K/UL (ref 3.9–12.7)

## 2025-06-24 PROCEDURE — 99215 OFFICE O/P EST HI 40 MIN: CPT | Mod: S$GLB,,, | Performed by: STUDENT IN AN ORGANIZED HEALTH CARE EDUCATION/TRAINING PROGRAM

## 2025-06-24 PROCEDURE — 1160F RVW MEDS BY RX/DR IN RCRD: CPT | Mod: CPTII,S$GLB,, | Performed by: STUDENT IN AN ORGANIZED HEALTH CARE EDUCATION/TRAINING PROGRAM

## 2025-06-24 PROCEDURE — 3066F NEPHROPATHY DOC TX: CPT | Mod: CPTII,S$GLB,, | Performed by: STUDENT IN AN ORGANIZED HEALTH CARE EDUCATION/TRAINING PROGRAM

## 2025-06-24 PROCEDURE — 3044F HG A1C LEVEL LT 7.0%: CPT | Mod: CPTII,S$GLB,, | Performed by: STUDENT IN AN ORGANIZED HEALTH CARE EDUCATION/TRAINING PROGRAM

## 2025-06-24 PROCEDURE — 80053 COMPREHEN METABOLIC PANEL: CPT

## 2025-06-24 PROCEDURE — 4010F ACE/ARB THERAPY RXD/TAKEN: CPT | Mod: CPTII,S$GLB,, | Performed by: STUDENT IN AN ORGANIZED HEALTH CARE EDUCATION/TRAINING PROGRAM

## 2025-06-24 PROCEDURE — 85025 COMPLETE CBC W/AUTO DIFF WBC: CPT

## 2025-06-24 PROCEDURE — 1159F MED LIST DOCD IN RCRD: CPT | Mod: CPTII,S$GLB,, | Performed by: STUDENT IN AN ORGANIZED HEALTH CARE EDUCATION/TRAINING PROGRAM

## 2025-06-24 PROCEDURE — 36415 COLL VENOUS BLD VENIPUNCTURE: CPT

## 2025-06-24 PROCEDURE — 3075F SYST BP GE 130 - 139MM HG: CPT | Mod: CPTII,S$GLB,, | Performed by: STUDENT IN AN ORGANIZED HEALTH CARE EDUCATION/TRAINING PROGRAM

## 2025-06-24 PROCEDURE — 86704 HEP B CORE ANTIBODY TOTAL: CPT

## 2025-06-24 PROCEDURE — 80061 LIPID PANEL: CPT

## 2025-06-24 PROCEDURE — 99999 PR PBB SHADOW E&M-EST. PATIENT-LVL V: CPT | Mod: PBBFAC,,, | Performed by: STUDENT IN AN ORGANIZED HEALTH CARE EDUCATION/TRAINING PROGRAM

## 2025-06-24 PROCEDURE — 3079F DIAST BP 80-89 MM HG: CPT | Mod: CPTII,S$GLB,, | Performed by: STUDENT IN AN ORGANIZED HEALTH CARE EDUCATION/TRAINING PROGRAM

## 2025-06-24 PROCEDURE — 86481 TB AG RESPONSE T-CELL SUSP: CPT

## 2025-06-24 PROCEDURE — 3061F NEG MICROALBUMINURIA REV: CPT | Mod: CPTII,S$GLB,, | Performed by: STUDENT IN AN ORGANIZED HEALTH CARE EDUCATION/TRAINING PROGRAM

## 2025-06-24 PROCEDURE — 86803 HEPATITIS C AB TEST: CPT

## 2025-06-24 PROCEDURE — 3072F LOW RISK FOR RETINOPATHY: CPT | Mod: CPTII,S$GLB,, | Performed by: STUDENT IN AN ORGANIZED HEALTH CARE EDUCATION/TRAINING PROGRAM

## 2025-06-24 PROCEDURE — 3008F BODY MASS INDEX DOCD: CPT | Mod: CPTII,S$GLB,, | Performed by: STUDENT IN AN ORGANIZED HEALTH CARE EDUCATION/TRAINING PROGRAM

## 2025-06-24 RX ORDER — UPADACITINIB 15 MG/1
15 TABLET, EXTENDED RELEASE ORAL DAILY
Qty: 30 TABLET | Refills: 11 | Status: SHIPPED | OUTPATIENT
Start: 2025-06-24 | End: 2026-06-24

## 2025-06-24 NOTE — PROGRESS NOTES
Allergy and Immunology  Established Patient Clinic Note    Date: 6/24/2025  Chief Complaint   Patient presents with    Follow-up     Persistent itch and eczema    Eczema     History  Narendra English  is a 59 y.o. male being seen for follow-up today.    Severe eczema   Severe atopic dermatitis   Not controlled at this time   Patient has failed topical steroids and Dupixent  Escalating to NICO inhibitor   Oral needed because more than 20% of surface body area involved even with Dupixent   It appears to be main issue  NICO inhibitors are very efficacious for the cessation of itch especially some patients report improvement within 48 hours    Allergies, PMH, PSH, Social, and Family History were reviewed.    Medications Ordered Prior to Encounter[1]    Physical Examination  Vitals:    06/24/25 0717   BP: 138/80   Pulse: 63     GENERAL:  male in no apparent distress and well developed and well nourished  HEAD:  Normocephalic, without obvious abnormality, atraumatic  EYES: sclera anicteric, conjunctiva normochromic  EARS: normal TM's and external ear canals both ears  NOSE: without erythema or discharge, clear discharge, turbinates normal    OROPHARYNX: moist mucous membranes without erythema, exudates or petechiae  LYMPH NODES: normal, supple, no lymphadenopathy  LUNGS: clear to auscultation, no wheezes, rales or rhonchi, symmetric air entry.  HEART: normal rate, regular rhythm, normal S1, S2, no murmurs, rubs, clicks or gallops.  ABDOMEN: soft, nontender, nondistended, no masses or organomegaly.  MUSCULOSKELETAL: no gross joint deformity or swelling.  NEURO: alert, oriented, normal speech, no focal findings or movement disorder noted.  SKIN:  Eczematous rash of chest bilateral upper back and back as well as some involvement of arms - greater than 20% of area affected not able to use topical.     Assessment/Plan:   Problem List Items Addressed This Visit    None  Visit Diagnoses         Severe eczema    -  Primary     Relevant Medications    upadacitinib (RINVOQ) 15 mg 24 hr tablet    Other Relevant Orders    Comprehensive Metabolic Panel    CBC Auto Differential    Lipid Panel    T-SPOT TB Screening Test    Hepatitis C Antibody    Hepatitis B Core Antibody, Total          Severe atopic dermatitis   Patient has failed topical steroids and Dupixent  Escalating/switching to NICO inhibitor at this time patient educated on the risk versus benefit   Screening labs to be obtained at this time   Patient is to continue Dupixent for 1 month while NICO inhibitor is being approved  Patient originally had eczema with prurigo nodularis  Prurigo nodularis has not improved and should remain controlled on NICO inhibitor though not approved for this indication  ED precautions discussed      Follow up:  Follow up in about 3 months (around 9/24/2025).    40+ minutes spent on direct and indirect health care of this patient.  Complex patient requiring immunotherapy with escalation in care.  Discussion of risk versus benefits.    DISCLAIMER: This note was prepared with [employer] voice recognition transcription software. Garbled syntax, mangled pronouns, and other bizarre constructions may be attributed to that software system. While efforts were made to correct any mistakes made by this voice recognition program, some errors and/or omissions may remain in the note that were missed when the note was originally created.     Teressa Riggins MD   [facility]  Allergy and Immunology       [1]   Current Outpatient Medications on File Prior to Visit   Medication Sig Dispense Refill    atorvastatin (LIPITOR) 10 MG tablet Take 1 tablet (10 mg total) by mouth every evening. 90 tablet 3    benazepriL (LOTENSIN) 40 MG tablet Take 1 tablet (40 mg total) by mouth once daily. 90 tablet 3    calcipotriene (DOVONOX) 0.005 % cream Apply 1 application  topically 2 (two) times daily.      clonazePAM (KLONOPIN) 1 MG tablet Take 1 tablet by mouth in the evening 30  tablet 4    colchicine (COLCRYS) 0.6 mg tablet Take 1 tablet (0.6 mg total) by mouth once daily. 180 tablet 3    docusate sodium (COLACE) 100 MG capsule Take 1 capsule (100 mg total) by mouth 2 (two) times daily. for 10 days 20 capsule 0    doxepin (SINEQUAN) 10 MG capsule Take 1 capsule (10 mg total) by mouth every evening. 30 capsule 11    doxycycline (VIBRAMYCIN) 100 MG Cap Take 100 mg by mouth once daily.      dupilumab (DUPIXENT) 300 mg/2 mL Syrg Inject 2 mLs (300 mg total) into the skin every 14 (fourteen) days. 4 mL 11    febuxostat (ULORIC) 40 mg Tab Take 1 tablet (40 mg total) by mouth once daily. 30 tablet 5    hydrALAZINE (APRESOLINE) 100 MG tablet Take 1 tablet (100 mg total) by mouth every 8 (eight) hours. 270 tablet 3    hydrOXYzine HCL (ATARAX) 25 MG tablet Take 25 mg by mouth 2 (two) times daily.      indomethacin (INDOCIN SR) 75 mg CpSR CR capsule Take 1 capsule (75 mg total) by mouth 2 (two) times daily as needed (for gout flare). MAX  3 TABLETS PER WEEK. 30 capsule 2    insulin glargine, TOUJEO, (TOUJEO SOLOSTAR U-300 INSULIN) 300 unit/mL (1.5 mL) InPn pen Inject 48 Units into the skin once daily. 12 mL 3    levothyroxine (SYNTHROID) 125 MCG tablet Take 1 tablet (125 mcg total) by mouth once daily. 90 tablet 3    metFORMIN (GLUCOPHAGE) 1000 MG tablet Take 1 tablet (1,000 mg total) by mouth 2 (two) times daily with meals. 180 tablet 3    metoprolol succinate (TOPROL-XL) 50 MG 24 hr tablet Take 1 tablet (50 mg total) by mouth once daily. 90 tablet 3    mometasone (ELOCON) 0.1 % ointment Apply topically once daily. 45 g 2    MOUNJARO 15 mg/0.5 mL PnIj Inject 15 mg into the skin once a week. 4 Pen 3    polyethylene glycol (CLEARLAX) 17 gram/dose powder Take 17 g by mouth daily as needed for Constipation. 10 each 0    pregabalin (LYRICA) 150 MG capsule Take 1 capsule (150 mg total) by mouth 2 (two) times daily. 60 capsule 2    tadalafiL (CIALIS) 20 MG Tab Take 1 tablet (20 mg total) by mouth every 24  hours as needed (erectile dysfunction). 20 tablet 11    testosterone cypionate (DEPOTESTOTERONE CYPIONATE) 200 mg/mL injection Inject 0.75 mLs (150 mg total) into the muscle every 7 days. 10 mL 5    tiZANidine (ZANAFLEX) 4 MG tablet TAKE 1 TABLET BY MOUTH TWICE DAILY AS NEEDED (NECK  PAIN) 60 tablet 1    traMADoL (ULTRAM) 50 mg tablet Take 1 tablet (50 mg total) by mouth every 8 (eight) hours as needed for Pain. 60 tablet 0    triamcinolone acetonide 0.1% (KENALOG) 0.1 % ointment AAA bid 454 g 1    VASCEPA 1 gram Cap Take 2 capsules (2,000 mg total) by mouth 2 (two) times daily. 360 capsule 1     No current facility-administered medications on file prior to visit.

## 2025-06-25 ENCOUNTER — RESULTS FOLLOW-UP (OUTPATIENT)
Dept: ALLERGY | Facility: CLINIC | Age: 60
End: 2025-06-25

## 2025-06-26 PROBLEM — L30.9 SEVERE ECZEMA: Status: ACTIVE | Noted: 2025-06-26

## 2025-06-30 LAB
Lab: NORMAL
Lab: NORMAL
T-SPOT PANEL A SPOT COUNT: 0
T-SPOT PANEL B SPOT COUNT: 0
T-SPOT.TB: NEGATIVE

## 2025-07-01 ENCOUNTER — PATIENT MESSAGE (OUTPATIENT)
Dept: UROLOGY | Facility: CLINIC | Age: 60
End: 2025-07-01
Payer: COMMERCIAL

## 2025-07-05 ENCOUNTER — PATIENT MESSAGE (OUTPATIENT)
Dept: PAIN MEDICINE | Facility: CLINIC | Age: 60
End: 2025-07-05
Payer: COMMERCIAL

## 2025-07-08 ENCOUNTER — TELEPHONE (OUTPATIENT)
Dept: PAIN MEDICINE | Facility: CLINIC | Age: 60
End: 2025-07-08
Payer: COMMERCIAL

## 2025-07-08 NOTE — TELEPHONE ENCOUNTER
Reach out to pt to inform him that he can  his hand cap tag. At [facility] location the first floor , with his name on it. Pt understood

## 2025-07-13 ENCOUNTER — PATIENT MESSAGE (OUTPATIENT)
Dept: ALLERGY | Facility: CLINIC | Age: 60
End: 2025-07-13
Payer: COMMERCIAL

## 2025-07-14 RX ORDER — DOXYCYCLINE 100 MG/1
100 CAPSULE ORAL
Qty: 60 CAPSULE | Refills: 0 | Status: SHIPPED | OUTPATIENT
Start: 2025-07-14

## 2025-07-21 ENCOUNTER — OFFICE VISIT (OUTPATIENT)
Dept: INTERNAL MEDICINE | Facility: CLINIC | Age: 60
End: 2025-07-21
Payer: COMMERCIAL

## 2025-07-21 VITALS
RESPIRATION RATE: 18 BRPM | TEMPERATURE: 96 F | HEIGHT: 73 IN | OXYGEN SATURATION: 97 % | HEART RATE: 77 BPM | BODY MASS INDEX: 33.69 KG/M2 | SYSTOLIC BLOOD PRESSURE: 128 MMHG | WEIGHT: 254.19 LBS | DIASTOLIC BLOOD PRESSURE: 60 MMHG

## 2025-07-21 DIAGNOSIS — Z79.4 TYPE 2 DIABETES MELLITUS WITH OTHER SPECIFIED COMPLICATION, WITH LONG-TERM CURRENT USE OF INSULIN: Primary | Chronic | ICD-10-CM

## 2025-07-21 DIAGNOSIS — E11.59 OBESITY, DIABETES, AND HYPERTENSION SYNDROME: Chronic | ICD-10-CM

## 2025-07-21 DIAGNOSIS — E11.69 OBESITY, DIABETES, AND HYPERTENSION SYNDROME: Chronic | ICD-10-CM

## 2025-07-21 DIAGNOSIS — E66.9 OBESITY, DIABETES, AND HYPERTENSION SYNDROME: Chronic | ICD-10-CM

## 2025-07-21 DIAGNOSIS — E11.59 HYPERTENSION ASSOCIATED WITH DIABETES: Chronic | ICD-10-CM

## 2025-07-21 DIAGNOSIS — E11.69 TYPE 2 DIABETES MELLITUS WITH OTHER SPECIFIED COMPLICATION, WITH LONG-TERM CURRENT USE OF INSULIN: Primary | Chronic | ICD-10-CM

## 2025-07-21 DIAGNOSIS — I15.2 HYPERTENSION ASSOCIATED WITH DIABETES: Chronic | ICD-10-CM

## 2025-07-21 DIAGNOSIS — R91.8 MULTIPLE LUNG NODULES ON CT: Chronic | ICD-10-CM

## 2025-07-21 DIAGNOSIS — E03.9 HYPOTHYROIDISM (ACQUIRED): Chronic | ICD-10-CM

## 2025-07-21 DIAGNOSIS — E29.1 HYPOGONADISM MALE: Chronic | ICD-10-CM

## 2025-07-21 DIAGNOSIS — E78.5 HYPERLIPIDEMIA ASSOCIATED WITH TYPE 2 DIABETES MELLITUS: Chronic | ICD-10-CM

## 2025-07-21 DIAGNOSIS — I15.2 OBESITY, DIABETES, AND HYPERTENSION SYNDROME: Chronic | ICD-10-CM

## 2025-07-21 DIAGNOSIS — E11.69 HYPERLIPIDEMIA ASSOCIATED WITH TYPE 2 DIABETES MELLITUS: Chronic | ICD-10-CM

## 2025-07-21 PROBLEM — N52.1 ERECTILE DYSFUNCTION ASSOCIATED WITH TYPE 2 DIABETES MELLITUS: Chronic | Status: ACTIVE | Noted: 2022-12-09

## 2025-07-21 PROCEDURE — 4010F ACE/ARB THERAPY RXD/TAKEN: CPT | Mod: CPTII,S$GLB,, | Performed by: FAMILY MEDICINE

## 2025-07-21 PROCEDURE — 3074F SYST BP LT 130 MM HG: CPT | Mod: CPTII,S$GLB,, | Performed by: FAMILY MEDICINE

## 2025-07-21 PROCEDURE — 3078F DIAST BP <80 MM HG: CPT | Mod: CPTII,S$GLB,, | Performed by: FAMILY MEDICINE

## 2025-07-21 PROCEDURE — 3044F HG A1C LEVEL LT 7.0%: CPT | Mod: CPTII,S$GLB,, | Performed by: FAMILY MEDICINE

## 2025-07-21 PROCEDURE — 1159F MED LIST DOCD IN RCRD: CPT | Mod: CPTII,S$GLB,, | Performed by: FAMILY MEDICINE

## 2025-07-21 PROCEDURE — 99214 OFFICE O/P EST MOD 30 MIN: CPT | Mod: S$GLB,,, | Performed by: FAMILY MEDICINE

## 2025-07-21 PROCEDURE — 99999 PR PBB SHADOW E&M-EST. PATIENT-LVL V: CPT | Mod: PBBFAC,,, | Performed by: FAMILY MEDICINE

## 2025-07-21 PROCEDURE — 3072F LOW RISK FOR RETINOPATHY: CPT | Mod: CPTII,S$GLB,, | Performed by: FAMILY MEDICINE

## 2025-07-21 PROCEDURE — G2211 COMPLEX E/M VISIT ADD ON: HCPCS | Mod: S$GLB,,, | Performed by: FAMILY MEDICINE

## 2025-07-21 PROCEDURE — 3066F NEPHROPATHY DOC TX: CPT | Mod: CPTII,S$GLB,, | Performed by: FAMILY MEDICINE

## 2025-07-21 PROCEDURE — 3061F NEG MICROALBUMINURIA REV: CPT | Mod: CPTII,S$GLB,, | Performed by: FAMILY MEDICINE

## 2025-07-21 PROCEDURE — 1160F RVW MEDS BY RX/DR IN RCRD: CPT | Mod: CPTII,S$GLB,, | Performed by: FAMILY MEDICINE

## 2025-07-21 PROCEDURE — 3008F BODY MASS INDEX DOCD: CPT | Mod: CPTII,S$GLB,, | Performed by: FAMILY MEDICINE

## 2025-07-21 RX ORDER — BLOOD-GLUCOSE SENSOR
EACH MISCELLANEOUS
Qty: 3 EACH | Refills: 11 | Status: SHIPPED | OUTPATIENT
Start: 2025-07-21

## 2025-07-21 RX ORDER — TIRZEPATIDE 15 MG/.5ML
15 INJECTION, SOLUTION SUBCUTANEOUS WEEKLY
Qty: 12 PEN | Refills: 1 | Status: SHIPPED | OUTPATIENT
Start: 2025-07-21

## 2025-07-21 NOTE — ASSESSMENT & PLAN NOTE
Lab Results   Component Value Date    TESTOSTERONE 328 08/12/2022    TESTOSTERONE 27.1 (L) 08/12/2022    TOTALTESTOST 517 05/07/2025    TOTALTESTOST 265 (L) 12/23/2024    TOTALTESTOST 375 08/19/2024    LABLH <0.1 (L) 06/09/2025    FSH 0.30 (L) 06/09/2025    PSA 1.58 05/07/2025    PSA 0.62 08/19/2024    PSA 0.77 06/16/2022    HGB 16.4 06/24/2025    HGB 16.6 05/07/2025    HGB 15.5 12/23/2024    HCT 51.7 06/24/2025    HCT 54.5 (H) 05/07/2025    HCT 48.2 12/23/2024    ESTRADIOL 90 (H) 06/09/2025     He wants to continue testosterone therapy. He reports that he continues to feel that the therapeutic benefit he is realizing outweighs the risks associated with testosterone therapy.

## 2025-07-21 NOTE — ASSESSMENT & PLAN NOTE
Lab Results   Component Value Date    TSH 2.999 04/04/2025    TSH 6.380 (H) 02/17/2025    TSH 2.499 09/09/2024    FREET4 0.91 02/17/2025    FREET4 0.91 09/09/2024    FREET4 0.98 09/15/2023

## 2025-07-21 NOTE — PROGRESS NOTES
OFFICE VISIT 7/21/25 11:00 AM CDT    CHIEF COMPLAINT: Annual Exam    SUMMARY: Type 2 diabetes is well controlled with recent A1c improvement, weight loss, goal of reduced insulin dose; continuous glucose monitoring was initiated. Hypertension and hyperlipidemia are stable with ongoing medications and improved BP and cholesterol. Hypogonadism is stable on testosterone therapy. Hypothyroidism is stable on levothyroxine. Multiple stable lung nodules require continued surveillance. Obesity is improving with weight loss and ongoing interventions.    Type 2 diabetes mellitus with other specified complication, with long-term current use of insulin: He reported feeling comfortable with tirzepatide (Mounjaro) and denied hypoglycemia. He lost 7-8 lbs since the last visit. A1c improved from 5.5% on 2/17/2025 to 5.2% on 4/4/2025. Blood glucose monitoring is managed with a newly initiated Dexcom G7 continuous glucose monitor and . Trujeo insulin dose was reduced from 48 to 43 units daily. Metformin (Glucophage) 1000 mg twice daily and tirzepatide (Mounjaro) 15 mg weekly were continued. He was instructed to bring his CGM to the next appointment for review. Diabetes labs were ordered for the end of September. Weight loss was confirmed on exam. Follow-up for diabetes labs was planned.    Hypogonadism male: He reported continued therapeutic benefit from testosterone therapy and requested to continue. Total testosterone levels measured 517 on 5/7/2025 and 265 (low) on 12/23/2024. LH and FSH were low on 6/9/2025. Estradiol was elevated at 90 on 6/9/2025. Hemoglobin and hematocrit normalized from 16.6 and 54.5 (high) on 5/7/2025 to 16.4 and 51.7 on 6/24/2025. Testosterone cypionate (Depotestosterone Cypionate) 150 mg IM weekly was continued. PSA was 1.58 on 5/7/2025.    Obesity, diabetes, and hypertension syndrome: His BMI was 33.54 kg/m2 with weight reduction from 118.8 kg on 6/24/2025 to 115.3 kg on 7/21/2025. He was advised  to continue current medications and weight management measures. Compression socks were recommended for leg swelling.    Hypothyroidism (acquired): TSH was 2.999 on 4/4/2025 and 6.380 (high) on 2/17/2025. Free T4 was 0.91 on 2/17/2025. He continued levothyroxine (Synthroid) 125 mcg daily.    Hypertension associated with diabetes: Office BP readings improved to 128/60 on 7/21/2025. Home BP 30-day average was within target. Benazepril (Lotensin) 40 mg daily, metoprolol succinate (Toprol-XL) 50 mg daily, and hydralazine (Apresoline) 100 mg every 8 hours were continued.    Hyperlipidemia associated with type 2 diabetes mellitus: Total cholesterol was low at 98 on 6/24/2025; triglycerides were 193 (high); HDL was 27 (low); LDL was 32.4 (low). Atorvastatin (Lipitor) 10 mg daily and icosapent ethyl (Vascepa) 2 g twice daily were continued.    Multiple lung nodules on CT: CT chest on 10/22/2024 showed stable bilateral pulmonary nodules without change compared to prior imaging. Continued surveillance with repeat CT at a 3-year interval was recommended. He is scheduled with Mayur Webb MD in rheumatology on 7/29/2025.    Follow up for diabetes labs is planned for the end of September. He is to return for an office visit with Lydia Gibbs PA-C in early October with A1c lab a few days before. He has a rheumatology appointment with Mayur Webb MD on 7/29/2025, a cardiology appointment with Kwame Guidry MD on 8/27/2025, an ophthalmology appointment with Dagoberto Neff OD on 8/28/2025, urology appointments with Shad Acosta MD on 9/15/2025 and 10/31/2025, an allergy appointment with Teressa Riggins MD on 9/23/2025, and a sleep appointment with Elizabeth B. Lejeune, NP on 12/10/2025.      1. Type 2 diabetes mellitus with other specified complication, with long-term current use of insulin  Overview:  Complications include obesity, hypertension, hyperlipidemia, and erectile dysfunction.    Orders:  -     MOUNJARO 15  mg/0.5 mL PnIj; Inject 15 mg into the skin once a week.  Dispense: 12 Pen; Refill: 1  -     blood-glucose,,cont (DEXCOM ) Misc; Use as directed to monitor blood glucose.  Dispense: 1 each; Refill: 0  -     blood-glucose sensor (DEXCOM G7 SENSOR) Inga; Use as directed for continuous glucose monitoring.  Dispense: 3 each; Refill: 11    2. Hypogonadism male  Assessment & Plan:  Lab Results   Component Value Date    TESTOSTERONE 328 08/12/2022    TESTOSTERONE 27.1 (L) 08/12/2022    TOTALTESTOST 517 05/07/2025    TOTALTESTOST 265 (L) 12/23/2024    TOTALTESTOST 375 08/19/2024    LABLH <0.1 (L) 06/09/2025    FSH 0.30 (L) 06/09/2025    PSA 1.58 05/07/2025    PSA 0.62 08/19/2024    PSA 0.77 06/16/2022    HGB 16.4 06/24/2025    HGB 16.6 05/07/2025    HGB 15.5 12/23/2024    HCT 51.7 06/24/2025    HCT 54.5 (H) 05/07/2025    HCT 48.2 12/23/2024    ESTRADIOL 90 (H) 06/09/2025     He wants to continue testosterone therapy. He reports that he continues to feel that the therapeutic benefit he is realizing outweighs the risks associated with testosterone therapy.       3. Obesity, diabetes, and hypertension syndrome  Overview:  No history or family history of thyroid cancer or multiple endocrine neoplasia syndrome.       4. Hypothyroidism (acquired)  Assessment & Plan:  Lab Results   Component Value Date    TSH 2.999 04/04/2025    TSH 6.380 (H) 02/17/2025    TSH 2.499 09/09/2024    FREET4 0.91 02/17/2025    FREET4 0.91 09/09/2024    FREET4 0.98 09/15/2023         5. Hypertension associated with diabetes    6. Hyperlipidemia associated with type 2 diabetes mellitus    7. Multiple lung nodules on CT  Overview:  10/22/2024 CT Chest Without Contrast: Bilateral pulmonary nodules unchanged; mild atherosclerotic disease; thoracic spondylosis. No new nodules; normal heart size; no effusion; no pathologic beverly enlargement; normal base of neck and body wall.  PLAN: Repeat CT at 3 year interval.           Unless specified  "otherwise, chronic conditions are represented as and appear to be compensated/controlled and stable.  Today's visit involved the intricate management of episodic problem(s) and the ongoing care for the patient's serious or complex condition(s) listed above, reflecting the inherent complexity of providing longitudinal, comprehensive evaluation and management as the central hub for the patient's primary care services.    Except as noted herein, ROS is otherwise negative.    Vitals:    07/21/25 1046   BP: 128/60   BP Location: Right arm   Patient Position: Sitting   Pulse: 77   Resp: 18   Temp: 96.2 °F (35.7 °C)   TempSrc: Tympanic   SpO2: 97%   Weight: 115.3 kg (254 lb 3.1 oz)   Height: 6' 1" (1.854 m)   Physical Exam  Vitals reviewed.   Constitutional:       General: He is not in acute distress.     Appearance: Normal appearance. He is not ill-appearing or diaphoretic.   Cardiovascular:      Rate and Rhythm: Normal rate and regular rhythm.   Pulmonary:      Effort: Pulmonary effort is normal.      Breath sounds: Normal breath sounds.   Skin:     General: Skin is warm and dry.   Neurological:      Mental Status: He is alert and oriented to person, place, and time. Mental status is at baseline.   Psychiatric:         Mood and Affect: Mood normal.         Behavior: Behavior normal.         Judgment: Judgment normal.         Documentation entered by me for this encounter may have been done in part using ambient-listening and speech-recognition technologies. I have reviewed the content for accuracy, though errors in syntax, spelling, or similar-sounding words may be present and should be interpreted in context. Please contact the author for any clarification.     Follow up in about 2 months (around 10/3/2025) for OV with Lydia Gibbs PA-C in early October; A1c (standing order lab) a few days before.    FOR FOLLOW-UP AT NEXT APPOINTMENT FFUNA  Goal: Gradually reduce insulin dose as tolerated.   "

## 2025-07-28 ENCOUNTER — PATIENT MESSAGE (OUTPATIENT)
Dept: INTERNAL MEDICINE | Facility: CLINIC | Age: 60
End: 2025-07-28
Payer: COMMERCIAL

## 2025-07-29 ENCOUNTER — OFFICE VISIT (OUTPATIENT)
Dept: RHEUMATOLOGY | Facility: CLINIC | Age: 60
End: 2025-07-29
Payer: COMMERCIAL

## 2025-07-29 VITALS
WEIGHT: 255.75 LBS | DIASTOLIC BLOOD PRESSURE: 69 MMHG | SYSTOLIC BLOOD PRESSURE: 110 MMHG | BODY MASS INDEX: 33.9 KG/M2 | HEART RATE: 61 BPM | HEIGHT: 73 IN

## 2025-07-29 DIAGNOSIS — M1A.09X1 IDIOPATHIC CHRONIC GOUT OF MULTIPLE SITES WITH TOPHUS: Primary | Chronic | ICD-10-CM

## 2025-07-29 DIAGNOSIS — R52 BREAKTHROUGH PAIN: ICD-10-CM

## 2025-07-29 DIAGNOSIS — M11.20 CHONDROCALCINOSIS: ICD-10-CM

## 2025-07-29 DIAGNOSIS — M48.10 DISH (DIFFUSE IDIOPATHIC SKELETAL HYPEROSTOSIS): ICD-10-CM

## 2025-07-29 DIAGNOSIS — L81.6 POIKILODERMA: ICD-10-CM

## 2025-07-29 DIAGNOSIS — M15.0 PRIMARY OSTEOARTHRITIS INVOLVING MULTIPLE JOINTS: ICD-10-CM

## 2025-07-29 DIAGNOSIS — Z79.4 TYPE 2 DIABETES MELLITUS WITH OTHER SPECIFIED COMPLICATION, WITH LONG-TERM CURRENT USE OF INSULIN: Chronic | ICD-10-CM

## 2025-07-29 DIAGNOSIS — E11.69 TYPE 2 DIABETES MELLITUS WITH OTHER SPECIFIED COMPLICATION, WITH LONG-TERM CURRENT USE OF INSULIN: Chronic | ICD-10-CM

## 2025-07-29 DIAGNOSIS — Z51.81 MEDICATION MONITORING ENCOUNTER: ICD-10-CM

## 2025-07-29 DIAGNOSIS — Z78.9 FAILURE OF OUTPATIENT TREATMENT: ICD-10-CM

## 2025-07-29 DIAGNOSIS — Z71.89 COUNSELING ON HEALTH PROMOTION AND DISEASE PREVENTION: ICD-10-CM

## 2025-07-29 PROCEDURE — G2211 COMPLEX E/M VISIT ADD ON: HCPCS | Mod: S$GLB,,, | Performed by: INTERNAL MEDICINE

## 2025-07-29 PROCEDURE — 3061F NEG MICROALBUMINURIA REV: CPT | Mod: CPTII,S$GLB,, | Performed by: INTERNAL MEDICINE

## 2025-07-29 PROCEDURE — 3072F LOW RISK FOR RETINOPATHY: CPT | Mod: CPTII,S$GLB,, | Performed by: INTERNAL MEDICINE

## 2025-07-29 PROCEDURE — 4010F ACE/ARB THERAPY RXD/TAKEN: CPT | Mod: CPTII,S$GLB,, | Performed by: INTERNAL MEDICINE

## 2025-07-29 PROCEDURE — 3066F NEPHROPATHY DOC TX: CPT | Mod: CPTII,S$GLB,, | Performed by: INTERNAL MEDICINE

## 2025-07-29 PROCEDURE — 99214 OFFICE O/P EST MOD 30 MIN: CPT | Mod: S$GLB,,, | Performed by: INTERNAL MEDICINE

## 2025-07-29 PROCEDURE — 3074F SYST BP LT 130 MM HG: CPT | Mod: CPTII,S$GLB,, | Performed by: INTERNAL MEDICINE

## 2025-07-29 PROCEDURE — 3044F HG A1C LEVEL LT 7.0%: CPT | Mod: CPTII,S$GLB,, | Performed by: INTERNAL MEDICINE

## 2025-07-29 PROCEDURE — 3008F BODY MASS INDEX DOCD: CPT | Mod: CPTII,S$GLB,, | Performed by: INTERNAL MEDICINE

## 2025-07-29 PROCEDURE — 1159F MED LIST DOCD IN RCRD: CPT | Mod: CPTII,S$GLB,, | Performed by: INTERNAL MEDICINE

## 2025-07-29 PROCEDURE — 3078F DIAST BP <80 MM HG: CPT | Mod: CPTII,S$GLB,, | Performed by: INTERNAL MEDICINE

## 2025-07-29 PROCEDURE — 99999 PR PBB SHADOW E&M-EST. PATIENT-LVL III: CPT | Mod: PBBFAC,,, | Performed by: INTERNAL MEDICINE

## 2025-07-29 NOTE — PROGRESS NOTES
RHEUMATOLOGY OUTPATIENT CLINIC NOTE    7/29/2025    Attending Rheumatologist: Mayur Webb  Primary Care Provider/Physician Requesting Consultation: TESSY Magdaleno MD   Chief Complaint/Reason For Consultation:  Gout      Subjective:     Narendra English Sr. is a 60 y.o. White male with mixed crystal arthropathy history    Prior GI intolerance to allopurinol.  On chronic therapy with Uloric at 40mg QD and colchicine.  Receiving Trevon per A&I for eczema refractory to dupixent.  Refers last gout flare approx 2m ago.  No acute complaints.     Review of Systems   Constitutional:  Negative for fever.   Eyes:  Negative for redness.   Respiratory:  Negative for cough and shortness of breath.    Cardiovascular:  Negative for chest pain.   Gastrointestinal:  Negative for constipation and diarrhea.   Genitourinary:  Negative for dysuria, hematuria and urgency.   Musculoskeletal:  Negative for joint pain.   Skin:  Negative for rash.   Neurological:  Negative for focal weakness and headaches.   Endo/Heme/Allergies:  Bruises/bleeds easily.       Chronic comorbid conditions affecting medical decision making today:  Past Medical History:   Diagnosis Date    Allergy     Cancer     Colon    Diabetes mellitus      09/14/2023 Insulin x 10 years    Encounter for screening colonoscopy 1/20/2023    Gastroesophageal reflux disease without esophagitis 06/20/2023    Hypertension     Multiple lung nodules on CT 10/22/2024    Sleep apnea     Thyroid disease      Past Surgical History:   Procedure Laterality Date    BACK SURGERY      CARPAL TUNNEL RELEASE Left 12/10/2024    Procedure: RELEASE, CARPAL TUNNEL;  Surgeon: Lita Brown MD;  Location: [facility] OR;  Service: Orthopedics;  Laterality: Left;  [id number] Left cubital tunnel release, in situ  [id number] Left carpal tunnel release    CARPAL TUNNEL RELEASE Right 12/31/2024    Procedure: RELEASE, CARPAL TUNNEL;  Surgeon: Lita Brown MD;  Location: [facility] OR;  Service:  Orthopedics;  Laterality: Right;    COLON SURGERY  02/06/21    COLONOSCOPY N/A 12/29/2020    Procedure: COLONOSCOPY;  Surgeon: William Cotter MD;  Location: [facility] ENDO;  Service: Endoscopy;  Laterality: N/A;  Will need Rapid doesn't live here    COLONOSCOPY N/A 02/10/2022    Procedure: COLONOSCOPY;  Surgeon: Wili Espinosa MD;  Location: [facility] ENDO;  Service: Endoscopy;  Laterality: N/A;    EPIDURAL STEROID INJECTION INTO CERVICAL SPINE N/A 12/01/2022    Procedure: C7/T1 IL SALBADOR;  Surgeon: Leonard Mart MD;  Location: [facility] PAIN MGT;  Service: Pain Management;  Laterality: N/A;    EPIDURAL STEROID INJECTION INTO CERVICAL SPINE N/A 7/17/2024    Procedure: C6/7 IL SALBADOR, Left Paramedian Approach;  Surgeon: Leonard Mart MD;  Location: [facility] PAIN MGT;  Service: Pain Management;  Laterality: N/A;    FESS, WITH NASAL SEPTOPLASTY, WITH IMAGING GUIDANCE Bilateral 08/17/2022    Procedure: FESS, WITH NASAL SEPTOPLASTY, WITH IMAGING GUIDANCE;  Surgeon: Viktor Ferrell MD;  Location: [facility] OR;  Service: ENT;  Laterality: Bilateral;    LAPAROSCOPIC RIGHT COLON RESECTION N/A 02/02/2021    Procedure: COLECTOMY, RIGHT, LAPAROSCOPIC, ERAS low;  Surgeon: Melonie Santoyo MD;  Location: [facility] OR 2ND FLR;  Service: Colon and Rectal;  Laterality: N/A;  needs rapid covid test    LYSIS OF ADHESIONS Bilateral 04/19/2023    Procedure: LYSIS, ADHESIONS;  Surgeon: Viktor Ferrell MD;  Location: [facility] OR;  Service: ENT;  Laterality: Bilateral;    NASAL ENDOSCOPY Bilateral 04/19/2023    Procedure: ENDOSCOPY, NOSE;  Surgeon: Viktor Ferrell MD;  Location: [facility] OR;  Service: ENT;  Laterality: Bilateral;    NASAL TURBINATE REDUCTION Bilateral 08/17/2022    Procedure: REDUCTION, NASAL TURBINATE;  Surgeon: Viktor Ferrell MD;  Location: [facility] OR;  Service: ENT;  Laterality: Bilateral;    RHINOPLASTY Bilateral 04/19/2023    Procedure: RHINOPLASTY;  Surgeon: Viktor Ferrell MD;  Location: [facility] OR;  Service: ENT;  Laterality:  Bilateral;    SELECTIVE INJECTION OF ANESTHETIC AGENT AROUND LUMBAR SPINAL NERVE ROOT BY TRANSFORAMINAL APPROACH Bilateral 07/05/2023    Procedure: Bilateral L4/5 TF SALBADOR RN IV Sedation;  Surgeon: Leonard Mart MD;  Location: [facility] PAIN MGT;  Service: Pain Management;  Laterality: Bilateral;    TONSILLECTOMY      ULNAR TUNNEL RELEASE Left 12/10/2024    Procedure: RELEASE, CUBITAL TUNNEL;  Surgeon: Lita Brown MD;  Location: [facility] OR;  Service: Orthopedics;  Laterality: Left;     Family History   Problem Relation Name Age of Onset    Hypertension Mother Kesha English     Diabetes Mother Kesha English     Breast cancer Mother Kesha English     Cancer Mother Kesha English     Hypertension Father Sandeep English     Stroke Father Sandeep English     No Known Problems Sister      No Known Problems Sister      Hypertension Brother Brother     Hypertension Brother Mauro English     Cataracts Maternal Grandmother Manda Quinn     Diabetes Maternal Grandmother Manda Quinn     Lung cancer Maternal Grandfather      No Known Problems Paternal Grandmother      No Known Problems Paternal Grandfather       Tobacco Use History[1]  Current Medications[2]     Objective:     Vitals:    07/29/25 1442   BP: 110/69   Pulse: 61     Physical Exam   Pulmonary/Chest: Effort normal. No respiratory distress.   Musculoskeletal:         General: No swelling or tenderness. Normal range of motion.       Reviewed available old and all outside pertinent medical records available.    All lab results personally reviewed and interpreted by me.       ASSESSMENT / PLAN     1. Idiopathic chronic gout of multiple sites with tophus  Recurrent inflammatory pattern knee arthralgias.  UrA almost on target.  Increase uloric dose to target UrA <5mg/dL.  Plan to c/ daily colchicine until UrA on target 2 consecutive measurements  Ambulatory referral/consult to Rheumatology    Uric Acid      2. Chondrocalcinosis  Local CSI per orthopedics PRN CPPD flare       3. Breakthrough pain  Short course PDN will be called in PRN      4. Failure of outpatient treatment  GI intolerance to allopurinol      5. Poikiloderma  No features of PsO on skin biopsy on file      6. Medication monitoring encounter  Comprehensive Metabolic Panel      7. DISH (diffuse idiopathic skeletal hyperostosis)  HLA B27 Antigen      8. Primary osteoarthritis involving multiple joints  Cyclic Citrullinated Peptide Antibody, IgG    Rheumatoid Factor      9. Type 2 diabetes mellitus with other specified complication, with long-term current use of insulin  Caution advised with systemic steroid use      10. Counseling on health promotion and disease prevention  Low purine and mediterranean diet.  Limit NSAID use to <14 days.                Visit today included increased complexity associated with the care of the episodic problem idiopathic chronic gout of multiple sites with tophus addressed and managing the longitudinal care of the patient due to the serious and/or complex managed problem(s) Medication monitoring encounter, Failure of outpatient treatment , counseling on health promotion and disease prevention     Mayur Webb M.D.           [1]   Social History  Tobacco Use   Smoking Status Never    Passive exposure: Never   Smokeless Tobacco Never   [2]   Current Outpatient Medications:     atorvastatin (LIPITOR) 10 MG tablet, Take 1 tablet (10 mg total) by mouth every evening., Disp: 90 tablet, Rfl: 3    benazepriL (LOTENSIN) 40 MG tablet, Take 1 tablet (40 mg total) by mouth once daily., Disp: 90 tablet, Rfl: 3    blood-glucose sensor (DEXCOM G7 SENSOR) Inga, Use as directed for continuous glucose monitoring., Disp: 3 each, Rfl: 11    blood-glucose,,cont (DEXCOM ) Misc, Use as directed to monitor blood glucose., Disp: 1 each, Rfl: 0    calcipotriene (DOVONOX) 0.005 % cream, Apply 1 application  topically 2 (two) times daily., Disp: , Rfl:     clonazePAM (KLONOPIN) 1 MG tablet, Take 1  tablet by mouth in the evening, Disp: 30 tablet, Rfl: 4    colchicine (COLCRYS) 0.6 mg tablet, Take 1 tablet (0.6 mg total) by mouth once daily., Disp: 180 tablet, Rfl: 3    doxepin (SINEQUAN) 10 MG capsule, Take 1 capsule (10 mg total) by mouth every evening., Disp: 30 capsule, Rfl: 11    doxycycline (VIBRAMYCIN) 100 MG Cap, Take 1 capsule by mouth once daily, Disp: 60 capsule, Rfl: 0    febuxostat (ULORIC) 40 mg Tab, Take 1 tablet (40 mg total) by mouth once daily., Disp: 30 tablet, Rfl: 5    hydrALAZINE (APRESOLINE) 100 MG tablet, Take 1 tablet (100 mg total) by mouth every 8 (eight) hours., Disp: 270 tablet, Rfl: 3    hydrOXYzine HCL (ATARAX) 25 MG tablet, Take 25 mg by mouth 2 (two) times daily., Disp: , Rfl:     indomethacin (INDOCIN SR) 75 mg CpSR CR capsule, Take 1 capsule (75 mg total) by mouth 2 (two) times daily as needed (for gout flare). MAX  3 TABLETS PER WEEK., Disp: 30 capsule, Rfl: 2    insulin glargine, TOUJEO, (TOUJEO SOLOSTAR U-300 INSULIN) 300 unit/mL (1.5 mL) InPn pen, Inject 48 Units into the skin once daily., Disp: 12 mL, Rfl: 3    levothyroxine (SYNTHROID) 125 MCG tablet, Take 1 tablet (125 mcg total) by mouth once daily., Disp: 90 tablet, Rfl: 3    metFORMIN (GLUCOPHAGE) 1000 MG tablet, Take 1 tablet (1,000 mg total) by mouth 2 (two) times daily with meals., Disp: 180 tablet, Rfl: 3    metoprolol succinate (TOPROL-XL) 50 MG 24 hr tablet, Take 1 tablet (50 mg total) by mouth once daily., Disp: 90 tablet, Rfl: 3    mometasone (ELOCON) 0.1 % ointment, Apply topically once daily., Disp: 45 g, Rfl: 2    MOUNJARO 15 mg/0.5 mL PnIj, Inject 15 mg into the skin once a week., Disp: 12 Pen, Rfl: 1    multivitamin (THERAGRAN) per tablet, Take 1 tablet by mouth once daily., Disp: , Rfl:     pregabalin (LYRICA) 150 MG capsule, Take 1 capsule (150 mg total) by mouth 2 (two) times daily., Disp: 60 capsule, Rfl: 2    tadalafiL (CIALIS) 20 MG Tab, Take 1 tablet (20 mg total) by mouth every 24 hours as needed  (erectile dysfunction)., Disp: 20 tablet, Rfl: 11    testosterone cypionate (DEPOTESTOTERONE CYPIONATE) 200 mg/mL injection, Inject 0.75 mLs (150 mg total) into the muscle every 7 days., Disp: 10 mL, Rfl: 5    tiZANidine (ZANAFLEX) 4 MG tablet, TAKE 1 TABLET BY MOUTH TWICE DAILY AS NEEDED (NECK  PAIN), Disp: 60 tablet, Rfl: 1    traMADoL (ULTRAM) 50 mg tablet, Take 1 tablet (50 mg total) by mouth every 8 (eight) hours as needed for Pain., Disp: 60 tablet, Rfl: 0    triamcinolone acetonide 0.1% (KENALOG) 0.1 % ointment, AAA bid, Disp: 454 g, Rfl: 1    upadacitinib (RINVOQ) 15 mg 24 hr tablet, Take 1 tablet (15 mg total) by mouth once daily., Disp: 30 tablet, Rfl: 11    VASCEPA 1 gram Cap, Take 2 capsules (2,000 mg total) by mouth 2 (two) times daily., Disp: 360 capsule, Rfl: 1

## 2025-07-30 ENCOUNTER — PATIENT MESSAGE (OUTPATIENT)
Dept: RHEUMATOLOGY | Facility: CLINIC | Age: 60
End: 2025-07-30
Payer: COMMERCIAL

## 2025-07-30 NOTE — TELEPHONE ENCOUNTER
It sounds like he's asking to restart Humalog. At his last appointment with me, he did not indicate that he was on Humalog. His diabetes was well controlled. If he's wanting to make a medication change, he can schedule a virtual video visit with me or with my Advanced Practice Partner, Lydia Gibbs PA-C.

## 2025-07-31 DIAGNOSIS — M10.9 GOUT, UNSPECIFIED CAUSE, UNSPECIFIED CHRONICITY, UNSPECIFIED SITE: ICD-10-CM

## 2025-07-31 RX ORDER — FEBUXOSTAT 80 MG/1
80 TABLET, FILM COATED ORAL DAILY
Qty: 30 TABLET | Refills: 3 | Status: SHIPPED | OUTPATIENT
Start: 2025-07-31 | End: 2025-08-30

## 2025-08-04 ENCOUNTER — PATIENT MESSAGE (OUTPATIENT)
Dept: INTERNAL MEDICINE | Facility: CLINIC | Age: 60
End: 2025-08-04
Payer: COMMERCIAL

## 2025-08-07 DIAGNOSIS — E29.1 HYPOGONADISM MALE: ICD-10-CM

## 2025-08-10 DIAGNOSIS — M54.12 CERVICAL RADICULOPATHY: ICD-10-CM

## 2025-08-10 DIAGNOSIS — M54.16 LUMBAR RADICULOPATHY: ICD-10-CM

## 2025-08-11 RX ORDER — PREGABALIN 150 MG/1
150 CAPSULE ORAL 2 TIMES DAILY
Qty: 60 CAPSULE | Refills: 2 | Status: SHIPPED | OUTPATIENT
Start: 2025-08-11 | End: 2025-08-13 | Stop reason: SDUPTHER

## 2025-08-11 RX ORDER — TESTOSTERONE CYPIONATE 200 MG/ML
INJECTION, SOLUTION INTRAMUSCULAR
Qty: 10 ML | Refills: 5 | Status: SHIPPED | OUTPATIENT
Start: 2025-08-11

## 2025-08-14 ENCOUNTER — PATIENT MESSAGE (OUTPATIENT)
Dept: INTERNAL MEDICINE | Facility: CLINIC | Age: 60
End: 2025-08-14
Payer: COMMERCIAL

## 2025-08-14 ENCOUNTER — OFFICE VISIT (OUTPATIENT)
Dept: PAIN MEDICINE | Facility: CLINIC | Age: 60
End: 2025-08-14
Payer: COMMERCIAL

## 2025-08-14 VITALS
HEIGHT: 73 IN | WEIGHT: 259.5 LBS | RESPIRATION RATE: 17 BRPM | DIASTOLIC BLOOD PRESSURE: 58 MMHG | BODY MASS INDEX: 34.39 KG/M2 | HEART RATE: 64 BPM | SYSTOLIC BLOOD PRESSURE: 108 MMHG

## 2025-08-14 DIAGNOSIS — M48.02 CERVICAL STENOSIS OF SPINAL CANAL: ICD-10-CM

## 2025-08-14 DIAGNOSIS — M54.16 LUMBAR RADICULOPATHY: ICD-10-CM

## 2025-08-14 DIAGNOSIS — M54.12 CERVICAL RADICULOPATHY: Primary | ICD-10-CM

## 2025-08-14 DIAGNOSIS — M50.30 DDD (DEGENERATIVE DISC DISEASE), CERVICAL: ICD-10-CM

## 2025-08-14 DIAGNOSIS — M47.812 CERVICAL SPONDYLOSIS: ICD-10-CM

## 2025-08-14 PROCEDURE — 3061F NEG MICROALBUMINURIA REV: CPT | Mod: CPTII,S$GLB,, | Performed by: PHYSICIAN ASSISTANT

## 2025-08-14 PROCEDURE — 3066F NEPHROPATHY DOC TX: CPT | Mod: CPTII,S$GLB,, | Performed by: PHYSICIAN ASSISTANT

## 2025-08-14 PROCEDURE — 1160F RVW MEDS BY RX/DR IN RCRD: CPT | Mod: CPTII,S$GLB,, | Performed by: PHYSICIAN ASSISTANT

## 2025-08-14 PROCEDURE — 3078F DIAST BP <80 MM HG: CPT | Mod: CPTII,S$GLB,, | Performed by: PHYSICIAN ASSISTANT

## 2025-08-14 PROCEDURE — 99999 PR PBB SHADOW E&M-EST. PATIENT-LVL V: CPT | Mod: PBBFAC,,, | Performed by: PHYSICIAN ASSISTANT

## 2025-08-14 PROCEDURE — 99214 OFFICE O/P EST MOD 30 MIN: CPT | Mod: S$GLB,,, | Performed by: PHYSICIAN ASSISTANT

## 2025-08-14 PROCEDURE — 3008F BODY MASS INDEX DOCD: CPT | Mod: CPTII,S$GLB,, | Performed by: PHYSICIAN ASSISTANT

## 2025-08-14 PROCEDURE — 3072F LOW RISK FOR RETINOPATHY: CPT | Mod: CPTII,S$GLB,, | Performed by: PHYSICIAN ASSISTANT

## 2025-08-14 PROCEDURE — 1159F MED LIST DOCD IN RCRD: CPT | Mod: CPTII,S$GLB,, | Performed by: PHYSICIAN ASSISTANT

## 2025-08-14 PROCEDURE — 3044F HG A1C LEVEL LT 7.0%: CPT | Mod: CPTII,S$GLB,, | Performed by: PHYSICIAN ASSISTANT

## 2025-08-14 PROCEDURE — 3074F SYST BP LT 130 MM HG: CPT | Mod: CPTII,S$GLB,, | Performed by: PHYSICIAN ASSISTANT

## 2025-08-14 PROCEDURE — 4010F ACE/ARB THERAPY RXD/TAKEN: CPT | Mod: CPTII,S$GLB,, | Performed by: PHYSICIAN ASSISTANT

## 2025-08-14 RX ORDER — PREGABALIN 200 MG/1
200 CAPSULE ORAL 2 TIMES DAILY
Qty: 60 CAPSULE | Refills: 2 | Status: SHIPPED | OUTPATIENT
Start: 2025-08-14

## 2025-08-14 RX ORDER — TIZANIDINE 4 MG/1
TABLET ORAL
Qty: 60 TABLET | Refills: 1 | Status: SHIPPED | OUTPATIENT
Start: 2025-08-14

## 2025-08-21 ENCOUNTER — OFFICE VISIT (OUTPATIENT)
Dept: INTERNAL MEDICINE | Facility: CLINIC | Age: 60
End: 2025-08-21
Payer: COMMERCIAL

## 2025-08-21 DIAGNOSIS — E78.5 HYPERLIPIDEMIA ASSOCIATED WITH TYPE 2 DIABETES MELLITUS: Chronic | ICD-10-CM

## 2025-08-21 DIAGNOSIS — E11.69 TYPE 2 DIABETES MELLITUS WITH OTHER SPECIFIED COMPLICATION, WITH LONG-TERM CURRENT USE OF INSULIN: Primary | Chronic | ICD-10-CM

## 2025-08-21 DIAGNOSIS — I15.2 HYPERTENSION ASSOCIATED WITH DIABETES: Chronic | ICD-10-CM

## 2025-08-21 DIAGNOSIS — E11.59 HYPERTENSION ASSOCIATED WITH DIABETES: Chronic | ICD-10-CM

## 2025-08-21 DIAGNOSIS — Z79.4 TYPE 2 DIABETES MELLITUS WITH OTHER SPECIFIED COMPLICATION, WITH LONG-TERM CURRENT USE OF INSULIN: Primary | Chronic | ICD-10-CM

## 2025-08-21 DIAGNOSIS — G47.33 OBSTRUCTIVE SLEEP APNEA ON CPAP: Chronic | ICD-10-CM

## 2025-08-21 DIAGNOSIS — E11.69 HYPERLIPIDEMIA ASSOCIATED WITH TYPE 2 DIABETES MELLITUS: Chronic | ICD-10-CM

## 2025-08-21 PROCEDURE — 1160F RVW MEDS BY RX/DR IN RCRD: CPT | Mod: CPTII,95,, | Performed by: FAMILY MEDICINE

## 2025-08-21 PROCEDURE — 4010F ACE/ARB THERAPY RXD/TAKEN: CPT | Mod: CPTII,95,, | Performed by: FAMILY MEDICINE

## 2025-08-21 PROCEDURE — 98006 SYNCH AUDIO-VIDEO EST MOD 30: CPT | Mod: 95,,, | Performed by: FAMILY MEDICINE

## 2025-08-21 PROCEDURE — 3061F NEG MICROALBUMINURIA REV: CPT | Mod: CPTII,95,, | Performed by: FAMILY MEDICINE

## 2025-08-21 PROCEDURE — 3044F HG A1C LEVEL LT 7.0%: CPT | Mod: CPTII,95,, | Performed by: FAMILY MEDICINE

## 2025-08-21 PROCEDURE — 3066F NEPHROPATHY DOC TX: CPT | Mod: CPTII,95,, | Performed by: FAMILY MEDICINE

## 2025-08-21 PROCEDURE — G2211 COMPLEX E/M VISIT ADD ON: HCPCS | Mod: 95,,, | Performed by: FAMILY MEDICINE

## 2025-08-21 PROCEDURE — 3072F LOW RISK FOR RETINOPATHY: CPT | Mod: CPTII,95,, | Performed by: FAMILY MEDICINE

## 2025-08-21 PROCEDURE — 1159F MED LIST DOCD IN RCRD: CPT | Mod: CPTII,95,, | Performed by: FAMILY MEDICINE

## 2025-08-21 RX ORDER — INSULIN LISPRO 100 [IU]/ML
3 INJECTION, SOLUTION INTRAVENOUS; SUBCUTANEOUS
Qty: 15 ML | Refills: 1 | Status: SHIPPED | OUTPATIENT
Start: 2025-08-21

## 2025-08-21 RX ORDER — PEN NEEDLE, DIABETIC 30 GX3/16"
NEEDLE, DISPOSABLE MISCELLANEOUS
Qty: 200 EACH | Refills: 5 | Status: SHIPPED | OUTPATIENT
Start: 2025-08-21

## 2025-08-27 ENCOUNTER — OFFICE VISIT (OUTPATIENT)
Dept: CARDIOLOGY | Facility: CLINIC | Age: 60
End: 2025-08-27
Payer: COMMERCIAL

## 2025-08-27 VITALS
OXYGEN SATURATION: 95 % | HEIGHT: 73 IN | WEIGHT: 266.13 LBS | BODY MASS INDEX: 35.27 KG/M2 | HEART RATE: 64 BPM | SYSTOLIC BLOOD PRESSURE: 137 MMHG | DIASTOLIC BLOOD PRESSURE: 77 MMHG

## 2025-08-27 DIAGNOSIS — E78.49 OTHER HYPERLIPIDEMIA: ICD-10-CM

## 2025-08-27 DIAGNOSIS — E11.59 HYPERTENSION ASSOCIATED WITH DIABETES: Primary | ICD-10-CM

## 2025-08-27 DIAGNOSIS — E78.1 HYPERTRIGLYCERIDEMIA: ICD-10-CM

## 2025-08-27 DIAGNOSIS — I15.2 HYPERTENSION ASSOCIATED WITH DIABETES: Primary | ICD-10-CM

## 2025-08-27 DIAGNOSIS — E78.5 HYPERLIPIDEMIA ASSOCIATED WITH TYPE 2 DIABETES MELLITUS: ICD-10-CM

## 2025-08-27 DIAGNOSIS — E03.9 HYPOTHYROIDISM (ACQUIRED): ICD-10-CM

## 2025-08-27 DIAGNOSIS — Z79.4 TYPE 2 DIABETES MELLITUS WITH OTHER CIRCULATORY COMPLICATION, WITH LONG-TERM CURRENT USE OF INSULIN: ICD-10-CM

## 2025-08-27 DIAGNOSIS — E11.69 TYPE 2 DIABETES MELLITUS WITH OTHER SPECIFIED COMPLICATION, UNSPECIFIED WHETHER LONG TERM INSULIN USE: ICD-10-CM

## 2025-08-27 DIAGNOSIS — G47.33 OSA (OBSTRUCTIVE SLEEP APNEA): ICD-10-CM

## 2025-08-27 DIAGNOSIS — E78.5 HYPERLIPIDEMIA, UNSPECIFIED HYPERLIPIDEMIA TYPE: ICD-10-CM

## 2025-08-27 DIAGNOSIS — Z79.4 TYPE 2 DIABETES MELLITUS WITHOUT COMPLICATION, WITH LONG-TERM CURRENT USE OF INSULIN: ICD-10-CM

## 2025-08-27 DIAGNOSIS — I10 HYPERTENSION, UNSPECIFIED TYPE: ICD-10-CM

## 2025-08-27 DIAGNOSIS — E11.59 TYPE 2 DIABETES MELLITUS WITH OTHER CIRCULATORY COMPLICATION, WITH LONG-TERM CURRENT USE OF INSULIN: ICD-10-CM

## 2025-08-27 DIAGNOSIS — E11.9 TYPE 2 DIABETES MELLITUS WITHOUT COMPLICATION, WITH LONG-TERM CURRENT USE OF INSULIN: ICD-10-CM

## 2025-08-27 DIAGNOSIS — R94.31 NONSPECIFIC ABNORMAL ELECTROCARDIOGRAM (ECG) (EKG): ICD-10-CM

## 2025-08-27 DIAGNOSIS — E11.69 HYPERLIPIDEMIA ASSOCIATED WITH TYPE 2 DIABETES MELLITUS: ICD-10-CM

## 2025-08-27 DIAGNOSIS — R79.89 LOW TESTOSTERONE: ICD-10-CM

## 2025-08-27 DIAGNOSIS — I10 BENIGN ESSENTIAL HTN: ICD-10-CM

## 2025-08-27 DIAGNOSIS — E11.9 TYPE 2 DIABETES MELLITUS WITHOUT COMPLICATION, WITHOUT LONG-TERM CURRENT USE OF INSULIN: ICD-10-CM

## 2025-08-27 PROCEDURE — 3061F NEG MICROALBUMINURIA REV: CPT | Mod: CPTII,S$GLB,, | Performed by: INTERNAL MEDICINE

## 2025-08-27 PROCEDURE — 3066F NEPHROPATHY DOC TX: CPT | Mod: CPTII,S$GLB,, | Performed by: INTERNAL MEDICINE

## 2025-08-27 PROCEDURE — 1159F MED LIST DOCD IN RCRD: CPT | Mod: CPTII,S$GLB,, | Performed by: INTERNAL MEDICINE

## 2025-08-27 PROCEDURE — 3072F LOW RISK FOR RETINOPATHY: CPT | Mod: CPTII,S$GLB,, | Performed by: INTERNAL MEDICINE

## 2025-08-27 PROCEDURE — G2211 COMPLEX E/M VISIT ADD ON: HCPCS | Mod: S$GLB,,, | Performed by: INTERNAL MEDICINE

## 2025-08-27 PROCEDURE — 3075F SYST BP GE 130 - 139MM HG: CPT | Mod: CPTII,S$GLB,, | Performed by: INTERNAL MEDICINE

## 2025-08-27 PROCEDURE — 99214 OFFICE O/P EST MOD 30 MIN: CPT | Mod: S$GLB,,, | Performed by: INTERNAL MEDICINE

## 2025-08-27 PROCEDURE — 3044F HG A1C LEVEL LT 7.0%: CPT | Mod: CPTII,S$GLB,, | Performed by: INTERNAL MEDICINE

## 2025-08-27 PROCEDURE — 1160F RVW MEDS BY RX/DR IN RCRD: CPT | Mod: CPTII,S$GLB,, | Performed by: INTERNAL MEDICINE

## 2025-08-27 PROCEDURE — 99999 PR PBB SHADOW E&M-EST. PATIENT-LVL V: CPT | Mod: PBBFAC,,, | Performed by: INTERNAL MEDICINE

## 2025-08-27 PROCEDURE — 3078F DIAST BP <80 MM HG: CPT | Mod: CPTII,S$GLB,, | Performed by: INTERNAL MEDICINE

## 2025-08-27 PROCEDURE — 4010F ACE/ARB THERAPY RXD/TAKEN: CPT | Mod: CPTII,S$GLB,, | Performed by: INTERNAL MEDICINE

## 2025-08-27 PROCEDURE — 3008F BODY MASS INDEX DOCD: CPT | Mod: CPTII,S$GLB,, | Performed by: INTERNAL MEDICINE

## 2025-08-28 ENCOUNTER — OFFICE VISIT (OUTPATIENT)
Dept: OPHTHALMOLOGY | Facility: CLINIC | Age: 60
End: 2025-08-28
Payer: COMMERCIAL

## 2025-08-28 DIAGNOSIS — E11.9 DIABETES MELLITUS WITHOUT COMPLICATION: Primary | ICD-10-CM

## 2025-08-28 DIAGNOSIS — H52.223 REGULAR ASTIGMATISM OF BOTH EYES WITH PRESBYOPIA: ICD-10-CM

## 2025-08-28 DIAGNOSIS — H25.813 COMBINED FORM OF AGE-RELATED CATARACT, BOTH EYES: ICD-10-CM

## 2025-08-28 DIAGNOSIS — H52.4 REGULAR ASTIGMATISM OF BOTH EYES WITH PRESBYOPIA: ICD-10-CM

## 2025-08-28 PROCEDURE — 2023F DILAT RTA XM W/O RTNOPTHY: CPT | Mod: CPTII,S$GLB,, | Performed by: OPTOMETRIST

## 2025-08-28 PROCEDURE — 1159F MED LIST DOCD IN RCRD: CPT | Mod: CPTII,S$GLB,, | Performed by: OPTOMETRIST

## 2025-08-28 PROCEDURE — 92014 COMPRE OPH EXAM EST PT 1/>: CPT | Mod: S$GLB,,, | Performed by: OPTOMETRIST

## 2025-08-28 PROCEDURE — 3044F HG A1C LEVEL LT 7.0%: CPT | Mod: CPTII,S$GLB,, | Performed by: OPTOMETRIST

## 2025-08-28 PROCEDURE — 4010F ACE/ARB THERAPY RXD/TAKEN: CPT | Mod: CPTII,S$GLB,, | Performed by: OPTOMETRIST

## 2025-08-28 PROCEDURE — 99999 PR PBB SHADOW E&M-EST. PATIENT-LVL IV: CPT | Mod: PBBFAC,,, | Performed by: OPTOMETRIST

## 2025-08-28 PROCEDURE — 92015 DETERMINE REFRACTIVE STATE: CPT | Mod: S$GLB,,, | Performed by: OPTOMETRIST

## 2025-08-28 PROCEDURE — 1160F RVW MEDS BY RX/DR IN RCRD: CPT | Mod: CPTII,S$GLB,, | Performed by: OPTOMETRIST

## 2025-08-28 PROCEDURE — 3066F NEPHROPATHY DOC TX: CPT | Mod: CPTII,S$GLB,, | Performed by: OPTOMETRIST

## 2025-08-28 PROCEDURE — 3061F NEG MICROALBUMINURIA REV: CPT | Mod: CPTII,S$GLB,, | Performed by: OPTOMETRIST

## (undated) DEVICE — KIT ANTIFOG

## (undated) DEVICE — NDL HYPO 27G X 1 1/2

## (undated) DEVICE — GLOVE SURGICAL LATEX SZ 8

## (undated) DEVICE — SYR 10CC LUER LOCK

## (undated) DEVICE — COVER LIGHT HANDLE 80/CA

## (undated) DEVICE — SUPPORT ULNA NERVE PROTECTOR

## (undated) DEVICE — COVER CAMERA OPERATING ROOM

## (undated) DEVICE — KIT VUETIP TROCAR SWAB

## (undated) DEVICE — SUT CTD VICRYL 4-0 P-2

## (undated) DEVICE — GOWN FAB REINF SET-IN SLV LG

## (undated) DEVICE — SUT PROLENE 6-0 BL MONOPC-1

## (undated) DEVICE — SUT MONOCYRL 4-0 PS2 UND

## (undated) DEVICE — ADHESIVE DERMABOND ADVANCED

## (undated) DEVICE — DEV-O-LOOPS MAXI RED

## (undated) DEVICE — STRIP MEDI WND CLSR 1/2X4IN

## (undated) DEVICE — SPONGE PATTY SURGICAL .5X3IN

## (undated) DEVICE — DRAPE STERI-DRAPE 1000 17X11IN

## (undated) DEVICE — SOL 9P NACL IRR PIC IL

## (undated) DEVICE — CUTTER PROXIMATE BLUE 75MM

## (undated) DEVICE — TROCAR ENDOPATH XCEL 5X100MM

## (undated) DEVICE — SUT MONOCRYL 4-0 PS-2

## (undated) DEVICE — UNDERGLOVES BIOGEL PI SZ 7 LF

## (undated) DEVICE — CORD CAUTERY BIPOLAR STERILE

## (undated) DEVICE — ALCOHOL 70% ANTISEPTIC ISO 4OZ

## (undated) DEVICE — DRAPE U SPLIT SHEET 54X76IN

## (undated) DEVICE — SEE MEDLINE ITEM 152622

## (undated) DEVICE — TOWEL OR DISP STRL BLUE 4/PK

## (undated) DEVICE — BLADE INFERIOR TURBINATE 2MM

## (undated) DEVICE — BLADE SURG #15 CARBON STEEL

## (undated) DEVICE — DRAPE STERI INSTRUMENT 1018

## (undated) DEVICE — SUT 1 36IN PDS II

## (undated) DEVICE — STAPLER INT PROX TX 60X3.5MM

## (undated) DEVICE — BLADE TRICUT ROTATABLE 4MM

## (undated) DEVICE — PACK BASIC SETUP SC BR

## (undated) DEVICE — SUT PROLENE 3-0 30 RB-1 BL

## (undated) DEVICE — GAUZE SPONGE 4X4 12PLY

## (undated) DEVICE — POSITIONER HEAD DONUT 9IN FOAM

## (undated) DEVICE — APPLICATOR CHLORAPREP ORN 26ML

## (undated) DEVICE — HEADREST ROUND DISP FOAM 9IN

## (undated) DEVICE — ELECTRODE REM PLYHSV RETURN 9

## (undated) DEVICE — DRAPE HAND STERILE

## (undated) DEVICE — DRAPE ABDOMINAL TIBURON 14X11

## (undated) DEVICE — KIT TURNOVER

## (undated) DEVICE — Device

## (undated) DEVICE — DRESSING N ADH OIL EMUL 3X3

## (undated) DEVICE — DRAPE THREE-QTR REINF 53X77IN

## (undated) DEVICE — SUT 3-0 ETHILON 18 FS-1

## (undated) DEVICE — CLOSURE SKIN STERI STRIP 1/2X4

## (undated) DEVICE — SPLINT NASAL EXTERNAL MED

## (undated) DEVICE — SPONGE GAUZE 4X4 12 PLY STRL

## (undated) DEVICE — TUBING SUCTION STRAIGHT .25X20

## (undated) DEVICE — NDL BOX COUNTER

## (undated) DEVICE — GOWN SMARTGOWN LVL4 X-LONG XL

## (undated) DEVICE — SUT 3/0 27IN PDS II VIO MO

## (undated) DEVICE — SEE MEDLINE ITEM 154981

## (undated) DEVICE — APPLICATOR STRL COT 2INNR 6IN

## (undated) DEVICE — NDL INSUF ULTRA VERESS 120MM

## (undated) DEVICE — SYR BULB EAR/ULCER STER 3OZ

## (undated) DEVICE — JELLY LUBRICATING STERILE 5 GR

## (undated) DEVICE — BANDAGE ELASTIC 3X5 VELCRO ST

## (undated) DEVICE — DRESSING TRANS 4X4 TEGADERM

## (undated) DEVICE — MARKER SKIN STND TIP BLUE BARR

## (undated) DEVICE — DRAPE INCISE IOBAN 2 23X17IN

## (undated) DEVICE — BLADE TRICUT 3.5MM

## (undated) DEVICE — SEE MEDLINE ITEM 152487

## (undated) DEVICE — SUT 4-0 ETHILON 18 PS-2

## (undated) DEVICE — SYR B-D DISP CONTROL 10CC100/C

## (undated) DEVICE — BLADE TRICUT

## (undated) DEVICE — SPONGE CODMAN SURG NEUR .5X1.5

## (undated) DEVICE — SUT CTD VICRYL 4-0 BR PS-2

## (undated) DEVICE — SUT 5/0 18IN PROLENE BL MO

## (undated) DEVICE — DRAPE ORTH SPLIT 77X108IN

## (undated) DEVICE — SUT 5/0 18IN PLAIN FAST AB

## (undated) DEVICE — SEE L#120831

## (undated) DEVICE — SEE MEDLINE ITEM 146417

## (undated) DEVICE — SEE MEDLINE ITEM 157144

## (undated) DEVICE — SCRUB 10% POVIDONE IODINE 4OZ

## (undated) DEVICE — TROCAR ENDOPATH XCEL 5MM 7.5CM

## (undated) DEVICE — NDL N SERIES MICRO-DISSECTION

## (undated) DEVICE — NDL SAFETY 22G X 1.5 ECLIPSE

## (undated) DEVICE — MANIFOLD 4 PORT

## (undated) DEVICE — TOURNIQUET SB QC DP 18X4IN

## (undated) DEVICE — SYR 50CC LL

## (undated) DEVICE — ADHESIVE MASTISOL VIAL 48/BX

## (undated) DEVICE — BNDG COFLEX FOAM LF2 ST 3X5YD

## (undated) DEVICE — TRACKER ENT INSTRUMENT

## (undated) DEVICE — TRAY FOLEY 16FR INFECTION CONT

## (undated) DEVICE — GLOVE SURGEONS ULTRA TOUCH 6.5

## (undated) DEVICE — TROCAR ENDOPATH XCEL 5X75MM

## (undated) DEVICE — SUT PLAIN 4-0 SC-1 18IN

## (undated) DEVICE — BANDAGE ESMARK ELASTIC ST 4X9

## (undated) DEVICE — IRRIGATOR ENDOSCOPY DISP.

## (undated) DEVICE — SUT PROLENE 3-0 SH DA 36 BL

## (undated) DEVICE — HANDSWITCH SUCTION COAG

## (undated) DEVICE — RELOAD PROXIMATE CUT BLUE 75MM

## (undated) DEVICE — SEALER LIGASURE LAP 37CM 5MM

## (undated) DEVICE — SEE MEDLINE ITEM 156902

## (undated) DEVICE — SUT 4-0 CHROMIC GUT / SH

## (undated) DEVICE — GOWN POLY REINF BRTH SLV XL

## (undated) DEVICE — SPLINT INTRANASAL POSISEP .6X2

## (undated) DEVICE — NDL SPINAL 25GX3.5 SPINOCAN

## (undated) DEVICE — TUBING HF INSUFFLATION W/ FLTR